# Patient Record
Sex: MALE | Race: WHITE | NOT HISPANIC OR LATINO | Employment: OTHER | URBAN - METROPOLITAN AREA
[De-identification: names, ages, dates, MRNs, and addresses within clinical notes are randomized per-mention and may not be internally consistent; named-entity substitution may affect disease eponyms.]

---

## 2019-02-05 ENCOUNTER — TELEPHONE (OUTPATIENT)
Dept: INTERNAL MEDICINE | Facility: CLINIC | Age: 49
End: 2019-02-05

## 2019-02-05 NOTE — TELEPHONE ENCOUNTER
Spoke with pts. Wife and scheduled an appointment for 3/18/19 at 3:20 to establish care   Akin/MA

## 2019-03-18 ENCOUNTER — OFFICE VISIT (OUTPATIENT)
Dept: INTERNAL MEDICINE | Facility: CLINIC | Age: 49
End: 2019-03-18
Attending: INTERNAL MEDICINE
Payer: MEDICARE

## 2019-03-18 ENCOUNTER — LAB VISIT (OUTPATIENT)
Dept: LAB | Facility: OTHER | Age: 49
End: 2019-03-18
Attending: INTERNAL MEDICINE
Payer: MEDICARE

## 2019-03-18 VITALS
BODY MASS INDEX: 36.18 KG/M2 | OXYGEN SATURATION: 97 % | HEART RATE: 115 BPM | SYSTOLIC BLOOD PRESSURE: 100 MMHG | WEIGHT: 184.31 LBS | HEIGHT: 60 IN | DIASTOLIC BLOOD PRESSURE: 72 MMHG

## 2019-03-18 DIAGNOSIS — R79.9 ABNORMAL FINDING OF BLOOD CHEMISTRY: ICD-10-CM

## 2019-03-18 DIAGNOSIS — R06.81 WITNESSED APNEIC SPELLS: ICD-10-CM

## 2019-03-18 DIAGNOSIS — G89.29 CHRONIC BILATERAL LOW BACK PAIN WITH SCIATICA, SCIATICA LATERALITY UNSPECIFIED: ICD-10-CM

## 2019-03-18 DIAGNOSIS — M54.40 CHRONIC BILATERAL LOW BACK PAIN WITH SCIATICA, SCIATICA LATERALITY UNSPECIFIED: ICD-10-CM

## 2019-03-18 DIAGNOSIS — M25.511 CHRONIC PAIN OF BOTH SHOULDERS: ICD-10-CM

## 2019-03-18 DIAGNOSIS — F43.10 PTSD (POST-TRAUMATIC STRESS DISORDER): ICD-10-CM

## 2019-03-18 DIAGNOSIS — E78.5 HYPERLIPIDEMIA, UNSPECIFIED HYPERLIPIDEMIA TYPE: ICD-10-CM

## 2019-03-18 DIAGNOSIS — E25.9 CONGENITAL ADRENAL HYPERPLASIA DUE TO 21-HYDROXYLASE DEFICIENCY (21-OH CAH), LATE ONSET: ICD-10-CM

## 2019-03-18 DIAGNOSIS — G25.0 ESSENTIAL TREMOR: ICD-10-CM

## 2019-03-18 DIAGNOSIS — G89.29 CHRONIC PAIN OF BOTH SHOULDERS: ICD-10-CM

## 2019-03-18 DIAGNOSIS — M79.7 FIBROMYALGIA: ICD-10-CM

## 2019-03-18 DIAGNOSIS — M25.512 CHRONIC PAIN OF BOTH SHOULDERS: ICD-10-CM

## 2019-03-18 DIAGNOSIS — Z00.00 ENCOUNTER FOR MEDICAL EXAMINATION TO ESTABLISH CARE: Primary | ICD-10-CM

## 2019-03-18 LAB
ALBUMIN SERPL BCP-MCNC: 3.9 G/DL
ALP SERPL-CCNC: 127 U/L
ALT SERPL W/O P-5'-P-CCNC: 23 U/L
ANION GAP SERPL CALC-SCNC: 11 MMOL/L
AST SERPL-CCNC: 20 U/L
BILIRUB SERPL-MCNC: 0.5 MG/DL
BUN SERPL-MCNC: 14 MG/DL
CALCIUM SERPL-MCNC: 10 MG/DL
CHLORIDE SERPL-SCNC: 107 MMOL/L
CHOLEST SERPL-MCNC: 218 MG/DL
CHOLEST/HDLC SERPL: 4.6 {RATIO}
CO2 SERPL-SCNC: 26 MMOL/L
CREAT SERPL-MCNC: 0.8 MG/DL
EST. GFR  (AFRICAN AMERICAN): >60 ML/MIN/1.73 M^2
EST. GFR  (NON AFRICAN AMERICAN): >60 ML/MIN/1.73 M^2
ESTIMATED AVG GLUCOSE: 120 MG/DL
GLUCOSE SERPL-MCNC: 101 MG/DL
HBA1C MFR BLD HPLC: 5.8 %
HDLC SERPL-MCNC: 47 MG/DL
HDLC SERPL: 21.6 %
LDLC SERPL CALC-MCNC: 139.2 MG/DL
NONHDLC SERPL-MCNC: 171 MG/DL
POTASSIUM SERPL-SCNC: 4.1 MMOL/L
PROT SERPL-MCNC: 6.8 G/DL
SODIUM SERPL-SCNC: 144 MMOL/L
T4 FREE SERPL-MCNC: 0.81 NG/DL
TRIGL SERPL-MCNC: 159 MG/DL
TSH SERPL DL<=0.005 MIU/L-ACNC: 0.4 UIU/ML

## 2019-03-18 PROCEDURE — 80053 COMPREHEN METABOLIC PANEL: CPT

## 2019-03-18 PROCEDURE — 99215 OFFICE O/P EST HI 40 MIN: CPT | Mod: PBBFAC | Performed by: INTERNAL MEDICINE

## 2019-03-18 PROCEDURE — 99999 PR PBB SHADOW E&M-EST. PATIENT-LVL V: CPT | Mod: PBBFAC,,, | Performed by: INTERNAL MEDICINE

## 2019-03-18 PROCEDURE — 85025 COMPLETE CBC W/AUTO DIFF WBC: CPT

## 2019-03-18 PROCEDURE — 80061 LIPID PANEL: CPT

## 2019-03-18 PROCEDURE — 99999 PR PBB SHADOW E&M-EST. PATIENT-LVL V: ICD-10-PCS | Mod: PBBFAC,,, | Performed by: INTERNAL MEDICINE

## 2019-03-18 PROCEDURE — 84443 ASSAY THYROID STIM HORMONE: CPT

## 2019-03-18 PROCEDURE — 99214 OFFICE O/P EST MOD 30 MIN: CPT | Mod: S$PBB,,, | Performed by: INTERNAL MEDICINE

## 2019-03-18 PROCEDURE — 36415 COLL VENOUS BLD VENIPUNCTURE: CPT

## 2019-03-18 PROCEDURE — 84439 ASSAY OF FREE THYROXINE: CPT

## 2019-03-18 PROCEDURE — 83036 HEMOGLOBIN GLYCOSYLATED A1C: CPT

## 2019-03-18 PROCEDURE — 99214 PR OFFICE/OUTPT VISIT, EST, LEVL IV, 30-39 MIN: ICD-10-PCS | Mod: S$PBB,,, | Performed by: INTERNAL MEDICINE

## 2019-03-18 RX ORDER — OXYCODONE AND ACETAMINOPHEN 5; 325 MG/1; MG/1
1 TABLET ORAL EVERY 8 HOURS PRN
Qty: 90 TABLET | Refills: 0 | Status: SHIPPED | OUTPATIENT
Start: 2019-03-18 | End: 2019-04-12 | Stop reason: SDUPTHER

## 2019-03-18 RX ORDER — LAMOTRIGINE 200 MG/1
200 TABLET ORAL 2 TIMES DAILY
COMMUNITY
End: 2020-01-08 | Stop reason: SDUPTHER

## 2019-03-18 RX ORDER — SALIVA STIMULANT COMB. NO.4
SPRAY, NON-AEROSOL (ML) MUCOUS MEMBRANE
Status: ON HOLD | COMMUNITY
End: 2019-09-17 | Stop reason: HOSPADM

## 2019-03-18 RX ORDER — OXYCODONE HYDROCHLORIDE 15 MG/1
15 TABLET ORAL NIGHTLY PRN
Qty: 30 TABLET | Refills: 0 | Status: SHIPPED | OUTPATIENT
Start: 2019-03-18 | End: 2019-04-12 | Stop reason: SDUPTHER

## 2019-03-18 RX ORDER — ALPRAZOLAM 1 MG/1
1 TABLET ORAL 3 TIMES DAILY PRN
COMMUNITY
End: 2019-10-14 | Stop reason: SDUPTHER

## 2019-03-18 RX ORDER — OXYCODONE HYDROCHLORIDE 15 MG/1
TABLET ORAL
COMMUNITY
End: 2019-03-18 | Stop reason: SDUPTHER

## 2019-03-18 RX ORDER — GABAPENTIN 800 MG/1
800 TABLET ORAL 2 TIMES DAILY
COMMUNITY
End: 2019-08-21 | Stop reason: SDUPTHER

## 2019-03-18 RX ORDER — ROPINIROLE 4 MG/1
4 TABLET, FILM COATED ORAL NIGHTLY
COMMUNITY
End: 2020-01-24 | Stop reason: SDUPTHER

## 2019-03-18 RX ORDER — ATORVASTATIN CALCIUM 10 MG/1
10 TABLET, FILM COATED ORAL NIGHTLY
COMMUNITY
End: 2019-06-26 | Stop reason: SDUPTHER

## 2019-03-18 RX ORDER — OXYCODONE AND ACETAMINOPHEN 5; 325 MG/1; MG/1
TABLET ORAL
COMMUNITY
End: 2019-03-18 | Stop reason: SDUPTHER

## 2019-03-18 RX ORDER — PRIMIDONE 50 MG/1
50 TABLET ORAL 3 TIMES DAILY
COMMUNITY
End: 2020-03-23 | Stop reason: SDUPTHER

## 2019-03-18 RX ORDER — NAPROXEN SODIUM 220 MG
220 TABLET ORAL NIGHTLY
Status: ON HOLD | COMMUNITY
End: 2019-09-17 | Stop reason: HOSPADM

## 2019-03-18 RX ORDER — LORATADINE 10 MG/1
10 TABLET ORAL DAILY
COMMUNITY

## 2019-03-18 RX ORDER — CYCLOBENZAPRINE HCL 10 MG
TABLET ORAL
COMMUNITY
End: 2019-03-25 | Stop reason: SDUPTHER

## 2019-03-18 NOTE — PROGRESS NOTES
Subjective:       Patient ID: Ari Santos is a 49 y.o. female.    Chief Complaint: Establish Care    Here to establish care    Chronic low back pain/Fibromyalgia/Hx of hip fracture/Cyst on spine s/p removal  -managed on chronic opiates, long acting and prn breakthrough,muscle relaxer. States PT worsened pain. Spinal stenosis-has had several MAYURI and RFA which was effective but is wearing. Multiple Missing muscles in quads, lumbar stenosis, painful neuropathy.     CAH- managed with daily prednisone. Had surgical correction around age 1 to create vagina, multiple subsequent procedures. Had breast implantation at age 15 and starting around age 16 started having corrective procedures including removal of breast implants and corrective genital procedures.    -PTSD-secondary to his primary medical Hx of CAH. He would like to establish mental health here at [facility]. MD office alone is a common trigger for him creating panic.  -RLS-Diagnosed 30+ yrs prior. requip for this  -HLD-on atorvastatin.               Review of Systems   Constitutional: Negative for activity change and unexpected weight change.   HENT: Negative for hearing loss, rhinorrhea and trouble swallowing.    Eyes: Negative for discharge and visual disturbance.   Respiratory: Negative for chest tightness and wheezing.    Cardiovascular: Negative for chest pain and palpitations.   Gastrointestinal: Negative for blood in stool, constipation, diarrhea and vomiting.   Endocrine: Negative for polydipsia and polyuria.   Genitourinary: Positive for dysuria. Negative for difficulty urinating, hematuria and menstrual problem.   Musculoskeletal: Positive for arthralgias, back pain, neck pain and neck stiffness. Negative for joint swelling.   Neurological: Positive for weakness. Negative for headaches.   Psychiatric/Behavioral: Positive for dysphoric mood and sleep disturbance. Negative for confusion and suicidal ideas. The patient is nervous/anxious.        No  past medical history on file.  No past surgical history on file.  No family history on file.  Social History     Socioeconomic History    Marital status:      Spouse name: Not on file    Number of children: Not on file    Years of education: Not on file    Highest education level: Not on file   Occupational History    Not on file   Social Needs    Financial resource strain: Not on file    Food insecurity:     Worry: Not on file     Inability: Not on file    Transportation needs:     Medical: Not on file     Non-medical: Not on file   Tobacco Use    Smoking status: Former Smoker    Smokeless tobacco: Never Used   Substance and Sexual Activity    Alcohol use: Not on file    Drug use: Not on file    Sexual activity: Not on file   Lifestyle    Physical activity:     Days per week: Not on file     Minutes per session: Not on file    Stress: Not on file   Relationships    Social connections:     Talks on phone: Not on file     Gets together: Not on file     Attends [employer] service: Not on file     Active member of club or organization: Not on file     Attends meetings of clubs or organizations: Not on file     Relationship status: Not on file    Intimate partner violence:     Fear of current or ex partner: Not on file     Emotionally abused: Not on file     Physically abused: Not on file     Forced sexual activity: Not on file   Other Topics Concern    Not on file   Social History Narrative    Not on file         Objective:      Vitals:    03/18/19 1520   BP: 100/72   Pulse: (!) 115   SpO2: 97%   Weight: 83.6 kg (184 lb 4.9 oz)   Height: 5' (1.524 m)      Physical Exam   Constitutional: She is oriented to person, place, and time. She appears well-developed and well-nourished. No distress.   HENT:   Head: Normocephalic and atraumatic.   Mouth/Throat: Oropharynx is clear and moist. No oropharyngeal exudate.   Eyes: Pupils are equal, round, and reactive to light. Conjunctivae and EOM are normal.  No scleral icterus.   Neck: No thyromegaly present.   Cardiovascular: Normal rate, regular rhythm and normal heart sounds.   No murmur heard.  Pulmonary/Chest: Effort normal and breath sounds normal. She has no wheezes. She has no rales.   Abdominal: Soft. She exhibits no distension. There is no tenderness.   Musculoskeletal: She exhibits no edema or tenderness.   Lymphadenopathy:     She has no cervical adenopathy.   Neurological: She is alert and oriented to person, place, and time.   Skin: Skin is warm and dry.   Psychiatric: She has a normal mood and affect. Her behavior is normal.       Assessment:       1. Encounter for medical examination to establish care    2. Chronic bilateral low back pain with sciatica, sciatica laterality unspecified    3. 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 Fibromyalgia    5. Essential tremor    6. Chronic pain of both shoulders    7. Hyperlipidemia, unspecified hyperlipidemia type    8. Witnessed apneic spells    9. PTSD (post-traumatic stress disorder)    10. Abnormal finding of blood chemistry         Plan:       Ari was seen today for establish care.    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 Will review chart for present anatomy and make note of appropriate cancer screening per guidelines and discuss with pt at f/u. That being said for now I will have automatic reminders for PAP and MMG removed from pt file at this time. Will ensure pt is aware of recommendations as I get more information. Care everywhere not available likely on  account of name change and lack of electronic crossover.    -     Ambulatory Referral to Endocrinology  -     Ambulatory referral to Urology    Fibromyalgia  -     Ambulatory Referral to Pain Clinic    Essential tremor  -     Ambulatory Referral to Neurology  -     Comprehensive metabolic panel; Future  -     Lipid panel; Future  -     TSH; Future  -     CBC auto differential; Future  -     Hemoglobin A1c; Future    Chronic pain of both shoulders  Pt on significant chronic opiate regimen. I made it clear to patient and partner that I will refill his medications for continuation of care and avoidance of withdrawal but that this regimen is very much outside of my comfort zone and that I will not be Rx this chronically moving forward. They were both understanding, calm and appreciative.  -     Ambulatory Referral to Pain Clinic    Hyperlipidemia, unspecified hyperlipidemia type  -     Lipid panel; Future    Witnessed apneic spells  -     Ambulatory referral to Sleep Disorders    PTSD (post-traumatic stress disorder)  -     Ambulatory Referral to Psychiatry  -     Ambulatory referral to Psychiatry  -     Ambulatory referral to Psychiatry  -     Ambulatory referral to Psychiatry    Abnormal finding of blood chemistry   -     Hemoglobin A1c; Future    Other orders  -     oxyCODONE-acetaminophen (PERCOCET) 5-325 mg per tablet; Take 1 tablet by mouth every 8 (eight) hours as needed for Pain.  -     oxyCODONE (ROXICODONE) 15 MG Tab; Take 1 tablet (15 mg total) by mouth nightly as needed for Pain.    RTC in 6 months or sooner prn            Side effects of medication(s) were discussed in detail and patient voiced understanding.  Patient will call back for any issues or complications.

## 2019-03-19 ENCOUNTER — TELEPHONE (OUTPATIENT)
Dept: INTERNAL MEDICINE | Facility: CLINIC | Age: 49
End: 2019-03-19

## 2019-03-19 DIAGNOSIS — R79.89 LOW TSH LEVEL: Primary | ICD-10-CM

## 2019-03-19 DIAGNOSIS — R73.03 PREDIABETES: ICD-10-CM

## 2019-03-19 PROBLEM — M54.42 CHRONIC BILATERAL LOW BACK PAIN WITH BILATERAL SCIATICA: Status: ACTIVE | Noted: 2019-03-19

## 2019-03-19 PROBLEM — M48.062 SPINAL STENOSIS OF LUMBAR REGION WITH NEUROGENIC CLAUDICATION: Status: ACTIVE | Noted: 2019-03-19

## 2019-03-19 PROBLEM — E25.0 CONGENITAL ADRENAL HYPERPLASIA: Status: ACTIVE | Noted: 2019-03-19

## 2019-03-19 PROBLEM — G25.2 INTENTION TREMOR: Status: ACTIVE | Noted: 2019-03-19

## 2019-03-19 PROBLEM — G25.81 RLS (RESTLESS LEGS SYNDROME): Status: ACTIVE | Noted: 2019-03-19

## 2019-03-19 PROBLEM — M79.7 FIBROMYALGIA: Status: ACTIVE | Noted: 2019-03-19

## 2019-03-19 PROBLEM — F43.10 PTSD (POST-TRAUMATIC STRESS DISORDER): Status: ACTIVE | Noted: 2019-03-19

## 2019-03-19 PROBLEM — Z87.890: Status: ACTIVE | Noted: 2019-03-19

## 2019-03-19 PROBLEM — J30.2 SEASONAL ALLERGIES: Status: ACTIVE | Noted: 2019-03-19

## 2019-03-19 PROBLEM — M54.41 CHRONIC BILATERAL LOW BACK PAIN WITH BILATERAL SCIATICA: Status: ACTIVE | Noted: 2019-03-19

## 2019-03-19 PROBLEM — G89.29 CHRONIC BILATERAL LOW BACK PAIN WITH BILATERAL SCIATICA: Status: ACTIVE | Noted: 2019-03-19

## 2019-03-19 PROBLEM — E78.5 HLD (HYPERLIPIDEMIA): Status: ACTIVE | Noted: 2019-03-19

## 2019-03-19 NOTE — TELEPHONE ENCOUNTER
Patient modifiers has been updated, registry removal for wellness registry female has been requested. Thank you.

## 2019-03-19 NOTE — TELEPHONE ENCOUNTER
Notified CCC's to remove patient from female-oriented health maintenance reminders.    Message from Dr. Mitchell:      Pt both with congenital adrenal hyperplasia and was given sex assignment surgeries as child that have since been reversed.

## 2019-03-21 ENCOUNTER — TELEPHONE (OUTPATIENT)
Dept: INTERNAL MEDICINE | Facility: CLINIC | Age: 49
End: 2019-03-21

## 2019-03-22 DIAGNOSIS — Z12.11 COLON CANCER SCREENING: ICD-10-CM

## 2019-03-25 ENCOUNTER — TELEPHONE (OUTPATIENT)
Dept: ENDOCRINOLOGY | Facility: CLINIC | Age: 49
End: 2019-03-25

## 2019-03-25 ENCOUNTER — PATIENT MESSAGE (OUTPATIENT)
Dept: INTERNAL MEDICINE | Facility: CLINIC | Age: 49
End: 2019-03-25

## 2019-03-25 NOTE — TELEPHONE ENCOUNTER
----- Message from Berkley Beckford sent at 3/25/2019  3:07 PM CDT -----  Consult    Dr Mitchell   Put in referral    Want pt to See dr Alvarez  - Complicated Case  CONGENTIAL ADRENAL  CAH-     Pt would rather wait to see Dr Alvarez      Can you find a place to work in  -   Doesn't have  To be soon  Pt will wait       Please call wife   Mrs Santos   Ph [phone]    Thanks

## 2019-03-25 NOTE — TELEPHONE ENCOUNTER
Left vm message that there are no openings at all right now with Dr Alvarez.  She can try scheduling from time to time via patient portal and I have placed him on our cancellation list.  Otherwise call back on April 1st and can schedule next available in August.  Patient cancelled June visit with Linda Shea.

## 2019-03-26 RX ORDER — CYCLOBENZAPRINE HCL 10 MG
10 TABLET ORAL 3 TIMES DAILY
Qty: 90 TABLET | Refills: 1 | Status: SHIPPED | OUTPATIENT
Start: 2019-03-26 | End: 2019-05-27 | Stop reason: SDUPTHER

## 2019-03-28 ENCOUNTER — OFFICE VISIT (OUTPATIENT)
Dept: SPINE | Facility: CLINIC | Age: 49
End: 2019-03-28
Attending: INTERNAL MEDICINE
Payer: MEDICARE

## 2019-03-28 VITALS
TEMPERATURE: 97 F | BODY MASS INDEX: 35.79 KG/M2 | SYSTOLIC BLOOD PRESSURE: 149 MMHG | DIASTOLIC BLOOD PRESSURE: 85 MMHG | HEART RATE: 105 BPM | HEIGHT: 60 IN | WEIGHT: 182.31 LBS

## 2019-03-28 DIAGNOSIS — M54.41 CHRONIC BILATERAL LOW BACK PAIN WITH BILATERAL SCIATICA: ICD-10-CM

## 2019-03-28 DIAGNOSIS — M48.062 SPINAL STENOSIS OF LUMBAR REGION WITH NEUROGENIC CLAUDICATION: Primary | ICD-10-CM

## 2019-03-28 DIAGNOSIS — F43.10 PTSD (POST-TRAUMATIC STRESS DISORDER): ICD-10-CM

## 2019-03-28 DIAGNOSIS — M54.42 CHRONIC BILATERAL LOW BACK PAIN WITH BILATERAL SCIATICA: ICD-10-CM

## 2019-03-28 DIAGNOSIS — G89.29 CHRONIC BILATERAL LOW BACK PAIN WITH BILATERAL SCIATICA: ICD-10-CM

## 2019-03-28 DIAGNOSIS — Z87.890 STATUS POST SEX REASSIGNMENT SURGERY: ICD-10-CM

## 2019-03-28 DIAGNOSIS — E25.0 CONGENITAL ADRENAL HYPERPLASIA: ICD-10-CM

## 2019-03-28 PROCEDURE — 99215 OFFICE O/P EST HI 40 MIN: CPT | Mod: PBBFAC | Performed by: ANESTHESIOLOGY

## 2019-03-28 PROCEDURE — 99204 OFFICE O/P NEW MOD 45 MIN: CPT | Mod: S$PBB,,, | Performed by: ANESTHESIOLOGY

## 2019-03-28 PROCEDURE — 99999 PR PBB SHADOW E&M-EST. PATIENT-LVL V: ICD-10-PCS | Mod: PBBFAC,,, | Performed by: ANESTHESIOLOGY

## 2019-03-28 PROCEDURE — 99204 PR OFFICE/OUTPT VISIT, NEW, LEVL IV, 45-59 MIN: ICD-10-PCS | Mod: S$PBB,,, | Performed by: ANESTHESIOLOGY

## 2019-03-28 PROCEDURE — 99999 PR PBB SHADOW E&M-EST. PATIENT-LVL V: CPT | Mod: PBBFAC,,, | Performed by: ANESTHESIOLOGY

## 2019-03-28 NOTE — PROGRESS NOTES
Chronic Pain - New Consult    Referring Physician: Siva Mitchell MD    Chief Complaint:   Chief Complaint   Patient presents with    Low-back Pain        SUBJECTIVE: Disclaimer: This note has been generated using voice-recognition software. There may be typographical errors that have been missed during proof-reading    Initial encounter:    Ari Santos presents to the clinic for the evaluation of lower back pain. The pain started 9 year ago starting indiously and symptoms have been worsening.    Brief history:  History of spinal stenosis and history of a cyst with drainage.    Patient has a pain management physician in [facility]    Pain Description:    The pain is located in the lower back area and radiates to the lower extremities bilaterally in the L5 distribution.      At BEST  5/10     At WORST  10/10 on the WORST day.      On average pain is rated as 7/10.     Today the pain is rated as 7/10    The pain is described as sharp and intermittent      Symptoms interfere with daily activity and sleeping.     Exacerbating factors: Standing, Walking, Morning and Getting out of bed/chair.      Mitigating factors medications, physical therapy and rest.     Patient reports significant motor weakness and loss of sensations.  Patient denies any suicidal or homicidal ideations    Pain Medications:  Current:  Flexeril 10mg TID  gababpentin 800mg QID  Lamictal 200mg qHS  Aleve 220mg BID  percoset 5/325  TID PRN  Oxycodone 15mg for PRN breakthrough pain (takes approximately 3/week)  Morphine 15mg BID  Xanax 1mg for 1 - 3 times/day  ropinrole 4mg    Tried in Past:  NSAIDs -with some relief  TCA -Never  SNRI -venlafaxine for depression -with side effects  Anti-convulsants -gabapentin without noticeable relief    Physical Therapy/Home Exercise: yes  -in the past,      report:  Reviewed and consistent with medication use as prescribed.    Pain Procedures: previous RFA and MAYURI, records not  available.    Chiropractor -helps in the past  Acupuncture - never  TENS unit -helps occasionally  Spinal decompression -never  Joint replacement -never    Imaging: none available for review today    History reviewed. No pertinent past medical history.  History reviewed. No pertinent surgical history.  Social History     Socioeconomic History    Marital status:      Spouse name: Not on file    Number of children: Not on file    Years of education: Not on file    Highest education level: Not on file   Occupational History    Not on file   Social Needs    Financial resource strain: Not on file    Food insecurity:     Worry: Not on file     Inability: Not on file    Transportation needs:     Medical: Not on file     Non-medical: Not on file   Tobacco Use    Smoking status: Former Smoker    Smokeless tobacco: Never Used   Substance and Sexual Activity    Alcohol use: Not on file    Drug use: Not on file    Sexual activity: Not on file   Lifestyle    Physical activity:     Days per week: Not on file     Minutes per session: Not on file    Stress: Not on file   Relationships    Social connections:     Talks on phone: Not on file     Gets together: Not on file     Attends [employer] service: Not on file     Active member of club or organization: Not on file     Attends meetings of clubs or organizations: Not on file     Relationship status: Not on file    Intimate partner violence:     Fear of current or ex partner: Not on file     Emotionally abused: Not on file     Physically abused: Not on file     Forced sexual activity: Not on file   Other Topics Concern    Not on file   Social History Narrative    Not on file     History reviewed. No pertinent family history.    Review of patient's allergies indicates:   Allergen Reactions    Penicillins Rash       Current Outpatient Medications   Medication Sig    ALPRAZolam (XANAX) 1 MG tablet     atorvastatin (LIPITOR) 10 MG tablet     cranberry  extract 500 mg Cap     cyclobenzaprine (FLEXERIL) 10 MG tablet Take 1 tablet (10 mg total) by mouth 3 (three) times daily.    gabapentin (NEURONTIN) 800 MG tablet     Lactobacillus acidophilus (ACIDOPHILUS) Cap     lamoTRIgine (LAMICTAL) 200 MG tablet     loratadine (CLARITIN) 10 mg tablet     morphine 15 mg TR12 Take 15 mg by mouth every 12 (twelve) hours.    naproxen sodium (ALEVE) 220 MG tablet     oxyCODONE (ROXICODONE) 15 MG Tab Take 1 tablet (15 mg total) by mouth nightly as needed for Pain.    oxyCODONE-acetaminophen (PERCOCET) 5-325 mg per tablet Take 1 tablet by mouth every 8 (eight) hours as needed for Pain.    primidone (MYSOLINE) 50 MG Tab     rOPINIRole (REQUIP) 4 MG tablet     sennosides/docusate sodium (STOOL SOFTENER-LAXATIVE ORAL)     zinc 50 mg Tab      No current facility-administered medications for this visit.        REVIEW OF SYSTEMS:    GENERAL:  No weight loss, malaise or fevers.  HEENT:   No recent changes in vision or hearing  NECK:  Negative for lumps, no difficulty with swallowing.  RESPIRATORY:  Negative for cough, wheezing or shortness of breath, patient denies any recent URI.  CARDIOVASCULAR:  Negative for chest pain, leg swelling or palpitations.  GI:  Negative for abdominal discomfort, blood in stools or black stools or change in bowel habits.  MUSCULOSKELETAL:  See HPI.  SKIN:  Negative for lesions, rash, and itching.  PSYCH:  Significant psychosocial stressors including PTSD for sex re-assignment surgery.  Patients sleep is disturbed secondary to pain.  HEMATOLOGY/LYMPHOLOGY:  Negative for prolonged bleeding, bruising easily or swollen nodes.  Patient is not currently taking any anti-coagulants  ENDO: No history of diabetes or thyroid dysfunction  NEURO:   No history of headaches, syncope, paralysis, seizures or tremors.  All other reviewed and negative other than HPI.    OBJECTIVE:    BP (!) 149/85   Pulse 105   Temp 96.9 °F (36.1 °C)   Ht 5' (1.524 m)   Wt 82.7  kg (182 lb 5.1 oz)   BMI 35.61 kg/m²     PHYSICAL EXAMINATION:    GENERAL: Well appearing, in no acute distress, alert and oriented x3.  PSYCH:  Mood and affect appropriate.  SKIN: Skin color, texture, turgor normal, no rashes or lesions.  HEAD/FACE:  Normocephalic, atraumatic. Cranial nerves grossly intact.  CV: RRR with palpation of the radial artery.  PULM: No evidence of respiratory difficulty, symmetric chest rise.  BACK:   There is pain with palpation over the facet joints of the lumbar spine bilaterally. There is decreased range of motion with extension to 15 degrees, and facet loading maneuvers cause reproducible pain.    EXTREMITIES: Peripheral joint ROM is full and pain free without obvious instability or laxity in all four extremities. No deformities, edema, or skin discoloration. Good capillary refill.  MUSCULOSKELETAL: Hip, and knee provocative maneuvers are negative.  There is  pain with palpation over the sacroiliac joints bilaterally.  There is no pain to palpation over the greater trochanteric bursa bilaterally.   EHL on Right 5/5, EHL on Left EHL 5/5.  5/5 strength in right ankle with plantar and dorsiflexion, 5/5 strength in left ankle with plantar and dorsiflexion, 5/5 strength with right knee flexion extension, 5/5 strength with knee flexion extension on the left. 5/5 Hip flexion on right, 5/5 hip flexion on the left. No atrophy or tone abnormalities are noted.  NEURO: Bilateral lower extremity coordination and muscle stretch reflexes are physiologic and symmetric.  Plantar response are downgoing. No clonus.  No loss of sensation is noted.  GAIT: Antalgic, ambulates with rolling walker, and has a leg length discrepancy    Lab Results   Component Value Date    WBC 6.73 03/18/2019    HGB 13.9 03/18/2019    HCT 43.8 03/18/2019     (H) 03/18/2019     03/18/2019     CMP  Sodium   Date Value Ref Range Status   03/18/2019 144 136 - 145 mmol/L Final     Potassium   Date Value Ref Range  Status   03/18/2019 4.1 3.5 - 5.1 mmol/L Final     Chloride   Date Value Ref Range Status   03/18/2019 107 95 - 110 mmol/L Final     CO2   Date Value Ref Range Status   03/18/2019 26 23 - 29 mmol/L Final     Glucose   Date Value Ref Range Status   03/18/2019 101 70 - 110 mg/dL Final     BUN, Bld   Date Value Ref Range Status   03/18/2019 14 6 - 20 mg/dL Final     Creatinine   Date Value Ref Range Status   03/18/2019 0.8 0.5 - 1.4 mg/dL Final     Calcium   Date Value Ref Range Status   03/18/2019 10.0 8.7 - 10.5 mg/dL Final     Total Protein   Date Value Ref Range Status   03/18/2019 6.8 6.0 - 8.4 g/dL Final     Albumin   Date Value Ref Range Status   03/18/2019 3.9 3.5 - 5.2 g/dL Final     Total Bilirubin   Date Value Ref Range Status   03/18/2019 0.5 0.1 - 1.0 mg/dL Final     Comment:     For infants and newborns, interpretation of results should be based  on gestational age, weight and in agreement with clinical  observations.  Premature Infant recommended reference ranges:  Up to 24 hours.............<8.0 mg/dL  Up to 48 hours............<12.0 mg/dL  3-5 days..................<15.0 mg/dL  6-29 days.................<15.0 mg/dL       Alkaline Phosphatase   Date Value Ref Range Status   03/18/2019 127 55 - 135 U/L Final     AST   Date Value Ref Range Status   03/18/2019 20 10 - 40 U/L Final     ALT   Date Value Ref Range Status   03/18/2019 23 10 - 44 U/L Final     Anion Gap   Date Value Ref Range Status   03/18/2019 11 8 - 16 mmol/L Final     eGFR if    Date Value Ref Range Status   03/18/2019 >60 >60 mL/min/1.73 m^2 Final     eGFR if non    Date Value Ref Range Status   03/18/2019 >60 >60 mL/min/1.73 m^2 Final     Comment:     Calculation used to obtain the estimated glomerular filtration  rate (eGFR) is the CKD-EPI equation.        Lab Results   Component Value Date    HGBA1C 5.8 (H) 03/18/2019     Lab Results   Component Value Date    TSH 0.398 (L) 03/18/2019     Free T4 - 0.81  (wnl)      ASSESSMENT: 49 y.o. year old female with pain, consistent with     Encounter Diagnoses   Name Primary?    Spinal stenosis of lumbar region with neurogenic claudication Yes    Status post sex reassignment surgery     PTSD (post-traumatic stress disorder)     Congenital adrenal hyperplasia     Chronic bilateral low back pain with bilateral sciatica        PLAN:   MRI lumbar spine secondary to worsening weakness in lower extremities and continued symptoms of neurogenic claudication, lumbar radiculopathy and peripheral neuropathy with EMG/NCV records which show chronic radiculopathy (based on patient report)    Flex/ext xray lumbar spine to evaluate facet arthropathy and r/o instability    De-escalate gabapentin by 1 tablet per week to see if pain is worsening, if there is no worsening in pain can continue till discontinued.    F/u after imaging to discuss results.    Obtain records from previous pain physician to review previous injections / interventions and imaging including recent EMG/NCV    Discussed the possibility of SCS for intervention in the future.    Greater than 25 minutes spent in total in todays visit with the patient, with more than half that time direct face to face counseling and education with the patient today. We discussed the disease process, prognosis, treatment plan, and risks and benefits.     Messi Cabello  03/28/2019

## 2019-03-28 NOTE — LETTER
March 28, 2019      Siva Mitchell MD  [address]  [address]  [facility] [address]           [facility] [address]  [address], [address]  [facility] [address]  Phone: [phone]  Fax: [phone]          Patient: Ari Santos   MR Number: [id number]   YOB: 1970   Date of Visit: 3/28/2019       Dear Dr. Siva Mitchell:    Thank you for referring Ari Santos to me for evaluation. Attached you will find relevant portions of my assessment and plan of care.    If you have questions, please do not hesitate to call me. I look forward to following Ari Santos along with you.    Sincerely,    Messi Cabello MD    Enclosure  CC:  No Recipients    If you would like to receive this communication electronically, please contact [email or web address] or [phone] to request more information on [employer] Link access.    For providers and/or their staff who would like to refer a patient to [facility], please contact us through our one-stop-shop provider referral line, [facility], at [phone].    If you feel you have received this communication in error or would no longer like to receive these types of communications, please e-mail [email or web address]

## 2019-03-29 ENCOUNTER — PATIENT MESSAGE (OUTPATIENT)
Dept: SPINE | Facility: CLINIC | Age: 49
End: 2019-03-29

## 2019-03-29 ENCOUNTER — TELEPHONE (OUTPATIENT)
Dept: INTERNAL MEDICINE | Facility: CLINIC | Age: 49
End: 2019-03-29

## 2019-03-29 NOTE — TELEPHONE ENCOUNTER
----- Message from Iveth Fuentes sent at 3/29/2019 11:27 AM CDT -----  Contact: [facility]/pharmacy #[id number]                                       PHARMACY  VERIFICATION  OF MEDICATIONS    Name of Who is Calling: [facility]/pharmacy #[id number]    What is the request in detail:  Pharmacist called with questions and concerns as this pertains to the amount of pain medications the above patient is taking.  PLEASE GIVE A CALL BACK AT YOUR EARLIEST CONVENIENCE.       THANKS!    Can the clinic reply by [facility]: NO    Pharmacy to Call Back:  [facility]/pharmacy #[id number] - [place] - [address]     [phone] (Phone)  [phone] (Fax)

## 2019-03-29 NOTE — TELEPHONE ENCOUNTER
Imaging sent by patient.  Copies have been made and sent to scanning.    Please review and advise.

## 2019-03-29 NOTE — LETTER
April 11, 2019    Ari Santos  [address]  [address]  [place] [address]         [facility] [address]  [place] [address]  Phone: [phone]  Fax: [phone] April 11, 2019     Patient: Ari Santos   YOB: 1970   Date of Visit: 3/29/2019       To Whom It May Concern:    I, Siva Faulkner MD am aware that Ari Santos is currently on several potentially sedating medications including but not limited to alprazolam, flexeril, several opiate formulations, Requip, and Lamictal.    If you have any questions or concerns, please don't hesitate to call.    Sincerely,        Siva Faulkner MD

## 2019-03-29 NOTE — TELEPHONE ENCOUNTER
I am aware that patient is taking multiple sedating medications including but limited to opiates, muscle relaxer, and benzodiazepines. Patient has recently established care in our system and will be establish care with appropriate specialist who will likely be Rx moving forward.

## 2019-03-29 NOTE — TELEPHONE ENCOUNTER
Spoke to pharmacist and told him the diagnosis codes for spinal stenosis of lumbar region, chronic low back pain  Pharmacist is requesting a document stating MD acknowledges the Pt is being prescribed xanax and cyclobenzaprine in order to approve the pain medicaitons/ clear red flags  Msg routed to MD

## 2019-03-29 NOTE — TELEPHONE ENCOUNTER
Spoke to pharmacist and he said that he needs documentation explaining diagnosis/reason why the Pt is being prescribed pain medications   Msg routed to MD

## 2019-04-01 ENCOUNTER — TELEPHONE (OUTPATIENT)
Dept: ENDOCRINOLOGY | Facility: CLINIC | Age: 49
End: 2019-04-01

## 2019-04-01 NOTE — TELEPHONE ENCOUNTER
Spoke with Kristy.  Can wait until next available in August.  Told her having some computer problems with those schedules and as soon as remedied, will schedule his the visit.

## 2019-04-01 NOTE — TELEPHONE ENCOUNTER
----- Message from Kim Johnson sent at 4/1/2019  8:19 AM CDT -----  Contact: Pt wife Kristy Sainz is requesting a callback from office at [phone] to get a visit scheduled has a referral from Dr Mitchell and need to be seen.     Thanks

## 2019-04-02 ENCOUNTER — OFFICE VISIT (OUTPATIENT)
Dept: INTERNAL MEDICINE | Facility: CLINIC | Age: 49
End: 2019-04-02
Attending: INTERNAL MEDICINE
Payer: MEDICARE

## 2019-04-02 VITALS
TEMPERATURE: 98 F | HEART RATE: 117 BPM | OXYGEN SATURATION: 97 % | DIASTOLIC BLOOD PRESSURE: 80 MMHG | SYSTOLIC BLOOD PRESSURE: 116 MMHG | HEIGHT: 60 IN | WEIGHT: 181.88 LBS | BODY MASS INDEX: 35.71 KG/M2

## 2019-04-02 DIAGNOSIS — B96.89 ACUTE BACTERIAL SINUSITIS: Primary | ICD-10-CM

## 2019-04-02 DIAGNOSIS — J01.90 ACUTE BACTERIAL SINUSITIS: Primary | ICD-10-CM

## 2019-04-02 PROCEDURE — 99214 OFFICE O/P EST MOD 30 MIN: CPT | Mod: PBBFAC | Performed by: INTERNAL MEDICINE

## 2019-04-02 PROCEDURE — 99213 OFFICE O/P EST LOW 20 MIN: CPT | Mod: S$PBB,,, | Performed by: INTERNAL MEDICINE

## 2019-04-02 PROCEDURE — 99213 PR OFFICE/OUTPT VISIT, EST, LEVL III, 20-29 MIN: ICD-10-PCS | Mod: S$PBB,,, | Performed by: INTERNAL MEDICINE

## 2019-04-02 PROCEDURE — 99999 PR PBB SHADOW E&M-EST. PATIENT-LVL IV: CPT | Mod: PBBFAC,,, | Performed by: INTERNAL MEDICINE

## 2019-04-02 PROCEDURE — 99999 PR PBB SHADOW E&M-EST. PATIENT-LVL IV: ICD-10-PCS | Mod: PBBFAC,,, | Performed by: INTERNAL MEDICINE

## 2019-04-02 RX ORDER — AZITHROMYCIN 250 MG/1
TABLET, FILM COATED ORAL
Qty: 6 TABLET | Refills: 0 | Status: SHIPPED | OUTPATIENT
Start: 2019-04-02 | End: 2019-04-07

## 2019-04-02 RX ORDER — FLUCONAZOLE 150 MG/1
150 TABLET ORAL DAILY
Qty: 1 TABLET | Refills: 0 | Status: SHIPPED | OUTPATIENT
Start: 2019-04-02 | End: 2019-04-03

## 2019-04-02 NOTE — PROGRESS NOTES
Subjective:       Patient ID: Ari Santos is a 49 y.o. female.    Chief Complaint: URI    Here for urgent visit    2 weeks prior developed productive cough, rhinorrhea, fatigue, listless, foul smelling, HA, sore throat, nausea, new myalgias. Patient denies SOB, chest tightness, eye pain, severe headache, inability to maintain adequate PO intake, abdominal pain, vomiting, or diarrhea. Taking dayquil.      Review of Systems   Constitutional: Positive for activity change. Negative for unexpected weight change.   HENT: Positive for rhinorrhea. Negative for hearing loss and trouble swallowing.    Eyes: Negative for discharge and visual disturbance.   Respiratory: Negative for chest tightness and wheezing.    Cardiovascular: Negative for chest pain and palpitations.   Gastrointestinal: Negative for blood in stool, constipation, diarrhea and vomiting.   Endocrine: Negative for polydipsia and polyuria.   Genitourinary: Negative for difficulty urinating, dysuria, hematuria and menstrual problem.   Musculoskeletal: Negative for arthralgias, joint swelling and neck pain.   Neurological: Positive for headaches. Negative for weakness.   Psychiatric/Behavioral: Negative for confusion and dysphoric mood.       Objective:      Vitals:    04/02/19 0757   BP: 116/80   Pulse: (!) 117   Temp: 97.8 °F (36.6 °C)   SpO2: 97%   Weight: 82.5 kg (181 lb 14.1 oz)   Height: 5' (1.524 m)      Physical Exam   Constitutional: She is oriented to person, place, and time. She appears well-developed and well-nourished.   HENT:   Head: Normocephalic and atraumatic.   Right Ear: Tympanic membrane, external ear and ear canal normal.   Left Ear: Tympanic membrane, external ear and ear canal normal.   Nose: No mucosal edema or rhinorrhea.   Mouth/Throat: No oropharyngeal exudate, posterior oropharyngeal edema or posterior oropharyngeal erythema.   Eyes: Conjunctivae and EOM are normal.   Pulmonary/Chest: Effort normal and breath sounds normal. No  respiratory distress. She has no decreased breath sounds. She has no wheezes.   Abdominal: She exhibits no distension.   Musculoskeletal: She exhibits no edema.   Lymphadenopathy:     She has no cervical adenopathy.   Neurological: She is alert and oriented to person, place, and time.   Skin: Skin is warm. No rash noted. She is diaphoretic.       Assessment:       1. Acute bacterial sinusitis        Plan:       Ari was seen today for uri.    Diagnoses and all orders for this visit:    Acute bacterial sinusitis  -     azithromycin (Z-SARAVANAN) 250 MG tablet; Take 2 tablets by mouth on day 1; Take 1 tablet by mouth on days 2-5  -     fluconazole (DIFLUCAN) 150 MG Tab; Take 1 tablet (150 mg total) by mouth once daily. for 1 day   Office and Emergency Department prompts discussed.                 Side effects of medication(s) were discussed in detail and patient voiced understanding.  Patient will call back for any issues or complications.

## 2019-04-02 NOTE — PATIENT INSTRUCTIONS
1)Antihistamines(Allegra, Claritin, Xzyal, Zyrtec)  2)Nasal Steroids (Nasocort, Rhinocort, Flonase)  3)Distilled salt water sinus rinses via neti pots or products such as Demetrio Med Sinus Rinse or Sinugator. Must wash container or device and use bottled water to avoid introducing infection.   You can can use (as directed) any combination of these three things every day of your life if needed in order to treat or control your symptoms. Brand name use of medications is not necessary

## 2019-04-10 ENCOUNTER — HOSPITAL ENCOUNTER (OUTPATIENT)
Dept: RADIOLOGY | Facility: OTHER | Age: 49
Discharge: HOME | End: 2019-04-10
Attending: ANESTHESIOLOGY
Payer: MEDICARE

## 2019-04-10 DIAGNOSIS — Z87.890 STATUS POST SEX REASSIGNMENT SURGERY: ICD-10-CM

## 2019-04-10 DIAGNOSIS — F43.10 PTSD (POST-TRAUMATIC STRESS DISORDER): ICD-10-CM

## 2019-04-10 DIAGNOSIS — M48.062 SPINAL STENOSIS OF LUMBAR REGION WITH NEUROGENIC CLAUDICATION: ICD-10-CM

## 2019-04-10 DIAGNOSIS — E25.0 CONGENITAL ADRENAL HYPERPLASIA: ICD-10-CM

## 2019-04-10 DIAGNOSIS — G89.29 CHRONIC BILATERAL LOW BACK PAIN WITH BILATERAL SCIATICA: ICD-10-CM

## 2019-04-10 DIAGNOSIS — M54.42 CHRONIC BILATERAL LOW BACK PAIN WITH BILATERAL SCIATICA: ICD-10-CM

## 2019-04-10 DIAGNOSIS — M54.41 CHRONIC BILATERAL LOW BACK PAIN WITH BILATERAL SCIATICA: ICD-10-CM

## 2019-04-10 LAB
BASOPHILS # BLD AUTO: 0.03 K/UL (ref 0–0.2)
BASOPHILS NFR BLD: 0.4 % (ref 0–1.9)
DIFFERENTIAL METHOD: ABNORMAL
EOSINOPHIL # BLD AUTO: 0 K/UL (ref 0–0.5)
EOSINOPHIL NFR BLD: 0.4 % (ref 0–8)
ERYTHROCYTE [DISTWIDTH] IN BLOOD BY AUTOMATED COUNT: 13.3 % (ref 11.5–14.5)
HCT VFR BLD AUTO: 43.8 % (ref 40–54)
HGB BLD-MCNC: 13.9 G/DL (ref 14–18)
LYMPHOCYTES # BLD AUTO: 1.2 K/UL (ref 1–4.8)
LYMPHOCYTES NFR BLD: 17.8 % (ref 18–48)
MCH RBC QN AUTO: 32.6 PG (ref 27–31)
MCHC RBC AUTO-ENTMCNC: 31.7 G/DL (ref 32–36)
MCV RBC AUTO: 103 FL (ref 82–98)
MONOCYTES # BLD AUTO: 0.3 K/UL (ref 0.3–1)
MONOCYTES NFR BLD: 4.2 % (ref 4–15)
NEUTROPHILS # BLD AUTO: 5.2 K/UL (ref 1.8–7.7)
NEUTROPHILS NFR BLD: 76.9 % (ref 38–73)
PLATELET # BLD AUTO: 336 K/UL (ref 150–350)
PMV BLD AUTO: 9.2 FL (ref 9.2–12.9)
RBC # BLD AUTO: 4.27 M/UL (ref 4.6–6.2)
WBC # BLD AUTO: 6.73 K/UL (ref 3.9–12.7)

## 2019-04-10 PROCEDURE — 72148 MRI LUMBAR SPINE WITHOUT CONTRAST: ICD-10-PCS | Mod: 26,,, | Performed by: RADIOLOGY

## 2019-04-10 PROCEDURE — 72110 XR LUMBAR SPINE 5 VIEW WITH FLEX AND EXT: ICD-10-PCS | Mod: 26,,, | Performed by: RADIOLOGY

## 2019-04-10 PROCEDURE — 72148 MRI LUMBAR SPINE W/O DYE: CPT | Mod: 26,,, | Performed by: RADIOLOGY

## 2019-04-10 PROCEDURE — 72148 MRI LUMBAR SPINE W/O DYE: CPT | Mod: TC

## 2019-04-10 PROCEDURE — 72110 X-RAY EXAM L-2 SPINE 4/>VWS: CPT | Mod: TC,FY

## 2019-04-10 PROCEDURE — 72110 X-RAY EXAM L-2 SPINE 4/>VWS: CPT | Mod: 26,,, | Performed by: RADIOLOGY

## 2019-04-11 ENCOUNTER — TELEPHONE (OUTPATIENT)
Dept: INTERNAL MEDICINE | Facility: CLINIC | Age: 49
End: 2019-04-11

## 2019-04-12 ENCOUNTER — OFFICE VISIT (OUTPATIENT)
Dept: PAIN MEDICINE | Facility: CLINIC | Age: 49
End: 2019-04-12
Payer: MEDICARE

## 2019-04-12 VITALS
SYSTOLIC BLOOD PRESSURE: 122 MMHG | HEIGHT: 60 IN | WEIGHT: 178.13 LBS | DIASTOLIC BLOOD PRESSURE: 90 MMHG | TEMPERATURE: 99 F | BODY MASS INDEX: 34.97 KG/M2 | HEART RATE: 120 BPM

## 2019-04-12 DIAGNOSIS — M47.816 LUMBAR SPONDYLOSIS: ICD-10-CM

## 2019-04-12 DIAGNOSIS — M48.00 SPINAL STENOSIS, UNSPECIFIED SPINAL REGION: ICD-10-CM

## 2019-04-12 DIAGNOSIS — G89.4 CHRONIC PAIN DISORDER: Primary | ICD-10-CM

## 2019-04-12 PROCEDURE — 99214 PR OFFICE/OUTPT VISIT, EST, LEVL IV, 30-39 MIN: ICD-10-PCS | Mod: S$PBB,,, | Performed by: NURSE PRACTITIONER

## 2019-04-12 PROCEDURE — 99213 OFFICE O/P EST LOW 20 MIN: CPT | Mod: PBBFAC | Performed by: NURSE PRACTITIONER

## 2019-04-12 PROCEDURE — 99214 OFFICE O/P EST MOD 30 MIN: CPT | Mod: S$PBB,,, | Performed by: NURSE PRACTITIONER

## 2019-04-12 PROCEDURE — 99999 PR PBB SHADOW E&M-EST. PATIENT-LVL III: ICD-10-PCS | Mod: PBBFAC,,, | Performed by: NURSE PRACTITIONER

## 2019-04-12 PROCEDURE — 99999 PR PBB SHADOW E&M-EST. PATIENT-LVL III: CPT | Mod: PBBFAC,,, | Performed by: NURSE PRACTITIONER

## 2019-04-12 RX ORDER — OXYCODONE HYDROCHLORIDE 15 MG/1
15 TABLET ORAL NIGHTLY PRN
Qty: 30 TABLET | Refills: 0 | Status: SHIPPED | OUTPATIENT
Start: 2019-04-12 | End: 2019-06-20

## 2019-04-12 RX ORDER — OXYCODONE AND ACETAMINOPHEN 5; 325 MG/1; MG/1
1 TABLET ORAL EVERY 8 HOURS PRN
Qty: 90 TABLET | Refills: 0 | Status: SHIPPED | OUTPATIENT
Start: 2019-04-12 | End: 2019-05-27 | Stop reason: SDUPTHER

## 2019-04-12 NOTE — H&P (VIEW-ONLY)
Chronic Pain - Follow up    Referring Physician: No ref. provider found    Chief Complaint:   Chief Complaint   Patient presents with    Follow-up     After imaging        SUBJECTIVE: Disclaimer: This note has been generated using voice-recognition software. There may be typographical errors that have been missed during proof-reading    Interval History 4/12/2019:  The patient returns for follow up of back pain.  We have received his records including previous lumbar and MRI.  There is no NCS/EMG report, however.  His records indicate lumbar RFAs over 6 months ago.  He reports that this was helpful for him until recently.  He had a left synovial cyst aspiration and L5-S1 TF MAYURI in the past as well.  He feels as though RFA was more helpful.  Additionally, he had an updated lumbar MRI since previous visit which does show significant arthritis and spinal stenosis.  He would like to hold off on surgical consult at this time.  He has decreased Gabapentin to 800 mg TID and has not noticed a change in pain.  He takes Percocet 5/325 mg TID PRN pain.  He also takes oxycodone 15 mg PRN, about 2-3 pills per week for severe pain.  This was a one time refill from Dr. Mitchell with intention of transition to our office.  He denies any bowel or bladder changes.  His pain today is 6/10.    Initial encounter:    Ari Santos presents to the clinic for the evaluation of lower back pain. The pain started 9 year ago starting indiously and symptoms have been worsening.    Brief history:  History of spinal stenosis and history of a cyst with drainage.    Patient has a pain management physician in [facility]    Pain Description:    The pain is located in the lower back area and radiates to the lower extremities bilaterally in the L5 distribution.      At BEST  5/10     At WORST  10/10 on the WORST day.      On average pain is rated as 7/10.     Today the pain is rated as 7/10    The pain is described as sharp and  intermittent      Symptoms interfere with daily activity and sleeping.     Exacerbating factors: Standing, Walking, Morning and Getting out of bed/chair.      Mitigating factors medications, physical therapy and rest.     Patient reports significant motor weakness and loss of sensations.  Patient denies any suicidal or homicidal ideations    Pain Medications:  Current:  Flexeril 10mg TID  gababpentin 800mg QID  Lamictal 200mg qHS  Aleve 220mg BID  percocet 5/325  TID PRN  Oxycodone 15mg for PRN breakthrough pain (takes approximately 3/week)  Morphine 15mg BID  Xanax 1mg for 1 - 3 times/day  ropinrole 4mg    Tried in Past:  NSAIDs -with some relief  TCA -Never  SNRI -venlafaxine for depression -with side effects  Anti-convulsants -gabapentin without noticeable relief    Physical Therapy/Home Exercise: yes  -in the past,      report:  Reviewed and consistent with medication use as prescribed.    Pain Procedures: previous RFA and MAYURI, records not available.    Chiropractor -helps in the past  Acupuncture - never  TENS unit -helps occasionally  Spinal decompression -never  Joint replacement -never    Imaging:     Narrative     EXAMINATION:  MRI LUMBAR SPINE WITHOUT CONTRAST    CLINICAL HISTORY:  bilateral lower extremity radiculopathy and weakness in the L4/5 distribution with neurogenic claudication;  Spinal stenosis, lumbar region with neurogenic claudication    TECHNIQUE:  Sagittal T1, T2 and STIR images as well as axial T1 and T2 weighted images were obtained through the lumbar without the administration of contrast.    COMPARISON:  None    FINDINGS:  Alignment: There loss of the normal lumbar lordosis.  Minimal grade 1 anterolisthesis of L3 on L4 and L4 on L5.  There may also be minimal retrolisthesis of L2 as relates to L3.    Vertebrae: The vertebral bodies maintain normal height and signal intensity.    Discs:  Multilevel, advanced loss of disc height is noted throughout the lumbar spine with disc  desiccation.    Cord: Normal signal.  The conus terminates at the T12-L1 level.    Degenerative findings:    T12-L1: There is a minimal diffuse disc bulge with no facet arthropathy or ligamentum flavum hypertrophy.  The central canal and neural foramen are patent.    L1-L2:There is a large diffuse disc bulge within no significant facet arthropathy.  Ligamentum flavum hypertrophy is present.  There effacement of the anterior thecal sleeve and mild central canal stenosis as well as moderate to severe right and severe left neural foraminal stenosis.    L2-L3: There is a 6 mm cystic structure in the posterior left aspect of the central canal at the level of the midbody of L2.  This effaces the anterior thecal sleeve and occupies a portion of the left neural foramen.  Additionally, there is a large diffuse disc bulge as well as severe bilateral facet arthropathy at L2-L3.  No significant ligamentum flavum hypertrophy.  Moderate central canal stenosis and mild bilateral neural foraminal stenosis is present.    L3-L4: There is a large diffuse disc bulge with severe bilateral facet arthropathy.  No significant ligamentum flavum hypertrophy there are congenitally shortened pedicles.  This results in moderate central canal stenosis, mild left and moderate right neural foraminal stenosis.    L4-L5: There is a large diffuse disc bulge with severe bilateral facet arthropathy and mild ligamentum flavum hypertrophy.  These findings result in severe central canal stenosis and left neural foraminal stenosis as well as moderate to severe right neural foraminal stenosis.    L5-S1: There is a mild diffuse disc bulge with severe right and moderate to severe left neural foraminal stenosis.  Ligamentum flavum hypertrophy is present    Paraspinal muscles & soft tissues: Normal.    Incidental findings:    -there is a small amount of fluid in the left sacroiliac joint    -2.6 cm T2 hyperintense, T1 hypointense rounded structure in the  interpolar region of the left kidney    -2.3 cm rounded, circumscribed, uniformly T2 hyperintense, T1 hypointense focus emanating from the lateral limb of the left adrenal gland    -1.1 cm, heterogeneously T2 hyperintense focus emanating from the junction of the anterior and medial limb of the right adrenal gland      Impression       1. Minimal grade 1 anterolisthesis of L3 on L4 and L4 on L5 with minimal grade 1 retrolisthesis of L2 on L3.  2. Multilevel degenerative disc and joint disease resulting in severe central canal and neural foraminal stenosis at several levels.  3. Finding on the left kidney likely represents a Bosniak category 2 cyst.  4. Indeterminate bilateral adrenal gland nodules.  Further evaluation of these nodules as well as the left kidney finding can be performed with dedicated adrenal CT or MRI.  5.  This report was flagged in Epic as abnormal.         History reviewed. No pertinent past medical history.  History reviewed. No pertinent surgical history.  Social History     Socioeconomic History    Marital status:      Spouse name: Not on file    Number of children: Not on file    Years of education: Not on file    Highest education level: Not on file   Occupational History    Not on file   Social Needs    Financial resource strain: Not very hard    Food insecurity:     Worry: Never true     Inability: Never true    Transportation needs:     Medical: No     Non-medical: No   Tobacco Use    Smoking status: Former Smoker    Smokeless tobacco: Never Used   Substance and Sexual Activity    Alcohol use: Not on file    Drug use: Not on file    Sexual activity: Not on file   Lifestyle    Physical activity:     Days per week: 3 days     Minutes per session: 20 min    Stress: To some extent   Relationships    Social connections:     Talks on phone: More than three times a week     Gets together: Once a week     Attends Jainism service: Not on file     Active member of club or  organization: No     Attends meetings of clubs or organizations: Never     Relationship status:    Other Topics Concern    Not on file   Social History Narrative    Not on file     History reviewed. No pertinent family history.    Review of patient's allergies indicates:   Allergen Reactions    Penicillins Rash       Current Outpatient Medications   Medication Sig    ALPRAZolam (XANAX) 1 MG tablet     atorvastatin (LIPITOR) 10 MG tablet     cranberry extract 500 mg Cap     cyclobenzaprine (FLEXERIL) 10 MG tablet Take 1 tablet (10 mg total) by mouth 3 (three) times daily.    gabapentin (NEURONTIN) 800 MG tablet     Lactobacillus acidophilus (ACIDOPHILUS) Cap     lamoTRIgine (LAMICTAL) 200 MG tablet     loratadine (CLARITIN) 10 mg tablet     morphine 15 mg TR12 Take 15 mg by mouth every 12 (twelve) hours.    naproxen sodium (ALEVE) 220 MG tablet     oxyCODONE (ROXICODONE) 15 MG Tab Take 1 tablet (15 mg total) by mouth nightly as needed for Pain.    oxyCODONE-acetaminophen (PERCOCET) 5-325 mg per tablet Take 1 tablet by mouth every 8 (eight) hours as needed for Pain.    primidone (MYSOLINE) 50 MG Tab     rOPINIRole (REQUIP) 4 MG tablet     sennosides/docusate sodium (STOOL SOFTENER-LAXATIVE ORAL)     zinc 50 mg Tab      No current facility-administered medications for this visit.        REVIEW OF SYSTEMS:    GENERAL:  No weight loss, malaise or fevers.  HEENT:   No recent changes in vision or hearing  NECK:  Negative for lumps, no difficulty with swallowing.  RESPIRATORY:  Negative for cough, wheezing or shortness of breath, patient denies any recent URI.  CARDIOVASCULAR:  Negative for chest pain, leg swelling or palpitations.  GI:  Negative for abdominal discomfort, blood in stools or black stools or change in bowel habits.  MUSCULOSKELETAL:  See HPI.  SKIN:  Negative for lesions, rash, and itching.  PSYCH:  Significant psychosocial stressors including PTSD for sex re-assignment surgery.   Patients sleep is disturbed secondary to pain.  HEMATOLOGY/LYMPHOLOGY:  Negative for prolonged bleeding, bruising easily or swollen nodes.  Patient is not currently taking any anti-coagulants  ENDO: No history of diabetes or thyroid dysfunction  NEURO:   No history of headaches, syncope, paralysis, seizures or tremors.  All other reviewed and negative other than HPI.    OBJECTIVE:    BP (!) 122/90   Pulse (!) 120   Temp 99.1 °F (37.3 °C)   Ht 5' (1.524 m)   Wt 80.8 kg (178 lb 2.1 oz)   BMI 34.79 kg/m²     PHYSICAL EXAMINATION:    GENERAL: Well appearing, in no acute distress, alert and oriented x3.  PSYCH:  Mood and affect appropriate.  SKIN: Skin color, texture, turgor normal, no rashes or lesions.  HEAD/FACE:  Normocephalic, atraumatic. Cranial nerves grossly intact.  CV: RRR with palpation of the radial artery.  PULM: No evidence of respiratory difficulty, symmetric chest rise.  BACK:  SLR is negative. There is pain with palpation over the facet joints of the lumbar spine bilaterally. There is decreased range of motion with extension to 15 degrees, and facet loading maneuvers cause reproducible pain.    EXTREMITIES: Peripheral joint ROM is full and pain free without obvious instability or laxity in all four extremities. No deformities, edema, or skin discoloration. Good capillary refill.  MUSCULOSKELETAL: Hip, and knee provocative maneuvers are negative.  There is  pain with palpation over the sacroiliac joints bilaterally.  There is no pain to palpation over the greater trochanteric bursa bilaterally.   EHL on Right 5/5, EHL on Left EHL 5/5.  5/5 strength in right ankle with plantar and dorsiflexion, 5/5 strength in left ankle with plantar and dorsiflexion, 5/5 strength with right knee flexion extension, 5/5 strength with knee flexion extension on the left. 5/5 Hip flexion on right, 5/5 hip flexion on the left. No atrophy or tone abnormalities are noted.  NEURO: Bilateral lower extremity coordination and  muscle stretch reflexes are physiologic and symmetric.  Plantar response are downgoing. No clonus.  No loss of sensation is noted.  GAIT: Antalgic, ambulates with rolling walker, and has a leg length discrepancy    Lab Results   Component Value Date    WBC 6.73 03/18/2019    HGB 13.9 (L) 03/18/2019    HCT 43.8 03/18/2019     (H) 03/18/2019     03/18/2019     CMP  Sodium   Date Value Ref Range Status   03/18/2019 144 136 - 145 mmol/L Final     Potassium   Date Value Ref Range Status   03/18/2019 4.1 3.5 - 5.1 mmol/L Final     Chloride   Date Value Ref Range Status   03/18/2019 107 95 - 110 mmol/L Final     CO2   Date Value Ref Range Status   03/18/2019 26 23 - 29 mmol/L Final     Glucose   Date Value Ref Range Status   03/18/2019 101 70 - 110 mg/dL Final     BUN, Bld   Date Value Ref Range Status   03/18/2019 14 6 - 20 mg/dL Final     Creatinine   Date Value Ref Range Status   03/18/2019 0.8 0.5 - 1.4 mg/dL Final     Calcium   Date Value Ref Range Status   03/18/2019 10.0 8.7 - 10.5 mg/dL Final     Total Protein   Date Value Ref Range Status   03/18/2019 6.8 6.0 - 8.4 g/dL Final     Albumin   Date Value Ref Range Status   03/18/2019 3.9 3.5 - 5.2 g/dL Final     Total Bilirubin   Date Value Ref Range Status   03/18/2019 0.5 0.1 - 1.0 mg/dL Final     Comment:     For infants and newborns, interpretation of results should be based  on gestational age, weight and in agreement with clinical  observations.  Premature Infant recommended reference ranges:  Up to 24 hours.............<8.0 mg/dL  Up to 48 hours............<12.0 mg/dL  3-5 days..................<15.0 mg/dL  6-29 days.................<15.0 mg/dL       Alkaline Phosphatase   Date Value Ref Range Status   03/18/2019 127 55 - 135 U/L Final     AST   Date Value Ref Range Status   03/18/2019 20 10 - 40 U/L Final     ALT   Date Value Ref Range Status   03/18/2019 23 10 - 44 U/L Final     Anion Gap   Date Value Ref Range Status   03/18/2019 11 8 - 16  mmol/L Final     eGFR if    Date Value Ref Range Status   03/18/2019 >60 >60 mL/min/1.73 m^2 Final     eGFR if non    Date Value Ref Range Status   03/18/2019 >60 >60 mL/min/1.73 m^2 Final     Comment:     Calculation used to obtain the estimated glomerular filtration  rate (eGFR) is the CKD-EPI equation.        Lab Results   Component Value Date    HGBA1C 5.8 (H) 03/18/2019     Lab Results   Component Value Date    TSH 0.398 (L) 03/18/2019     Free T4 - 0.81 (wnl)      ASSESSMENT: 49 y.o. year old male with pain, consistent with     Encounter Diagnoses   Name Primary?    Chronic pain disorder Yes    Lumbar spondylosis     Spinal stenosis, unspecified spinal region        PLAN:     - Previous imaging was reviewed and discussed with the patient today.    - Schedule for repeat left then right L3,4,5 RFAs.  Previous provided significant benefit for 6 months from outside provider.    - Decrease Gabapentin to 800 mg BID, from TID.    - I discussed with Dr. Cabello after the visit was complete.  We will take over Percocet 5/325 mg TID PRN pain, #90.  Will also prescribe oxycodone 15 mg PRN #30 for severe breakthrough pain.  Obtain UDS and pain contract at next OV.    - He declined neurosurgical consult at this time.  Consider in the future pending relief from above.  Also consider [place] MAYURI and SCS trial.    - RTC 4 weeks after procedures.  Can call for medications prior to this.    - Dr. Cabello was consulted on the patient and agrees with this plan.      Greater than 25 minutes spent in total in todays visit with the patient, with more than half that time direct face to face counseling and education with the patient today. We discussed the disease process, prognosis, treatment plan, and risks and benefits.     Alejandra Phoenix  04/12/2019

## 2019-04-12 NOTE — PROGRESS NOTES
Chronic Pain - Follow up    Referring Physician: No ref. provider found    Chief Complaint:   Chief Complaint   Patient presents with    Follow-up     After imaging        SUBJECTIVE: Disclaimer: This note has been generated using voice-recognition software. There may be typographical errors that have been missed during proof-reading    Interval History 4/12/2019:  The patient returns for follow up of back pain.  We have received his records including previous lumbar and MRI.  There is no NCS/EMG report, however.  His records indicate lumbar RFAs over 6 months ago.  He reports that this was helpful for him until recently.  He had a left synovial cyst aspiration and L5-S1 TF MAYURI in the past as well.  He feels as though RFA was more helpful.  Additionally, he had an updated lumbar MRI since previous visit which does show significant arthritis and spinal stenosis.  He would like to hold off on surgical consult at this time.  He has decreased Gabapentin to 800 mg TID and has not noticed a change in pain.  He takes Percocet 5/325 mg TID PRN pain.  He also takes oxycodone 15 mg PRN, about 2-3 pills per week for severe pain.  This was a one time refill from Dr. Mitchell with intention of transition to our office.  He denies any bowel or bladder changes.  His pain today is 6/10.    Initial encounter:    Ari Santos presents to the clinic for the evaluation of lower back pain. The pain started 9 year ago starting indiously and symptoms have been worsening.    Brief history:  History of spinal stenosis and history of a cyst with drainage.    Patient has a pain management physician in [facility]    Pain Description:    The pain is located in the lower back area and radiates to the lower extremities bilaterally in the L5 distribution.      At BEST  5/10     At WORST  10/10 on the WORST day.      On average pain is rated as 7/10.     Today the pain is rated as 7/10    The pain is described as sharp and  intermittent      Symptoms interfere with daily activity and sleeping.     Exacerbating factors: Standing, Walking, Morning and Getting out of bed/chair.      Mitigating factors medications, physical therapy and rest.     Patient reports significant motor weakness and loss of sensations.  Patient denies any suicidal or homicidal ideations    Pain Medications:  Current:  Flexeril 10mg TID  gababpentin 800mg QID  Lamictal 200mg qHS  Aleve 220mg BID  percocet 5/325  TID PRN  Oxycodone 15mg for PRN breakthrough pain (takes approximately 3/week)  Morphine 15mg BID  Xanax 1mg for 1 - 3 times/day  ropinrole 4mg    Tried in Past:  NSAIDs -with some relief  TCA -Never  SNRI -venlafaxine for depression -with side effects  Anti-convulsants -gabapentin without noticeable relief    Physical Therapy/Home Exercise: yes  -in the past,      report:  Reviewed and consistent with medication use as prescribed.    Pain Procedures: previous RFA and MAYURI, records not available.    Chiropractor -helps in the past  Acupuncture - never  TENS unit -helps occasionally  Spinal decompression -never  Joint replacement -never    Imaging:     Narrative     EXAMINATION:  MRI LUMBAR SPINE WITHOUT CONTRAST    CLINICAL HISTORY:  bilateral lower extremity radiculopathy and weakness in the L4/5 distribution with neurogenic claudication;  Spinal stenosis, lumbar region with neurogenic claudication    TECHNIQUE:  Sagittal T1, T2 and STIR images as well as axial T1 and T2 weighted images were obtained through the lumbar without the administration of contrast.    COMPARISON:  None    FINDINGS:  Alignment: There loss of the normal lumbar lordosis.  Minimal grade 1 anterolisthesis of L3 on L4 and L4 on L5.  There may also be minimal retrolisthesis of L2 as relates to L3.    Vertebrae: The vertebral bodies maintain normal height and signal intensity.    Discs:  Multilevel, advanced loss of disc height is noted throughout the lumbar spine with disc  desiccation.    Cord: Normal signal.  The conus terminates at the T12-L1 level.    Degenerative findings:    T12-L1: There is a minimal diffuse disc bulge with no facet arthropathy or ligamentum flavum hypertrophy.  The central canal and neural foramen are patent.    L1-L2:There is a large diffuse disc bulge within no significant facet arthropathy.  Ligamentum flavum hypertrophy is present.  There effacement of the anterior thecal sleeve and mild central canal stenosis as well as moderate to severe right and severe left neural foraminal stenosis.    L2-L3: There is a 6 mm cystic structure in the posterior left aspect of the central canal at the level of the midbody of L2.  This effaces the anterior thecal sleeve and occupies a portion of the left neural foramen.  Additionally, there is a large diffuse disc bulge as well as severe bilateral facet arthropathy at L2-L3.  No significant ligamentum flavum hypertrophy.  Moderate central canal stenosis and mild bilateral neural foraminal stenosis is present.    L3-L4: There is a large diffuse disc bulge with severe bilateral facet arthropathy.  No significant ligamentum flavum hypertrophy there are congenitally shortened pedicles.  This results in moderate central canal stenosis, mild left and moderate right neural foraminal stenosis.    L4-L5: There is a large diffuse disc bulge with severe bilateral facet arthropathy and mild ligamentum flavum hypertrophy.  These findings result in severe central canal stenosis and left neural foraminal stenosis as well as moderate to severe right neural foraminal stenosis.    L5-S1: There is a mild diffuse disc bulge with severe right and moderate to severe left neural foraminal stenosis.  Ligamentum flavum hypertrophy is present    Paraspinal muscles & soft tissues: Normal.    Incidental findings:    -there is a small amount of fluid in the left sacroiliac joint    -2.6 cm T2 hyperintense, T1 hypointense rounded structure in the  interpolar region of the left kidney    -2.3 cm rounded, circumscribed, uniformly T2 hyperintense, T1 hypointense focus emanating from the lateral limb of the left adrenal gland    -1.1 cm, heterogeneously T2 hyperintense focus emanating from the junction of the anterior and medial limb of the right adrenal gland      Impression       1. Minimal grade 1 anterolisthesis of L3 on L4 and L4 on L5 with minimal grade 1 retrolisthesis of L2 on L3.  2. Multilevel degenerative disc and joint disease resulting in severe central canal and neural foraminal stenosis at several levels.  3. Finding on the left kidney likely represents a Bosniak category 2 cyst.  4. Indeterminate bilateral adrenal gland nodules.  Further evaluation of these nodules as well as the left kidney finding can be performed with dedicated adrenal CT or MRI.  5.  This report was flagged in Epic as abnormal.         History reviewed. No pertinent past medical history.  History reviewed. No pertinent surgical history.  Social History     Socioeconomic History    Marital status:      Spouse name: Not on file    Number of children: Not on file    Years of education: Not on file    Highest education level: Not on file   Occupational History    Not on file   Social Needs    Financial resource strain: Not very hard    Food insecurity:     Worry: Never true     Inability: Never true    Transportation needs:     Medical: No     Non-medical: No   Tobacco Use    Smoking status: Former Smoker    Smokeless tobacco: Never Used   Substance and Sexual Activity    Alcohol use: Not on file    Drug use: Not on file    Sexual activity: Not on file   Lifestyle    Physical activity:     Days per week: 3 days     Minutes per session: 20 min    Stress: To some extent   Relationships    Social connections:     Talks on phone: More than three times a week     Gets together: Once a week     Attends [employer] service: Not on file     Active member of club or  organization: No     Attends meetings of clubs or organizations: Never     Relationship status:    Other Topics Concern    Not on file   Social History Narrative    Not on file     History reviewed. No pertinent family history.    Review of patient's allergies indicates:   Allergen Reactions    Penicillins Rash       Current Outpatient Medications   Medication Sig    ALPRAZolam (XANAX) 1 MG tablet     atorvastatin (LIPITOR) 10 MG tablet     cranberry extract 500 mg Cap     cyclobenzaprine (FLEXERIL) 10 MG tablet Take 1 tablet (10 mg total) by mouth 3 (three) times daily.    gabapentin (NEURONTIN) 800 MG tablet     Lactobacillus acidophilus (ACIDOPHILUS) Cap     lamoTRIgine (LAMICTAL) 200 MG tablet     loratadine (CLARITIN) 10 mg tablet     morphine 15 mg TR12 Take 15 mg by mouth every 12 (twelve) hours.    naproxen sodium (ALEVE) 220 MG tablet     oxyCODONE (ROXICODONE) 15 MG Tab Take 1 tablet (15 mg total) by mouth nightly as needed for Pain.    oxyCODONE-acetaminophen (PERCOCET) 5-325 mg per tablet Take 1 tablet by mouth every 8 (eight) hours as needed for Pain.    primidone (MYSOLINE) 50 MG Tab     rOPINIRole (REQUIP) 4 MG tablet     sennosides/docusate sodium (STOOL SOFTENER-LAXATIVE ORAL)     zinc 50 mg Tab      No current facility-administered medications for this visit.        REVIEW OF SYSTEMS:    GENERAL:  No weight loss, malaise or fevers.  HEENT:   No recent changes in vision or hearing  NECK:  Negative for lumps, no difficulty with swallowing.  RESPIRATORY:  Negative for cough, wheezing or shortness of breath, patient denies any recent URI.  CARDIOVASCULAR:  Negative for chest pain, leg swelling or palpitations.  GI:  Negative for abdominal discomfort, blood in stools or black stools or change in bowel habits.  MUSCULOSKELETAL:  See HPI.  SKIN:  Negative for lesions, rash, and itching.  PSYCH:  Significant psychosocial stressors including PTSD for sex re-assignment surgery.   Patients sleep is disturbed secondary to pain.  HEMATOLOGY/LYMPHOLOGY:  Negative for prolonged bleeding, bruising easily or swollen nodes.  Patient is not currently taking any anti-coagulants  ENDO: No history of diabetes or thyroid dysfunction  NEURO:   No history of headaches, syncope, paralysis, seizures or tremors.  All other reviewed and negative other than HPI.    OBJECTIVE:    BP (!) 122/90   Pulse (!) 120   Temp 99.1 °F (37.3 °C)   Ht 5' (1.524 m)   Wt 80.8 kg (178 lb 2.1 oz)   BMI 34.79 kg/m²     PHYSICAL EXAMINATION:    GENERAL: Well appearing, in no acute distress, alert and oriented x3.  PSYCH:  Mood and affect appropriate.  SKIN: Skin color, texture, turgor normal, no rashes or lesions.  HEAD/FACE:  Normocephalic, atraumatic. Cranial nerves grossly intact.  CV: RRR with palpation of the radial artery.  PULM: No evidence of respiratory difficulty, symmetric chest rise.  BACK:  SLR is negative. There is pain with palpation over the facet joints of the lumbar spine bilaterally. There is decreased range of motion with extension to 15 degrees, and facet loading maneuvers cause reproducible pain.    EXTREMITIES: Peripheral joint ROM is full and pain free without obvious instability or laxity in all four extremities. No deformities, edema, or skin discoloration. Good capillary refill.  MUSCULOSKELETAL: Hip, and knee provocative maneuvers are negative.  There is  pain with palpation over the sacroiliac joints bilaterally.  There is no pain to palpation over the greater trochanteric bursa bilaterally.   EHL on Right 5/5, EHL on Left EHL 5/5.  5/5 strength in right ankle with plantar and dorsiflexion, 5/5 strength in left ankle with plantar and dorsiflexion, 5/5 strength with right knee flexion extension, 5/5 strength with knee flexion extension on the left. 5/5 Hip flexion on right, 5/5 hip flexion on the left. No atrophy or tone abnormalities are noted.  NEURO: Bilateral lower extremity coordination and  muscle stretch reflexes are physiologic and symmetric.  Plantar response are downgoing. No clonus.  No loss of sensation is noted.  GAIT: Antalgic, ambulates with rolling walker, and has a leg length discrepancy    Lab Results   Component Value Date    WBC 6.73 03/18/2019    HGB 13.9 (L) 03/18/2019    HCT 43.8 03/18/2019     (H) 03/18/2019     03/18/2019     CMP  Sodium   Date Value Ref Range Status   03/18/2019 144 136 - 145 mmol/L Final     Potassium   Date Value Ref Range Status   03/18/2019 4.1 3.5 - 5.1 mmol/L Final     Chloride   Date Value Ref Range Status   03/18/2019 107 95 - 110 mmol/L Final     CO2   Date Value Ref Range Status   03/18/2019 26 23 - 29 mmol/L Final     Glucose   Date Value Ref Range Status   03/18/2019 101 70 - 110 mg/dL Final     BUN, Bld   Date Value Ref Range Status   03/18/2019 14 6 - 20 mg/dL Final     Creatinine   Date Value Ref Range Status   03/18/2019 0.8 0.5 - 1.4 mg/dL Final     Calcium   Date Value Ref Range Status   03/18/2019 10.0 8.7 - 10.5 mg/dL Final     Total Protein   Date Value Ref Range Status   03/18/2019 6.8 6.0 - 8.4 g/dL Final     Albumin   Date Value Ref Range Status   03/18/2019 3.9 3.5 - 5.2 g/dL Final     Total Bilirubin   Date Value Ref Range Status   03/18/2019 0.5 0.1 - 1.0 mg/dL Final     Comment:     For infants and newborns, interpretation of results should be based  on gestational age, weight and in agreement with clinical  observations.  Premature Infant recommended reference ranges:  Up to 24 hours.............<8.0 mg/dL  Up to 48 hours............<12.0 mg/dL  3-5 days..................<15.0 mg/dL  6-29 days.................<15.0 mg/dL       Alkaline Phosphatase   Date Value Ref Range Status   03/18/2019 127 55 - 135 U/L Final     AST   Date Value Ref Range Status   03/18/2019 20 10 - 40 U/L Final     ALT   Date Value Ref Range Status   03/18/2019 23 10 - 44 U/L Final     Anion Gap   Date Value Ref Range Status   03/18/2019 11 8 - 16  mmol/L Final     eGFR if    Date Value Ref Range Status   03/18/2019 >60 >60 mL/min/1.73 m^2 Final     eGFR if non    Date Value Ref Range Status   03/18/2019 >60 >60 mL/min/1.73 m^2 Final     Comment:     Calculation used to obtain the estimated glomerular filtration  rate (eGFR) is the CKD-EPI equation.        Lab Results   Component Value Date    HGBA1C 5.8 (H) 03/18/2019     Lab Results   Component Value Date    TSH 0.398 (L) 03/18/2019     Free T4 - 0.81 (wnl)      ASSESSMENT: 49 y.o. year old male with pain, consistent with     Encounter Diagnoses   Name Primary?    Chronic pain disorder Yes    Lumbar spondylosis     Spinal stenosis, unspecified spinal region        PLAN:     - Previous imaging was reviewed and discussed with the patient today.    - Schedule for repeat left then right L3,4,5 RFAs.  Previous provided significant benefit for 6 months from outside provider.    - Decrease Gabapentin to 800 mg BID, from TID.    - I discussed with Dr. Cabello after the visit was complete.  We will take over Percocet 5/325 mg TID PRN pain, #90.  Will also prescribe oxycodone 15 mg PRN #30 for severe breakthrough pain.  Obtain UDS and pain contract at next OV.    - He declined neurosurgical consult at this time.  Consider in the future pending relief from above.  Also consider [place] MAYURI and SCS trial.    - RTC 4 weeks after procedures.  Can call for medications prior to this.    - Dr. Cabello was consulted on the patient and agrees with this plan.      Greater than 25 minutes spent in total in todays visit with the patient, with more than half that time direct face to face counseling and education with the patient today. We discussed the disease process, prognosis, treatment plan, and risks and benefits.     Alejandra hPoenix  04/12/2019

## 2019-04-25 ENCOUNTER — PATIENT MESSAGE (OUTPATIENT)
Dept: INTERNAL MEDICINE | Facility: CLINIC | Age: 49
End: 2019-04-25

## 2019-04-26 PROBLEM — N28.1 RENAL CYST: Status: ACTIVE | Noted: 2019-04-26

## 2019-04-29 ENCOUNTER — PATIENT MESSAGE (OUTPATIENT)
Dept: PAIN MEDICINE | Facility: OTHER | Age: 49
End: 2019-04-29

## 2019-04-29 NOTE — TELEPHONE ENCOUNTER
Patient is requesting a refill for morphine,  Dr. Falcon said that prescription was supposed to be written by you now.      Please review.  Thanks

## 2019-05-02 NOTE — PROGRESS NOTES
Subjective:      Patient ID: Ari Santos is a 49 y.o. male.    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 This is his first visit with me.   Referred by Dr. Siva Mitchell. Per his last note: managed with daily prednisone. Had surgical correction around age 1 to create vagina, multiple subsequent procedures. Had breast implantation at age 15 and starting around age 16 started having corrective procedures including removal of breast implants and corrective genital procedures.    Moved to [place] a few months ago from [place].    Congenital Adrenal Hyperplasia: Per patient, genetic testing was female. Surgery 3/4 months after to remove penis (not born with testicles) and created a vagina. Raised female. At 15 had breast implants, has since been removed.   Total hysterectomy 2003. Started testosterone in 2004. Attempted to have phalloplasty in 2004, unsuccessful.     Goal is to have phalloplasty. Patient is unhappy with reconstructive surgery of breasts due to excess tissue.     Prednisone 10mg in the Am and 5mg at night  Testosterone 1 packet a day.     Happy with facial hair, no acne.      Ref. Range 3/18/2019 16:45   Hemoglobin A1C External Latest Ref Range: 4.0 - 5.6 % 5.8 (H)   Estimated Avg Glucose Latest Ref Range: 68 - 131 mg/dL 120   TSH Latest Ref Range: 0.400 - 4.000 uIU/mL 0.398 (L)   Free T4 Latest Ref Range: 0.71 - 1.51 ng/dL 0.81     BMD 3/7/18  Impression: Normal BMD    Review of Systems   Constitutional: Negative for fatigue and unexpected weight change.   Eyes: Negative for visual disturbance.   Respiratory: Negative for cough and shortness of breath.    Cardiovascular: Negative for chest pain.   Gastrointestinal: Negative for abdominal pain.   Endocrine: Negative for cold intolerance, heat  intolerance, polydipsia, polyphagia and polyuria.   Musculoskeletal: Negative for arthralgias.   Skin: Negative for wound.   Neurological: Negative for headaches.   Hematological: Does not bruise/bleed easily.   Psychiatric/Behavioral: Negative for sleep disturbance. The patient is nervous/anxious.        Objective:   Physical Exam   Constitutional: He appears well-developed.   HENT:   Right Ear: External ear normal.   Left Ear: External ear normal.   Nose: Nose normal.   Hearing Normal     Neck: No tracheal deviation present. No thyromegaly present.   Cardiovascular: Normal rate.   No murmur heard.  No edema present   Pulmonary/Chest: Effort normal and breath sounds normal.   Abdominal: Soft. He exhibits no mass. No hernia.   Neurological: He is alert. No cranial nerve deficit or sensory deficit.   Skin: No rash noted.   Psychiatric: He has a normal mood and affect. Judgment normal.   Vitals reviewed.  +gynecomastia    Body mass index is 34.53 kg/m².    Lab Review:   Lab Results   Component Value Date    HGBA1C 5.8 (H) 03/18/2019     Lab Results   Component Value Date    CHOL 218 (H) 03/18/2019    HDL 47 03/18/2019    LDLCALC 139.2 03/18/2019    TRIG 159 (H) 03/18/2019    CHOLHDL 21.6 03/18/2019     Lab Results   Component Value Date     03/18/2019    K 4.1 03/18/2019     03/18/2019    CO2 26 03/18/2019     03/18/2019    BUN 14 03/18/2019    CREATININE 0.8 03/18/2019    CALCIUM 10.0 03/18/2019    PROT 6.8 03/18/2019    ALBUMIN 3.9 03/18/2019    BILITOT 0.5 03/18/2019    ALKPHOS 127 03/18/2019    AST 20 03/18/2019    ALT 23 03/18/2019    ANIONGAP 11 03/18/2019    ESTGFRAFRICA >60 03/18/2019    EGFRNONAA >60 03/18/2019    TSH 0.397 (L) 05/06/2019       Assessment and Plan     1. Congenital adrenal hyperplasia  ACTH    17-Hydroxyprogesterone    TSH    Vitamin D    Ambulatory referral to Urogynecology    Ambulatory referral to Plastic Surgery    testosterone 1 % (25 mg/2.5gram) GlPk   2.  Hyperlipidemia, unspecified hyperlipidemia type  ACTH    17-Hydroxyprogesterone    TSH    Vitamin D    Ambulatory referral to Urogynecology    Ambulatory referral to Plastic Surgery   3. Body mass index (BMI) of 34.0-34.9 in adult   Vitamin D    Ambulatory referral to Urogynecology    Ambulatory referral to Plastic Surgery   4. Status post sex reassignment surgery     5. Chronic bilateral low back pain with bilateral sciatica     6. PTSD (post-traumatic stress disorder)     7. Abnormal thyroid blood test         Congenital adrenal hyperplasia  -- Request outside records to research in more depth specific testing and timeline of events  -- Order labs for today, 17 hydroxyprogesterone and ACTH  -- Referral to urogynecology ordered  -- Continue: prednisone 10mg in AM and 5mg at night  -- In depth discussion in revisiting the topic of tapering prednisone dose in the near future    Status post sex reassignment surgery  -- Referral to plastic surgery  -- Continue: testosterone daily     HLD (hyperlipidemia)  -- Controlled  -- Continue: statin    Chronic bilateral low back pain with bilateral sciatica  -- Continue following with Dr. Cabello    PTSD (post-traumatic stress disorder)  -- Continue following with PCP, per Dr. Soria note, referral to psychiatry has been made.     Abnormal thyroid blood test  -- Order TFTs today    Follow up in about 3 months (around 8/6/2019).  Labs today  Referral to plastic surgery and urogynecology    I, Delfina Alvarez MD,  have personally taken the history and examined the patient and agree with the resident's note as stated above.    Based on history   seems to have classic CAH and ? 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 Vagina, any erectile tissue?     Unsure why he did not have breat development as he had ovaries?     Recommend medic alert tag   Does know  about stress dosing   No recent adrenal crisis  He is on higher doses that I would expect he would need as virilization is not a concern

## 2019-05-06 ENCOUNTER — OFFICE VISIT (OUTPATIENT)
Dept: ENDOCRINOLOGY | Facility: CLINIC | Age: 49
End: 2019-05-06
Payer: MEDICARE

## 2019-05-06 ENCOUNTER — TELEPHONE (OUTPATIENT)
Dept: UROGYNECOLOGY | Facility: CLINIC | Age: 49
End: 2019-05-06

## 2019-05-06 VITALS
HEIGHT: 60 IN | HEART RATE: 110 BPM | BODY MASS INDEX: 34.71 KG/M2 | SYSTOLIC BLOOD PRESSURE: 90 MMHG | DIASTOLIC BLOOD PRESSURE: 60 MMHG | WEIGHT: 176.81 LBS

## 2019-05-06 DIAGNOSIS — G89.29 CHRONIC BILATERAL LOW BACK PAIN WITH BILATERAL SCIATICA: ICD-10-CM

## 2019-05-06 DIAGNOSIS — E78.5 HYPERLIPIDEMIA, UNSPECIFIED HYPERLIPIDEMIA TYPE: ICD-10-CM

## 2019-05-06 DIAGNOSIS — R79.89 ABNORMAL THYROID BLOOD TEST: ICD-10-CM

## 2019-05-06 DIAGNOSIS — F43.10 PTSD (POST-TRAUMATIC STRESS DISORDER): ICD-10-CM

## 2019-05-06 DIAGNOSIS — M54.41 CHRONIC BILATERAL LOW BACK PAIN WITH BILATERAL SCIATICA: ICD-10-CM

## 2019-05-06 DIAGNOSIS — M54.42 CHRONIC BILATERAL LOW BACK PAIN WITH BILATERAL SCIATICA: ICD-10-CM

## 2019-05-06 DIAGNOSIS — Z87.890 STATUS POST SEX REASSIGNMENT SURGERY: ICD-10-CM

## 2019-05-06 DIAGNOSIS — E25.0 CONGENITAL ADRENAL HYPERPLASIA: Primary | ICD-10-CM

## 2019-05-06 PROCEDURE — 99999 PR PBB SHADOW E&M-EST. PATIENT-LVL V: CPT | Mod: PBBFAC,,, | Performed by: INTERNAL MEDICINE

## 2019-05-06 PROCEDURE — 99215 OFFICE O/P EST HI 40 MIN: CPT | Mod: PBBFAC | Performed by: INTERNAL MEDICINE

## 2019-05-06 PROCEDURE — 99999 PR PBB SHADOW E&M-EST. PATIENT-LVL V: ICD-10-PCS | Mod: PBBFAC,,, | Performed by: INTERNAL MEDICINE

## 2019-05-06 PROCEDURE — 99204 PR OFFICE/OUTPT VISIT, NEW, LEVL IV, 45-59 MIN: ICD-10-PCS | Mod: S$PBB,,, | Performed by: INTERNAL MEDICINE

## 2019-05-06 PROCEDURE — 99204 OFFICE O/P NEW MOD 45 MIN: CPT | Mod: S$PBB,,, | Performed by: INTERNAL MEDICINE

## 2019-05-06 RX ORDER — PREDNISONE 5 MG/1
5 TABLET ORAL DAILY
COMMUNITY
End: 2019-06-26 | Stop reason: SDUPTHER

## 2019-05-06 RX ORDER — TESTOSTERONE 50 MG/5G
GEL TRANSDERMAL DAILY
COMMUNITY
End: 2019-05-06 | Stop reason: ALTCHOICE

## 2019-05-06 RX ORDER — TESTOSTERONE 25 MG/2.5G
1 GEL TRANSDERMAL DAILY
Qty: 30 PACKET | Refills: 4 | Status: SHIPPED | OUTPATIENT
Start: 2019-05-06 | End: 2019-05-27 | Stop reason: ALTCHOICE

## 2019-05-06 RX ORDER — TESTOSTERONE 25 MG/2.5G
1 GEL TRANSDERMAL DAILY
COMMUNITY
End: 2019-05-06

## 2019-05-06 NOTE — TELEPHONE ENCOUNTER
----- Message from Alfreda Elizabeth sent at 5/6/2019 11:40 AM CDT -----  Pt needs appt with Dr Claudia jasso/ Dr esteban .... Referral is in

## 2019-05-06 NOTE — TELEPHONE ENCOUNTER
Emmanuel Tellez,    Can you please contact pt and help her schedule a consult appointment Dr. Taylor for congenital adrenal hyperplasia referred by Dr. Alvarez.    Thank you,  Gigi

## 2019-05-06 NOTE — ASSESSMENT & PLAN NOTE
-- Request outside records to research in more depth specific testing and timeline of events  -- Order labs for today, 17 hydroxyprogesterone and ACTH  -- Referral to urogynecology ordered  -- Continue: prednisone 10mg in AM and 5mg at night  -- In depth discussion in revisiting the topic of tapering prednisone dose in the near future

## 2019-05-06 NOTE — PATIENT INSTRUCTIONS
Jasbir Hodge M.D (Sol) - [facility] plastic surgery   He has been trained in gender conforming surgeries   Please schedule a visit for a consultation.

## 2019-05-09 ENCOUNTER — HOSPITAL ENCOUNTER (OUTPATIENT)
Facility: OTHER | Age: 49
Discharge: HOME | End: 2019-05-09
Attending: ANESTHESIOLOGY | Admitting: ANESTHESIOLOGY
Payer: MEDICARE

## 2019-05-09 VITALS
TEMPERATURE: 99 F | WEIGHT: 177 LBS | HEIGHT: 60 IN | RESPIRATION RATE: 14 BRPM | BODY MASS INDEX: 34.75 KG/M2 | HEART RATE: 94 BPM | SYSTOLIC BLOOD PRESSURE: 132 MMHG | DIASTOLIC BLOOD PRESSURE: 70 MMHG | OXYGEN SATURATION: 99 %

## 2019-05-09 DIAGNOSIS — M54.41 CHRONIC BILATERAL LOW BACK PAIN WITH BILATERAL SCIATICA: Primary | ICD-10-CM

## 2019-05-09 DIAGNOSIS — M54.42 CHRONIC BILATERAL LOW BACK PAIN WITH BILATERAL SCIATICA: Primary | ICD-10-CM

## 2019-05-09 DIAGNOSIS — M48.062 SPINAL STENOSIS OF LUMBAR REGION WITH NEUROGENIC CLAUDICATION: ICD-10-CM

## 2019-05-09 DIAGNOSIS — G89.29 CHRONIC BILATERAL LOW BACK PAIN WITH BILATERAL SCIATICA: Primary | ICD-10-CM

## 2019-05-09 PROCEDURE — 64636 DESTROY L/S FACET JNT ADDL: CPT | Mod: LT,,, | Performed by: ANESTHESIOLOGY

## 2019-05-09 PROCEDURE — 25000003 PHARM REV CODE 250: Performed by: ANESTHESIOLOGY

## 2019-05-09 PROCEDURE — 99152 MOD SED SAME PHYS/QHP 5/>YRS: CPT | Mod: ,,, | Performed by: ANESTHESIOLOGY

## 2019-05-09 PROCEDURE — 63600175 PHARM REV CODE 636 W HCPCS: Performed by: ANESTHESIOLOGY

## 2019-05-09 PROCEDURE — 64636 DESTROY L/S FACET JNT ADDL: CPT | Performed by: ANESTHESIOLOGY

## 2019-05-09 PROCEDURE — 64635 PR DESTROY LUMB/SAC FACET JNT: ICD-10-PCS | Mod: LT,,, | Performed by: ANESTHESIOLOGY

## 2019-05-09 PROCEDURE — 64636 PR DESTROY L/S FACET JNT ADDL: ICD-10-PCS | Mod: LT,,, | Performed by: ANESTHESIOLOGY

## 2019-05-09 PROCEDURE — 99152 PR MOD CONSCIOUS SEDATION, SAME PHYS, 5+ YRS, FIRST 15 MIN: ICD-10-PCS | Mod: ,,, | Performed by: ANESTHESIOLOGY

## 2019-05-09 PROCEDURE — 64635 DESTROY LUMB/SAC FACET JNT: CPT | Performed by: ANESTHESIOLOGY

## 2019-05-09 PROCEDURE — 64635 DESTROY LUMB/SAC FACET JNT: CPT | Mod: LT,,, | Performed by: ANESTHESIOLOGY

## 2019-05-09 RX ORDER — SODIUM CHLORIDE 9 MG/ML
INJECTION, SOLUTION INTRAVENOUS CONTINUOUS
Status: DISCONTINUED | OUTPATIENT
Start: 2019-05-09 | End: 2019-05-09 | Stop reason: HOSPADM

## 2019-05-09 RX ORDER — LIDOCAINE HYDROCHLORIDE 10 MG/ML
INJECTION INFILTRATION; PERINEURAL
Status: DISCONTINUED | OUTPATIENT
Start: 2019-05-09 | End: 2019-05-09 | Stop reason: HOSPADM

## 2019-05-09 RX ORDER — BUPIVACAINE HYDROCHLORIDE 2.5 MG/ML
INJECTION, SOLUTION EPIDURAL; INFILTRATION; INTRACAUDAL
Status: DISCONTINUED | OUTPATIENT
Start: 2019-05-09 | End: 2019-05-09 | Stop reason: HOSPADM

## 2019-05-09 RX ORDER — MIDAZOLAM HYDROCHLORIDE 1 MG/ML
INJECTION INTRAMUSCULAR; INTRAVENOUS
Status: DISCONTINUED | OUTPATIENT
Start: 2019-05-09 | End: 2019-05-09 | Stop reason: HOSPADM

## 2019-05-09 RX ORDER — FENTANYL CITRATE 50 UG/ML
INJECTION, SOLUTION INTRAMUSCULAR; INTRAVENOUS
Status: DISCONTINUED | OUTPATIENT
Start: 2019-05-09 | End: 2019-05-09 | Stop reason: HOSPADM

## 2019-05-09 NOTE — INTERVAL H&P NOTE
The patient has been examined and the H&P has been reviewed:    I concur with the findings and no changes have occurred since H&P was written.    Anesthesia/Surgery risks, benefits and alternative options discussed and understood by patient/family.        There are no hospital problems to display for this patient.

## 2019-05-09 NOTE — DISCHARGE INSTRUCTIONS
Recovery After Procedural Sedation (Adult)  You have been given medicine by vein to make you sleep during your surgery. This may have included both a pain medicine and sleeping medicine. Most of the effects have worn off. But you may still have some drowsiness for the next 6 to 8 hours.  Home care  Follow these guidelines when you get home:  · For the next 8 hours, you should be watched by a responsible adult. This person should make sure your condition is not getting worse.  · Don't drink any alcohol for the next 24 hours.  · Don't drive, operate dangerous machinery, or make important business or personal decisions during the next 24 hours.  Note: Your healthcare provider may tell you not to take any medicine by mouth for pain or sleep in the next 4 hours. These medicines may react with the medicines you were given in the hospital. This could cause a much stronger response than usual.  Follow-up care  Follow up with your healthcare provider if you are not alert and back to your usual level of activity within 12 hours.  When to seek medical advice  Call your healthcare provider right away if any of these occur:  · Drowsiness gets worse  · Weakness or dizziness gets worse  · Repeated vomiting  · You can't be awakened   Date Last Reviewed: 10/18/2016  © 8651-5122 [employer]. [address] [place] [address], [place] [address]. All rights reserved. This information is not intended as a substitute for professional medical care. Always follow your healthcare professional's instructions.       Thank you for allowing us to care for you today. You may receive a survey about the care we provided. Your feedback is valuable and helps us provide excellent care throughout the community.     Home Care Instructions for Pain Management:    1. DIET:   You may resume your normal diet today.   2. BATHING:   You may shower with luke warm water. No tub baths or anything that will soak injection sites under water for the next 24  hours.  3. DRESSING:   You may remove your bandage today.   4. ACTIVITY LEVEL:   You may resume your normal activities 24 hrs after your procedure. Nothing strenuous today.  5. MEDICATIONS:   You may resume your normal medications today. To restart blood thinners, ask your doctor.  6. DRIVING    If you have received any sedatives by mouth today, you may not drive for 12 hours.    If you have received any sedation through your IV, you may not drive for 24 hrs.   7. SPECIAL INSTRUCTIONS:   No heat to the injection site for 24 hrs including, hot bath or shower, heating pad, moist heat, or hot tubs.    Use ice pack to injection site for any pain or discomfort.  Apply ice packs for 20 minute intervals as needed.    IF you have diabetes, be sure to monitor your blood sugar more closely. IF your injection contained steroids your blood sugar levels may become higher than normal.    If you are still having pain upon discharge:  Your pain may improve over the next 48 hours. The anesthetic (numbing medication) works immediately to 48 hours. IF your injection contained a steroid (anti-inflammatory medication), it takes approximately 3 days to start feeling relief and 7-10 days to see your greatest results from the medication. It is possible you may need subsequent injections. This would be discussed at your follow up appointment with pain management or your referring doctor.      PLEASE CALL YOUR DOCTOR IF:  1. Redness or swelling around the injection site.  2. Fever of 101 degrees or more  3. Drainage (pus) from the injection site.  4. For any continuous bleeding (some dried blood over the incision is normal.)    FOR EMERGENCIES:   If any unusual problems or difficulties occur during clinic hours, call [phone] or [phone].

## 2019-05-09 NOTE — DISCHARGE SUMMARY
Discharge Note  Short Stay      SUMMARY     Admit Date: 5/9/2019    Attending Physician: Messi Cabello      Discharge Physician: Messi Cabello      Discharge Date: 5/9/2019 10:13 AM    Procedure(s) (LRB):  RADIOFREQUENCY ABLATION, LEFT L3,4,5 (Left)    Final Diagnosis: Lumbar spondylosis [M47.816]    Disposition: Home or self care    Patient Instructions:   Current Discharge Medication List      CONTINUE these medications which have NOT CHANGED    Details   ALPRAZolam (XANAX) 1 MG tablet       atorvastatin (LIPITOR) 10 MG tablet Take 10 mg by mouth every evening.       cranberry extract 500 mg Cap       cyclobenzaprine (FLEXERIL) 10 MG tablet Take 1 tablet (10 mg total) by mouth 3 (three) times daily.  Qty: 90 tablet, Refills: 1      gabapentin (NEURONTIN) 800 MG tablet Take 800 mg by mouth 2 (two) times daily.       Lactobacillus acidophilus (ACIDOPHILUS) Cap       lamoTRIgine (LAMICTAL) 200 MG tablet Take 200 mg by mouth once daily.       loratadine (CLARITIN) 10 mg tablet       morphine 15 mg TR12 Take 15 mg by mouth every 12 (twelve) hours.  Qty: 60 tablet, Refills: 0      naproxen sodium (ALEVE) 220 MG tablet       oxyCODONE (ROXICODONE) 15 MG Tab Take 1 tablet (15 mg total) by mouth nightly as needed for Pain.  Qty: 30 tablet, Refills: 0      oxyCODONE-acetaminophen (PERCOCET) 5-325 mg per tablet Take 1 tablet by mouth every 8 (eight) hours as needed for Pain.  Qty: 90 tablet, Refills: 0      predniSONE (DELTASONE) 5 MG tablet Take 5 mg by mouth once daily. 2 tablets in the am and 1 tablet in the evening      primidone (MYSOLINE) 50 MG Tab Take 50 mg by mouth 3 (three) times daily.       rOPINIRole (REQUIP) 4 MG tablet Take 4 mg by mouth nightly.       sennosides/docusate sodium (STOOL SOFTENER-LAXATIVE ORAL)       testosterone 1 % (25 mg/2.5gram) GlPk Place 1 packet onto the skin once daily.  Qty: 30 packet, Refills: 4    Associated Diagnoses: Congenital adrenal hyperplasia      zinc 50 mg Tab                   Discharge Diagnosis: Lumbar spondylosis [M47.816]  Condition on Discharge: Stable with no complications to procedure   Diet on Discharge: Same as before.  Activity: as per instruction sheet.  Discharge to: Home with a responsible adult.  Follow up: 2-4 weeks

## 2019-05-09 NOTE — OP NOTE
Lumbar Medial nerve branch block radiofrequency ablation Under Fluoroscopy     Time-out taken to identify patient and procedure side prior to starting the procedure.     05/09/2019    PROCEDURE: Left radiofrequency ablation of the the medial branch nerves at the   transverse process of  L4, L5 and sacral ala    2)Conscious sedation provided by MD     REASON FOR PROCEDURE: Lumbar spondylosis [M47.816]     PHYSICIAN: Messi Cabello MD     ASSISTANTS: None     MEDICATIONS INJECTED: 0.25% Bupivicaine total 6 mL     LOCAL ANESTHETIC USED: Xylocaine 1% 1mL / site     ESTIMATED BLOOD LOSS: None.     COMPLICATIONS: None.     Interval history: Patient reports that he had complete relief of pain for the day of the procedure, we will proceed with the RFA     TECHNIQUE: Laying in a prone position, the patient was prepped and draped in the usual sterile fashion using ChloraPrep and fenestrated drape. The level was determined under fluoroscopic guidance. Local anesthetic was given by going down to the hub of the 27-gauge 1.25in needle and raising a wheel. A 18-gauge 10mm curved active tip needle was introduced to the anatomic local of the medial branch at each of the above levels using fluoroscopy. Then sensory and motor testing was performed to confirm that the needle tips were in the correct location. Then after negative aspiration, 1 mL of 0.25% bupivacaine was injected into each level. This was followed by thermal lesioning at 80 degrees celsius for 90 seconds.     Conscious sedation provided by M.D   The patient was monitored with continuous pulse oximetry, EKG, and intermittent blood pressure monitors. The patient was hemodynamically stable throughout the entire process was responsive to voice, and breathing spontaneously. Supplemental O2 was provided at 2L/min via nasal cannula. Patient was comfortable for the duration of the procedure. (See nurse documentation and case log for sedation time)    There was a total of 2mg  IV Midazolam and 100mcg Fentanyl titrated for the procedure    The patient tolerated the procedure well. Was able to move their leg at the knee and ankle at the conclusion of the procedure    The patient was monitored after the procedure. Patient was given post procedure and discharge instructions to follow at home. We will see the patient back in two weeks or the patient may call to inform of status. The patient was discharged in a stable condition

## 2019-05-09 NOTE — PLAN OF CARE
Problem: Adult Inpatient Plan of Care  Goal: Plan of Care Review  Pt tolerated procedure well. Pt reports 5/10 pain after procedure. Assisted pt up for first time. Steady on feet. All discharge instructions given.

## 2019-05-10 ENCOUNTER — TELEPHONE (OUTPATIENT)
Dept: ENDOCRINOLOGY | Facility: CLINIC | Age: 49
End: 2019-05-10

## 2019-05-10 NOTE — TELEPHONE ENCOUNTER
Spoke with the patient and his wife, Kristy, about decreasing his current steroid dosage from prednisone 10mg in the Am and 5mg in the Pm to ONLY prednisone 10mg in the AM.  Reviewed the lab results and the indication to the above medication changes along with adverse effects to alert us about in the event they occur.  Both verbalized understanding.

## 2019-05-23 ENCOUNTER — HOSPITAL ENCOUNTER (OUTPATIENT)
Facility: OTHER | Age: 49
Discharge: HOME | End: 2019-05-23
Attending: ANESTHESIOLOGY | Admitting: ANESTHESIOLOGY
Payer: MEDICARE

## 2019-05-23 VITALS
SYSTOLIC BLOOD PRESSURE: 145 MMHG | WEIGHT: 177 LBS | DIASTOLIC BLOOD PRESSURE: 79 MMHG | RESPIRATION RATE: 18 BRPM | HEIGHT: 60 IN | OXYGEN SATURATION: 99 % | TEMPERATURE: 99 F | HEART RATE: 97 BPM | BODY MASS INDEX: 34.75 KG/M2

## 2019-05-23 DIAGNOSIS — M47.816 LUMBAR SPONDYLOSIS: Primary | ICD-10-CM

## 2019-05-23 DIAGNOSIS — M47.816 OSTEOARTHRITIS OF LUMBAR SPINE, UNSPECIFIED SPINAL OSTEOARTHRITIS COMPLICATION STATUS: ICD-10-CM

## 2019-05-23 DIAGNOSIS — G89.29 CHRONIC PAIN: ICD-10-CM

## 2019-05-23 PROCEDURE — 64635 DESTROY LUMB/SAC FACET JNT: CPT | Mod: LT,,, | Performed by: ANESTHESIOLOGY

## 2019-05-23 PROCEDURE — 99152 MOD SED SAME PHYS/QHP 5/>YRS: CPT | Mod: ,,, | Performed by: ANESTHESIOLOGY

## 2019-05-23 PROCEDURE — 64636 PR DESTROY L/S FACET JNT ADDL: ICD-10-PCS | Mod: LT,,, | Performed by: ANESTHESIOLOGY

## 2019-05-23 PROCEDURE — 63600175 PHARM REV CODE 636 W HCPCS: Performed by: ANESTHESIOLOGY

## 2019-05-23 PROCEDURE — 64635 PR DESTROY LUMB/SAC FACET JNT: ICD-10-PCS | Mod: LT,,, | Performed by: ANESTHESIOLOGY

## 2019-05-23 PROCEDURE — 64636 DESTROY L/S FACET JNT ADDL: CPT | Mod: LT,,, | Performed by: ANESTHESIOLOGY

## 2019-05-23 PROCEDURE — 25000003 PHARM REV CODE 250: Performed by: STUDENT IN AN ORGANIZED HEALTH CARE EDUCATION/TRAINING PROGRAM

## 2019-05-23 PROCEDURE — 99152 PR MOD CONSCIOUS SEDATION, SAME PHYS, 5+ YRS, FIRST 15 MIN: ICD-10-PCS | Mod: ,,, | Performed by: ANESTHESIOLOGY

## 2019-05-23 PROCEDURE — 25000003 PHARM REV CODE 250: Performed by: ANESTHESIOLOGY

## 2019-05-23 PROCEDURE — 64635 DESTROY LUMB/SAC FACET JNT: CPT | Performed by: ANESTHESIOLOGY

## 2019-05-23 PROCEDURE — 64636 DESTROY L/S FACET JNT ADDL: CPT | Performed by: ANESTHESIOLOGY

## 2019-05-23 RX ORDER — FENTANYL CITRATE 50 UG/ML
INJECTION, SOLUTION INTRAMUSCULAR; INTRAVENOUS
Status: DISCONTINUED | OUTPATIENT
Start: 2019-05-23 | End: 2019-05-23 | Stop reason: HOSPADM

## 2019-05-23 RX ORDER — BUPIVACAINE HYDROCHLORIDE 2.5 MG/ML
INJECTION, SOLUTION EPIDURAL; INFILTRATION; INTRACAUDAL
Status: DISCONTINUED | OUTPATIENT
Start: 2019-05-23 | End: 2019-05-23 | Stop reason: HOSPADM

## 2019-05-23 RX ORDER — LIDOCAINE HYDROCHLORIDE 10 MG/ML
INJECTION INFILTRATION; PERINEURAL
Status: DISCONTINUED | OUTPATIENT
Start: 2019-05-23 | End: 2019-05-23 | Stop reason: HOSPADM

## 2019-05-23 RX ORDER — MIDAZOLAM HYDROCHLORIDE 1 MG/ML
INJECTION INTRAMUSCULAR; INTRAVENOUS
Status: DISCONTINUED | OUTPATIENT
Start: 2019-05-23 | End: 2019-05-23 | Stop reason: HOSPADM

## 2019-05-23 RX ORDER — SODIUM CHLORIDE 9 MG/ML
500 INJECTION, SOLUTION INTRAVENOUS CONTINUOUS
Status: DISCONTINUED | OUTPATIENT
Start: 2019-05-23 | End: 2019-05-23 | Stop reason: HOSPADM

## 2019-05-23 RX ADMIN — SODIUM CHLORIDE 500 ML: 0.9 INJECTION, SOLUTION INTRAVENOUS at 08:05

## 2019-05-23 NOTE — H&P
HPI  Patient presenting for  Procedure(s) (LRB):  RADIOFREQUENCY ABLATION, RIGHT L3,4,5 (Right)    No health changes since previous encounter    PMHx, PSHx, Allergies, Medications reviewed in epic    ROS negative except pain complaints in HPI    OBJECTIVE:    /88   Pulse (!) 113   Temp 98.7 °F (37.1 °C) (Oral)   Resp 20   Ht 5' (1.524 m)   Wt 80.3 kg (177 lb)   SpO2 97%   BMI 34.57 kg/m²     PHYSICAL EXAMINATION:    GENERAL: Well appearing, in no acute distress, alert and oriented x3.  PSYCH:  Mood and affect appropriate.  SKIN: Skin color, texture, turgor normal, no rashes or lesions.  CV: RRR with palpation of the radial artery.  PULM: No evidence of respiratory difficulty, symmetric chest rise. Clear to auscultation.  NEURO: Cranial nerves grossly intact.    Plan:    Proceed with procedure as planned    Kylie Floyd  05/23/2019

## 2019-05-23 NOTE — OP NOTE
Lumbar Medial nerve branch block radiofrequency ablation Under Fluoroscopy     Time-out taken to identify patient and procedure side prior to starting the procedure.     05/23/2019    PROCEDURE: Right radiofrequency ablation of the the medial branch nerves at the   transverse process of  L4, L5 and sacral ala    2)Conscious sedation provided by MD     REASON FOR PROCEDURE: Lumbar spondylosis [M47.816]     PHYSICIAN: Messi Cabello MD     ASSISTANTS: Kylie Floyd DO     MEDICATIONS INJECTED: 0.25% Bupivicaine total 6mL     LOCAL ANESTHETIC USED: Xylocaine 1% 1mL / site     ESTIMATED BLOOD LOSS: None.     COMPLICATIONS: None.     Interval history: Patient reports that he had complete relief of pain for the day of the procedure, we will proceed with the RFA     TECHNIQUE: Laying in a prone position, the patient was prepped and draped in the usual sterile fashion using ChloraPrep and fenestrated drape. The level was determined under fluoroscopic guidance. Local anesthetic was given by going down to the hub of the 27-gauge 1.25in needle and raising a wheel. A 18-gauge 10mm curved active tip needle was introduced to the anatomic local of the medial branch at each of the above levels using fluoroscopy. Then sensory and motor testing was performed to confirm that the needle tips were in the correct location. Then after negative aspiration, 1 mL of 0.25% bupivacaine was injected into each level. This was followed by thermal lesioning at 80 degrees celsius for 90 seconds.     Conscious sedation provided by M.D   The patient was monitored with continuous pulse oximetry, EKG, and intermittent blood pressure monitors. The patient was hemodynamically stable throughout the entire process was responsive to voice, and breathing spontaneously. Supplemental O2 was provided at 2L/min via nasal cannula. Patient was comfortable for the duration of the procedure. (See nurse documentation and case log for sedation time)    There was  a total of 3mg IV Midazolam and 100mcg Fentanyl titrated for the procedure    The patient tolerated the procedure well. Was able to move their leg at the knee and ankle at the conclusion of the procedure    The patient was monitored after the procedure. Patient was given post procedure and discharge instructions to follow at home. We will see the patient back in two weeks or the patient may call to inform of status. The patient was discharged in a stable condition     Attending physician was present throughout the entire case and all critical portions.

## 2019-05-23 NOTE — DISCHARGE INSTRUCTIONS
Thank you for allowing us to care for you today. You may receive a survey about the care we provided. Your feedback is valuable and helps us provide excellent care throughout the community.     Home Care Instructions for Pain Management:    1. DIET:   You may resume your normal diet today.   2. BATHING:   You may shower with luke warm water. No tub baths or anything that will soak injection sites under water for the next 24 hours.  3. DRESSING:   You may remove your bandage today.   4. ACTIVITY LEVEL:   You may resume your normal activities 24 hrs after your procedure. Nothing strenuous today.  5. MEDICATIONS:   You may resume your normal medications today. To restart blood thinners, ask your doctor.  6. DRIVING    If you have received any sedatives by mouth today, you may not drive for 12 hours.    If you have received any sedation through your IV, you may not drive for 24 hrs.   7. SPECIAL INSTRUCTIONS:   No heat to the injection site for 24 hrs including, hot bath or shower, heating pad, moist heat, or hot tubs.    Use ice pack to injection site for any pain or discomfort.  Apply ice packs for 20 minute intervals as needed.    IF you have diabetes, be sure to monitor your blood sugar more closely. IF your injection contained steroids your blood sugar levels may become higher than normal.    If you are still having pain upon discharge:  Your pain may improve over the next 48 hours. The anesthetic (numbing medication) works immediately to 48 hours. IF your injection contained a steroid (anti-inflammatory medication), it takes approximately 3 days to start feeling relief and 7-10 days to see your greatest results from the medication. It is possible you may need subsequent injections. This would be discussed at your follow up appointment with pain management or your referring doctor.      PLEASE CALL YOUR DOCTOR IF:  1. Redness or swelling around the injection site.  2. Fever of 101 degrees or more  3. Drainage  (pus) from the injection site.  4. For any continuous bleeding (some dried blood over the incision is normal.)    FOR EMERGENCIES:   If any unusual problems or difficulties occur during clinic hours, call [phone] or [phone]. Adult Procedural Sedation Instructions    Recovery After Procedural Sedation (Adult)  You have been given medicine by vein to make you sleep during your surgery. This may have included both a pain medicine and sleeping medicine. Most of the effects have worn off. But you may still have some drowsiness for the next 6 to 8 hours.  Home care  Follow these guidelines when you get home:  · For the next 8 hours, you should be watched by a responsible adult. This person should make sure your condition is not getting worse.  · Don't drink any alcohol for the next 24 hours.  · Don't drive, operate dangerous machinery, or make important business or personal decisions during the next 24 hours.  Note: Your healthcare provider may tell you not to take any medicine by mouth for pain or sleep in the next 4 hours. These medicines may react with the medicines you were given in the hospital. This could cause a much stronger response than usual.  Follow-up care  Follow up with your healthcare provider if you are not alert and back to your usual level of activity within 12 hours.  When to seek medical advice  Call your healthcare provider right away if any of these occur:  · Drowsiness gets worse  · Weakness or dizziness gets worse  · Repeated vomiting  · You can't be awakened   Date Last Reviewed: 10/18/2016  © 4976-7503 [employer]. [address] [place] [address], [place] [address]. All rights reserved. This information is not intended as a substitute for professional medical care. Always follow your healthcare professional's instructions.

## 2019-05-23 NOTE — DISCHARGE SUMMARY
Discharge Note  Short Stay      SUMMARY     Admit Date: 5/23/2019    Attending Physician: Kylie Floyd      Discharge Physician: Kylie Floyd      Discharge Date: 5/23/2019 9:49 AM    Procedure(s) (LRB):  RADIOFREQUENCY ABLATION, RIGHT L3,4,5 (Right)    Final Diagnosis: Lumbar spondylosis [M47.816]    Disposition: Home or self care    Patient Instructions:   Current Discharge Medication List      CONTINUE these medications which have NOT CHANGED    Details   ALPRAZolam (XANAX) 1 MG tablet       atorvastatin (LIPITOR) 10 MG tablet Take 10 mg by mouth every evening.       cranberry extract 500 mg Cap       cyclobenzaprine (FLEXERIL) 10 MG tablet Take 1 tablet (10 mg total) by mouth 3 (three) times daily.  Qty: 90 tablet, Refills: 1      gabapentin (NEURONTIN) 800 MG tablet Take 800 mg by mouth 2 (two) times daily.       Lactobacillus acidophilus (ACIDOPHILUS) Cap       lamoTRIgine (LAMICTAL) 200 MG tablet Take 200 mg by mouth once daily.       loratadine (CLARITIN) 10 mg tablet       morphine 15 mg TR12 Take 15 mg by mouth every 12 (twelve) hours.  Qty: 60 tablet, Refills: 0      naproxen sodium (ALEVE) 220 MG tablet       oxyCODONE (ROXICODONE) 15 MG Tab Take 1 tablet (15 mg total) by mouth nightly as needed for Pain.  Qty: 30 tablet, Refills: 0      oxyCODONE-acetaminophen (PERCOCET) 5-325 mg per tablet Take 1 tablet by mouth every 8 (eight) hours as needed for Pain.  Qty: 90 tablet, Refills: 0      predniSONE (DELTASONE) 5 MG tablet Take 5 mg by mouth once daily. 2 tablets in the am and 1 tablet in the evening      primidone (MYSOLINE) 50 MG Tab Take 50 mg by mouth 3 (three) times daily.       rOPINIRole (REQUIP) 4 MG tablet Take 4 mg by mouth nightly.       sennosides/docusate sodium (STOOL SOFTENER-LAXATIVE ORAL)       testosterone 1 % (25 mg/2.5gram) GlPk Place 1 packet onto the skin once daily.  Qty: 30 packet, Refills: 4    Associated Diagnoses: Congenital adrenal hyperplasia      zinc 50 mg Tab                   Discharge Diagnosis: Lumbar spondylosis [M47.816]  Condition on Discharge: Stable with no complications to procedure   Diet on Discharge: Same as before.  Activity: as per instruction sheet.  Discharge to: Home with a responsible adult.  Follow up: 2-4 weeks

## 2019-05-26 ENCOUNTER — PATIENT MESSAGE (OUTPATIENT)
Dept: SPINE | Facility: CLINIC | Age: 49
End: 2019-05-26

## 2019-05-26 ENCOUNTER — PATIENT MESSAGE (OUTPATIENT)
Dept: ENDOCRINOLOGY | Facility: CLINIC | Age: 49
End: 2019-05-26

## 2019-05-26 DIAGNOSIS — M54.40 CHRONIC LOW BACK PAIN WITH SCIATICA, SCIATICA LATERALITY UNSPECIFIED, UNSPECIFIED BACK PAIN LATERALITY: Primary | ICD-10-CM

## 2019-05-26 DIAGNOSIS — G89.29 CHRONIC LOW BACK PAIN WITH SCIATICA, SCIATICA LATERALITY UNSPECIFIED, UNSPECIFIED BACK PAIN LATERALITY: Primary | ICD-10-CM

## 2019-05-27 ENCOUNTER — PATIENT MESSAGE (OUTPATIENT)
Dept: ENDOCRINOLOGY | Facility: CLINIC | Age: 49
End: 2019-05-27

## 2019-05-27 DIAGNOSIS — Z87.890 STATUS POST SEX REASSIGNMENT SURGERY: Primary | ICD-10-CM

## 2019-05-27 RX ORDER — TESTOSTERONE GEL, 1% 10 MG/G
1 GEL TRANSDERMAL DAILY
Qty: 30 PACKET | Refills: 4 | Status: SHIPPED | OUTPATIENT
Start: 2019-05-27 | End: 2019-07-09 | Stop reason: SDUPTHER

## 2019-05-27 NOTE — TELEPHONE ENCOUNTER
Patient is requesting a refill for Flexeril and oxycodones instead of taking the 15mg oxycodone he is asking for a prescription for 75 10 mg.     Patient last office visit:  04/12/2019    Patient last UDS:  No UDS on File    Patient last refill for Flexeril:  03/26/2019    Patient last refill for Oxycodone:  04/30/2019    Please review.  Thanks

## 2019-05-27 NOTE — TELEPHONE ENCOUNTER
Spoke with the patients spouse to confirm dosage of testosterone.  Per patients spouse, the patient feels better on the 50mg/5g testosterone gel as opposed to the 25mg/2.5gram dosage.  They report that they accidentally reported the wrong dosage during medication reconciliation during their last visit.  Will send in a new Rx to their pharmacy.  All of their questions were answered.

## 2019-05-28 RX ORDER — CYCLOBENZAPRINE HCL 10 MG
10 TABLET ORAL 3 TIMES DAILY
Qty: 90 TABLET | Refills: 1 | Status: SHIPPED | OUTPATIENT
Start: 2019-05-28 | End: 2019-07-29 | Stop reason: SDUPTHER

## 2019-05-28 RX ORDER — OXYCODONE AND ACETAMINOPHEN 5; 325 MG/1; MG/1
1 TABLET ORAL EVERY 8 HOURS PRN
Qty: 90 TABLET | Refills: 0 | Status: SHIPPED | OUTPATIENT
Start: 2019-05-28 | End: 2019-06-20

## 2019-05-28 NOTE — TELEPHONE ENCOUNTER
Contacted and spoke with patient wife informing her that patient medication was called into his pharmacy.    Patient wife verbalized understanding.

## 2019-06-20 ENCOUNTER — OFFICE VISIT (OUTPATIENT)
Dept: PAIN MEDICINE | Facility: CLINIC | Age: 49
End: 2019-06-20
Payer: MEDICARE

## 2019-06-20 VITALS
DIASTOLIC BLOOD PRESSURE: 84 MMHG | TEMPERATURE: 99 F | BODY MASS INDEX: 33.76 KG/M2 | HEIGHT: 60 IN | HEART RATE: 112 BPM | SYSTOLIC BLOOD PRESSURE: 125 MMHG | WEIGHT: 171.94 LBS

## 2019-06-20 DIAGNOSIS — M54.41 CHRONIC BILATERAL LOW BACK PAIN WITH BILATERAL SCIATICA: ICD-10-CM

## 2019-06-20 DIAGNOSIS — M54.42 CHRONIC BILATERAL LOW BACK PAIN WITH BILATERAL SCIATICA: ICD-10-CM

## 2019-06-20 DIAGNOSIS — G89.29 CHRONIC BILATERAL LOW BACK PAIN WITH BILATERAL SCIATICA: ICD-10-CM

## 2019-06-20 DIAGNOSIS — M47.816 LUMBAR SPONDYLOSIS: Primary | ICD-10-CM

## 2019-06-20 DIAGNOSIS — G89.4 CHRONIC PAIN SYNDROME: ICD-10-CM

## 2019-06-20 DIAGNOSIS — M47.816 OSTEOARTHRITIS OF LUMBAR SPINE, UNSPECIFIED SPINAL OSTEOARTHRITIS COMPLICATION STATUS: ICD-10-CM

## 2019-06-20 DIAGNOSIS — M48.062 SPINAL STENOSIS OF LUMBAR REGION WITH NEUROGENIC CLAUDICATION: ICD-10-CM

## 2019-06-20 PROCEDURE — 99999 PR PBB SHADOW E&M-EST. PATIENT-LVL III: ICD-10-PCS | Mod: PBBFAC,,, | Performed by: NURSE PRACTITIONER

## 2019-06-20 PROCEDURE — 99999 PR PBB SHADOW E&M-EST. PATIENT-LVL III: CPT | Mod: PBBFAC,,, | Performed by: NURSE PRACTITIONER

## 2019-06-20 PROCEDURE — 99214 PR OFFICE/OUTPT VISIT, EST, LEVL IV, 30-39 MIN: ICD-10-PCS | Mod: S$PBB,,, | Performed by: NURSE PRACTITIONER

## 2019-06-20 PROCEDURE — 99213 OFFICE O/P EST LOW 20 MIN: CPT | Mod: PBBFAC | Performed by: NURSE PRACTITIONER

## 2019-06-20 PROCEDURE — 99214 OFFICE O/P EST MOD 30 MIN: CPT | Mod: S$PBB,,, | Performed by: NURSE PRACTITIONER

## 2019-06-20 NOTE — PROGRESS NOTES
Chronic Pain - Follow up    Referring Physician: No ref. provider found    Chief Complaint:   No chief complaint on file.       SUBJECTIVE: Disclaimer: This note has been generated using voice-recognition software. There may be typographical errors that have been missed during proof-reading    Interval History 6/20/2019:  The patient is here for follow up of lower back pain.  He is s/p lumbar RFAs.  He is reporting minimal benefit of pain.  He feels as though previous RFAs from another provider were helpful.  However, he is reporting pain across the lower back with radiation down the back of both legs.  We previously discussed a surgical referral and he would like to pursue this option.  He has been taking Percocet 5/325 mg TID as well as oxycodone 15 mg for severe breakthrough pain.  He feels as though the 15 mg make him hyper and unable to sleep.  He would like to adjust the medications.  Additionally, he was weaning Gabapentin 800 mg and is now taking it BID.  However, he feels as though his shooting pain has worsened since this.  His pain today is 6/10.    Interval History 4/12/2019:  The patient returns for follow up of back pain.  We have received his records including previous lumbar and MRI.  There is no NCS/EMG report, however.  His records indicate lumbar RFAs over 6 months ago.  He reports that this was helpful for him until recently.  He had a left synovial cyst aspiration and L5-S1 TF MAYURI in the past as well.  He feels as though RFA was more helpful.  Additionally, he had an updated lumbar MRI since previous visit which does show significant arthritis and spinal stenosis.  He would like to hold off on surgical consult at this time.  He has decreased Gabapentin to 800 mg TID and has not noticed a change in pain.  He takes Percocet 5/325 mg TID PRN pain.  He also takes oxycodone 15 mg PRN, about 2-3 pills per week for severe pain.  This was a one time refill from Dr. Mitchell with intention of transition  to our office.  He denies any bowel or bladder changes.  His pain today is 6/10.    Initial encounter:    Ari Santos presents to the clinic for the evaluation of lower back pain. The pain started 9 year ago starting indiously and symptoms have been worsening.    Brief history:  History of spinal stenosis and history of a cyst with drainage.    Patient has a pain management physician in [facility]    Pain Description:    The pain is located in the lower back area and radiates to the lower extremities bilaterally in the L5 distribution.      At BEST  5/10     At WORST  10/10 on the WORST day.      On average pain is rated as 7/10.     Today the pain is rated as 7/10    The pain is described as sharp and intermittent      Symptoms interfere with daily activity and sleeping.     Exacerbating factors: Standing, Walking, Morning and Getting out of bed/chair.      Mitigating factors medications, physical therapy and rest.     Patient reports significant motor weakness and loss of sensations.  Patient denies any suicidal or homicidal ideations    Pain Medications:  Current:  Flexeril 10mg TID  Gabapentin 800mg BID  Lamictal 200mg qHS  Aleve 220mg BID  Percocet 5/325  TID PRN  Oxycodone 15mg for PRN breakthrough pain (takes approximately 3/week)  Xanax 1mg for 1 - 3 times/day  ropinrole 4mg    Tried in Past:  NSAIDs -with some relief  TCA -Never  SNRI -venlafaxine for depression -with side effects  Anti-convulsants -gabapentin     Physical Therapy/Home Exercise: yes completed last year with limited benefit     report:  Reviewed and consistent with medication use as prescribed.    Pain Procedures: previous RFA and MAYURI  5/9/19 Left L3,4,5 RFA  5/23/19 Right L3,4,5 RFA    Chiropractor -helps in the past  Acupuncture - never  TENS unit -helps occasionally  Spinal decompression -never  Joint replacement -never    Imaging:     Narrative     EXAMINATION:  MRI LUMBAR SPINE WITHOUT CONTRAST    CLINICAL  HISTORY:  bilateral lower extremity radiculopathy and weakness in the L4/5 distribution with neurogenic claudication;  Spinal stenosis, lumbar region with neurogenic claudication    TECHNIQUE:  Sagittal T1, T2 and STIR images as well as axial T1 and T2 weighted images were obtained through the lumbar without the administration of contrast.    COMPARISON:  None    FINDINGS:  Alignment: There loss of the normal lumbar lordosis.  Minimal grade 1 anterolisthesis of L3 on L4 and L4 on L5.  There may also be minimal retrolisthesis of L2 as relates to L3.    Vertebrae: The vertebral bodies maintain normal height and signal intensity.    Discs:  Multilevel, advanced loss of disc height is noted throughout the lumbar spine with disc desiccation.    Cord: Normal signal.  The conus terminates at the T12-L1 level.    Degenerative findings:    T12-L1: There is a minimal diffuse disc bulge with no facet arthropathy or ligamentum flavum hypertrophy.  The central canal and neural foramen are patent.    L1-L2:There is a large diffuse disc bulge within no significant facet arthropathy.  Ligamentum flavum hypertrophy is present.  There effacement of the anterior thecal sleeve and mild central canal stenosis as well as moderate to severe right and severe left neural foraminal stenosis.    L2-L3: There is a 6 mm cystic structure in the posterior left aspect of the central canal at the level of the midbody of L2.  This effaces the anterior thecal sleeve and occupies a portion of the left neural foramen.  Additionally, there is a large diffuse disc bulge as well as severe bilateral facet arthropathy at L2-L3.  No significant ligamentum flavum hypertrophy.  Moderate central canal stenosis and mild bilateral neural foraminal stenosis is present.    L3-L4: There is a large diffuse disc bulge with severe bilateral facet arthropathy.  No significant ligamentum flavum hypertrophy there are congenitally shortened pedicles.  This results in  moderate central canal stenosis, mild left and moderate right neural foraminal stenosis.    L4-L5: There is a large diffuse disc bulge with severe bilateral facet arthropathy and mild ligamentum flavum hypertrophy.  These findings result in severe central canal stenosis and left neural foraminal stenosis as well as moderate to severe right neural foraminal stenosis.    L5-S1: There is a mild diffuse disc bulge with severe right and moderate to severe left neural foraminal stenosis.  Ligamentum flavum hypertrophy is present    Paraspinal muscles & soft tissues: Normal.    Incidental findings:    -there is a small amount of fluid in the left sacroiliac joint    -2.6 cm T2 hyperintense, T1 hypointense rounded structure in the interpolar region of the left kidney    -2.3 cm rounded, circumscribed, uniformly T2 hyperintense, T1 hypointense focus emanating from the lateral limb of the left adrenal gland    -1.1 cm, heterogeneously T2 hyperintense focus emanating from the junction of the anterior and medial limb of the right adrenal gland      Impression       1. Minimal grade 1 anterolisthesis of L3 on L4 and L4 on L5 with minimal grade 1 retrolisthesis of L2 on L3.  2. Multilevel degenerative disc and joint disease resulting in severe central canal and neural foraminal stenosis at several levels.  3. Finding on the left kidney likely represents a Bosniak category 2 cyst.  4. Indeterminate bilateral adrenal gland nodules.  Further evaluation of these nodules as well as the left kidney finding can be performed with dedicated adrenal CT or MRI.  5.  This report was flagged in Epic as abnormal.         Past Medical History:   Diagnosis Date    Adrenal insufficiency     Arthritis     Congenital adrenal hyperplasia     Hyperlipidemia     Spinal stenosis      Past Surgical History:   Procedure Laterality Date    RADIOFREQUENCY ABLATION, LEFT L3,4,5 Left 5/9/2019    [facility]     RADIOFREQUENCY ABLATION, RIGHT L3,4,5 Right 5/23/2019    Performed [facility] at [facility]     Social History     Socioeconomic History    Marital status:      Spouse name: Not on file    Number of children: Not on file    Years of education: Not on file    Highest education level: Not on file   Occupational History    Not on file   Social Needs    Financial resource strain: Not very hard    Food insecurity:     Worry: Never true     Inability: Never true    Transportation needs:     Medical: No     Non-medical: No   Tobacco Use    Smoking status: Former Smoker    Smokeless tobacco: Former User     Quit date: 5/1/2009    Tobacco comment: Socailly   Substance and Sexual Activity    Alcohol use: Not Currently     Frequency: Never     Binge frequency: Never    Drug use: Never    Sexual activity: Yes     Partners: Female   Lifestyle    Physical activity:     Days per week: 3 days     Minutes per session: 20 min    Stress: To some extent   Relationships    Social connections:     Talks on phone: More than three times a week     Gets together: Once a week     Attends Zoroastrianism service: Not on file     Active member of club or organization: No     Attends meetings of clubs or organizations: Never     Relationship status:    Other Topics Concern    Not on file   Social History Narrative    Not on file     No family history on file.    Review of patient's allergies indicates:   Allergen Reactions    Penicillins Rash       Current Outpatient Medications   Medication Sig    ALPRAZolam (XANAX) 1 MG tablet     atorvastatin (LIPITOR) 10 MG tablet Take 10 mg by mouth every evening.     cranberry extract 500 mg Cap     cyclobenzaprine (FLEXERIL) 10 MG tablet Take 1 tablet (10 mg total) by mouth 3 (three) times daily.    gabapentin (NEURONTIN) 800 MG tablet Take 800 mg by mouth 2 (two) times daily.     Lactobacillus acidophilus (ACIDOPHILUS) Cap     lamoTRIgine (LAMICTAL)  200 MG tablet Take 200 mg by mouth once daily.     loratadine (CLARITIN) 10 mg tablet     morphine 15 mg TR12 Take 15 mg by mouth every 12 (twelve) hours.    naproxen sodium (ALEVE) 220 MG tablet     oxyCODONE (ROXICODONE) 15 MG Tab Take 1 tablet (15 mg total) by mouth nightly as needed for Pain.    oxyCODONE-acetaminophen (PERCOCET) 5-325 mg per tablet Take 1 tablet by mouth every 8 (eight) hours as needed for Pain.    predniSONE (DELTASONE) 5 MG tablet Take 5 mg by mouth once daily. 2 tablets in the am and 1 tablet in the evening    primidone (MYSOLINE) 50 MG Tab Take 50 mg by mouth 3 (three) times daily.     rOPINIRole (REQUIP) 4 MG tablet Take 4 mg by mouth nightly.     sennosides/docusate sodium (STOOL SOFTENER-LAXATIVE ORAL)     testosterone (ANDROGEL) 1 % (50 mg/5 gram) GlPk Apply 5 g topically once daily.    zinc 50 mg Tab      No current facility-administered medications for this visit.        REVIEW OF SYSTEMS:    GENERAL:  No weight loss, malaise or fevers.  HEENT:   No recent changes in vision or hearing  NECK:  Negative for lumps, no difficulty with swallowing.  RESPIRATORY:  Negative for cough, wheezing or shortness of breath, patient denies any recent URI.  CARDIOVASCULAR:  Negative for chest pain, leg swelling or palpitations.  GI:  Negative for abdominal discomfort, blood in stools or black stools or change in bowel habits.  MUSCULOSKELETAL:  See HPI.  SKIN:  Negative for lesions, rash, and itching.  PSYCH:  Significant psychosocial stressors including PTSD for sex re-assignment surgery.  Patient's sleep is disturbed secondary to pain.  HEMATOLOGY/LYMPHOLOGY:  Negative for prolonged bleeding, bruising easily or swollen nodes.  Patient is not currently taking any anti-coagulants  ENDO: No history of diabetes or thyroid dysfunction  NEURO:   No history of headaches, syncope, paralysis, seizures or tremors.  All other reviewed and negative other than HPI.    OBJECTIVE:    /84   Pulse  (!) 112   Temp 98.5 °F (36.9 °C)   Ht 5' (1.524 m)   Wt 78 kg (171 lb 15.3 oz)   BMI 33.58 kg/m²     PHYSICAL EXAMINATION:    GENERAL: Well appearing, in no acute distress, alert and oriented x3.  PSYCH:  Mood and affect appropriate.  SKIN: Skin color, texture, turgor normal, no rashes or lesions.  HEAD/FACE:  Normocephalic, atraumatic. Cranial nerves grossly intact.  CV: RRR with palpation of the radial artery.  PULM: No evidence of respiratory difficulty, symmetric chest rise.  BACK:  There is significant pain with palpation over the facet joints of the lumbar spine bilaterally. There is decreased range of motion with extension to 15 degrees, and facet loading maneuvers cause reproducible pain.    EXTREMITIES: Peripheral joint ROM is full and pain free without obvious instability or laxity in all four extremities. No deformities, edema, or skin discoloration. Good capillary refill.  MUSCULOSKELETAL: Hip, and knee provocative maneuvers are negative.  There is pain with palpation over the sacroiliac joints bilaterally.  There is no pain to palpation over the greater trochanteric bursa bilaterally.   EHL on Right 5/5, EHL on Left EHL 5/5.  5/5 strength in right ankle with plantar and dorsiflexion, 5/5 strength in left ankle with plantar and dorsiflexion, 5/5 strength with right knee flexion extension, 5/5 strength with knee flexion extension on the left. 5/5 Hip flexion on right, 5/5 hip flexion on the left. No atrophy or tone abnormalities are noted.  NEURO:  No clonus.  Decreased sensation to BLE.  GAIT: Antalgic, ambulates with rolling walker, and has a leg length discrepancy    Lab Results   Component Value Date    WBC 6.73 03/18/2019    HGB 13.9 (L) 03/18/2019    HCT 43.8 03/18/2019     (H) 03/18/2019     03/18/2019     CMP  Sodium   Date Value Ref Range Status   03/18/2019 144 136 - 145 mmol/L Final     Potassium   Date Value Ref Range Status   03/18/2019 4.1 3.5 - 5.1 mmol/L Final      Chloride   Date Value Ref Range Status   03/18/2019 107 95 - 110 mmol/L Final     CO2   Date Value Ref Range Status   03/18/2019 26 23 - 29 mmol/L Final     Glucose   Date Value Ref Range Status   03/18/2019 101 70 - 110 mg/dL Final     BUN, Bld   Date Value Ref Range Status   03/18/2019 14 6 - 20 mg/dL Final     Creatinine   Date Value Ref Range Status   03/18/2019 0.8 0.5 - 1.4 mg/dL Final     Calcium   Date Value Ref Range Status   03/18/2019 10.0 8.7 - 10.5 mg/dL Final     Total Protein   Date Value Ref Range Status   03/18/2019 6.8 6.0 - 8.4 g/dL Final     Albumin   Date Value Ref Range Status   03/18/2019 3.9 3.5 - 5.2 g/dL Final     Total Bilirubin   Date Value Ref Range Status   03/18/2019 0.5 0.1 - 1.0 mg/dL Final     Comment:     For infants and newborns, interpretation of results should be based  on gestational age, weight and in agreement with clinical  observations.  Premature Infant recommended reference ranges:  Up to 24 hours.............<8.0 mg/dL  Up to 48 hours............<12.0 mg/dL  3-5 days..................<15.0 mg/dL  6-29 days.................<15.0 mg/dL       Alkaline Phosphatase   Date Value Ref Range Status   03/18/2019 127 55 - 135 U/L Final     AST   Date Value Ref Range Status   03/18/2019 20 10 - 40 U/L Final     ALT   Date Value Ref Range Status   03/18/2019 23 10 - 44 U/L Final     Anion Gap   Date Value Ref Range Status   03/18/2019 11 8 - 16 mmol/L Final     eGFR if    Date Value Ref Range Status   03/18/2019 >60 >60 mL/min/1.73 m^2 Final     eGFR if non    Date Value Ref Range Status   03/18/2019 >60 >60 mL/min/1.73 m^2 Final     Comment:     Calculation used to obtain the estimated glomerular filtration  rate (eGFR) is the CKD-EPI equation.        Lab Results   Component Value Date    HGBA1C 5.8 (H) 03/18/2019     Lab Results   Component Value Date    TSH 0.397 (L) 05/06/2019     Free T4 - 0.81 (wnl)      ASSESSMENT: 49 y.o. year old female  with pain, consistent with     Encounter Diagnoses   Name Primary?    Lumbar spondylosis Yes    Chronic bilateral low back pain with bilateral sciatica     Osteoarthritis of lumbar spine, unspecified spinal osteoarthritis complication status     Spinal stenosis of lumbar region with neurogenic claudication     Chronic pain syndrome        PLAN:     - Previous imaging was reviewed and discussed with the patient today.    - We discussed MAYURI and SCS trial today.    - I placed a referral for neurosurgery.  He would like to see Dr. Peng.    - Increase Gabapentin 800 mg BID to TID for one week then QID.  He has had worsened pain since decreasing.    - Discontinue oxycodone 15 mg PRN #30.  Change Percocet 5/325 to 7.5/25 mg TID PRN, #90.    - Pain contract today.  He was unable to provide UDS.  Will obtain next visit.    - RTC after neurosurgery appointment.    - Dr. Cabello was consulted on the patient and agrees with this plan.      Greater than 25 minutes spent in total in todays visit with the patient, with more than half that time direct face to face counseling and education with the patient today. We discussed the disease process, prognosis, treatment plan, and risks and benefits.     Alejandra Phoenix  06/20/2019

## 2019-06-21 ENCOUNTER — TELEPHONE (OUTPATIENT)
Dept: PAIN MEDICINE | Facility: CLINIC | Age: 49
End: 2019-06-21

## 2019-06-21 RX ORDER — OXYCODONE AND ACETAMINOPHEN 7.5; 325 MG/1; MG/1
1 TABLET ORAL EVERY 8 HOURS PRN
Qty: 90 TABLET | Refills: 0 | Status: SHIPPED | OUTPATIENT
Start: 2019-06-21 | End: 2019-07-21

## 2019-06-21 NOTE — TELEPHONE ENCOUNTER
Left voice message for patient to schedule appointment from referral to Neurosurgery.  Nikunj RANDALL  [phone]

## 2019-06-25 ENCOUNTER — PATIENT MESSAGE (OUTPATIENT)
Dept: ENDOCRINOLOGY | Facility: CLINIC | Age: 49
End: 2019-06-25

## 2019-06-25 ENCOUNTER — PATIENT MESSAGE (OUTPATIENT)
Dept: INTERNAL MEDICINE | Facility: CLINIC | Age: 49
End: 2019-06-25

## 2019-06-25 DIAGNOSIS — E78.5 HYPERLIPIDEMIA, UNSPECIFIED HYPERLIPIDEMIA TYPE: Primary | ICD-10-CM

## 2019-06-26 RX ORDER — PREDNISONE 5 MG/1
5 TABLET ORAL DAILY
Qty: 270 TABLET | Refills: 0 | Status: SHIPPED | OUTPATIENT
Start: 2019-06-26 | End: 2019-09-24 | Stop reason: SDUPTHER

## 2019-06-26 RX ORDER — ATORVASTATIN CALCIUM 10 MG/1
10 TABLET, FILM COATED ORAL NIGHTLY
Qty: 90 TABLET | Refills: 2 | Status: SHIPPED | OUTPATIENT
Start: 2019-06-26 | End: 2019-09-25 | Stop reason: SDUPTHER

## 2019-06-26 NOTE — TELEPHONE ENCOUNTER
I, Delfina Alvarez MD, am the physician of Ari Santos  YOB: 1970.     Ari Santos has undergone appropriate and successful clinical treatment for gender transition to the gender of male. As these changes from hormonal therapy are irreversible, they should be legally recognized as male .     I declare under penalty of perjury under the laws of the [place] that the foregoing is true and correct.    If there are any questions or concerns please contact my office,     Delfina Alvarez MD           [place] #[id number]            Chair of Endocrinology, [facility]          Associate Clerkship Director and Senior Lecturer, [employer], [facility]. [facility], [address] [place] [address], 9th floor clinic                                   ([id number]), [place] [address]          O: [phone]  F: [phone]            Email: [email or web address]

## 2019-07-09 DIAGNOSIS — Z87.890 STATUS POST SEX REASSIGNMENT SURGERY: ICD-10-CM

## 2019-07-09 RX ORDER — TESTOSTERONE GEL, 1% 10 MG/G
1 GEL TRANSDERMAL DAILY
Qty: 30 PACKET | Refills: 4 | OUTPATIENT
Start: 2019-07-09 | End: 2020-01-07

## 2019-07-09 RX ORDER — TESTOSTERONE GEL, 1% 10 MG/G
1 GEL TRANSDERMAL DAILY
Qty: 30 PACKET | Refills: 4 | Status: SHIPPED | OUTPATIENT
Start: 2019-07-09 | End: 2019-07-11 | Stop reason: SDUPTHER

## 2019-07-10 DIAGNOSIS — M25.562 PAIN IN BOTH KNEES, UNSPECIFIED CHRONICITY: Primary | ICD-10-CM

## 2019-07-10 DIAGNOSIS — M25.561 PAIN IN BOTH KNEES, UNSPECIFIED CHRONICITY: Primary | ICD-10-CM

## 2019-07-11 ENCOUNTER — OFFICE VISIT (OUTPATIENT)
Dept: ORTHOPEDICS | Facility: CLINIC | Age: 49
End: 2019-07-11
Payer: MEDICARE

## 2019-07-11 ENCOUNTER — HOSPITAL ENCOUNTER (OUTPATIENT)
Dept: RADIOLOGY | Facility: HOSPITAL | Age: 49
Discharge: HOME | End: 2019-07-11
Attending: ORTHOPAEDIC SURGERY
Payer: MEDICARE

## 2019-07-11 VITALS — WEIGHT: 172.19 LBS | BODY MASS INDEX: 33.8 KG/M2 | HEIGHT: 60 IN

## 2019-07-11 DIAGNOSIS — M25.562 PAIN IN BOTH KNEES, UNSPECIFIED CHRONICITY: ICD-10-CM

## 2019-07-11 DIAGNOSIS — M17.0 ARTHRITIS OF BOTH KNEES: Primary | ICD-10-CM

## 2019-07-11 DIAGNOSIS — M25.561 PAIN IN BOTH KNEES, UNSPECIFIED CHRONICITY: ICD-10-CM

## 2019-07-11 DIAGNOSIS — Z87.890 STATUS POST SEX REASSIGNMENT SURGERY: ICD-10-CM

## 2019-07-11 PROCEDURE — 99213 OFFICE O/P EST LOW 20 MIN: CPT | Mod: PBBFAC,25 | Performed by: ORTHOPAEDIC SURGERY

## 2019-07-11 PROCEDURE — 73564 XR KNEE ORTHO BILAT WITH FLEXION: ICD-10-PCS | Mod: 26,50,, | Performed by: RADIOLOGY

## 2019-07-11 PROCEDURE — 99203 PR OFFICE/OUTPT VISIT, NEW, LEVL III, 30-44 MIN: ICD-10-PCS | Mod: S$PBB,,, | Performed by: ORTHOPAEDIC SURGERY

## 2019-07-11 PROCEDURE — 73564 X-RAY EXAM KNEE 4 OR MORE: CPT | Mod: 26,50,, | Performed by: RADIOLOGY

## 2019-07-11 PROCEDURE — 73564 X-RAY EXAM KNEE 4 OR MORE: CPT | Mod: TC,50

## 2019-07-11 PROCEDURE — 99203 OFFICE O/P NEW LOW 30 MIN: CPT | Mod: S$PBB,,, | Performed by: ORTHOPAEDIC SURGERY

## 2019-07-11 PROCEDURE — 99999 PR PBB SHADOW E&M-EST. PATIENT-LVL III: CPT | Mod: PBBFAC,,, | Performed by: ORTHOPAEDIC SURGERY

## 2019-07-11 PROCEDURE — 99999 PR PBB SHADOW E&M-EST. PATIENT-LVL III: ICD-10-PCS | Mod: PBBFAC,,, | Performed by: ORTHOPAEDIC SURGERY

## 2019-07-11 RX ORDER — TESTOSTERONE GEL, 1% 10 MG/G
1 GEL TRANSDERMAL DAILY
Qty: 30 PACKET | Refills: 4 | Status: SHIPPED | OUTPATIENT
Start: 2019-07-11 | End: 2020-02-26 | Stop reason: SDUPTHER

## 2019-07-11 NOTE — TELEPHONE ENCOUNTER
----- Message from Noreen Fermin NP sent at 7/11/2019  7:34 AM CDT -----  Did you send in the new Rx again and did they not accept it again?  ----- Message -----  From: Rose Farfan RN  Sent: 7/10/2019   6:24 PM  To: Noreen Fermin NP    Please Advise  ----- Message -----  From: Loreto Ingram  Sent: 7/10/2019   4:48 PM  To: Zander Sorto Staff    Needs Advice  /  [facility]       Reason for call:        Communication Preference:   Phone  [phone]    Additional Information:  Another physician signature is needed for medication   testosterone (ANDROGEL) 1 % (50 mg/5 gram) GlPk    For the pt prescription ..

## 2019-07-11 NOTE — PROGRESS NOTES
"Subjective:     HPI:   Ari Santos is a 49 y.o. 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  Much of initial treatment and management was done at the [facility].  The patient had some type of right distal thigh soft tissue procedure they say this was due to multiple right thigh steroid injections which caused the "muscle to graft to the femur" and they did some type of quad surgery to remove some of the quad muscle and rectus femoris.  They do not have any records of this was done with the patient was very young.  No history of wound healing issues or infection.     Patient has had progressively worsening bilateral knee pain now it is severe in nature.  It is global in nature.  Patient says it feels like both of their knees are breaking.  They also have chronic worsening low back pain with some radicular symptoms down the back of both legs as well as numbness and tingling and radicular symptoms in the peroneal distribution of both lower legs    On Percocet 5 milligrams 3 times a day they been weaned off morphine and OxyContin from the pain clinic.  Around prednisone 10 milligrams a day for their adrenal issues and Aleve.  They had injections in the left knee in [place] several years ago with no change in symptoms.  No therapy.  Tried bilateral compressive knee sleeves.  Currently using a Rollator but not a wheelchair.  Cannot walk more than a block main limitation is walking    Patient is here with their wife.  They are on disability    Patient has congenital adrenal hyperplasia for which there on prednisone  Significant spinal stenosis history of radiofrequency ablation in May no surgeries, neurosurgery eval pending in August  sex reassignment surgery: born female living as " male  fibromyalgia       ROS:  The updated medical history is in the chart and has been reviewed. A review of systems is updated and there is no reported vision changes, ear/nose/mouth/throat complaints,  chest pain, shortness of breath, abdominal pain, urological complaints, fevers or chills, psychiatric complaints. Musculoskeletal and neurologcial symptoms are as documented. All other systems are negative.      Objective:   Exam:  There were no vitals filed for this visit.  Body mass index is 33.63 kg/m².    Physical examination included assessment of the patient's general appearance with particular attention to development, nutrition, body habitus, attention to grooming, and any evidence of distress.  Constitutional: The patient is a well-developed, well-nourished patient in no acute distress.   Cardiovascular: Vascular examination included warmth and capillary refill as well inspection for edema and assessment of pedal pulses. Pulses are palpable and regular.  Musculoskeletal: Gait was assessed as to whether it was steady, non-antalgic, and/or required the use of an assist device. The patient was also asked to walk independently and get onto the examination table.  Skin: The skin was examined for any obvious rashes or lesions in the extremity.  Neurologic: Sensation is intact to light touch in the extremity. The patient has good coordination without hyperreflexia and is alert and oriented to person, place and time and has normal mood and affect.     All of the above were examined and found to be within normal limits except for the following pertinent clinical findings:    Patient has a significant gait deformity with severe varus angulation of both knees also has significant sagittal balance issues leans very far forward with ambulation  Right knee  range of motion 15° varus alignment which corrects to about 5° of varus tender to palpation lateral and patellofemoral joint lines significantly positive  anterior drawer.  Knee opens up to varus stress without obvious in point.  Significant flexion contracture.  10 degree extensor lag.  5/5 extension strength.  Well-healed curvilinear medial distal thigh incision    Left knee  degree knee range of motion 15° varus alignment which is not nearly correct as much.  Significant flexion contracture but no extensor lag.  5/5 knee strength extension. Knee feels stable to anterior-posterior varus and valgus stresses    Distally patient has decreased sensation the lateral lower leg and lateral foot into the 1st dorsal was pacing great toe into a 5/5 tib ant EHL and gastroc psoas strength      Imaging:  Indication:  Left knee pain  Exam Ordered: Radiographs of the left knee include a standing anteroposterior view, a standing posterioanterior view, a lateral view in full flexion, and a sunrise view  Details of Examination: Todays exam shows evidence of joint space narrowing, osteophyte formation, and subchondral sclerosis, all consistent with degenerative arthritis of the knee.  No other significant findings are noted.  Impression:  Degenerative Arthritis, Left Knee    Indication:  Right knee pain  Exam Ordered: Radiographs of the right knee include a standing anteroposterior view, a standing posterioanterior view, a lateral view in full flexion, and a sunrise view  Details of Examination: Todays exam shows evidence of joint space narrowing, osteophyte formation, and subchondral sclerosis, all consistent with degenerative arthritis of the knee.  No other significant findings are noted.  Impression:  Degenerative Arthritis, Right Knee    Significant varus deformities bilaterally.  The arthritis is worse than the left knee than the right.  Both knees have significant varus lateral compartment opening right side seems more significant than the left      Assessment:     Arthritis of both knees [M17.0]  Congenital adrenal hyperplasia with skeletal abnormalities including very  short stature and varus deformities of both knees  Arthritic changes are more severe in the left knee than the right.  Right side has history of some type of distal thigh quad resection now with extensor lag    Patient has congenital adrenal hyperplasia for which there on prednisone  Significant spinal stenosis history of radiofrequency ablation in May no surgeries, neurosurgery eval pending in August  sex reassignment surgery: born female living as male  fibromyalgia     Plan:       The above findings were discussed with patient length. We discussed the risks of conservative versus surgical management knee arthritis. Conservative management consisting of anti-inflammatory medications, glucosamine/chondroitin sulfate, weight loss, physical therapy, activity modification, as well as injections (lubricant versus corticosteroid) was discussed at length.     We discussed that this is a very difficult and unique situation.  Knee replacements would be very complex and high risk.    I would like to start with some long alignment hip to ankle x-rays to get a true sense of the overall alignment in the knees.    I would also like to see the results of the neuro surgery evaluation given there is significant spinal deformity with ambulation and radicular symptoms    In the meantime we will get them a prescription for custom hinged knee braces which the patient requests.  Given their instability and deformity as well as congenitally short stature off-the-shelf braces will not fit     Orders Placed This Encounter   Procedures    X-Ray Hip to Ankle     Standing, bilateral hip to ankle xrays     Standing Status:   Future     Standing Expiration Date:   7/11/2020     Scheduling Instructions:      Standing, bilateral hip to ankle xrays     Order Specific Question:   May the Radiologist modify the order per protocol to meet the clinical needs of the patient?     Answer:   Yes       Past Medical History:   Diagnosis Date    Adrenal  insufficiency     Arthritis     Congenital adrenal hyperplasia     Hyperlipidemia     Spinal stenosis        Past Surgical History:   Procedure Laterality Date    RADIOFREQUENCY ABLATION, LEFT L3,4,5 Left 5/9/2019    [facility] at [facility]    RADIOFREQUENCY ABLATION, RIGHT L3,4,5 Right 5/23/2019    Performed by [facility] at [facility]       No family history on file.    Social History     Socioeconomic History    Marital status:      Spouse name: Not on file    Number of children: Not on file    Years of education: Not on file    Highest education level: Not on file   Occupational History    Not on file   Social Needs    Financial resource strain: Not very hard    Food insecurity:     Worry: Never true     Inability: Never true    Transportation needs:     Medical: No     Non-medical: No   Tobacco Use    Smoking status: Former Smoker    Smokeless tobacco: Former User     Quit date: 5/1/2009    Tobacco comment: Socailly   Substance and Sexual Activity    Alcohol use: Not Currently     Frequency: Never     Binge frequency: Never    Drug use: Never    Sexual activity: Yes     Partners: Female   Lifestyle    Physical activity:     Days per week: 3 days     Minutes per session: 20 min    Stress: To some extent   Relationships    Social connections:     Talks on phone: More than three times a week     Gets together: Once a week     Attends Worship service: Not on file     Active member of club or organization: No     Attends meetings of clubs or organizations: Never     Relationship status:    Other Topics Concern    Not on file   Social History Narrative    Not on file

## 2019-07-11 NOTE — LETTER
July 15, 2019      Messi Cabello MD  [address]  [address]  [facility] [place] [facility] [address]           [facility]  [address]  [place] [facility] [address]  Phone: [phone]          Patient: Ari Santos   MR Number: [id number]   YOB: 1970   Date of Visit: 7/11/2019       Dear Dr. Messi Cabello:    Thank you for referring Ari Santos to me for evaluation. Attached you will find relevant portions of my assessment and plan of care.    If you have questions, please do not hesitate to call me. I look forward to following Ari Santos along with you.    Sincerely,    Donte Andrade III, MD    Enclosure  CC:  No Recipients    If you would like to receive this communication electronically, please contact [email or web address] or [phone] to request more information on [employer] Link access.    For providers and/or their staff who would like to refer a patient to [facility], please contact us through our one-stop-shop provider referral line, [facility], at [phone].    If you feel you have received this communication in error or would no longer like to receive these types of communications, please e-mail [email or web address]

## 2019-07-13 ENCOUNTER — PATIENT MESSAGE (OUTPATIENT)
Dept: ENDOCRINOLOGY | Facility: CLINIC | Age: 49
End: 2019-07-13

## 2019-07-19 ENCOUNTER — TELEPHONE (OUTPATIENT)
Dept: ORTHOPEDICS | Facility: CLINIC | Age: 49
End: 2019-07-19

## 2019-07-19 NOTE — TELEPHONE ENCOUNTER
----- Message from Chaparrita Hall sent at 7/19/2019  2:54 PM CDT -----  Contact: Pt's Wife Kristy Santos  Pt's Wife Kristy Santos called to speak to the nurse to request a new order due to missing his scheduled appt and can be reached at [phone]

## 2019-07-22 ENCOUNTER — HOSPITAL ENCOUNTER (OUTPATIENT)
Dept: RADIOLOGY | Facility: HOSPITAL | Age: 49
Discharge: HOME | End: 2019-07-22
Attending: ORTHOPAEDIC SURGERY
Payer: MEDICARE

## 2019-07-22 DIAGNOSIS — M17.0 ARTHRITIS OF BOTH KNEES: ICD-10-CM

## 2019-07-22 PROCEDURE — 77073 XR HIP TO ANKLE: ICD-10-PCS | Mod: 26,,, | Performed by: RADIOLOGY

## 2019-07-22 PROCEDURE — 77073 BONE LENGTH STUDIES: CPT | Mod: 26,,, | Performed by: RADIOLOGY

## 2019-07-22 PROCEDURE — 77073 BONE LENGTH STUDIES: CPT | Mod: TC

## 2019-07-23 ENCOUNTER — TELEPHONE (OUTPATIENT)
Dept: ORTHOPEDICS | Facility: CLINIC | Age: 49
End: 2019-07-23

## 2019-07-23 NOTE — TELEPHONE ENCOUNTER
----- Message from Donte Andrade III, MD sent at 7/23/2019  9:54 AM CDT -----  Regarding: follow up appt  Patient got long alignment xrays    Please schedule them for an appointment anytime after their 8/12 neurosurgery appointment    Thank you

## 2019-07-26 ENCOUNTER — PATIENT MESSAGE (OUTPATIENT)
Dept: SPINE | Facility: CLINIC | Age: 49
End: 2019-07-26

## 2019-07-26 DIAGNOSIS — G89.29 CHRONIC LOW BACK PAIN WITH SCIATICA, SCIATICA LATERALITY UNSPECIFIED, UNSPECIFIED BACK PAIN LATERALITY: ICD-10-CM

## 2019-07-26 DIAGNOSIS — M54.40 CHRONIC LOW BACK PAIN WITH SCIATICA, SCIATICA LATERALITY UNSPECIFIED, UNSPECIFIED BACK PAIN LATERALITY: ICD-10-CM

## 2019-07-29 RX ORDER — CYCLOBENZAPRINE HCL 10 MG
10 TABLET ORAL 3 TIMES DAILY
Qty: 90 TABLET | Refills: 1 | Status: SHIPPED | OUTPATIENT
Start: 2019-07-29 | End: 2019-09-03 | Stop reason: SDUPTHER

## 2019-07-29 RX ORDER — OXYCODONE AND ACETAMINOPHEN 7.5; 325 MG/1; MG/1
1 TABLET ORAL 3 TIMES DAILY PRN
Qty: 90 TABLET | Refills: 0 | Status: SHIPPED | OUTPATIENT
Start: 2019-07-29 | End: 2019-08-21 | Stop reason: DRUGHIGH

## 2019-07-29 NOTE — TELEPHONE ENCOUNTER
Mr. Santos is requesting a refill on Oxycodone , Flexeril and Morphine.   Last office visit 06/20/2019   shows: Oxycodone last filled 06/21, Morphine 04/30/2019  NO UDS on file-Noted in chart note, patient unable to produce at last office visit  Signed pain contract on 06/20/2019    Patient also [place], as documented in his request.

## 2019-08-09 ENCOUNTER — PATIENT OUTREACH (OUTPATIENT)
Dept: ADMINISTRATIVE | Facility: OTHER | Age: 49
End: 2019-08-09

## 2019-08-12 ENCOUNTER — OFFICE VISIT (OUTPATIENT)
Dept: NEUROSURGERY | Facility: CLINIC | Age: 49
End: 2019-08-12
Payer: MEDICARE

## 2019-08-12 ENCOUNTER — PATIENT MESSAGE (OUTPATIENT)
Dept: ENDOCRINOLOGY | Facility: CLINIC | Age: 49
End: 2019-08-12

## 2019-08-12 VITALS
HEIGHT: 60 IN | WEIGHT: 163.81 LBS | BODY MASS INDEX: 32.16 KG/M2 | SYSTOLIC BLOOD PRESSURE: 119 MMHG | HEART RATE: 112 BPM | DIASTOLIC BLOOD PRESSURE: 73 MMHG

## 2019-08-12 DIAGNOSIS — M48.062 SPINAL STENOSIS OF LUMBAR REGION WITH NEUROGENIC CLAUDICATION: Primary | ICD-10-CM

## 2019-08-12 DIAGNOSIS — G89.29 CHRONIC BILATERAL LOW BACK PAIN WITH BILATERAL SCIATICA: ICD-10-CM

## 2019-08-12 DIAGNOSIS — M54.42 CHRONIC BILATERAL LOW BACK PAIN WITH BILATERAL SCIATICA: ICD-10-CM

## 2019-08-12 DIAGNOSIS — M54.41 CHRONIC BILATERAL LOW BACK PAIN WITH BILATERAL SCIATICA: ICD-10-CM

## 2019-08-12 PROCEDURE — 99205 OFFICE O/P NEW HI 60 MIN: CPT | Mod: S$PBB,,, | Performed by: NEUROLOGICAL SURGERY

## 2019-08-12 PROCEDURE — 99999 PR PBB SHADOW E&M-EST. PATIENT-LVL IV: ICD-10-PCS | Mod: PBBFAC,,, | Performed by: NEUROLOGICAL SURGERY

## 2019-08-12 PROCEDURE — 99999 PR PBB SHADOW E&M-EST. PATIENT-LVL IV: CPT | Mod: PBBFAC,,, | Performed by: NEUROLOGICAL SURGERY

## 2019-08-12 PROCEDURE — 99214 OFFICE O/P EST MOD 30 MIN: CPT | Mod: PBBFAC | Performed by: NEUROLOGICAL SURGERY

## 2019-08-12 PROCEDURE — 99205 PR OFFICE/OUTPT VISIT, NEW, LEVL V, 60-74 MIN: ICD-10-PCS | Mod: S$PBB,,, | Performed by: NEUROLOGICAL SURGERY

## 2019-08-12 NOTE — LETTER
August 15, 2019      Messi Cabello MD  [address]  [address]  [facility] [address]           [facility]  [address] [facility] [address]  [facility] [address]  Phone: [phone]  Fax: [phone]          Patient: Ari Santos   MR Number: [id number]   YOB: 1970   Date of Visit: 8/12/2019       Dear Dr. Messi Cabello:    Thank you for referring Ari Santos to me for evaluation. Attached you will find relevant portions of my assessment and plan of care.    If you have questions, please do not hesitate to call me. I look forward to following Ari Santos along with you.    Sincerely,    Silas Peng, DO    Enclosure  CC:  No Recipients    If you would like to receive this communication electronically, please contact [email or web address] or [phone] to request more information on [employer] Link access.    For providers and/or their staff who would like to refer a patient to [facility], please contact us through our one-stop-shop provider referral line, [facility] , at [phone].    If you feel you have received this communication in error or would no longer like to receive these types of communications, please e-mail [email or web address]

## 2019-08-13 NOTE — PROGRESS NOTES
Subjective:      Patient ID: Ari Santos is a 49 y.o. female.    Chief Complaint:  No chief complaint on file.    History of Present Illness  Ari Santos is here for follow up of CAH.  Previously seen by Dr. Alvarez.  Last seen 5/6/19.  This is their first visit with me.      Had surgical correction around age 1 to create vagina, multiple subsequent procedures. Had breast implantation at age 15 and starting around age 16 started having corrective procedures including removal of breast implants and corrective genital procedures.    Congenital Adrenal Hyperplasia: Per patient, genetic testing was female. Surgery 3/4 months after to remove penis (not born with testicles) and created a vagina. Raised female. At 15 had breast implants, has since been removed.   Total hysterectomy 2003. Started testosterone in 2004. Attempted to have phalloplasty in 2004, unsuccessful.      Goal is to have phalloplasty. Patient is unhappy with reconstructive surgery of breasts due to excess tissue.      Current Meds:  Prednisone 10mg daily  Testosterone 50mg/5g - 1 packet a day    Happy with facial hair, no acne.     BMD 3/7/18  Impression: Normal BMD    Review of Systems   Constitutional: Negative for fatigue.   Eyes: Negative for visual disturbance.   Respiratory: Negative for shortness of breath.    Cardiovascular: Negative for chest pain.   Gastrointestinal: Negative for abdominal pain.   Musculoskeletal: Positive for back pain and gait problem (rolling walker). Negative for arthralgias.   Skin: Negative for wound.   Neurological: Negative for headaches.   Hematological: Does not bruise/bleed easily.   Psychiatric/Behavioral: Negative for sleep disturbance.     Objective:   Physical Exam   Neck: No thyromegaly present.   Cardiovascular: Normal rate.   No edema present   Pulmonary/Chest: Effort normal.   Abdominal: Soft.   Vitals reviewed.     Ref. Range 3/18/2019 16:45 5/6/2019 11:07   Vit D, 25-Hydroxy Latest Ref Range:  30 - 96 ng/mL  36   17-Hydroxyprogesterone Latest Ref Range: 40 - 239 ng/dL  <31 (L)   Hemoglobin A1C External Latest Ref Range: 4.0 - 5.6 % 5.8 (H)    Estimated Avg Glucose Latest Ref Range: 68 - 131 mg/dL 120    ACTH Latest Ref Range: 0 - 46 pg/mL  <5   TSH Latest Ref Range: 0.400 - 4.000 uIU/mL 0.398 (L) 0.397 (L)   Free T4 Latest Ref Range: 0.71 - 1.51 ng/dL 0.81 0.91     Visit Vitals  /80 (BP Location: Right arm, Patient Position: Sitting, BP Method: Medium (Manual))   Resp 16   Ht 5' (1.524 m)   Wt 74.2 kg (163 lb 9.3 oz)   BMI 31.95 kg/m²       Body mass index is 31.95 kg/m².    Lab Review:   Lab Results   Component Value Date    HGBA1C 5.8 (H) 03/18/2019     Lab Results   Component Value Date    CHOL 218 (H) 03/18/2019    HDL 47 03/18/2019    LDLCALC 139.2 03/18/2019    TRIG 159 (H) 03/18/2019    CHOLHDL 21.6 03/18/2019     Lab Results   Component Value Date     03/18/2019    K 4.1 03/18/2019     03/18/2019    CO2 26 03/18/2019     03/18/2019    BUN 14 03/18/2019    CREATININE 0.8 03/18/2019    CALCIUM 10.0 03/18/2019    PROT 6.8 03/18/2019    ALBUMIN 3.9 03/18/2019    BILITOT 0.5 03/18/2019    ALKPHOS 127 03/18/2019    AST 20 03/18/2019    ALT 23 03/18/2019    ANIONGAP 11 03/18/2019    ESTGFRAFRICA >60 03/18/2019    EGFRNONAA >60 03/18/2019    TSH 0.397 (L) 05/06/2019     Vit D, 25-Hydroxy   Date Value Ref Range Status   05/06/2019 36 30 - 96 ng/mL Final     Comment:     Vitamin D deficiency.........<10 ng/mL                              Vitamin D insufficiency......10-29 ng/mL       Vitamin D sufficiency........> or equal to 30 ng/mL  Vitamin D toxicity............>100 ng/mL       Assessment and Plan     1. Congenital adrenal hyperplasia  Comprehensive metabolic panel    DHEA-sulfate    17-Hydroxyprogesterone   2. Status post sex reassignment surgery     3. Abnormal thyroid blood test     4. Hyperlipidemia, unspecified hyperlipidemia type       Congenital adrenal hyperplasia  --  Order labs for 17 hydroxyprogesterone and DHEas prior to next appointment.  -- Patient to make an appointment with urogynecology.  -- Decrease prednisone to 7.5mg daily for one week.  If you tolerate this, decrease prednisone to 5mg daily thereafter.   Discuss signs and symptoms to notify us about.  -- Patient is having back surgery in September.  Instructed them to notify the surgeon to have Hydrocortisone 100mg on call in the OR and to consult endocrine team on admit.   -- Recommended medic alert tag. The patient does know about stress dosing.  No recent adrenal crisis.    Status post sex reassignment surgery  -- CONTINUE: testosterone daily     Abnormal thyroid blood test  -- Stable    HLD (hyperlipidemia)  -- Controlled on statin.    Follow up in about 3 months (around 11/14/2019).    Case discussed with Dr. Alvarez.   Recommendations were discussed with the patient in detail  The patient verbalized understanding and agrees with the plan outlined as above.

## 2019-08-14 ENCOUNTER — OFFICE VISIT (OUTPATIENT)
Dept: ENDOCRINOLOGY | Facility: CLINIC | Age: 49
End: 2019-08-14
Payer: MEDICARE

## 2019-08-14 ENCOUNTER — PATIENT OUTREACH (OUTPATIENT)
Dept: ADMINISTRATIVE | Facility: HOSPITAL | Age: 49
End: 2019-08-14

## 2019-08-14 VITALS
HEIGHT: 60 IN | SYSTOLIC BLOOD PRESSURE: 118 MMHG | RESPIRATION RATE: 16 BRPM | DIASTOLIC BLOOD PRESSURE: 80 MMHG | BODY MASS INDEX: 32.11 KG/M2 | WEIGHT: 163.56 LBS

## 2019-08-14 DIAGNOSIS — E78.5 HYPERLIPIDEMIA, UNSPECIFIED HYPERLIPIDEMIA TYPE: ICD-10-CM

## 2019-08-14 DIAGNOSIS — R79.89 ABNORMAL THYROID BLOOD TEST: ICD-10-CM

## 2019-08-14 DIAGNOSIS — Z87.890 STATUS POST SEX REASSIGNMENT SURGERY: ICD-10-CM

## 2019-08-14 DIAGNOSIS — E25.0 CONGENITAL ADRENAL HYPERPLASIA: Primary | ICD-10-CM

## 2019-08-14 PROCEDURE — 99999 PR PBB SHADOW E&M-EST. PATIENT-LVL V: ICD-10-PCS | Mod: PBBFAC,,, | Performed by: NURSE PRACTITIONER

## 2019-08-14 PROCEDURE — 99214 PR OFFICE/OUTPT VISIT, EST, LEVL IV, 30-39 MIN: ICD-10-PCS | Mod: S$PBB,,, | Performed by: NURSE PRACTITIONER

## 2019-08-14 PROCEDURE — 99214 OFFICE O/P EST MOD 30 MIN: CPT | Mod: S$PBB,,, | Performed by: NURSE PRACTITIONER

## 2019-08-14 PROCEDURE — 99999 PR PBB SHADOW E&M-EST. PATIENT-LVL V: CPT | Mod: PBBFAC,,, | Performed by: NURSE PRACTITIONER

## 2019-08-14 PROCEDURE — 99215 OFFICE O/P EST HI 40 MIN: CPT | Mod: PBBFAC | Performed by: NURSE PRACTITIONER

## 2019-08-14 NOTE — PATIENT INSTRUCTIONS
Decrease prednisone to 7.5mg daily for one week.  If you tolerate this, decrease prednisone to 5mg daily thereafter.       For surgery, notify surgeon to have hydrocortisone 100mg on call in the OR and to consult Endocrine.

## 2019-08-14 NOTE — ASSESSMENT & PLAN NOTE
-- Order labs for 17 hydroxyprogesterone and DHEas prior to next appointment.  -- Patient to make an appointment with urogynecology.  -- Decrease prednisone to 7.5mg daily for one week.  If you tolerate this, decrease prednisone to 5mg daily thereafter.   Discuss signs and symptoms to notify us about.  -- Patient is having back surgery in September.  Instructed them to notify the surgeon to have Hydrocortisone 100mg on call in the OR and to consult endocrine team on admit.   -- Recommended medic alert tag. The patient does know about stress dosing.  No recent adrenal crisis.

## 2019-08-15 NOTE — PROGRESS NOTES
CHIEF COMPLAINT:  Chief Complaint   Patient presents with    Lumbar Spine Pain (L-Spine)       HPI:  Ari Santos is a 49 y.o.  female (transgender male) with below listed PMH, who is referred for evaluation   Of low back pain that radiates into his legs.   Pain resides along low back and radiates into the buttocks down the backs of his legs to his calves.  He has numbness and tingling in his legs down to his feet involving the entirety of the feet.   He cannot feel his toes.  He has difficulty judging when he stepping and cannot feel the ground when he is walking.  His legs feel weak when he is walking and he uses a rolling walker which is done for the past 6 years.  He can no longer walk long distances and feels like he has no balance.  The symptoms have been ongoing for the past 10 years but worse over the more recent years.  He occasionally has constipation but denies any incontinence.      He has tried physical therapy several times the most recent 2 years ago without much relief.  He has also done injections both steroids and radiofrequency ablation which only helped for the short term.  He recently had a left-sided lumbar facet cyst drained last fall which helped.      Other ailments include bilateral carpal tunnel syndrome right greater than left.  He wears braces but the symptoms have worsened over the past couple of months.  He also complains of neck pain since he was 18 years old that radiates from the elbows down to his fingers.  He also reports bilateral knee pain and bowing of his legs for which he sees Dr. Andrade of Orthopedics.      He has congenital adrenal hyperplasia which was diagnosed as an infant for which he takes daily steroids and can not stop.  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 RLE is shorter than LLE.    Review of patient's allergies indicates:   Allergen Reactions    Penicillins Rash       Past Medical History:    Diagnosis Date    Adrenal insufficiency     Arthritis     Congenital adrenal hyperplasia     Hyperlipidemia     Spinal stenosis      Past Surgical History:   Procedure Laterality Date    RADIOFREQUENCY ABLATION, LEFT L3,4,5 Left 5/9/2019    Performed by Messi Cabello [facility] at [facility]    RADIOFREQUENCY ABLATION, RIGHT L3,4,5 Right 5/23/2019    Performed by Messi Cabello MD at [facility]     History reviewed. No pertinent family history.  Social History     Tobacco Use    Smoking status: Former Smoker    Smokeless tobacco: Former User     Quit date: 5/1/2009    Tobacco comment: Socailly   Substance Use Topics    Alcohol use: Not Currently     Frequency: Never     Binge frequency: Never    Drug use: Never        Review of Systems   Constitutional: Negative.    Respiratory: Negative for cough and shortness of breath.    Cardiovascular: Negative for chest pain, palpitations, claudication and leg swelling.   Gastrointestinal: Negative for abdominal pain, constipation and diarrhea.   Genitourinary: Negative for flank pain, frequency and urgency.   Musculoskeletal: Positive for back pain, falls, joint pain, myalgias and neck pain.   Skin: Negative.    Neurological: Positive for tingling, sensory change, focal weakness and weakness. Negative for dizziness, tremors, speech change, seizures, loss of consciousness and headaches.   Psychiatric/Behavioral: Negative for depression, hallucinations, memory loss, substance abuse and suicidal ideas. The patient is nervous/anxious (PTSD). The patient does not have insomnia.        OBJECTIVE:   Vital Signs:  Pulse: (!) 112 (08/12/19 1018)  BP: 119/73 (08/12/19 1018)    Physical Exam:  Constitutional: Patient sitting comfortably in chair. Appears well developed and well nourished.  Skin: Exposed areas are intact without abnormal markings, rashes or other lesions.  HEENT: Normocephalic. Normal conjunctivae.  Cardiovascular: Normal rate and regular  rhythm.  Respiratory: Chest wall rises and falls symmetrically, without signs of respiratory distress.  Abdomen: Soft and non-tender.  Extremities: Warm and without edema. Calves supple, non-tender.  Psych/Behavior: Normal affect.    Neurological:    Mental status: Alert and oriented. Conversational and appropriate.       Cranial Nerves: VFF to confrontation. PERRL. EOMI without nystagmus. Facial STLT normal and symmetric. Strong, symmetric muscles of mastication. Facial strength full and symmetric. Hearing equal bilaterally to finger rub. Palate and uvula rise and fall normally in midline. Shoulder shrug 5/5 strength. Tongue midline.     Motor:    Upper:  Deltoids Triceps Biceps WE WF  FA    R 5/5 5/5 5/5 5/5 5/5 5/5 5/5    L 5/5 5/5 5/5 5/5 5/5 5/5 5/5      Lower:  HF KE KF DF PF EHL    R 5/5 5/5 5/5 5/5 5/5 5/5    L 5/5 5/5 5/5 5/5 5/5 5/5     Sensory: Intact sensation to light touch and pinprick in all extremities.     Reflexes:      DTR: 2+ knees and biceps symmetrically.     Moore's: Negative.     Babinski's: Negative.     Clonus: Negative.    Cerebellar: Finger-to-nose and rapid alternating movements normal.     Gait:  Stable, fluid.    Spine:    Posture: Head well aligned over pelvis and shoulders in front and side views.  No focal or global spinal deformity visible on inspection.     Cervical:      ROM: Full with flexion, extension, lateral rotation and ear-to-shoulder bend.      Midline TTP: Negative.     Spurling's test: Negative.     Lhermitte's: Negative.    Thoracic:     Midline TTP: Negative    Lumbar:     Midline TTP: Negative     Straight Leg Test: Negative     Crossed Straight Leg Test: Negative    Other:     SI joint TTP: Negative.     Tenderness with external/internal hip rotation: Negative.    Diagnostic Results:  All imaging was independently reviewed by me.    MRI L spine, dated 4/10/19:  1. Severe multi-level DDD  2. Severe stenosis from L1-S1    Flex/Ex X-ray L spine, dated  4/10/19:  1. No dynamic instability    DEXA, 3/7/18:  Normal mineral bone density    ASSESSMENT/PLAN:     Problem List Items Addressed This Visit        Neuro    Spinal stenosis of lumbar region with neurogenic claudication - Primary    Relevant Orders    Case Request Operating Room: LAMINECTOMY, SPINE, LUMBAR, FOR DECOMPRESSION    (OPEN L1-S1 lamis)  Flouro  Trev frame (Completed)    MRI Cervical Spine Without Contrast       Orthopedic    Chronic bilateral low back pain with bilateral sciatica    Relevant Orders    Case Request Operating Room: LAMINECTOMY, SPINE, LUMBAR, FOR DECOMPRESSION    (OPEN L1-S1 lamis)  Flouro  Trev frame (Completed)    MRI Cervical Spine Without Contrast          VISIT SUMMARY:  Patient presents with signs and symptoms concerning for neurogenic claudication secondary to severe stenosis from L1-S1.  Patient has severe multilevel degenerative disc disease throughout the lumbar spine as well as poor bone quality most likely due to his chronic steroid use.  We discussed his options for treatment which would include an open multilevel laminectomy for decompression of the nerve roots.  I shared my concern about his chronic steroid use and the risk for infection and poor healing.  Unfortunately he is unable to come off of steroids due to his reliance for congenital adrenal hyperplasia.  A recent DEXA scan did not demonstrate osteoporosis.  Because he has upper extremity symptoms I will order a cervical MRI to rule out any cervical stenosis.  Ultimately he will need PCP clearance and we will tentatively plan for surgery on 09/13/2019.    PATIENT EDUCATION:  More than half the clinic visit was spent showing with patient the pertinent findings on imaging and educating the patient about natural history of the pathology.   We discussed options for treatment as well as the risks and benefits of each option.  All questions were answered.     The patient understands and agrees with the following plan  of care.    1. Surgery scheduled for 9/13/19 at [facility]  2. Preop clearance needed from PCP (Dr Mitchell)  3. Preop labs, EKG, CXR ordered   4. Preop PAT appointment requested  5. STOP TAKING ASPIRIN, NSAIDS, AND ALL OTHER BLOOD THINNERS 7 DAYS PRIOR TO SURGERY  6. F/u pain medicine on 8/21 as scheduled  7. Ordered cervical MRI to cervical stenosis                .

## 2019-08-19 ENCOUNTER — HOSPITAL ENCOUNTER (OUTPATIENT)
Dept: RADIOLOGY | Facility: HOSPITAL | Age: 49
Discharge: HOME | End: 2019-08-19
Attending: NEUROLOGICAL SURGERY
Payer: MEDICARE

## 2019-08-19 ENCOUNTER — PATIENT OUTREACH (OUTPATIENT)
Dept: ADMINISTRATIVE | Facility: OTHER | Age: 49
End: 2019-08-19

## 2019-08-19 DIAGNOSIS — G89.29 CHRONIC BILATERAL LOW BACK PAIN WITH BILATERAL SCIATICA: ICD-10-CM

## 2019-08-19 DIAGNOSIS — M54.42 CHRONIC BILATERAL LOW BACK PAIN WITH BILATERAL SCIATICA: ICD-10-CM

## 2019-08-19 DIAGNOSIS — M54.41 CHRONIC BILATERAL LOW BACK PAIN WITH BILATERAL SCIATICA: ICD-10-CM

## 2019-08-19 DIAGNOSIS — M48.062 SPINAL STENOSIS OF LUMBAR REGION WITH NEUROGENIC CLAUDICATION: ICD-10-CM

## 2019-08-19 PROCEDURE — 72141 MRI NECK SPINE W/O DYE: CPT | Mod: 26,,, | Performed by: RADIOLOGY

## 2019-08-19 PROCEDURE — 72141 MRI NECK SPINE W/O DYE: CPT | Mod: TC

## 2019-08-19 PROCEDURE — 72141 MRI CERVICAL SPINE WITHOUT CONTRAST: ICD-10-PCS | Mod: 26,,, | Performed by: RADIOLOGY

## 2019-08-21 ENCOUNTER — OFFICE VISIT (OUTPATIENT)
Dept: SPINE | Facility: CLINIC | Age: 49
End: 2019-08-21
Attending: ANESTHESIOLOGY
Payer: MEDICARE

## 2019-08-21 VITALS
RESPIRATION RATE: 18 BRPM | HEIGHT: 60 IN | BODY MASS INDEX: 31.94 KG/M2 | HEART RATE: 98 BPM | TEMPERATURE: 98 F | SYSTOLIC BLOOD PRESSURE: 120 MMHG | DIASTOLIC BLOOD PRESSURE: 80 MMHG | WEIGHT: 162.69 LBS

## 2019-08-21 DIAGNOSIS — M48.062 SPINAL STENOSIS OF LUMBAR REGION WITH NEUROGENIC CLAUDICATION: ICD-10-CM

## 2019-08-21 DIAGNOSIS — M54.40 CHRONIC LOW BACK PAIN WITH SCIATICA, SCIATICA LATERALITY UNSPECIFIED, UNSPECIFIED BACK PAIN LATERALITY: ICD-10-CM

## 2019-08-21 DIAGNOSIS — G89.4 CHRONIC PAIN SYNDROME: ICD-10-CM

## 2019-08-21 DIAGNOSIS — M54.41 CHRONIC BILATERAL LOW BACK PAIN WITH BILATERAL SCIATICA: ICD-10-CM

## 2019-08-21 DIAGNOSIS — M79.2 NEUROPATHIC PAIN: ICD-10-CM

## 2019-08-21 DIAGNOSIS — F11.90 OPIOID USE: Primary | ICD-10-CM

## 2019-08-21 DIAGNOSIS — G89.29 CHRONIC LOW BACK PAIN WITH SCIATICA, SCIATICA LATERALITY UNSPECIFIED, UNSPECIFIED BACK PAIN LATERALITY: ICD-10-CM

## 2019-08-21 DIAGNOSIS — M54.42 CHRONIC BILATERAL LOW BACK PAIN WITH BILATERAL SCIATICA: ICD-10-CM

## 2019-08-21 DIAGNOSIS — G89.29 CHRONIC BILATERAL LOW BACK PAIN WITH BILATERAL SCIATICA: ICD-10-CM

## 2019-08-21 PROCEDURE — 99215 OFFICE O/P EST HI 40 MIN: CPT | Mod: PBBFAC | Performed by: ANESTHESIOLOGY

## 2019-08-21 PROCEDURE — 99999 PR PBB SHADOW E&M-EST. PATIENT-LVL V: CPT | Mod: PBBFAC,,, | Performed by: ANESTHESIOLOGY

## 2019-08-21 PROCEDURE — 99999 PR PBB SHADOW E&M-EST. PATIENT-LVL V: ICD-10-PCS | Mod: PBBFAC,,, | Performed by: ANESTHESIOLOGY

## 2019-08-21 PROCEDURE — 99214 OFFICE O/P EST MOD 30 MIN: CPT | Mod: S$PBB,GC,, | Performed by: ANESTHESIOLOGY

## 2019-08-21 PROCEDURE — 99214 PR OFFICE/OUTPT VISIT, EST, LEVL IV, 30-39 MIN: ICD-10-PCS | Mod: S$PBB,GC,, | Performed by: ANESTHESIOLOGY

## 2019-08-21 RX ORDER — OXYCODONE AND ACETAMINOPHEN 10; 325 MG/1; MG/1
1 TABLET ORAL EVERY 8 HOURS PRN
Qty: 90 TABLET | Refills: 0 | Status: ON HOLD | OUTPATIENT
Start: 2019-08-21 | End: 2019-09-17 | Stop reason: HOSPADM

## 2019-08-21 RX ORDER — GABAPENTIN 800 MG/1
TABLET ORAL
Qty: 150 TABLET | Refills: 2 | Status: SHIPPED | OUTPATIENT
Start: 2019-08-21 | End: 2019-09-25 | Stop reason: SDUPTHER

## 2019-08-21 NOTE — PROGRESS NOTES
Chronic Pain - Follow up    Referring Physician: No ref. provider found    Chief Complaint:   Chief Complaint   Patient presents with    Follow-up        SUBJECTIVE: Disclaimer: This note has been generated using voice-recognition software. There may be typographical errors that have been missed during proof-reading    Interval History 8/21/19:  Patient reports for follow up. He has seen neurosurgery and is scheduled for  Open L1-S1 laminectomy. He has also seen orthopedics for his bilateral knee pain. They have planned for knee braces after completion of his spinal surgery.  Patient reports continued back pain.  He is s/p RFA of bilateral L3,4,5 in 5/9/19 and 5/23/19 with 60% relief in his pain for 1 week.  Patient reports continued back pain, sharp, starts in his lower back and radiates to his feet. Patient also reports worsening of the neuropathic pain in the palms of his hands, burning, tingling pain worse at night.    Interval History 6/20/2019:  The patient is here for follow up of lower back pain.  He is s/p lumbar RFAs.  He is reporting minimal benefit of pain.  He feels as though previous RFAs from another provider were helpful.  However, he is reporting pain across the lower back with radiation down the back of both legs.  We previously discussed a surgical referral and he would like to pursue this option.  He has been taking Percocet 5/325 mg TID as well as oxycodone 15 mg for severe breakthrough pain.  He feels as though the 15 mg make him hyper and unable to sleep.  He would like to adjust the medications.  Additionally, he was weaning Gabapentin 800 mg and is now taking it BID.  However, he feels as though his shooting pain has worsened since this.  His pain today is 6/10.    Interval History 4/12/2019:  The patient returns for follow up of back pain.  We have received his records including previous lumbar and MRI.  There is no NCS/EMG report, however.  His records indicate lumbar RFAs over 6 months  ago.  He reports that this was helpful for him until recently.  He had a left synovial cyst aspiration and L5-S1 TF MAYURI in the past as well.  He feels as though RFA was more helpful.  Additionally, he had an updated lumbar MRI since previous visit which does show significant arthritis and spinal stenosis.  He would like to hold off on surgical consult at this time.  He has decreased Gabapentin to 800 mg TID and has not noticed a change in pain.  He takes Percocet 5/325 mg TID PRN pain.  He also takes oxycodone 15 mg PRN, about 2-3 pills per week for severe pain.  This was a one time refill from Dr. Mitchell with intention of transition to our office.  He denies any bowel or bladder changes.  His pain today is 6/10.    Initial encounter:    Ari Santos presents to the clinic for the evaluation of lower back pain. The pain started 9 year ago starting indiously and symptoms have been worsening.    Brief history:  History of spinal stenosis and history of a cyst with drainage.    Patient has a pain management physician in [facility]    Pain Description:    The pain is located in the lower back area and radiates to the lower extremities bilaterally in the L5 distribution.      At BEST  5/10     At WORST  10/10 on the WORST day.      On average pain is rated as 7/10.     Today the pain is rated as 7/10    The pain is described as sharp and intermittent      Symptoms interfere with daily activity and sleeping.     Exacerbating factors: Standing, Walking, Morning and Getting out of bed/chair.      Mitigating factors medications, physical therapy and rest.     Patient reports significant motor weakness and loss of sensations.  Patient denies any suicidal or homicidal ideations    Pain Medications:  Current:  Flexeril 10mg TID  Gabapentin 800mg QID  Lamictal 200mg qHS  Aleve 220mg BID  Percocet 5/325  TID PRN  Xanax 1mg for 1 - 3 times/day  ropinrole 4mg    Tried in Past:  NSAIDs -with some relief  TCA  -Never  SNRI -venlafaxine for depression -with side effects  Anti-convulsants -gabapentin     Physical Therapy/Home Exercise: yes completed last year with limited benefit     report:  Reviewed and consistent with medication use as prescribed.    Pain Procedures: previous RFA and MAYURI  5/9/19 Left L3,4,5 RFA  5/23/19 Right L3,4,5 RFA- 50-60% reduction for one week    Chiropractor -helps in the past  Acupuncture - never  TENS unit -helps occasionally  Spinal decompression -never  Joint replacement -never    Imaging:     Narrative     EXAMINATION:  MRI LUMBAR SPINE WITHOUT CONTRAST    CLINICAL HISTORY:  bilateral lower extremity radiculopathy and weakness in the L4/5 distribution with neurogenic claudication;  Spinal stenosis, lumbar region with neurogenic claudication    TECHNIQUE:  Sagittal T1, T2 and STIR images as well as axial T1 and T2 weighted images were obtained through the lumbar without the administration of contrast.    COMPARISON:  None    FINDINGS:  Alignment: There loss of the normal lumbar lordosis.  Minimal grade 1 anterolisthesis of L3 on L4 and L4 on L5.  There may also be minimal retrolisthesis of L2 as relates to L3.    Vertebrae: The vertebral bodies maintain normal height and signal intensity.    Discs:  Multilevel, advanced loss of disc height is noted throughout the lumbar spine with disc desiccation.    Cord: Normal signal.  The conus terminates at the T12-L1 level.    Degenerative findings:    T12-L1: There is a minimal diffuse disc bulge with no facet arthropathy or ligamentum flavum hypertrophy.  The central canal and neural foramen are patent.    L1-L2:There is a large diffuse disc bulge within no significant facet arthropathy.  Ligamentum flavum hypertrophy is present.  There effacement of the anterior thecal sleeve and mild central canal stenosis as well as moderate to severe right and severe left neural foraminal stenosis.    L2-L3: There is a 6 mm cystic structure in the posterior  left aspect of the central canal at the level of the midbody of L2.  This effaces the anterior thecal sleeve and occupies a portion of the left neural foramen.  Additionally, there is a large diffuse disc bulge as well as severe bilateral facet arthropathy at L2-L3.  No significant ligamentum flavum hypertrophy.  Moderate central canal stenosis and mild bilateral neural foraminal stenosis is present.    L3-L4: There is a large diffuse disc bulge with severe bilateral facet arthropathy.  No significant ligamentum flavum hypertrophy there are congenitally shortened pedicles.  This results in moderate central canal stenosis, mild left and moderate right neural foraminal stenosis.    L4-L5: There is a large diffuse disc bulge with severe bilateral facet arthropathy and mild ligamentum flavum hypertrophy.  These findings result in severe central canal stenosis and left neural foraminal stenosis as well as moderate to severe right neural foraminal stenosis.    L5-S1: There is a mild diffuse disc bulge with severe right and moderate to severe left neural foraminal stenosis.  Ligamentum flavum hypertrophy is present    Paraspinal muscles & soft tissues: Normal.    Incidental findings:    -there is a small amount of fluid in the left sacroiliac joint    -2.6 cm T2 hyperintense, T1 hypointense rounded structure in the interpolar region of the left kidney    -2.3 cm rounded, circumscribed, uniformly T2 hyperintense, T1 hypointense focus emanating from the lateral limb of the left adrenal gland    -1.1 cm, heterogeneously T2 hyperintense focus emanating from the junction of the anterior and medial limb of the right adrenal gland      Impression       1. Minimal grade 1 anterolisthesis of L3 on L4 and L4 on L5 with minimal grade 1 retrolisthesis of L2 on L3.  2. Multilevel degenerative disc and joint disease resulting in severe central canal and neural foraminal stenosis at several levels.  3. Finding on the left kidney likely  represents a Bosniak category 2 cyst.  4. Indeterminate bilateral adrenal gland nodules.  Further evaluation of these nodules as well as the left kidney finding can be performed with dedicated adrenal CT or MRI.  5.  This report was flagged in Epic as abnormal.         Past Medical History:   Diagnosis Date    Adrenal insufficiency     Arthritis     Congenital adrenal hyperplasia     Hyperlipidemia     Spinal stenosis      Past Surgical History:   Procedure Laterality Date    RADIOFREQUENCY ABLATION, LEFT L3,4,5 Left 5/9/2019    [facility]formed [facility]    RADIOFREQUENCY ABLATION, RIGHT L3,4,5 Right 5/23/2019    Performed by [facility] at [facility]     Social History     Socioeconomic History    Marital status:      Spouse name: Not on file    Number of children: Not on file    Years of education: Not on file    Highest education level: Not on file   Occupational History    Not on file   Social Needs    Financial resource strain: Not very hard    Food insecurity:     Worry: Never true     Inability: Never true    Transportation needs:     Medical: No     Non-medical: No   Tobacco Use    Smoking status: Former Smoker    Smokeless tobacco: Former User     Quit date: 5/1/2009    Tobacco comment: Socailly   Substance and Sexual Activity    Alcohol use: Not Currently     Frequency: Never     Binge frequency: Never    Drug use: Never    Sexual activity: Yes     Partners: Female   Lifestyle    Physical activity:     Days per week: 3 days     Minutes per session: 20 min    Stress: To some extent   Relationships    Social connections:     Talks on phone: More than three times a week     Gets together: Once a week     Attends [employer] service: Not on file     Active member of club or organization: No     Attends meetings of clubs or organizations: Never     Relationship status:    Other Topics Concern    Not on file   Social History Narrative     Not on file     History reviewed. No pertinent family history.    Review of patient's allergies indicates:   Allergen Reactions    Penicillins Rash       Current Outpatient Medications   Medication Sig    ALPRAZolam (XANAX) 1 MG tablet     ALPRAZolam (XANAX) 1 MG tablet 1 tablet (1 mg total) by mouth three times daily.    atorvastatin (LIPITOR) 10 MG tablet Take 1 tablet (10 mg total) by mouth every evening.    cranberry extract 500 mg Cap     cyclobenzaprine (FLEXERIL) 10 MG tablet Take 1 tablet (10 mg total) by mouth 3 (three) times daily.    gabapentin (NEURONTIN) 800 MG tablet 1 tablet three times a day and 2 tablets at night (5 tablets a day, 4000mg)    Lactobacillus acidophilus (ACIDOPHILUS) Cap     lamoTRIgine (LAMICTAL) 200 MG tablet Take 200 mg by mouth once daily.     loratadine (CLARITIN) 10 mg tablet     morphine 15 mg TR12 Take 15 mg by mouth every 12 (twelve) hours.    naproxen sodium (ALEVE) 220 MG tablet     predniSONE (DELTASONE) 5 MG tablet Take 1 tablet (5 mg total) by mouth once daily. 2 tablets in the am and 1 tablet in the evening    primidone (MYSOLINE) 50 MG Tab Take 50 mg by mouth 3 (three) times daily.     rOPINIRole (REQUIP) 4 MG tablet Take 4 mg by mouth nightly.     sennosides/docusate sodium (STOOL SOFTENER-LAXATIVE ORAL)     testosterone (ANDROGEL) 1 % (50 mg/5 gram) GlPk Apply 5 g topically once daily.    zinc 50 mg Tab     clindamycin (CLEOCIN) 300 MG capsule take 1 capsuke by mouth every 8 hours for 7 days    fluconazole (DIFLUCAN) 100 MG tablet take 1 tablet by mouth at onset of symptoms and one 7 days later    oxyCODONE-acetaminophen (PERCOCET)  mg per tablet Take 1 tablet by mouth every 8 (eight) hours as needed for Pain.     No current facility-administered medications for this visit.        REVIEW OF SYSTEMS:    GENERAL:  No weight loss, malaise or fevers.  HEENT:   No recent changes in vision or hearing  NECK:  Negative for lumps, no difficulty  with swallowing.  RESPIRATORY:  Negative for cough, wheezing or shortness of breath, patient denies any recent URI.  CARDIOVASCULAR:  Negative for chest pain, leg swelling or palpitations.  GI:  Negative for abdominal discomfort, blood in stools or black stools or change in bowel habits.  MUSCULOSKELETAL:  See HPI.  SKIN:  Negative for lesions, rash, and itching.  PSYCH:  Significant psychosocial stressors including PTSD for sex re-assignment surgery.  Patient's sleep is disturbed secondary to pain.  HEMATOLOGY/LYMPHOLOGY:  Negative for prolonged bleeding, bruising easily or swollen nodes.  Patient is not currently taking any anti-coagulants  ENDO: No history of diabetes or thyroid dysfunction  NEURO:   No history of headaches, syncope, paralysis, seizures or tremors.  All other reviewed and negative other than HPI.    OBJECTIVE:    /80   Pulse 98   Temp 97.6 °F (36.4 °C)   Resp 18   Ht 5' (1.524 m)   Wt 73.8 kg (162 lb 11.2 oz)   BMI 31.78 kg/m²     PHYSICAL EXAMINATION:    GENERAL: Well appearing, in no acute distress, alert and oriented x3.  PSYCH:  Mood and affect appropriate.  SKIN: Skin color, texture, turgor normal, no rashes or lesions.  HEAD/FACE:  Normocephalic, atraumatic. Cranial nerves grossly intact.  CV: RRR with palpation of the radial artery.  PULM: No evidence of respiratory difficulty, symmetric chest rise.  BACK:  There is significant pain with palpation over the facet joints of the lumbar spine bilaterally. There is decreased range of motion with extension to 15 degrees, and facet loading maneuvers cause reproducible pain.    EXTREMITIES: Peripheral joint ROM is full and pain free without obvious instability or laxity in all four extremities. No deformities, edema, or skin discoloration. Good capillary refill.  MUSCULOSKELETAL: Hip, and knee provocative maneuvers are negative.  There is pain with palpation over the sacroiliac joints bilaterally.  There is no pain to palpation over  the greater trochanteric bursa bilaterally.   EHL on Right 5/5, EHL on Left EHL 5/5.  5/5 strength in right ankle with plantar and dorsiflexion, 5/5 strength in left ankle with plantar and dorsiflexion, 5/5 strength with right knee flexion extension, 5/5 strength with knee flexion extension on the left. 5/5 Hip flexion on right, 5/5 hip flexion on the left. No atrophy or tone abnormalities are noted.  NEURO:  No clonus.  Decreased sensation to BLE.  GAIT: Antalgic, ambulates with rolling walker, and has a leg length discrepancy    Lab Results   Component Value Date    WBC 6.73 03/18/2019    HGB 13.9 (L) 03/18/2019    HCT 43.8 03/18/2019     (H) 03/18/2019     03/18/2019     CMP  Sodium   Date Value Ref Range Status   03/18/2019 144 136 - 145 mmol/L Final     Potassium   Date Value Ref Range Status   03/18/2019 4.1 3.5 - 5.1 mmol/L Final     Chloride   Date Value Ref Range Status   03/18/2019 107 95 - 110 mmol/L Final     CO2   Date Value Ref Range Status   03/18/2019 26 23 - 29 mmol/L Final     Glucose   Date Value Ref Range Status   03/18/2019 101 70 - 110 mg/dL Final     BUN, Bld   Date Value Ref Range Status   03/18/2019 14 6 - 20 mg/dL Final     Creatinine   Date Value Ref Range Status   03/18/2019 0.8 0.5 - 1.4 mg/dL Final     Calcium   Date Value Ref Range Status   03/18/2019 10.0 8.7 - 10.5 mg/dL Final     Total Protein   Date Value Ref Range Status   03/18/2019 6.8 6.0 - 8.4 g/dL Final     Albumin   Date Value Ref Range Status   03/18/2019 3.9 3.5 - 5.2 g/dL Final     Total Bilirubin   Date Value Ref Range Status   03/18/2019 0.5 0.1 - 1.0 mg/dL Final     Comment:     For infants and newborns, interpretation of results should be based  on gestational age, weight and in agreement with clinical  observations.  Premature Infant recommended reference ranges:  Up to 24 hours.............<8.0 mg/dL  Up to 48 hours............<12.0 mg/dL  3-5 days..................<15.0 mg/dL  6-29  days.................<15.0 mg/dL       Alkaline Phosphatase   Date Value Ref Range Status   03/18/2019 127 55 - 135 U/L Final     AST   Date Value Ref Range Status   03/18/2019 20 10 - 40 U/L Final     ALT   Date Value Ref Range Status   03/18/2019 23 10 - 44 U/L Final     Anion Gap   Date Value Ref Range Status   03/18/2019 11 8 - 16 mmol/L Final     eGFR if    Date Value Ref Range Status   03/18/2019 >60 >60 mL/min/1.73 m^2 Final     eGFR if non    Date Value Ref Range Status   03/18/2019 >60 >60 mL/min/1.73 m^2 Final     Comment:     Calculation used to obtain the estimated glomerular filtration  rate (eGFR) is the CKD-EPI equation.        Lab Results   Component Value Date    HGBA1C 5.8 (H) 03/18/2019     Lab Results   Component Value Date    TSH 0.397 (L) 05/06/2019     Free T4 - 0.81 (wnl)      ASSESSMENT: 49 y.o. year old female with pain, consistent with     Encounter Diagnoses   Name Primary?    Opioid use Yes    Chronic low back pain with sciatica, sciatica laterality unspecified, unspecified back pain laterality     Spinal stenosis of lumbar region with neurogenic claudication     Chronic pain syndrome     Chronic bilateral low back pain with bilateral sciatica     Neuropathic pain        PLAN:     - Previous imaging was reviewed and discussed with the patient today.    - We discussed MAYURI and SCS trial today.    -He has seen neurosurgery and is scheduled for  Open L1-S1 laminectomy on 9/13/19.     -He has seen orthopedics for his bilateral knee pain. They have planned for knee braces after completion of his spinal surgery.    -Patient s/p bilateral L3,4,5 RFA in 5/2019 reports 50-60% reduction of his pain for 1 week, followed by complete return.    - Increase Gabapentin 800 mg from 1 pill QID to 1 pill at morning, lunch, and afternoon, and 2 pills at night for increased neuropathic pain.    - Change Percocet 7.5/325 to 10/325 mg TID PRN, #90 on 8/21/19    - Pain has  filled contract.  UDS performed today, will f/u results.    - RTC in 3 months with Dr. Cabello.    - Dr. Cabello was consulted on the patient and agrees with this plan.      Greater than 25 minutes spent in total in todays visit with the patient, with more than half that time direct face to face counseling and education with the patient today. We discussed the disease process, prognosis, treatment plan, and risks and benefits.         Parker Montano  08/21/2019      I reviewed and edited the  fellow's note, I conducted my own interview and physical examination and agree with the findings.     Messi Cabello 08/21/2019

## 2019-08-28 ENCOUNTER — OFFICE VISIT (OUTPATIENT)
Dept: INTERNAL MEDICINE | Facility: CLINIC | Age: 49
End: 2019-08-28
Attending: INTERNAL MEDICINE
Payer: MEDICARE

## 2019-08-28 VITALS
BODY MASS INDEX: 31.25 KG/M2 | SYSTOLIC BLOOD PRESSURE: 120 MMHG | WEIGHT: 159.19 LBS | OXYGEN SATURATION: 96 % | DIASTOLIC BLOOD PRESSURE: 70 MMHG | HEART RATE: 117 BPM | HEIGHT: 60 IN

## 2019-08-28 DIAGNOSIS — K62.5 BRBPR (BRIGHT RED BLOOD PER RECTUM): ICD-10-CM

## 2019-08-28 DIAGNOSIS — F43.10 PTSD (POST-TRAUMATIC STRESS DISORDER): ICD-10-CM

## 2019-08-28 DIAGNOSIS — R00.0 TACHYCARDIA: ICD-10-CM

## 2019-08-28 DIAGNOSIS — Z01.818 PRE-OP EVALUATION: Primary | ICD-10-CM

## 2019-08-28 DIAGNOSIS — R06.81 WITNESSED APNEIC SPELLS: ICD-10-CM

## 2019-08-28 PROCEDURE — 99215 OFFICE O/P EST HI 40 MIN: CPT | Mod: PBBFAC | Performed by: INTERNAL MEDICINE

## 2019-08-28 PROCEDURE — 99214 PR OFFICE/OUTPT VISIT, EST, LEVL IV, 30-39 MIN: ICD-10-PCS | Mod: S$PBB,,, | Performed by: INTERNAL MEDICINE

## 2019-08-28 PROCEDURE — 99999 PR PBB SHADOW E&M-EST. PATIENT-LVL V: CPT | Mod: PBBFAC,,, | Performed by: INTERNAL MEDICINE

## 2019-08-28 PROCEDURE — 99999 PR PBB SHADOW E&M-EST. PATIENT-LVL V: ICD-10-PCS | Mod: PBBFAC,,, | Performed by: INTERNAL MEDICINE

## 2019-08-28 PROCEDURE — 99214 OFFICE O/P EST MOD 30 MIN: CPT | Mod: S$PBB,,, | Performed by: INTERNAL MEDICINE

## 2019-08-28 RX ORDER — TRIAMCINOLONE ACETONIDE 1 MG/G
CREAM TOPICAL 2 TIMES DAILY
Qty: 45 G | Refills: 1 | Status: SHIPPED | OUTPATIENT
Start: 2019-08-28 | End: 2021-06-09 | Stop reason: SDUPTHER

## 2019-08-28 RX ORDER — NYSTATIN 100000 U/G
CREAM TOPICAL 2 TIMES DAILY
Qty: 30 G | Refills: 1 | Status: ON HOLD | OUTPATIENT
Start: 2019-08-28 | End: 2020-01-31 | Stop reason: HOSPADM

## 2019-08-28 NOTE — PROGRESS NOTES
Subjective:       Patient ID: Ari Santos is a 49 y.o. female.    Chief Complaint: Pre-op Exam    Here for pre-op clearance       have lumbar surgery for his chronic lumbar radiculopathy by Dr Peng in approx 2 weeks. Pt denies CP at rest or with exertion, SOB, episodic chest tightness and wheezing, chronic cough, palpitations, dizziness, orthopnea, PND, LE edema. About twice a week he rides a stationary bike without difficulty. His stamina has been increasing and he has been increasing the resistance on machine. Hx of KIARA, lost a lot of weight and symptoms are on longer present. Has not had official sleep eval. Has seen endocrine and plan of hydrocortisone 100mg on call to OR along with consult to endocrine for management of CAH on chronic prednisone. This was recently weaned to 5mg. No insufficiency symptoms. He is very stressed about his upcoming procedure. He is frustrated by his chronic CTS pains, R>>L, managed by [facility]. 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 He is being set up for knee braces prior to possible replacement in future. Back first. He is tachycardic. This has been present for several years and has not been investigated. He is not Tx his KIARA consistently.       Review of Systems   Constitutional: Negative for activity change and unexpected weight change.   HENT: Negative for hearing loss, rhinorrhea and trouble swallowing.    Eyes: Negative for discharge and visual disturbance.   Respiratory: Negative for chest tightness and wheezing.    Cardiovascular: Negative for chest pain and palpitations.   Gastrointestinal: Positive for blood in stool. Negative for constipation, diarrhea and vomiting.   Endocrine: Negative for polydipsia and polyuria.   Genitourinary: Negative for difficulty urinating, dysuria, hematuria and menstrual problem.   Musculoskeletal: Positive for neck pain. Negative for arthralgias and joint swelling.   Neurological: Negative for weakness and  headaches.   Psychiatric/Behavioral: Negative for confusion and dysphoric mood.       Objective:      Vitals:    08/28/19 1520   BP: 120/70   Pulse: (!) 117   SpO2: 96%   Weight: 72.2 kg (159 lb 2.8 oz)   Height: 5' (1.524 m)      Physical Exam   Constitutional: She is oriented to person, place, and time. She appears well-developed and well-nourished. No distress.   HENT:   Head: Normocephalic and atraumatic.   Mouth/Throat: Oropharynx is clear and moist. No oropharyngeal exudate.   Eyes: Pupils are equal, round, and reactive to light. Conjunctivae and EOM are normal. No scleral icterus.   Neck: No thyromegaly present.   Cardiovascular: Normal rate, regular rhythm and normal heart sounds.   No murmur heard.  Pulmonary/Chest: Effort normal and breath sounds normal. She has no wheezes. She has no rales.   Abdominal: Soft. She exhibits no distension. There is no tenderness.   Musculoskeletal: She exhibits no edema or tenderness.   Lymphadenopathy:     She has no cervical adenopathy.   Neurological: She is alert and oriented to person, place, and time.   Skin: Skin is warm and dry.   Psychiatric: She has a normal mood and affect. Her behavior is normal.       Assessment:       1. Pre-op evaluation    2. PTSD (post-traumatic stress disorder)    3. Witnessed apneic spells    4. Tachycardia    5. BRBPR (bright red blood per rectum)        Plan:       Ari was seen today for pre-op exam.    Diagnoses and all orders for this visit:    Pre-op evaluation   Pt is performing exercise, mostly upper body, but tolerance is increasing. Description of activity sounds >4 METs for approx 20-30 min. Resting tachycardia in setting of long duration of moderate-high risk procedure warrants ischemic eval with stress test. That being said his untreated KIARA and exercise capacity needs to continue to be addressed. Additional pre op note to follow stress test results. -     SCHEDULED EKG 12-LEAD (to Muse); Future    PTSD (post-traumatic stress  disorder)  -     Ambulatory Referral to Psychiatry  -     Ambulatory Referral to Psychiatry  -     Ambulatory referral to Psychiatry  -     Ambulatory referral to Psychiatry    Witnessed apneic spells  -     Ambulatory referral to Sleep Disorders    Tachycardia   Needs to have KIARA r/o as cause.    BRBPR (bright red blood per rectum)   colonscopy within past 2 yrs and with Hx of hemorrhoids. Infrequent.   Other orders  -     nystatin (MYCOSTATIN) cream; Apply topically 2 (two) times daily.  -     triamcinolone acetonide 0.1% (KENALOG) 0.1 % cream; Apply topically 2 (two) times daily.           Siva Faulkner MD  Internal Medicine-[facility]        Side effects of medication(s) were discussed in detail and patient voiced understanding.  Patient will call back for any issues or complications.

## 2019-08-29 ENCOUNTER — HOSPITAL ENCOUNTER (OUTPATIENT)
Dept: CARDIOLOGY | Facility: OTHER | Age: 49
Discharge: HOME | End: 2019-08-29
Attending: INTERNAL MEDICINE
Payer: MEDICARE

## 2019-08-29 DIAGNOSIS — Z01.818 PRE-OP EVALUATION: ICD-10-CM

## 2019-08-29 PROCEDURE — 93010 EKG 12-LEAD: ICD-10-PCS | Mod: ,,, | Performed by: INTERNAL MEDICINE

## 2019-08-29 PROCEDURE — 93010 ELECTROCARDIOGRAM REPORT: CPT | Mod: ,,, | Performed by: INTERNAL MEDICINE

## 2019-08-29 PROCEDURE — 93005 ELECTROCARDIOGRAM TRACING: CPT

## 2019-09-03 DIAGNOSIS — M54.40 CHRONIC LOW BACK PAIN WITH SCIATICA, SCIATICA LATERALITY UNSPECIFIED, UNSPECIFIED BACK PAIN LATERALITY: ICD-10-CM

## 2019-09-03 DIAGNOSIS — G89.29 CHRONIC LOW BACK PAIN WITH SCIATICA, SCIATICA LATERALITY UNSPECIFIED, UNSPECIFIED BACK PAIN LATERALITY: ICD-10-CM

## 2019-09-03 RX ORDER — MORPHINE SULFATE 15 MG/1
15 TABLET, FILM COATED, EXTENDED RELEASE ORAL EVERY 12 HOURS
Qty: 60 TABLET | Refills: 0 | Status: ON HOLD | OUTPATIENT
Start: 2019-09-03 | End: 2019-09-17 | Stop reason: SDUPTHER

## 2019-09-03 RX ORDER — CYCLOBENZAPRINE HCL 10 MG
10 TABLET ORAL 3 TIMES DAILY
Qty: 90 TABLET | Refills: 1 | Status: ON HOLD | OUTPATIENT
Start: 2019-09-03 | End: 2019-09-17 | Stop reason: HOSPADM

## 2019-09-03 NOTE — TELEPHONE ENCOUNTER
Patient requesting refill on morphine and cyclobenzaprine (FLEXERIL) 10 MG tablet     Last office visit 06/20/2019     shows last refill on morphine 04/30/2019    Patient does have a pain contract on file with [facility] department 06/20/2019    Patient has no UDS on file    Please review.  Thanks

## 2019-09-04 ENCOUNTER — TELEPHONE (OUTPATIENT)
Dept: INTERNAL MEDICINE | Facility: CLINIC | Age: 49
End: 2019-09-04

## 2019-09-04 DIAGNOSIS — R00.0 TACHYCARDIA, UNSPECIFIED: ICD-10-CM

## 2019-09-04 DIAGNOSIS — Z01.818 PRE-OP EVALUATION: Primary | ICD-10-CM

## 2019-09-04 DIAGNOSIS — R94.31 ABNORMAL ELECTROCARDIOGRAM: ICD-10-CM

## 2019-09-04 NOTE — TELEPHONE ENCOUNTER
Please let pt know we need a stress test due to his persistently elevated HR and the extent of surgery he is having. Please schedule

## 2019-09-04 NOTE — TELEPHONE ENCOUNTER
3rd attempt to call MR. Santos to schedule stress test but no answer.  LVM to call the office.  Message sent to gwyn burks

## 2019-09-04 NOTE — TELEPHONE ENCOUNTER
----- Message from Lucia Conway sent at 9/4/2019 12:08 PM CDT -----  Contact: Self   Type: Patient Call Back    What is the request in detail: pt returning call.     Can the clinic reply by [facility]? No    Would the patient rather a call back or a response via [facility]? Call back     Best call back number: [phone]

## 2019-09-05 ENCOUNTER — TELEPHONE (OUTPATIENT)
Dept: INTERNAL MEDICINE | Facility: CLINIC | Age: 49
End: 2019-09-05

## 2019-09-05 NOTE — TELEPHONE ENCOUNTER
----- Message from Treasure Nascimento sent at 9/4/2019  2:50 PM CDT -----  Contact: Kristy- Wife   Type:  Patient Returning Call    Who Called: Kristy- Wife     Who Left Message for Patient:  Renetta    Does the patient know what this is regarding?: y     Best Call Back Number:[phone]    Additional Information:

## 2019-09-06 ENCOUNTER — TELEPHONE (OUTPATIENT)
Dept: CARDIOLOGY | Facility: CLINIC | Age: 49
End: 2019-09-06

## 2019-09-09 ENCOUNTER — PATIENT MESSAGE (OUTPATIENT)
Dept: SURGERY | Facility: HOSPITAL | Age: 49
End: 2019-09-09

## 2019-09-10 ENCOUNTER — CLINICAL SUPPORT (OUTPATIENT)
Dept: CARDIOLOGY | Facility: CLINIC | Age: 49
End: 2019-09-10
Attending: INTERNAL MEDICINE
Payer: MEDICARE

## 2019-09-10 DIAGNOSIS — Z01.818 PRE-OP EVALUATION: ICD-10-CM

## 2019-09-10 DIAGNOSIS — R00.0 TACHYCARDIA, UNSPECIFIED: ICD-10-CM

## 2019-09-10 DIAGNOSIS — R94.31 ABNORMAL ELECTROCARDIOGRAM: ICD-10-CM

## 2019-09-10 LAB
CV PHARM DOSE: 0.4 MG
CV STRESS BASE HR: 94 BPM
DIASTOLIC BLOOD PRESSURE: 61 MMHG
END DIASTOLIC INDEX-HIGH: 170 ML/M2
END SYSTOLIC INDEX-HIGH: 70 ML/M2
NUC REST DIASTOLIC VOLUME INDEX: 54
NUC REST EJECTION FRACTION: 90
NUC REST SYSTOLIC VOLUME INDEX: 5
NUC STRESS DIASTOLIC VOLUME INDEX: 56
NUC STRESS EJECTION FRACTION: 90 %
NUC STRESS SYSTOLIC VOLUME INDEX: 6
OHS CV CPX 1 MINUTE RECOVERY HEART RATE: 106 BPM
OHS CV CPX 85 PERCENT MAX PREDICTED HEART RATE MALE: 137
OHS CV CPX MAX PREDICTED HEART RATE: 161
OHS CV CPX PATIENT IS FEMALE: 1
OHS CV CPX PATIENT IS MALE: 0
OHS CV CPX PEAK DIASTOLIC BLOOD PRESSURE: 67 MMHG
OHS CV CPX PEAK HEAR RATE: 100 BPM
OHS CV CPX PEAK RATE PRESSURE PRODUCT: NORMAL
OHS CV CPX PEAK SYSTOLIC BLOOD PRESSURE: 105 MMHG
OHS CV CPX PERCENT MAX PREDICTED HEART RATE ACHIEVED: 62
OHS CV CPX RATE PRESSURE PRODUCT PRESENTING: NORMAL
RETIRED EF AND QEF - SEE NOTES: 51 %
STRESS ECHO TARGET HR: 143.65 BPM
SYSTOLIC BLOOD PRESSURE: 108 MMHG

## 2019-09-10 PROCEDURE — 99999 PR PBB SHADOW E&M-EST. PATIENT-LVL I: ICD-10-PCS | Mod: PBBFAC,,,

## 2019-09-10 PROCEDURE — 78452 HT MUSCLE IMAGE SPECT MULT: CPT | Mod: PBBFAC | Performed by: INTERNAL MEDICINE

## 2019-09-10 PROCEDURE — 93018 CV STRESS TEST I&R ONLY: CPT | Mod: S$PBB,,, | Performed by: INTERNAL MEDICINE

## 2019-09-10 PROCEDURE — 99999 PR PBB SHADOW E&M-EST. PATIENT-LVL I: CPT | Mod: PBBFAC,,,

## 2019-09-10 PROCEDURE — 99211 OFF/OP EST MAY X REQ PHY/QHP: CPT | Mod: PBBFAC,27

## 2019-09-10 PROCEDURE — 99211 OFF/OP EST MAY X REQ PHY/QHP: CPT | Mod: PBBFAC

## 2019-09-10 PROCEDURE — 99211 OFF/OP EST MAY X REQ PHY/QHP: CPT | Mod: PBBFAC,27,25

## 2019-09-10 PROCEDURE — 93016 CV STRESS TEST SUPVJ ONLY: CPT | Mod: S$PBB,,, | Performed by: INTERNAL MEDICINE

## 2019-09-10 PROCEDURE — 93018 STRESS TEST WITH MYOCARDIAL PERFUSION (CUPID ONLY): ICD-10-PCS | Mod: S$PBB,,, | Performed by: INTERNAL MEDICINE

## 2019-09-10 PROCEDURE — 93016 STRESS TEST WITH MYOCARDIAL PERFUSION (CUPID ONLY): ICD-10-PCS | Mod: S$PBB,,, | Performed by: INTERNAL MEDICINE

## 2019-09-10 PROCEDURE — 78452 STRESS TEST WITH MYOCARDIAL PERFUSION (CUPID ONLY): ICD-10-PCS | Mod: 26,S$PBB,, | Performed by: INTERNAL MEDICINE

## 2019-09-10 RX ORDER — REGADENOSON 0.08 MG/ML
0.4 INJECTION, SOLUTION INTRAVENOUS
Status: COMPLETED | OUTPATIENT
Start: 2019-09-10 | End: 2019-09-10

## 2019-09-10 RX ADMIN — REGADENOSON 0.4 MG: 0.08 INJECTION, SOLUTION INTRAVENOUS at 09:09

## 2019-09-12 ENCOUNTER — PATIENT MESSAGE (OUTPATIENT)
Dept: INTERNAL MEDICINE | Facility: CLINIC | Age: 49
End: 2019-09-12

## 2019-09-12 NOTE — PRE-PROCEDURE INSTRUCTIONS
Preop instructions: NPO solids/ milk products after midnight or clears up to 2 hours before arrival (clear liquids are: water, apple juice, Gatorade & Jell-O, black coffee/no milk, cream or creamer), shower instructions, directions, leave all valuables at home, medication instructions for PM prior & am of procedure explained. Patient's wife stated an understanding.      Patient's wife denies any side effects or issues with anesthesia or sedation.

## 2019-09-13 ENCOUNTER — ANESTHESIA EVENT (OUTPATIENT)
Dept: SURGERY | Facility: HOSPITAL | Age: 49
DRG: 519 | End: 2019-09-13
Payer: MEDICARE

## 2019-09-13 ENCOUNTER — HOSPITAL ENCOUNTER (INPATIENT)
Facility: HOSPITAL | Age: 49
LOS: 1 days | Discharge: HOME HEALTH | DRG: 519 | End: 2019-09-17
Attending: NEUROLOGICAL SURGERY | Admitting: NEUROLOGICAL SURGERY
Payer: MEDICARE

## 2019-09-13 ENCOUNTER — ANESTHESIA (OUTPATIENT)
Dept: SURGERY | Facility: HOSPITAL | Age: 49
DRG: 519 | End: 2019-09-13
Payer: MEDICARE

## 2019-09-13 DIAGNOSIS — G89.29 CHRONIC LOW BACK PAIN WITH SCIATICA, SCIATICA LATERALITY UNSPECIFIED, UNSPECIFIED BACK PAIN LATERALITY: ICD-10-CM

## 2019-09-13 DIAGNOSIS — M48.061 LUMBAR STENOSIS: ICD-10-CM

## 2019-09-13 DIAGNOSIS — M54.40 CHRONIC LOW BACK PAIN WITH SCIATICA, SCIATICA LATERALITY UNSPECIFIED, UNSPECIFIED BACK PAIN LATERALITY: ICD-10-CM

## 2019-09-13 DIAGNOSIS — E25.0 CONGENITAL ADRENAL HYPERPLASIA: ICD-10-CM

## 2019-09-13 DIAGNOSIS — M48.062 LUMBAR STENOSIS WITH NEUROGENIC CLAUDICATION: ICD-10-CM

## 2019-09-13 LAB
ABO + RH BLD: NORMAL
ANION GAP SERPL CALC-SCNC: 9 MMOL/L (ref 8–16)
APTT BLDCRRT: 26.9 SEC (ref 21–32)
BASOPHILS # BLD AUTO: 0.04 K/UL (ref 0–0.2)
BASOPHILS NFR BLD: 0.6 % (ref 0–1.9)
BLD GP AB SCN CELLS X3 SERPL QL: NORMAL
BUN SERPL-MCNC: 6 MG/DL (ref 6–20)
CALCIUM SERPL-MCNC: 8.7 MG/DL (ref 8.7–10.5)
CHLORIDE SERPL-SCNC: 107 MMOL/L (ref 95–110)
CO2 SERPL-SCNC: 24 MMOL/L (ref 23–29)
CREAT SERPL-MCNC: 0.6 MG/DL (ref 0.5–1.4)
DIFFERENTIAL METHOD: ABNORMAL
EOSINOPHIL # BLD AUTO: 0.1 K/UL (ref 0–0.5)
EOSINOPHIL NFR BLD: 1.6 % (ref 0–8)
ERYTHROCYTE [DISTWIDTH] IN BLOOD BY AUTOMATED COUNT: 12.6 % (ref 11.5–14.5)
EST. GFR  (AFRICAN AMERICAN): >60 ML/MIN/1.73 M^2
EST. GFR  (NON AFRICAN AMERICAN): >60 ML/MIN/1.73 M^2
GLUCOSE SERPL-MCNC: 82 MG/DL (ref 70–110)
HCT VFR BLD AUTO: 38.5 % (ref 37–48.5)
HGB BLD-MCNC: 12.4 G/DL (ref 12–16)
IMM GRANULOCYTES # BLD AUTO: 0.02 K/UL (ref 0–0.04)
IMM GRANULOCYTES NFR BLD AUTO: 0.3 % (ref 0–0.5)
INR PPP: 1.1 (ref 0.8–1.2)
LYMPHOCYTES # BLD AUTO: 1.6 K/UL (ref 1–4.8)
LYMPHOCYTES NFR BLD: 23 % (ref 18–48)
MCH RBC QN AUTO: 31.9 PG (ref 27–31)
MCHC RBC AUTO-ENTMCNC: 32.2 G/DL (ref 32–36)
MCV RBC AUTO: 99 FL (ref 82–98)
MONOCYTES # BLD AUTO: 0.5 K/UL (ref 0.3–1)
MONOCYTES NFR BLD: 6.5 % (ref 4–15)
NEUTROPHILS # BLD AUTO: 4.7 K/UL (ref 1.8–7.7)
NEUTROPHILS NFR BLD: 68 % (ref 38–73)
NRBC BLD-RTO: 0 /100 WBC
PLATELET # BLD AUTO: 223 K/UL (ref 150–350)
PMV BLD AUTO: 8.9 FL (ref 9.2–12.9)
POTASSIUM SERPL-SCNC: 3.5 MMOL/L (ref 3.5–5.1)
PROTHROMBIN TIME: 11 SEC (ref 9–12.5)
RBC # BLD AUTO: 3.89 M/UL (ref 4–5.4)
SODIUM SERPL-SCNC: 140 MMOL/L (ref 136–145)
WBC # BLD AUTO: 6.88 K/UL (ref 3.9–12.7)

## 2019-09-13 PROCEDURE — 63600175 PHARM REV CODE 636 W HCPCS

## 2019-09-13 PROCEDURE — C1729 CATH, DRAINAGE: HCPCS | Performed by: NEUROLOGICAL SURGERY

## 2019-09-13 PROCEDURE — 94761 N-INVAS EAR/PLS OXIMETRY MLT: CPT

## 2019-09-13 PROCEDURE — 63600175 PHARM REV CODE 636 W HCPCS: Performed by: STUDENT IN AN ORGANIZED HEALTH CARE EDUCATION/TRAINING PROGRAM

## 2019-09-13 PROCEDURE — 25000003 PHARM REV CODE 250: Performed by: NEUROLOGICAL SURGERY

## 2019-09-13 PROCEDURE — 71000033 HC RECOVERY, INTIAL HOUR: Performed by: NEUROLOGICAL SURGERY

## 2019-09-13 PROCEDURE — 63600175 PHARM REV CODE 636 W HCPCS: Performed by: NEUROLOGICAL SURGERY

## 2019-09-13 PROCEDURE — 27201423 OPTIME MED/SURG SUP & DEVICES STERILE SUPPLY: Performed by: NEUROLOGICAL SURGERY

## 2019-09-13 PROCEDURE — 85730 THROMBOPLASTIN TIME PARTIAL: CPT

## 2019-09-13 PROCEDURE — D9220A PRA ANESTHESIA: ICD-10-PCS | Mod: CRNA,,, | Performed by: NURSE ANESTHETIST, CERTIFIED REGISTERED

## 2019-09-13 PROCEDURE — C9290 INJ, BUPIVACAINE LIPOSOME: HCPCS | Performed by: NEUROLOGICAL SURGERY

## 2019-09-13 PROCEDURE — 71000039 HC RECOVERY, EACH ADD'L HOUR: Performed by: NEUROLOGICAL SURGERY

## 2019-09-13 PROCEDURE — 86920 COMPATIBILITY TEST SPIN: CPT

## 2019-09-13 PROCEDURE — D9220A PRA ANESTHESIA: Mod: ANES,,, | Performed by: ANESTHESIOLOGY

## 2019-09-13 PROCEDURE — 63600175 PHARM REV CODE 636 W HCPCS: Performed by: NURSE ANESTHETIST, CERTIFIED REGISTERED

## 2019-09-13 PROCEDURE — D9220A PRA ANESTHESIA: ICD-10-PCS | Mod: ANES,,, | Performed by: ANESTHESIOLOGY

## 2019-09-13 PROCEDURE — 63047 PR LAMINEC/FACETECT/FORAMIN,LUMBAR 1 SEG: ICD-10-PCS | Mod: ,,, | Performed by: NEUROLOGICAL SURGERY

## 2019-09-13 PROCEDURE — 25000003 PHARM REV CODE 250: Performed by: STUDENT IN AN ORGANIZED HEALTH CARE EDUCATION/TRAINING PROGRAM

## 2019-09-13 PROCEDURE — 63047 LAM FACETEC & FORAMOT LUMBAR: CPT | Mod: ,,, | Performed by: NEUROLOGICAL SURGERY

## 2019-09-13 PROCEDURE — 36000711: Performed by: NEUROLOGICAL SURGERY

## 2019-09-13 PROCEDURE — 63048 PR LAMINECT/FACETECT/FORAMINOT, EA ADDTL VERTEBRAL SEGM: ICD-10-PCS | Mod: ,,, | Performed by: NEUROLOGICAL SURGERY

## 2019-09-13 PROCEDURE — 25000003 PHARM REV CODE 250: Performed by: NURSE ANESTHETIST, CERTIFIED REGISTERED

## 2019-09-13 PROCEDURE — S0020 INJECTION, BUPIVICAINE HYDRO: HCPCS | Performed by: NEUROLOGICAL SURGERY

## 2019-09-13 PROCEDURE — 36000710: Performed by: NEUROLOGICAL SURGERY

## 2019-09-13 PROCEDURE — 80048 BASIC METABOLIC PNL TOTAL CA: CPT

## 2019-09-13 PROCEDURE — 37000008 HC ANESTHESIA 1ST 15 MINUTES: Performed by: NEUROLOGICAL SURGERY

## 2019-09-13 PROCEDURE — 63600175 PHARM REV CODE 636 W HCPCS: Performed by: ANESTHESIOLOGY

## 2019-09-13 PROCEDURE — 86901 BLOOD TYPING SEROLOGIC RH(D): CPT

## 2019-09-13 PROCEDURE — 85025 COMPLETE CBC W/AUTO DIFF WBC: CPT

## 2019-09-13 PROCEDURE — 37000009 HC ANESTHESIA EA ADD 15 MINS: Performed by: NEUROLOGICAL SURGERY

## 2019-09-13 PROCEDURE — D9220A PRA ANESTHESIA: Mod: CRNA,,, | Performed by: NURSE ANESTHETIST, CERTIFIED REGISTERED

## 2019-09-13 PROCEDURE — 63048 LAM FACETEC &FORAMOT EA ADDL: CPT | Mod: ,,, | Performed by: NEUROLOGICAL SURGERY

## 2019-09-13 PROCEDURE — 85610 PROTHROMBIN TIME: CPT

## 2019-09-13 RX ORDER — GABAPENTIN 400 MG/1
800 CAPSULE ORAL 3 TIMES DAILY
Status: DISCONTINUED | OUTPATIENT
Start: 2019-09-13 | End: 2019-09-17 | Stop reason: HOSPADM

## 2019-09-13 RX ORDER — HYDROMORPHONE HYDROCHLORIDE 1 MG/ML
0.2 INJECTION, SOLUTION INTRAMUSCULAR; INTRAVENOUS; SUBCUTANEOUS EVERY 5 MIN PRN
Status: DISCONTINUED | OUTPATIENT
Start: 2019-09-13 | End: 2019-09-13 | Stop reason: HOSPADM

## 2019-09-13 RX ORDER — ATORVASTATIN CALCIUM 10 MG/1
10 TABLET, FILM COATED ORAL NIGHTLY
Status: DISCONTINUED | OUTPATIENT
Start: 2019-09-13 | End: 2019-09-17 | Stop reason: HOSPADM

## 2019-09-13 RX ORDER — LIDOCAINE HCL/PF 100 MG/5ML
SYRINGE (ML) INTRAVENOUS
Status: DISCONTINUED | OUTPATIENT
Start: 2019-09-13 | End: 2019-09-13

## 2019-09-13 RX ORDER — MUPIROCIN 20 MG/G
1 OINTMENT TOPICAL 2 TIMES DAILY
Status: DISCONTINUED | OUTPATIENT
Start: 2019-09-13 | End: 2019-09-17 | Stop reason: HOSPADM

## 2019-09-13 RX ORDER — SODIUM CHLORIDE 0.9 % (FLUSH) 0.9 %
10 SYRINGE (ML) INJECTION
Status: DISCONTINUED | OUTPATIENT
Start: 2019-09-13 | End: 2019-09-13 | Stop reason: HOSPADM

## 2019-09-13 RX ORDER — ROPINIROLE 1 MG/1
4 TABLET, FILM COATED ORAL NIGHTLY
Status: DISCONTINUED | OUTPATIENT
Start: 2019-09-13 | End: 2019-09-17 | Stop reason: HOSPADM

## 2019-09-13 RX ORDER — MIDAZOLAM HYDROCHLORIDE 1 MG/ML
INJECTION, SOLUTION INTRAMUSCULAR; INTRAVENOUS
Status: DISCONTINUED | OUTPATIENT
Start: 2019-09-13 | End: 2019-09-13

## 2019-09-13 RX ORDER — OXYCODONE HYDROCHLORIDE 5 MG/1
5 TABLET ORAL EVERY 4 HOURS PRN
Status: DISCONTINUED | OUTPATIENT
Start: 2019-09-13 | End: 2019-09-16

## 2019-09-13 RX ORDER — KETAMINE HCL IN 0.9 % NACL 50 MG/5 ML
SYRINGE (ML) INTRAVENOUS
Status: DISCONTINUED | OUTPATIENT
Start: 2019-09-13 | End: 2019-09-13

## 2019-09-13 RX ORDER — METHOCARBAMOL 500 MG/1
500 TABLET, FILM COATED ORAL 4 TIMES DAILY
Status: DISCONTINUED | OUTPATIENT
Start: 2019-09-13 | End: 2019-09-14

## 2019-09-13 RX ORDER — ACETAMINOPHEN 10 MG/ML
1000 INJECTION, SOLUTION INTRAVENOUS ONCE
Status: COMPLETED | OUTPATIENT
Start: 2019-09-13 | End: 2019-09-13

## 2019-09-13 RX ORDER — GABAPENTIN 300 MG/1
300 CAPSULE ORAL 3 TIMES DAILY
Status: DISCONTINUED | OUTPATIENT
Start: 2019-09-13 | End: 2019-09-13

## 2019-09-13 RX ORDER — LIDOCAINE HYDROCHLORIDE AND EPINEPHRINE 10; 10 MG/ML; UG/ML
INJECTION, SOLUTION INFILTRATION; PERINEURAL
Status: DISCONTINUED | OUTPATIENT
Start: 2019-09-13 | End: 2019-09-13 | Stop reason: HOSPADM

## 2019-09-13 RX ORDER — MAG HYDROX/ALUMINUM HYD/SIMETH 200-200-20
30 SUSPENSION, ORAL (FINAL DOSE FORM) ORAL EVERY 4 HOURS PRN
Status: DISCONTINUED | OUTPATIENT
Start: 2019-09-13 | End: 2019-09-17 | Stop reason: HOSPADM

## 2019-09-13 RX ORDER — MORPHINE SULFATE 2 MG/ML
4 INJECTION, SOLUTION INTRAMUSCULAR; INTRAVENOUS
Status: DISCONTINUED | OUTPATIENT
Start: 2019-09-13 | End: 2019-09-16

## 2019-09-13 RX ORDER — ONDANSETRON 2 MG/ML
4 INJECTION INTRAMUSCULAR; INTRAVENOUS ONCE AS NEEDED
Status: DISCONTINUED | OUTPATIENT
Start: 2019-09-13 | End: 2019-09-13 | Stop reason: HOSPADM

## 2019-09-13 RX ORDER — PROPOFOL 10 MG/ML
VIAL (ML) INTRAVENOUS
Status: DISCONTINUED | OUTPATIENT
Start: 2019-09-13 | End: 2019-09-13

## 2019-09-13 RX ORDER — GLYCOPYRROLATE 0.2 MG/ML
INJECTION INTRAMUSCULAR; INTRAVENOUS
Status: DISCONTINUED | OUTPATIENT
Start: 2019-09-13 | End: 2019-09-13

## 2019-09-13 RX ORDER — NEOSTIGMINE METHYLSULFATE 0.5 MG/ML
INJECTION, SOLUTION INTRAVENOUS
Status: DISCONTINUED | OUTPATIENT
Start: 2019-09-13 | End: 2019-09-13

## 2019-09-13 RX ORDER — CLINDAMYCIN PHOSPHATE 900 MG/50ML
900 INJECTION, SOLUTION INTRAVENOUS
Status: DISCONTINUED | OUTPATIENT
Start: 2019-09-13 | End: 2019-09-14

## 2019-09-13 RX ORDER — ONDANSETRON 2 MG/ML
INJECTION INTRAMUSCULAR; INTRAVENOUS
Status: DISCONTINUED | OUTPATIENT
Start: 2019-09-13 | End: 2019-09-13

## 2019-09-13 RX ORDER — ALPRAZOLAM 0.25 MG/1
1 TABLET ORAL 3 TIMES DAILY PRN
Status: DISCONTINUED | OUTPATIENT
Start: 2019-09-13 | End: 2019-09-17 | Stop reason: HOSPADM

## 2019-09-13 RX ORDER — SODIUM CHLORIDE 9 MG/ML
INJECTION, SOLUTION INTRAVENOUS CONTINUOUS PRN
Status: DISCONTINUED | OUTPATIENT
Start: 2019-09-13 | End: 2019-09-13

## 2019-09-13 RX ORDER — HYDROMORPHONE HYDROCHLORIDE 1 MG/ML
INJECTION, SOLUTION INTRAMUSCULAR; INTRAVENOUS; SUBCUTANEOUS
Status: COMPLETED
Start: 2019-09-13 | End: 2019-09-13

## 2019-09-13 RX ORDER — DOCUSATE SODIUM 50 MG/5ML
100 LIQUID ORAL DAILY
Status: DISCONTINUED | OUTPATIENT
Start: 2019-09-14 | End: 2019-09-16

## 2019-09-13 RX ORDER — ACETAMINOPHEN 325 MG/1
650 TABLET ORAL EVERY 6 HOURS PRN
Status: DISCONTINUED | OUTPATIENT
Start: 2019-09-13 | End: 2019-09-16

## 2019-09-13 RX ORDER — FAMOTIDINE 20 MG/1
20 TABLET, FILM COATED ORAL 2 TIMES DAILY
Status: DISCONTINUED | OUTPATIENT
Start: 2019-09-13 | End: 2019-09-17 | Stop reason: HOSPADM

## 2019-09-13 RX ORDER — BUPIVACAINE HYDROCHLORIDE 5 MG/ML
INJECTION, SOLUTION EPIDURAL; INTRACAUDAL
Status: DISCONTINUED | OUTPATIENT
Start: 2019-09-13 | End: 2019-09-13 | Stop reason: HOSPADM

## 2019-09-13 RX ORDER — HEPARIN SODIUM 5000 [USP'U]/ML
5000 INJECTION, SOLUTION INTRAVENOUS; SUBCUTANEOUS EVERY 8 HOURS
Status: DISCONTINUED | OUTPATIENT
Start: 2019-09-14 | End: 2019-09-17 | Stop reason: HOSPADM

## 2019-09-13 RX ORDER — ACETAMINOPHEN 325 MG/1
650 TABLET ORAL EVERY 4 HOURS PRN
Status: DISCONTINUED | OUTPATIENT
Start: 2019-09-13 | End: 2019-09-16

## 2019-09-13 RX ORDER — AMOXICILLIN 250 MG
2 CAPSULE ORAL NIGHTLY PRN
Status: DISCONTINUED | OUTPATIENT
Start: 2019-09-13 | End: 2019-09-16

## 2019-09-13 RX ORDER — ONDANSETRON 8 MG/1
8 TABLET, ORALLY DISINTEGRATING ORAL EVERY 6 HOURS PRN
Status: DISCONTINUED | OUTPATIENT
Start: 2019-09-13 | End: 2019-09-17 | Stop reason: HOSPADM

## 2019-09-13 RX ORDER — BACITRACIN 50000 [IU]/1
INJECTION, POWDER, FOR SOLUTION INTRAMUSCULAR
Status: DISCONTINUED | OUTPATIENT
Start: 2019-09-13 | End: 2019-09-13 | Stop reason: HOSPADM

## 2019-09-13 RX ORDER — ROCURONIUM BROMIDE 10 MG/ML
INJECTION, SOLUTION INTRAVENOUS
Status: DISCONTINUED | OUTPATIENT
Start: 2019-09-13 | End: 2019-09-13

## 2019-09-13 RX ORDER — VANCOMYCIN HYDROCHLORIDE 1 G/20ML
INJECTION, POWDER, LYOPHILIZED, FOR SOLUTION INTRAVENOUS
Status: DISCONTINUED | OUTPATIENT
Start: 2019-09-13 | End: 2019-09-13 | Stop reason: HOSPADM

## 2019-09-13 RX ORDER — PHENYLEPHRINE HYDROCHLORIDE 10 MG/ML
INJECTION INTRAVENOUS
Status: DISCONTINUED | OUTPATIENT
Start: 2019-09-13 | End: 2019-09-13

## 2019-09-13 RX ORDER — CETIRIZINE HYDROCHLORIDE 5 MG/1
10 TABLET ORAL DAILY
Status: DISCONTINUED | OUTPATIENT
Start: 2019-09-14 | End: 2019-09-17 | Stop reason: HOSPADM

## 2019-09-13 RX ORDER — LAMOTRIGINE 100 MG/1
200 TABLET ORAL 2 TIMES DAILY
Status: DISCONTINUED | OUTPATIENT
Start: 2019-09-13 | End: 2019-09-17 | Stop reason: HOSPADM

## 2019-09-13 RX ORDER — FENTANYL CITRATE 50 UG/ML
INJECTION, SOLUTION INTRAMUSCULAR; INTRAVENOUS
Status: DISCONTINUED | OUTPATIENT
Start: 2019-09-13 | End: 2019-09-13

## 2019-09-13 RX ORDER — HYDROCODONE BITARTRATE AND ACETAMINOPHEN 5; 325 MG/1; MG/1
1 TABLET ORAL EVERY 4 HOURS PRN
Status: DISCONTINUED | OUTPATIENT
Start: 2019-09-13 | End: 2019-09-14

## 2019-09-13 RX ADMIN — GABAPENTIN 800 MG: 400 CAPSULE ORAL at 09:09

## 2019-09-13 RX ADMIN — MUPIROCIN 1 G: 20 OINTMENT TOPICAL at 10:09

## 2019-09-13 RX ADMIN — Medication 10 MG: at 05:09

## 2019-09-13 RX ADMIN — ROCURONIUM BROMIDE 30 MG: 10 INJECTION, SOLUTION INTRAVENOUS at 02:09

## 2019-09-13 RX ADMIN — HYDROCODONE BITARTRATE AND ACETAMINOPHEN 1 TABLET: 5; 325 TABLET ORAL at 06:09

## 2019-09-13 RX ADMIN — HYDROMORPHONE HYDROCHLORIDE 0.2 MG: 1 INJECTION, SOLUTION INTRAMUSCULAR; INTRAVENOUS; SUBCUTANEOUS at 06:09

## 2019-09-13 RX ADMIN — FENTANYL CITRATE 50 MCG: 50 INJECTION, SOLUTION INTRAMUSCULAR; INTRAVENOUS at 05:09

## 2019-09-13 RX ADMIN — HYDROCORTISONE SODIUM SUCCINATE 100 MG: 100 INJECTION, POWDER, FOR SOLUTION INTRAMUSCULAR; INTRAVENOUS at 02:09

## 2019-09-13 RX ADMIN — METHOCARBAMOL TABLETS 500 MG: 500 TABLET, COATED ORAL at 09:09

## 2019-09-13 RX ADMIN — HYDROCORTISONE SODIUM SUCCINATE 100 MG: 100 INJECTION, POWDER, FOR SOLUTION INTRAMUSCULAR; INTRAVENOUS at 07:09

## 2019-09-13 RX ADMIN — PHENYLEPHRINE HYDROCHLORIDE 150 MCG: 10 INJECTION INTRAVENOUS at 04:09

## 2019-09-13 RX ADMIN — PHENYLEPHRINE HYDROCHLORIDE 100 MCG: 10 INJECTION INTRAVENOUS at 04:09

## 2019-09-13 RX ADMIN — SENNOSIDES,DOCUSATE SODIUM 2 TABLET: 8.6; 5 TABLET, FILM COATED ORAL at 10:09

## 2019-09-13 RX ADMIN — FAMOTIDINE 20 MG: 20 TABLET, FILM COATED ORAL at 09:09

## 2019-09-13 RX ADMIN — ATORVASTATIN CALCIUM 10 MG: 10 TABLET, FILM COATED ORAL at 09:09

## 2019-09-13 RX ADMIN — LIDOCAINE HYDROCHLORIDE 60 MG: 20 INJECTION, SOLUTION INTRAVENOUS at 02:09

## 2019-09-13 RX ADMIN — FENTANYL CITRATE 50 MCG: 50 INJECTION, SOLUTION INTRAMUSCULAR; INTRAVENOUS at 01:09

## 2019-09-13 RX ADMIN — VANCOMYCIN HYDROCHLORIDE 1 G: 1 INJECTION, POWDER, LYOPHILIZED, FOR SOLUTION INTRAVENOUS at 02:09

## 2019-09-13 RX ADMIN — ONDANSETRON 8 MG: 8 TABLET, ORALLY DISINTEGRATING ORAL at 09:09

## 2019-09-13 RX ADMIN — HYDROMORPHONE HYDROCHLORIDE 0.2 MG: 1 INJECTION, SOLUTION INTRAMUSCULAR; INTRAVENOUS; SUBCUTANEOUS at 07:09

## 2019-09-13 RX ADMIN — MIDAZOLAM HYDROCHLORIDE 2 MG: 1 INJECTION, SOLUTION INTRAMUSCULAR; INTRAVENOUS at 01:09

## 2019-09-13 RX ADMIN — Medication 30 MG: at 02:09

## 2019-09-13 RX ADMIN — OXYCODONE HYDROCHLORIDE 5 MG: 5 TABLET ORAL at 07:09

## 2019-09-13 RX ADMIN — ONDANSETRON 4 MG: 2 INJECTION INTRAMUSCULAR; INTRAVENOUS at 05:09

## 2019-09-13 RX ADMIN — PHENYLEPHRINE HYDROCHLORIDE 100 MCG: 10 INJECTION INTRAVENOUS at 05:09

## 2019-09-13 RX ADMIN — ALPRAZOLAM 1 MG: 0.25 TABLET ORAL at 10:09

## 2019-09-13 RX ADMIN — SODIUM CHLORIDE: 0.9 INJECTION, SOLUTION INTRAVENOUS at 12:09

## 2019-09-13 RX ADMIN — HYDROCODONE BITARTRATE AND ACETAMINOPHEN 1 TABLET: 5; 325 TABLET ORAL at 10:09

## 2019-09-13 RX ADMIN — FENTANYL CITRATE 50 MCG: 50 INJECTION, SOLUTION INTRAMUSCULAR; INTRAVENOUS at 04:09

## 2019-09-13 RX ADMIN — NEOSTIGMINE METHYLSULFATE 5 MG: 0.5 INJECTION INTRAVENOUS at 05:09

## 2019-09-13 RX ADMIN — Medication 10 MG: at 03:09

## 2019-09-13 RX ADMIN — MORPHINE SULFATE 4 MG: 2 INJECTION, SOLUTION INTRAMUSCULAR; INTRAVENOUS at 09:09

## 2019-09-13 RX ADMIN — ACETAMINOPHEN 1000 MG: 10 INJECTION, SOLUTION INTRAVENOUS at 12:09

## 2019-09-13 RX ADMIN — GLYCOPYRROLATE 0.6 MG: 0.2 INJECTION, SOLUTION INTRAMUSCULAR; INTRAVENOUS at 05:09

## 2019-09-13 RX ADMIN — PROPOFOL 200 MG: 10 INJECTION, EMULSION INTRAVENOUS at 02:09

## 2019-09-13 RX ADMIN — ROPINIROLE HYDROCHLORIDE 4 MG: 1 TABLET, FILM COATED ORAL at 09:09

## 2019-09-13 RX ADMIN — FENTANYL CITRATE 50 MCG: 50 INJECTION, SOLUTION INTRAMUSCULAR; INTRAVENOUS at 02:09

## 2019-09-13 RX ADMIN — LAMOTRIGINE 200 MG: 100 TABLET ORAL at 09:09

## 2019-09-13 NOTE — H&P
CHIEF COMPLAINT:      Chief Complaint   Patient presents with    Lumbar Spine Pain (L-Spine)         HPI:  Ari Santos is a 49 y.o.  female (transgender male) with below listed PMH, who is referred for evaluation   Of low back pain that radiates into his legs.   Pain resides along low back and radiates into the buttocks down the backs of his legs to his calves.  He has numbness and tingling in his legs down to his feet involving the entirety of the feet.   He cannot feel his toes.  He has difficulty judging when he stepping and cannot feel the ground when he is walking.  His legs feel weak when he is walking and he uses a rolling walker which is done for the past 6 years.  He can no longer walk long distances and feels like he has no balance.  The symptoms have been ongoing for the past 10 years but worse over the more recent years.  He occasionally has constipation but denies any incontinence.       He has tried physical therapy several times the most recent 2 years ago without much relief.  He has also done injections both steroids and radiofrequency ablation which only helped for the short term.  He recently had a left-sided lumbar facet cyst drained last fall which helped.       Other ailments include bilateral carpal tunnel syndrome right greater than left.  He wears braces but the symptoms have worsened over the past couple of months.  He also complains of neck pain since he was 18 years old that radiates from the elbows down to his fingers.  He also reports bilateral knee pain and bowing of his legs for which he sees Dr. Andrade of Orthopedics.       He has congenital adrenal hyperplasia which was diagnosed as an infant for which he takes daily steroids and can not stop.  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 RLE is shorter than LLE.          Review of patient's allergies indicates:   Allergen Reactions    Penicillins Rash               Past Medical History:   Diagnosis Date    Adrenal insufficiency      Arthritis      Congenital adrenal hyperplasia      Hyperlipidemia      Spinal stenosis              Past Surgical History:   Procedure Laterality Date    RADIOFREQUENCY ABLATION, LEFT L3,4,5 Left 5/9/2019     Performed by [facility] at [facility]    RADIOFREQUENCY ABLATION, RIGHT L3,4,5 Right 5/23/2019     Performed by [facility] at [facility]      History reviewed. No pertinent family history.  Social History            Tobacco Use    Smoking status: Former Smoker    Smokeless tobacco: Former User       Quit date: 5/1/2009    Tobacco comment: Socailly   Substance Use Topics    Alcohol use: Not Currently       Frequency: Never       Binge frequency: Never    Drug use: Never         Review of Systems   Constitutional: Negative.    Respiratory: Negative for cough and shortness of breath.    Cardiovascular: Negative for chest pain, palpitations, claudication and leg swelling.   Gastrointestinal: Negative for abdominal pain, constipation and diarrhea.   Genitourinary: Negative for flank pain, frequency and urgency.   Musculoskeletal: Positive for back pain, falls, joint pain, myalgias and neck pain.   Skin: Negative.    Neurological: Positive for tingling, sensory change, focal weakness and weakness. Negative for dizziness, tremors, speech change, seizures, loss of consciousness and headaches.   Psychiatric/Behavioral: Negative for depression, hallucinations, memory loss, substance abuse and suicidal ideas. The patient is nervous/anxious (PTSD). The patient does not have insomnia.          OBJECTIVE:   Vital Signs:  Pulse: (!) 112 (08/12/19 1018)  BP: 119/73 (08/12/19 1018)     Physical Exam:  Constitutional: Patient sitting comfortably in chair. Appears well developed and well nourished.  Skin: Exposed areas are intact without abnormal markings, rashes or other lesions.  HEENT: Normocephalic. Normal  conjunctivae.  Cardiovascular: Normal rate and regular rhythm.  Respiratory: Chest wall rises and falls symmetrically, without signs of respiratory distress.  Abdomen: Soft and non-tender.  Extremities: Warm and without edema. Calves supple, non-tender.  Psych/Behavior: Normal affect.     Neurological:     Mental status: Alert and oriented. Conversational and appropriate.       Cranial Nerves: VFF to confrontation. PERRL. EOMI without nystagmus. Facial STLT normal and symmetric. Strong, symmetric muscles of mastication. Facial strength full and symmetric. Hearing equal bilaterally to finger rub. Palate and uvula rise and fall normally in midline. Shoulder shrug 5/5 strength. Tongue midline.      Motor:     Upper:   Deltoids Triceps Biceps WE WF  FA     R 5/5 5/5 5/5 5/5 5/5 5/5 5/5     L 5/5 5/5 5/5 5/5 5/5 5/5 5/5      Lower:   HF KE KF DF PF EHL     R 5/5 5/5 5/5 5/5 5/5 5/5     L 5/5 5/5 5/5 5/5 5/5 5/5      Sensory: Intact sensation to light touch and pinprick in all extremities.      Reflexes:      DTR: 2+ knees and biceps symmetrically.     Moore's: Negative.     Babinski's: Negative.     Clonus: Negative.     Cerebellar: Finger-to-nose and rapid alternating movements normal.      Gait:  Stable, fluid.     Spine:     Posture: Head well aligned over pelvis and shoulders in front and side views.  No focal or global spinal deformity visible on inspection.      Cervical:      ROM: Full with flexion, extension, lateral rotation and ear-to-shoulder bend.      Midline TTP: Negative.     Spurling's test: Negative.     Lhermitte's: Negative.     Thoracic:     Midline TTP: Negative     Lumbar:     Midline TTP: Negative     Straight Leg Test: Negative     Crossed Straight Leg Test: Negative     Other:     SI joint TTP: Negative.     Tenderness with external/internal hip rotation: Negative.     Diagnostic Results:  All imaging was independently reviewed by me.     MRI L spine, dated 4/10/19:  1. Severe multi-level  DDD  2. Severe stenosis from L1-S1     Flex/Ex X-ray L spine, dated 4/10/19:  1. No dynamic instability     DEXA, 3/7/18:  Normal mineral bone density     ASSESSMENT/PLAN:           Problem List Items Addressed This Visit                 Neuro      Spinal stenosis of lumbar region with neurogenic claudication - Primary      Relevant Orders      Case Request Operating Room: LAMINECTOMY, SPINE, LUMBAR, FOR DECOMPRESSION     (OPEN L1-S1 lamis)  Flouro  Trev frame (Completed)      MRI Cervical Spine Without Contrast            Orthopedic      Chronic bilateral low back pain with bilateral sciatica      Relevant Orders      Case Request Operating Room: LAMINECTOMY, SPINE, LUMBAR, FOR DECOMPRESSION     (OPEN L1-S1 lamis)  Flouro  Trev frame (Completed)      MRI Cervical Spine Without Contrast              VISIT SUMMARY:  Patient presents with signs and symptoms concerning for neurogenic claudication secondary to severe stenosis from L1-S1.  Patient has severe multilevel degenerative disc disease throughout the lumbar spine as well as poor bone quality most likely due to his chronic steroid use.  We discussed his options for treatment which would include an open multilevel laminectomy for decompression of the nerve roots.  I shared my concern about his chronic steroid use and the risk for infection and poor healing.  Unfortunately he is unable to come off of steroids due to his reliance for congenital adrenal hyperplasia.  A recent DEXA scan did not demonstrate osteoporosis.  Because he has upper extremity symptoms I will order a cervical MRI to rule out any cervical stenosis.  Ultimately he will need PCP clearance and we will tentatively plan for surgery on 09/13/2019.     PATIENT EDUCATION:  More than half the clinic visit was spent showing with patient the pertinent findings on imaging and educating the patient about natural history of the pathology.   We discussed options for treatment as well as the risks and  benefits of each option.  All questions were answered.      The patient understands and agrees with the following plan of care.     1. Surgery scheduled for 9/13/19 at [facility] [place]  2. Preop clearance needed from PCP (Dr Mitchell)  3. Preop labs, EKG, CXR ordered   4. Preop PAT appointment requested  5. STOP TAKING ASPIRIN, NSAIDS, AND ALL OTHER BLOOD THINNERS 7 DAYS PRIOR TO SURGERY  6. F/u pain medicine on 8/21 as scheduled  7. Ordered cervical MRI to cervical stenosis

## 2019-09-13 NOTE — TRANSFER OF CARE
Anesthesia Transfer of Care Note    Patient: Ari Santos    Procedure(s) Performed: Procedure(s) (LRB):  LAMINECTOMY, SPINE, LUMBAR, FOR DECOMPRESSION  (OPEN L1-S1 lamis) Maria To Trev frame (Bilateral)    Patient location: PACU    Anesthesia Type: general    Transport from OR: Transported from OR on 6-10 L/min O2 by face mask with adequate spontaneous ventilation    Post pain: adequate analgesia    Post assessment: no apparent anesthetic complications and tolerated procedure well    Post vital signs: stable    Level of consciousness: awake and alert    Nausea/Vomiting: no nausea/vomiting    Complications: none    Transfer of care protocol was followed      Last vitals:   Visit Vitals  BP (!) 98/54   Pulse 105   Temp 37.2 °C (98.9 °F)   Resp 18   Ht 5' (1.524 m)   Wt 71.7 kg (158 lb)   SpO2 96%   Breastfeeding? No   BMI 30.86 kg/m²

## 2019-09-13 NOTE — CARE UPDATE
Consult received for perioperative steroid management in patient with CAH. Chart reviewed.    This is a 49 y.o F to M transgender with a history of CAH since childhood currently on prednisone 5 mg as per last note. He underwent an L1 laminectomy which provides moderate to high surgical stress. Noted to have received hydrocortisone 100 mg preop.    Plan:  - Hydrocortisone 100 mg IV now, followed by 50 mg IV q8h, then taper    Manan Angelo MD  Endocrinology Fellow

## 2019-09-13 NOTE — ANESTHESIA PREPROCEDURE EVALUATION
09/13/2019  Ari Santos is a 49 y.o., female.    Anesthesia Evaluation    I have reviewed the Patient Summary Reports.    I have reviewed the Nursing Notes.   I have reviewed the Medications.     Review of Systems  Anesthesia Hx:  No problems with previous Anesthesia  History of prior surgery of interest to airway management or planning: Previous anesthesia: General  Denies Personal Hx of Anesthesia complications.   Cardiovascular:   hyperlipidemia    Pulmonary:  Pulmonary Normal    Renal/:  Renal/ Normal     Hepatic/GI:  Hepatic/GI Normal    Musculoskeletal:  Spine Disorders: lumbar    Neurological:   Neuromuscular Disease,    Endocrine:  Endocrine Normal    Psych:   Psychiatric History PTSD       Patient Active Problem List   Diagnosis    Status post sex reassignment surgery    Chronic bilateral low back pain with bilateral sciatica    Spinal stenosis of lumbar region with neurogenic claudication    Fibromyalgia    Intention tremor    RLS (restless legs syndrome)    PTSD (post-traumatic stress disorder)    Congenital adrenal hyperplasia    Seasonal allergies    HLD (hyperlipidemia)    Renal cyst    Abnormal thyroid blood test    Chronic pain    Neuropathic pain     Past Medical History:   Diagnosis Date    Adrenal insufficiency     Arthritis     Congenital adrenal hyperplasia     Hyperlipidemia     Spinal stenosis      Past Surgical History:   Procedure Laterality Date    RADIOFREQUENCY ABLATION, LEFT L3,4,5 Left 5/9/2019    Performed by [facility] at [facility]    RADIOFREQUENCY ABLATION, RIGHT L3,4,5 Right 5/23/2019    Performed by [facility] at [facility]         Physical Exam  General:  Well nourished    Airway/Jaw/Neck:  Airway Findings: Mouth Opening: Normal Tongue: Normal  General Airway Assessment: Adult  Mallampati: II  TM Distance:  Normal, at least 6 cm  Jaw/Neck Findings:  Neck ROM: Normal ROM      Dental:  Dental Findings: In tact   Chest/Lungs:  Chest/Lungs Findings: Clear to auscultation     Heart/Vascular:  Heart Findings: Rate: Normal  Rhythm: Regular Rhythm  Sounds: Normal        Mental Status:  Mental Status Findings:  Cooperative, Alert and Oriented         Anesthesia Plan  Type of Anesthesia, risks & benefits discussed:  Anesthesia Type:  general  Patient's Preference:   Intra-op Monitoring Plan: standard ASA monitors  Intra-op Monitoring Plan Comments:   Post Op Pain Control Plan: per primary service following discharge from PACU  Post Op Pain Control Plan Comments:   Induction:   IV  Beta Blocker:  Patient is not currently on a Beta-Blocker (No further documentation required).       Informed Consent: Patient understands risks and agrees with Anesthesia plan.  Questions answered. Anesthesia consent signed with patient.  ASA Score: 3     Day of Surgery Review of History & Physical:    H&P update referred to the surgeon.         Ready For Surgery From Anesthesia Perspective.

## 2019-09-13 NOTE — TELEPHONE ENCOUNTER
-No signs or symptoms of underlying or uncontrolled cardiac or pulmonary pathology  -Negative uclear stress test done 9/12/19 without any acute or remote signs of ischemia or underlying arrhythmia   -Patient has an estimated risk of perioperative myocardial infarction or cardiac arrest of approx 0.3 % using [facility] database risk model.  -No additional testing or change in management is required prior to elective procedure.

## 2019-09-14 PROCEDURE — 99214 OFFICE O/P EST MOD 30 MIN: CPT | Mod: GC,,, | Performed by: INTERNAL MEDICINE

## 2019-09-14 PROCEDURE — S0077 INJECTION, CLINDAMYCIN PHOSP: HCPCS | Performed by: NEUROLOGICAL SURGERY

## 2019-09-14 PROCEDURE — 94761 N-INVAS EAR/PLS OXIMETRY MLT: CPT

## 2019-09-14 PROCEDURE — 63600175 PHARM REV CODE 636 W HCPCS: Performed by: NEUROLOGICAL SURGERY

## 2019-09-14 PROCEDURE — 99024 PR POST-OP FOLLOW-UP VISIT: ICD-10-PCS | Mod: GC,,, | Performed by: NEUROLOGICAL SURGERY

## 2019-09-14 PROCEDURE — 63600175 PHARM REV CODE 636 W HCPCS: Performed by: STUDENT IN AN ORGANIZED HEALTH CARE EDUCATION/TRAINING PROGRAM

## 2019-09-14 PROCEDURE — 25000003 PHARM REV CODE 250: Performed by: STUDENT IN AN ORGANIZED HEALTH CARE EDUCATION/TRAINING PROGRAM

## 2019-09-14 PROCEDURE — 99214 PR OFFICE/OUTPT VISIT, EST, LEVL IV, 30-39 MIN: ICD-10-PCS | Mod: GC,,, | Performed by: INTERNAL MEDICINE

## 2019-09-14 PROCEDURE — 25000003 PHARM REV CODE 250: Performed by: NEUROLOGICAL SURGERY

## 2019-09-14 PROCEDURE — 99024 POSTOP FOLLOW-UP VISIT: CPT | Mod: GC,,, | Performed by: NEUROLOGICAL SURGERY

## 2019-09-14 RX ORDER — RAMELTEON 8 MG/1
8 TABLET ORAL NIGHTLY PRN
Status: DISCONTINUED | OUTPATIENT
Start: 2019-09-14 | End: 2019-09-16

## 2019-09-14 RX ORDER — HYDROCODONE BITARTRATE AND ACETAMINOPHEN 10; 325 MG/1; MG/1
1 TABLET ORAL EVERY 4 HOURS PRN
Status: DISCONTINUED | OUTPATIENT
Start: 2019-09-14 | End: 2019-09-16

## 2019-09-14 RX ORDER — METHOCARBAMOL 750 MG/1
750 TABLET, FILM COATED ORAL 3 TIMES DAILY
Status: DISCONTINUED | OUTPATIENT
Start: 2019-09-14 | End: 2019-09-16

## 2019-09-14 RX ORDER — SODIUM CHLORIDE 9 MG/ML
INJECTION, SOLUTION INTRAVENOUS ONCE
Status: COMPLETED | OUTPATIENT
Start: 2019-09-14 | End: 2019-09-14

## 2019-09-14 RX ORDER — SODIUM CHLORIDE 9 MG/ML
INJECTION, SOLUTION INTRAVENOUS CONTINUOUS
Status: DISCONTINUED | OUTPATIENT
Start: 2019-09-14 | End: 2019-09-16

## 2019-09-14 RX ADMIN — ROPINIROLE HYDROCHLORIDE 4 MG: 1 TABLET, FILM COATED ORAL at 09:09

## 2019-09-14 RX ADMIN — METHOCARBAMOL TABLETS 750 MG: 750 TABLET, COATED ORAL at 09:09

## 2019-09-14 RX ADMIN — HEPARIN SODIUM 5000 UNITS: 5000 INJECTION, SOLUTION INTRAVENOUS; SUBCUTANEOUS at 01:09

## 2019-09-14 RX ADMIN — LAMOTRIGINE 200 MG: 100 TABLET ORAL at 09:09

## 2019-09-14 RX ADMIN — MUPIROCIN 1 G: 20 OINTMENT TOPICAL at 09:09

## 2019-09-14 RX ADMIN — ATORVASTATIN CALCIUM 10 MG: 10 TABLET, FILM COATED ORAL at 09:09

## 2019-09-14 RX ADMIN — HYDROCORTISONE SODIUM SUCCINATE 50 MG: 100 INJECTION, POWDER, FOR SOLUTION INTRAMUSCULAR; INTRAVENOUS at 01:09

## 2019-09-14 RX ADMIN — MORPHINE SULFATE 4 MG: 2 INJECTION, SOLUTION INTRAMUSCULAR; INTRAVENOUS at 11:09

## 2019-09-14 RX ADMIN — CLINDAMYCIN IN 5 PERCENT DEXTROSE 900 MG: 18 INJECTION, SOLUTION INTRAVENOUS at 01:09

## 2019-09-14 RX ADMIN — FAMOTIDINE 20 MG: 20 TABLET, FILM COATED ORAL at 09:09

## 2019-09-14 RX ADMIN — GABAPENTIN 800 MG: 400 CAPSULE ORAL at 09:09

## 2019-09-14 RX ADMIN — HYDROCODONE BITARTRATE AND ACETAMINOPHEN 1 TABLET: 5; 325 TABLET ORAL at 11:09

## 2019-09-14 RX ADMIN — METHOCARBAMOL TABLETS 500 MG: 500 TABLET, COATED ORAL at 01:09

## 2019-09-14 RX ADMIN — ALPRAZOLAM 1 MG: 0.25 TABLET ORAL at 09:09

## 2019-09-14 RX ADMIN — SODIUM CHLORIDE: 0.9 INJECTION, SOLUTION INTRAVENOUS at 03:09

## 2019-09-14 RX ADMIN — HYDROCODONE BITARTRATE AND ACETAMINOPHEN 1 TABLET: 10; 325 TABLET ORAL at 03:09

## 2019-09-14 RX ADMIN — HEPARIN SODIUM 5000 UNITS: 5000 INJECTION, SOLUTION INTRAVENOUS; SUBCUTANEOUS at 05:09

## 2019-09-14 RX ADMIN — OXYCODONE HYDROCHLORIDE 5 MG: 5 TABLET ORAL at 09:09

## 2019-09-14 RX ADMIN — RAMELTEON 8 MG: 8 TABLET ORAL at 09:09

## 2019-09-14 RX ADMIN — OXYCODONE HYDROCHLORIDE 5 MG: 5 TABLET ORAL at 03:09

## 2019-09-14 RX ADMIN — SODIUM CHLORIDE: 0.9 INJECTION, SOLUTION INTRAVENOUS at 09:09

## 2019-09-14 RX ADMIN — HEPARIN SODIUM 5000 UNITS: 5000 INJECTION, SOLUTION INTRAVENOUS; SUBCUTANEOUS at 09:09

## 2019-09-14 RX ADMIN — MORPHINE SULFATE 4 MG: 2 INJECTION, SOLUTION INTRAMUSCULAR; INTRAVENOUS at 06:09

## 2019-09-14 RX ADMIN — CETIRIZINE HYDROCHLORIDE 10 MG: 5 TABLET ORAL at 09:09

## 2019-09-14 RX ADMIN — METHOCARBAMOL TABLETS 500 MG: 500 TABLET, COATED ORAL at 09:09

## 2019-09-14 RX ADMIN — SODIUM CHLORIDE: 0.9 INJECTION, SOLUTION INTRAVENOUS at 08:09

## 2019-09-14 RX ADMIN — MORPHINE SULFATE 2 MG: 2 INJECTION, SOLUTION INTRAMUSCULAR; INTRAVENOUS at 09:09

## 2019-09-14 RX ADMIN — RAMELTEON 8 MG: 8 TABLET ORAL at 12:09

## 2019-09-14 RX ADMIN — HYDROCORTISONE SODIUM SUCCINATE 50 MG: 100 INJECTION, POWDER, FOR SOLUTION INTRAMUSCULAR; INTRAVENOUS at 09:09

## 2019-09-14 RX ADMIN — GABAPENTIN 800 MG: 400 CAPSULE ORAL at 03:09

## 2019-09-14 RX ADMIN — HYDROCORTISONE SODIUM SUCCINATE 50 MG: 100 INJECTION, POWDER, FOR SOLUTION INTRAMUSCULAR; INTRAVENOUS at 04:09

## 2019-09-14 RX ADMIN — ACETAMINOPHEN 650 MG: 325 TABLET ORAL at 09:09

## 2019-09-14 RX ADMIN — DOCUSATE SODIUM 100 MG: 50 LIQUID ORAL at 09:09

## 2019-09-14 NOTE — HPI
This is a 49 y.o F to M transgender with a history of CAH since childhood (classical non-salt wasting) currently on prednisone 5 mg. Earlier in the year he was on prednisone 10 mg AM and 5 mg PM but this has been titrated down based on suppressed ACTH and low 17OHP. He is currently tolerating 5 mg daily at home and he will follow up with Dr. Alvarez in November 2019 with labs before visit. He was admitted on 9/13 as he underwent an L1 laminectomy on 9/13/19 which provides moderate to high surgical stress. Noted to have received hydrocortisone 100 mg preoperatively.

## 2019-09-14 NOTE — PLAN OF CARE
Problem: Adult Inpatient Plan of Care  Goal: Plan of Care Review  Outcome: Ongoing (interventions implemented as appropriate)  POC reviewed with patient this shift.  A/O x4.  Respirations unlabored.  Skin w/d.  Dressing to back incision remains CDI.  Hemovac/woundvac also intact.  See flowsheet for full assessment.  Pt reports some relief from PRN sleep med with intermittent rest periods noted.  Back pain continues but appears to be managed well with PRN meds.  Able to verbalize wants/needs.  No c/o pain or discomfort at this time.

## 2019-09-14 NOTE — SUBJECTIVE & OBJECTIVE
Interval HPI: Had back surgery yesterday, pain fairly well controlled  Overnight events: None  Eatin%  Nausea: No  Hypoglycemia and intervention: No  Fever: No  TPN and/or TF: No    PMH, PSH, FH, SH updated and reviewed     ROS:  Review of Systems   Constitutional: Negative for unexpected weight change.   Eyes: Negative for visual disturbance.   Respiratory: Negative for shortness of breath.    Cardiovascular: Negative for chest pain.   Gastrointestinal: Negative for abdominal pain.   Musculoskeletal: Positive for back pain. Negative for arthralgias.   Skin: Negative for wound.   Allergic/Immunologic: Negative for food allergies.   Neurological: Negative for headaches.   Hematological: Does not bruise/bleed easily.       PHYSICAL EXAMINATION:  Vitals:    19 0418   BP: (!) 103/58   Pulse: 99   Resp: 12   Temp: 98.3 °F (36.8 °C)     Body mass index is 32.21 kg/m².    Physical Exam   Constitutional: She is oriented to person, place, and time. She appears well-developed and well-nourished.   HENT:   Head: Normocephalic and atraumatic.   Neck: Neck supple. No thyromegaly present.   Cardiovascular: Normal rate, regular rhythm and normal heart sounds.   Pulmonary/Chest: Effort normal. No respiratory distress.   Abdominal: Soft. There is no tenderness.   Neurological: She is alert and oriented to person, place, and time.   Skin: Skin is warm. No rash noted.   Psychiatric: She has a normal mood and affect. Her behavior is normal.

## 2019-09-14 NOTE — PLAN OF CARE
POC reviewed with pt, acknowledged understanding. Pt AAO x 4. Pt remains free of falls/injuries. Pt on telemetry remains SR. Pt tolerating clear liquid diet. Pt pain controlled with prescribed meds. Pt wearing SCDs. Pt on RA denies SOB. Pt voids per catheter, output recorded. No acute events throughout shift. No distress noted, will continue to monitor.

## 2019-09-14 NOTE — NURSING
Pt arrived to unit and transferred to bed with x2 staff assist.  A/O x4.  Respirations unlabored.  Skin w/d.  Incision to back continues with dressing CDI.  Hemovac/woundvac intact with 100 mL noted in hemovac.  C/o back pain 7/10.  VSS.  Will medicate shortly.

## 2019-09-14 NOTE — CONSULTS
[facility]  Endocrinology  Consult Note    Consult Requested by: Silas Peng DO   Reason for admit: L1 laminectomy    HISTORY OF PRESENT ILLNESS:  This is a 49 y.o F to M transgender with a history of CAH since childhood (classical non-salt wasting) currently on prednisone 5 mg. Earlier in the year he was on prednisone 10 mg AM and 5 mg PM but this has been titrated down based on suppressed ACTH and low 17OHP. He is currently tolerating 5 mg daily at home and he will follow up with Dr. Alvarez in November 2019 with labs before visit. He was admitted on 9/13 as he underwent an L1 laminectomy on 9/13/19 which provides moderate to high surgical stress. Noted to have received hydrocortisone 100 mg preoperatively.    Medications and/or Treatments Impacting Glycemic Control:  Immunotherapy:    Immunosuppressants     None        Steroids:   Hormones (From admission, onward)    Start     Stop Route Frequency Ordered    09/14/19 0400  hydrocortisone sodium succinate injection 50 mg      -- IV Every 8 hours 09/13/19 1830        Pressors:    Autonomic Drugs (From admission, onward)    None          Medications Prior to Admission   Medication Sig Dispense Refill Last Dose    ALPRAZolam (XANAX) 1 MG tablet 1 tablet (1 mg total) by mouth three times daily. 90 tablet 2 9/12/2019 at Unknown time    atorvastatin (LIPITOR) 10 MG tablet Take 1 tablet (10 mg total) by mouth every evening. 90 tablet 2 9/12/2019 at Unknown time    cranberry extract 500 mg Cap    9/12/2019 at Unknown time    cyclobenzaprine (FLEXERIL) 10 MG tablet Take 1 tablet (10 mg total) by mouth 3 (three) times daily. Greater than 7 day quantity medically 90 tablet 1 9/12/2019 at Unknown time    gabapentin (NEURONTIN) 800 MG tablet 1 tablet three times a day and 2 tablets at night (5 tablets a day, 4000mg) (Patient taking differently: Take 800 mg by mouth. 1 tablet three times a day and 2 tablets at night (5 tablets a day, 4000mg)) 150  tablet 2 9/12/2019 at Unknown time    Lactobacillus acidophilus (ACIDOPHILUS) Cap    9/12/2019 at Unknown time    lamoTRIgine (LAMICTAL) 200 MG tablet Take 200 mg by mouth 2 (two) times daily.    9/12/2019 at Unknown time    loratadine (CLARITIN) 10 mg tablet Take 10 mg by mouth once daily.    9/12/2019 at Unknown time    morphine (MS CONTIN) 15 MG 12 hr tablet Take 1 tablet (15 mg total) by mouth every 12 (twelve) hours. (Patient taking differently: Take 15 mg by mouth every 12 (twelve) hours. ) 60 tablet 0 9/12/2019 at Unknown time    naproxen sodium (ALEVE) 220 MG tablet Take 220 mg by mouth nightly.    9/11/2019 at Unknown time    nystatin (MYCOSTATIN) cream Apply topically 2 (two) times daily. 30 g 1 9/12/2019 at Unknown time    oxyCODONE-acetaminophen (PERCOCET)  mg per tablet Take 1 tablet by mouth every 8 (eight) hours as needed for Pain. 90 tablet 0 9/12/2019 at Unknown time    predniSONE (DELTASONE) 5 MG tablet Take 1 tablet (5 mg total) by mouth once daily. 2 tablets in the am and 1 tablet in the evening 270 tablet 0 9/13/2019 at Unknown time    primidone (MYSOLINE) 50 MG Tab Take 50 mg by mouth 3 (three) times daily.    9/12/2019 at Unknown time    rOPINIRole (REQUIP) 4 MG tablet Take 4 mg by mouth nightly.    9/12/2019 at Unknown time    sennosides/docusate sodium (STOOL SOFTENER-LAXATIVE ORAL)    Past Week at Unknown time    testosterone (ANDROGEL) 1 % (50 mg/5 gram) GlPk Apply 5 g topically once daily. 30 packet 4 9/12/2019 at Unknown time    triamcinolone acetonide 0.1% (KENALOG) 0.1 % cream Apply topically 2 (two) times daily. 45 g 1 Past Week at Unknown time    zinc 50 mg Tab    9/12/2019 at Unknown time    ALPRAZolam (XANAX) 1 MG tablet    Taking       Current Facility-Administered Medications   Medication Dose Route Frequency Provider Last Rate Last Dose    acetaminophen tablet 650 mg  650 mg Oral Q4H PRN Jasen De León MD        acetaminophen tablet 650 mg  650 mg Oral Q6H  PRN Jasen De León MD   650 mg at 09/14/19 0926    ALPRAZolam tablet 1 mg  1 mg Oral TID PRN Anselmo Harris MD   1 mg at 09/14/19 0925    aluminum-magnesium hydroxide-simethicone 200-200-20 mg/5 mL suspension 30 mL  30 mL Oral Q4H PRN Jasen De León MD        atorvastatin tablet 10 mg  10 mg Oral QHS Anselmo Harris MD   10 mg at 09/13/19 2119    cetirizine tablet 10 mg  10 mg Oral Daily Anselmo Harris MD   10 mg at 09/14/19 0925    docusate 50 mg/5 mL liquid 100 mg  100 mg Oral Daily Ansemlo Harris MD   100 mg at 09/14/19 0927    famotidine tablet 20 mg  20 mg Oral BID Anselmo Harris MD   20 mg at 09/14/19 0918    gabapentin capsule 800 mg  800 mg Oral TID Anselmo Harris MD   800 mg at 09/14/19 0917    heparin (porcine) injection 5,000 Units  5,000 Units Subcutaneous Q8H Jasen De León MD   5,000 Units at 09/14/19 0553    HYDROcodone-acetaminophen 5-325 mg per tablet 1 tablet  1 tablet Oral Q4H PRN Jasen De León MD   1 tablet at 09/13/19 2220    hydrocortisone sodium succinate injection 50 mg  50 mg Intravenous Q8H Manan Agnelo MD   50 mg at 09/14/19 0400    influenza (QUADRIVALENT PF) vaccine 0.5 mL  0.5 mL Intramuscular vaccine x 1 dose Silas Peng,         lamoTRIgine tablet 200 mg  200 mg Oral BID Anselmo Harris MD   200 mg at 09/14/19 0937    methocarbamol tablet 500 mg  500 mg Oral QID Anselmo Harris MD   500 mg at 09/14/19 0918    morphine injection 4 mg  4 mg Intravenous Q1H PRN Jasen De León MD   2 mg at 09/14/19 0912    mupirocin 2 % ointment 1 g  1 g Nasal BID Jasen De León MD   1 g at 09/14/19 0928    ondansetron disintegrating tablet 8 mg  8 mg Oral Q6H PRN Jasen De León MD   8 mg at 09/13/19 2113    oxyCODONE immediate release tablet 5 mg  5 mg Oral Q4H PRN Jasen De León MD   5 mg at 09/14/19 0328    promethazine (PHENERGAN) 6.25 mg in dextrose 5 % 50 mL IVPB  6.25 mg Intravenous Q6H PRN Jasen De León MD        ramelteon tablet 8 mg  8 mg Oral Nightly PRN  Anselmo Harris MD   8 mg at 19 0056    rOPINIRole tablet 4 mg  4 mg Oral Nightly Anselmo Harris MD   4 mg at 19    senna-docusate 8.6-50 mg per tablet 2 tablet  2 tablet Oral Nightly PRN Jasen De León MD   2 tablet at 19 2219       Interval HPI: Had back surgery yesterday, pain fairly well controlled  Overnight events: None  Eatin%  Nausea: No  Hypoglycemia and intervention: No  Fever: No  TPN and/or TF: No    PMH, PSH, FH, SH updated and reviewed     ROS:  Review of Systems   Constitutional: Negative for unexpected weight change.   Eyes: Negative for visual disturbance.   Respiratory: Negative for shortness of breath.    Cardiovascular: Negative for chest pain.   Gastrointestinal: Negative for abdominal pain.   Musculoskeletal: Positive for back pain. Negative for arthralgias.   Skin: Negative for wound.   Allergic/Immunologic: Negative for food allergies.   Neurological: Negative for headaches.   Hematological: Does not bruise/bleed easily.       PHYSICAL EXAMINATION:  Vitals:    19 0418   BP: (!) 103/58   Pulse: 99   Resp: 12   Temp: 98.3 °F (36.8 °C)     Body mass index is 32.21 kg/m².    Physical Exam   Constitutional: She is oriented to person, place, and time. She appears well-developed and well-nourished.   HENT:   Head: Normocephalic and atraumatic.   Neck: Neck supple. No thyromegaly present.   Cardiovascular: Normal rate, regular rhythm and normal heart sounds.   Pulmonary/Chest: Effort normal. No respiratory distress.   Abdominal: Soft. There is no tenderness.   Neurological: She is alert and oriented to person, place, and time.   Skin: Skin is warm. No rash noted.   Psychiatric: She has a normal mood and affect. Her behavior is normal.     .     ASSESSMENT and PLAN:    Congenital adrenal hyperplasia  Needs stress doses given moderate to major surgical stress. Received hydrocortisone 100 mg x1 yesterday, now tapered to 50 mg q8h, tolerating well with normal  vitals and electrolytes.    Plan:  - POD1: Hydrocortisone 50 mg IV q8h  - POD2: Hydrocortisone 25 mg IV q8h  - POD3: Hydrocortisone 25 mg IV q12h  - POD4: Resume home dose of prednisone of 5 mg daily  - Follow up with Endocrinology in Nov 2019    Lumbar stenosis with neurogenic claudication  He is s/p L1 laminectomy on 9/13  Managed by Neurosurgery          DISCHARGE NEEDS: will assess daily    Manan Angelo MD  Endocrinology  [facility]

## 2019-09-14 NOTE — PROGRESS NOTES
[facility]  Brief Operative Note    SUMMARY     Surgery Date: 9/13/2019     Surgeon(s) and Role:     * Silas Peng,  - Primary    Pre-op Diagnosis:  Chronic bilateral low back pain with bilateral sciatica [M54.42, M54.41, G89.29]  Spinal stenosis of lumbar region with neurogenic claudication [M48.062]    Post-op Diagnosis:  Post-Op Diagnosis Codes:     * Chronic bilateral low back pain with bilateral sciatica [M54.42, M54.41, G89.29]     * Spinal stenosis of lumbar region with neurogenic claudication [M48.062]    Procedure(s) (LRB):  LAMINECTOMY, SPINE, LUMBAR, FOR DECOMPRESSION  (OPEN L[facility]-S[facility]) [facility] ([facility]    Anesthesia: General    Description of Procedure: Open L1-S1 laminectomies, medial facetectomies, and foraminotomies    Description of the findings of the procedure: Severe lumbar stenosis.  Fragile tissue 2/2 chronic steroid use.    Estimated Blood Loss: 1050 mL         Specimens:   Specimen (12h ago, onward)    None

## 2019-09-14 NOTE — SUBJECTIVE & OBJECTIVE
Interval History: 9/14: ROSE MARIEONmorgan reports pain well controlled. Pt reports a history of PTSD for which he sees psych, but does not have an appointment until January and would like to see them sooner.    Medications:  Continuous Infusions:  Scheduled Meds:   atorvastatin  10 mg Oral QHS    cetirizine  10 mg Oral Daily    docusate  100 mg Oral Daily    famotidine  20 mg Oral BID    gabapentin  800 mg Oral TID    heparin (porcine)  5,000 Units Subcutaneous Q8H    hydrocortisone sodium succinate  50 mg Intravenous Q8H    lamoTRIgine  200 mg Oral BID    methocarbamol  750 mg Oral TID    mupirocin  1 g Nasal BID    rOPINIRole  4 mg Oral Nightly     PRN Meds:acetaminophen, acetaminophen, ALPRAZolam, aluminum-magnesium hydroxide-simethicone, HYDROcodone-acetaminophen, influenza, morphine, ondansetron, oxyCODONE, promethazine (PHENERGAN) IVPB, ramelteon, senna-docusate 8.6-50 mg     Review of Systems   Constitutional: Negative for chills and fever.   Gastrointestinal: Negative for constipation, diarrhea, nausea and vomiting.   Neurological: Positive for weakness. Negative for headaches.     Objective:     Weight: 74.8 kg (164 lb 14.5 oz)  Body mass index is 32.21 kg/m².  Vital Signs (Most Recent):  Temp: 98.4 °F (36.9 °C) (09/14/19 1612)  Pulse: 102 (09/14/19 1612)  Resp: 18 (09/14/19 1612)  BP: (!) 106/54 (09/14/19 1612)  SpO2: (!) 94 % (09/14/19 1612) Vital Signs (24h Range):  Temp:  [97.6 °F (36.4 °C)-98.5 °F (36.9 °C)] 98.4 °F (36.9 °C)  Pulse:  [] 102  Resp:  [9-18] 18  SpO2:  [94 %-100 %] 94 %  BP: ()/(51-82) 106/54     Date 09/14/19 0700 - 09/15/19 0659   Shift 9807-2814 3100-4795 0665-6596 24 Hour Total   INTAKE   I.V.(mL/kg)  500(6.7)  500(6.7)   Shift Total(mL/kg)  500(6.7)  500(6.7)   OUTPUT   Drains  10  10   Shift Total(mL/kg)  10(0.1)  10(0.1)   Weight (kg) 74.8 74.8 74.8 74.8                        Closed/Suction Drain 09/13/19 1717 Right Back Accordion 10 Fr. (Active)   Site Description  "Healing 9/14/2019 10:00 AM   Dressing Type Transparent 9/14/2019 10:00 AM   Dressing Status Clean;Dry;Intact 9/14/2019 10:00 AM   Drainage Bloody 9/14/2019 10:00 AM   Status Other (Comment) 9/14/2019 10:00 AM   Output (mL) 10 mL 9/14/2019  4:00 PM            Urethral Catheter 09/13/19 1420 Non-latex;Straight-tip 16 Fr. (Active)   Site Assessment Clean;Intact 9/14/2019 10:00 AM   Collection Container Urimeter 9/14/2019 10:00 AM   Securement Method secured to top of thigh w/ adhesive device 9/14/2019 10:00 AM   Catheter Care Performed yes 9/14/2019 10:00 AM   Reason for Continuing Urinary Catheterization Post operative 9/14/2019 10:00 AM   CAUTI Prevention Bundle StatLock in place w 1" slack 9/14/2019 10:00 AM   Output (mL) 400 mL 9/14/2019  6:43 AM       Neurosurgery Physical Exam  4/5 strength in RLE, otherwise 5/5, SILT, incision c/d/i. Reflexes 2+ diffusely       Significant Labs:  Recent Labs   Lab 09/13/19  1310   GLU 82      K 3.5      CO2 24   BUN 6   CREATININE 0.6   CALCIUM 8.7     Recent Labs   Lab 09/13/19  1310   WBC 6.88   HGB 12.4   HCT 38.5        Recent Labs   Lab 09/13/19  1310   INR 1.1   APTT 26.9     Microbiology Results (last 7 days)     ** No results found for the last 168 hours. **        All pertinent labs from the last 24 hours have been reviewed.    Significant Diagnostics:  I have reviewed all pertinent imaging results/findings within the past 24 hours.  "

## 2019-09-14 NOTE — ASSESSMENT & PLAN NOTE
49 y.o. transgender male who presents with lumbar stenosis now s/p L1-S1 laminectomy on 7/13/19:    --Patient admitted to neurosurgery      -[facility] neurochecks  --All labs and diagnostics reviewed  --Follow-up post-op XRs  --Drains to remain at this time, abx while in place. Wound vac to remain until 9/18/19.  --PT/OT/OOB  --TEDs/SCDs/SQH  --Continue to monitor clinically, notify NSGY immediately with any changes in neuro status

## 2019-09-14 NOTE — NURSING
Patient with BP 90/52.  Patient asymptomatic.  States BP runs 110 systolic at home.   Patient c/o pain 10/10 at present.   Call made to Dr. Peng.  Orders received for Fluid bolus 500 ml and Norco 10/325mg.   Bolus given and patient lisbet well.  Norco 10/325 given around 1500.   Patient states relief with [place].  BP at 1600 was 108/52.   Will cont POC and monitor BP.

## 2019-09-14 NOTE — HOSPITAL COURSE
9/13: OR for elective L1-S1 open decompression. No intra op complications. Patient tolerated procedure well. Recovered in PACU.   9/14: ALFRED, pt reports pain well controlled. Pt reports a history of PTSD for which he sees psych, but does not have an appointment until January and would like to see them sooner. HV drain output: 200 mL - continue.   9/15: Pt reports significant pain when OOB, primarily muscle spasm. HV drain output: 35 mL - continue. PT/OT recommending HH.  9/16: Continue reports of significant pain. DC Robaxin, begin Valium PRN muscle spasms. Prevena incisional vac removed without complication. HV drain output: 125 mL - continue. Begin abx while drain in place. H&H 7.9/25.5. Begin iron for replacement.   9/17: ALFRED. Pain much better controlled with new regimen. Drain output undocumented yesterday afternoon but has been 10 mL since midnight. Drain removed without complication. Patient tolerated removal well. Patient requesting to go home. DC home with home health. Follow up in Neurosurgery clinic in 2 weeks for a wound check and in 6 weeks with imaging. Discharge instructions given verbally to patient and wife. All of their questions were answered. They were encouraged to call the clinic with any questions or concerns prior to follow up appt.

## 2019-09-14 NOTE — HPI
Ari Santos is a 49 y.o. transgender male (birth assigned female) with below listed PMH, who presents for L1-S1 laminectomy for lumbar spinal stenosis. Pain resides along low back and radiates into the buttocks down the backs of his legs to his calves.  He has numbness and tingling in his legs down to his feet involving the entirety of the feet which makes walking difficult.  He also reports bilateral knee pain and bowing of his legs for which he sees Dr. Andrade of Orthopedics.  He has congenital adrenal hyperplasia which was diagnosed as an infant for which he takes daily steroids and can not stop.  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 RLE is shorter than LLE.    Past Medical History:   Diagnosis Date    Adrenal insufficiency     Arthritis     Congenital adrenal hyperplasia     Hyperlipidemia     Spinal stenosis

## 2019-09-14 NOTE — PROGRESS NOTES
[facility]  Neurosurgery  Progress Note    Subjective:     History of Present Illness:   Ari Santos is a 49 y.o. female (transgender male) with below listed PMH, who presents for L1-S1 laminectomy for lumbar spinal stenosis. Pain resides along low back and radiates into the buttocks down the backs of his legs to his calves.  He has numbness and tingling in his legs down to his feet involving the entirety of the feet which makes walking difficult.  He also reports bilateral knee pain and bowing of his legs for which he sees Dr. Andrade of Orthopedics.  He has congenital adrenal hyperplasia which was diagnosed as an infant for which he takes daily steroids and can not stop.  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 RLE is shorter than LLE.    Past Medical History:   Diagnosis Date    Adrenal insufficiency     Arthritis     Congenital adrenal hyperplasia     Hyperlipidemia     Spinal stenosis        Post-Op Info:  Procedure(s) (LRB):  LAMINECTOMY, SPINE, LUMBAR, FOR DECOMPRESSION  (OPEN L1-S1 lamis) [facility] Trev frame (Bilateral)   1 Day Post-Op     Interval History: 9/14: ALFRED pt reports pain well controlled. Pt reports a history of PTSD for which he sees psych, but does not have an appointment until January and would like to see them sooner.    Medications:  Continuous Infusions:  Scheduled Meds:   atorvastatin  10 mg Oral QHS    cetirizine  10 mg Oral Daily    docusate  100 mg Oral Daily    famotidine  20 mg Oral BID    gabapentin  800 mg Oral TID    heparin (porcine)  5,000 Units Subcutaneous Q8H    hydrocortisone sodium succinate  50 mg Intravenous Q8H    lamoTRIgine  200 mg Oral BID    methocarbamol  750 mg Oral TID    mupirocin  1 g Nasal BID    rOPINIRole  4 mg Oral Nightly     PRN Meds:acetaminophen, acetaminophen, ALPRAZolam, aluminum-magnesium hydroxide-simethicone, HYDROcodone-acetaminophen, influenza,  "morphine, ondansetron, oxyCODONE, promethazine (PHENERGAN) IVPB, ramelteon, senna-docusate 8.6-50 mg     Review of Systems   Constitutional: Negative for chills and fever.   Gastrointestinal: Negative for constipation, diarrhea, nausea and vomiting.   Neurological: Positive for weakness. Negative for headaches.     Objective:     Weight: 74.8 kg (164 lb 14.5 oz)  Body mass index is 32.21 kg/m².  Vital Signs (Most Recent):  Temp: 98.4 °F (36.9 °C) (09/14/19 1612)  Pulse: 102 (09/14/19 1612)  Resp: 18 (09/14/19 1612)  BP: (!) 106/54 (09/14/19 1612)  SpO2: (!) 94 % (09/14/19 1612) Vital Signs (24h Range):  Temp:  [97.6 °F (36.4 °C)-98.5 °F (36.9 °C)] 98.4 °F (36.9 °C)  Pulse:  [] 102  Resp:  [9-18] 18  SpO2:  [94 %-100 %] 94 %  BP: ()/(51-82) 106/54     Date 09/14/19 0700 - 09/15/19 0659   Shift 4251-0659 5669-9168 6125-0824 24 Hour Total   INTAKE   I.V.(mL/kg)  500(6.7)  500(6.7)   Shift Total(mL/kg)  500(6.7)  500(6.7)   OUTPUT   Drains  10  10   Shift Total(mL/kg)  10(0.1)  10(0.1)   Weight (kg) 74.8 74.8 74.8 74.8                        Closed/Suction Drain 09/13/19 1717 Right Back Accordion 10 Fr. (Active)   Site Description Healing 9/14/2019 10:00 AM   Dressing Type Transparent 9/14/2019 10:00 AM   Dressing Status Clean;Dry;Intact 9/14/2019 10:00 AM   Drainage Bloody 9/14/2019 10:00 AM   Status Other (Comment) 9/14/2019 10:00 AM   Output (mL) 10 mL 9/14/2019  4:00 PM            Urethral Catheter 09/13/19 1420 Non-latex;Straight-tip 16 Fr. (Active)   Site Assessment Clean;Intact 9/14/2019 10:00 AM   Collection Container Urimeter 9/14/2019 10:00 AM   Securement Method secured to top of thigh w/ adhesive device 9/14/2019 10:00 AM   Catheter Care Performed yes 9/14/2019 10:00 AM   Reason for Continuing Urinary Catheterization Post operative 9/14/2019 10:00 AM   CAUTI Prevention Bundle StatLock in place w 1" slack 9/14/2019 10:00 AM   Output (mL) 400 mL 9/14/2019  6:43 AM       Neurosurgery Physical " Exam  4/5 strength in RLE, otherwise 5/5, SILT, incision c/d/i. Reflexes 2+ diffusely       Significant Labs:  Recent Labs   Lab 09/13/19  1310   GLU 82      K 3.5      CO2 24   BUN 6   CREATININE 0.6   CALCIUM 8.7     Recent Labs   Lab 09/13/19  1310   WBC 6.88   HGB 12.4   HCT 38.5        Recent Labs   Lab 09/13/19  1310   INR 1.1   APTT 26.9     Microbiology Results (last 7 days)     ** No results found for the last 168 hours. **        All pertinent labs from the last 24 hours have been reviewed.    Significant Diagnostics:  I have reviewed all pertinent imaging results/findings within the past 24 hours.    Assessment/Plan:     Lumbar stenosis with neurogenic claudication  49 y.o. transgender male who presents with lumbar stenosis now s/p L1-S1 laminectomy on 7/13/19:    --Patient admitted to neurosurgery      -q2h neurochecks  --All labs and diagnostics reviewed  --Follow-up post-op XRs  --Drains to remain at this time, abx while in place. Wound vac to remain until 9/18/19.  --PT/OT/OOB  --TEDs/SCDs/SQH  --Continue to monitor clinically, notify NSGY immediately with any changes in neuro status        Andrea Peterson MD  Neurosurgery  [facility]

## 2019-09-14 NOTE — ASSESSMENT & PLAN NOTE
Needs stress doses given moderate to major surgical stress. Received hydrocortisone 100 mg x1 yesterday, now tapered to 50 mg q8h, tolerating well with normal vitals and electrolytes.    Plan:  - POD1: Hydrocortisone 50 mg IV q8h  - POD2: Hydrocortisone 25 mg IV q8h  - POD3: Hydrocortisone 25 mg IV q12h  - POD4: Resume home dose of prednisone of 5 mg daily  - Follow up with Endocrinology in Nov 2019

## 2019-09-15 LAB
ALBUMIN SERPL BCP-MCNC: 3.1 G/DL (ref 3.5–5.2)
ALP SERPL-CCNC: 189 U/L (ref 55–135)
ALT SERPL W/O P-5'-P-CCNC: 20 U/L (ref 10–44)
ANION GAP SERPL CALC-SCNC: 9 MMOL/L (ref 8–16)
AST SERPL-CCNC: 26 U/L (ref 10–40)
BASOPHILS # BLD AUTO: 0.04 K/UL (ref 0–0.2)
BASOPHILS NFR BLD: 0.5 % (ref 0–1.9)
BILIRUB SERPL-MCNC: 0.2 MG/DL (ref 0.1–1)
BUN SERPL-MCNC: 4 MG/DL (ref 6–20)
CALCIUM SERPL-MCNC: 8.9 MG/DL (ref 8.7–10.5)
CHLORIDE SERPL-SCNC: 107 MMOL/L (ref 95–110)
CO2 SERPL-SCNC: 24 MMOL/L (ref 23–29)
CREAT SERPL-MCNC: 0.7 MG/DL (ref 0.5–1.4)
DIFFERENTIAL METHOD: ABNORMAL
EOSINOPHIL # BLD AUTO: 0 K/UL (ref 0–0.5)
EOSINOPHIL NFR BLD: 0.1 % (ref 0–8)
ERYTHROCYTE [DISTWIDTH] IN BLOOD BY AUTOMATED COUNT: 13 % (ref 11.5–14.5)
EST. GFR  (AFRICAN AMERICAN): >60 ML/MIN/1.73 M^2
EST. GFR  (NON AFRICAN AMERICAN): >60 ML/MIN/1.73 M^2
GLUCOSE SERPL-MCNC: 96 MG/DL (ref 70–110)
HCT VFR BLD AUTO: 27.9 % (ref 37–48.5)
HGB BLD-MCNC: 9 G/DL (ref 12–16)
IMM GRANULOCYTES # BLD AUTO: 0.03 K/UL (ref 0–0.04)
IMM GRANULOCYTES NFR BLD AUTO: 0.4 % (ref 0–0.5)
LYMPHOCYTES # BLD AUTO: 2.1 K/UL (ref 1–4.8)
LYMPHOCYTES NFR BLD: 26.6 % (ref 18–48)
MCH RBC QN AUTO: 31.7 PG (ref 27–31)
MCHC RBC AUTO-ENTMCNC: 32.3 G/DL (ref 32–36)
MCV RBC AUTO: 98 FL (ref 82–98)
MONOCYTES # BLD AUTO: 0.4 K/UL (ref 0.3–1)
MONOCYTES NFR BLD: 5.3 % (ref 4–15)
NEUTROPHILS # BLD AUTO: 5.4 K/UL (ref 1.8–7.7)
NEUTROPHILS NFR BLD: 67.1 % (ref 38–73)
NRBC BLD-RTO: 0 /100 WBC
PLATELET # BLD AUTO: 284 K/UL (ref 150–350)
PMV BLD AUTO: 9.8 FL (ref 9.2–12.9)
POTASSIUM SERPL-SCNC: 4.1 MMOL/L (ref 3.5–5.1)
PROT SERPL-MCNC: 5.5 G/DL (ref 6–8.4)
RBC # BLD AUTO: 2.84 M/UL (ref 4–5.4)
SODIUM SERPL-SCNC: 140 MMOL/L (ref 136–145)
WBC # BLD AUTO: 7.98 K/UL (ref 3.9–12.7)

## 2019-09-15 PROCEDURE — 94761 N-INVAS EAR/PLS OXIMETRY MLT: CPT

## 2019-09-15 PROCEDURE — 80053 COMPREHEN METABOLIC PANEL: CPT

## 2019-09-15 PROCEDURE — 97530 THERAPEUTIC ACTIVITIES: CPT

## 2019-09-15 PROCEDURE — 36415 COLL VENOUS BLD VENIPUNCTURE: CPT

## 2019-09-15 PROCEDURE — 97161 PT EVAL LOW COMPLEX 20 MIN: CPT

## 2019-09-15 PROCEDURE — 85025 COMPLETE CBC W/AUTO DIFF WBC: CPT

## 2019-09-15 PROCEDURE — 25000003 PHARM REV CODE 250: Performed by: STUDENT IN AN ORGANIZED HEALTH CARE EDUCATION/TRAINING PROGRAM

## 2019-09-15 PROCEDURE — 63600175 PHARM REV CODE 636 W HCPCS: Performed by: STUDENT IN AN ORGANIZED HEALTH CARE EDUCATION/TRAINING PROGRAM

## 2019-09-15 PROCEDURE — 63600175 PHARM REV CODE 636 W HCPCS: Performed by: NEUROLOGICAL SURGERY

## 2019-09-15 PROCEDURE — 25000003 PHARM REV CODE 250: Performed by: NEUROLOGICAL SURGERY

## 2019-09-15 RX ORDER — PREDNISONE 5 MG/1
5 TABLET ORAL DAILY
Status: DISCONTINUED | OUTPATIENT
Start: 2019-09-17 | End: 2019-09-17 | Stop reason: HOSPADM

## 2019-09-15 RX ADMIN — SODIUM CHLORIDE: 0.9 INJECTION, SOLUTION INTRAVENOUS at 07:09

## 2019-09-15 RX ADMIN — ALPRAZOLAM 1 MG: 0.25 TABLET ORAL at 02:09

## 2019-09-15 RX ADMIN — GABAPENTIN 800 MG: 400 CAPSULE ORAL at 02:09

## 2019-09-15 RX ADMIN — ATORVASTATIN CALCIUM 10 MG: 10 TABLET, FILM COATED ORAL at 09:09

## 2019-09-15 RX ADMIN — ALPRAZOLAM 1 MG: 0.25 TABLET ORAL at 08:09

## 2019-09-15 RX ADMIN — CETIRIZINE HYDROCHLORIDE 10 MG: 5 TABLET ORAL at 08:09

## 2019-09-15 RX ADMIN — MORPHINE SULFATE 4 MG: 2 INJECTION, SOLUTION INTRAMUSCULAR; INTRAVENOUS at 07:09

## 2019-09-15 RX ADMIN — HYDROCORTISONE SODIUM SUCCINATE 25 MG: 100 INJECTION, POWDER, FOR SOLUTION INTRAMUSCULAR; INTRAVENOUS at 06:09

## 2019-09-15 RX ADMIN — ROPINIROLE HYDROCHLORIDE 4 MG: 1 TABLET, FILM COATED ORAL at 09:09

## 2019-09-15 RX ADMIN — FAMOTIDINE 20 MG: 20 TABLET, FILM COATED ORAL at 09:09

## 2019-09-15 RX ADMIN — HEPARIN SODIUM 5000 UNITS: 5000 INJECTION, SOLUTION INTRAVENOUS; SUBCUTANEOUS at 02:09

## 2019-09-15 RX ADMIN — GABAPENTIN 800 MG: 400 CAPSULE ORAL at 09:09

## 2019-09-15 RX ADMIN — HEPARIN SODIUM 5000 UNITS: 5000 INJECTION, SOLUTION INTRAVENOUS; SUBCUTANEOUS at 09:09

## 2019-09-15 RX ADMIN — HYDROCODONE BITARTRATE AND ACETAMINOPHEN 1 TABLET: 10; 325 TABLET ORAL at 06:09

## 2019-09-15 RX ADMIN — METHOCARBAMOL TABLETS 750 MG: 750 TABLET, COATED ORAL at 08:09

## 2019-09-15 RX ADMIN — METHOCARBAMOL TABLETS 750 MG: 750 TABLET, COATED ORAL at 02:09

## 2019-09-15 RX ADMIN — MUPIROCIN 1 G: 20 OINTMENT TOPICAL at 09:09

## 2019-09-15 RX ADMIN — DOCUSATE SODIUM 100 MG: 50 LIQUID ORAL at 08:09

## 2019-09-15 RX ADMIN — HYDROCORTISONE SODIUM SUCCINATE 25 MG: 100 INJECTION, POWDER, FOR SOLUTION INTRAMUSCULAR; INTRAVENOUS at 02:09

## 2019-09-15 RX ADMIN — HYDROCORTISONE SODIUM SUCCINATE 25 MG: 100 INJECTION, POWDER, FOR SOLUTION INTRAMUSCULAR; INTRAVENOUS at 09:09

## 2019-09-15 RX ADMIN — HEPARIN SODIUM 5000 UNITS: 5000 INJECTION, SOLUTION INTRAVENOUS; SUBCUTANEOUS at 06:09

## 2019-09-15 RX ADMIN — FAMOTIDINE 20 MG: 20 TABLET, FILM COATED ORAL at 08:09

## 2019-09-15 RX ADMIN — LAMOTRIGINE 200 MG: 100 TABLET ORAL at 08:09

## 2019-09-15 RX ADMIN — GABAPENTIN 800 MG: 400 CAPSULE ORAL at 08:09

## 2019-09-15 RX ADMIN — HYDROCODONE BITARTRATE AND ACETAMINOPHEN 1 TABLET: 10; 325 TABLET ORAL at 02:09

## 2019-09-15 RX ADMIN — OXYCODONE HYDROCHLORIDE 5 MG: 5 TABLET ORAL at 10:09

## 2019-09-15 RX ADMIN — SENNOSIDES,DOCUSATE SODIUM 2 TABLET: 8.6; 5 TABLET, FILM COATED ORAL at 09:09

## 2019-09-15 RX ADMIN — METHOCARBAMOL TABLETS 750 MG: 750 TABLET, COATED ORAL at 09:09

## 2019-09-15 RX ADMIN — HYDROCODONE BITARTRATE AND ACETAMINOPHEN 1 TABLET: 10; 325 TABLET ORAL at 05:09

## 2019-09-15 RX ADMIN — LAMOTRIGINE 200 MG: 100 TABLET ORAL at 09:09

## 2019-09-15 RX ADMIN — HYDROCODONE BITARTRATE AND ACETAMINOPHEN 1 TABLET: 10; 325 TABLET ORAL at 09:09

## 2019-09-15 RX ADMIN — OXYCODONE HYDROCHLORIDE 5 MG: 5 TABLET ORAL at 05:09

## 2019-09-15 NOTE — SUBJECTIVE & OBJECTIVE
Interval History: 9/15: Pt reports significant pain when OOB, primarily muscle spasm.    Medications:  Continuous Infusions:   sodium chloride 0.9% 100 mL/hr at 09/15/19 0743     Scheduled Meds:   atorvastatin  10 mg Oral QHS    cetirizine  10 mg Oral Daily    docusate  100 mg Oral Daily    famotidine  20 mg Oral BID    gabapentin  800 mg Oral TID    heparin (porcine)  5,000 Units Subcutaneous Q8H    hydrocortisone sodium succinate  25 mg Intravenous Q8H    [START ON 9/16/2019] hydrocortisone sodium succinate  25 mg Intravenous Q12H    lamoTRIgine  200 mg Oral BID    methocarbamol  750 mg Oral TID    mupirocin  1 g Nasal BID    [START ON 9/17/2019] predniSONE  5 mg Oral Daily    rOPINIRole  4 mg Oral Nightly     PRN Meds:acetaminophen, acetaminophen, ALPRAZolam, aluminum-magnesium hydroxide-simethicone, HYDROcodone-acetaminophen, influenza, morphine, ondansetron, oxyCODONE, promethazine (PHENERGAN) IVPB, ramelteon, senna-docusate 8.6-50 mg     Review of Systems  Objective:     Weight: 74.8 kg (164 lb 14.5 oz)  Body mass index is 32.21 kg/m².  Vital Signs (Most Recent):  Temp: 97.8 °F (36.6 °C) (09/15/19 1230)  Pulse: 98 (09/15/19 1230)  Resp: 18 (09/15/19 1230)  BP: (!) 110/57 (09/15/19 1230)  SpO2: 95 % (09/15/19 1230) Vital Signs (24h Range):  Temp:  [97.4 °F (36.3 °C)-98.5 °F (36.9 °C)] 97.8 °F (36.6 °C)  Pulse:  [] 98  Resp:  [16-18] 18  SpO2:  [94 %-100 %] 95 %  BP: ()/(52-59) 110/57                          Closed/Suction Drain 09/13/19 1717 Right Back Accordion 10 Fr. (Active)   Site Description Healing 9/14/2019 10:00 AM   Dressing Type Transparent 9/14/2019 10:00 AM   Dressing Status Clean;Dry;Intact 9/14/2019 10:00 AM   Drainage Bloody 9/14/2019 10:00 AM   Status Other (Comment) 9/14/2019 10:00 AM   Output (mL) 25 mL 9/14/2019 11:26 PM       Neurosurgery Physical Exam  4/5 strength in RLE, otherwise 5/5, SILT, incision c/d/i. Reflexes 2+ diffusely    Significant Labs:  Recent Labs    Lab 09/15/19  0633   GLU 96      K 4.1      CO2 24   BUN 4*   CREATININE 0.7   CALCIUM 8.9     Recent Labs   Lab 09/15/19  0633   WBC 7.98   HGB 9.0*   HCT 27.9*        No results for input(s): LABPT, INR, APTT in the last 48 hours.  Microbiology Results (last 7 days)     ** No results found for the last 168 hours. **        Recent Lab Results       09/15/19  0633        Albumin 3.1     Alkaline Phosphatase 189     ALT 20     Anion Gap 9     AST 26     Baso # 0.04     Basophil% 0.5     BILIRUBIN TOTAL 0.2  Comment:  For infants and newborns, interpretation of results should be based  on gestational age, weight and in agreement with clinical  observations.  Premature Infant recommended reference ranges:  Up to 24 hours.............<8.0 mg/dL  Up to 48 hours............<12.0 mg/dL  3-5 days..................<15.0 mg/dL  6-29 days.................<15.0 mg/dL       BUN, Bld 4     Calcium 8.9     Chloride 107     CO2 24     Creatinine 0.7     Differential Method Automated     eGFR if African American >60.0     eGFR if non  >60.0  Comment:  Calculation used to obtain the estimated glomerular filtration  rate (eGFR) is the CKD-EPI equation.        Eos # 0.0     Eosinophil% 0.1     Glucose 96     Gran # (ANC) 5.4     Gran% 67.1     Hematocrit 27.9  Comment:  Results confirmed, test repeated     Hemoglobin 9.0  Comment:  Results confirmed, test repeated     Immature Grans (Abs) 0.03  Comment:  Mild elevation in immature granulocytes is non specific and   can be seen in a variety of conditions including stress response,   acute inflammation, trauma and pregnancy. Correlation with other   laboratory and clinical findings is essential.       Immature Granulocytes 0.4     Lymph # 2.1     Lymph% 26.6     MCH 31.7     MCHC 32.3     MCV 98     Mono # 0.4     Mono% 5.3     MPV 9.8     nRBC 0     Platelets 284     Potassium 4.1     PROTEIN TOTAL 5.5     RBC 2.84     RDW 13.0     Sodium 140     WBC  7.98         All pertinent labs from the last 24 hours have been reviewed.    Significant Diagnostics:  I have reviewed all pertinent imaging results/findings within the past 24 hours.

## 2019-09-15 NOTE — PLAN OF CARE
Problem: Physical Therapy Goal  Goal: Physical Therapy Goal  Goals to be met by: 19    Patient will increase functional independence with mobility by performin. Supine to sit with Stand-by Assistance  2. Sit to supine with Stand-by Assistance  3. Sit to stand transfer with Stand-by Assistance  4. Gait  x 150 feet with Stand-by Assistance using Rollator.     Outcome: Ongoing (interventions implemented as appropriate)  Initial evaluation completed. Patient tolerated assessment well. Established POC and goals. Patient would continue to benefit from skilled PT services in order to improve functional mobility independence.       Dayan Sweeney, PT, DPT  9/15/2019

## 2019-09-15 NOTE — NURSING
Call placed to NSX to notify of B/P 92/55 automatic and 88/50 manual.  Pt asymptomatic.  Order given per MD Nieves for 1L bolus of NS then continue NS@ 100 cc/h continuous.  Order noted and carried out.

## 2019-09-15 NOTE — PT/OT/SLP EVAL
Physical Therapy Evaluation    Patient Name:  Ari Santos   MRN:  [id number]    Recommendations:     Discharge Recommendations:  home health PT   Discharge Equipment Recommendations: none   Barriers to discharge: None    Assessment:     Ari Santos is a 49 y.o. female admitted with a medical diagnosis of <principal problem not specified>.  She presents with the following impairments/functional limitations:  weakness, impaired self care skills, impaired balance, impaired endurance, impaired functional mobilty, gait instability, impaired sensation, decreased lower extremity function, impaired fine motor . Patient tolerated session well. He has good family support at home and displays good insight into his deficits and limitations. He requires Min-ModA for functional mobility today. Patient will benefit from PT services to address the mentioned deficits in order to promote an improve functional mobility status. Upon D/C, he is a great candidate for HHPT in order to progress towards his PLOF.     Rehab Prognosis: Good; patient would benefit from acute skilled PT services to address these deficits and reach maximum level of function.    Recent Surgery: Procedure(s) (LRB):  LAMINECTOMY, SPINE, LUMBAR, FOR DECOMPRESSION  (OPEN L1-S1 lamis) [facility] frame (Bilateral) 2 Days Post-Op    Plan:     During this hospitalization, patient to be seen 4 x/week to address the identified rehab impairments via gait training, therapeutic activities, therapeutic exercises, neuromuscular re-education and progress toward the following goals:    · Plan of Care Expires:  10/15/19    History:     Past Medical History:   Diagnosis Date    Adrenal insufficiency     Arthritis     Congenital adrenal hyperplasia     Hyperlipidemia     Spinal stenosis        Past Surgical History:   Procedure Laterality Date    RADIOFREQUENCY ABLATION, LEFT L3,4,5 Left 5/9/2019    [facility]     "RADIOFREQUENCY ABLATION, RIGHT L3,4,5 Right 5/23/2019    Performed by Messi Cabello MD at [facility]       Subjective     Chief Complaint: incisional back pain   Patient/Family Comments/goals: "I know I have to get up and moving. My R leg is shorter and is always like a noodle."  Pain/Comfort:  · Pain Rating 1: (did not rate)  · Location - Orientation 1: generalized  · Location 1: back  · Pain Addressed 1: Reposition, Distraction  · Pain Rating Post-Intervention 1: other (see comments)(did not rate)    Patients cultural, spiritual, Restorationist conflicts given the current situation: no    Living Environment:  Patient's living environment is as follows:  Home type apt   1 or 2 stories  1st floor    Number of KRISTINA/ rails 0 KRISTINA   AD used?/Owned?  Rollator, SPC, tub bench   Bathroom set-up  Tub/shower   Working? no   Driving? no   Individuals living with patient:  wife   Hobbies/Roles: Drawing and painting   Prior to admission, patients level of function was Benitez for mobility with rollator; A for ADLs.  Equipment used at home: bath bench, cane, straight, other (see comments)(rollator).  DME owned (not currently used): none.  Upon discharge, patient will have assistance from wife.    Objective:     Communicated with RN prior to session.  Patient found sitting EOB  with hemovac, wound vac, peripheral IV, telemetry  upon PT entry to room. See below for detailed functional assessment. Patient agreeable to participate in initial evaluation.    General Precautions: Standard, fall   Orthopedic Precautions:spinal precautions   Braces: N/A     Exams:  · Cognitive Exam:  Patient is oriented to Person, Place, Time and Situation  · Fine Motor Coordination:  Test:  Intact Impaired  Comments    Rapid ankle DF/PF  X     · Postural Exam:  Patient presented with the following abnormalities:    · -       Rounded shoulders  · -       Forward head  · Sensation:    LEFT LE: -       Intact  light/touch LE RIGHT LE:-       Intact  " light/touch LE      ROM and Strength   Right Lower Extremity  Left Lower Extremity    Hip Flexion WFL WFL   Knee Extension  WFL WFL   Knee Flexion  WFL WFL   Ankle DF WFL WFL   Ankle PF  WFL WFL     Functional Mobility:  · Bed Mobility:     · Scooting: minimum assistance  · Transfers:     · Sit to Stand:  moderate assistance with rollator  · Toilet Transfer: minimum assistance with  rollator  using  Step Transfer  · Gait: ~12ftx2 with Safia with periods of ModA for line management; slow gait speed, decreased LLE foot clearance 2* RLE LLD   · Balance: sitting eob - SBA; static standing - CGA; dyanamic standing - Safia-ModA    Therapeutic Activities and Exercises:  -Patient educated on the role and goal of PT services during acute care LOS. Question and concerns acknowledged and answers as appropriate.   -Will continue to educated as needed.    -White board updated in patients room to reflect level of assistance needed with nursing.     AM-PAC 6 CLICK MOBILITY  Total Score:12     Patient left up in chair with all lines intact, call button in reach and RN notified.  White board updated in patient room to reflect level of function with nursing.     GOALS:   Multidisciplinary Problems     Physical Therapy Goals        Problem: Physical Therapy Goal    Goal Priority Disciplines Outcome Goal Variances Interventions   Physical Therapy Goal     PT, PT/OT Ongoing (interventions implemented as appropriate)     Description:  Goals to be met by: 19    Patient will increase functional independence with mobility by performin. Supine to sit with Stand-by Assistance  2. Sit to supine with Stand-by Assistance  3. Sit to stand transfer with Stand-by Assistance  4. Gait  x 150 feet with Stand-by Assistance using Rollator.                       Time Tracking:     PT Received On: 09/15/19  PT Start Time: 931     PT Stop Time:   PT Total Time (min): 33 min     Billable Minutes: Evaluation 20 and Therapeutic Activity  13      Dayan Sweeney, PT  09/15/2019

## 2019-09-15 NOTE — NURSING
Received c/b from MD and was advised that although pt had been given Alprazolam 1mg at 2155 give additional dose now for relief.  Will medicate shortly.

## 2019-09-15 NOTE — PROGRESS NOTES
[facility]  Neurosurgery  Progress Note    Subjective:     History of Present Illness:   Ari Santos is a 49 y.o. transgender male (birth assigned female) with below listed PMH, who presents for L1-S1 laminectomy for lumbar spinal stenosis. Pain resides along low back and radiates into the buttocks down the backs of his legs to his calves.  He has numbness and tingling in his legs down to his feet involving the entirety of the feet which makes walking difficult.  He also reports bilateral knee pain and bowing of his legs for which he sees Dr. Andrade of Orthopedics.  He has congenital adrenal hyperplasia which was diagnosed as an infant for which he takes daily steroids and can not stop.  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 RLE is shorter than LLE.    Past Medical History:   Diagnosis Date    Adrenal insufficiency     Arthritis     Congenital adrenal hyperplasia     Hyperlipidemia     Spinal stenosis        Post-Op Info:  Procedure(s) (LRB):  LAMINECTOMY, SPINE, LUMBAR, FOR DECOMPRESSION  (OPEN L1-S1 lamis) [facility] Trev frame (Bilateral)   2 Days Post-Op     Interval History: 9/15: Pt reports significant pain when OOB, primarily muscle spasm.    Medications:  Continuous Infusions:   sodium chloride 0.9% 100 mL/hr at 09/15/19 0743     Scheduled Meds:   atorvastatin  10 mg Oral QHS    cetirizine  10 mg Oral Daily    docusate  100 mg Oral Daily    famotidine  20 mg Oral BID    gabapentin  800 mg Oral TID    heparin (porcine)  5,000 Units Subcutaneous Q8H    hydrocortisone sodium succinate  25 mg Intravenous Q8H    [START ON 9/16/2019] hydrocortisone sodium succinate  25 mg Intravenous Q12H    lamoTRIgine  200 mg Oral BID    methocarbamol  750 mg Oral TID    mupirocin  1 g Nasal BID    [START ON 9/17/2019] predniSONE  5 mg Oral Daily    rOPINIRole  4 mg Oral Nightly     PRN Meds:acetaminophen, acetaminophen,  ALPRAZolam, aluminum-magnesium hydroxide-simethicone, HYDROcodone-acetaminophen, influenza, morphine, ondansetron, oxyCODONE, promethazine (PHENERGAN) IVPB, ramelteon, senna-docusate 8.6-50 mg     Review of Systems  Objective:     Weight: 74.8 kg (164 lb 14.5 oz)  Body mass index is 32.21 kg/m².  Vital Signs (Most Recent):  Temp: 97.8 °F (36.6 °C) (09/15/19 1230)  Pulse: 98 (09/15/19 1230)  Resp: 18 (09/15/19 1230)  BP: (!) 110/57 (09/15/19 1230)  SpO2: 95 % (09/15/19 1230) Vital Signs (24h Range):  Temp:  [97.4 °F (36.3 °C)-98.5 °F (36.9 °C)] 97.8 °F (36.6 °C)  Pulse:  [] 98  Resp:  [16-18] 18  SpO2:  [94 %-100 %] 95 %  BP: ()/(52-59) 110/57                          Closed/Suction Drain 09/13/19 1717 Right Back Accordion 10 Fr. (Active)   Site Description Healing 9/14/2019 10:00 AM   Dressing Type Transparent 9/14/2019 10:00 AM   Dressing Status Clean;Dry;Intact 9/14/2019 10:00 AM   Drainage Bloody 9/14/2019 10:00 AM   Status Other (Comment) 9/14/2019 10:00 AM   Output (mL) 25 mL 9/14/2019 11:26 PM       Neurosurgery Physical Exam  4/5 strength in RLE, otherwise 5/5, SILT, incision c/d/i. Reflexes 2+ diffusely    Significant Labs:  Recent Labs   Lab 09/15/19  0633   GLU 96      K 4.1      CO2 24   BUN 4*   CREATININE 0.7   CALCIUM 8.9     Recent Labs   Lab 09/15/19  0633   WBC 7.98   HGB 9.0*   HCT 27.9*        No results for input(s): LABPT, INR, APTT in the last 48 hours.  Microbiology Results (last 7 days)     ** No results found for the last 168 hours. **        Recent Lab Results       09/15/19  0633        Albumin 3.1     Alkaline Phosphatase 189     ALT 20     Anion Gap 9     AST 26     Baso # 0.04     Basophil% 0.5     BILIRUBIN TOTAL 0.2  Comment:  For infants and newborns, interpretation of results should be based  on gestational age, weight and in agreement with clinical  observations.  Premature Infant recommended reference ranges:  Up to 24 hours.............<8.0  mg/dL  Up to 48 hours............<12.0 mg/dL  3-5 days..................<15.0 mg/dL  6-29 days.................<15.0 mg/dL       BUN, Bld 4     Calcium 8.9     Chloride 107     CO2 24     Creatinine 0.7     Differential Method Automated     eGFR if African American >60.0     eGFR if non  >60.0  Comment:  Calculation used to obtain the estimated glomerular filtration  rate (eGFR) is the CKD-EPI equation.        Eos # 0.0     Eosinophil% 0.1     Glucose 96     Gran # (ANC) 5.4     Gran% 67.1     Hematocrit 27.9  Comment:  Results confirmed, test repeated     Hemoglobin 9.0  Comment:  Results confirmed, test repeated     Immature Grans (Abs) 0.03  Comment:  Mild elevation in immature granulocytes is non specific and   can be seen in a variety of conditions including stress response,   acute inflammation, trauma and pregnancy. Correlation with other   laboratory and clinical findings is essential.       Immature Granulocytes 0.4     Lymph # 2.1     Lymph% 26.6     MCH 31.7     MCHC 32.3     MCV 98     Mono # 0.4     Mono% 5.3     MPV 9.8     nRBC 0     Platelets 284     Potassium 4.1     PROTEIN TOTAL 5.5     RBC 2.84     RDW 13.0     Sodium 140     WBC 7.98         All pertinent labs from the last 24 hours have been reviewed.    Significant Diagnostics:  I have reviewed all pertinent imaging results/findings within the past 24 hours.    Assessment/Plan:     Lumbar stenosis with neurogenic claudication  49 y.o. transgender male who presents with lumbar stenosis now s/p L1-S1 laminectomy on 7/13/19:    --Patient admitted to neurosurgery      -q2h neurochecks  --Endocrinology consulted for steroids. Appreciate recs.  --Drains to remain at this time, abx while in place. Wound vac to remain until 9/18/19.  --PT/OT/OOB >6hrs per day. Encouraged to mobilize regularly.  --TEDs/SCDs/SQH  --Continue to monitor clinically, notify NSGY immediately with any changes in neuro status        Andrea Peterson,  MD  Neurosurgery  [facility]

## 2019-09-15 NOTE — NURSING
Pt c/o inability to sleep despite PRN Ramelteon given.  States pain controlled at this time just unable to sleep and requesting something for relief.  Call placed to on-call NSX MD Rice to notify.  Was advised will review chart and input orders as needed.

## 2019-09-15 NOTE — PLAN OF CARE
Problem: Fall Injury Risk  Goal: Absence of Fall and Fall-Related Injury    Intervention: Identify and Manage Contributors to Fall Injury Risk  Patient to be absent of fall and fall related injury. POC reviewed with patient. Pt verbalized understanding. Questions and concerns addressed. No acute events today. Pt progressing toward goals. Will continue to monitor. See flowsheets for full assessments and VS info.    09/15/19 1657   Manage Acute Allergic Reaction   Medication Review/Management medications reviewed   Identify and Manage Contributors to Fall Injury Risk   Self-Care Promotion safe use of adaptive equipment encouraged;meal setup provided

## 2019-09-15 NOTE — ANESTHESIA POSTPROCEDURE EVALUATION
Anesthesia Post Evaluation    Patient: Ari Santos    Procedure(s) Performed: Procedure(s) (LRB):  LAMINECTOMY, SPINE, LUMBAR, FOR DECOMPRESSION  (OPEN L1-S1 lamis) [facility] Trev frame (Bilateral)    Final Anesthesia Type: general  Patient location during evaluation: ICU  Patient participation: Yes- Able to Participate  Level of consciousness: awake and alert and oriented  Post-procedure vital signs: reviewed and stable  Pain management: adequate  Airway patency: patent  PONV status at discharge: No PONV  Anesthetic complications: no      Cardiovascular status: blood pressure returned to baseline and hemodynamically stable  Respiratory status: unassisted  Hydration status: euvolemic  Follow-up not needed.          Vitals Value Taken Time   /57 9/15/2019 12:30 PM   Temp 36.6 °C (97.8 °F) 9/15/2019 12:30 PM   Pulse 98 9/15/2019 12:30 PM   Resp 18 9/15/2019 12:30 PM   SpO2 95 % 9/15/2019 12:30 PM         Event Time     Out of Recovery 20:00:17          Pain/Andreea Score: Pain Rating Prior to Med Admin: 10 (9/15/2019  2:14 PM)  Pain Rating Post Med Admin: 4 (9/14/2019 11:56 PM)  Andreea Score: 10 (9/14/2019 12:00 PM)

## 2019-09-15 NOTE — ASSESSMENT & PLAN NOTE
49 y.o. transgender male who presents with lumbar stenosis now s/p L1-S1 laminectomy on 7/13/19:    --Patient admitted to neurosurgery      -[facility] neurochecks  --Endocrinology consulted for steroids. Appreciate recs.  --Drains to remain at this time, abx while in place. Wound vac to remain until 9/18/19.  --PT/OT/OOB >6hrs per day. Encouraged to mobilize regularly.  --TEDs/SCDs/SQH  --Continue to monitor clinically, notify NSGY immediately with any changes in neuro status

## 2019-09-15 NOTE — OP NOTE
Date of Operation: 9/13/19    Pre-Operative Diagnosis:   1.  Multi-level lumbar stenosis from L1-S1  2. Severe DDD  3. Neurogenic claudication  4. Chronic low back and leg pain  5. Chronic steroid use  6. Congenital adrenal hyperplasia    Post-Operative Diagnosis:   1.  Multi-level lumbar stenosis from L1-S1  2. Severe DDD  3. Neurogenic claudication  4. Chronic low back and leg pain  5. Chronic steroid use  6. Congenital adrenal hyperplasia    Operation Titles:  1. Bilateral L1, L2, L3, L4, L5, and S1 laminectomies, medial facetectomies and foraminotomies for decompression   2. Use of intraoperative fluoroscopy  3. Placement of Provena wound vac    Surgeon: Silas Peng D.O.    Anesthesia: General     Level of involvement of attending surgeon: Full     Indications:   50 y/o biological female (identifies as male) with congenital adrenal hyperplasia on lifelong steroid use presented with chronic severe low back and leg pain due to severe L1-S1 stenosis causing neurogenic claudication.  Decision was to proceed with open L1-S1 laminectomies to decompress his cauda equina.  Extensive discussion was held re: his chronic steroid use and elevate risk of infection and poor wound healing.     The risks, benefits and alternative options were discussed with the patient. The risks which include but are not limited to bleeding, infection, death, CSF leak, nerve root injury, bowel and bladder or sexual dysfunction, weakness, numbness, paralysis, stroke, chronic pain, and deformity were discussed.     Procedure In Detail:   The patient was taken to the operating Room, and induction of general endotracheal anesthesia was initiated. After placement of adequate IV access, the patient was positioned prone on the marilyn such that all pressure points were carefully inspected and padded. A safety pause was performed with the indicated procedure and site of surgery stated and confirmed. Under fluoroscopic guidance, a midline L1-S1  incision was marked and preanesthetized with local anesthetic. The area was then prepped and draped in the usual sterile fashion.     A Midline incision was made with a #10 blade and with bovie cautery, carried down through the lumbo-dorsal fascia, exposing the L-S1 laminae, the facet joints as well as the pars. The L1-S1 levels were once again confirmed with fluoroscopy.  The L1-S1 spinous processes were bitten off using rongeurs. Using a matchstick drill bit, lamina from L1 to S1 were drilled out as well as the medial part of the L1-S1 facets and pars bilaterally such that the ligamentum flavum was exposed. The ligamentum was then gently swept inferiorly with a curette and then it was bitten off piecemeal using kerrison rongeurs. The medial aspect of the facet joints were identified and Kerrisoned to achieve adequate lateral recess and proximal neuro-foraminal decompression. The serjio and nerve hook confirmed this decompression. Meticulous hemostasis was obtained using Floseal, bipolar cautery, and Aquamantis.    Due to chronic steroid use, the patient's tissues were fragile and oozy.  Although the patient lost 1200 cc blood he remained hemodynamically stable throughout the surgery.    The operative space was irrigated with saline. Number 0 Vicryls were used to close the fascia and 2-0 Vicryls were used subcutaneously. Mastisol and steristrips were used on the skin. The patient was awoken and returned to the recovery area where (s)he remained at his/her neurological and hemodynamic baseline.     Drain: Hemovac and Provena wound vac    Estimated Blood Loss:  1200 ml    Needle/Sponge count: Correct     Anesthesia: General    Prognosis: Good    Condition: Stable    Wound: Clean

## 2019-09-16 PROBLEM — M48.061 LUMBAR STENOSIS: Status: ACTIVE | Noted: 2019-09-16

## 2019-09-16 LAB
ALBUMIN SERPL BCP-MCNC: 2.7 G/DL (ref 3.5–5.2)
ALP SERPL-CCNC: 222 U/L (ref 55–135)
ALT SERPL W/O P-5'-P-CCNC: 27 U/L (ref 10–44)
ANION GAP SERPL CALC-SCNC: 10 MMOL/L (ref 8–16)
AST SERPL-CCNC: 38 U/L (ref 10–40)
BASOPHILS # BLD AUTO: 0.04 K/UL (ref 0–0.2)
BASOPHILS NFR BLD: 0.6 % (ref 0–1.9)
BILIRUB SERPL-MCNC: 0.3 MG/DL (ref 0.1–1)
BUN SERPL-MCNC: 6 MG/DL (ref 6–20)
CALCIUM SERPL-MCNC: 8.6 MG/DL (ref 8.7–10.5)
CHLORIDE SERPL-SCNC: 108 MMOL/L (ref 95–110)
CO2 SERPL-SCNC: 24 MMOL/L (ref 23–29)
CREAT SERPL-MCNC: 0.6 MG/DL (ref 0.5–1.4)
DIFFERENTIAL METHOD: ABNORMAL
EOSINOPHIL # BLD AUTO: 0 K/UL (ref 0–0.5)
EOSINOPHIL NFR BLD: 0.5 % (ref 0–8)
ERYTHROCYTE [DISTWIDTH] IN BLOOD BY AUTOMATED COUNT: 13 % (ref 11.5–14.5)
EST. GFR  (AFRICAN AMERICAN): >60 ML/MIN/1.73 M^2
EST. GFR  (NON AFRICAN AMERICAN): >60 ML/MIN/1.73 M^2
GLUCOSE SERPL-MCNC: 97 MG/DL (ref 70–110)
HCT VFR BLD AUTO: 25.5 % (ref 37–48.5)
HGB BLD-MCNC: 7.9 G/DL (ref 12–16)
IMM GRANULOCYTES # BLD AUTO: 0.01 K/UL (ref 0–0.04)
IMM GRANULOCYTES NFR BLD AUTO: 0.2 % (ref 0–0.5)
LYMPHOCYTES # BLD AUTO: 1.7 K/UL (ref 1–4.8)
LYMPHOCYTES NFR BLD: 26.5 % (ref 18–48)
MCH RBC QN AUTO: 31.3 PG (ref 27–31)
MCHC RBC AUTO-ENTMCNC: 31 G/DL (ref 32–36)
MCV RBC AUTO: 101 FL (ref 82–98)
MONOCYTES # BLD AUTO: 0.5 K/UL (ref 0.3–1)
MONOCYTES NFR BLD: 7.6 % (ref 4–15)
NEUTROPHILS # BLD AUTO: 4.2 K/UL (ref 1.8–7.7)
NEUTROPHILS NFR BLD: 64.6 % (ref 38–73)
NRBC BLD-RTO: 0 /100 WBC
PLATELET # BLD AUTO: 263 K/UL (ref 150–350)
PMV BLD AUTO: 10.2 FL (ref 9.2–12.9)
POTASSIUM SERPL-SCNC: 3.9 MMOL/L (ref 3.5–5.1)
PROT SERPL-MCNC: 5.2 G/DL (ref 6–8.4)
RBC # BLD AUTO: 2.52 M/UL (ref 4–5.4)
SODIUM SERPL-SCNC: 142 MMOL/L (ref 136–145)
WBC # BLD AUTO: 6.54 K/UL (ref 3.9–12.7)

## 2019-09-16 PROCEDURE — 63600175 PHARM REV CODE 636 W HCPCS: Performed by: STUDENT IN AN ORGANIZED HEALTH CARE EDUCATION/TRAINING PROGRAM

## 2019-09-16 PROCEDURE — 63600175 PHARM REV CODE 636 W HCPCS: Performed by: PHYSICIAN ASSISTANT

## 2019-09-16 PROCEDURE — 94761 N-INVAS EAR/PLS OXIMETRY MLT: CPT

## 2019-09-16 PROCEDURE — 36415 COLL VENOUS BLD VENIPUNCTURE: CPT

## 2019-09-16 PROCEDURE — 80053 COMPREHEN METABOLIC PANEL: CPT

## 2019-09-16 PROCEDURE — 97530 THERAPEUTIC ACTIVITIES: CPT

## 2019-09-16 PROCEDURE — 97116 GAIT TRAINING THERAPY: CPT

## 2019-09-16 PROCEDURE — 20600001 HC STEP DOWN PRIVATE ROOM

## 2019-09-16 PROCEDURE — 99024 POSTOP FOLLOW-UP VISIT: CPT | Mod: POP,,, | Performed by: PHYSICIAN ASSISTANT

## 2019-09-16 PROCEDURE — 25000003 PHARM REV CODE 250: Performed by: STUDENT IN AN ORGANIZED HEALTH CARE EDUCATION/TRAINING PROGRAM

## 2019-09-16 PROCEDURE — 99024 PR POST-OP FOLLOW-UP VISIT: ICD-10-PCS | Mod: POP,,, | Performed by: PHYSICIAN ASSISTANT

## 2019-09-16 PROCEDURE — 94799 UNLISTED PULMONARY SVC/PX: CPT

## 2019-09-16 PROCEDURE — 25000003 PHARM REV CODE 250: Performed by: NEUROLOGICAL SURGERY

## 2019-09-16 PROCEDURE — 63600175 PHARM REV CODE 636 W HCPCS: Performed by: NEUROLOGICAL SURGERY

## 2019-09-16 PROCEDURE — 85025 COMPLETE CBC W/AUTO DIFF WBC: CPT

## 2019-09-16 PROCEDURE — 25000003 PHARM REV CODE 250: Performed by: PHYSICIAN ASSISTANT

## 2019-09-16 RX ORDER — DIAZEPAM 5 MG/1
5 TABLET ORAL EVERY 6 HOURS PRN
Status: DISCONTINUED | OUTPATIENT
Start: 2019-09-16 | End: 2019-09-17

## 2019-09-16 RX ORDER — OXYCODONE AND ACETAMINOPHEN 7.5; 325 MG/1; MG/1
1 TABLET ORAL EVERY 4 HOURS PRN
Status: DISCONTINUED | OUTPATIENT
Start: 2019-09-16 | End: 2019-09-17

## 2019-09-16 RX ORDER — MORPHINE SULFATE 2 MG/ML
2 INJECTION, SOLUTION INTRAMUSCULAR; INTRAVENOUS EVERY 4 HOURS PRN
Status: DISCONTINUED | OUTPATIENT
Start: 2019-09-16 | End: 2019-09-17 | Stop reason: HOSPADM

## 2019-09-16 RX ORDER — POLYETHYLENE GLYCOL 3350 17 G/17G
17 POWDER, FOR SOLUTION ORAL DAILY
Status: DISCONTINUED | OUTPATIENT
Start: 2019-09-16 | End: 2019-09-17 | Stop reason: HOSPADM

## 2019-09-16 RX ORDER — ACETAMINOPHEN 325 MG/1
650 TABLET ORAL EVERY 6 HOURS PRN
Status: DISCONTINUED | OUTPATIENT
Start: 2019-09-16 | End: 2019-09-17 | Stop reason: HOSPADM

## 2019-09-16 RX ORDER — FERROUS SULFATE 325(65) MG
325 TABLET, DELAYED RELEASE (ENTERIC COATED) ORAL 3 TIMES DAILY
Status: DISCONTINUED | OUTPATIENT
Start: 2019-09-16 | End: 2019-09-17 | Stop reason: HOSPADM

## 2019-09-16 RX ORDER — SYRING-NEEDL,DISP,INSUL,0.3 ML 29 G X1/2"
296 SYRINGE, EMPTY DISPOSABLE MISCELLANEOUS ONCE
Status: COMPLETED | OUTPATIENT
Start: 2019-09-16 | End: 2019-09-16

## 2019-09-16 RX ORDER — BACITRACIN 500 [USP'U]/G
OINTMENT TOPICAL 2 TIMES DAILY
Status: DISCONTINUED | OUTPATIENT
Start: 2019-09-16 | End: 2019-09-17

## 2019-09-16 RX ORDER — ASCORBIC ACID 250 MG
250 TABLET ORAL 3 TIMES DAILY
Status: DISCONTINUED | OUTPATIENT
Start: 2019-09-16 | End: 2019-09-17 | Stop reason: HOSPADM

## 2019-09-16 RX ORDER — AMOXICILLIN 250 MG
1 CAPSULE ORAL 2 TIMES DAILY
Status: DISCONTINUED | OUTPATIENT
Start: 2019-09-16 | End: 2019-09-17 | Stop reason: HOSPADM

## 2019-09-16 RX ORDER — BISACODYL 10 MG
10 SUPPOSITORY, RECTAL RECTAL DAILY PRN
Status: DISCONTINUED | OUTPATIENT
Start: 2019-09-16 | End: 2019-09-17 | Stop reason: HOSPADM

## 2019-09-16 RX ORDER — VANCOMYCIN HCL IN 5 % DEXTROSE 1G/250ML
15 PLASTIC BAG, INJECTION (ML) INTRAVENOUS
Status: DISCONTINUED | OUTPATIENT
Start: 2019-09-16 | End: 2019-09-17

## 2019-09-16 RX ADMIN — Medication 250 MG: at 08:09

## 2019-09-16 RX ADMIN — SENNOSIDES,DOCUSATE SODIUM 1 TABLET: 8.6; 5 TABLET, FILM COATED ORAL at 08:09

## 2019-09-16 RX ADMIN — MORPHINE SULFATE 4 MG: 2 INJECTION, SOLUTION INTRAMUSCULAR; INTRAVENOUS at 12:09

## 2019-09-16 RX ADMIN — BACITRACIN: 500 OINTMENT TOPICAL at 08:09

## 2019-09-16 RX ADMIN — MORPHINE SULFATE 2 MG: 2 INJECTION, SOLUTION INTRAMUSCULAR; INTRAVENOUS at 02:09

## 2019-09-16 RX ADMIN — GABAPENTIN 800 MG: 400 CAPSULE ORAL at 08:09

## 2019-09-16 RX ADMIN — SENNOSIDES,DOCUSATE SODIUM 1 TABLET: 8.6; 5 TABLET, FILM COATED ORAL at 02:09

## 2019-09-16 RX ADMIN — RAMELTEON 8 MG: 8 TABLET ORAL at 12:09

## 2019-09-16 RX ADMIN — ALPRAZOLAM 1 MG: 0.25 TABLET ORAL at 08:09

## 2019-09-16 RX ADMIN — MUPIROCIN 1 G: 20 OINTMENT TOPICAL at 08:09

## 2019-09-16 RX ADMIN — ALPRAZOLAM 1 MG: 0.25 TABLET ORAL at 09:09

## 2019-09-16 RX ADMIN — ATORVASTATIN CALCIUM 10 MG: 10 TABLET, FILM COATED ORAL at 08:09

## 2019-09-16 RX ADMIN — HYDROCORTISONE SODIUM SUCCINATE 25 MG: 100 INJECTION, POWDER, FOR SOLUTION INTRAMUSCULAR; INTRAVENOUS at 08:09

## 2019-09-16 RX ADMIN — VANCOMYCIN HYDROCHLORIDE 1000 MG: 1 INJECTION, POWDER, LYOPHILIZED, FOR SOLUTION INTRAVENOUS at 05:09

## 2019-09-16 RX ADMIN — ROPINIROLE HYDROCHLORIDE 4 MG: 1 TABLET, FILM COATED ORAL at 08:09

## 2019-09-16 RX ADMIN — POLYETHYLENE GLYCOL 3350 17 G: 17 POWDER, FOR SOLUTION ORAL at 02:09

## 2019-09-16 RX ADMIN — HYDROCODONE BITARTRATE AND ACETAMINOPHEN 1 TABLET: 10; 325 TABLET ORAL at 05:09

## 2019-09-16 RX ADMIN — HYDROCORTISONE SODIUM SUCCINATE 25 MG: 100 INJECTION, POWDER, FOR SOLUTION INTRAMUSCULAR; INTRAVENOUS at 09:09

## 2019-09-16 RX ADMIN — GABAPENTIN 800 MG: 400 CAPSULE ORAL at 02:09

## 2019-09-16 RX ADMIN — FERROUS SULFATE TAB EC 325 MG (65 MG FE EQUIVALENT) 325 MG: 325 (65 FE) TABLET DELAYED RESPONSE at 08:09

## 2019-09-16 RX ADMIN — ALPRAZOLAM 1 MG: 0.25 TABLET ORAL at 03:09

## 2019-09-16 RX ADMIN — METHOCARBAMOL TABLETS 750 MG: 750 TABLET, COATED ORAL at 09:09

## 2019-09-16 RX ADMIN — Medication 250 MG: at 02:09

## 2019-09-16 RX ADMIN — MAGNESIUM CITRATE 296 ML: 1.75 LIQUID ORAL at 07:09

## 2019-09-16 RX ADMIN — Medication 10 MG: at 05:09

## 2019-09-16 RX ADMIN — HEPARIN SODIUM 5000 UNITS: 5000 INJECTION, SOLUTION INTRAVENOUS; SUBCUTANEOUS at 02:09

## 2019-09-16 RX ADMIN — FERROUS SULFATE TAB EC 325 MG (65 MG FE EQUIVALENT) 325 MG: 325 (65 FE) TABLET DELAYED RESPONSE at 02:09

## 2019-09-16 RX ADMIN — HEPARIN SODIUM 5000 UNITS: 5000 INJECTION, SOLUTION INTRAVENOUS; SUBCUTANEOUS at 05:09

## 2019-09-16 RX ADMIN — LAMOTRIGINE 200 MG: 100 TABLET ORAL at 08:09

## 2019-09-16 RX ADMIN — OXYCODONE AND ACETAMINOPHEN 1 TABLET: 7.5; 325 TABLET ORAL at 10:09

## 2019-09-16 RX ADMIN — SODIUM CHLORIDE: 0.9 INJECTION, SOLUTION INTRAVENOUS at 03:09

## 2019-09-16 RX ADMIN — MUPIROCIN 1 G: 20 OINTMENT TOPICAL at 09:09

## 2019-09-16 RX ADMIN — DIAZEPAM 5 MG: 5 TABLET ORAL at 05:09

## 2019-09-16 RX ADMIN — CETIRIZINE HYDROCHLORIDE 10 MG: 5 TABLET ORAL at 10:09

## 2019-09-16 RX ADMIN — HEPARIN SODIUM 5000 UNITS: 5000 INJECTION, SOLUTION INTRAVENOUS; SUBCUTANEOUS at 09:09

## 2019-09-16 RX ADMIN — GABAPENTIN 800 MG: 400 CAPSULE ORAL at 09:09

## 2019-09-16 RX ADMIN — FAMOTIDINE 20 MG: 20 TABLET, FILM COATED ORAL at 09:09

## 2019-09-16 NOTE — ASSESSMENT & PLAN NOTE
49 y.o. transgender male who presents with lumbar stenosis now s/p open L1-S1 laminectomy on 7/13/19:    -Patient neurologically stable on exam  -No brace needed  -HV drain output: 125 mL. Will continue at this time. Abx while in place.   -Prevena incisional vac without suction. Removed without complication. Incision looks good. Will leave vac off. Bacitracin to incision BID.     -Pain: Increase Oxy to 7.5 mg PRN. DC Robaxin and begin Valium 5 mg PRN muscle spasms. Decrease Morphine to 2 mg q 4 hours PRN breakthrough pain. Continue Neurontin 800 mg TID and Lamictal 200mg qHS.   -A long discussion regarding realistic pain expectations was had with patient and wife. Explained to him that it is only POD 3 and he is already able to stand and walk for several mins before experiencing any LE pain or paresthesias. He voiced agreement. Informed him that due to his narcotics use pre op, it is harder to control his pain post op. He voiced understanding. Discussed with him the side effects of narcotics including constipation and bowel obstructions. He voiced understanding. Also reminded him that he just had surgery and it is impossible to make him pain free. Reassured him that with time, his post operative pain would improve. He voiced understanding.    -Congenital adrenal hyperplasia: Will continue with steroid taper per Endo. Currently on POD 3.  - POD3: Hydrocortisone 25 mg IV q12h  - POD4: Resume home dose of prednisone of 5 mg daily  - Follow up with Endocrinology in Nov 2019    -Acute blood loss anemia: H&H 7.9/25.5. Begin iron TID for replacement. Will monitor for increased constipation.   -HLD: Continue home dose of Atorvastatin  -RLS: Continue home dose of Ropinirole 4 mg qHS  -DVT prophylaxis: ADRIEN's, SCD's, SQH  -Bowel regimen: Given Mag citrate x 1 this AM for constipation. Begin senna BID, miralax daily, and suppository daily PRN. Encouraged mobility to help with BM.   -Atelectasis prevention: IS hourly while  awake, PT/OT, OOB > 6 hours per day      DISPO: Home with HH pending drain output. Currently, drain output remains to high to remove. Plan discussed with patient and wife.

## 2019-09-16 NOTE — PLAN OF CARE
CM met with patient and spouse to obtain d/c planning assessment. Pt is post op day 3 for laminectomy with drain in place. Plan d/c home with family and home health Plan b: home with family.    Siva Mitchell MD    Payor: MEDICARE / Plan: MEDICARE PART A & B / Product Type: [facility] /       [facility] [address]  [facility] [address]  Phone: [phone] Fax: [phone]         09/16/19 1208   Discharge Assessment   Assessment Type Discharge Planning Assessment   Confirmed/corrected address and phone number on facesheet? Yes   Assessment information obtained from? Caregiver  (Wife, Kristy)   Expected Length of Stay (days) 2   Communicated expected length of stay with patient/caregiver yes   Prior to hospitilization cognitive status: Alert/Oriented   Prior to hospitalization functional status: Independent;Assistive Equipment   Current cognitive status: Alert/Oriented   Current Functional Status: Independent;Assistive Equipment   Lives With spouse   Able to Return to Prior Arrangements yes   Is patient able to care for self after discharge? Yes   Who are your caregiver(s) and their phone number(s)? Kristy Sainz, wife [phone]   Patient's perception of discharge disposition home health   Readmission Within the Last 30 Days no previous admission in last 30 days   Patient currently being followed by outpatient case management? No   Patient currently receives any other outside agency services? No   Equipment Currently Used at Home walker, rolling;cane, quad;cane, straight   Does the patient have transportation home? Yes   Transportation Anticipated family or friend will provide   Does the patient receive services at the [facility]? No   Discharge Plan A Home with family;Home Health   Discharge Plan B Home with family   Patient/Family in Agreement with Plan yes

## 2019-09-16 NOTE — NURSING
"Pt still has not had BM despite scheduled/PRN meds now day 4.  Reports feelings of "fullness."  ABD remains rounded but non-distended, soft, and non-tender.  BS active.  Call placed to on-call NSX to notify.  Awaiting c/b.   "

## 2019-09-16 NOTE — SUBJECTIVE & OBJECTIVE
"Interval History:   NAEON. Patient sitting in chair, in no apparent distress. Wife at bedside. Patient reports severe pain especially with movement. Patient also concerned because while walking yesterday, he experienced a sharp radicular pain into his buttock and leg. He is worried that he has done something to "mess things up". He also reports continued numbness in his toes which is unchanged. Patient also reports abdominal discomfort because he has not had a BM since surgery. Reports flatus. Denies abdominal pain or cramping. States he feels "full". Denies any new LE paresthesias or weakness. Tolerating diet. Voiding appropriately.     Medications:  Continuous Infusions:  Scheduled Meds:   ascorbic acid (vitamin C)  250 mg Oral TID    atorvastatin  10 mg Oral QHS    cetirizine  10 mg Oral Daily    famotidine  20 mg Oral BID    ferrous sulfate  325 mg Oral TID    gabapentin  800 mg Oral TID    heparin (porcine)  5,000 Units Subcutaneous Q8H    hydrocortisone sodium succinate  25 mg Intravenous Q12H    lamoTRIgine  200 mg Oral BID    mupirocin  1 g Nasal BID    polyethylene glycol  17 g Oral Daily    [START ON 9/17/2019] predniSONE  5 mg Oral Daily    rOPINIRole  4 mg Oral Nightly    senna-docusate 8.6-50 mg  1 tablet Oral BID    vancomycin (VANCOCIN) IVPB  15 mg/kg Intravenous Q12H     PRN Meds:acetaminophen, ALPRAZolam, aluminum-magnesium hydroxide-simethicone, bisacodyl, diazePAM, HYDROcodone-acetaminophen, influenza, morphine, ondansetron, oxyCODONE, promethazine (PHENERGAN) IVPB, ramelteon     Review of Systems  Objective:     Weight: 74.8 kg (164 lb 14.5 oz)  Body mass index is 32.21 kg/m².  Vital Signs (Most Recent):  Temp: 97.9 °F (36.6 °C) (09/16/19 1152)  Pulse: 104 (09/16/19 1152)  Resp: 16 (09/16/19 1152)  BP: (!) 93/56 (09/16/19 1100)  SpO2: 98 % (09/16/19 1152) Vital Signs (24h Range):  Temp:  [97.6 °F (36.4 °C)-99.2 °F (37.3 °C)] 97.9 °F (36.6 °C)  Pulse:  [] 104  Resp:  [16-18] " 16  SpO2:  [94 %-98 %] 98 %  BP: ()/(52-66) 93/56                          Closed/Suction Drain 09/13/19 1717 Right Back Accordion 10 Fr. (Active)   Site Description Healing 9/14/2019 10:00 AM   Dressing Type Transparent 9/14/2019 10:00 AM   Dressing Status Clean;Dry;Intact 9/14/2019 10:00 AM   Drainage Bloody 9/14/2019 10:00 AM   Status Other (Comment) 9/14/2019 10:00 AM   Output (mL) 15 mL 9/16/2019  6:00 AM       Neurosurgery Physical Exam   General: well developed, well nourished, anxious  Head: normocephalic, atraumatic  Neurologic: Alert and oriented. Thought content appropriate.  GCS: Motor: 6/Verbal: 5/Eyes: 4 GCS Total: 15  Mental Status: Awake, Alert, Oriented x 4  Language: No aphasia  Speech: No dysarthria  Cranial nerves: face symmetric, tongue midline, CN II-XII grossly intact.   Eyes: pupils equal, round, reactive to light with accomodation, EOMI.   Pulmonary: normal respirations, no signs of respiratory distress  Abdomen: soft, non-distended, not tender to palpation  Skin: Skin is warm, dry and intact.  Sensory: intact to light touch throughout except decreased in left calf    Motor Strength: Moves all extremities spontaneously with good tone. No abnormal movements seen. LE exam pain limited.     Strength  Deltoids Triceps Biceps Wrist Extension Wrist Flexion Hand    Upper: R 5/5 5/5 5/5 5/5 5/5 5/5    L 5/5 5/5 5/5 5/5 5/5 5/5     Iliopsoas Quadriceps Knee  Flexion Tibialis  anterior Gastro- cnemius EHL   Lower: R 4/5 5-/5 4+/5 5/5 5/5 5/5    L 4/5 5-/5 4+/5 5/5 5/5 5/5     Moore's: Negative.  Clonus: Negative.  Extremities: Leg length discrepancy. Varus deformity bilateral knees.        Incision:  Clean, dry, staples intact. Skin edges well approximated. No surrounding erythema or edema. No drainage or TTP. HV drain in place.         Significant Labs:  Recent Labs   Lab 09/15/19  0633 09/16/19  0554   GLU 96 97    142   K 4.1 3.9    108   CO2 24 24   BUN 4* 6   CREATININE  0.7 0.6   CALCIUM 8.9 8.6*     Recent Labs   Lab 09/15/19  0633 09/16/19  0554   WBC 7.98 6.54   HGB 9.0* 7.9*   HCT 27.9* 25.5*    263

## 2019-09-16 NOTE — NURSING
Received c/b from MD Harris.  Order given for mag citrate x1 dose now.  Order noted and carried out.

## 2019-09-16 NOTE — PLAN OF CARE
Problem: Physical Therapy Goal  Goal: Physical Therapy Goal  Goals to be met by: 19    Patient will increase functional independence with mobility by performin. Supine to sit with Stand-by Assistance  2. Sit to supine with Stand-by Assistance  3. Sit to stand transfer with Stand-by Assistance  4. Gait  x 150 feet with Stand-by Assistance using Rollator.      Outcome: Ongoing (interventions implemented as appropriate)  Patient tolerated treatment well. Established POC and goals reviewed and remain appropriate. Plan is to continue to improve patient's functional mobility capabilities.        Dayan Sweeney, PT, DPT  2019

## 2019-09-16 NOTE — PROGRESS NOTES
Pharmacokinetic Initial Assessment: IV Vancomycin    Assessment/Plan:    Maintenance dose of vancomycin 1000 mg IV every 12 hours  Desired empiric serum trough concentration is 10 to 15 mcg/mL  Draw vancomycin trough level 30 min prior to fourth dose on 9/17 at approximately 1630  Pharmacy will continue to follow and monitor vancomycin.      Please contact pharmacy at extension [phone] with any questions regarding this assessment.     Thank you for the consult,   Angelica Baum, PharmD       Patient brief summary:  Ari Santos is a 49 y.o. female initiated on antimicrobial therapy with IV Vancomycin for Surgical Prophylaxis    Drug Allergies:   Review of patient's allergies indicates:   Allergen Reactions    Penicillins Rash       Actual Body Weight:   74.8 kg    Renal Function:   Estimated Creatinine Clearance: 100.2 mL/min (based on SCr of 0.6 mg/dL).,     Dialysis Method (if applicable):  N/A    CBC (last 72 hours):  Recent Labs   Lab Result Units 09/15/19  0633 09/16/19  0554   WBC K/uL 7.98 6.54   Hemoglobin g/dL 9.0* 7.9*   Hematocrit % 27.9* 25.5*   Platelets K/uL 284 263   Gran% % 67.1 64.6   Lymph% % 26.6 26.5   Mono% % 5.3 7.6   Eosinophil% % 0.1 0.5   Basophil% % 0.5 0.6   Differential Method  Automated Automated       Metabolic Panel (last 72 hours):  Recent Labs   Lab Result Units 09/15/19  0633 09/16/19  0554   Sodium mmol/L 140 142   Potassium mmol/L 4.1 3.9   Chloride mmol/L 107 108   CO2 mmol/L 24 24   Glucose mg/dL 96 97   BUN, Bld mg/dL 4* 6   Creatinine mg/dL 0.7 0.6   Albumin g/dL 3.1* 2.7*   Total Bilirubin mg/dL 0.2 0.3   Alkaline Phosphatase U/L 189* 222*   AST U/L 26 38   ALT U/L 20 27       Drug levels (last 3 results):  No results for input(s): VANCOMYCINRA, VANCOMYCINPE, VANCOMYCINTR in the last 72 hours.    Microbiologic Results:  Microbiology Results (last 7 days)     ** No results found for the last 168 hours. **

## 2019-09-16 NOTE — PT/OT/SLP PROGRESS
"Physical Therapy Treatment    Patient Name:  Ari Santos   MRN:  [id number]    Recommendations:     Discharge Recommendations:  home health PT   Discharge Equipment Recommendations: none   Barriers to discharge: None    Assessment:     Ari Santos is a 49 y.o. female admitted with a medical diagnosis of <principal problem not specified>.  She presents with the following impairments/functional limitations:  weakness, gait instability, impaired endurance, impaired balance, decreased lower extremity function, impaired sensation, impaired self care skills, impaired functional mobilty. Patient tolerated session fair today. He was extremely anxious and required a lot of reassuring and therapeutic listening regarding his PMH, recent surgery, and overall functional mobility. He would benefit from a psych consult and is agreeable to speaking with an MD. Patient would continue to benefit from skilled PT services while in the hospital. At this time,upon D/C patient is a good candidate for HHPT in order to progress to a greater level of functional independence.     Rehab Prognosis: Good; patient would benefit from acute skilled PT services to address these deficits and reach maximum level of function.    Recent Surgery: Procedure(s) (LRB):  LAMINECTOMY, SPINE, LUMBAR, FOR DECOMPRESSION  [facility]    Plan:     During this hospitalization, patient to be seen 4 x/week to address the identified rehab impairments via gait training, therapeutic activities, therapeutic exercises and progress toward the following goals:    · Plan of Care Expires:  10/15/19    Subjective     Chief Complaint: BLE numbness  Patient/Family Comments/goals: "I'm just so anxious. I have PTSD. I would rather a female RN. I'm just really freaking out right now.  Pain/Comfort:  · Pain Rating 1: 0/10  · Pain Rating Post-Intervention 1: 0/10      Objective:     Communicated with RN prior to " session.  Patient found up in chair with peripheral IV, telemetry, hemovac, wound vac upon PT entry to room.     General Precautions: Standard, fall   Orthopedic Precautions:spinal precautions   Braces: N/A     Functional Mobility:  · Bed Mobility:     · Scooting: contact guard assistance  · Transfers:     · Sit to Stand:  minimum assistance with rollator  · Gait   · Assisted patient from bathroom upon entry with Safia and rollator; verbal cues to improve upright posture   · Balance: static standing - CGA; dyanmic standing - Safia      AM-PAC 6 CLICK MOBILITY  Turning over in bed (including adjusting bedclothes, sheets and blankets)?: 3  Sitting down on and standing up from a chair with arms (e.g., wheelchair, bedside commode, etc.): 3  Moving from lying on back to sitting on the side of the bed?: 2  Moving to and from a bed to a chair (including a wheelchair)?: 3  Need to walk in hospital room?: 3  Climbing 3-5 steps with a railing?: 2  Basic Mobility Total Score: 16       Therapeutic Activities and Exercises:  -Patient educated on the continued role and goal of PT  -Questions and concerns answered within the the PT scope of practice.   -White board updated in patient room to reflect level of assistance needed with nursing.     Patient left up in chair with all lines intact, call button in reach and RN notified..    GOALS:   Multidisciplinary Problems     Physical Therapy Goals        Problem: Physical Therapy Goal    Goal Priority Disciplines Outcome Goal Variances Interventions   Physical Therapy Goal     PT, PT/OT Ongoing (interventions implemented as appropriate)     Description:  Goals to be met by: 19    Patient will increase functional independence with mobility by performin. Supine to sit with Stand-by Assistance  2. Sit to supine with Stand-by Assistance  3. Sit to stand transfer with Stand-by Assistance  4. Gait  x 150 feet with Stand-by Assistance using Rollator.                       Time  Tracking:     PT Received On: 09/16/19  PT Start Time: 0907; 1447      PT Stop Time: 0923; 1522  PT Total Time (min): 16 min + 35 min = 51      Billable Minutes: Gait Training 10 and Therapeutic Activity 41    Treatment Type: Treatment  PT/PTA: PT     PTA Visit Number: 0     Dayan Sweeney, PT  09/16/2019

## 2019-09-16 NOTE — PROGRESS NOTES
"[facility]  Neurosurgery  Progress Note    Subjective:     History of Present Illness:   Ari Santos is a 49 y.o. transgender male (birth assigned female) with below listed PMH, who presents for L1-S1 laminectomy for lumbar spinal stenosis. Pain resides along low back and radiates into the buttocks down the backs of his legs to his calves.  He has numbness and tingling in his legs down to his feet involving the entirety of the feet which makes walking difficult.  He also reports bilateral knee pain and bowing of his legs for which he sees Dr. Andrade of Orthopedics.  He has congenital adrenal hyperplasia which was diagnosed as an infant for which he takes daily steroids and can not stop.  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 RLE is shorter than LLE.    Past Medical History:   Diagnosis Date    Adrenal insufficiency     Arthritis     Congenital adrenal hyperplasia     Hyperlipidemia     Spinal stenosis        Post-Op Info:  Procedure(s) (LRB):  LAMINECTOMY, SPINE, LUMBAR, FOR DECOMPRESSION  [facility]   3 Days Post-Op     Interval History:   NAEON. Patient sitting in chair, in no apparent distress. Wife at bedside. Patient reports severe pain especially with movement. Patient also concerned because while walking yesterday, he experienced a sharp radicular pain into his buttock and leg. He is worried that he has done something to "mess things up". He also reports continued numbness in his toes which is unchanged. Patient also reports abdominal discomfort because he has not had a BM since surgery. Reports flatus. Denies abdominal pain or cramping. States he feels "full". Denies any new LE paresthesias or weakness. Tolerating diet. Voiding appropriately.     Medications:  Continuous Infusions:  Scheduled Meds:   ascorbic acid (vitamin C)  250 mg Oral TID    atorvastatin  10 mg Oral " QHS    cetirizine  10 mg Oral Daily    famotidine  20 mg Oral BID    ferrous sulfate  325 mg Oral TID    gabapentin  800 mg Oral TID    heparin (porcine)  5,000 Units Subcutaneous Q8H    hydrocortisone sodium succinate  25 mg Intravenous Q12H    lamoTRIgine  200 mg Oral BID    mupirocin  1 g Nasal BID    polyethylene glycol  17 g Oral Daily    [START ON 9/17/2019] predniSONE  5 mg Oral Daily    rOPINIRole  4 mg Oral Nightly    senna-docusate 8.6-50 mg  1 tablet Oral BID    vancomycin (VANCOCIN) IVPB  15 mg/kg Intravenous Q12H     PRN Meds:acetaminophen, ALPRAZolam, aluminum-magnesium hydroxide-simethicone, bisacodyl, diazePAM, HYDROcodone-acetaminophen, influenza, morphine, ondansetron, oxyCODONE, promethazine (PHENERGAN) IVPB, ramelteon     Review of Systems  Objective:     Weight: 74.8 kg (164 lb 14.5 oz)  Body mass index is 32.21 kg/m².  Vital Signs (Most Recent):  Temp: 97.9 °F (36.6 °C) (09/16/19 1152)  Pulse: 104 (09/16/19 1152)  Resp: 16 (09/16/19 1152)  BP: (!) 93/56 (09/16/19 1100)  SpO2: 98 % (09/16/19 1152) Vital Signs (24h Range):  Temp:  [97.6 °F (36.4 °C)-99.2 °F (37.3 °C)] 97.9 °F (36.6 °C)  Pulse:  [] 104  Resp:  [16-18] 16  SpO2:  [94 %-98 %] 98 %  BP: ()/(52-66) 93/56                          Closed/Suction Drain 09/13/19 1717 Right Back Accordion 10 Fr. (Active)   Site Description Healing 9/14/2019 10:00 AM   Dressing Type Transparent 9/14/2019 10:00 AM   Dressing Status Clean;Dry;Intact 9/14/2019 10:00 AM   Drainage Bloody 9/14/2019 10:00 AM   Status Other (Comment) 9/14/2019 10:00 AM   Output (mL) 15 mL 9/16/2019  6:00 AM       Neurosurgery Physical Exam   General: well developed, well nourished, anxious  Head: normocephalic, atraumatic  Neurologic: Alert and oriented. Thought content appropriate.  GCS: Motor: 6/Verbal: 5/Eyes: 4 GCS Total: 15  Mental Status: Awake, Alert, Oriented x 4  Language: No aphasia  Speech: No dysarthria  Cranial nerves: face symmetric, tongue  midline, CN II-XII grossly intact.   Eyes: pupils equal, round, reactive to light with accomodation, EOMI.   Pulmonary: normal respirations, no signs of respiratory distress  Abdomen: soft, non-distended, not tender to palpation  Skin: Skin is warm, dry and intact.  Sensory: intact to light touch throughout except decreased in left calf    Motor Strength: Moves all extremities spontaneously with good tone. No abnormal movements seen. LE exam pain limited.     Strength  Deltoids Triceps Biceps Wrist Extension Wrist Flexion Hand    Upper: R 5/5 5/5 5/5 5/5 5/5 5/5    L 5/5 5/5 5/5 5/5 5/5 5/5     Iliopsoas Quadriceps Knee  Flexion Tibialis  anterior Gastro- cnemius EHL   Lower: R 4/5 5-/5 4+/5 5/5 5/5 5/5    L 4/5 5-/5 4+/5 5/5 5/5 5/5     Moore's: Negative.  Clonus: Negative.  Extremities: Leg length discrepancy. Varus deformity bilateral knees.        Incision:  Clean, dry, staples intact. Skin edges well approximated. No surrounding erythema or edema. No drainage or TTP. HV drain in place.         Significant Labs:  Recent Labs   Lab 09/15/19  0633 09/16/19  0554   GLU 96 97    142   K 4.1 3.9    108   CO2 24 24   BUN 4* 6   CREATININE 0.7 0.6   CALCIUM 8.9 8.6*     Recent Labs   Lab 09/15/19  0633 09/16/19  0554   WBC 7.98 6.54   HGB 9.0* 7.9*   HCT 27.9* 25.5*    263         Assessment/Plan:     Lumbar stenosis with neurogenic claudication  49 y.o. transgender male who presents with lumbar stenosis now s/p open L1-S1 laminectomy on 7/13/19:    -Patient neurologically stable on exam  -No brace needed  -HV drain output: 125 mL. Will continue at this time. Abx while in place.   -Prevena incisional vac without suction. Removed without complication. Incision looks good. Will leave vac off. Bacitracin to incision BID.     -Pain: Increase Oxy to 7.5 mg PRN. DC Robaxin and begin Valium 5 mg PRN muscle spasms. Decrease Morphine to 2 mg q 4 hours PRN breakthrough pain. Continue Neurontin 800 mg TID  and Lamictal 200mg qHS.   -A long discussion regarding realistic pain expectations was had with patient and wife. Explained to him that it is only POD 3 and he is already able to stand and walk for several mins before experiencing any LE pain or paresthesias. He voiced agreement. Informed him that due to his narcotics use pre op, it is harder to control his pain post op. He voiced understanding. Discussed with him the side effects of narcotics including constipation and bowel obstructions. He voiced understanding. Also reminded him that he just had surgery and it is impossible to make him pain free. Reassured him that with time, his post operative pain would improve. He voiced understanding.    -Congenital adrenal hyperplasia: Will continue with steroid taper per Endo. Currently on POD 3.  - POD3: Hydrocortisone 25 mg IV q12h  - POD4: Resume home dose of prednisone of 5 mg daily  - Follow up with Endocrinology in Nov 2019    -Acute blood loss anemia: H&H 7.9/25.5. Begin iron TID for replacement. Will monitor for increased constipation.   -HLD: Continue home dose of Atorvastatin  -RLS: Continue home dose of Ropinirole 4 mg qHS  -DVT prophylaxis: ADRIEN's, SCD's, SQH  -Bowel regimen: Given Mag citrate x 1 this AM for constipation. Begin senna BID, miralax daily, and suppository daily PRN. Encouraged mobility to help with BM.   -Atelectasis prevention: IS hourly while awake, PT/OT, OOB > 6 hours per day      DISPO: Home with HH pending drain output. Currently, drain output remains to high to remove. Plan discussed with patient and wife.       More than 60 mins was spent with patient and wife answering questions, reviewing recovery process, discussing dispo and pain control, etc. All of their questions were answered prior to leaving the room.     Discussed with Dr. Peng  Please call with any questions      Noreen Pastrana PA-C   Neurosurgery   Pager: [phone]

## 2019-09-17 VITALS
SYSTOLIC BLOOD PRESSURE: 113 MMHG | HEIGHT: 60 IN | OXYGEN SATURATION: 100 % | RESPIRATION RATE: 20 BRPM | WEIGHT: 163 LBS | HEART RATE: 103 BPM | DIASTOLIC BLOOD PRESSURE: 82 MMHG | BODY MASS INDEX: 32 KG/M2 | TEMPERATURE: 99 F

## 2019-09-17 LAB
ALBUMIN SERPL BCP-MCNC: 2.8 G/DL (ref 3.5–5.2)
ALP SERPL-CCNC: 334 U/L (ref 55–135)
ALT SERPL W/O P-5'-P-CCNC: 51 U/L (ref 10–44)
ANION GAP SERPL CALC-SCNC: 12 MMOL/L (ref 8–16)
AST SERPL-CCNC: 71 U/L (ref 10–40)
BASOPHILS # BLD AUTO: 0.03 K/UL (ref 0–0.2)
BASOPHILS NFR BLD: 0.4 % (ref 0–1.9)
BILIRUB SERPL-MCNC: 0.4 MG/DL (ref 0.1–1)
BLD PROD TYP BPU: NORMAL
BLD PROD TYP BPU: NORMAL
BLOOD UNIT EXPIRATION DATE: NORMAL
BLOOD UNIT EXPIRATION DATE: NORMAL
BLOOD UNIT TYPE CODE: 6200
BLOOD UNIT TYPE CODE: 6200
BLOOD UNIT TYPE: NORMAL
BLOOD UNIT TYPE: NORMAL
BUN SERPL-MCNC: 7 MG/DL (ref 6–20)
CALCIUM SERPL-MCNC: 8.9 MG/DL (ref 8.7–10.5)
CHLORIDE SERPL-SCNC: 102 MMOL/L (ref 95–110)
CO2 SERPL-SCNC: 25 MMOL/L (ref 23–29)
CODING SYSTEM: NORMAL
CODING SYSTEM: NORMAL
CREAT SERPL-MCNC: 0.6 MG/DL (ref 0.5–1.4)
DIFFERENTIAL METHOD: ABNORMAL
DISPENSE STATUS: NORMAL
DISPENSE STATUS: NORMAL
EOSINOPHIL # BLD AUTO: 0.1 K/UL (ref 0–0.5)
EOSINOPHIL NFR BLD: 1.2 % (ref 0–8)
ERYTHROCYTE [DISTWIDTH] IN BLOOD BY AUTOMATED COUNT: 12.8 % (ref 11.5–14.5)
EST. GFR  (AFRICAN AMERICAN): >60 ML/MIN/1.73 M^2
EST. GFR  (NON AFRICAN AMERICAN): >60 ML/MIN/1.73 M^2
GLUCOSE SERPL-MCNC: 91 MG/DL (ref 70–110)
HCT VFR BLD AUTO: 25.9 % (ref 37–48.5)
HGB BLD-MCNC: 8.1 G/DL (ref 12–16)
IMM GRANULOCYTES # BLD AUTO: 0.02 K/UL (ref 0–0.04)
IMM GRANULOCYTES NFR BLD AUTO: 0.3 % (ref 0–0.5)
LYMPHOCYTES # BLD AUTO: 1.6 K/UL (ref 1–4.8)
LYMPHOCYTES NFR BLD: 22.8 % (ref 18–48)
MCH RBC QN AUTO: 31.5 PG (ref 27–31)
MCHC RBC AUTO-ENTMCNC: 31.3 G/DL (ref 32–36)
MCV RBC AUTO: 101 FL (ref 82–98)
MONOCYTES # BLD AUTO: 0.4 K/UL (ref 0.3–1)
MONOCYTES NFR BLD: 6.4 % (ref 4–15)
NEUTROPHILS # BLD AUTO: 4.7 K/UL (ref 1.8–7.7)
NEUTROPHILS NFR BLD: 68.9 % (ref 38–73)
NRBC BLD-RTO: 0 /100 WBC
PLATELET # BLD AUTO: 295 K/UL (ref 150–350)
PMV BLD AUTO: 10.3 FL (ref 9.2–12.9)
POTASSIUM SERPL-SCNC: 3.8 MMOL/L (ref 3.5–5.1)
PROT SERPL-MCNC: 5.5 G/DL (ref 6–8.4)
RBC # BLD AUTO: 2.57 M/UL (ref 4–5.4)
SODIUM SERPL-SCNC: 139 MMOL/L (ref 136–145)
TRANS ERYTHROCYTES VOL PATIENT: NORMAL ML
TRANS ERYTHROCYTES VOL PATIENT: NORMAL ML
WBC # BLD AUTO: 6.83 K/UL (ref 3.9–12.7)

## 2019-09-17 PROCEDURE — 25000003 PHARM REV CODE 250: Performed by: NEUROLOGICAL SURGERY

## 2019-09-17 PROCEDURE — 90686 IIV4 VACC NO PRSV 0.5 ML IM: CPT | Performed by: NEUROLOGICAL SURGERY

## 2019-09-17 PROCEDURE — 63600175 PHARM REV CODE 636 W HCPCS: Performed by: PHYSICIAN ASSISTANT

## 2019-09-17 PROCEDURE — 63600175 PHARM REV CODE 636 W HCPCS: Performed by: NEUROLOGICAL SURGERY

## 2019-09-17 PROCEDURE — 99024 PR POST-OP FOLLOW-UP VISIT: ICD-10-PCS | Mod: POP,,, | Performed by: PHYSICIAN ASSISTANT

## 2019-09-17 PROCEDURE — 25000003 PHARM REV CODE 250: Performed by: PHYSICIAN ASSISTANT

## 2019-09-17 PROCEDURE — 99024 POSTOP FOLLOW-UP VISIT: CPT | Mod: POP,,, | Performed by: PHYSICIAN ASSISTANT

## 2019-09-17 PROCEDURE — 25000003 PHARM REV CODE 250: Performed by: STUDENT IN AN ORGANIZED HEALTH CARE EDUCATION/TRAINING PROGRAM

## 2019-09-17 PROCEDURE — 90471 IMMUNIZATION ADMIN: CPT | Performed by: NEUROLOGICAL SURGERY

## 2019-09-17 PROCEDURE — 63600175 PHARM REV CODE 636 W HCPCS: Performed by: STUDENT IN AN ORGANIZED HEALTH CARE EDUCATION/TRAINING PROGRAM

## 2019-09-17 PROCEDURE — 80053 COMPREHEN METABOLIC PANEL: CPT

## 2019-09-17 PROCEDURE — G0008 ADMIN INFLUENZA VIRUS VAC: HCPCS | Performed by: NEUROLOGICAL SURGERY

## 2019-09-17 PROCEDURE — 36415 COLL VENOUS BLD VENIPUNCTURE: CPT

## 2019-09-17 PROCEDURE — 85025 COMPLETE CBC W/AUTO DIFF WBC: CPT

## 2019-09-17 RX ORDER — OXYCODONE AND ACETAMINOPHEN 5; 325 MG/1; MG/1
1 TABLET ORAL EVERY 4 HOURS PRN
Status: DISCONTINUED | OUTPATIENT
Start: 2019-09-17 | End: 2019-09-17 | Stop reason: HOSPADM

## 2019-09-17 RX ORDER — CYCLOBENZAPRINE HCL 5 MG
5 TABLET ORAL 3 TIMES DAILY
Qty: 60 TABLET | Refills: 0 | Status: SHIPPED | OUTPATIENT
Start: 2019-09-17 | End: 2019-09-26

## 2019-09-17 RX ORDER — OXYCODONE AND ACETAMINOPHEN 10; 325 MG/1; MG/1
1 TABLET ORAL EVERY 4 HOURS PRN
Status: DISCONTINUED | OUTPATIENT
Start: 2019-09-17 | End: 2019-09-17 | Stop reason: HOSPADM

## 2019-09-17 RX ORDER — OXYCODONE AND ACETAMINOPHEN 10; 325 MG/1; MG/1
1 TABLET ORAL EVERY 6 HOURS PRN
Qty: 60 TABLET | Refills: 0 | Status: SHIPPED | OUTPATIENT
Start: 2019-09-17 | End: 2019-10-23 | Stop reason: SDUPTHER

## 2019-09-17 RX ORDER — CYCLOBENZAPRINE HCL 5 MG
5 TABLET ORAL 3 TIMES DAILY
Status: DISCONTINUED | OUTPATIENT
Start: 2019-09-17 | End: 2019-09-17 | Stop reason: HOSPADM

## 2019-09-17 RX ORDER — FERROUS SULFATE 325(65) MG
325 TABLET, DELAYED RELEASE (ENTERIC COATED) ORAL 3 TIMES DAILY
Refills: 0 | COMMUNITY
Start: 2019-09-17 | End: 2019-10-01

## 2019-09-17 RX ORDER — MORPHINE SULFATE 15 MG/1
15 TABLET, FILM COATED, EXTENDED RELEASE ORAL EVERY 12 HOURS
Qty: 20 TABLET | Refills: 0 | Status: SHIPPED | OUTPATIENT
Start: 2019-09-17 | End: 2019-09-27

## 2019-09-17 RX ORDER — FAMOTIDINE 20 MG/1
20 TABLET, FILM COATED ORAL 2 TIMES DAILY
Qty: 60 TABLET | Refills: 11 | Status: SHIPPED | OUTPATIENT
Start: 2019-09-17 | End: 2020-01-09 | Stop reason: CLARIF

## 2019-09-17 RX ADMIN — ALPRAZOLAM 1 MG: 0.25 TABLET ORAL at 05:09

## 2019-09-17 RX ADMIN — HEPARIN SODIUM 5000 UNITS: 5000 INJECTION, SOLUTION INTRAVENOUS; SUBCUTANEOUS at 05:09

## 2019-09-17 RX ADMIN — OXYCODONE HYDROCHLORIDE AND ACETAMINOPHEN 1 TABLET: 5; 325 TABLET ORAL at 09:09

## 2019-09-17 RX ADMIN — FAMOTIDINE 20 MG: 20 TABLET, FILM COATED ORAL at 09:09

## 2019-09-17 RX ADMIN — LAMOTRIGINE 200 MG: 100 TABLET ORAL at 09:09

## 2019-09-17 RX ADMIN — VANCOMYCIN HYDROCHLORIDE 1000 MG: 1 INJECTION, POWDER, LYOPHILIZED, FOR SOLUTION INTRAVENOUS at 05:09

## 2019-09-17 RX ADMIN — CETIRIZINE HYDROCHLORIDE 10 MG: 5 TABLET ORAL at 09:09

## 2019-09-17 RX ADMIN — DIAZEPAM 5 MG: 5 TABLET ORAL at 12:09

## 2019-09-17 RX ADMIN — CYCLOBENZAPRINE HYDROCHLORIDE 5 MG: 5 TABLET, FILM COATED ORAL at 09:09

## 2019-09-17 RX ADMIN — OXYCODONE HYDROCHLORIDE AND ACETAMINOPHEN 1 TABLET: 10; 325 TABLET ORAL at 01:09

## 2019-09-17 RX ADMIN — PREDNISONE 5 MG: 5 TABLET ORAL at 09:09

## 2019-09-17 RX ADMIN — Medication 250 MG: at 09:09

## 2019-09-17 RX ADMIN — FERROUS SULFATE TAB EC 325 MG (65 MG FE EQUIVALENT) 325 MG: 325 (65 FE) TABLET DELAYED RESPONSE at 09:09

## 2019-09-17 RX ADMIN — GABAPENTIN 800 MG: 400 CAPSULE ORAL at 09:09

## 2019-09-17 RX ADMIN — INFLUENZA VIRUS VACCINE 0.5 ML: 15; 15; 15; 15 SUSPENSION INTRAMUSCULAR at 01:09

## 2019-09-17 RX ADMIN — OXYCODONE AND ACETAMINOPHEN 1 TABLET: 7.5; 325 TABLET ORAL at 02:09

## 2019-09-17 NOTE — DISCHARGE INSTRUCTIONS
Neurosurgery Patient Information. Please follow ONLY the instructions that are checked below.    Activity Restrictions:  [x]  Return to work will be determined on an individual basis.  [x]  No lifting greater than 5-10 pounds.  [x]  Avoid bending and twisting the area of your surgery more than 45 degrees from neutral position in any direction.  [x]  No driving or operating machinery:  [x]  until cleared by your surgeon.  [x]  while taking narcotic pain medications or muscle relaxants.  [x]  Wear your brace at all times except when lying in bed, showering, using the restroom, or performing hygiene tasks.   [x]  Increase ambulation over the next 2 weeks so that you are walking 2 miles per day at 2 weeks post-operatively.  [x]  Walk on paved surfaces only. It is okay to walk up and down stairs while holding onto a side rail.  [x]  No sexual activity for 6 weeks.    Discharge Medication/Follow-up:  [x]  Please refer to discharge medication reconciliation form.  [x]  Do not take ANY Aspirin or Aspirin containing products for 2 weeks after surgery.   [x]  Do not take ANY herbal supplements for 2 weeks after surgery.    [x]  Do not take ANY non-steroidal anti-inflammatory drugs (NSAIDS), including the following: ibuprofen, naprosyn, Aleve, Advil, Indocin, Mobic, or Celebrex for 6 weeks after surgery.   [x]  Prescriptions for appropriate medication will be given upon discharge.  [x]  Take docusate (Colace 100 mg): take one capsule a day as needed for constipation. You can get this over the counter.  [x]  Follow-up appointment:  [x]  10-14 days post-op for wound check by physician assistant/nurse  [x]  4-6 weeks with MD: Dr. Peng  [x]  An appointment will be mailed to you.    [x]  Follow up appointment with Endocrinology Nov 2019    Wound Care:  [x]  No bandage required. Keep your incision open to the air.  [x]  You may shower on the 2nd day after your surgery. Keep the incision clean and dry at all times. Please cover the  incision while showering and REMOVE once you have completed taking your shower. Do not allow the force of water to hit the incision. If the incision gets damp, pat it dry. Do not rub or scrub the incision.  [x]  You cannot take a bath until 8 weeks after surgery.      Call your doctor or go to the Emergency Room for any signs of infection, including: increased redness, drainage, pain, or fever (temperature ?101.5 for 24 hours). Call your doctor or go to the Emergency Room if there are any localized neurological changes; problems with speech, vision, numbness, tingling, weakness, or severe headache; or for other concerns.    Special Instructions:  [x]  No use of tobacco products.  [x]  Diet: Please eat a regular diet as tolerated.  [x]  Once discharged, please resume your home dose of prednisone 5 mg daily per Endocrinology.  [x]  Please take over the counter Iron, three times per day, for 14 days then stop. This can cause constipation so please continue your home bowel regimen.       Physicians need 3 days' notice for pain medicine to be refilled. Pain medicine will only be refilled between 8 AM and 5 PM, Monday through Friday, due to Food and Drug Administration regulation of documentation.    If you have any questions about this form, please call [phone].    Form No. 16808 (Revised 10/31/2013)

## 2019-09-17 NOTE — PLAN OF CARE
Problem: Adult Inpatient Plan of Care  Goal: Plan of Care Review  Outcome: Ongoing (interventions implemented as appropriate)  POC reviewed with pt who verbalized understanding. AAOx4. VSS on RA. Remains free of falls and injury. Pt has extremely high anxiety about everything - needs encouragement/coaching a lot. Back incision intact with staples. R back hemovac in place. Up with assist/walker to bathroom; BM x2 this shift. Pain and muscle spasms treated with PRN meds per MAR. No acute events. Resting between care. Will continue to monitor.

## 2019-09-17 NOTE — PROGRESS NOTES
AVS printed and reviewed with patient and spouse at bedside. All LDAs, drains removed at this time. Care plans and education resolved for discharge. All prescriptions and  medications delivered to bedside. All follow up appointment and medications reviewed and understood by patient and spouse. Transport requested. All personal belongings gathered by patient & spouse. Patient successfully discharged to home via wheelchair.

## 2019-09-17 NOTE — PLAN OF CARE
[facility] accepted the patient.      09/17/19 1202   Post-Acute Status   Post-Acute Authorization Home Health/Hospice   Home Health/Hospice Status Set-up Complete     Ale Argueta LMSW  [facility]  [id number]

## 2019-09-17 NOTE — SUBJECTIVE & OBJECTIVE
Interval History:   NAEON. Patient sitting comfortably in the chair. Wife at bedside. Patient states that pain has greatly improved. Numbness in foot is unchanged. Denies any new weakness, bladder/bowel dysfunction, or new concerns. Tolerating diet. Voiding appropriately.     Medications:  Continuous Infusions:  Scheduled Meds:   ascorbic acid (vitamin C)  250 mg Oral TID    atorvastatin  10 mg Oral QHS    cetirizine  10 mg Oral Daily    cyclobenzaprine  5 mg Oral TID    famotidine  20 mg Oral BID    ferrous sulfate  325 mg Oral TID    gabapentin  800 mg Oral TID    heparin (porcine)  5,000 Units Subcutaneous Q8H    lamoTRIgine  200 mg Oral BID    mupirocin  1 g Nasal BID    polyethylene glycol  17 g Oral Daily    predniSONE  5 mg Oral Daily    rOPINIRole  4 mg Oral Nightly    senna-docusate 8.6-50 mg  1 tablet Oral BID     PRN Meds:acetaminophen, ALPRAZolam, aluminum-magnesium hydroxide-simethicone, bisacodyl, morphine, ondansetron, oxyCODONE-acetaminophen, oxyCODONE-acetaminophen, promethazine (PHENERGAN) IVPB     Review of Systems  Objective:     Weight: 73.9 kg (163 lb)  Body mass index is 31.83 kg/m².  Vital Signs (Most Recent):  Temp: 98.6 °F (37 °C) (09/17/19 0801)  Pulse: 103 (09/17/19 0801)  Resp: 20 (09/17/19 0801)  BP: 113/82 (09/17/19 0801)  SpO2: 100 % (09/17/19 0801) Vital Signs (24h Range):  Temp:  [97.8 °F (36.6 °C)-98.8 °F (37.1 °C)] 98.6 °F (37 °C)  Pulse:  [] 103  Resp:  [16-20] 20  SpO2:  [95 %-100 %] 100 %  BP: (108-132)/(55-82) 113/82       Neurosurgery Physical Exam   General: well developed, well nourished, anxious  Head: normocephalic, atraumatic  Neurologic: Alert and oriented. Thought content appropriate.  GCS: Motor: 6/Verbal: 5/Eyes: 4 GCS Total: 15  Mental Status: Awake, Alert, Oriented x 4  Language: No aphasia  Speech: No dysarthria  Cranial nerves: face symmetric, tongue midline, CN II-XII grossly intact.   Eyes: pupils equal, round, reactive to light with  accomodation, EOMI.   Pulmonary: normal respirations, no signs of respiratory distress  Abdomen: soft, non-distended, not tender to palpation  Skin: Skin is warm, dry and intact.  Sensory: intact to light touch throughout except decreased in left calf     Motor Strength: Moves all extremities spontaneously with good tone. No abnormal movements seen. LE exam pain limited.      Strength   Deltoids Triceps Biceps Wrist Extension Wrist Flexion Hand    Upper: R 5/5 5/5 5/5 5/5 5/5 5/5     L 5/5 5/5 5/5 5/5 5/5 5/5       Iliopsoas Quadriceps Knee  Flexion Tibialis  anterior Gastro- cnemius EHL   Lower: R 4+/5 5-/5 4+/5 5/5 5/5 5/5     L 4+/5 5-/5 4+/5 5/5 5/5 5/5      Moore's: Negative.  Clonus: Negative.  Extremities: Leg length discrepancy. Varus deformity bilateral knees.         Incision:  Clean, dry, staples intact. Skin edges well approximated. No surrounding erythema or edema. No drainage or TTP. HV drain in place.      Significant Labs:  Recent Labs   Lab 09/16/19  0554 09/17/19  0437   GLU 97 91    139   K 3.9 3.8    102   CO2 24 25   BUN 6 7   CREATININE 0.6 0.6   CALCIUM 8.6* 8.9     Recent Labs   Lab 09/16/19  0554 09/17/19  0437   WBC 6.54 6.83   HGB 7.9* 8.1*   HCT 25.5* 25.9*    295

## 2019-09-17 NOTE — DISCHARGE SUMMARY
[facility]  Neurosurgery  Discharge Summary      Patient Name: Ari Santos  MRN: [id number]  Admission Date: 9/13/2019  Hospital Length of Stay: 1 days  Discharge Date and Time: 9/17/2019  3:27 PM  Attending Physician: No att. providers found   Discharging Provider: DUC Jimenez  Primary Care Provider: Siva Mitchell MD    HPI:   Ari Santos is a 49 y.o. transgender male (birth assigned female) with below listed PMH, who presents for L1-S1 laminectomy for lumbar spinal stenosis. Pain resides along low back and radiates into the buttocks down the backs of his legs to his calves.  He has numbness and tingling in his legs down to his feet involving the entirety of the feet which makes walking difficult.  He also reports bilateral knee pain and bowing of his legs for which he sees Dr. Andrade of Orthopedics.  He has congenital adrenal hyperplasia which was diagnosed as an infant for which he takes daily steroids and can not stop.  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 RLE is shorter than LLE.    Past Medical History:   Diagnosis Date    Adrenal insufficiency     Arthritis     Congenital adrenal hyperplasia     Hyperlipidemia     Spinal stenosis          Date of Operation: 9/13/19     Pre-Operative Diagnosis:   1.  Multi-level lumbar stenosis from L1-S1  2. Severe DDD  3. Neurogenic claudication  4. Chronic low back and leg pain  5. Chronic steroid use  6. Congenital adrenal hyperplasia     Post-Operative Diagnosis:   1.  Multi-level lumbar stenosis from L1-S1  2. Severe DDD  3. Neurogenic claudication  4. Chronic low back and leg pain  5. Chronic steroid use  6. Congenital adrenal hyperplasia     Operation Titles:  1. Bilateral L1, L2, L3, L4, L5, and S1 laminectomies, medial facetectomies and foraminotomies for decompression   2. Use of intraoperative fluoroscopy  3. Placement of Provena wound vac     Surgeon:  Silas Peng D.O.        Hospital Course:  9/13: OR for elective L1-S1 open decompression. No intra op complications. Patient tolerated procedure well. Recovered in PACU.   9/14: ALFRED, pt reports pain well controlled. Pt reports a history of PTSD for which he sees psych, but does not have an appointment until January and would like to see them sooner. HV drain output: 200 mL - continue.   9/15: Pt reports significant pain when OOB, primarily muscle spasm. HV drain output: 35 mL - continue. PT/OT recommending HH.  9/16: Continue reports of significant pain. DC Robaxin, begin Valium PRN muscle spasms. Prevena incisional vac removed without complication. HV drain output: 125 mL - continue. Begin abx while drain in place. H&H 7.9/25.5. Begin iron for replacement.   9/17: CANDICEEON. Pain much better controlled with new regimen. Drain output undocumented yesterday afternoon but has been 10 mL since midnight. Drain removed without complication. Patient tolerated removal well. Patient requesting to go home. DC home with home health. Follow up in Neurosurgery clinic in 2 weeks for a wound check and in 6 weeks with imaging. Discharge instructions given verbally to patient and wife. All of their questions were answered. They were encouraged to call the clinic with any questions or concerns prior to follow up appt.        Consults: PT, OT, Social work  Consults (From admission, onward)        Status Ordering Provider     Inpatient consult to Endocrinology  Once     Provider:  (Not yet assigned)    Completed YOVANA CARROLL          Significant Diagnostic Studies: Labs:   BMP:   Recent Labs   Lab 09/16/19  0554 09/17/19  0437   GLU 97 91    139   K 3.9 3.8    102   CO2 24 25   BUN 6 7   CREATININE 0.6 0.6   CALCIUM 8.6* 8.9    and CBC   Recent Labs   Lab 09/16/19  0554 09/17/19  0437   WBC 6.54 6.83   HGB 7.9* 8.1*   HCT 25.5* 25.9*    295       Pending Diagnostic Studies:     None        Final Active  "Diagnoses:    Diagnosis Date Noted POA    PRINCIPAL PROBLEM:  Lumbar stenosis with neurogenic claudication [M48.062] 09/13/2019 Yes    Lumbar stenosis [M48.061] 09/16/2019 Yes    Congenital adrenal hyperplasia [E25.0] 03/19/2019 Yes      Problems Resolved During this Admission:      Discharged Condition: good    Disposition: Home-Health Care [facility]    Follow Up: Follow up in Neurosurgery clinic in 2 weeks for a wound check and in 6 weeks, no imaging.     Patient Instructions:   See the patient instruction tab for detailed discharge instructions and follow up information.        WALKER FOR HOME USE   Order Comments: Patient has a borrowed walker.  Needs a rolling walker.     Order Specific Question Answer Comments   Type of Walker: Guillermo (4'4"-5'7")    With wheels? Yes    Height: 5' (1.524 m)    Weight: 73.9 kg (163 lb)    Length of need (1-99 months): 99    Does patient have medical equipment at home? walker, rolling rollator / rollator   Does patient have medical equipment at home? bath bench    Please check all that apply: Patient is unable to safely ambulate without equipment.    Vendor: [facility]    Expected Date of Delivery: 9/17/2019      Ambulatory referral to Home Health   Referral Priority: Routine Referral Type: Home Health   Referral Reason: Specialty Services Required   Requested Specialty: Home Health Services   Number of Visits Requested: 1     Notify your health care provider if you experience any of the following:  temperature >100.4     Notify your health care provider if you experience any of the following:  persistent nausea and vomiting or diarrhea     Notify your health care provider if you experience any of the following:  severe uncontrolled pain     Notify your health care provider if you experience any of the following:  redness, tenderness, or signs of infection (pain, swelling, redness, odor or green/yellow discharge around incision site)     Notify your health care provider if you " experience any of the following:  difficulty breathing or increased cough     Notify your health care provider if you experience any of the following:  severe persistent headache     Notify your health care provider if you experience any of the following:  worsening rash     Notify your health care provider if you experience any of the following:  persistent dizziness, light-headedness, or visual disturbances     Notify your health care provider if you experience any of the following:  increased confusion or weakness     No dressing needed     Activity as tolerated     Medications:  Reconciled Home Medications:      Medication List      START taking these medications    famotidine 20 MG tablet  Commonly known as:  PEPCID  Take 1 tablet (20 mg total) by mouth 2 (two) times daily.     ferrous sulfate 325 (65 FE) MG EC tablet  Take 1 tablet (325 mg total) by mouth 3 (three) times daily. for 14 days        CHANGE how you take these medications    cyclobenzaprine 5 MG tablet  Commonly known as:  FLEXERIL  Take 1 tablet (5 mg total) by mouth 3 (three) times daily.  What changed:    · medication strength  · how much to take  · additional instructions     gabapentin 800 MG tablet  Commonly known as:  NEURONTIN  1 tablet three times a day and 2 tablets at night (5 tablets a day, 4000mg)  What changed:    · how much to take  · how to take this  · additional instructions     morphine 15 MG 12 hr tablet  Commonly known as:  MS CONTIN  Take 1 tablet (15 mg total) by mouth every 12 (twelve) hours. Medically necessary for more than 7 days for 10 days  What changed:  additional instructions     oxyCODONE-acetaminophen  mg per tablet  Commonly known as:  PERCOCET  Take 1 tablet by mouth every 6 (six) hours as needed for Pain.  What changed:  when to take this     XANAX 1 MG tablet  Generic drug:  ALPRAZolam  What changed:  Another medication with the same name was removed. Continue taking this medication, and follow the  directions you see here.        CONTINUE taking these medications    ACIDOPHILUS Cap  Generic drug:  Lactobacillus acidophilus     atorvastatin 10 MG tablet  Commonly known as:  LIPITOR  Take 1 tablet (10 mg total) by mouth every evening.     CLARITIN 10 mg tablet  Generic drug:  loratadine  Take 10 mg by mouth once daily.     LaMICtal 200 MG tablet  Generic drug:  lamoTRIgine  Take 200 mg by mouth 2 (two) times daily.     nystatin cream  Commonly known as:  MYCOSTATIN  Apply topically 2 (two) times daily.     predniSONE 5 MG tablet  Commonly known as:  DELTASONE  Take 1 tablet (5 mg total) by mouth once daily. 2 tablets in the am and 1 tablet in the evening     primidone 50 MG Tab  Commonly known as:  MYSOLINE  Take 50 mg by mouth 3 (three) times daily.     REQUIP 4 MG tablet  Generic drug:  rOPINIRole  Take 4 mg by mouth nightly.     testosterone 1 % (50 mg/5 gram) Glpk  Commonly known as:  ANDROGEL  Apply 5 g topically once daily.     triamcinolone acetonide 0.1% 0.1 % cream  Commonly known as:  KENALOG  Apply topically 2 (two) times daily.     zinc 50 mg Tab        STOP taking these medications    ALEVE 220 MG tablet  Generic drug:  naproxen sodium     cranberry extract 500 mg Cap     STOOL SOFTENER-LAXATIVE ORAL            DUC Jimenez  Neurosurgery  [facility]

## 2019-09-17 NOTE — ASSESSMENT & PLAN NOTE
49 y.o. transgender male who presents with lumbar stenosis now s/p open L1-S1 laminectomy on 7/13/19:    -Patient neurologically stable on exam  -No brace needed  -HV drain output: 10 mL. Drain removed without complication. Patient tolerated removal well. DC abx.   -Pain: Continue current regimen of Oxy PRN, Flexeril PRN, Neurontin 800 mg TID, and Lamictal 200mg qHS. Will restart home dose of MS Contin x 10 days. Patient instructed to DC MS Contin after 10 days. He voiced understanding.   -Congenital adrenal hyperplasia: Will continue with steroid taper per Endo. Currently on POD 4.  - POD4: Resume home dose of prednisone of 5 mg daily  - Follow up with Endocrinology in Nov 2019  -Acute blood loss anemia: Continue iron TID x 2 weeks for replacement. Patient informed about possibility of increased constipation.   -HLD: Continue home dose of Atorvastatin  -RLS: Continue home dose of Ropinirole 4 mg qHS  -Bowel regimen: Patient reports multiple BM's last night and this morning. Reports resolution of previous abdominal discomfort. Continue home bowel regimen upon discharge.   -Atelectasis prevention: continue IS hourly while awake x 7 days, PT/OT, OOB > 6 hours per day  -DC home with home health  -Follow up in Neurosurgery clinic in 2 weeks for a wound check  -Follow up with Dr. Peng in 6 weeks. No imaging.   -Discharge instructions given verbally to patient and wife. All of their questions were answered. They were encouraged to call the clinic with any questions or concerns prior to follow up appt.

## 2019-09-17 NOTE — PT/OT/SLP PROGRESS
"Physical Therapy Treatment    Patient Name:  Ari Santos   MRN:  [id number]    Recommendations:     Discharge Recommendations:  home health PT   Discharge Equipment Recommendations: none   Barriers to discharge: None    Assessment:     Ari Santos is a 49 y.o. female admitted with a medical diagnosis of Lumbar stenosis with neurogenic claudication.  She presents with the following impairments/functional limitations:  weakness, gait instability, impaired endurance, impaired balance, decreased lower extremity function, impaired sensation, impaired functional mobilty. Patient tolerated session well today. He was in much better spirits and was eager to return home. Demonstration and explanation of safe and proper bed mobility technique with patient and wife per request with questions and concerns answered. Patient would continue to benefit from skilled PT services while in the hospital. At this time,upon D/C he is a good candidate for HHPT in order to progress towards his PLOF.     Rehab Prognosis: Good; patient would benefit from acute skilled PT services to address these deficits and reach maximum level of function.    Recent Surgery: Procedure(s) (LRB):  LAMINECTOMY, SPINE, LUMBAR, FOR DECOMPRESSION  (OPEN L1-S1 lamis) [facility] Trev frame (Bilateral) 4 Days Post-Op    Plan:     During this hospitalization, patient to be seen 4 x/week to address the identified rehab impairments via gait training, therapeutic activities, therapeutic exercises, neuromuscular re-education and progress toward the following goals:    · Plan of Care Expires:  10/15/19    Subjective     Chief Complaint: none at this time  Patient/Family Comments/goals: "I'm good today. I'm excited to go home."  Pain/Comfort:  · Pain Rating 1: 1/10  · Location - Orientation 1: generalized  · Pain Rating Post-Intervention 1: 0/10      Objective:     Communicated with RN prior to session.  Patient found up in chair with telemetry upon PT entry to " room.     General Precautions: Standard, fall   Orthopedic Precautions:spinal precautions   Braces: N/A     Functional Mobility:  · Transfers:     · Sit to Stand:  minimum assistance with rollator  · Gait: ~2x20ft with CGA and rollator; slow but steady gait pattern, verbal cues to increase upright posture   · Balance: good balance throughout; no LOB      · Detailed discussion regarding safe and proper bed mobility upon return home with patient and wife   · Both were able to correctly recall technique and feel safe performing log roll technique at home with assistance as needed       AM-PAC 6 CLICK MOBILITY  Turning over in bed (including adjusting bedclothes, sheets and blankets)?: 3  Sitting down on and standing up from a chair with arms (e.g., wheelchair, bedside commode, etc.): 3  Moving from lying on back to sitting on the side of the bed?: 3  Moving to and from a bed to a chair (including a wheelchair)?: 3  Need to walk in hospital room?: 3  Climbing 3-5 steps with a railing?: 2  Basic Mobility Total Score: 17       Therapeutic Activities and Exercises:  -Patient educated on the continued role and goal of PT  -Questions and concerns answered within the the PT scope of practice.   -White board updated in patient room to reflect level of assistance needed with nursing.     Patient left up in chair with all lines intact, call button in reach, RN notified and wife present..    GOALS:   Multidisciplinary Problems     Physical Therapy Goals     Not on file          Multidisciplinary Problems (Resolved)        Problem: Physical Therapy Goal    Goal Priority Disciplines Outcome Goal Variances Interventions   Physical Therapy Goal   (Resolved)     PT, PT/OT Outcome(s) achieved     Description:  Goals to be met by: 19    Patient will increase functional independence with mobility by performin. Supine to sit with Stand-by Assistance  2. Sit to supine with Stand-by Assistance  3. Sit to stand transfer with  Stand-by Assistance  4. Gait  x 150 feet with Stand-by Assistance using Rollator.                       Time Tracking:     PT Received On: 09/17/19  PT Start Time: 1114     PT Stop Time: 1137  PT Total Time (min): 23 min     Billable Minutes: Gait Training 10 and Therapeutic Activity 13    Treatment Type: Treatment  PT/PTA: PT     PTA Visit Number: 0     Dayan Sweeney, PT  09/17/2019

## 2019-09-17 NOTE — PLAN OF CARE
Discharge home today with home health. Pt provided with rolling walker. Family provided transportation. Neurosurgery to schedule follow up appointment.    Future Appointments   Date Time Provider Department Center   9/26/2019  3:00 PM Meseret Sterling PA-C [facility]   10/21/2019  8:40 AM Rea Jha NP [facility] SLEEP [facility]   10/23/2019  3:00 PM Rashard Taylor MD [facility] UROGYN [facility]   10/28/2019  8:40 AM Silas Peng DO [facility]   10/29/2019 10:00 AM LAB, [facility] LAB[facility]   11/5/2019  9:30 AM Delfina Alvarez MD [facility] ENDODIA [address]   11/21/2019  2:30 [facility]   1/8/2020  1:00 PM Silas Mejias Jr., MD [facility] PSYCH [address]          09/17/19 1437   Final Note   Assessment Type Final Discharge Note   Anticipated Discharge Disposition Home-Health   Hospital Follow Up  Appt(s) scheduled?   (Neurosurgery to schedule follow up appointment)   Discharge plans and expectations educations in teach back method with documentation complete? Yes

## 2019-09-17 NOTE — PLAN OF CARE
SW following for DC needs. SW in communication with Sarah SPARKS.    Patient's preference for Home Health is [facility]. SW sent to [facility] via [facility].      09/17/19 1157   Post-Acute Status   Post-Acute Authorization Placement   Post-Acute Placement Status Referrals Sent     Ale Argueta LMSW  [facility]  [id number]

## 2019-09-19 ENCOUNTER — PATIENT OUTREACH (OUTPATIENT)
Dept: ADMINISTRATIVE | Facility: CLINIC | Age: 49
End: 2019-09-19

## 2019-09-19 PROCEDURE — G0180 MD CERTIFICATION HHA PATIENT: HCPCS | Mod: ,,, | Performed by: NEUROLOGICAL SURGERY

## 2019-09-19 PROCEDURE — G0180 PR HOME HEALTH MD CERTIFICATION: ICD-10-PCS | Mod: ,,, | Performed by: NEUROLOGICAL SURGERY

## 2019-09-19 NOTE — PATIENT INSTRUCTIONS
Sepsis     To treat sepsis, antibiotics and fluids may by given through an intravenous (IV) line.     Sepsis happens when your body responds with widespread inflammation to a bad infection or bacteremia--the presence of bacteria in your bloodstream. Sepsis can be deadly. Blood pressure may drop and the lungs and kidneys may start to fail. Emergency care for sepsis is crucial.  Risk factors  Those most at risk for sepsis are:  · Infants or older adults  · People who have an illness that weakens their immune system, such as cancer, AIDS, or diabetes  · People being treated with chemotherapy medicines or radiation, which weakens the immune system  · People who have had a transplant  · People with a very severe infection such as pneumonia, meningitis, or a urinary tract infection  When to go to the emergency department (ED)  Sepsis is an emergency. Go to the nearest ED if you have a fever with any of these symptoms:  · Chills and shaking  · Rapid heartbeat and breathing  · Trouble breathing  · Severe nausea or uncontrolled vomiting  · Confusion, disorientation, drowsiness, or dizziness  · Decreased urination  · Severe pain, including in the back or joints   What to expect in the ED  · Blood and urine tests are done to look for bacteria. They also check for organ failure.  · Blood, urine, or sputum cultures may be taken. The samples are sent to a lab. They are placed in a special container. Any bacteria should grow in 24 hours.  · X-rays or other imaging tests may be done.  A person with sepsis will be admitted to the hospital and treated with antibiotics. Treatment may also include oxygen and intravenous fluids.  Date Last Reviewed: 10/1/2016  © 4757-8240 [employer]. [address] [place] [address], [place] [address]. All rights reserved. This information is not intended as a substitute for professional medical care. Always follow your healthcare professional's instructions.

## 2019-09-23 ENCOUNTER — PATIENT MESSAGE (OUTPATIENT)
Dept: NEUROSURGERY | Facility: CLINIC | Age: 49
End: 2019-09-23

## 2019-09-23 ENCOUNTER — TELEPHONE (OUTPATIENT)
Dept: NEUROSURGERY | Facility: HOSPITAL | Age: 49
End: 2019-09-23

## 2019-09-23 NOTE — TELEPHONE ENCOUNTER
Left message as f/u to patient's message stating he was having incontinence.  I replied to his Epic message and asked him to either respond to that or contact the clinic so that we could find out more info.

## 2019-09-24 ENCOUNTER — TELEPHONE (OUTPATIENT)
Dept: NEUROSURGERY | Facility: CLINIC | Age: 49
End: 2019-09-24

## 2019-09-24 RX ORDER — PREDNISONE 5 MG/1
5 TABLET ORAL DAILY
Qty: 30 TABLET | Refills: 11 | Status: SHIPPED | OUTPATIENT
Start: 2019-09-24 | End: 2020-03-05

## 2019-09-24 NOTE — TELEPHONE ENCOUNTER
Spoke with patient's wife regarding his sxs. He has some urge urinary incontinence. Normal bowel function. No new numbness. He has experienced incontinence in the past, but reports these sxs began while he was in the hospital. Pain is in a similar distribution compared to pre-op but is less frequent and more intense. It tends to come in shooting waves instead of a constant pain.     Left voicemail for patient explaining what I discussed with his wife. It is possible he has a UTI causing increased urgency. Advised to contact primary care for evaluation.     Will see pt at wound check next week. Advised to call if he would like to discuss further or if any new problems arise.         Meseret Sterling PA-C  [facility]  Department of Neurosurgery  [phone]

## 2019-09-25 DIAGNOSIS — E78.5 HYPERLIPIDEMIA, UNSPECIFIED HYPERLIPIDEMIA TYPE: ICD-10-CM

## 2019-09-25 DIAGNOSIS — M54.40 CHRONIC LOW BACK PAIN WITH SCIATICA, SCIATICA LATERALITY UNSPECIFIED, UNSPECIFIED BACK PAIN LATERALITY: Primary | ICD-10-CM

## 2019-09-25 DIAGNOSIS — G89.29 CHRONIC LOW BACK PAIN WITH SCIATICA, SCIATICA LATERALITY UNSPECIFIED, UNSPECIFIED BACK PAIN LATERALITY: Primary | ICD-10-CM

## 2019-09-25 RX ORDER — ATORVASTATIN CALCIUM 10 MG/1
10 TABLET, FILM COATED ORAL NIGHTLY
Qty: 90 TABLET | Refills: 2 | Status: SHIPPED | OUTPATIENT
Start: 2019-09-25 | End: 2020-07-06 | Stop reason: SDUPTHER

## 2019-09-25 RX ORDER — GABAPENTIN 800 MG/1
TABLET ORAL
Qty: 150 TABLET | Refills: 2 | Status: SHIPPED | OUTPATIENT
Start: 2019-09-25 | End: 2019-12-30 | Stop reason: SDUPTHER

## 2019-09-25 NOTE — TELEPHONE ENCOUNTER
Patient requesting refill on gabapentin (NEURONTIN) 800 MG tablet     Last office visit 08/21/2019     shows last refill on 08/21/2019    Patient does have a pain contract on file with [facility]    Patient does not have a UDS on file.    Please review.  Thanks

## 2019-09-26 ENCOUNTER — HOSPITAL ENCOUNTER (OUTPATIENT)
Dept: RADIOLOGY | Facility: HOSPITAL | Age: 49
Discharge: HOME | End: 2019-09-26
Attending: PHYSICIAN ASSISTANT
Payer: MEDICARE

## 2019-09-26 ENCOUNTER — OFFICE VISIT (OUTPATIENT)
Dept: NEUROSURGERY | Facility: CLINIC | Age: 49
End: 2019-09-26
Payer: MEDICARE

## 2019-09-26 ENCOUNTER — PATIENT MESSAGE (OUTPATIENT)
Dept: NEUROSURGERY | Facility: CLINIC | Age: 49
End: 2019-09-26

## 2019-09-26 VITALS
WEIGHT: 154.56 LBS | HEIGHT: 60 IN | HEART RATE: 109 BPM | RESPIRATION RATE: 20 BRPM | BODY MASS INDEX: 30.35 KG/M2 | DIASTOLIC BLOOD PRESSURE: 58 MMHG | SYSTOLIC BLOOD PRESSURE: 131 MMHG | TEMPERATURE: 100 F

## 2019-09-26 DIAGNOSIS — S72.002A CLOSED FRACTURE OF LEFT HIP, INITIAL ENCOUNTER: ICD-10-CM

## 2019-09-26 DIAGNOSIS — Z98.890 S/P LUMBAR LAMINECTOMY: ICD-10-CM

## 2019-09-26 DIAGNOSIS — Z98.1 S/P LUMBAR FUSION: ICD-10-CM
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
## 2019-09-26 DIAGNOSIS — Z98.1 S/P LUMBAR SPINAL FUSION: ICD-10-CM

## 2019-09-26 PROCEDURE — 73521 X-RAY EXAM HIPS BI 2 VIEWS: CPT | Mod: 26,,, | Performed by: RADIOLOGY

## 2019-09-26 PROCEDURE — 99024 PR POST-OP FOLLOW-UP VISIT: ICD-10-PCS | Mod: POP,,, | Performed by: PHYSICIAN ASSISTANT

## 2019-09-26 PROCEDURE — 99215 OFFICE O/P EST HI 40 MIN: CPT | Mod: PBBFAC,25 | Performed by: PHYSICIAN ASSISTANT

## 2019-09-26 PROCEDURE — 72100 X-RAY EXAM L-S SPINE 2/3 VWS: CPT | Mod: TC,FY

## 2019-09-26 PROCEDURE — 99999 PR PBB SHADOW E&M-EST. PATIENT-LVL V: CPT | Mod: PBBFAC,,, | Performed by: PHYSICIAN ASSISTANT

## 2019-09-26 PROCEDURE — 73521 XR HIPS BILATERAL 2 VIEW INCL AP PELVIS: ICD-10-PCS | Mod: 26,,, | Performed by: RADIOLOGY

## 2019-09-26 PROCEDURE — 99999 PR PBB SHADOW E&M-EST. PATIENT-LVL V: ICD-10-PCS | Mod: PBBFAC,,, | Performed by: PHYSICIAN ASSISTANT

## 2019-09-26 PROCEDURE — 99024 POSTOP FOLLOW-UP VISIT: CPT | Mod: POP,,, | Performed by: PHYSICIAN ASSISTANT

## 2019-09-26 PROCEDURE — 73521 X-RAY EXAM HIPS BI 2 VIEWS: CPT | Mod: TC,FY

## 2019-09-26 PROCEDURE — 72100 X-RAY EXAM L-S SPINE 2/3 VWS: CPT | Mod: 26,,, | Performed by: RADIOLOGY

## 2019-09-26 PROCEDURE — 72100 XR LUMBAR SPINE AP AND LATERAL: ICD-10-PCS | Mod: 26,,, | Performed by: RADIOLOGY

## 2019-09-26 NOTE — PATIENT INSTRUCTIONS
-Keep incision open to air   -FU with Dr. Cabello for pain medication   -will call/email with xray results once available.   -Can shower and get incision wet, just pat dry and no vigorous scrubbing. Do not submerge incision for another 4 weeks.   -No lifting more than 10 lbs or excessive bending/twisting.   -Follow up with Dr. Peng in 4 weeks   -will order bedside commode via [facility] [place].    Please call with any questions or concerns prior to your next appointment.

## 2019-09-27 ENCOUNTER — PATIENT MESSAGE (OUTPATIENT)
Dept: NEUROSURGERY | Facility: CLINIC | Age: 49
End: 2019-09-27

## 2019-09-27 ENCOUNTER — TELEPHONE (OUTPATIENT)
Dept: NEUROSURGERY | Facility: CLINIC | Age: 49
End: 2019-09-27

## 2019-09-27 ENCOUNTER — HOSPITAL ENCOUNTER (OUTPATIENT)
Dept: RADIOLOGY | Facility: HOSPITAL | Age: 49
Discharge: HOME | End: 2019-09-27
Attending: PHYSICIAN ASSISTANT
Payer: MEDICARE

## 2019-09-27 ENCOUNTER — HOSPITAL ENCOUNTER (EMERGENCY)
Facility: HOSPITAL | Age: 49
Discharge: HOME | End: 2019-09-27
Attending: EMERGENCY MEDICINE
Payer: MEDICARE

## 2019-09-27 ENCOUNTER — TELEPHONE (OUTPATIENT)
Dept: ORTHOPEDICS | Facility: CLINIC | Age: 49
End: 2019-09-27

## 2019-09-27 VITALS
DIASTOLIC BLOOD PRESSURE: 54 MMHG | RESPIRATION RATE: 16 BRPM | TEMPERATURE: 98 F | BODY MASS INDEX: 30.23 KG/M2 | SYSTOLIC BLOOD PRESSURE: 98 MMHG | OXYGEN SATURATION: 100 % | WEIGHT: 154 LBS | HEART RATE: 105 BPM | HEIGHT: 60 IN

## 2019-09-27 DIAGNOSIS — Z98.890 S/P LUMBAR LAMINECTOMY: Primary | ICD-10-CM

## 2019-09-27 DIAGNOSIS — S72.002A CLOSED FRACTURE OF LEFT HIP, INITIAL ENCOUNTER: ICD-10-CM

## 2019-09-27 DIAGNOSIS — N39.0 URINARY TRACT INFECTION WITH HEMATURIA, SITE UNSPECIFIED: ICD-10-CM

## 2019-09-27 DIAGNOSIS — R31.9 URINARY TRACT INFECTION WITH HEMATURIA, SITE UNSPECIFIED: ICD-10-CM

## 2019-09-27 DIAGNOSIS — R26.89 IMPAIRED WEIGHT BEARING: ICD-10-CM

## 2019-09-27 DIAGNOSIS — M25.552 LEFT HIP PAIN: ICD-10-CM
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
## 2019-09-27 PROBLEM — S32.82XD: Status: ACTIVE | Noted: 2019-09-27

## 2019-09-27 PROBLEM — S72.009D: Status: ACTIVE | Noted: 2019-09-27

## 2019-09-27 LAB
ABO + RH BLD: NORMAL
ALBUMIN SERPL BCP-MCNC: 3.2 G/DL (ref 3.5–5.2)
ALP SERPL-CCNC: 683 U/L (ref 55–135)
ALT SERPL W/O P-5'-P-CCNC: 48 U/L (ref 10–44)
ANION GAP SERPL CALC-SCNC: 12 MMOL/L (ref 8–16)
AST SERPL-CCNC: 68 U/L (ref 10–40)
BACTERIA #/AREA URNS AUTO: ABNORMAL /HPF
BASOPHILS # BLD AUTO: 0.05 K/UL (ref 0–0.2)
BASOPHILS NFR BLD: 0.4 % (ref 0–1.9)
BILIRUB SERPL-MCNC: 0.5 MG/DL (ref 0.1–1)
BILIRUB UR QL STRIP: NEGATIVE
BLD GP AB SCN CELLS X3 SERPL QL: NORMAL
BUN SERPL-MCNC: 9 MG/DL (ref 6–20)
CALCIUM SERPL-MCNC: 9.8 MG/DL (ref 8.7–10.5)
CHLORIDE SERPL-SCNC: 99 MMOL/L (ref 95–110)
CLARITY UR REFRACT.AUTO: ABNORMAL
CO2 SERPL-SCNC: 24 MMOL/L (ref 23–29)
COLOR UR AUTO: YELLOW
CREAT SERPL-MCNC: 0.7 MG/DL (ref 0.5–1.4)
DIFFERENTIAL METHOD: ABNORMAL
EOSINOPHIL # BLD AUTO: 0 K/UL (ref 0–0.5)
EOSINOPHIL NFR BLD: 0.3 % (ref 0–8)
ERYTHROCYTE [DISTWIDTH] IN BLOOD BY AUTOMATED COUNT: 13.9 % (ref 11.5–14.5)
EST. GFR  (AFRICAN AMERICAN): >60 ML/MIN/1.73 M^2
EST. GFR  (NON AFRICAN AMERICAN): >60 ML/MIN/1.73 M^2
GLUCOSE SERPL-MCNC: 108 MG/DL (ref 70–110)
GLUCOSE UR QL STRIP: NEGATIVE
HCT VFR BLD AUTO: 27.8 % (ref 37–48.5)
HGB BLD-MCNC: 9 G/DL (ref 12–16)
HGB UR QL STRIP: ABNORMAL
IMM GRANULOCYTES # BLD AUTO: 0.07 K/UL (ref 0–0.04)
IMM GRANULOCYTES NFR BLD AUTO: 0.6 % (ref 0–0.5)
INR PPP: 1 (ref 0.8–1.2)
KETONES UR QL STRIP: NEGATIVE
LACTATE SERPL-SCNC: 0.8 MMOL/L (ref 0.5–2.2)
LEUKOCYTE ESTERASE UR QL STRIP: ABNORMAL
LYMPHOCYTES # BLD AUTO: 0.9 K/UL (ref 1–4.8)
LYMPHOCYTES NFR BLD: 7.3 % (ref 18–48)
MCH RBC QN AUTO: 32.5 PG (ref 27–31)
MCHC RBC AUTO-ENTMCNC: 32.4 G/DL (ref 32–36)
MCV RBC AUTO: 100 FL (ref 82–98)
MICROSCOPIC COMMENT: ABNORMAL
MONOCYTES # BLD AUTO: 0.6 K/UL (ref 0.3–1)
MONOCYTES NFR BLD: 4.6 % (ref 4–15)
NEUTROPHILS # BLD AUTO: 11 K/UL (ref 1.8–7.7)
NEUTROPHILS NFR BLD: 86.8 % (ref 38–73)
NITRITE UR QL STRIP: POSITIVE
NRBC BLD-RTO: 0 /100 WBC
PH UR STRIP: 7 [PH] (ref 5–8)
PLATELET # BLD AUTO: 459 K/UL (ref 150–350)
PMV BLD AUTO: 8.7 FL (ref 9.2–12.9)
POTASSIUM SERPL-SCNC: 4.2 MMOL/L (ref 3.5–5.1)
PROT SERPL-MCNC: 6.8 G/DL (ref 6–8.4)
PROT UR QL STRIP: NEGATIVE
PROTHROMBIN TIME: 10.2 SEC (ref 9–12.5)
RBC # BLD AUTO: 2.77 M/UL (ref 4–5.4)
RBC #/AREA URNS AUTO: 50 /HPF (ref 0–4)
SODIUM SERPL-SCNC: 135 MMOL/L (ref 136–145)
SP GR UR STRIP: 1 (ref 1–1.03)
URN SPEC COLLECT METH UR: ABNORMAL
WBC # BLD AUTO: 12.63 K/UL (ref 3.9–12.7)
WBC #/AREA URNS AUTO: >100 /HPF (ref 0–5)
WBC CLUMPS UR QL AUTO: ABNORMAL

## 2019-09-27 PROCEDURE — 96375 TX/PRO/DX INJ NEW DRUG ADDON: CPT | Mod: 59

## 2019-09-27 PROCEDURE — 99284 EMERGENCY DEPT VISIT MOD MDM: CPT | Mod: 25

## 2019-09-27 PROCEDURE — 99285 EMERGENCY DEPT VISIT HI MDM: CPT | Mod: ,,, | Performed by: PHYSICIAN ASSISTANT

## 2019-09-27 PROCEDURE — 86901 BLOOD TYPING SEROLOGIC RH(D): CPT

## 2019-09-27 PROCEDURE — 87086 URINE CULTURE/COLONY COUNT: CPT

## 2019-09-27 PROCEDURE — 80053 COMPREHEN METABOLIC PANEL: CPT

## 2019-09-27 PROCEDURE — 81001 URINALYSIS AUTO W/SCOPE: CPT

## 2019-09-27 PROCEDURE — 85025 COMPLETE CBC W/AUTO DIFF WBC: CPT

## 2019-09-27 PROCEDURE — 73502 XR HIP 2 VIEW LEFT: ICD-10-PCS | Mod: 26,LT,, | Performed by: RADIOLOGY

## 2019-09-27 PROCEDURE — 73502 X-RAY EXAM HIP UNI 2-3 VIEWS: CPT | Mod: 26,LT,, | Performed by: RADIOLOGY

## 2019-09-27 PROCEDURE — 85610 PROTHROMBIN TIME: CPT

## 2019-09-27 PROCEDURE — 96376 TX/PRO/DX INJ SAME DRUG ADON: CPT

## 2019-09-27 PROCEDURE — 96361 HYDRATE IV INFUSION ADD-ON: CPT

## 2019-09-27 PROCEDURE — 87040 BLOOD CULTURE FOR BACTERIA: CPT

## 2019-09-27 PROCEDURE — 83605 ASSAY OF LACTIC ACID: CPT

## 2019-09-27 PROCEDURE — 73502 X-RAY EXAM HIP UNI 2-3 VIEWS: CPT | Mod: TC,LT

## 2019-09-27 PROCEDURE — 96374 THER/PROPH/DIAG INJ IV PUSH: CPT

## 2019-09-27 PROCEDURE — 63600175 PHARM REV CODE 636 W HCPCS: Performed by: PHYSICIAN ASSISTANT

## 2019-09-27 PROCEDURE — 99285 PR EMERGENCY DEPT VISIT,LEVEL V: ICD-10-PCS | Mod: ,,, | Performed by: PHYSICIAN ASSISTANT

## 2019-09-27 RX ORDER — MORPHINE SULFATE 2 MG/ML
6 INJECTION, SOLUTION INTRAMUSCULAR; INTRAVENOUS
Status: COMPLETED | OUTPATIENT
Start: 2019-09-27 | End: 2019-09-27

## 2019-09-27 RX ORDER — ONDANSETRON 2 MG/ML
4 INJECTION INTRAMUSCULAR; INTRAVENOUS
Status: COMPLETED | OUTPATIENT
Start: 2019-09-27 | End: 2019-09-27

## 2019-09-27 RX ORDER — CEFTRIAXONE 1 G/1
1 INJECTION, POWDER, FOR SOLUTION INTRAMUSCULAR; INTRAVENOUS
Status: COMPLETED | OUTPATIENT
Start: 2019-09-27 | End: 2019-09-27

## 2019-09-27 RX ORDER — FLUCONAZOLE 150 MG/1
150 TABLET ORAL ONCE
Qty: 1 TABLET | Refills: 0 | Status: SHIPPED | OUTPATIENT
Start: 2019-09-27 | End: 2019-09-27

## 2019-09-27 RX ORDER — SULFAMETHOXAZOLE AND TRIMETHOPRIM 800; 160 MG/1; MG/1
1 TABLET ORAL 2 TIMES DAILY
Qty: 14 TABLET | Refills: 0 | Status: SHIPPED | OUTPATIENT
Start: 2019-09-27 | End: 2019-10-04

## 2019-09-27 RX ADMIN — CEFTRIAXONE SODIUM 1 G: 1 INJECTION, POWDER, FOR SOLUTION INTRAMUSCULAR; INTRAVENOUS at 05:09

## 2019-09-27 RX ADMIN — ONDANSETRON 4 MG: 2 INJECTION INTRAMUSCULAR; INTRAVENOUS at 01:09

## 2019-09-27 RX ADMIN — MORPHINE SULFATE 6 MG: 2 INJECTION, SOLUTION INTRAMUSCULAR; INTRAVENOUS at 01:09

## 2019-09-27 RX ADMIN — SODIUM CHLORIDE 1000 ML: 0.9 INJECTION, SOLUTION INTRAVENOUS at 01:09

## 2019-09-27 RX ADMIN — MORPHINE SULFATE 6 MG: 2 INJECTION, SOLUTION INTRAMUSCULAR; INTRAVENOUS at 04:09

## 2019-09-27 NOTE — TELEPHONE ENCOUNTER
Spoke with wife Kristy I [facility] a [facility] per wife today 9-27-19 at 3:30pm. I also told wife I'm going to work on getting him in to see Ortho.      ----- Message from Meseret Sterling PA-C sent at 9/27/2019  9:37 AM CDT -----  Can you please call Mr. Santos's wife (the 484 number in the chart) to schedule a STAT hip CT for sometime today? They live near [facility]    Also, I referred him to Ortho for his left hip fracture. Dr Aelx is helping to coordinate an appt at her office on the [facility], but the patient wanted to know if there is any availability at [facility]. Can you see if he can be seen urgently for a left hip fracture next week at [facility]? If not next week, then we will let him keep whatever appt Dr. Alex offers here in [place].     Thanks,   Meseret

## 2019-09-27 NOTE — ED NOTES
Bed: [facility]  Expected date:   Expected time:   Means of arrival:   Comments:

## 2019-09-27 NOTE — SUBJECTIVE & OBJECTIVE
Past Medical History:   Diagnosis Date    Adrenal insufficiency     Arthritis     Congenital adrenal hyperplasia     Hyperlipidemia     Spinal stenosis        Past Surgical History:   Procedure Laterality Date    BACK SURGERY      RADIOFREQUENCY ABLATION Left 5/9/2019    Procedure: RADIOFREQUENCY ABLATION, LEFT L3,4,5;  Surgeon: Messi Cabello MD;  Location: [facility]IN [facility];  Service: Pain Management;  Laterality: Left;  1 of 2    RADIOFREQUENCY ABLATION Right 5/23/2019    Procedure: RADIOFREQUENCY ABLATION, RIGHT L3,4,5;  Surgeon: Messi Cabello MD;  Location: [facility] PAIN T;  Service: Pain Management;  Laterality: Right;  2of 2       Review of patient's allergies indicates:   Allergen Reactions    Penicillins Rash       No current facility-administered medications for this encounter.      Current Outpatient Medications   Medication Sig    ALPRAZolam (XANAX) 1 MG tablet Take 1 mg by mouth 3 (three) times daily as needed.     atorvastatin (LIPITOR) 10 MG tablet Take 1 tablet (10 mg total) by mouth every evening.    ferrous sulfate 325 (65 FE) MG EC tablet Take 1 tablet (325 mg total) by mouth 3 (three) times daily. for 14 days    gabapentin (NEURONTIN) 800 MG tablet 1 tablet by mouth three times a day and 2 tablets at night (5 tablets a day, 4000mg)    lamoTRIgine (LAMICTAL) 200 MG tablet Take 200 mg by mouth 2 (two) times daily.     loratadine (CLARITIN) 10 mg tablet Take 10 mg by mouth once daily.     morphine (MS CONTIN) 15 MG 12 hr tablet Take 1 tablet (15 mg total) by mouth every 12 (twelve) hours. Medically necessary for more than 7 days for 10 days    oxyCODONE-acetaminophen (PERCOCET)  mg per tablet Take 1 tablet by mouth every 6 (six) hours as needed for Pain.    predniSONE (DELTASONE) 5 MG tablet Take 1 tablet (5 mg total) by mouth once daily.    primidone (MYSOLINE) 50 MG Tab Take 50 mg by mouth 3 (three) times daily.     rOPINIRole (REQUIP) 4 MG tablet Take 4 mg by mouth  nightly.     zinc 50 mg Tab once daily.     famotidine (PEPCID) 20 MG tablet Take 1 tablet (20 mg total) by mouth 2 (two) times daily.    Lactobacillus acidophilus (ACIDOPHILUS) Cap Take by mouth once daily.     nystatin (MYCOSTATIN) cream Apply topically 2 (two) times daily. (Patient taking differently: Apply topically 2 (two) times daily as needed. )    testosterone (ANDROGEL) 1 % (50 mg/5 gram) GlPk Apply 5 g topically once daily.    triamcinolone acetonide 0.1% (KENALOG) 0.1 % cream Apply topically 2 (two) times daily.     Family History     None        Tobacco Use    Smoking status: Former Smoker    Smokeless tobacco: Former User     Quit date: 5/1/2009    Tobacco comment: Socailly   Substance and Sexual Activity    Alcohol use: Not Currently     Frequency: Never     Binge frequency: Never    Drug use: Never    Sexual activity: Yes     Partners: Female     ROS   Per primary team note   Objective:     Vital Signs (Most Recent):  Temp: 97.8 °F (36.6 °C) (09/27/19 1409)  Pulse: 103 (09/27/19 1409)  Resp: 14 (09/27/19 1409)  BP: (!) 106/59 (09/27/19 1409)  SpO2: 98 % (09/27/19 1409) Vital Signs (24h Range):  Temp:  [97.8 °F (36.6 °C)-99.9 °F (37.7 °C)] 97.8 °F (36.6 °C)  Pulse:  [103-112] 103  Resp:  [14-20] 14  SpO2:  [96 %-98 %] 98 %  BP: (100-131)/(58-59) 106/59     Weight: 69.9 kg (154 lb)  Height: 5' (152.4 cm)  Body mass index is 30.08 kg/m².      Ortho/SPM Exam   Surgical dressing in place over L-spine  No drainage from surgical incision that is exposed   Mild pain with passive ROM of L hip  TTP of L groin and sacrum  Painless ROM of R hip  No ecchymosis, erythema, or signs of cellulitis   No decubitus ulcers  SILT throughout except for slightly decreased over L foot (same as baseline)  Motor intact throghout    All other joints (shoulder/elbow/wrist/hip/knee/ankle) were examined and had full ROM and were non-tender to palpation.     Significant Labs:   CBC:   Recent Labs   Lab 09/27/19  1220    WBC 12.63   HGB 9.0*   HCT 27.8*   *     CMP:   Recent Labs   Lab 09/27/19  1220   *   K 4.2   CL 99   CO2 24      BUN 9   CREATININE 0.7   CALCIUM 9.8   PROT 6.8   ALBUMIN 3.2*   BILITOT 0.5   ALKPHOS 683*   AST 68*   ALT 48*   ANIONGAP 12   EGFRNONAA >60.0       Significant Imaging: I have reviewed all pertinent imaging results/findings.   xrays of pelvis and L femur as well as CT scan of pelvis show old fractures of the right iliac wing, left superior and inferior pubic rami. No acute fractures noted

## 2019-09-27 NOTE — HPI
Ari Santos is a 49 y.o. female with PMH gender transition (F>M), adrenal hypertrophy, fibromyalgia, renal disease not on HD, lumbar stenosis s/p L1-S1 laminectomies on 9/13/19 with Dr Peng (neurosurgery) who presents after a mechanical fall from standing. He states that he has been using a walker at home since his laminectomy. He was reaching for his walker when it rolled away from him causing him to fall. He is not sure how he fell. He thinks it may have been onto his buttock. He complains of left groin and posterior sacral pain. He has slight neuropathy of bilateral feet at baseline. There is no change in sensation in any extremity from his baseline. Surgical dressing still in place from neurosurgery.

## 2019-09-27 NOTE — CONSULTS
Patient seen in Neurosurgery clinic yesterday. Exam unchanged. Patient being admitted for ortho intervention for hip fractures. No neurosurgical needs at this time. Please reconsult if NSGY can be of any further assistance.     Discussed with Dr. Peng  Please call with any questions      Noreen Pastrana PA-C   Neurosurgery   Pager: [phone]

## 2019-09-27 NOTE — ED NOTES
Patient brought to OD bathroom and cleansed. Brief placed. Patient reports pain with urination and frequent need to go. Will continue to monitor. Returned to hallway.

## 2019-09-27 NOTE — ASSESSMENT & PLAN NOTE
Ari Santos is a 49 y.o. female with multiple fractures of the pelvis. His pelvis fractures appear to be well corticated signifying likely old fractures. He is s/p L1-S1 laminectomy with neurosurgery. Most of his pain is in his sacral region near the inferior aspect of the surgical incision. He is NVI. There is no acute orthopedic intervention that is warranted for these old fractures. Weight bearing restrictions per neurosurgery postop recommendations. No restrictions from an orthopedic standpoint. Pain control per ED/neurosurgery. Patient safe to discharge from orthopedic standpoint. Will message for outpatient follow up.

## 2019-09-27 NOTE — TELEPHONE ENCOUNTER
Ortho Telephone Triage Message [phone]  Chart/xrays reviewed by LATISHA Peterson PA-C and  who instruct that pt come to  ED for c/o L hip fracture and that CT may be obtained in ED. Wife, Kristy, notified and states understanding. To bring pt to  ED.

## 2019-09-27 NOTE — PROGRESS NOTES
Wound Check   Neurosurgery     Ari Santos is a 49 y.o. transgender male (biologically female) who presents to clinic today for 2 week wound check, s/p L1-S1 open laminectomies with Dr. Peng on 9/13/19.  Denies fevers, chills, night sweats or N/V. Further denies wound drainage or swelling. Pt has been taking Percocet as needed and as prescribed by Dr. Cabello. He reports generally 5/10 constant pain. However, he reports severe 10/10 shooting pains that come and go. He has had increased urinary frequency since surgery. He is currently wearing an adult diaper because he cannot always make it to the bathroom in time. He often urinates on himself upon standing. He always has sensation prior to incontinence. Bowels are moving appropriately and with good control.    He fell from standing today when he lost his balance turning in the kitchen. He denies any new numbness or change in his radicular pain. He reports new pain in his buttocks and pelvis where he hit the ground. He has fractured his right hip in the past and is concerned that he may have a new fracture.       Physical Exam:   General: well developed, well nourished, no distress  Neurologic: Alert and oriented. Thought content appropriate.   GCS: Motor: 6/Verbal: 5/Eyes: 4 GCS Total: 15   Mental Status: Awake, Alert, Oriented x3   Cranial nerves: face symmetric, tongue midline, pupils equal, round, reactive to light with accomodation, EOMI.   Motor Strength: grossly 5/5 BUE and 4-5/5 BLE, particularly in knee flexion. This is consistent with post-op evaluations in the hospital and appears to be somewhat pain limited.    Sensation: response to light touch throughout  Ambulating with rolling walker     Incision is clean, dry and intact with no signs of erythema, swelling or purulent drainage. Staples are intact on exam; removed in clinic. All skin edges are completely approximated. Small amount of bleeding after removing staples. Island dressing applied to  protect clothing.       Vitals:    09/26/19 1518   BP: (!) 131/58   Pulse: 109   Resp: 20   Temp: 99.9 °F (37.7 °C)             Assessment/Plan:   Ari Santos is a 49 y.o. male who presents for 2 week wound check, s/p open L1-S1 laminectomies with Dr. Peng on 9/13/19. He reports concern with continued pain similar to pre-op. The only change in his pain is instead of constant severe pain, he experiences constant mild-moderate pain with intermittent shooting severe pain. Additionally, he fell today.    Xrays of lumbar spine and bilateral hips ordered for evaluation of possible fractures.   UPDATE: hip xray revealed probably hip fracture and radiology recommended dedicated left hip xray. Left hip xrays ordered and confirmed left inferior pubic rami fracture along with sacral fracture  ---Pt referred to orthopedics. Discussed case with Dr. Alex. She recommends touch down weight bearing on his left side along with urgent hip CT for evaluation of extent of the fracture. She is out of town next week but will coordinate an appt with either her PA next week or herself the following.   ---the patient's wife was informed of the above information and recommendations. CT to be completed today. She requested the ortho appt be at [facility] if possible but is willing to come to [place] if needed. Will attempt urgent appt with [facility] ortho next week. If unavailable, will keep appt provided by Dr. Alex.   ---Touch down weight bearing will be difficult in the patient's current condition of increased pain and BLE weakness, but the patient's wife verbalized understanding and they will do their best to comply.     -Keep incision open to air   -No need to continue putting bacitracin ointment on wound   -Can shower and get incision wet, just pat dry and no vigorous scrubbing. Do not submerge incision for another 4 weeks.   -No lifting more than 10 lbs or excessive bending/twisting.   -Follow up with Dr. Peng in 4 weeks    -Encouraged patient to call if they have any questions or concerns prior to next follow up appt        Meseret Sterling PA-C  [facility]  Department of Neurosurgery  [phone]

## 2019-09-27 NOTE — CONSULTS
[facility]  Orthopedics  Consult Note    Patient Name: Ari Santos  MRN: [id number]  Admission Date: 9/27/2019  Hospital Length of Stay: 0 days  Attending Provider: Anastasia Bronson MD  Primary Care Provider: Siva Mitchell MD        Inpatient consult to Orthopedic Surgery  Consult performed by: Shalom Dewey MD  Consult ordered by: Thomas Mendoza PA-C        Subjective:     Principal Problem:<principal problem not specified>    Chief Complaint:   Chief Complaint   Patient presents with    Hip Injury     fell yest, had xrays, + fracture        HPI: Ari Santos is a 49 y.o. female with PMH gender transition (F>M), adrenal hypertrophy, fibromyalgia, renal disease not on HD, lumbar stenosis s/p L1-S1 laminectomies on 9/13/19 with Dr Peng (neurosurgery) who presents after a mechanical fall from standing. He states that he has been using a walker at home since his laminectomy. He was reaching for his walker when it rolled away from him causing him to fall. He is not sure how he fell. He thinks it may have been onto his buttock. He complains of left groin and posterior sacral pain. He has slight neuropathy of bilateral feet at baseline. There is no change in sensation in any extremity from his baseline. Surgical dressing still in place from neurosurgery.     Past Medical History:   Diagnosis Date    Adrenal insufficiency     Arthritis     Congenital adrenal hyperplasia     Hyperlipidemia     Spinal stenosis        Past Surgical History:   Procedure Laterality Date    BACK SURGERY      RADIOFREQUENCY ABLATION Left 5/9/2019    Procedure: RADIOFREQUENCY ABLATION, LEFT L3,4,5;  Surgeon: Messi Cabello MD;  Location: [facility];  Service: Pain Management;  Laterality: Left;  1 of 2    RADIOFREQUENCY ABLATION Right 5/23/2019    Procedure: RADIOFREQUENCY ABLATION, RIGHT L3,4,5;  Surgeon: Messi Cabello MD;  Location: [facility];  Service: Pain Management;   Laterality: Right;  2of 2       Review of patient's allergies indicates:   Allergen Reactions    Penicillins Rash       No current facility-administered medications for this encounter.      Current Outpatient Medications   Medication Sig    ALPRAZolam (XANAX) 1 MG tablet Take 1 mg by mouth 3 (three) times daily as needed.     atorvastatin (LIPITOR) 10 MG tablet Take 1 tablet (10 mg total) by mouth every evening.    ferrous sulfate 325 (65 FE) MG EC tablet Take 1 tablet (325 mg total) by mouth 3 (three) times daily. for 14 days    gabapentin (NEURONTIN) 800 MG tablet 1 tablet by mouth three times a day and 2 tablets at night (5 tablets a day, 4000mg)    lamoTRIgine (LAMICTAL) 200 MG tablet Take 200 mg by mouth 2 (two) times daily.     loratadine (CLARITIN) 10 mg tablet Take 10 mg by mouth once daily.     morphine (MS CONTIN) 15 MG 12 hr tablet Take 1 tablet (15 mg total) by mouth every 12 (twelve) hours. Medically necessary for more than 7 days for 10 days    oxyCODONE-acetaminophen (PERCOCET)  mg per tablet Take 1 tablet by mouth every 6 (six) hours as needed for Pain.    predniSONE (DELTASONE) 5 MG tablet Take 1 tablet (5 mg total) by mouth once daily.    primidone (MYSOLINE) 50 MG Tab Take 50 mg by mouth 3 (three) times daily.     rOPINIRole (REQUIP) 4 MG tablet Take 4 mg by mouth nightly.     zinc 50 mg Tab once daily.     famotidine (PEPCID) 20 MG tablet Take 1 tablet (20 mg total) by mouth 2 (two) times daily.    Lactobacillus acidophilus (ACIDOPHILUS) Cap Take by mouth once daily.     nystatin (MYCOSTATIN) cream Apply topically 2 (two) times daily. (Patient taking differently: Apply topically 2 (two) times daily as needed. )    testosterone (ANDROGEL) 1 % (50 mg/5 gram) GlPk Apply 5 g topically once daily.    triamcinolone acetonide 0.1% (KENALOG) 0.1 % cream Apply topically 2 (two) times daily.     Family History     None        Tobacco Use    Smoking status: Former Smoker     Smokeless tobacco: Former User     Quit date: 5/1/2009    Tobacco comment: Socailly   Substance and Sexual Activity    Alcohol use: Not Currently     Frequency: Never     Binge frequency: Never    Drug use: Never    Sexual activity: Yes     Partners: Female     ROS   Per primary team note   Objective:     Vital Signs (Most Recent):  Temp: 97.8 °F (36.6 °C) (09/27/19 1409)  Pulse: 103 (09/27/19 1409)  Resp: 14 (09/27/19 1409)  BP: (!) 106/59 (09/27/19 1409)  SpO2: 98 % (09/27/19 1409) Vital Signs (24h Range):  Temp:  [97.8 °F (36.6 °C)-99.9 °F (37.7 °C)] 97.8 °F (36.6 °C)  Pulse:  [103-112] 103  Resp:  [14-20] 14  SpO2:  [96 %-98 %] 98 %  BP: (100-131)/(58-59) 106/59     Weight: 69.9 kg (154 lb)  Height: 5' (152.4 cm)  Body mass index is 30.08 kg/m².      Ortho/SPM Exam   Surgical dressing in place over L-spine  No drainage from surgical incision that is exposed   Mild pain with passive ROM of L hip  TTP of L groin and sacrum  Painless ROM of R hip  No ecchymosis, erythema, or signs of cellulitis   No decubitus ulcers  SILT throughout except for slightly decreased over L foot (same as baseline)  Motor intact throghout    All other joints (shoulder/elbow/wrist/hip/knee/ankle) were examined and had full ROM and were non-tender to palpation.     Significant Labs:   CBC:   Recent Labs   Lab 09/27/19  1220   WBC 12.63   HGB 9.0*   HCT 27.8*   *     CMP:   Recent Labs   Lab 09/27/19  1220   *   K 4.2   CL 99   CO2 24      BUN 9   CREATININE 0.7   CALCIUM 9.8   PROT 6.8   ALBUMIN 3.2*   BILITOT 0.5   ALKPHOS 683*   AST 68*   ALT 48*   ANIONGAP 12   EGFRNONAA >60.0       Significant Imaging: I have reviewed all pertinent imaging results/findings.   xrays of pelvis and L femur as well as CT scan of pelvis show old fractures of the right iliac wing, left superior and inferior pubic rami. No acute fractures noted    Assessment/Plan:     Multiple closed traumatic fractures of multiple bones of hip and  pelvis with routine healing, subsequent encounter  Ari Santos is a 49 y.o. female with multiple fractures of the pelvis. His pelvis fractures appear to be well corticated signifying likely old fractures. He is s/p L1-S1 laminectomy with neurosurgery. Most of his pain is in his sacral region near the inferior aspect of the surgical incision. He is NVI. There is no acute orthopedic intervention that is warranted for these old fractures. Weight bearing restrictions per neurosurgery postop recommendations. No restrictions from an orthopedic standpoint. Pain control per ED/neurosurgery. Patient safe to discharge from orthopedic standpoint. Will message for outpatient follow up.           Shalom Dewey MD  Orthopedics  [facility]

## 2019-09-28 NOTE — ED PROVIDER NOTES
Encounter Date: 9/27/2019       History     Chief Complaint   Patient presents with    Hip Injury     fell yest, had xrays, + fracture     Mr Santos is a 48 yo transgender male (biologically female) with recent s/p L1-S1 open laminectomies 9/13/19 that presents to the ED after outpatient imaging revealed concern for possible pelvic fracture after a mechanical fall two days ago. Pt reports that he lost control of his walker at home causing him to fall on his tailbone. Pt states that he has fractured his pelvis in the past and was concerned he might have new fracture. Pt was seen in Neurology clinic for post op f/u and reported fall and symptoms. X-rays of lumbar spine, hip and pelvis were ordered which revealed age indeterminate fractures of pelvis. Pt sent to ED for further imaging studies and Orthopedic surgery eval. Localizes pain to left hip. Rates pain 8/10 at rest. Pt took percocet at home with moderate relief. Pt also reporting urinary frequency, dysuria and concerned about possible UTI with frequent UTIs in the past. Denies any fevers, chills, headaches, dizziness, numbness, weakness, chest pain, shortness of breath, abdominal pain, N/V/D.         Review of patient's allergies indicates:   Allergen Reactions    Penicillins Rash     Past Medical History:   Diagnosis Date    Adrenal insufficiency     Arthritis     Congenital adrenal hyperplasia     Hyperlipidemia     Spinal stenosis      Past Surgical History:   Procedure Laterality Date    BACK SURGERY      RADIOFREQUENCY ABLATION Left 5/9/2019    Procedure: RADIOFREQUENCY ABLATION, LEFT L3,4,5;  Surgeon: Messi Cabello MD;  Location: [facility];  Service: Pain Management;  Laterality: Left;  1 of 2    RADIOFREQUENCY ABLATION Right 5/23/2019    Procedure: RADIOFREQUENCY ABLATION, RIGHT L3,4,5;  Surgeon: Messi Cabello MD;  Location: [facility];  Service: Pain Management;  Laterality: Right;  2of 2     History reviewed. No pertinent family  history.  Social History     Tobacco Use    Smoking status: Former Smoker    Smokeless tobacco: Former User     Quit date: 5/1/2009    Tobacco comment: Socailly   Substance Use Topics    Alcohol use: Not Currently     Frequency: Never     Binge frequency: Never    Drug use: Never     Review of Systems   Constitutional: Positive for activity change. Negative for chills and fever.   HENT: Negative for congestion, rhinorrhea and sore throat.    Eyes: Negative for pain and visual disturbance.   Respiratory: Negative for cough and shortness of breath.    Cardiovascular: Negative for chest pain.   Gastrointestinal: Negative for abdominal pain, diarrhea, nausea and vomiting.   Genitourinary: Positive for dysuria and frequency. Negative for flank pain.   Musculoskeletal: Positive for arthralgias, back pain and gait problem. Negative for neck pain and neck stiffness.   Skin: Positive for wound. Negative for rash.   Allergic/Immunologic: Positive for immunocompromised state.   Neurological: Negative for dizziness, syncope, numbness and headaches.   Hematological: Does not bruise/bleed easily.   Psychiatric/Behavioral: Negative for confusion.       Physical Exam     Initial Vitals [09/27/19 1203]   BP Pulse Resp Temp SpO2   (!) 100/59 (!) 112 18 99.1 °F (37.3 °C) 96 %      MAP       --         Physical Exam    Constitutional: She appears well-developed and well-nourished. She is cooperative.  Non-toxic appearance. No distress.   HENT:   Head: Normocephalic and atraumatic.   Eyes: EOM are normal.   Neck: Normal range of motion. Neck supple. No spinous process tenderness and no muscular tenderness present.   Cardiovascular: Intact distal pulses. Tachycardia present.  Exam reveals no gallop and no friction rub.    No murmur heard.  Pulses:       Radial pulses are 2+ on the right side, and 2+ on the left side.        Dorsalis pedis pulses are 2+ on the right side, and 2+ on the left side.   Pulmonary/Chest: Effort normal. No  respiratory distress. She has no decreased breath sounds. She has no wheezes. She has no rhonchi. She has no rales.   Abdominal: Soft. There is no tenderness. There is no rigidity, no rebound, no guarding and no CVA tenderness.   Musculoskeletal:        Right hip: She exhibits no tenderness, no bony tenderness and no deformity.        Left hip: She exhibits tenderness. She exhibits no bony tenderness, no swelling, no crepitus and no deformity.   Incision site from recent laminectomy without erythema, warmth, discharge or concerning signs of infection. Tenderness to palpation of sacral region and left hip. No bruising, hematomas, gross deformity noted. Distal pulses and sensation intact.    Neurological: She is alert and oriented to person, place, and time. No cranial nerve deficit.   Pt has tingling and decreased sensation to BLE but states it has been present for years   Skin: Skin is warm and dry.   Psychiatric: She has a normal mood and affect. Her speech is normal and behavior is normal.         ED Course   Procedures  Labs Reviewed   CBC W/ AUTO DIFFERENTIAL - Abnormal; Notable for the following components:       Result Value    RBC 2.77 (*)     Hemoglobin 9.0 (*)     Hematocrit 27.8 (*)     Mean Corpuscular Volume 100 (*)     Mean Corpuscular Hemoglobin 32.5 (*)     Platelets 459 (*)     MPV 8.7 (*)     Immature Granulocytes 0.6 (*)     Gran # (ANC) 11.0 (*)     Immature Grans (Abs) 0.07 (*)     Lymph # 0.9 (*)     Gran% 86.8 (*)     Lymph% 7.3 (*)     All other components within normal limits   COMPREHENSIVE METABOLIC PANEL - Abnormal; Notable for the following components:    Sodium 135 (*)     Albumin 3.2 (*)     Alkaline Phosphatase 683 (*)     AST 68 (*)     ALT 48 (*)     All other components within normal limits   URINALYSIS, REFLEX TO URINE CULTURE - Abnormal; Notable for the following components:    Appearance, UA Cloudy (*)     Occult Blood UA 1+ (*)     Nitrite, UA Positive (*)     Leukocytes, UA 3+  (*)     All other components within normal limits    Narrative:     Preferred Collection Type->Urine, Clean Catch   URINALYSIS MICROSCOPIC - Abnormal; Notable for the following components:    RBC, UA 50 (*)     WBC, UA >100 (*)     WBC Clumps, UA Occasional (*)     Bacteria Many (*)     All other components within normal limits    Narrative:     Preferred Collection Type->Urine, Clean Catch   CULTURE, BLOOD   CULTURE, BLOOD   CULTURE, URINE   PROTIME-INR   LACTIC ACID, PLASMA   TYPE & SCREEN          Imaging Results           CT Pelvis Without Contrast (Final result)  Result time 09/27/19 20:11:36    Final result by Rene Mederos MD (09/27/19 20:11:36)                 Impression:      1. No acute displaced fractures.  2. Multiple remote fractures as detailed above.  Ill-defined sclerosis of the right sacral ala favored to relate to remote trauma noting that a superimposed acute component would be difficult to entirely exclude.  3. Fluid distention of the endometrial cavity, suggesting cervical stenosis which can be seen with chronic inflammation/fibrosis, neoplastic process not excluded.  Clinical correlation recommended.  4. Bladder is distended with associated fat stranding, suggesting inflammatory/infectious cystitis versus bladder outlet obstruction.  5. Advanced degenerative changes of the visualized lumbar spine with laminectomy changes.  This report was flagged in Epic as abnormal.    Electronically signed by resident: Aniyah De La Torre  Date:    09/27/2019  Time:    17:07    Electronically signed by: Rene Mederos MD  Date:    09/27/2019  Time:    20:11             Narrative:    EXAMINATION:  CT PELVIS WITHOUT CONTRAST    CLINICAL HISTORY:  2mm cuts with 3d recons;    TECHNIQUE:  5.0 mm axial CT images of the pelvis were obtained via helical, multi detector CT technique.  No intravenous contrast material was administered.    COMPARISON:  Hip radiographs 09/27/2018, 09/26/2019    FINDINGS:  The  following remote fractures are identified:    *Nonunited, displaced lateral right iliac crest.  *Healed medial right iliac crest.  *Nonunited, displaced left superior pubic ramus.  *Nonunited, displaced left inferior pubic ramus.  *Healed right inferior pubic ramus.  There is ill-defined sclerosis of the right sacral ala, likely related to remote fracture.  Ossified loose body noted at long the posterior-superior aspect of the left SI joint, likely related to remote trauma.  No definite acute displaced fractures.  There are postoperative changes of lumbar laminectomies.  Advanced degenerative changes of the lower lumbar spine noted.    Partially visualized large and small bowel is unremarkable without evidence inflammation or obstruction.    The endometrial cavity distended with fluid, suggesting cervical stenosis which can be seen with chronic inflammation/fibrosis, neoplastic process not excluded.    Bladder is distended without significant wall thickening; however, there is subtle fat stranding circumferentially around the bladder, suggesting inflammatory/infectious cystitis versus bladder outlet obstruction.    There are a few slightly prominent right inguinal lymph nodes, nonspecific.                               X-Ray Femur Ap/Lat Left (Final result)  Result time 09/27/19 13:58:35    Final result by Herman Yin III, MD (09/27/19 13:58:35)                 Narrative:    EXAMINATION:  XR FEMUR 2 VIEW LEFT    CLINICAL HISTORY:  Fracture of unspecified part of neck of unspecified femur, initial encounter for closed fracture    FINDINGS:  There is DJD of the hip and knee.  There are remote fractures of the left superior inferior pubic ramus.  There is DJD of the knee with a varus deformity.      Electronically signed by: Herman Yin MD  Date:    09/27/2019  Time:    13:58                               Medical Decision Making:   Clinical Tests:   Lab Tests: Ordered and Reviewed  Radiological Study: Ordered  and Reviewed  ED Management:  On exam patient appears non-toxic and in no acute distress. IVFs, morphine and zofran given for pain. Discussed patient with orthopedic surgery who recommends obtaining plain films of left femur and will review imaging from yesterday. UA reveals nitrite positive urine with 3+ leuks. Rocephin given. No leukocytosis, Lactate 0.8. X-ray of femur reveals  remote fractures of pelvis. CT pelvis ordered for further evaluation of old vs new fractures. Consult to Neurosurgery placed since patient is two weeks out from laminectomy. Discussed patient with NSG and no neurosurgical intervention is necessary at this time. CT pelvis read reports no new fractures. Orthopedics does not believe any orthopedic intervention is necessary for remote fractures and is safe for discharge. Pt has been being assisted to the bathroom by ED RN. Discussed findings from imaging studies and lab work with patient and his wife. In regards to UTI patient states that he has had frequent UTIs in the past and requesting prescription of diflucan in addition to antibiotics because he states he is prone to yeast infections when taking courses of abx. Pt states that he feels comfortable being discharged home with his current pain control regimen and PO antibiotics.  Strict return instructions given and instructed patient to f/u closely with PCP and NSG.  Pt and wife are understanding and agreeable with plan.                       Clinical Impression:       ICD-10-CM ICD-9-CM   1. Fall, initial encounter W19.XXXA E888.9   2. Left hip pain M25.552 719.45   3. Urinary tract infection with hematuria, site unspecified N39.0 599.0    R31.9 599.70         Disposition:   Disposition: Discharged  Condition: Stable                        Thomas Mendoza PA-C  09/28/19 1001

## 2019-09-29 LAB
BACTERIA UR CULT: NORMAL
BACTERIA UR CULT: NORMAL

## 2019-10-01 ENCOUNTER — EXTERNAL HOME HEALTH (OUTPATIENT)
Dept: HOME HEALTH SERVICES | Facility: HOSPITAL | Age: 49
End: 2019-10-01
Payer: MEDICARE

## 2019-10-02 LAB
BACTERIA BLD CULT: NORMAL
BACTERIA BLD CULT: NORMAL

## 2019-10-14 ENCOUNTER — TELEPHONE (OUTPATIENT)
Dept: SLEEP MEDICINE | Facility: CLINIC | Age: 49
End: 2019-10-14

## 2019-10-14 RX ORDER — ALPRAZOLAM 1 MG/1
1 TABLET ORAL
Qty: 90 TABLET | Refills: 2 | Status: CANCELLED | OUTPATIENT
Start: 2019-10-14

## 2019-10-15 RX ORDER — ALPRAZOLAM 1 MG/1
1 TABLET ORAL
Qty: 90 TABLET | Refills: 2 | OUTPATIENT
Start: 2019-10-15

## 2019-10-21 ENCOUNTER — PATIENT OUTREACH (OUTPATIENT)
Dept: ADMINISTRATIVE | Facility: OTHER | Age: 49
End: 2019-10-21

## 2019-10-22 ENCOUNTER — PATIENT MESSAGE (OUTPATIENT)
Dept: PAIN MEDICINE | Facility: CLINIC | Age: 49
End: 2019-10-22

## 2019-10-23 ENCOUNTER — OFFICE VISIT (OUTPATIENT)
Dept: UROGYNECOLOGY | Facility: CLINIC | Age: 49
End: 2019-10-23
Payer: MEDICARE

## 2019-10-23 VITALS
DIASTOLIC BLOOD PRESSURE: 70 MMHG | SYSTOLIC BLOOD PRESSURE: 120 MMHG | BODY MASS INDEX: 28.13 KG/M2 | WEIGHT: 143.31 LBS | HEIGHT: 60 IN

## 2019-10-23 DIAGNOSIS — F52.21 ERECTILE DISORDER, GENERALIZED, MILD: ICD-10-CM

## 2019-10-23 DIAGNOSIS — Z87.890 STATUS POST SEX REASSIGNMENT SURGERY: ICD-10-CM

## 2019-10-23 DIAGNOSIS — F43.10 PTSD (POST-TRAUMATIC STRESS DISORDER): ICD-10-CM

## 2019-10-23 DIAGNOSIS — S32.82XD: ICD-10-CM

## 2019-10-23 DIAGNOSIS — M48.062 SPINAL STENOSIS OF LUMBAR REGION WITH NEUROGENIC CLAUDICATION: ICD-10-CM

## 2019-10-23 DIAGNOSIS — R93.5 ABNORMAL CT SCAN, PELVIS: ICD-10-CM

## 2019-10-23 DIAGNOSIS — S72.009D: ICD-10-CM

## 2019-10-23 DIAGNOSIS — E25.0 CONGENITAL ADRENAL HYPERPLASIA: ICD-10-CM

## 2019-10-23 DIAGNOSIS — G89.21 CHRONIC PAIN DUE TO TRAUMA: ICD-10-CM

## 2019-10-23 DIAGNOSIS — M79.7 FIBROMYALGIA: ICD-10-CM

## 2019-10-23 DIAGNOSIS — N39.41 URINARY INCONTINENCE, URGE: Primary | ICD-10-CM

## 2019-10-23 PROCEDURE — 51701 INSERT BLADDER CATHETER: CPT | Mod: S$PBB,,, | Performed by: OBSTETRICS & GYNECOLOGY

## 2019-10-23 PROCEDURE — 99999 PR PBB SHADOW E&M-EST. PATIENT-LVL III: ICD-10-PCS | Mod: PBBFAC,,, | Performed by: OBSTETRICS & GYNECOLOGY

## 2019-10-23 PROCEDURE — 99213 OFFICE O/P EST LOW 20 MIN: CPT | Mod: PBBFAC | Performed by: OBSTETRICS & GYNECOLOGY

## 2019-10-23 PROCEDURE — 99205 OFFICE O/P NEW HI 60 MIN: CPT | Mod: S$PBB,KX,, | Performed by: OBSTETRICS & GYNECOLOGY

## 2019-10-23 PROCEDURE — 87086 URINE CULTURE/COLONY COUNT: CPT

## 2019-10-23 PROCEDURE — 51701 INSERT BLADDER CATHETER: CPT | Mod: PBBFAC | Performed by: OBSTETRICS & GYNECOLOGY

## 2019-10-23 PROCEDURE — 87088 URINE BACTERIA CULTURE: CPT

## 2019-10-23 PROCEDURE — 87077 CULTURE AEROBIC IDENTIFY: CPT

## 2019-10-23 PROCEDURE — 99999 PR PBB SHADOW E&M-EST. PATIENT-LVL III: CPT | Mod: PBBFAC,,, | Performed by: OBSTETRICS & GYNECOLOGY

## 2019-10-23 PROCEDURE — 87186 SC STD MICRODIL/AGAR DIL: CPT

## 2019-10-23 PROCEDURE — 99205 PR OFFICE/OUTPT VISIT, NEW, LEVL V, 60-74 MIN: ICD-10-PCS | Mod: S$PBB,KX,, | Performed by: OBSTETRICS & GYNECOLOGY

## 2019-10-23 PROCEDURE — 51701 PR INSERTION OF NON-INDWELLING BLADDER CATHETERIZATION FOR RESIDUAL UR: ICD-10-PCS | Mod: S$PBB,,, | Performed by: OBSTETRICS & GYNECOLOGY

## 2019-10-23 RX ORDER — OXYCODONE AND ACETAMINOPHEN 10; 325 MG/1; MG/1
1 TABLET ORAL EVERY 8 HOURS PRN
Qty: 90 TABLET | Refills: 0 | Status: SHIPPED | OUTPATIENT
Start: 2019-10-23 | End: 2019-11-18 | Stop reason: SDUPTHER

## 2019-10-23 RX ORDER — CYCLOBENZAPRINE HCL 10 MG
10 TABLET ORAL 3 TIMES DAILY PRN
Qty: 90 TABLET | Refills: 1 | Status: SHIPPED | OUTPATIENT
Start: 2019-10-23 | End: 2019-12-15 | Stop reason: SDUPTHER

## 2019-10-23 RX ORDER — MORPHINE SULFATE 15 MG/1
15 TABLET, FILM COATED, EXTENDED RELEASE ORAL 2 TIMES DAILY
Qty: 60 TABLET | Refills: 0 | Status: SHIPPED | OUTPATIENT
Start: 2019-10-23 | End: 2019-11-18 | Stop reason: SDUPTHER

## 2019-10-23 NOTE — PATIENT INSTRUCTIONS
1)  Suspected urge incontinence:  --started after laminectomy/lund use  --slight decrease R pudendal nerve sensory  --suspect UTI--C&S sent  --also could have temporary bladder control issues post laminectomy--consider need for PT once C&S back & treated if needed   --reassess once fully healed: consider PT or medication  --consider topical estrogen to prevent UTIs since on T4    2)  History of multiple pelvic surgeries, desires metoidoplasty vs phalloplasty:  --get OR records  --review recent CT with radiology (implies uterus/cervix may still be present but s/p MAUREEN)   --consider MRI pelvis if still unclear   --consider cystoscopy in future if needed  --discuss any reconstructive possibilities once this clarified    3)  Decreased erectile response:  --started 2 years ago  --onset seems to correlate with worsening of back issues/use of pain meds  --does have some decreased sensation pudendal distribution R--may be affecting erectile capacity   --consider application of estrogen    4)  Will call you once urine C&S back and once receive operative notes/can speak with radiologist.

## 2019-10-23 NOTE — PROGRESS NOTES
"[facility] 4   [address] [address]    Ari Santos  [id number]  1970  October 27, 2019    Consulting Physician: No ref. provider found   GYN: no recent exams  Primary M.D.: Siva Mitchell MD    Chief Complaint   Patient presents with    Consult     incontinence     Goes by "Ari":  Uses male pronouns.     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     Referred by Dr. Siva Mitchell. Per his last note: managed with daily prednisone. Had surgical correction around age 1 to create vagina, multiple subsequent procedures. Had breast implantation at age 15 and starting around age 16 started having corrective procedures including removal of breast implants and corrective genital procedures.     Moved to [place] a few months ago from [place].     1)  Congenital Adrenal Hyperplasia: Per patient, genetic testing was female. Surgery 3/4 months after to remove penis (not born with testicles) and created a vagina. Raised female. At 15 had breast implants, has since been removed.  At 15 also another genital surgery--removed labial tissue (had a lot of erectile tissue and was getting tissue engorgement).  Sara 2003 ([facility] in [place], [phone]) , small incision (unsure if had ovaries but was told everything removed). Started to have some issues with recurrent UTIs.  Was told urethra was routed incorrectly and urine was trapping.  2004: URO rerouted urethra, removed cervix.  (URO [facility] in [place], Dr. Andrade: [phone] ). Was trying to reroute urethra so could stand to urinate. No urethra is more at SP area.  Vagina was closed--small pinpoint left. Also has some hair coming from this orifice.  Started testosterone in 2004--no issues.  --had laminectomy (Bilateral L1, L2, L3, L4, L5, and S1 " laminectomies, medial facetectomies and foraminotomies for decompression) 9/2019--had indwelling catheter until POD#2--has had UI since then, improving; Spontaneous UI.  Was not having before surgery.  Wears 4-5 PPD, moderate wetness. Thinks urine leaks from urethra when he has orgasm but not other times  --no C/D, FI  --before laminectomy had L sided pubic/vulvar numbness--was present before surgery    --Goal is to have phalloplasty vs metoidioplasty: main goal is to stand up while urinating.     --Patient has had significant weight loss, s/p implants and removal.  Patient is unhappy with reconstructive surgery of breasts due to excess tissue.     2)  Fall with pelvic Fx 2017:  --R iliac crest Fx--feels resolved s/p PT  --5/2018 CT:   1. No findings of diverticulitis or other acute inflammatory process within the abdomen or pelvis.  2. Stable bilateral benign adrenal nodules.  3. When compared to the CT examination from 10/20/2017, there is increased fluid distention of a structure  immediately posterior to the lower portion of the bladder which may represent a vagina. This does not appear  to represent a bladder diverticulum.  4. Multiple chronic rib, pelvic and sacral fractures.  --9/2019 CT A/P:  1. No acute displaced fractures.  2. Multiple remote fractures as detailed above.  Ill-defined sclerosis of the right sacral ala favored to relate to remote trauma noting that a superimposed acute component would be difficult to entirely exclude.  3. Fluid distention of the endometrial cavity, suggesting cervical stenosis which can be seen with chronic inflammation/fibrosis, neoplastic process not excluded.  Clinical correlation recommended.  4. Bladder is distended with associated fat stranding, suggesting inflammatory/infectious cystitis versus bladder outlet obstruction.  5. Advanced degenerative changes of the visualized lumbar spine with laminectomy changes.  **worried about what's happening on CT scan, manuel with  previous fluid on imaging, not persistent    3)  Erectile tissue does not work anymore:  --originally located B just superior to previous vaginal orifice  --states has small orifice    Prednisone 10mg in the Am and 5mg at night  Testosterone 1 packet a day.      Happy with facial hair, no acne.        Ref. Range 3/18/2019 16:45   Hemoglobin A1C External Latest Ref Range: 4.0 - 5.6 % 5.8 (H)   Estimated Avg Glucose Latest Ref Range: 68 - 131 mg/dL 120   TSH Latest Ref Range: 0.400 - 4.000 uIU/mL 0.398 (L)   Free T4 Latest Ref Range: 0.71 - 1.51 ng/dL 0.81      BMD 3/7/18  Impression: Normal BMD    Plan:  Congenital adrenal hyperplasia  -- Request outside records to research in more depth specific testing and timeline of events  -- Order labs for today, 17 hydroxyprogesterone and ACTH  -- Referral to urogynecology ordered  -- taking prednisone 5 mg daily  -- In depth discussion in revisiting the topic of tapering prednisone dose in the near future     Status post sex reassignment surgery  -- Referral to plastic surgery  -- Continue: testosterone daily      HLD (hyperlipidemia)  -- Controlled  -- Continue: statin     Chronic bilateral low back pain with bilateral sciatica  -- Continue following with Dr. Cabello     PTSD (post-traumatic stress disorder)  -- Continue following with PCP, per Dr. Soria note, referral to psychiatry has been made.      Abnormal thyroid blood test  -- Order TFTs today     Follow up in about 3 months (around 8/6/2019).  Labs today  Referral to plastic surgery and urogynecology      Past Medical History  Past Medical History:   Diagnosis Date    Adrenal insufficiency     Arthritis     Congenital adrenal hyperplasia     Hyperlipidemia     Spinal stenosis         Past Surgical History  Past Surgical History:   Procedure Laterality Date    BACK SURGERY      LAMINECTOMY  09/13/2019    RADIOFREQUENCY ABLATION Left 5/9/2019    Procedure: RADIOFREQUENCY ABLATION, LEFT L3,4,5;  Surgeon:  Messi Cabello MD;  Location: [facility];  Service: Pain Management;  Laterality: Left;  1 of 2    RADIOFREQUENCY ABLATION Right 5/23/2019    Procedure: RADIOFREQUENCY ABLATION, RIGHT L3,4,5;  Surgeon: Messi Cabello MD;  Location: [facility];  Service: Pain Management;  Laterality: Right;  2of 2     G0    Family History  Family History   Problem Relation Age of Onset    Diabetes Maternal Grandfather     Breast cancer Neg Hx     Ovarian cancer Neg Hx     Vaginal cancer Neg Hx     Endometrial cancer Neg Hx     Cervical cancer Neg Hx       Colon CA: Yes - MGF  Breast CA: No  GYN CA: No   CA: Yes - mother RCC    Social History  Social History     Tobacco Use   Smoking Status Former Smoker   Smokeless Tobacco Former User    Quit date: 5/1/2009   Tobacco Comment    Socailly     Social History     Substance and Sexual Activity   Alcohol Use Not Currently    Frequency: Never    Binge frequency: Never   .    Social History     Substance and Sexual Activity   Drug Use Never     The patient is  to female.   Resides in [place] [address]. Moved recently from [place], [place].    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      Allergies  Review of patient's allergies indicates:   Allergen Reactions    Penicillins Rash       Medications  Current Outpatient Medications on File Prior to Visit   Medication Sig Dispense Refill    ALPRAZolam (XANAX) 1 MG tablet Take 1 tablet (1 mg total) by mouth 3 (three) times daily as needed. 90 tablet 0    atorvastatin (LIPITOR) 10 MG tablet Take 1 tablet (10 mg total) by mouth every evening. 90 tablet 2    gabapentin (NEURONTIN) 800 MG tablet 1 tablet by mouth three times a day and 2 tablets at night (5 tablets a day, 4000mg) 150 tablet 2    Lactobacillus acidophilus (ACIDOPHILUS) Cap Take by mouth once daily.       lamoTRIgine (LAMICTAL) 200 MG tablet Take 200 mg by mouth 2 (two) times daily.       loratadine (CLARITIN) 10 mg  tablet Take 10 mg by mouth once daily.       nystatin (MYCOSTATIN) cream Apply topically 2 (two) times daily. (Patient taking differently: Apply topically 2 (two) times daily as needed. ) 30 g 1    predniSONE (DELTASONE) 5 MG tablet Take 1 tablet (5 mg total) by mouth once daily. 30 tablet 11    primidone (MYSOLINE) 50 MG Tab Take 50 mg by mouth 3 (three) times daily.       rOPINIRole (REQUIP) 4 MG tablet Take 4 mg by mouth nightly.       testosterone (ANDROGEL) 1 % (50 mg/5 gram) GlPk Apply 5 g topically once daily. 30 packet 4    triamcinolone acetonide 0.1% (KENALOG) 0.1 % cream Apply topically 2 (two) times daily. 45 g 1    zinc 50 mg Tab once daily.       famotidine (PEPCID) 20 MG tablet Take 1 tablet (20 mg total) by mouth 2 (two) times daily. (Patient not taking: Reported on 10/23/2019) 60 tablet 11     No current facility-administered medications on file prior to visit.        Review of Systems A 14 point ROS was reviewed with pertinent positives as noted above in the history of present illness.      Constitutional: negative  Eyes: negative  Endocrine: negative  Gastrointestinal: negative  Cardiovascular: negative  Respiratory: negative  Allergic/Immunologic: negative  Integumentary: negative  Psychiatric: negative  Musculoskeletal: negative   Ear/Nose/Throat: negative  Neurologic: negative  Genitourinary: SEE HPI  Hematologic/Lymphatic: negative   Breast: negative    Urogynecologic Exam  /70 (BP Location: Right arm, Patient Position: Sitting, BP Method: Large (Manual))   Ht 5' (1.524 m)   Wt 65 kg (143 lb 4.8 oz)   BMI 27.99 kg/m²     GENERAL APPEARANCE:  The patient is well-developed, well-nourished. Uses walker.    Neck:  Supple with no thyromegaly, no carotid bruits.  Heart:  Regular rate and rhythm, no murmurs, rubs or gallops.  Lungs:  Clear.  No CVA tenderness.  Abdomen:  Soft, nontender, nondistended, no hepatosplenomegaly.  Incisions:  None visible    PELVIC:    External genitalia:   Normal Bartholins, Skenes and labia bilaterally.  Bilateral labia not fused superiorly.  Fleshy, erectile tissue around urethra (superior most aspect of mons).  Bilateral labia majora present.  Small dimple present at previous vaginal area--does not track with gentle exploration with os finder.   Urethra:  No caruncle, diverticulum or masses. Able to pass 14F catheter easily--needs to pass 7-8 cm before urine expelled (increased urethral length)?    Internal: deferred, as reports MAUREEN/BSO with vaginal closure    NEUROLOGIC:  Slight decreased sensation R vulvar/labial area.  S2 through 4 normal.  Sacral reflexes intact.    EXT: EDWARDS, 2+ pulses bilaterally, no C/C/E    COUGH STRESS TEST:  negative    RECTAL:    External:  Normal, (--) hemorrhoids, (--) dovetailing.   Internal:   (--) tenderness, (--) masses, Normal resting tone, Normal active tone.    PVR: 70 mL, terminal cloudiness    Impression    1. Urinary incontinence, urge    2. Chronic pain due to trauma    3. Spinal stenosis of lumbar region with neurogenic claudication    4. PTSD (post-traumatic stress disorder)    5. Status post sex reassignment surgery    6. Congenital adrenal hyperplasia    7. Fibromyalgia    8. Multiple closed traumatic fractures of multiple bones of hip and pelvis with routine healing, subsequent encounter    9. Abnormal CT scan, pelvis    10. Erectile disorder, generalized, mild        Initial Plan  The patient was counseled regarding these issues. The patient was given a summary sheet containing each of these issues with possible options for evaluation and management. When appropriate, we also reviewed computer-generated diagrams specific to their diagnoses..  All questions were addressed to the patient's satisfaction.    1)  Suspected urge incontinence:  --started after laminectomy/lund use  --slight decrease R pudendal nerve sensory  --suspect UTI--C&S sent  --also could have temporary bladder control issues post laminectomy--consider  need for PT once C&S back & treated if needed   --reassess once fully healed: consider PT or medication  --consider topical estrogen to prevent UTIs since on T4    2)  History of multiple pelvic surgeries, desires metoidoplasty vs phalloplasty:  --get OR records  --review recent CT with radiology (implies uterus/cervix may still be present but s/p MAUREEN)   --consider MRI pelvis if still unclear   --consider cystoscopy in future if needed  --discuss any reconstructive possibilities once this clarified    3)  Decreased erectile response:  --started 2 years ago  --onset seems to correlate with worsening of back issues/use of pain meds  --does have some decreased sensation pudendal distribution R--may be affecting erectile capacity   --consider application of estrogen    4)  Will call you once urine C&S back and once receive operative notes/can speak with radiologist.     Approximately 60 min were spent in consult, 90 % in discussion.   Patient's partner was present for entire discussion.   Thank you for requesting consultation of your patient.  I look forward to participating in their care.    Rashard Taylor  Female Pelvic Medicine and Reconstructive Surgery  [facility] [place], [place]

## 2019-10-26 LAB — BACTERIA UR CULT: ABNORMAL

## 2019-10-27 ENCOUNTER — PATIENT MESSAGE (OUTPATIENT)
Dept: UROGYNECOLOGY | Facility: CLINIC | Age: 49
End: 2019-10-27

## 2019-10-27 DIAGNOSIS — N39.0 ACUTE UTI: Primary | ICD-10-CM

## 2019-10-27 PROBLEM — N39.41 URINARY INCONTINENCE, URGE: Status: ACTIVE | Noted: 2019-10-27

## 2019-10-27 PROBLEM — R93.5 ABNORMAL CT SCAN, PELVIS: Status: ACTIVE | Noted: 2019-10-27

## 2019-10-27 PROBLEM — F52.21 ERECTILE DISORDER, GENERALIZED, MILD: Status: ACTIVE | Noted: 2019-10-27

## 2019-10-27 RX ORDER — SULFAMETHOXAZOLE AND TRIMETHOPRIM 800; 160 MG/1; MG/1
1 TABLET ORAL 2 TIMES DAILY
Qty: 14 TABLET | Refills: 0 | Status: SHIPPED | OUTPATIENT
Start: 2019-10-27 | End: 2019-11-05

## 2019-10-28 ENCOUNTER — PATIENT OUTREACH (OUTPATIENT)
Dept: ADMINISTRATIVE | Facility: HOSPITAL | Age: 49
End: 2019-10-28

## 2019-10-28 ENCOUNTER — HOSPITAL ENCOUNTER (OUTPATIENT)
Dept: RADIOLOGY | Facility: HOSPITAL | Age: 49
Discharge: HOME | End: 2019-10-28
Attending: PHYSICIAN ASSISTANT
Payer: MEDICARE

## 2019-10-28 ENCOUNTER — OFFICE VISIT (OUTPATIENT)
Dept: NEUROSURGERY | Facility: CLINIC | Age: 49
End: 2019-10-28
Payer: MEDICARE

## 2019-10-28 ENCOUNTER — TELEPHONE (OUTPATIENT)
Dept: UROGYNECOLOGY | Facility: CLINIC | Age: 49
End: 2019-10-28

## 2019-10-28 VITALS
HEART RATE: 112 BPM | DIASTOLIC BLOOD PRESSURE: 63 MMHG | HEIGHT: 60 IN | WEIGHT: 142 LBS | SYSTOLIC BLOOD PRESSURE: 97 MMHG | BODY MASS INDEX: 27.88 KG/M2 | RESPIRATION RATE: 16 BRPM | TEMPERATURE: 99 F

## 2019-10-28 DIAGNOSIS — G56.03 CARPAL TUNNEL SYNDROME ON BOTH SIDES: ICD-10-CM

## 2019-10-28 DIAGNOSIS — Z98.890 S/P LUMBAR LAMINECTOMY: Primary | ICD-10-CM

## 2019-10-28 DIAGNOSIS — M48.062 SPINAL STENOSIS OF LUMBAR REGION WITH NEUROGENIC CLAUDICATION: ICD-10-CM
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
## 2019-10-28 PROBLEM — G89.29 CHRONIC BILATERAL LOW BACK PAIN WITH BILATERAL SCIATICA: Status: RESOLVED | Noted: 2019-03-19 | Resolved: 2019-10-28

## 2019-10-28 PROBLEM — M54.42 CHRONIC BILATERAL LOW BACK PAIN WITH BILATERAL SCIATICA: Status: RESOLVED | Noted: 2019-03-19 | Resolved: 2019-10-28

## 2019-10-28 PROBLEM — M54.41 CHRONIC BILATERAL LOW BACK PAIN WITH BILATERAL SCIATICA: Status: RESOLVED | Noted: 2019-03-19 | Resolved: 2019-10-28

## 2019-10-28 PROCEDURE — 72100 X-RAY EXAM L-S SPINE 2/3 VWS: CPT | Mod: TC,FY

## 2019-10-28 PROCEDURE — 72100 X-RAY EXAM L-S SPINE 2/3 VWS: CPT | Mod: 26,,, | Performed by: RADIOLOGY

## 2019-10-28 PROCEDURE — 99999 PR PBB SHADOW E&M-EST. PATIENT-LVL III: ICD-10-PCS | Mod: PBBFAC,,, | Performed by: NEUROLOGICAL SURGERY

## 2019-10-28 PROCEDURE — 72100 XR LUMBAR SPINE AP AND LATERAL: ICD-10-PCS | Mod: 26,,, | Performed by: RADIOLOGY

## 2019-10-28 PROCEDURE — 99024 PR POST-OP FOLLOW-UP VISIT: ICD-10-PCS | Mod: POP,,, | Performed by: NEUROLOGICAL SURGERY

## 2019-10-28 PROCEDURE — 99024 POSTOP FOLLOW-UP VISIT: CPT | Mod: POP,,, | Performed by: NEUROLOGICAL SURGERY

## 2019-10-28 PROCEDURE — 99999 PR PBB SHADOW E&M-EST. PATIENT-LVL III: CPT | Mod: PBBFAC,,, | Performed by: NEUROLOGICAL SURGERY

## 2019-10-28 PROCEDURE — 99213 OFFICE O/P EST LOW 20 MIN: CPT | Mod: PBBFAC,25 | Performed by: NEUROLOGICAL SURGERY

## 2019-10-28 NOTE — TELEPHONE ENCOUNTER
----- Message from Rashard Taylor MD sent at 10/27/2019  5:30 PM CDT -----  Please let patient know he has UTI.  Rx for Bactrim sent to the [facility].  I will let him know once I receive his OR records/talk with radiologist, hopefully sometime this week. Thanks!

## 2019-10-29 ENCOUNTER — PATIENT MESSAGE (OUTPATIENT)
Dept: UROGYNECOLOGY | Facility: CLINIC | Age: 49
End: 2019-10-29

## 2019-10-29 PROBLEM — G56.03 CARPAL TUNNEL SYNDROME ON BOTH SIDES: Status: ACTIVE | Noted: 2019-10-29

## 2019-10-29 NOTE — PROGRESS NOTES
"CHIEF COMPLAINT:  4-6 week follow-up    HPI:    Ari Santos is a 49 y.o.-year-old female who presents today for post-operative follow-up s/p open L1-S1 lamis on 9/13/19 for neurogenic claudication.  Patient reports that he is doing significantly better and with the exception of some occasional muscle soreness he has significant pain relief.  His wound has healed very well without any signs of infection.  He reports that he has had urinary dysfunction since the surgery and is wearing a pad.  He has had extensive pelvic floor and vaginal corrective surgery in the past and has seen neural gynecology who believes this is possibly due to a chronic UTI.  He can sense when he has to go the bathroom but can't always stop it from coming out.    He additionally reports bilateral carpal tunnel syndrome. Pain at night in palm of his hand and into 1st through 4th digits of both hands.  He has numbness and "gravel" sensation in those fingers.  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  He has not had an BUE EMG.    He also has severe bilateral knee pain for which he is seeing Dr. Andrade of Orthopedics at [facility] on November 7th.  He has severe bowing of his legs.    ROS:  As stated in the above HPI    PE:  Vitals:    10/28/19 0835   BP: 97/63   Pulse: (!) 112   Resp: 16   Temp: 99.3 °F (37.4 °C)       AAOX3  NAD  CN 2-12 intact    Strength:      Deltoids Biceps Triceps Wrist Ext. Wrist Flex. Hand    RUE 5 5 5 5 5 5   LUE 5 5 5 5 5 5    Hip Flex. Knee Flex. Knee Ext. Dorsi Flex Plantar Flex EHL   RLE 5 5 5 5 5 5   LLE 5 5 5 5 5 5     Sensation:  Decreased in bilat digits 1-4    Gait:  Steady    DTR:  No hyperreflexia    Lumbar incision:  Well healed    IMAGING:  No new imaging     ASSESSMENT:   Problem List Items Addressed This Visit        Neuro    Carpal tunnel syndrome on both sides    Relevant Orders    EMG W/ ULTRASOUND AND NERVE CONDUCTION TEST 2 Extremities    " RESOLVED: Spinal stenosis of lumbar region with neurogenic claudication       Orthopedic    S/P lumbar laminectomy - Primary        1. S/p open L1-S1 lamis for neurogenic claudication.  Neuro intact.  Back and leg near completely resolved.  2. Bilateral carpal tunnel syndrome    PLAN:   - Ordered BUE EMG/NCS to r/o CTS  - RTC in 2 months  - F/u urogyn re: urinary dysfunction

## 2019-10-30 ENCOUNTER — PATIENT MESSAGE (OUTPATIENT)
Dept: INTERNAL MEDICINE | Facility: CLINIC | Age: 49
End: 2019-10-30

## 2019-10-30 ENCOUNTER — PATIENT MESSAGE (OUTPATIENT)
Dept: PAIN MEDICINE | Facility: CLINIC | Age: 49
End: 2019-10-30

## 2019-10-30 ENCOUNTER — PATIENT MESSAGE (OUTPATIENT)
Dept: ENDOCRINOLOGY | Facility: CLINIC | Age: 49
End: 2019-10-30

## 2019-10-30 DIAGNOSIS — R74.01 TRANSAMINITIS: Primary | ICD-10-CM

## 2019-10-30 NOTE — TELEPHONE ENCOUNTER
Spoke with the patients spouse, Kristy about LFTs on most recent lab work and concerns that they have been elevated on the last 3 sets of labs.  Advised to cut out acetaminophen and ETOH.  Kristy reports that the patient does not drink but does have acetaminophen in his pain medication.  Advised to reach out to pain management to see if there is an alternative and to follow up with his PCP.  She verbalized understanding.     Will send Dr. Mitchell a message to loop him in.

## 2019-10-31 ENCOUNTER — PATIENT MESSAGE (OUTPATIENT)
Dept: ENDOCRINOLOGY | Facility: CLINIC | Age: 49
End: 2019-10-31

## 2019-10-31 ENCOUNTER — PATIENT MESSAGE (OUTPATIENT)
Dept: INTERNAL MEDICINE | Facility: CLINIC | Age: 49
End: 2019-10-31

## 2019-11-02 ENCOUNTER — PATIENT OUTREACH (OUTPATIENT)
Dept: ADMINISTRATIVE | Facility: OTHER | Age: 49
End: 2019-11-02

## 2019-11-03 ENCOUNTER — PATIENT MESSAGE (OUTPATIENT)
Dept: ENDOCRINOLOGY | Facility: CLINIC | Age: 49
End: 2019-11-03

## 2019-11-04 ENCOUNTER — PATIENT MESSAGE (OUTPATIENT)
Dept: UROGYNECOLOGY | Facility: CLINIC | Age: 49
End: 2019-11-04

## 2019-11-04 ENCOUNTER — PATIENT MESSAGE (OUTPATIENT)
Dept: ENDOCRINOLOGY | Facility: CLINIC | Age: 49
End: 2019-11-04

## 2019-11-04 NOTE — TELEPHONE ENCOUNTER
To whom it may concern,     I, Delfina Alvarez MD, am the physician of Ari Santos,   YOB: 1970, with whom I have a doctor/patient relationship and with whom I have treated since May 2019.     Ari Santos has undergone appropriate and successful clinical treatment for gender transition to the gender of male. As these changes from hormonal therapy are irreversible, they should be legally recognized as male .     I declare under penalty of perjury under the laws of the [place] that the foregoing is true and correct.    If there are any questions or concerns please contact my office,     Delfina Alvarez MD     [place] #[id number]  Chair of Endocrinology, [facility]  Senior Lecturer, [employer], [facility]. [facility], [address] [place] [address], 6th [place] [address]  O: [phone]  F: [phone]    Email: [email or web address]

## 2019-11-05 ENCOUNTER — OFFICE VISIT (OUTPATIENT)
Dept: ENDOCRINOLOGY | Facility: CLINIC | Age: 49
End: 2019-11-05
Payer: MEDICARE

## 2019-11-05 VITALS
WEIGHT: 141.56 LBS | BODY MASS INDEX: 26.73 KG/M2 | SYSTOLIC BLOOD PRESSURE: 139 MMHG | HEIGHT: 61 IN | DIASTOLIC BLOOD PRESSURE: 78 MMHG | HEART RATE: 92 BPM

## 2019-11-05 DIAGNOSIS — E27.40 ADRENAL INSUFFICIENCY: ICD-10-CM

## 2019-11-05 DIAGNOSIS — E25.0 CONGENITAL ADRENAL HYPERPLASIA: Primary | ICD-10-CM

## 2019-11-05 DIAGNOSIS — R79.89 ABNORMAL THYROID BLOOD TEST: ICD-10-CM

## 2019-11-05 DIAGNOSIS — R73.02 IGT (IMPAIRED GLUCOSE TOLERANCE): ICD-10-CM

## 2019-11-05 PROBLEM — Z87.890: Status: RESOLVED | Noted: 2019-03-19 | Resolved: 2019-11-05

## 2019-11-05 PROCEDURE — 99214 OFFICE O/P EST MOD 30 MIN: CPT | Mod: S$PBB,,, | Performed by: INTERNAL MEDICINE

## 2019-11-05 PROCEDURE — 99999 PR PBB SHADOW E&M-EST. PATIENT-LVL III: CPT | Mod: PBBFAC,,, | Performed by: INTERNAL MEDICINE

## 2019-11-05 PROCEDURE — 99213 OFFICE O/P EST LOW 20 MIN: CPT | Mod: PBBFAC | Performed by: INTERNAL MEDICINE

## 2019-11-05 PROCEDURE — 99214 PR OFFICE/OUTPT VISIT, EST, LEVL IV, 30-39 MIN: ICD-10-PCS | Mod: S$PBB,,, | Performed by: INTERNAL MEDICINE

## 2019-11-05 PROCEDURE — 99999 PR PBB SHADOW E&M-EST. PATIENT-LVL III: ICD-10-PCS | Mod: PBBFAC,,, | Performed by: INTERNAL MEDICINE

## 2019-11-05 RX ORDER — PREDNISONE 1 MG/1
TABLET ORAL
Qty: 200 TABLET | Refills: 3 | Status: SHIPPED | OUTPATIENT
Start: 2019-11-05 | End: 2020-03-05

## 2019-11-05 NOTE — PROGRESS NOTES
Subjective:      Patient ID: Air Santos is a 49 y.o. female.    Chief Complaint:  Congenital adrenal hyperplasia    History of Present Illness  Ari Santos is here for follow up of CAH.       Raised female.     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     Saw Dr Taylor and this was very traumatic for him but he appreciated her kindness      Per her exam:  PELVIC:    External genitalia:  Normal Bartholins, Skenes and labia bilaterally.  Bilateral labia not fused superiorly.  Fleshy, erectile tissue around urethra (superior most aspect of mons).  Bilateral labia majora present.  Small dimple present at previous vaginal area--does not track with gentle exploration with os finder.   Urethra:  No caruncle, diverticulum or masses. Able to pass 14F catheter easily--needs to pass 7-8 cm before urine expelled (increased urethral length)?    Internal: deferred, as reports MAUREEN/BSO with vaginal closure      Started testosterone in 2004. Attempted to have phalloplasty in 2004, unsuccessful.         When I initially saw him he was on a high dose of pred   Prednisone 10mg in the Am and 5mg at night    Since we have attempted to lower the dose to physiologic as we dont need to over treat the CAH      Current ritchie:     Prednisone 10mg daily - was down to 5 mg but did not feel well  Feels better on the 10 mg qd       Testosterone 50mg/5g - 1 packet a day    BMD 3/7/18  Impression: Normal BMD    Review of Systems   Constitutional: Negative for fatigue.   Eyes: Negative for visual disturbance.   Respiratory: Negative for shortness of breath.    Cardiovascular:  "Negative for chest pain.   Gastrointestinal: Negative for abdominal pain.   Musculoskeletal: Positive for back pain and gait problem (rolling walker). Negative for arthralgias.   Skin: Negative for wound.   Neurological: Negative for headaches.   Hematological: Does not bruise/bleed easily.   Psychiatric/Behavioral: Negative for sleep disturbance.     Objective:   Physical Exam   Neck: No thyromegaly present.   Cardiovascular: Normal rate.   No edema present   Pulmonary/Chest: Effort normal.   Abdominal: Soft.   Vitals reviewed.     Ref. Range 3/18/2019 16:45 5/6/2019 11:07   Vit D, 25-Hydroxy Latest Ref Range: 30 - 96 ng/mL  36   17-Hydroxyprogesterone Latest Ref Range: 40 - 239 ng/dL  <31 (L)   Hemoglobin A1C External Latest Ref Range: 4.0 - 5.6 % 5.8 (H)    Estimated Avg Glucose Latest Ref Range: 68 - 131 mg/dL 120    ACTH Latest Ref Range: 0 - 46 pg/mL  <5   TSH Latest Ref Range: 0.400 - 4.000 uIU/mL 0.398 (L) 0.397 (L)   Free T4 Latest Ref Range: 0.71 - 1.51 ng/dL 0.81 0.91     Visit Vitals  /78   Pulse 92   Ht 5' 1" (1.549 m)   Wt 64.2 kg (141 lb 8.6 oz)   BMI 26.74 kg/m²       Body mass index is 26.74 kg/m².    Lab Review:   Lab Results   Component Value Date    HGBA1C 5.8 (H) 03/18/2019     Lab Results   Component Value Date    CHOL 218 (H) 03/18/2019    HDL 47 03/18/2019    LDLCALC 139.2 03/18/2019    TRIG 159 (H) 03/18/2019    CHOLHDL 21.6 03/18/2019     Lab Results   Component Value Date     10/28/2019    K 4.9 10/28/2019     10/28/2019    CO2 27 10/28/2019    GLU 88 10/28/2019    BUN 10 10/28/2019    CREATININE 0.7 10/28/2019    CALCIUM 10.4 10/28/2019    PROT 7.1 10/28/2019    ALBUMIN 4.2 10/28/2019    BILITOT 0.3 10/28/2019    ALKPHOS 605 (H) 10/28/2019    AST 62 (H) 10/28/2019    ALT 70 (H) 10/28/2019    ANIONGAP 8 10/28/2019    ESTGFRAFRICA >60 10/28/2019    EGFRNONAA >60 10/28/2019    TSH 0.397 (L) 05/06/2019     Vit D, 25-Hydroxy   Date Value Ref Range Status   05/06/2019 36 30 - " 96 ng/mL Final     Comment:     Vitamin D deficiency.........<10 ng/mL                              Vitamin D insufficiency......10-29 ng/mL       Vitamin D sufficiency........> or equal to 30 ng/mL  Vitamin D toxicity............>100 ng/mL       Assessment and Plan     Congenital adrenal hyperplasia    cah with AI  Goal is to get him on the lowest dose pred that he can tolerate (goal is about 5 mg qd)  Plan to decrease dose from 10 mg qd to 9 mg qd for 1 week and then they will reach t to me  Needs medic alert tag   Reviewed sick day precautions - handout provided  Do not need to normalize 17 oh p or androgens           Adrenal insufficiency  As above     Abnormal thyroid blood test  Recheck and if low check thyroid scan and uptake       IGT (impaired glucose tolerance)    alerted as to the increased risk for t2DM  Stressed diet and exercise      Periodic hba1c levels      Endocrine disorder in female-to-male transgender person  As above  Continue trt   Follow labs       Follow up in about 3 months (around 2/5/2020).

## 2019-11-06 ENCOUNTER — TELEPHONE (OUTPATIENT)
Dept: UROGYNECOLOGY | Facility: CLINIC | Age: 49
End: 2019-11-06

## 2019-11-06 NOTE — ASSESSMENT & PLAN NOTE
cah with AI  Goal is to get him on the lowest dose pred that he can tolerate (goal is about 5 mg qd)  Plan to decrease dose from 10 mg qd to 9 mg qd for 1 week and then they will reach t to me  Needs medic alert tag   Reviewed sick day precautions - handout provided  Do not need to normalize 17 oh p or androgens

## 2019-11-06 NOTE — ASSESSMENT & PLAN NOTE
alerted as to the increased risk for t2DM  Stressed diet and exercise      Periodic hba1c levels

## 2019-11-06 NOTE — TELEPHONE ENCOUNTER
----- Message from Anastasia Allen sent at 11/6/2019  8:38 AM CST -----  Contact: AMAURY VENTURA [[id number]]  Name of Who is Calling: AMAURY VENTURA [[id number]]      What is the request in detail:patient would like a call back to discuss results. Please call       Can the clinic reply by [facility]: no    What Number to Call Back if not in [facility]:[phone] (wife)  or [phone]

## 2019-11-07 ENCOUNTER — OFFICE VISIT (OUTPATIENT)
Dept: ORTHOPEDICS | Facility: CLINIC | Age: 49
End: 2019-11-07
Payer: MEDICARE

## 2019-11-07 VITALS — BODY MASS INDEX: 27.4 KG/M2 | WEIGHT: 139.56 LBS | HEIGHT: 60 IN

## 2019-11-07 DIAGNOSIS — M17.0 ARTHRITIS OF BOTH KNEES: Primary | ICD-10-CM

## 2019-11-07 PROCEDURE — 99212 OFFICE O/P EST SF 10 MIN: CPT | Mod: S$PBB,,, | Performed by: ORTHOPAEDIC SURGERY

## 2019-11-07 PROCEDURE — 99999 PR PBB SHADOW E&M-EST. PATIENT-LVL III: ICD-10-PCS | Mod: PBBFAC,,, | Performed by: ORTHOPAEDIC SURGERY

## 2019-11-07 PROCEDURE — 99999 PR PBB SHADOW E&M-EST. PATIENT-LVL III: CPT | Mod: PBBFAC,,, | Performed by: ORTHOPAEDIC SURGERY

## 2019-11-07 PROCEDURE — 99212 PR OFFICE/OUTPT VISIT, EST, LEVL II, 10-19 MIN: ICD-10-PCS | Mod: S$PBB,,, | Performed by: ORTHOPAEDIC SURGERY

## 2019-11-07 PROCEDURE — 99213 OFFICE O/P EST LOW 20 MIN: CPT | Mod: PBBFAC | Performed by: ORTHOPAEDIC SURGERY

## 2019-11-07 NOTE — PROGRESS NOTES
Subjective:     HPI:   Ari Santos is a 49 y.o. female who presents for recheck.  He has severe varus knees bilaterally.    In the meantime he had a spine fusion he says this helped his radicular pain significantly.  Nine mostly complains of knee pain the right knee is most symptomatic. he got braces he says they are not very helpful     Objective:   Exam:  On the right side he has 5-120 degree knee range of motion 30 degree varus alignment which corrects to 15 45 degree extensor lag 5/5 knee extension strength with knee at 90°  He has a curvilinear longitudinal scar at the distal medial thigh he has got VMO atrophy in that area you can palpate the quad tendon does feel to be intact      Imaging:  None today the      Assessment:     Arthritis of both knees [M17.0]  Severe varus     Plan:       We discussed non operative and operative options.    At this point he would like to move forward with right total knee replacement.  I am concerned about degree of extensor lag which will certainly complicate surgery    I would like to run his case by several of my colleagues at the upcoming [facility] meeting    We will tentatively plan on a right TKA on 1/13/20    We will see him back in December to discuss specifics, risks, benefits    No orders of the defined types were placed in this encounter.      Past Medical History:   Diagnosis Date    Adrenal insufficiency     Arthritis     Chronic bilateral low back pain with bilateral sciatica 3/19/2019    Congenital adrenal hyperplasia     Hyperlipidemia     Spinal stenosis     Spinal stenosis of lumbar region with neurogenic claudication 3/19/2019    Status post sex reassignment surgery 3/19/2019    Hx of Congenital Adrenal Hyperplasia       Past Surgical History:   Procedure Laterality Date    BACK SURGERY      LAMINECTOMY  09/13/2019    RADIOFREQUENCY ABLATION Left 5/9/2019    Procedure: RADIOFREQUENCY ABLATION, LEFT L3,4,5;  Surgeon: Messi Cabello MD;   Location: [facility];  Service: Pain Management;  Laterality: Left;  1 of 2    RADIOFREQUENCY ABLATION Right 5/23/2019    Procedure: RADIOFREQUENCY ABLATION, RIGHT L3,4,5;  Surgeon: Messi Cabello MD;  Location: [facility];  Service: Pain Management;  Laterality: Right;  2of 2       Family History   Problem Relation Age of Onset    Diabetes Maternal Grandfather     Breast cancer Neg Hx     Ovarian cancer Neg Hx     Vaginal cancer Neg Hx     Endometrial cancer Neg Hx     Cervical cancer Neg Hx        Social History     Socioeconomic History    Marital status:      Spouse name: Not on file    Number of children: Not on file    Years of education: Not on file    Highest education level: Not on file   Occupational History    Not on file   Social Needs    Financial resource strain: Not very hard    Food insecurity:     Worry: Never true     Inability: Never true    Transportation needs:     Medical: No     Non-medical: No   Tobacco Use    Smoking status: Former Smoker    Smokeless tobacco: Former User     Quit date: 5/1/2009    Tobacco comment: Socailly   Substance and Sexual Activity    Alcohol use: Not Currently     Frequency: Never     Binge frequency: Never    Drug use: Never    Sexual activity: Yes     Partners: Female   Lifestyle    Physical activity:     Days per week: 3 days     Minutes per session: 20 min    Stress: To some extent   Relationships    Social connections:     Talks on phone: More than three times a week     Gets together: Once a week     Attends [employer] service: Not on file     Active member of club or organization: No     Attends meetings of clubs or organizations: Never     Relationship status:    Other Topics Concern    Not on file   Social History Narrative    Not on file

## 2019-11-11 ENCOUNTER — PATIENT MESSAGE (OUTPATIENT)
Dept: UROGYNECOLOGY | Facility: CLINIC | Age: 49
End: 2019-11-11

## 2019-11-12 ENCOUNTER — PROCEDURE VISIT (OUTPATIENT)
Dept: NEUROLOGY | Facility: CLINIC | Age: 49
End: 2019-11-12
Payer: MEDICARE

## 2019-11-12 VITALS — WEIGHT: 139 LBS | HEIGHT: 60 IN | TEMPERATURE: 86 F | BODY MASS INDEX: 27.29 KG/M2

## 2019-11-12 DIAGNOSIS — G56.03 CARPAL TUNNEL SYNDROME ON BOTH SIDES: ICD-10-CM

## 2019-11-12 PROCEDURE — 95886 PR EMG COMPLETE, W/ NERVE CONDUCTION STUDIES, 5+ MUSCLES: ICD-10-PCS | Mod: 26,S$PBB,, | Performed by: NEUROLOGICAL SURGERY

## 2019-11-12 PROCEDURE — 95911 NRV CNDJ TEST 9-10 STUDIES: CPT | Mod: PBBFAC | Performed by: NEUROLOGICAL SURGERY

## 2019-11-12 PROCEDURE — 95911 PR NERVE CONDUCTION STUDY; 9-10 STUDIES: ICD-10-PCS | Mod: 26,S$PBB,, | Performed by: NEUROLOGICAL SURGERY

## 2019-11-12 PROCEDURE — 99203 PR OFFICE/OUTPT VISIT, NEW, LEVL III, 30-44 MIN: ICD-10-PCS | Mod: 25,S$PBB,, | Performed by: NEUROLOGICAL SURGERY

## 2019-11-12 PROCEDURE — 95886 MUSC TEST DONE W/N TEST COMP: CPT | Mod: 26,S$PBB,, | Performed by: NEUROLOGICAL SURGERY

## 2019-11-12 PROCEDURE — 95911 NRV CNDJ TEST 9-10 STUDIES: CPT | Mod: 26,S$PBB,, | Performed by: NEUROLOGICAL SURGERY

## 2019-11-12 PROCEDURE — 95886 MUSC TEST DONE W/N TEST COMP: CPT | Mod: PBBFAC | Performed by: NEUROLOGICAL SURGERY

## 2019-11-12 PROCEDURE — 99203 OFFICE O/P NEW LOW 30 MIN: CPT | Mod: 25,S$PBB,, | Performed by: NEUROLOGICAL SURGERY

## 2019-11-13 ENCOUNTER — PATIENT OUTREACH (OUTPATIENT)
Dept: ADMINISTRATIVE | Facility: OTHER | Age: 49
End: 2019-11-13

## 2019-11-13 NOTE — PROCEDURES
Chief Complaint   Patient presents with    Other Misc     emg        Ari Devyn Santos is a 49 y.o. female with a history of multiple medical diagnoses as listed below that presents for evaluation of numbness in the hands and arms.  The patient has a history of laminectomy performed and lumbar spine to help to address his pain in the legs, but the patient has been having increasing pain and difficulty with using his hands. 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  He says that he has been having increasing pain in his hands that is waking him up from sleep at night.  He feels that  strength has been decreasing in his manual dexterity has also been fading compared to his baseline.  He says that the symptoms have been increasingly bothersome and he has been evaluated by Neurosurgery who recommended he come today for EMG/nerve conduction studies to further evaluate his symptoms.  The patient was referred by Meseret Sterling to have EMG/nerve conduction studies as part of the diagnosis and evaluation of his complaints.    PAST MEDICAL HISTORY:  Past Medical History:   Diagnosis Date    Adrenal insufficiency     Arthritis     Chronic bilateral low back pain with bilateral sciatica 3/19/2019    Congenital adrenal hyperplasia     Hyperlipidemia     Spinal stenosis     Spinal stenosis of lumbar region with neurogenic claudication 3/19/2019    Status post sex reassignment surgery 3/19/2019    Hx of Congenital Adrenal Hyperplasia       PAST SURGICAL HISTORY:  Past Surgical History:   Procedure Laterality Date    BACK SURGERY      LAMINECTOMY  09/13/2019    RADIOFREQUENCY ABLATION Left 5/9/2019    Procedure: RADIOFREQUENCY ABLATION, LEFT L3,4,5;  Surgeon: Messi Cabello MD;  Location: [facility];  Service: Pain Management;  Laterality: Left;  1 of 2    RADIOFREQUENCY ABLATION Right 5/23/2019    Procedure: RADIOFREQUENCY ABLATION, RIGHT L3,4,5;  Surgeon: Messi Cabello MD;   Location: [facility];  Service: Pain Management;  Laterality: Right;  2of 2       SOCIAL HISTORY:  Social History     Socioeconomic History    Marital status:      Spouse name: Not on file    Number of children: Not on file    Years of education: Not on file    Highest education level: Not on file   Occupational History    Not on file   Social Needs    Financial resource strain: Not very hard    Food insecurity:     Worry: Never true     Inability: Never true    Transportation needs:     Medical: No     Non-medical: No   Tobacco Use    Smoking status: Former Smoker    Smokeless tobacco: Former User     Quit date: 5/1/2009    Tobacco comment: Socailly   Substance and Sexual Activity    Alcohol use: Not Currently     Frequency: Never     Binge frequency: Never    Drug use: Never    Sexual activity: Yes     Partners: Female   Lifestyle    Physical activity:     Days per week: 3 days     Minutes per session: 20 min    Stress: To some extent   Relationships    Social connections:     Talks on phone: More than three times a week     Gets together: Once a week     Attends [employer]od[employer] service: Not on file     Active member of club or organization: No     Attends meetings of clubs or organizations: Never     Relationship status:    Other Topics Concern    Not on file   Social History Narrative    Not on file       FAMILY HISTORY:  Family History   Problem Relation Age of Onset    Diabetes Maternal Grandfather     Breast cancer Neg Hx     Ovarian cancer Neg Hx     Vaginal cancer Neg Hx     Endometrial cancer Neg Hx     Cervical cancer Neg Hx        ALLERGIES AND MEDICATIONS: updated and reviewed.  Review of patient's allergies indicates:   Allergen Reactions    Penicillins Rash     Current Outpatient Medications   Medication Sig Dispense Refill    ALPRAZolam (XANAX) 1 MG tablet Take 1 tablet (1 mg total) by mouth 3 (three) times daily as needed. 90 tablet 0    atorvastatin (LIPITOR)  10 MG tablet Take 1 tablet (10 mg total) by mouth every evening. 90 tablet 2    cyclobenzaprine (FLEXERIL) 10 MG tablet Take 1 tablet (10 mg total) by mouth 3 (three) times daily as needed for Muscle spasms. 90 tablet 1    famotidine (PEPCID) 20 MG tablet Take 1 tablet (20 mg total) by mouth 2 (two) times daily. 60 tablet 11    gabapentin (NEURONTIN) 800 MG tablet 1 tablet by mouth three times a day and 2 tablets at night (5 tablets a day, 4000mg) 150 tablet 2    Lactobacillus acidophilus (ACIDOPHILUS) Cap Take by mouth once daily.       lamoTRIgine (LAMICTAL) 200 MG tablet Take 200 mg by mouth 2 (two) times daily.       loratadine (CLARITIN) 10 mg tablet Take 10 mg by mouth once daily.       morphine (MS CONTIN) 15 MG 12 hr tablet Take 1 tablet (15 mg total) by mouth 2 (two) times daily. 60 tablet 0    nystatin (MYCOSTATIN) cream Apply topically 2 (two) times daily. (Patient taking differently: Apply topically 2 (two) times daily as needed. ) 30 g 1    oxyCODONE-acetaminophen (PERCOCET)  mg per tablet Take 1 tablet by mouth every 8 (eight) hours as needed for Pain. 90 tablet 0    predniSONE (DELTASONE) 1 MG tablet As prescribed - up to 4 mg  A day 200 tablet 3    predniSONE (DELTASONE) 5 MG tablet Take 1 tablet (5 mg total) by mouth once daily. (Patient taking differently: Take 10 mg by mouth once daily. ) 30 tablet 11    primidone (MYSOLINE) 50 MG Tab Take 50 mg by mouth 3 (three) times daily.       rOPINIRole (REQUIP) 4 MG tablet Take 4 mg by mouth nightly.       rOPINIRole (REQUIP) 4 MG tablet Take one tablet by mouth at bedtime 90 tablet 0    testosterone (ANDROGEL) 1 % (50 mg/5 gram) GlPk Apply 5 g topically once daily. 30 packet 4    triamcinolone acetonide 0.1% (KENALOG) 0.1 % cream Apply topically 2 (two) times daily. 45 g 1    zinc 50 mg Tab once daily.        No current facility-administered medications for this visit.        Review of Systems   Neurological: Positive for weakness  and numbness.       Neurologic Exam     Mental Status   Oriented to person, place, and time.   Registration: recalls 3 of 3 objects.   Attention: normal. Concentration: normal.   Speech: speech is normal   Level of consciousness: alert  Knowledge: good.     Cranial Nerves     CN II   Visual fields full to confrontation.   Right visual field deficit: none  Left visual field deficit: none     CN III, IV, VI   Pupils are equal, round, and reactive to light.  Extraocular motions are normal.   Right pupil: Size: 3 mm. Shape: regular. Accommodation: intact.   Left pupil: Size: 3 mm. Shape: regular. Accommodation: intact.   CN III: no CN III palsy  CN VI: no CN VI palsy  Nystagmus: none   Diplopia: none  Ophthalmoparesis: none  Upgaze: normal  Downgaze: normal  Conjugate gaze: present    CN V   Facial sensation intact.   Right facial sensation deficit: none  Left facial sensation deficit: none    CN VII   Facial expression full, symmetric.   Right facial weakness: none  Left facial weakness: none    CN VIII   CN VIII normal.     CN IX, X   CN IX normal.   CN X normal.   Palate: symmetric    CN XI   CN XI normal.   Right sternocleidomastoid strength: normal  Left sternocleidomastoid strength: normal  Right trapezius strength: normal  Left trapezius strength: normal    CN XII   CN XII normal.   Tongue deviation: none    Motor Exam   Muscle bulk: normal  Overall muscle tone: normal  Right arm tone: normal  Left arm tone: normal  Right leg tone: normal  Left leg tone: normal    Strength   Strength 5/5 except as noted.   Right interossei: 4/5  Left interossei: 4/5    Sensory Exam   Right arm light touch: decreased from fingers  Left arm light touch: decreased from fingers  Right leg light touch: normal  Left leg light touch: normal  Right arm vibration: decreased from fingers  Left arm vibration: decreased from fingers  Right leg vibration: normal  Left leg vibration: normal  Right arm proprioception: normal  Left arm  proprioception: normal  Right leg proprioception: normal  Left leg proprioception: normal  Right arm pinprick: decreased from fingers  Left arm pinprick: decreased from fingers  Right leg pinprick: normal  Left leg pinprick: normal  Sensory deficit distribution on right: median  Sensory deficit distribution on left: median    Gait, Coordination, and Reflexes     Gait  Gait: normal    Coordination   Romberg: negative  Finger to nose coordination: normal  Heel to shin coordination: normal  Tandem walking coordination: normal    Tremor   Resting tremor: absent    Reflexes   Right brachioradialis: 2+  Left brachioradialis: 2+  Right biceps: 2+  Left biceps: 2+  Right triceps: 2+  Left triceps: 2+  Right patellar: 2+  Left patellar: 2+  Right achilles: 2+  Left achilles: 2+  Right plantar: normal  Left plantar: normal      Physical Exam   Constitutional: She is oriented to person, place, and time. She appears well-developed and well-nourished.   HENT:   Head: Normocephalic and atraumatic.   Eyes: Pupils are equal, round, and reactive to light. EOM are normal.   Neck: Normal range of motion.   Cardiovascular: Normal rate and intact distal pulses.   Pulmonary/Chest: Effort normal. No apnea. No respiratory distress.   Musculoskeletal: Normal range of motion.   Neurological: She is alert and oriented to person, place, and time. She has a normal Finger-Nose-Finger Test, a normal Heel to Shin Test, a normal Romberg Test and a normal Tandem Gait Test. Gait normal.   Reflex Scores:       Tricep reflexes are 2+ on the right side and 2+ on the left side.       Bicep reflexes are 2+ on the right side and 2+ on the left side.       Brachioradialis reflexes are 2+ on the right side and 2+ on the left side.       Patellar reflexes are 2+ on the right side and 2+ on the left side.       Achilles reflexes are 2+ on the right side and 2+ on the left side.  Skin: Skin is warm and dry.   Psychiatric: She has a normal mood and affect. Her  speech is normal and behavior is normal. Thought content normal.   Vitals reviewed.      Vitals:    11/12/19 1545   Temp: (!) 86 °F (30 °C)   TempSrc: Skin   Weight: 63 kg (139 lb)   Height: 5' (1.524 m)       Assessment & Plan:    Problem List Items Addressed This Visit     Carpal tunnel syndrome on both sides        EMG consistent with carpal tunnel syndrome as well as a concomitant C7 radiculopathy.  Full report will be available to Meseret Arguelles in the media section of the chart    Follow-up: Follow up if symptoms worsen or fail to improve.    This note was done with the assistance of voice recognition software. Some errors may be present after proofreading.

## 2019-11-15 DIAGNOSIS — M17.0 ARTHRITIS OF BOTH KNEES: Primary | ICD-10-CM

## 2019-11-15 DIAGNOSIS — F43.10 PTSD (POST-TRAUMATIC STRESS DISORDER): ICD-10-CM

## 2019-11-15 DIAGNOSIS — R30.0 DYSURIA: Primary | ICD-10-CM

## 2019-11-15 NOTE — PROGRESS NOTES
Will do pre-hab, specific focus on quad strengthening  Will see back at end of December  Planning R TKA in january

## 2019-11-16 RX ORDER — ALPRAZOLAM 1 MG/1
1 TABLET ORAL 3 TIMES DAILY PRN
Qty: 90 TABLET | Refills: 0 | Status: SHIPPED | OUTPATIENT
Start: 2019-11-16 | End: 2019-12-13 | Stop reason: SDUPTHER

## 2019-11-16 NOTE — TELEPHONE ENCOUNTER
Spoke with patient's partner.  Reviewed results with her per patient request.  Not sure further imaging warranted, as patient not having pelvic/perineal pain, but partner concerned that urine may be leaking into area due to previous surgeries. May also be that cervical stump left in place with some fluid collection.  Will discuss possible IR drainage of site with radiology to test for urine vs other fluid.      She is trying to get OP notes.  I let her know we were unable to get them.     Patient also feels like ? UTI again.  Urine C&S placed so he can drop off at [facility].  Partner asks why patient is getting frequent UTIs.  I explained that mult  surgeries and/or T4/atrophy may place him at risk.  May need cysto + topical estrogen.  Will discuss after I speak with radiology re: possible IR drainage of area.     They are ok with this plan.

## 2019-11-18 ENCOUNTER — HOSPITAL ENCOUNTER (OUTPATIENT)
Dept: RADIOLOGY | Facility: OTHER | Age: 49
Discharge: HOME | End: 2019-11-18
Attending: INTERNAL MEDICINE
Payer: MEDICARE

## 2019-11-18 ENCOUNTER — OFFICE VISIT (OUTPATIENT)
Dept: PAIN MEDICINE | Facility: CLINIC | Age: 49
End: 2019-11-18
Attending: ANESTHESIOLOGY
Payer: MEDICARE

## 2019-11-18 ENCOUNTER — TELEPHONE (OUTPATIENT)
Dept: ORTHOPEDICS | Facility: CLINIC | Age: 49
End: 2019-11-18

## 2019-11-18 VITALS
OXYGEN SATURATION: 100 % | TEMPERATURE: 98 F | RESPIRATION RATE: 18 BRPM | HEIGHT: 60 IN | HEART RATE: 112 BPM | DIASTOLIC BLOOD PRESSURE: 76 MMHG | BODY MASS INDEX: 27.48 KG/M2 | SYSTOLIC BLOOD PRESSURE: 121 MMHG | WEIGHT: 140 LBS

## 2019-11-18 DIAGNOSIS — G89.4 CHRONIC PAIN SYNDROME: Primary | ICD-10-CM

## 2019-11-18 DIAGNOSIS — M79.2 NEUROPATHIC PAIN: ICD-10-CM

## 2019-11-18 DIAGNOSIS — Z98.890 S/P LUMBAR LAMINECTOMY: ICD-10-CM

## 2019-11-18 DIAGNOSIS — R74.01 TRANSAMINITIS: ICD-10-CM

## 2019-11-18 DIAGNOSIS — M54.12 CERVICAL RADICULOPATHY: ICD-10-CM

## 2019-11-18 PROCEDURE — 99215 OFFICE O/P EST HI 40 MIN: CPT | Mod: PBBFAC,25 | Performed by: ANESTHESIOLOGY

## 2019-11-18 PROCEDURE — 99214 PR OFFICE/OUTPT VISIT, EST, LEVL IV, 30-39 MIN: ICD-10-PCS | Mod: S$PBB,,, | Performed by: ANESTHESIOLOGY

## 2019-11-18 PROCEDURE — 76705 US ABDOMEN LIMITED: ICD-10-PCS | Mod: 26,,, | Performed by: RADIOLOGY

## 2019-11-18 PROCEDURE — 76705 ECHO EXAM OF ABDOMEN: CPT | Mod: 26,,, | Performed by: RADIOLOGY

## 2019-11-18 PROCEDURE — 76705 ECHO EXAM OF ABDOMEN: CPT | Mod: TC

## 2019-11-18 PROCEDURE — 99214 OFFICE O/P EST MOD 30 MIN: CPT | Mod: S$PBB,,, | Performed by: ANESTHESIOLOGY

## 2019-11-18 PROCEDURE — 99999 PR PBB SHADOW E&M-EST. PATIENT-LVL V: CPT | Mod: PBBFAC,,, | Performed by: ANESTHESIOLOGY

## 2019-11-18 PROCEDURE — 99999 PR PBB SHADOW E&M-EST. PATIENT-LVL V: ICD-10-PCS | Mod: PBBFAC,,, | Performed by: ANESTHESIOLOGY

## 2019-11-18 RX ORDER — MORPHINE SULFATE 15 MG/1
15 TABLET, FILM COATED, EXTENDED RELEASE ORAL 2 TIMES DAILY
Qty: 60 TABLET | Refills: 0 | Status: SHIPPED | OUTPATIENT
Start: 2019-11-21 | End: 2019-12-30 | Stop reason: SDUPTHER

## 2019-11-18 RX ORDER — OXYCODONE AND ACETAMINOPHEN 10; 325 MG/1; MG/1
1 TABLET ORAL EVERY 8 HOURS PRN
Qty: 90 TABLET | Refills: 0 | Status: ON HOLD | OUTPATIENT
Start: 2019-11-21 | End: 2020-01-31 | Stop reason: HOSPADM

## 2019-11-18 NOTE — PROGRESS NOTES
Chronic Pain - Follow up    Referring Physician: No ref. provider found    Chief Complaint:   No chief complaint on file.       SUBJECTIVE: Disclaimer: This note has been generated using voice-recognition software. There may be typographical errors that have been missed during proof-reading  Interval history 11/18/2019:  Since previous encounter the patient is status post lumbar laminectomy decompression from L1-S1 in September and has healed well subsequently and has undergone physical therapy.  He denies radicular symptoms to his lower extremities at this time but is having severe knee pain and is being evaluated for knee replacement to be done in January.  Otherwise the patient has been stable and has been utilizing his opioid medications appropriately he is taking MS Contin 15 mg b.i.d. along with oxycodone acetaminophen 10/325 t.i.d. p.r.n. without any side effects or evidence of misuse or abuse.  The patient also has been taking gabapentin 4000 mg per day but continues to have radicular pain symptoms in his upper extremities which was thought to be predominantly carpal tunnel he had an EMG/NCV with Neurology which showed a bilateral carpal tunnel with concomitant C7 radiculopathy.  Previous MRI of the cervical spine did reveal stenosis at C5-6 and C6-7 with moderate neuroforaminal stenosis.  Interval History 8/21/19:  Patient reports for follow up. He has seen neurosurgery and is scheduled for  Open L1-S1 laminectomy. He has also seen orthopedics for his bilateral knee pain. They have planned for knee braces after completion of his spinal surgery.  Patient reports continued back pain.  He is s/p RFA of bilateral L3,4,5 in 5/9/19 and 5/23/19 with 60% relief in his pain for 1 week.  Patient reports continued back pain, sharp, starts in his lower back and radiates to his feet. Patient also reports worsening of the neuropathic pain in the palms of his hands, burning, tingling pain worse at night.    Interval  History 6/20/2019:  The patient is here for follow up of lower back pain.  He is s/p lumbar RFAs.  He is reporting minimal benefit of pain.  He feels as though previous RFAs from another provider were helpful.  However, he is reporting pain across the lower back with radiation down the back of both legs.  We previously discussed a surgical referral and he would like to pursue this option.  He has been taking Percocet 5/325 mg TID as well as oxycodone 15 mg for severe breakthrough pain.  He feels as though the 15 mg make him hyper and unable to sleep.  He would like to adjust the medications.  Additionally, he was weaning Gabapentin 800 mg and is now taking it BID.  However, he feels as though his shooting pain has worsened since this.  His pain today is 6/10.    Interval History 4/12/2019:  The patient returns for follow up of back pain.  We have received his records including previous lumbar and MRI.  There is no NCS/EMG report, however.  His records indicate lumbar RFAs over 6 months ago.  He reports that this was helpful for him until recently.  He had a left synovial cyst aspiration and L5-S1 TF MAYURI in the past as well.  He feels as though RFA was more helpful.  Additionally, he had an updated lumbar MRI since previous visit which does show significant arthritis and spinal stenosis.  He would like to hold off on surgical consult at this time.  He has decreased Gabapentin to 800 mg TID and has not noticed a change in pain.  He takes Percocet 5/325 mg TID PRN pain.  He also takes oxycodone 15 mg PRN, about 2-3 pills per week for severe pain.  This was a one time refill from Dr. Mitchell with intention of transition to our office.  He denies any bowel or bladder changes.  His pain today is 6/10.    Initial encounter:    Ari Santos presents to the clinic for the evaluation of lower back pain. The pain started 9 year ago starting indiously and symptoms have been worsening.    Brief history:  History of spinal  stenosis and history of a cyst with drainage.    Patient has a pain management physician in [facility]    Pain Description:    The pain is located in the lower back area and radiates to the lower extremities bilaterally in the L5 distribution.      At BEST  5/10     At WORST  10/10 on the WORST day.      On average pain is rated as 7/10.     Today the pain is rated as 7/10    The pain is described as sharp and intermittent      Symptoms interfere with daily activity and sleeping.     Exacerbating factors: Standing, Walking, Morning and Getting out of bed/chair.      Mitigating factors medications, physical therapy and rest.     Patient reports significant motor weakness and loss of sensations.  Patient denies any suicidal or homicidal ideations    Pain Medications:  Current:  Flexeril 10mg TID  Gabapentin 800mg QID  Lamictal 200mg qHS  Aleve 220mg BID  Percocet 5/325  TID PRN  Xanax 1mg for 1 - 3 times/day  ropinrole 4mg    Tried in Past:  NSAIDs -with some relief  TCA -Never  SNRI -venlafaxine for depression -with side effects  Anti-convulsants -gabapentin     Physical Therapy/Home Exercise: yes completed last year with limited benefit     report:  Reviewed and consistent with medication use as prescribed.    Pain Procedures: previous RFA and MAYURI  5/9/19 Left L3,4,5 RFA  5/23/19 Right L3,4,5 RFA- 50-60% reduction for one week    Chiropractor -helps in the past  Acupuncture - never  TENS unit -helps occasionally  Spinal decompression lumbar decompressive surgery from L1-S1 September 2019  Joint replacement -never    Imaging:     Narrative     EXAMINATION:  MRI LUMBAR SPINE WITHOUT CONTRAST    CLINICAL HISTORY:  bilateral lower extremity radiculopathy and weakness in the L4/5 distribution with neurogenic claudication;  Spinal stenosis, lumbar region with neurogenic claudication    TECHNIQUE:  Sagittal T1, T2 and STIR images as well as axial T1 and T2 weighted images were obtained through the lumbar  without the administration of contrast.    COMPARISON:  None    FINDINGS:  Alignment: There loss of the normal lumbar lordosis.  Minimal grade 1 anterolisthesis of L3 on L4 and L4 on L5.  There may also be minimal retrolisthesis of L2 as relates to L3.    Vertebrae: The vertebral bodies maintain normal height and signal intensity.    Discs:  Multilevel, advanced loss of disc height is noted throughout the lumbar spine with disc desiccation.    Cord: Normal signal.  The conus terminates at the T12-L1 level.    Degenerative findings:    T12-L1: There is a minimal diffuse disc bulge with no facet arthropathy or ligamentum flavum hypertrophy.  The central canal and neural foramen are patent.    L1-L2:There is a large diffuse disc bulge within no significant facet arthropathy.  Ligamentum flavum hypertrophy is present.  There effacement of the anterior thecal sleeve and mild central canal stenosis as well as moderate to severe right and severe left neural foraminal stenosis.    L2-L3: There is a 6 mm cystic structure in the posterior left aspect of the central canal at the level of the midbody of L2.  This effaces the anterior thecal sleeve and occupies a portion of the left neural foramen.  Additionally, there is a large diffuse disc bulge as well as severe bilateral facet arthropathy at L2-L3.  No significant ligamentum flavum hypertrophy.  Moderate central canal stenosis and mild bilateral neural foraminal stenosis is present.    L3-L4: There is a large diffuse disc bulge with severe bilateral facet arthropathy.  No significant ligamentum flavum hypertrophy there are congenitally shortened pedicles.  This results in moderate central canal stenosis, mild left and moderate right neural foraminal stenosis.    L4-L5: There is a large diffuse disc bulge with severe bilateral facet arthropathy and mild ligamentum flavum hypertrophy.  These findings result in severe central canal stenosis and left neural foraminal stenosis  as well as moderate to severe right neural foraminal stenosis.    L5-S1: There is a mild diffuse disc bulge with severe right and moderate to severe left neural foraminal stenosis.  Ligamentum flavum hypertrophy is present    Paraspinal muscles & soft tissues: Normal.    Incidental findings:    -there is a small amount of fluid in the left sacroiliac joint    -2.6 cm T2 hyperintense, T1 hypointense rounded structure in the interpolar region of the left kidney    -2.3 cm rounded, circumscribed, uniformly T2 hyperintense, T1 hypointense focus emanating from the lateral limb of the left adrenal gland    -1.1 cm, heterogeneously T2 hyperintense focus emanating from the junction of the anterior and medial limb of the right adrenal gland      Impression       1. Minimal grade 1 anterolisthesis of L3 on L4 and L4 on L5 with minimal grade 1 retrolisthesis of L2 on L3.  2. Multilevel degenerative disc and joint disease resulting in severe central canal and neural foraminal stenosis at several levels.  3. Finding on the left kidney likely represents a Bosniak category 2 cyst.  4. Indeterminate bilateral adrenal gland nodules.  Further evaluation of these nodules as well as the left kidney finding can be performed with dedicated adrenal CT or MRI.  5.  This report was flagged in Epic as abnormal.         Past Medical History:   Diagnosis Date    Adrenal insufficiency     Arthritis     Chronic bilateral low back pain with bilateral sciatica 3/19/2019    Congenital adrenal hyperplasia     Hyperlipidemia     Spinal stenosis     Spinal stenosis of lumbar region with neurogenic claudication 3/19/2019    Status post sex reassignment surgery 3/19/2019    Hx of Congenital Adrenal Hyperplasia     Past Surgical History:   Procedure Laterality Date    BACK SURGERY      LAMINECTOMY  09/13/2019    RADIOFREQUENCY ABLATION Left 5/9/2019    Procedure: RADIOFREQUENCY ABLATION, LEFT L3,4,5;  Surgeon: Messi Cabello MD;  Location:  [facility];  Service: Pain Management;  Laterality: Left;  1 of 2    RADIOFREQUENCY ABLATION Right 5/23/2019    Procedure: RADIOFREQUENCY ABLATION, RIGHT L3,4,5;  Surgeon: Messi Cabello MD;  Location: [facility];  Service: Pain Management;  Laterality: Right;  2of 2     Social History     Socioeconomic History    Marital status:      Spouse name: Not on file    Number of children: Not on file    Years of education: Not on file    Highest education level: Not on file   Occupational History    Not on file   Social Needs    Financial resource strain: Not very hard    Food insecurity:     Worry: Never true     Inability: Never true    Transportation needs:     Medical: No     Non-medical: No   Tobacco Use    Smoking status: Former Smoker    Smokeless tobacco: Former User     Quit date: 5/1/2009    Tobacco comment: Socailly   Substance and Sexual Activity    Alcohol use: Not Currently     Frequency: Never     Binge frequency: Never    Drug use: Never    Sexual activity: Yes     Partners: Female   Lifestyle    Physical activity:     Days per week: 3 days     Minutes per session: 20 min    Stress: To some extent   Relationships    Social connections:     Talks on phone: More than three times a week     Gets together: Once a week     Attends Worship service: Not on file     Active member of club or organization: No     Attends meetings of clubs or organizations: Never     Relationship status:    Other Topics Concern    Not on file   Social History Narrative    Not on file     Family History   Problem Relation Age of Onset    Diabetes Maternal Grandfather     Breast cancer Neg Hx     Ovarian cancer Neg Hx     Vaginal cancer Neg Hx     Endometrial cancer Neg Hx     Cervical cancer Neg Hx        Review of patient's allergies indicates:   Allergen Reactions    Penicillins Rash       Current Outpatient Medications   Medication Sig    ALPRAZolam (XANAX) 1 MG tablet Take 1  tablet (1 mg total) by mouth 3 (three) times daily as needed.    atorvastatin (LIPITOR) 10 MG tablet Take 1 tablet (10 mg total) by mouth every evening.    cyclobenzaprine (FLEXERIL) 10 MG tablet Take 1 tablet (10 mg total) by mouth 3 (three) times daily as needed for Muscle spasms.    famotidine (PEPCID) 20 MG tablet Take 1 tablet (20 mg total) by mouth 2 (two) times daily.    gabapentin (NEURONTIN) 800 MG tablet 1 tablet by mouth three times a day and 2 tablets at night (5 tablets a day, 4000mg)    Lactobacillus acidophilus (ACIDOPHILUS) Cap Take by mouth once daily.     lamoTRIgine (LAMICTAL) 200 MG tablet Take 200 mg by mouth 2 (two) times daily.     loratadine (CLARITIN) 10 mg tablet Take 10 mg by mouth once daily.     morphine (MS CONTIN) 15 MG 12 hr tablet Take 1 tablet (15 mg total) by mouth 2 (two) times daily.    nystatin (MYCOSTATIN) cream Apply topically 2 (two) times daily. (Patient taking differently: Apply topically 2 (two) times daily as needed. )    oxyCODONE-acetaminophen (PERCOCET)  mg per tablet Take 1 tablet by mouth every 8 (eight) hours as needed for Pain.    predniSONE (DELTASONE) 1 MG tablet As prescribed - up to 4 mg  A day    predniSONE (DELTASONE) 5 MG tablet Take 1 tablet (5 mg total) by mouth once daily. (Patient taking differently: Take 10 mg by mouth once daily. )    primidone (MYSOLINE) 50 MG Tab Take 50 mg by mouth 3 (three) times daily.     rOPINIRole (REQUIP) 4 MG tablet Take 4 mg by mouth nightly.     rOPINIRole (REQUIP) 4 MG tablet Take one tablet by mouth at bedtime    testosterone (ANDROGEL) 1 % (50 mg/5 gram) GlPk Apply 5 g topically once daily.    triamcinolone acetonide 0.1% (KENALOG) 0.1 % cream Apply topically 2 (two) times daily.    zinc 50 mg Tab once daily.      No current facility-administered medications for this visit.        REVIEW OF SYSTEMS:    GENERAL:  No weight loss, malaise or fevers.  HEENT:   No recent changes in vision or  hearing  NECK:  Negative for lumps, no difficulty with swallowing.  RESPIRATORY:  Negative for cough, wheezing or shortness of breath, patient denies any recent URI.  CARDIOVASCULAR:  Negative for chest pain, leg swelling or palpitations.  GI:  Negative for abdominal discomfort, blood in stools or black stools or change in bowel habits.  MUSCULOSKELETAL:  See HPI.  SKIN:  Negative for lesions, rash, and itching.  PSYCH:  Significant psychosocial stressors including PTSD for sex re-assignment surgery.  Patient's sleep is disturbed secondary to pain.  HEMATOLOGY/LYMPHOLOGY:  Negative for prolonged bleeding, bruising easily or swollen nodes.  Patient is not currently taking any anti-coagulants  ENDO: No history of diabetes or thyroid dysfunction  NEURO:   No history of headaches, syncope, paralysis, seizures or tremors.  All other reviewed and negative other than HPI.    OBJECTIVE:    /76   Pulse (!) 112   Temp 98.1 °F (36.7 °C)   Resp 18   Ht 5' (1.524 m)   Wt 63.5 kg (139 lb 15.9 oz)   SpO2 100%   BMI 27.34 kg/m²     PHYSICAL EXAMINATION:    GENERAL: Well appearing, in no acute distress, alert and oriented x3.  PSYCH:  Mood and affect appropriate.  SKIN:  Well-healed incisions without any evidence of infection  HEAD/FACE:  Normocephalic, atraumatic.   CV: RRR with palpation of the radial artery.  PULM: No evidence of respiratory difficulty, symmetric chest rise.  EXT:  Decreased range of motion in the right knee, the patient is wearing a knee brace on both knees.  BACK:  There is significant pain with palpation over the facet joints of the lumbar spine bilaterally. There is decreased range of motion with extension to 15 degrees, and facet loading maneuvers cause reproducible pain.    NEURO:  No clonus.  Cranial nerves grossly intact  GAIT: Antalgic, ambulates with rolling walker, and has a leg length discrepancy    Lab Results   Component Value Date    WBC 12.63 09/27/2019    HGB 9.0 (L) 09/27/2019     HCT 27.8 (L) 09/27/2019     (H) 09/27/2019     (H) 09/27/2019     CMP  Sodium   Date Value Ref Range Status   10/28/2019 138 136 - 145 mmol/L Final     Potassium   Date Value Ref Range Status   10/28/2019 4.9 3.5 - 5.1 mmol/L Final     Chloride   Date Value Ref Range Status   10/28/2019 103 95 - 110 mmol/L Final     CO2   Date Value Ref Range Status   10/28/2019 27 23 - 29 mmol/L Final     Glucose   Date Value Ref Range Status   10/28/2019 88 70 - 110 mg/dL Final     BUN, Bld   Date Value Ref Range Status   10/28/2019 10 6 - 20 mg/dL Final     Creatinine   Date Value Ref Range Status   10/28/2019 0.7 0.5 - 1.4 mg/dL Final     Calcium   Date Value Ref Range Status   10/28/2019 10.4 8.7 - 10.5 mg/dL Final     Total Protein   Date Value Ref Range Status   10/28/2019 7.1 6.0 - 8.4 g/dL Final     Albumin   Date Value Ref Range Status   10/28/2019 4.2 3.5 - 5.2 g/dL Final     Total Bilirubin   Date Value Ref Range Status   10/28/2019 0.3 0.1 - 1.0 mg/dL Final     Comment:     For infants and newborns, interpretation of results should be based  on gestational age, weight and in agreement with clinical  observations.  Premature Infant recommended reference ranges:  Up to 24 hours.............<8.0 mg/dL  Up to 48 hours............<12.0 mg/dL  3-5 days..................<15.0 mg/dL  6-29 days.................<15.0 mg/dL       Alkaline Phosphatase   Date Value Ref Range Status   10/28/2019 605 (H) 55 - 135 U/L Final     AST   Date Value Ref Range Status   10/28/2019 62 (H) 10 - 40 U/L Final     ALT   Date Value Ref Range Status   10/28/2019 70 (H) 10 - 44 U/L Final     Anion Gap   Date Value Ref Range Status   10/28/2019 8 8 - 16 mmol/L Final     eGFR if    Date Value Ref Range Status   10/28/2019 >60 >60 mL/min/1.73 m^2 Final     eGFR if non    Date Value Ref Range Status   10/28/2019 >60 >60 mL/min/1.73 m^2 Final     Comment:     Calculation used to obtain the estimated glomerular  filtration  rate (eGFR) is the CKD-EPI equation.        Lab Results   Component Value Date    HGBA1C 5.8 (H) 03/18/2019     Lab Results   Component Value Date    TSH 0.397 (L) 05/06/2019     Free T4 - 0.81 (wnl)      ASSESSMENT: 49 y.o. year old female with pain, consistent with     Encounter Diagnoses   Name Primary?    Chronic pain syndrome Yes    S/P lumbar laminectomy     Neuropathic pain     Cervical radiculopathy        PLAN:     - Previous imaging was reviewed and discussed with the patient today.    -follow-up with orthopedics regarding knee replacement, we will avoid steroid injections until after healing    -in the future the patient may benefit from medial branch nerve blocks followed by radiofrequency ablation of the lumbar spine    -continue gabapentin    Continue oxycodone acetaminophen 10/325 mg TID PRN, #90 , a refill provided today  Continue MS Contin 15 mg b.i.d.    - Pain has filled contract.  UDS performed today, will f/u results.    - RTC in 3 months with NP        Greater than 25 minutes spent in total in todays visit with the patient, with more than half that time direct face to face counseling and education with the patient today. We discussed the disease process, prognosis, treatment plan, and risks and benefits.         Messi Cabello  11/18/2019

## 2019-11-18 NOTE — TELEPHONE ENCOUNTER
----- Message from Donte Andrade III, MD sent at 11/15/2019  4:15 PM CST -----  Regarding: schedule appt  Please schedule appointment for the end of december

## 2019-11-21 ENCOUNTER — PATIENT OUTREACH (OUTPATIENT)
Dept: ADMINISTRATIVE | Facility: HOSPITAL | Age: 49
End: 2019-11-21

## 2019-12-03 ENCOUNTER — CLINICAL SUPPORT (OUTPATIENT)
Dept: REHABILITATION | Facility: OTHER | Age: 49
End: 2019-12-03
Payer: MEDICARE

## 2019-12-03 DIAGNOSIS — M25.562 CHRONIC PAIN OF BOTH KNEES: ICD-10-CM

## 2019-12-03 DIAGNOSIS — G89.29 CHRONIC PAIN OF BOTH KNEES: ICD-10-CM

## 2019-12-03 DIAGNOSIS — M25.561 CHRONIC PAIN OF BOTH KNEES: ICD-10-CM

## 2019-12-03 DIAGNOSIS — Z74.09 DECREASED FUNCTIONAL MOBILITY AND ENDURANCE: ICD-10-CM

## 2019-12-03 DIAGNOSIS — M62.81 QUADRICEPS WEAKNESS: ICD-10-CM

## 2019-12-03 PROCEDURE — 97162 PT EVAL MOD COMPLEX 30 MIN: CPT | Mod: PN | Performed by: PHYSICAL THERAPIST

## 2019-12-03 PROCEDURE — 97110 THERAPEUTIC EXERCISES: CPT | Mod: PN | Performed by: PHYSICAL THERAPIST

## 2019-12-04 PROBLEM — M62.81 QUADRICEPS WEAKNESS: Status: ACTIVE | Noted: 2019-12-04

## 2019-12-04 PROBLEM — M25.562 CHRONIC PAIN OF BOTH KNEES: Status: ACTIVE | Noted: 2019-12-04

## 2019-12-04 PROBLEM — G89.29 CHRONIC PAIN OF BOTH KNEES: Status: ACTIVE | Noted: 2019-12-04

## 2019-12-04 PROBLEM — M25.561 CHRONIC PAIN OF BOTH KNEES: Status: ACTIVE | Noted: 2019-12-04

## 2019-12-04 PROBLEM — Z74.09 DECREASED FUNCTIONAL MOBILITY AND ENDURANCE: Status: ACTIVE | Noted: 2019-12-04

## 2019-12-05 ENCOUNTER — PATIENT OUTREACH (OUTPATIENT)
Dept: ADMINISTRATIVE | Facility: OTHER | Age: 49
End: 2019-12-05

## 2019-12-09 ENCOUNTER — PATIENT MESSAGE (OUTPATIENT)
Dept: UROGYNECOLOGY | Facility: CLINIC | Age: 49
End: 2019-12-09

## 2019-12-10 DIAGNOSIS — R18.8 PELVIC FLUID COLLECTION: Primary | ICD-10-CM

## 2019-12-11 ENCOUNTER — CLINICAL SUPPORT (OUTPATIENT)
Dept: REHABILITATION | Facility: OTHER | Age: 49
End: 2019-12-11
Payer: MEDICARE

## 2019-12-11 DIAGNOSIS — G89.29 CHRONIC PAIN OF BOTH KNEES: ICD-10-CM

## 2019-12-11 DIAGNOSIS — M25.561 CHRONIC PAIN OF BOTH KNEES: ICD-10-CM

## 2019-12-11 DIAGNOSIS — M25.562 CHRONIC PAIN OF BOTH KNEES: ICD-10-CM

## 2019-12-11 DIAGNOSIS — Z74.09 DECREASED FUNCTIONAL MOBILITY AND ENDURANCE: ICD-10-CM

## 2019-12-11 DIAGNOSIS — M62.81 QUADRICEPS WEAKNESS: ICD-10-CM

## 2019-12-11 PROCEDURE — 97110 THERAPEUTIC EXERCISES: CPT | Mod: PN | Performed by: PHYSICAL THERAPIST

## 2019-12-11 NOTE — PROGRESS NOTES
"  Physical Therapy Daily Treatment Note     Name: Ari Santos  Clinic Number: [id number]    Therapy Diagnosis:   Encounter Diagnoses   Name Primary?    Chronic pain of both knees     Quadriceps weakness     Decreased functional mobility and endurance      Physician: Donte Andrade III, *    Visit Date: 12/11/2019    Physician Orders: PT Eval and Treat   eval and treat with modalities: pre-hab TKA, B varus knee arthritis, R side extensor lag. Specific focus on quad strengthening      Medical Diagnosis from Referral: M17.0 (ICD-10-CM) - Arthritis of both knees   Evaluation Date: 12/3/2019  Authorization Period Expiration: 12/31/2019  Plan of Care Expiration: 1/10/2020  Visit # / Visits authorized: 2/20    Time In: 0900am  Time Out: 0945am  Total Billable Time: 45 minutes 1:1 with PT    Precautions: Standard, Laminectomy (09/13/2019).    Subjective     Pt reports: doing the exercises at home. He has his braces on today per request but does not like to wear them. They are bulky and "embaressing"  She was compliant with home exercise program.  Response to previous treatment: none  Functional change: none    Pain: 5/10  Location: right medial knee      Objective     Ari received therapeutic exercises to develop strength, endurance, ROM and core stabilization for 45 minutes including:     QS with tactile/ verbal cueing into towel roll 5 sec x 20  QS with ball for tactile cueing VMO contraction 3 sec x 10  TVA with block in HL 5 sec x 10  SLR with cueing for QS 5 x 2  Supine hip abduction/ adduction x 10  Supine heel slides with sliding board x 10 ea  Hamstring sets 3" x 10 (increased fatigue)  LAQ 3 sec x 20 (2# on L only)    U shuttle 12.5# x 20    Ari received cold pack for 0 minutes to knees.      Home Exercises Provided and Patient Education Provided     Education provided:   - HEP, Pt's spouse present     Written Home Exercises Provided: Patient instructed to cont prior HEP.  Exercises were reviewed and Ari " was able to demonstrate them prior to the end of the session.  Ari demonstrated good  understanding of the education provided.     See EMR under Patient Instructions for exercises provided prior visit.    Assessment     Good tolerance to treatment session with appropriate training effect. Continues with significant varus collapse during stance phase of gait with braces donned.   Ari is progressing well towards her goals.   Pt prognosis is Good.     Pt will continue to benefit from skilled outpatient physical therapy to address the deficits listed in the problem list box on initial evaluation, provide pt/family education and to maximize pt's level of independence in the home and community environment.     Pt's spiritual, cultural and educational needs considered and pt agreeable to plan of care and goals.     Anticipated barriers to physical therapy: none    Goals:   Short term (2 weeks):  1. Pt to be independent with home exercise program for improved self management of condition-progressing, not met  Long term (4 weeks)  1. Patient to have improved gait speed as noted by TUG <20 sec for decreased risk for falls- progressing, not met  2. Pt to have decreased subjective report of disability as noted by <60% on FOTO knee questionnaire -progressing, not met  3. Pt's R knee quad strength to be 3-/5 or greater for improved performance of ADL's such sit to stand transfers- progressing, not met    Plan     Continue with emphasis on quad strengthening    Dayan Bruce, PT

## 2019-12-12 DIAGNOSIS — M17.11 PRIMARY OSTEOARTHRITIS OF RIGHT KNEE: Primary | ICD-10-CM

## 2019-12-13 DIAGNOSIS — F43.10 PTSD (POST-TRAUMATIC STRESS DISORDER): ICD-10-CM

## 2019-12-13 RX ORDER — ALPRAZOLAM 1 MG/1
1 TABLET ORAL 3 TIMES DAILY PRN
Qty: 90 TABLET | Refills: 0 | Status: CANCELLED | OUTPATIENT
Start: 2019-12-13

## 2019-12-15 DIAGNOSIS — M62.838 MUSCLE SPASM: Primary | ICD-10-CM

## 2019-12-16 ENCOUNTER — PATIENT OUTREACH (OUTPATIENT)
Dept: ADMINISTRATIVE | Facility: OTHER | Age: 49
End: 2019-12-16

## 2019-12-16 DIAGNOSIS — Z12.11 ENCOUNTER FOR FIT (FECAL IMMUNOCHEMICAL TEST) SCREENING: Primary | ICD-10-CM

## 2019-12-16 RX ORDER — CYCLOBENZAPRINE HCL 10 MG
10 TABLET ORAL 3 TIMES DAILY PRN
Qty: 90 TABLET | Refills: 1 | Status: SHIPPED | OUTPATIENT
Start: 2019-12-16 | End: 2020-01-17

## 2019-12-17 ENCOUNTER — TELEPHONE (OUTPATIENT)
Dept: PREADMISSION TESTING | Facility: HOSPITAL | Age: 49
End: 2019-12-17

## 2019-12-17 ENCOUNTER — OFFICE VISIT (OUTPATIENT)
Dept: ORTHOPEDICS | Facility: CLINIC | Age: 49
End: 2019-12-17
Payer: MEDICARE

## 2019-12-17 ENCOUNTER — CLINICAL SUPPORT (OUTPATIENT)
Dept: REHABILITATION | Facility: OTHER | Age: 49
End: 2019-12-17
Payer: MEDICARE

## 2019-12-17 VITALS — BODY MASS INDEX: 26.01 KG/M2 | WEIGHT: 132.5 LBS | HEIGHT: 60 IN

## 2019-12-17 DIAGNOSIS — G89.29 CHRONIC PAIN OF BOTH KNEES: ICD-10-CM

## 2019-12-17 DIAGNOSIS — M25.561 CHRONIC PAIN OF BOTH KNEES: ICD-10-CM

## 2019-12-17 DIAGNOSIS — M17.11 PRIMARY OSTEOARTHRITIS OF RIGHT KNEE: Primary | ICD-10-CM

## 2019-12-17 DIAGNOSIS — Z74.09 DECREASED FUNCTIONAL MOBILITY AND ENDURANCE: ICD-10-CM

## 2019-12-17 DIAGNOSIS — M25.562 CHRONIC PAIN OF BOTH KNEES: ICD-10-CM

## 2019-12-17 DIAGNOSIS — M79.604 PAIN OF RIGHT LOWER EXTREMITY: ICD-10-CM

## 2019-12-17 DIAGNOSIS — M62.81 QUADRICEPS WEAKNESS: ICD-10-CM

## 2019-12-17 DIAGNOSIS — M79.604 PAIN OF RIGHT LOWER EXTREMITY: Primary | ICD-10-CM

## 2019-12-17 DIAGNOSIS — Z01.818 PRE-OP TESTING: Primary | ICD-10-CM

## 2019-12-17 PROCEDURE — 99499 UNLISTED E&M SERVICE: CPT | Mod: S$PBB,,, | Performed by: ORTHOPAEDIC SURGERY

## 2019-12-17 PROCEDURE — 97110 THERAPEUTIC EXERCISES: CPT | Mod: PN | Performed by: PHYSICAL THERAPIST

## 2019-12-17 PROCEDURE — 99999 PR PBB SHADOW E&M-EST. PATIENT-LVL III: ICD-10-PCS | Mod: PBBFAC,,, | Performed by: ORTHOPAEDIC SURGERY

## 2019-12-17 PROCEDURE — 99999 PR PBB SHADOW E&M-EST. PATIENT-LVL III: CPT | Mod: PBBFAC,,, | Performed by: ORTHOPAEDIC SURGERY

## 2019-12-17 PROCEDURE — 99213 OFFICE O/P EST LOW 20 MIN: CPT | Mod: PBBFAC | Performed by: ORTHOPAEDIC SURGERY

## 2019-12-17 PROCEDURE — 99499 NO LOS: ICD-10-PCS | Mod: S$PBB,,, | Performed by: ORTHOPAEDIC SURGERY

## 2019-12-17 RX ORDER — ALPRAZOLAM 1 MG/1
1 TABLET ORAL 3 TIMES DAILY PRN
Qty: 90 TABLET | Refills: 0 | Status: SHIPPED | OUTPATIENT
Start: 2019-12-17 | End: 2020-01-15 | Stop reason: SDUPTHER

## 2019-12-17 NOTE — PROGRESS NOTES
Subjective:     HPI:   Ari Santos is a 49 y.o. female who presents for surgical discussion for right total knee    Replacement he has done 3 therapy sessions has been doing exercises on his own he thinks it is helping he has gotten to increase knee extension strength    Of note he is on 9 milligram prednisone daily he got a stress dose and had endocrine consult when he was in the hospital for his spine surgery recently.    He is on testosterone but says he can stop it after surgery we discussed the DVT risk for that    He has a rolling walker and a wheelchair at home     Objective:   Exam:  Slightly improved knee extension strength      Imaging:  None today      Assessment:     Primary osteoarthritis of right knee [M17.11]  Some type of underlying skeletal dysplasia with severe varus deformity grade 3 arthritic changes     Plan:       This patient has significant symptoms in their knee that are affecting their quality of life and daily activities.  They have tried non-operative treatment including analgesics, an exercise program, and activity modification, but the symptoms have persisted. I believe they make a good candidate for knee arthroplasty.     The risks and benefits of knee arthroplasty have been discussed with the patient which include, but are not limited to infections (including severe sequelae), potential component failure, fracture, DVT, pulmonary embolus, nerve palsy, poor range of motion, the possibility of bone grafting, persistent pain, wound healing complications, transfusions, medical complications and death.     Pre-operative planning will include the following:  A pre-surgical evaluation by will be arranged.  Pre-op orders will be placed.  We will make arrangements with the operating room for proper time and staffing.  We will make Social Service arrangements for the patient.    Location: [facility] 2nd floor     Implants:   Company: [employer]  System: Attune Revision, Hinge backup, consider short  cemented stem on femur    DVT prophylaxis: ASA 81mg BID x1 month  Dispo: no PT for 1st 2 weeks, start OP PT 2 weeks post op    We discussed that this is a incredibly complex difficult surgery with significant risk of postoperative and perioperative complications, long-term results are little more unpredictable given his extensor lag and prior knee surgeries, he has had increased risk for implant instability, failure, increased need for revision, nerve injury    Will plan on and attune revision component will need a hinge back up.  He may need some scar revision given his previous surgeries.  Will treat this like a total knee revision and do a total knee revision closure with wound VAC.  Will put him in a T scope hinged knee brace for 2 weeks.  We will use a sterile tourniquet    Inpatient hospital stay anticipate stay of at least 3 days likely 4 days      No orders of the defined types were placed in this encounter.      Past Medical History:   Diagnosis Date    Adrenal insufficiency     Arthritis     Chronic bilateral low back pain with bilateral sciatica 3/19/2019    Congenital adrenal hyperplasia     Hyperlipidemia     Spinal stenosis     Spinal stenosis of lumbar region with neurogenic claudication 3/19/2019    Status post sex reassignment surgery 3/19/2019    Hx of Congenital Adrenal Hyperplasia       Past Surgical History:   Procedure Laterality Date    BACK SURGERY      LAMINECTOMY  09/13/2019    RADIOFREQUENCY ABLATION Left 5/9/2019    Procedure: RADIOFREQUENCY ABLATION, LEFT L3,4,5;  Surgeon: Messi Cabello MD;  Location: [facility];  Service: Pain Management;  Laterality: Left;  1 of 2    RADIOFREQUENCY ABLATION Right 5/23/2019    Procedure: RADIOFREQUENCY ABLATION, RIGHT L3,4,5;  Surgeon: Messi Cabello MD;  Location: [facility];  Service: Pain Management;  Laterality: Right;  2of 2       Family History   Problem Relation Age of Onset    Diabetes Maternal Grandfather     Breast  cancer Neg Hx     Ovarian cancer Neg Hx     Vaginal cancer Neg Hx     Endometrial cancer Neg Hx     Cervical cancer Neg Hx        Social History     Socioeconomic History    Marital status:      Spouse name: Not on file    Number of children: Not on file    Years of education: Not on file    Highest education level: Not on file   Occupational History    Not on file   Social Needs    Financial resource strain: Not very hard    Food insecurity:     Worry: Never true     Inability: Never true    Transportation needs:     Medical: No     Non-medical: No   Tobacco Use    Smoking status: Former Smoker    Smokeless tobacco: Former User     Quit date: 5/1/2009    Tobacco comment: Socailly   Substance and Sexual Activity    Alcohol use: Not Currently     Frequency: Never     Binge frequency: Never    Drug use: Never    Sexual activity: Yes     Partners: Female   Lifestyle    Physical activity:     Days per week: 3 days     Minutes per session: 20 min    Stress: To some extent   Relationships    Social connections:     Talks on phone: More than three times a week     Gets together: Once a week     Attends Alevism service: Not on file     Active member of club or organization: No     Attends meetings of clubs or organizations: Never     Relationship status:    Other Topics Concern    Not on file   Social History Narrative    Not on file

## 2019-12-17 NOTE — PROGRESS NOTES
"  Physical Therapy Daily Treatment Note     Name: Ari Santos  Clinic Number: [id number]    Therapy Diagnosis:   Encounter Diagnoses   Name Primary?    Chronic pain of both knees     Quadriceps weakness     Decreased functional mobility and endurance      Physician: Donte Andrade III, *    Visit Date: 12/17/2019    Physician Orders: PT Eval and Treat   eval and treat with modalities: pre-hab TKA, B varus knee arthritis, R side extensor lag. Specific focus on quad strengthening      Medical Diagnosis from Referral: M17.0 (ICD-10-CM) - Arthritis of both knees   Evaluation Date: 12/3/2019  Authorization Period Expiration: 12/31/2019  Plan of Care Expiration: 1/10/2020  Visit # / Visits authorized: 3/20    Time In: 1:00pm  Time Out: 1:55pm  Total Billable Time: 55 minutes 1:1 with PT 30 min    Precautions: Standard, Laminectomy (09/13/2019).    Subjective     Pt reports: He has an appointment with Dr Andrade this afternoon. He feels stronger since beginning PT. His goal is to be able to walk with a cane following his back surgery.   She was compliant with home exercise program.  Response to previous treatment: none  Functional change: none    Pain: 5/10  Location: right medial knee      Objective     Ari received therapeutic exercises to develop strength, endurance, ROM and core stabilization for 55 minutes including:     QS with tactile/ verbal cueing into towel roll 5 sec x 20  QS with ball for tactile cueing VMO contraction 3 sec x 10  +SAQ from large roll 3 sec x 20 ea  TVA with block in HL 5 sec x 10  +Bridging 2 x 10  SLR with cueing for QS 5 x 2  Supine hip abduction/ adduction x 20  Supine heel slides with sliding board x 10 ea  Hamstring sets 3" x 10 - nt  LAQ 3 sec x 20 (2# on L only)    U shuttle 12.5# x 20 ea  TKE with OTB x 20    Gait training with SPC x 50 ft SBA    Ari received cold pack for 0 minutes to knees.      Home Exercises Provided and Patient Education Provided     Education provided: "   - HEP, Pt's spouse present     Written Home Exercises Provided: Patient instructed to cont prior HEP.  Exercises were reviewed and Ari was able to demonstrate them prior to the end of the session.  Ari demonstrated good  understanding of the education provided.     See EMR under Patient Instructions for exercises provided prior visit.    Assessment     Good tolerance to progression of quad and gluteal strengthening. Recommended continued use of RW in community and SPC household distances. SBA for occasional R knee buckling and instability.   Ari is progressing well towards her goals.   Pt prognosis is Good.     Pt will continue to benefit from skilled outpatient physical therapy to address the deficits listed in the problem list box on initial evaluation, provide pt/family education and to maximize pt's level of independence in the home and community environment.     Pt's spiritual, cultural and educational needs considered and pt agreeable to plan of care and goals.     Anticipated barriers to physical therapy: none    Goals:   Short term (2 weeks):  1. Pt to be independent with home exercise program for improved self management of condition-progressing, not met  Long term (4 weeks)  1. Patient to have improved gait speed as noted by TUG <20 sec for decreased risk for falls- progressing, not met  2. Pt to have decreased subjective report of disability as noted by <60% on FOTO knee questionnaire -progressing, not met  3. Pt's R knee quad strength to be 3-/5 or greater for improved performance of ADL's such sit to stand transfers- progressing, not met    Plan     Continue with emphasis on quad strengthening    Dayan Bruce, PT

## 2019-12-17 NOTE — TELEPHONE ENCOUNTER
----- Message from Kristi Floyd LPN sent at 12/17/2019  4:18 PM CST -----  Surgery 1/13    Patient needs PT/INR,POC and OPOC appt.

## 2019-12-18 DIAGNOSIS — M17.11 PRIMARY OSTEOARTHRITIS OF RIGHT KNEE: Primary | ICD-10-CM

## 2019-12-21 DIAGNOSIS — N39.41 URINARY INCONTINENCE, URGE: ICD-10-CM

## 2019-12-21 DIAGNOSIS — Z98.890 HISTORY OF PELVIC SURGERY: Primary | ICD-10-CM

## 2019-12-22 ENCOUNTER — TELEPHONE (OUTPATIENT)
Dept: UROGYNECOLOGY | Facility: CLINIC | Age: 49
End: 2019-12-22

## 2019-12-22 NOTE — TELEPHONE ENCOUNTER
----- Message from Rsahard Taylor MD sent at 10/27/2019  6:45 PM CDT -----  Regarding: please print blank signed request of information (scanned into EPIC media), fill out, & send  Please print out scanned/signed release of information (media section, scanned 10/27/19), fill out, and send ASAP to request records for the following surgeries done at the following different hospitals:  1)  [facility] in [place] (p) [phone]  --hysterectomy done around 2003  2)  [facility], urology (Dr. Andrade or Stephanie?) (p) [phone]  --urinary reconstruction surgery done around 2004: URO rerouted urethra, removed cervix?, closed vagina    Please let me know when received. Thanks!

## 2019-12-24 DIAGNOSIS — R18.8 PELVIC FLUID COLLECTION: Primary | ICD-10-CM

## 2019-12-26 DIAGNOSIS — Z01.818 PRE-OP TESTING: Primary | ICD-10-CM

## 2019-12-30 ENCOUNTER — PATIENT OUTREACH (OUTPATIENT)
Dept: ADMINISTRATIVE | Facility: OTHER | Age: 49
End: 2019-12-30

## 2019-12-30 DIAGNOSIS — G89.4 CHRONIC PAIN SYNDROME: Primary | ICD-10-CM

## 2019-12-31 DIAGNOSIS — R18.8 PELVIC FLUID COLLECTION: Primary | ICD-10-CM

## 2019-12-31 RX ORDER — MIDAZOLAM HYDROCHLORIDE 1 MG/ML
1 INJECTION INTRAMUSCULAR; INTRAVENOUS
Status: CANCELLED | OUTPATIENT
Start: 2019-12-31

## 2019-12-31 RX ORDER — FENTANYL CITRATE 50 UG/ML
50 INJECTION, SOLUTION INTRAMUSCULAR; INTRAVENOUS
Status: CANCELLED | OUTPATIENT
Start: 2019-12-31

## 2019-12-31 RX ORDER — MORPHINE SULFATE 15 MG/1
15 TABLET, FILM COATED, EXTENDED RELEASE ORAL 2 TIMES DAILY
Qty: 60 TABLET | Refills: 0 | Status: SHIPPED | OUTPATIENT
Start: 2019-12-31 | End: 2020-02-03 | Stop reason: SDUPTHER

## 2019-12-31 RX ORDER — GABAPENTIN 800 MG/1
TABLET ORAL
Qty: 150 TABLET | Refills: 2 | Status: SHIPPED | OUTPATIENT
Start: 2019-12-31 | End: 2020-02-27 | Stop reason: SDUPTHER

## 2019-12-31 RX ORDER — GABAPENTIN 800 MG/1
TABLET ORAL
Qty: 150 TABLET | Refills: 2 | Status: CANCELLED | OUTPATIENT
Start: 2019-12-31

## 2019-12-31 RX ORDER — MORPHINE SULFATE 15 MG/1
15 TABLET, FILM COATED, EXTENDED RELEASE ORAL 2 TIMES DAILY
Qty: 60 TABLET | Refills: 0 | Status: CANCELLED | OUTPATIENT
Start: 2019-12-31

## 2019-12-31 NOTE — TELEPHONE ENCOUNTER
Patient was last seen in the office 11/18/19 by . This is a  patient. Patient has a pain contract on file. Patient last received this medication 11/29/2019. Patient has a follow up scheduled 02/27/20. Patient has no UDS on file.

## 2020-01-02 ENCOUNTER — OFFICE VISIT (OUTPATIENT)
Dept: NEUROSURGERY | Facility: CLINIC | Age: 50
End: 2020-01-02
Payer: MEDICARE

## 2020-01-02 VITALS
HEIGHT: 60 IN | HEART RATE: 107 BPM | SYSTOLIC BLOOD PRESSURE: 113 MMHG | BODY MASS INDEX: 26.05 KG/M2 | WEIGHT: 132.69 LBS | TEMPERATURE: 99 F | DIASTOLIC BLOOD PRESSURE: 68 MMHG

## 2020-01-02 DIAGNOSIS — Z98.890 S/P LUMBAR LAMINECTOMY: ICD-10-CM

## 2020-01-02 DIAGNOSIS — G56.03 CARPAL TUNNEL SYNDROME ON BOTH SIDES: Primary | ICD-10-CM

## 2020-01-02 DIAGNOSIS — S29.019A THORACIC MYOFASCIAL STRAIN, INITIAL ENCOUNTER: ICD-10-CM

## 2020-01-02 PROCEDURE — 99213 OFFICE O/P EST LOW 20 MIN: CPT | Mod: PBBFAC | Performed by: NEUROLOGICAL SURGERY

## 2020-01-02 PROCEDURE — 99999 PR PBB SHADOW E&M-EST. PATIENT-LVL III: CPT | Mod: PBBFAC,,, | Performed by: NEUROLOGICAL SURGERY

## 2020-01-02 PROCEDURE — 99024 POSTOP FOLLOW-UP VISIT: CPT | Mod: POP,,, | Performed by: NEUROLOGICAL SURGERY

## 2020-01-02 PROCEDURE — 99024 PR POST-OP FOLLOW-UP VISIT: ICD-10-PCS | Mod: POP,,, | Performed by: NEUROLOGICAL SURGERY

## 2020-01-02 PROCEDURE — 99999 PR PBB SHADOW E&M-EST. PATIENT-LVL III: ICD-10-PCS | Mod: PBBFAC,,, | Performed by: NEUROLOGICAL SURGERY

## 2020-01-02 NOTE — PROGRESS NOTES
"CHIEF COMPLAINT:  3 month follow-up     INTERVAL HISTORY (1/2/20):  Patient returns for routine 3m postoperative follow-up.  Patient reports preoperative back and leg pain continued to be improved.  However, he has muscle strain and paraspinal muscle weakness in the thoracolumbar region.  Overall he feels deconditioned and needs to strengthen his back.  He is scheduled to undergo a knee replacement on 01/13/2020 at [facility].  Overall he continues to suffer from multifocal areas of pain and arthritis.     He continues to complain of symptoms of bilateral carpal tunnel syndrome.  Recent EMG shows severe bilateral carpal tunnel syndrome and chronic C7 radiculopathy on the right.    HPI:  Ari Santos is a 49 y.o.-year-old female who presents today for post-operative follow-up s/p open L1-S1 lamis on 9/13/19 for neurogenic claudication.  Patient reports that he is doing significantly better and with the exception of some occasional muscle soreness he has significant pain relief.  His wound has healed very well without any signs of infection.  He reports that he has had urinary dysfunction since the surgery and is wearing a pad.  He has had extensive pelvic floor and vaginal corrective surgery in the past and has seen neural gynecology who believes this is possibly due to a chronic UTI.  He can sense when he has to go the bathroom but can't always stop it from coming out.     He additionally reports bilateral carpal tunnel syndrome. Pain at night in palm of his hand and into 1st through 4th digits of both hands.  He has numbness and "gravel" sensation in those fingers.  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  He has not had an BUE EMG.     He also has severe bilateral knee pain for which he is seeing Dr. Andrade of Orthopedics at [facility] on November 7th.  He has severe bowing of his legs.     ROS:  As stated in the above HPI     PE:    Vitals:    01/02/20 1528 "   BP: 113/68   Pulse: 107   Temp: 98.8 °F (37.1 °C)        AAOX3  NAD  CN 2-12 intact     Strength:                  Deltoids Biceps Triceps Wrist Ext. Wrist Flex. Hand    RUE 5 5 5 5 5 5   LUE 5 5 5 5 5 5     Hip Flex. Knee Flex. Knee Ext. Dorsi Flex Plantar Flex EHL   RLE 5 5 5 5 5 5   LLE 5 5 5 5 5 5      Sensation:  Decreased in bilat digits 1-4     Gait:  Steady     DTR:  No hyperreflexia     Lumbar incision:  Well healed     IMAGING:  EMG, 11/13/19:  1.   Severe bilateral carpal tunnel syndrome  2.   Chronic C7 denervation on the right      ASSESSMENT:   Problem List Items Addressed This Visit        Neuro    Carpal tunnel syndrome on both sides - Primary       Orthopedic    S/P lumbar laminectomy    Thoracic myofascial strain        1. S/p open L1-S1 lamis for neurogenic claudication.  Neuro intact.  Back and leg pain improved but having myofascial/muscle strain and weak core strength.  2. Bilateral carpal tunnel syndrome.  Confirmed with EMG.  Will refer to hand specialist.     PLAN:   - Cont PT as tolerated (rec incorporating back specific exercises)  - Defer to ortho re: knee replacement and CTS  - RTC in 6 months

## 2020-01-03 ENCOUNTER — HOSPITAL ENCOUNTER (OUTPATIENT)
Facility: HOSPITAL | Age: 50
Discharge: HOME | End: 2020-01-03
Attending: OBSTETRICS & GYNECOLOGY | Admitting: OBSTETRICS & GYNECOLOGY
Payer: MEDICARE

## 2020-01-03 ENCOUNTER — OFFICE VISIT (OUTPATIENT)
Dept: ORTHOPEDICS | Facility: CLINIC | Age: 50
End: 2020-01-03
Payer: MEDICARE

## 2020-01-03 ENCOUNTER — HOSPITAL ENCOUNTER (OUTPATIENT)
Dept: RADIOLOGY | Facility: HOSPITAL | Age: 50
Discharge: HOME | End: 2020-01-03
Attending: PHYSICIAN ASSISTANT
Payer: MEDICARE

## 2020-01-03 ENCOUNTER — PATIENT MESSAGE (OUTPATIENT)
Dept: UROGYNECOLOGY | Facility: CLINIC | Age: 50
End: 2020-01-03

## 2020-01-03 VITALS
TEMPERATURE: 98 F | HEART RATE: 103 BPM | SYSTOLIC BLOOD PRESSURE: 123 MMHG | DIASTOLIC BLOOD PRESSURE: 79 MMHG | WEIGHT: 132 LBS | BODY MASS INDEX: 25.91 KG/M2 | HEIGHT: 60 IN

## 2020-01-03 VITALS
WEIGHT: 132 LBS | HEIGHT: 60 IN | RESPIRATION RATE: 18 BRPM | TEMPERATURE: 97 F | BODY MASS INDEX: 25.91 KG/M2 | SYSTOLIC BLOOD PRESSURE: 109 MMHG | DIASTOLIC BLOOD PRESSURE: 53 MMHG | HEART RATE: 95 BPM | OXYGEN SATURATION: 100 %

## 2020-01-03 DIAGNOSIS — R18.8 PELVIC FLUID COLLECTION: ICD-10-CM

## 2020-01-03 DIAGNOSIS — M17.11 PRIMARY OSTEOARTHRITIS OF RIGHT KNEE: ICD-10-CM

## 2020-01-03 DIAGNOSIS — M17.11 PRIMARY OSTEOARTHRITIS OF RIGHT KNEE: Primary | ICD-10-CM

## 2020-01-03 PROCEDURE — 73560 X-RAY EXAM OF KNEE 1 OR 2: CPT | Mod: TC,RT

## 2020-01-03 PROCEDURE — 63600175 PHARM REV CODE 636 W HCPCS: Performed by: RADIOLOGY

## 2020-01-03 PROCEDURE — 25000003 PHARM REV CODE 250: Performed by: RADIOLOGY

## 2020-01-03 PROCEDURE — 73560 XR KNEE 1 OR 2 VIEW RIGHT: ICD-10-PCS | Mod: 26,RT,, | Performed by: RADIOLOGY

## 2020-01-03 PROCEDURE — 99499 UNLISTED E&M SERVICE: CPT | Mod: S$PBB,,, | Performed by: PHYSICIAN ASSISTANT

## 2020-01-03 PROCEDURE — 99999 PR PBB SHADOW E&M-EST. PATIENT-LVL V: CPT | Mod: PBBFAC,,, | Performed by: PHYSICIAN ASSISTANT

## 2020-01-03 PROCEDURE — 99215 OFFICE O/P EST HI 40 MIN: CPT | Mod: PBBFAC,25 | Performed by: PHYSICIAN ASSISTANT

## 2020-01-03 PROCEDURE — 63600175 PHARM REV CODE 636 W HCPCS: Performed by: FAMILY MEDICINE

## 2020-01-03 PROCEDURE — 73560 X-RAY EXAM OF KNEE 1 OR 2: CPT | Mod: 26,RT,, | Performed by: RADIOLOGY

## 2020-01-03 PROCEDURE — 99499 NO LOS: ICD-10-PCS | Mod: S$PBB,,, | Performed by: PHYSICIAN ASSISTANT

## 2020-01-03 PROCEDURE — 99999 PR PBB SHADOW E&M-EST. PATIENT-LVL V: ICD-10-PCS | Mod: PBBFAC,,, | Performed by: PHYSICIAN ASSISTANT

## 2020-01-03 RX ORDER — SODIUM CHLORIDE 9 MG/ML
INJECTION, SOLUTION INTRAVENOUS ONCE
Status: COMPLETED | OUTPATIENT
Start: 2020-01-03 | End: 2020-01-03

## 2020-01-03 RX ORDER — MIDAZOLAM HYDROCHLORIDE 1 MG/ML
INJECTION INTRAMUSCULAR; INTRAVENOUS CODE/TRAUMA/SEDATION MEDICATION
Status: COMPLETED | OUTPATIENT
Start: 2020-01-03 | End: 2020-01-03

## 2020-01-03 RX ORDER — SODIUM CHLORIDE 9 MG/ML
500 INJECTION, SOLUTION INTRAVENOUS ONCE
Status: COMPLETED | OUTPATIENT
Start: 2020-01-03 | End: 2020-01-03

## 2020-01-03 RX ORDER — FENTANYL CITRATE 50 UG/ML
INJECTION, SOLUTION INTRAMUSCULAR; INTRAVENOUS CODE/TRAUMA/SEDATION MEDICATION
Status: COMPLETED | OUTPATIENT
Start: 2020-01-03 | End: 2020-01-03

## 2020-01-03 RX ORDER — CHOLECALCIFEROL (VITAMIN D3) 125 MCG
CAPSULE ORAL
Status: ON HOLD | COMMUNITY
End: 2020-01-31 | Stop reason: HOSPADM

## 2020-01-03 RX ORDER — ACETAMINOPHEN 325 MG/1
650 TABLET ORAL ONCE
Status: COMPLETED | OUTPATIENT
Start: 2020-01-03 | End: 2020-01-03

## 2020-01-03 RX ADMIN — ACETAMINOPHEN 650 MG: 325 TABLET ORAL at 12:01

## 2020-01-03 RX ADMIN — MIDAZOLAM HYDROCHLORIDE 1 MG: 1 INJECTION, SOLUTION INTRAMUSCULAR; INTRAVENOUS at 11:01

## 2020-01-03 RX ADMIN — FENTANYL CITRATE 50 MCG: 50 INJECTION, SOLUTION INTRAMUSCULAR; INTRAVENOUS at 11:01

## 2020-01-03 RX ADMIN — SODIUM CHLORIDE: 0.9 INJECTION, SOLUTION INTRAVENOUS at 01:01

## 2020-01-03 RX ADMIN — SODIUM CHLORIDE 500 ML: 0.9 INJECTION, SOLUTION INTRAVENOUS at 10:01

## 2020-01-03 NOTE — DISCHARGE INSTRUCTIONS
Please call with any questions or concerns.      Monday thru Friday 8:00 am - 4:30 pm    Interventional Radiology   [phone]    After Hours    Ask for the Radiology Intern on call  [phone]

## 2020-01-03 NOTE — DISCHARGE SUMMARY
Radiology Discharge Summary      Hospital Course: No complications    Admit Date: 1/3/2020  Discharge Date: 01/03/2020     Instructions Given to Patient: Yes  Diet: Resume prior diet  Activity: activity as tolerated and no driving for today    Description of Condition on Discharge: Stable  Vital Signs (Most Recent): Temp: 97 °F (36.1 °C) (01/03/20 1205)  Pulse: 95 (01/03/20 1320)  Resp: 18 (01/03/20 1320)  BP: (!) 109/53 (01/03/20 1320)  SpO2: 100 % (01/03/20 1320)    Discharge Disposition: Home    Discharge Diagnosis: Pelvic fluid collection s/p attempted aspiration. Follow up as scheduled.    Manan Mederos M.D.  Diagnostic and Interventional Radiologist  Department of Radiology  Pager: [phone]

## 2020-01-03 NOTE — H&P
Radiology History & Physical      SUBJECTIVE:     Chief Complaint: Pelvic fluid collection    History of Present Illness:  Ari Santos is a 49 y.o. female who presents for pelvic fluid collection requiring drainage.    Past Medical History:   Diagnosis Date    Adrenal insufficiency     Arthritis     Chronic bilateral low back pain with bilateral sciatica 3/19/2019    Congenital adrenal hyperplasia     Hyperlipidemia     Spinal stenosis     Spinal stenosis of lumbar region with neurogenic claudication 3/19/2019    Status post sex reassignment surgery 3/19/2019    Hx of Congenital Adrenal Hyperplasia     Past Surgical History:   Procedure Laterality Date    BACK SURGERY      LAMINECTOMY  09/13/2019    RADIOFREQUENCY ABLATION Left 5/9/2019    Procedure: RADIOFREQUENCY ABLATION, LEFT L3,4,5;  Surgeon: Messi Cabello MD;  Location: [facility];  Service: Pain Management;  Laterality: Left;  1 of 2    RADIOFREQUENCY ABLATION Right 5/23/2019    Procedure: RADIOFREQUENCY ABLATION, RIGHT L3,4,5;  Surgeon: Messi Cabello MD;  Location: [facility];  Service: Pain Management;  Laterality: Right;  2of 2       Home Meds:   Prior to Admission medications    Medication Sig Start Date End Date Taking? Authorizing Provider   ALPRAZolam (XANAX) 1 MG tablet Take 1 tablet (1 mg total) by mouth 3 (three) times daily as needed. 12/17/19  Yes Siva Mitchell MD   atorvastatin (LIPITOR) 10 MG tablet Take 1 tablet (10 mg total) by mouth every evening. 9/25/19  Yes Siva Mitchell MD   cyclobenzaprine (FLEXERIL) 10 MG tablet Take 1 tablet (10 mg total) by mouth 3 (three) times daily as needed for Muscle spasms. 12/16/19 1/17/20 Yes ERIK Soto   gabapentin (NEURONTIN) 800 MG tablet 1 tablet by mouth three times a day and 2 tablets at night (5 tablets a day, 4000mg) 12/31/19  Yes Messi Cabello MD   lamoTRIgine (LAMICTAL) 200 MG tablet Take 200 mg by mouth 2 (two) times daily.    Yes  Historical Provider, MD   morphine (MS CONTIN) 15 MG 12 hr tablet Take 1 tablet (15 mg total) by mouth 2 (two) times daily. 12/31/19  Yes Messi Cabello MD   naproxen sodium (ALEVE) 220 mg Cap Take by mouth.   Yes Historical Provider, MD   oxyCODONE-acetaminophen (PERCOCET)  mg per tablet Take 1 tablet by mouth every 8 (eight) hours as needed for Pain. 11/21/19  Yes Messi Cabello MD   predniSONE (DELTASONE) 1 MG tablet As prescribed - up to 4 mg  A day 11/5/19  Yes Delfina Alvarez MD   predniSONE (DELTASONE) 5 MG tablet Take 1 tablet (5 mg total) by mouth once daily.  Patient taking differently: Take 10 mg by mouth once daily.  9/24/19  Yes Noreen Fermin NP   primidone (MYSOLINE) 50 MG Tab Take 50 mg by mouth 3 (three) times daily.    Yes Historical Provider, MD   rOPINIRole (REQUIP) 4 MG tablet Take 4 mg by mouth nightly.    Yes Historical Provider, MD   testosterone (ANDROGEL) 1 % (50 mg/5 gram) GlPk Apply 5 g topically once daily. 7/11/19 1/17/20 Yes Delfina Alvarez MD   zinc 50 mg Tab once daily.    Yes Historical Provider, MD   famotidine (PEPCID) 20 MG tablet Take 1 tablet (20 mg total) by mouth 2 (two) times daily. 9/17/19 9/16/20  Briseyda Brown PA-C   Lactobacillus acidophilus (ACIDOPHILUS) Cap Take by mouth once daily.     Historical Provider, MD   loratadine (CLARITIN) 10 mg tablet Take 10 mg by mouth once daily.     Historical Provider, MD   nystatin (MYCOSTATIN) cream Apply topically 2 (two) times daily.  Patient taking differently: Apply topically 2 (two) times daily as needed.  8/28/19   Siva Mitchell MD   rOPINIRole (REQUIP) 4 MG tablet Take one tablet by mouth at bedtime 12/7/18      triamcinolone acetonide 0.1% (KENALOG) 0.1 % cream Apply topically 2 (two) times daily. 8/28/19   Siva Mitchell MD     Anticoagulants/Antiplatelets: no anticoagulation    Allergies:   Review of patient's allergies indicates:   Allergen Reactions    Penicillins Rash     Sedation  History:  no adverse reactions    Review of Systems:   Hematological: no known coagulopathies  Respiratory: no shortness of breath  Cardiovascular: no chest pain  Gastrointestinal: no abdominal pain  Genito-Urinary: no dysuria  Musculoskeletal: negative  Neurological: no TIA or stroke symptoms         OBJECTIVE:     Vital Signs (Most Recent)  Temp: 97.1 °F (36.2 °C) (01/03/20 1033)  Pulse: 95 (01/03/20 1033)  Resp: 19 (01/03/20 1033)  BP: 115/62 (01/03/20 1033)  SpO2: 98 % (01/03/20 1033)    Physical Exam:  ASA: II  Mallampati: III    General: no acute distress  Mental Status: alert and oriented to person, place and time  HEENT: normocephalic, atraumatic  Chest: unlabored breathing  Heart: regular heart rate  Abdomen: nondistended  Extremity: moves all extremities    Laboratory  Lab Results   Component Value Date    INR 1.0 01/03/2020       Lab Results   Component Value Date    WBC 8.09 01/03/2020    HGB 13.3 01/03/2020    HCT 43.9 01/03/2020    MCV 97 01/03/2020     01/03/2020      Lab Results   Component Value Date    GLU 88 10/28/2019     10/28/2019    K 4.9 10/28/2019     10/28/2019    CO2 27 10/28/2019    BUN 10 10/28/2019    CREATININE 0.7 10/28/2019    CALCIUM 10.4 10/28/2019    ALT 70 (H) 10/28/2019    AST 62 (H) 10/28/2019    ALBUMIN 4.2 10/28/2019    BILITOT 0.3 10/28/2019       ASSESSMENT/PLAN:     Sedation Plan: Moderate  Patient will undergo pelvic fluid collection drainage.    Manny Tinajero M.D.  PGY-3  Radiology

## 2020-01-03 NOTE — NURSING
Pt arrived to IR Room 173 for pelvic fluid collection drainage. Pt oriented to unit and staff. Plan of care reviewed with patient, patient verbalizes understanding. Comfort measures utilized. Pt safely transferred from stretcher to procedural table. Fall risk reviewed with patient, fall risk interventions maintained. Safety strap applied, positioner pillows utilized to minimize pressure points. Blankets applied. Pt prepped and draped utilizing standard sterile technique. Patient placed on continuous monitoring, as required by sedation policy. Timeouts completed utilizing standard universal time-out, per department and facility policy. RN to remain at bedside, continuous monitoring maintained. Pt resting comfortably. Denies pain/discomfort. Will continue to monitor. See flow sheets for monitoring, medication administration, and updates.

## 2020-01-03 NOTE — NURSING
Abscess aspiration completed, MD Mederos unable to obtain fluid for lab collection, report given to Xiomara KUO in [place] [facility], dressing CDI

## 2020-01-03 NOTE — PLAN OF CARE
Pt SBP 80's and HR 90's, Dr Mederos aware. VORB for 500mL bolus of NS prior to ambulation. Site is clean dry and intact. No hematoma noted. No physical signs of bleeding at this time

## 2020-01-03 NOTE — PROCEDURES
Radiology Post-Procedure Note    Pre Op Diagnosis: Pelvic fluid collection    Post Op Diagnosis: Same    Procedure: CT guided aspiration    Procedure performed by: Manan Mederos MD    Written Informed Consent Obtained: Yes  Specimen Removed: NO  Estimated Blood Loss: Minimal    Findings:   Pt placed prone.  images show near total resolution of fluid. Case d/w Dr. Taylor and pt. It was decided to attempt aspiration despite near total resolution. 17 gauge needle was advanced to pelvic area between bladder and rectum under CT guidance. No fluid could be obtained. Needle was removed. No complications.    Patient tolerated procedure well.    Manan Mederos M.D.  Diagnostic and Interventional Radiologist  Department of Radiology  Pager: [phone]

## 2020-01-04 RX ORDER — ONDANSETRON 2 MG/ML
4 INJECTION INTRAMUSCULAR; INTRAVENOUS EVERY 8 HOURS PRN
Status: CANCELLED | OUTPATIENT
Start: 2020-01-04

## 2020-01-04 RX ORDER — POLYETHYLENE GLYCOL 3350 17 G/17G
17 POWDER, FOR SOLUTION ORAL DAILY
Status: CANCELLED | OUTPATIENT
Start: 2020-01-04

## 2020-01-04 RX ORDER — TALC
6 POWDER (GRAM) TOPICAL NIGHTLY PRN
Status: CANCELLED | OUTPATIENT
Start: 2020-01-04

## 2020-01-04 RX ORDER — FAMOTIDINE 20 MG/1
20 TABLET, FILM COATED ORAL 2 TIMES DAILY
Status: CANCELLED | OUTPATIENT
Start: 2020-01-04

## 2020-01-04 RX ORDER — OXYCODONE HYDROCHLORIDE 5 MG/1
10 TABLET ORAL
Status: CANCELLED | OUTPATIENT
Start: 2020-01-04

## 2020-01-04 RX ORDER — SODIUM CHLORIDE 9 MG/ML
INJECTION, SOLUTION INTRAVENOUS
Status: CANCELLED | OUTPATIENT
Start: 2020-01-04

## 2020-01-04 RX ORDER — MUPIROCIN 20 MG/G
1 OINTMENT TOPICAL 2 TIMES DAILY
Status: CANCELLED | OUTPATIENT
Start: 2020-01-04 | End: 2020-01-09

## 2020-01-04 RX ORDER — AMOXICILLIN 250 MG
1 CAPSULE ORAL 2 TIMES DAILY
Status: CANCELLED | OUTPATIENT
Start: 2020-01-04

## 2020-01-04 RX ORDER — SODIUM CHLORIDE 9 MG/ML
INJECTION, SOLUTION INTRAVENOUS CONTINUOUS
Status: CANCELLED | OUTPATIENT
Start: 2020-01-04 | End: 2020-01-05

## 2020-01-04 RX ORDER — LIDOCAINE HYDROCHLORIDE 10 MG/ML
1 INJECTION, SOLUTION EPIDURAL; INFILTRATION; INTRACAUDAL; PERINEURAL
Status: CANCELLED | OUTPATIENT
Start: 2020-01-04

## 2020-01-04 RX ORDER — OXYCODONE HYDROCHLORIDE 5 MG/1
15 TABLET ORAL
Status: CANCELLED | OUTPATIENT
Start: 2020-01-04

## 2020-01-04 RX ORDER — PREGABALIN 25 MG/1
150 CAPSULE ORAL NIGHTLY
Status: CANCELLED | OUTPATIENT
Start: 2020-01-04

## 2020-01-04 RX ORDER — FENTANYL CITRATE 50 UG/ML
25 INJECTION, SOLUTION INTRAMUSCULAR; INTRAVENOUS EVERY 5 MIN PRN
Status: CANCELLED | OUTPATIENT
Start: 2020-01-04

## 2020-01-04 RX ORDER — NALOXONE HCL 0.4 MG/ML
0.02 VIAL (ML) INJECTION
Status: CANCELLED | OUTPATIENT
Start: 2020-01-04

## 2020-01-04 RX ORDER — MORPHINE SULFATE 10 MG/ML
2 INJECTION, SOLUTION INTRAMUSCULAR; INTRAVENOUS
Status: CANCELLED | OUTPATIENT
Start: 2020-01-04

## 2020-01-04 RX ORDER — ROPIVACAINE HYDROCHLORIDE 2 MG/ML
8 INJECTION, SOLUTION EPIDURAL; INFILTRATION; PERINEURAL CONTINUOUS
Status: CANCELLED | OUTPATIENT
Start: 2020-01-04

## 2020-01-04 RX ORDER — CELECOXIB 100 MG/1
400 CAPSULE ORAL
Status: CANCELLED | OUTPATIENT
Start: 2020-01-04

## 2020-01-04 RX ORDER — CELECOXIB 100 MG/1
200 CAPSULE ORAL DAILY
Status: CANCELLED | OUTPATIENT
Start: 2020-01-04

## 2020-01-04 RX ORDER — MUPIROCIN 20 MG/G
1 OINTMENT TOPICAL
Status: CANCELLED | OUTPATIENT
Start: 2020-01-04

## 2020-01-04 RX ORDER — BISACODYL 10 MG
10 SUPPOSITORY, RECTAL RECTAL EVERY 12 HOURS PRN
Status: CANCELLED | OUTPATIENT
Start: 2020-01-04

## 2020-01-04 RX ORDER — ACETAMINOPHEN 500 MG
1000 TABLET ORAL EVERY 6 HOURS
Status: CANCELLED | OUTPATIENT
Start: 2020-01-04 | End: 2020-01-06

## 2020-01-04 RX ORDER — SODIUM CHLORIDE 0.9 % (FLUSH) 0.9 %
10 SYRINGE (ML) INJECTION
Status: CANCELLED | OUTPATIENT
Start: 2020-01-04

## 2020-01-04 RX ORDER — PREGABALIN 25 MG/1
150 CAPSULE ORAL
Status: CANCELLED | OUTPATIENT
Start: 2020-01-04

## 2020-01-04 RX ORDER — OXYCODONE HYDROCHLORIDE 5 MG/1
5 TABLET ORAL
Status: CANCELLED | OUTPATIENT
Start: 2020-01-04

## 2020-01-04 RX ORDER — MIDAZOLAM HYDROCHLORIDE 1 MG/ML
1 INJECTION INTRAMUSCULAR; INTRAVENOUS EVERY 5 MIN PRN
Status: CANCELLED | OUTPATIENT
Start: 2020-01-04

## 2020-01-04 RX ORDER — ASPIRIN 81 MG/1
81 TABLET ORAL 2 TIMES DAILY
Status: CANCELLED | OUTPATIENT
Start: 2020-01-04

## 2020-01-04 NOTE — H&P (VIEW-ONLY)
CC: Right knee pain    Ari Santos is a 49 y.o. male with longstanding history of knee issues congenital adrenal hyperplasia for which there on prednisone  Significant spinal stenosis history of radiofrequency ablation in May no surgeries.  Pain is worse with activity and weight bearing.  Patient has experienced interference of activities of daily living due to decreased range of motion and an increase in joint pain and swelling.  Patient has failed non-operative treatment including pain medication NSAIDs, injections, and activity modification.  Ari Santos currently ambulates using Rollator.     Relevant medical conditions of significance in perioperative period:  Spinal stenosis: seeing neurosurgery, RFA in May  Congenital adrenal hyperplasia: on prednisone, currently 9 mg  Chronic opioid use: currently Percocet 5 mg tid as well as morphine 15 mg qhs    Past Medical History:   Diagnosis Date    Adrenal insufficiency     Arthritis     Chronic bilateral low back pain with bilateral sciatica 3/19/2019    Congenital adrenal hyperplasia     Hyperlipidemia     Spinal stenosis     Spinal stenosis of lumbar region with neurogenic claudication 3/19/2019    Status post sex reassignment surgery 3/19/2019    Hx of Congenital Adrenal Hyperplasia       Past Surgical History:   Procedure Laterality Date    BACK SURGERY      LAMINECTOMY  09/13/2019    RADIOFREQUENCY ABLATION Left 5/9/2019    Procedure: RADIOFREQUENCY ABLATION, LEFT L3,4,5;  Surgeon: Messi Cabello MD;  Location: [facility];  Service: Pain Management;  Laterality: Left;  1 of 2    RADIOFREQUENCY ABLATION Right 5/23/2019    Procedure: RADIOFREQUENCY ABLATION, RIGHT L3,4,5;  Surgeon: Messi Cabello MD;  Location: [facility];  Service: Pain Management;  Laterality: Right;  2of 2       Family History   Problem Relation Age of Onset    Diabetes Maternal Grandfather     Breast cancer Neg Hx     Ovarian cancer Neg Hx      Vaginal cancer Neg Hx     Endometrial cancer Neg Hx     Cervical cancer Neg Hx        Review of patient's allergies indicates:   Allergen Reactions    Penicillins Rash         Current Outpatient Medications:     ALPRAZolam (XANAX) 1 MG tablet, Take 1 tablet (1 mg total) by mouth 3 (three) times daily as needed., Disp: 90 tablet, Rfl: 0    atorvastatin (LIPITOR) 10 MG tablet, Take 1 tablet (10 mg total) by mouth every evening., Disp: 90 tablet, Rfl: 2    cyclobenzaprine (FLEXERIL) 10 MG tablet, Take 1 tablet (10 mg total) by mouth 3 (three) times daily as needed for Muscle spasms., Disp: 90 tablet, Rfl: 1    famotidine (PEPCID) 20 MG tablet, Take 1 tablet (20 mg total) by mouth 2 (two) times daily., Disp: 60 tablet, Rfl: 11    gabapentin (NEURONTIN) 800 MG tablet, 1 tablet by mouth three times a day and 2 tablets at night (5 tablets a day, 4000mg), Disp: 150 tablet, Rfl: 2    Lactobacillus acidophilus (ACIDOPHILUS) Cap, Take by mouth once daily. , Disp: , Rfl:     lamoTRIgine (LAMICTAL) 200 MG tablet, Take 200 mg by mouth 2 (two) times daily. , Disp: , Rfl:     loratadine (CLARITIN) 10 mg tablet, Take 10 mg by mouth once daily. , Disp: , Rfl:     morphine (MS CONTIN) 15 MG 12 hr tablet, Take 1 tablet (15 mg total) by mouth 2 (two) times daily., Disp: 60 tablet, Rfl: 0    nystatin (MYCOSTATIN) cream, Apply topically 2 (two) times daily. (Patient taking differently: Apply topically 2 (two) times daily as needed. ), Disp: 30 g, Rfl: 1    oxyCODONE-acetaminophen (PERCOCET)  mg per tablet, Take 1 tablet by mouth every 8 (eight) hours as needed for Pain., Disp: 90 tablet, Rfl: 0    predniSONE (DELTASONE) 1 MG tablet, As prescribed - up to 4 mg  A day, Disp: 200 tablet, Rfl: 3    predniSONE (DELTASONE) 5 MG tablet, Take 1 tablet (5 mg total) by mouth once daily. (Patient taking differently: Take 10 mg by mouth once daily. ), Disp: 30 tablet, Rfl: 11    primidone (MYSOLINE) 50 MG Tab, Take 50 mg by  mouth 3 (three) times daily. , Disp: , Rfl:     rOPINIRole (REQUIP) 4 MG tablet, Take 4 mg by mouth nightly. , Disp: , Rfl:     rOPINIRole (REQUIP) 4 MG tablet, Take one tablet by mouth at bedtime, Disp: 90 tablet, Rfl: 0    testosterone (ANDROGEL) 1 % (50 mg/5 gram) GlPk, Apply 5 g topically once daily., Disp: 30 packet, Rfl: 4    triamcinolone acetonide 0.1% (KENALOG) 0.1 % cream, Apply topically 2 (two) times daily., Disp: 45 g, Rfl: 1    zinc 50 mg Tab, once daily. , Disp: , Rfl:     naproxen sodium (ALEVE) 220 mg Cap, Take by mouth., Disp: , Rfl:     Review of Systems:  Constitutional: no fever or chills  Eyes: no visual changes  ENT: no nasal congestion or sore throat  Respiratory: no cough or shortness of breath  Cardiovascular: no chest pain or palpitations  Gastrointestinal: no nausea or vomiting, tolerating diet  Genitourinary: no hematuria or dysuria  Integument/Breast: no rash or pruritis  Hematologic/Lymphatic: no easy bruising or lymphadenopathy  Musculoskeletal: positive for knee pain  Neurological: no seizures or tremors  Behavioral/Psych: no auditory or visual hallucinations  Endocrine: no heat or cold intolerance    PE:  /79 (BP Location: Left arm, Patient Position: Sitting, BP Method: Medium (Automatic))   Pulse 103   Temp 97.5 °F (36.4 °C) (Oral)   Ht 5' (1.524 m)   Wt 59.9 kg (132 lb)   BMI 25.78 kg/m²   General: Pleasant, cooperative, NAD   Gait: antalgic  HEENT: NCAT, sclera nonicteric   Lungs: Respirations clear bilaterally; equal and unlabored.   CV: S1S2; 2+ bilateral upper and lower extremity pulses.   Skin: Intact throughout with no rashes, erythema, or lesions  Extremities: No LE edema,  no erythema or warmth of the skin in either lower extremity.    Right knee exam:  Knee Range of Motion:, pain with passive range of motion  Effusion:yes  Condition of skin: intact  Location of tenderness: global   Strength: decreased   There is 10 degree extension lag      Radiographs: Radiographs reveal advanced degenerative changes including subchondral cyst formation, subchondral sclerosis, osteophyte formation, joint space narrowing.     Knee Alignment: significant varus     Diagnosis: osteoarthritis right knee    Plan: Right total knee arthroplasty    Due to the serious nature of total joint infection and the high prevalence of community acquired MRSA, vancomycin will be used perioperatively.

## 2020-01-04 NOTE — H&P
CC: Right knee pain    Ari Santos is a 49 y.o. male with longstanding history of knee issues congenital adrenal hyperplasia for which there on prednisone  Significant spinal stenosis history of radiofrequency ablation in May no surgeries.  Pain is worse with activity and weight bearing.  Patient has experienced interference of activities of daily living due to decreased range of motion and an increase in joint pain and swelling.  Patient has failed non-operative treatment including pain medication NSAIDs, injections, and activity modification.  Ari Santos currently ambulates using Rollator.     Relevant medical conditions of significance in perioperative period:  Spinal stenosis: seeing neurosurgery, RFA in May  Congenital adrenal hyperplasia: on prednisone, currently 9 mg  Chronic opioid use: currently Percocet 5 mg tid as well as morphine 15 mg qhs    Past Medical History:   Diagnosis Date    Adrenal insufficiency     Arthritis     Chronic bilateral low back pain with bilateral sciatica 3/19/2019    Congenital adrenal hyperplasia     Hyperlipidemia     Spinal stenosis     Spinal stenosis of lumbar region with neurogenic claudication 3/19/2019    Status post sex reassignment surgery 3/19/2019    Hx of Congenital Adrenal Hyperplasia       Past Surgical History:   Procedure Laterality Date    BACK SURGERY      LAMINECTOMY  09/13/2019    RADIOFREQUENCY ABLATION Left 5/9/2019    Procedure: RADIOFREQUENCY ABLATION, LEFT L3,4,5;  Surgeon: Messi Cabello MD;  Location: [facility];  Service: Pain Management;  Laterality: Left;  1 of 2    RADIOFREQUENCY ABLATION Right 5/23/2019    Procedure: RADIOFREQUENCY ABLATION, RIGHT L3,4,5;  Surgeon: Messi Cabello MD;  Location: [facility];  Service: Pain Management;  Laterality: Right;  2of 2       Family History   Problem Relation Age of Onset    Diabetes Maternal Grandfather     Breast cancer Neg Hx     Ovarian cancer Neg Hx      Vaginal cancer Neg Hx     Endometrial cancer Neg Hx     Cervical cancer Neg Hx        Review of patient's allergies indicates:   Allergen Reactions    Penicillins Rash         Current Outpatient Medications:     ALPRAZolam (XANAX) 1 MG tablet, Take 1 tablet (1 mg total) by mouth 3 (three) times daily as needed., Disp: 90 tablet, Rfl: 0    atorvastatin (LIPITOR) 10 MG tablet, Take 1 tablet (10 mg total) by mouth every evening., Disp: 90 tablet, Rfl: 2    cyclobenzaprine (FLEXERIL) 10 MG tablet, Take 1 tablet (10 mg total) by mouth 3 (three) times daily as needed for Muscle spasms., Disp: 90 tablet, Rfl: 1    famotidine (PEPCID) 20 MG tablet, Take 1 tablet (20 mg total) by mouth 2 (two) times daily., Disp: 60 tablet, Rfl: 11    gabapentin (NEURONTIN) 800 MG tablet, 1 tablet by mouth three times a day and 2 tablets at night (5 tablets a day, 4000mg), Disp: 150 tablet, Rfl: 2    Lactobacillus acidophilus (ACIDOPHILUS) Cap, Take by mouth once daily. , Disp: , Rfl:     lamoTRIgine (LAMICTAL) 200 MG tablet, Take 200 mg by mouth 2 (two) times daily. , Disp: , Rfl:     loratadine (CLARITIN) 10 mg tablet, Take 10 mg by mouth once daily. , Disp: , Rfl:     morphine (MS CONTIN) 15 MG 12 hr tablet, Take 1 tablet (15 mg total) by mouth 2 (two) times daily., Disp: 60 tablet, Rfl: 0    nystatin (MYCOSTATIN) cream, Apply topically 2 (two) times daily. (Patient taking differently: Apply topically 2 (two) times daily as needed. ), Disp: 30 g, Rfl: 1    oxyCODONE-acetaminophen (PERCOCET)  mg per tablet, Take 1 tablet by mouth every 8 (eight) hours as needed for Pain., Disp: 90 tablet, Rfl: 0    predniSONE (DELTASONE) 1 MG tablet, As prescribed - up to 4 mg  A day, Disp: 200 tablet, Rfl: 3    predniSONE (DELTASONE) 5 MG tablet, Take 1 tablet (5 mg total) by mouth once daily. (Patient taking differently: Take 10 mg by mouth once daily. ), Disp: 30 tablet, Rfl: 11    primidone (MYSOLINE) 50 MG Tab, Take 50 mg by  mouth 3 (three) times daily. , Disp: , Rfl:     rOPINIRole (REQUIP) 4 MG tablet, Take 4 mg by mouth nightly. , Disp: , Rfl:     rOPINIRole (REQUIP) 4 MG tablet, Take one tablet by mouth at bedtime, Disp: 90 tablet, Rfl: 0    testosterone (ANDROGEL) 1 % (50 mg/5 gram) GlPk, Apply 5 g topically once daily., Disp: 30 packet, Rfl: 4    triamcinolone acetonide 0.1% (KENALOG) 0.1 % cream, Apply topically 2 (two) times daily., Disp: 45 g, Rfl: 1    zinc 50 mg Tab, once daily. , Disp: , Rfl:     naproxen sodium (ALEVE) 220 mg Cap, Take by mouth., Disp: , Rfl:     Review of Systems:  Constitutional: no fever or chills  Eyes: no visual changes  ENT: no nasal congestion or sore throat  Respiratory: no cough or shortness of breath  Cardiovascular: no chest pain or palpitations  Gastrointestinal: no nausea or vomiting, tolerating diet  Genitourinary: no hematuria or dysuria  Integument/Breast: no rash or pruritis  Hematologic/Lymphatic: no easy bruising or lymphadenopathy  Musculoskeletal: positive for knee pain  Neurological: no seizures or tremors  Behavioral/Psych: no auditory or visual hallucinations  Endocrine: no heat or cold intolerance    PE:  /79 (BP Location: Left arm, Patient Position: Sitting, BP Method: Medium (Automatic))   Pulse 103   Temp 97.5 °F (36.4 °C) (Oral)   Ht 5' (1.524 m)   Wt 59.9 kg (132 lb)   BMI 25.78 kg/m²   General: Pleasant, cooperative, NAD   Gait: antalgic  HEENT: NCAT, sclera nonicteric   Lungs: Respirations clear bilaterally; equal and unlabored.   CV: S1S2; 2+ bilateral upper and lower extremity pulses.   Skin: Intact throughout with no rashes, erythema, or lesions  Extremities: No LE edema,  no erythema or warmth of the skin in either lower extremity.    Right knee exam:  Knee Range of Motion:, pain with passive range of motion  Effusion:yes  Condition of skin: intact  Location of tenderness: global   Strength: decreased   There is 10 degree extension lag      Radiographs: Radiographs reveal advanced degenerative changes including subchondral cyst formation, subchondral sclerosis, osteophyte formation, joint space narrowing.     Knee Alignment: significant varus     Diagnosis: osteoarthritis right knee    Plan: Right total knee arthroplasty    Due to the serious nature of total joint infection and the high prevalence of community acquired MRSA, vancomycin will be used perioperatively.

## 2020-01-04 NOTE — PROGRESS NOTES
Ari Santos is a 49 y.o. year old here today for a pre-operative visit in preparation for a right total knee arthroplasty to be performed by  Dr. Andrade on 1/13/2020.  she was last seen and treated in the clinic on 12/17/2019. she will be medically optimized by the pre op center. There has been no significant change in medical status since last visit. No fever, chills, malaise, or unexplained weight change.      Allergies, Medications, past medical and surgical history reviewed.    Focused examination performed.    Patient declined to see Dr. Andrade today in clinic.  All questions answered. Patient encouraged to call with questions. Contact information given.     Pre, justina, and post operative procedures and expectations discussed. Questions were answered. Ari Santos has been educated and is ready to proceed with surgery. Approximately 30 minutes was spent discussing surgical outcomes, plans, procedures pre, justina, and post operative expections and care.  Surgical consent signed.    Ari Santos will contact us if there are any questions, concerns, or changes in medical status prior to surgery.

## 2020-01-06 ENCOUNTER — CLINICAL SUPPORT (OUTPATIENT)
Dept: REHABILITATION | Facility: OTHER | Age: 50
End: 2020-01-06
Payer: MEDICARE

## 2020-01-06 ENCOUNTER — PATIENT MESSAGE (OUTPATIENT)
Dept: ENDOCRINOLOGY | Facility: CLINIC | Age: 50
End: 2020-01-06

## 2020-01-06 DIAGNOSIS — G89.29 CHRONIC PAIN OF BOTH KNEES: ICD-10-CM

## 2020-01-06 DIAGNOSIS — M25.562 CHRONIC PAIN OF BOTH KNEES: ICD-10-CM

## 2020-01-06 DIAGNOSIS — M62.81 QUADRICEPS WEAKNESS: ICD-10-CM

## 2020-01-06 DIAGNOSIS — M25.561 CHRONIC PAIN OF BOTH KNEES: ICD-10-CM

## 2020-01-06 DIAGNOSIS — Z74.09 DECREASED FUNCTIONAL MOBILITY AND ENDURANCE: ICD-10-CM

## 2020-01-06 PROCEDURE — 97110 THERAPEUTIC EXERCISES: CPT | Mod: PN | Performed by: PHYSICAL THERAPIST

## 2020-01-06 NOTE — PROGRESS NOTES
Physical Therapy Daily Treatment Note     Name: Ari Santos  Clinic Number: [id number]    Therapy Diagnosis:   Encounter Diagnoses   Name Primary?    Chronic pain of both knees     Quadriceps weakness     Decreased functional mobility and endurance      Physician: Donte Andrade III, *    Visit Date: 2020    Physician Orders: PT Eval and Treat   eval and treat with modalities: pre-hab TKA, B varus knee arthritis, R side extensor lag. Specific focus on quad strengthening      Medical Diagnosis from Referral: M17.0 (ICD-10-CM) - Arthritis of both knees   Evaluation Date: 12/3/2019  Authorization Period Expiration: 2019  Plan of Care Expiration: 1/10/2020  Visit # / Visits authorized:     Time In: 4:00pm  Time Out: 5:00pm  Total Billable Time: 55 minutes 1:1 with PT     Precautions: Standard, Laminectomy (2019).    Subjective     Pt reports: Follow up appointments after surgery were moved to 1 week following surgery. Pt's wife reports that he will be in an immobilizer and have wound vac. He has a RW at home from his back surgery and will bring it to the hospital on the day of his knee surgery. Patient reports that he has been doing the exercises daily and feels stronger. Anxious about the surgery and learning how to walk all over again.   She was compliant with home exercise program.  Response to previous treatment: none  Functional change: none    Pain: 5/10  Location: right medial knee      Objective     R knee AROM: 0- deg  SLR la deg  MMT:  Quads:  R:2/4, L: 4/5    Ari received therapeutic exercises to develop strength, endurance, ROM and core stabilization for 55 minutes including:     QS with tactile/ verbal cueing into towel roll 5 sec x 20  QS with ball for tactile cueing VMO contraction 3 sec x 10  SAQ from large roll 3 sec x 20 ea  TVA with block in HL 5 sec x 10  Bridging 2 x 10  SLR with cueing for QS 5 x 2  Supine hip abduction/ adduction x 20  Supine heel slides  "with sliding board x 10 ea  Hamstring sets 3" x 10 - nt  LAQ 3 sec x 20 (2# on L only)    U shuttle 12.5# x 20 ea  TKE with OTB x 20    Gait training with RW x 50 ft VC's for toe clearance and step through gait pattern     Ari received cold pack for 0 minutes to knees.      Home Exercises Provided and Patient Education Provided     Education provided:   - Educated on appropriate walker height for c/o B shoulder pain using standard height walker, Pt has katerin sized walker at home that may be able to adjust to lower height. Pt's spouse present     Written Home Exercises Provided: Patient instructed to cont prior HEP.  Exercises were reviewed and Ari was able to demonstrate them prior to the end of the session.  Ari demonstrated good  understanding of the education provided.     See EMR under Patient Instructions for exercises provided prior visit.    Assessment     Patient demonstrating slight improvements in R knee flexion ROM and quad contraction. Pt to benefit from continued performance of quad and hip strengthening in home program for improved outcomes following TKA  Ari is progressing well towards her goals.   Pt prognosis is Good.     Pt will continue to benefit from skilled outpatient physical therapy to address the deficits listed in the problem list box on initial evaluation, provide pt/family education and to maximize pt's level of independence in the home and community environment.     Pt's spiritual, cultural and educational needs considered and pt agreeable to plan of care and goals.     Anticipated barriers to physical therapy: none    Goals:   Short term (2 weeks):  1. Pt to be independent with home exercise program for improved self management of condition-met  Long term (4 weeks)  1. Patient to have improved gait speed as noted by TUG <20 sec for decreased risk for falls- progressing, not met  2. Pt to have decreased subjective report of disability as noted by <60% on FOTO knee questionnaire " -progressing, not met  3. Pt's R knee quad strength to be 3-/5 or greater for improved performance of ADL's such sit to stand transfers- progressing, not met    Plan     Continue with home exercise program at this time. Pt to be re-evaluated with new orders following TKA on 1/13/2020    Dayan Bruce, PT

## 2020-01-08 ENCOUNTER — OFFICE VISIT (OUTPATIENT)
Dept: PSYCHIATRY | Facility: CLINIC | Age: 50
End: 2020-01-08
Payer: MEDICARE

## 2020-01-08 VITALS
BODY MASS INDEX: 25.7 KG/M2 | SYSTOLIC BLOOD PRESSURE: 120 MMHG | WEIGHT: 131.63 LBS | DIASTOLIC BLOOD PRESSURE: 80 MMHG | HEART RATE: 119 BPM

## 2020-01-08 DIAGNOSIS — F43.10 PTSD (POST-TRAUMATIC STRESS DISORDER): Primary | ICD-10-CM

## 2020-01-08 PROCEDURE — 90791 PSYCH DIAGNOSTIC EVALUATION: CPT | Mod: ,,, | Performed by: PSYCHIATRY & NEUROLOGY

## 2020-01-08 PROCEDURE — 99999 PR PBB SHADOW E&M-EST. PATIENT-LVL II: ICD-10-PCS | Mod: PBBFAC,,, | Performed by: PSYCHIATRY & NEUROLOGY

## 2020-01-08 PROCEDURE — 99212 OFFICE O/P EST SF 10 MIN: CPT | Mod: PBBFAC | Performed by: PSYCHIATRY & NEUROLOGY

## 2020-01-08 PROCEDURE — 99999 PR PBB SHADOW E&M-EST. PATIENT-LVL II: CPT | Mod: PBBFAC,,, | Performed by: PSYCHIATRY & NEUROLOGY

## 2020-01-08 PROCEDURE — 90791 PR PSYCHIATRIC DIAGNOSTIC EVALUATION: ICD-10-PCS | Mod: ,,, | Performed by: PSYCHIATRY & NEUROLOGY

## 2020-01-08 RX ORDER — LAMOTRIGINE 200 MG/1
200 TABLET ORAL 2 TIMES DAILY
Qty: 60 TABLET | Refills: 5 | Status: SHIPPED | OUTPATIENT
Start: 2020-01-08 | End: 2020-07-02 | Stop reason: SDUPTHER

## 2020-01-08 NOTE — PROGRESS NOTES
Outpatient Psychiatry Initial Visit (MD/NP)    1/8/2020    Ari Santos, a 49 y.o. female, for initial evaluation visit.  Patient is referred by Siva Mitchell MD       Reason for Encounter: Referral for treatment    Complaints:  She has been experiencing depression and PTSD.Mr Santos was seen today for an initial evaluation for depression related to PTSD. 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 He has always felt he was a man, and has since been under care for a return to his real self being male. This route has been very traumatic to him and he has physical limitations as a result. He is also coping with chronic knee and back pain, which hinder his mobility. He is now walking slowly with a walker, his goal being to at least be able to use a cane to walk. He was optimistic today and hopeful. He denies intent to harm himself or others. When he was younger, he used to cut himself,but now now. He has no signs or symptoms of psychosis or ricardo. He is not actively depressed either. He is an , and is recently moved to . He has a wife and that relationship is stable over the course of 30 years. He is compliant with his medication and uses it properly. He denies side effects from his medicines as well at this time. He likes [place] area of [place] country and finds it more accepting to folks who have differences about themselves. He is pleased with the acceptance he has found here and motivated to continue his mood stability.  Current Medications:  Medication List with Changes/Refills   Current Medications    ALPRAZOLAM (XANAX) 1 MG TABLET    Take 1 tablet (1 mg total) by mouth 3 (three) times daily as needed.    ATORVASTATIN (LIPITOR) 10 MG TABLET    Take 1 tablet (10 mg total) by mouth every evening.    CYCLOBENZAPRINE (FLEXERIL) 10 MG TABLET    Take 1 tablet (10 mg total) by mouth 3 (three) times daily as needed for Muscle spasms.    FAMOTIDINE  (PEPCID) 20 MG TABLET    Take 1 tablet (20 mg total) by mouth 2 (two) times daily.    GABAPENTIN (NEURONTIN) 800 MG TABLET    1 tablet by mouth three times a day and 2 tablets at night (5 tablets a day, 4000mg)    LACTOBACILLUS ACIDOPHILUS (ACIDOPHILUS) CAP    Take by mouth once daily.     LORATADINE (CLARITIN) 10 MG TABLET    Take 10 mg by mouth once daily.     MORPHINE (MS CONTIN) 15 MG 12 HR TABLET    Take 1 tablet (15 mg total) by mouth 2 (two) times daily.    NAPROXEN SODIUM (ALEVE) 220 MG CAP    Take by mouth.    NYSTATIN (MYCOSTATIN) CREAM    Apply topically 2 (two) times daily.    OXYCODONE-ACETAMINOPHEN (PERCOCET)  MG PER TABLET    Take 1 tablet by mouth every 8 (eight) hours as needed for Pain.    PREDNISONE (DELTASONE) 1 MG TABLET    As prescribed - up to 4 mg  A day    PREDNISONE (DELTASONE) 5 MG TABLET    Take 1 tablet (5 mg total) by mouth once daily.    PRIMIDONE (MYSOLINE) 50 MG TAB    Take 50 mg by mouth 3 (three) times daily.     ROPINIROLE (REQUIP) 4 MG TABLET    Take 4 mg by mouth nightly.     ROPINIROLE (REQUIP) 4 MG TABLET    Take one tablet by mouth at bedtime    TESTOSTERONE (ANDROGEL) 1 % (50 MG/5 GRAM) GLPK    Apply 5 g topically once daily.    TRIAMCINOLONE ACETONIDE 0.1% (KENALOG) 0.1 % CREAM    Apply topically 2 (two) times daily.    ZINC 50 MG TAB    once daily.    Changed and/or Refilled Medications    Modified Medication Previous Medication    LAMOTRIGINE (LAMICTAL) 200 MG TABLET lamoTRIgine (LAMICTAL) 200 MG tablet       Take 1 tablet (200 mg total) by mouth 2 (two) times daily.    Take 200 mg by mouth 2 (two) times daily.         Stressors:  Dealing with the trauma of his past surgery and chronic pain.    History:     Past Psychiatric History:   Previous therapy: yes  Previous psychiatric treatment and medication trials: yes  Previous psychiatric hospitalizations: yes  Previous diagnoses: yes  Previous suicide attempts: no  History of violence: no  Currently in treatment with  myself.  Education: not questioned this visit  Other pertinent history: None  Depression screening was performed with standardized tool: Not Screened - Not Eligible/Appropriate    Substance Abuse History:  Recreational drugs: no recreational drug use  Use of alcohol: denied  Use of caffeine: limited  Tobacco use: no  Legal consequences of chemical use: no  Patient feels she ought to cut down on drinking and/or drug use: no  Patient has been annoyed by others criticizing her drinking or drug use: no  Patient has felt bad or guilty about drinking or drug use:no  Patient has had a drink or used drugs as an eye opener first thing in the morning to steady nerves, get rid of a hangover or get the day started: no  Use of OTC medications: Aleve and Vitamin C    The following portions of the patient's history were reviewed and updated as appropriate: allergies, current medications, past family history, past medical history, past social history, past surgical history and problem list.    Record Review: moderate      Review Of Systems:     Medical Review Of Systems:  ROS     Psychiatric Review Of Systems:  Sleep: no  Appetite changes: no  Weight changes: no  Energy: yes  Interest/pleasure/anhedonia: no  Somatic symptoms: yes  Libido: Not questioned  Anxiety/panic: no  Guilty/hopeless: no  Self-injurious behavior/risky behavior: no  Any drugs: no  Alcohol: no     Current Evaluation:       Mental Status Evaluation:  Appearance:  unremarkable, age appropriate, casually dressed, neatly groomed   Behavior:  normal, cooperative   Speech:  no latency; no press, spontaneous   Mood:  euthymic   Affect:  congruent and appropriate   Thought Process:  normal and logical   Thought Content:  normal, no suicidality, no homicidality, delusions, or paranoia   Sensorium:  grossly intact   Cognition:  grossly intact   Insight:  has awareness of illness   Judgment:  behavior is adequate to circumstances     SIGECAPS  Sleep:   Interest:   Guilt:    Energy:   Concentration:   Appetite:   Psychomotor agitation:   Suicidal intentions: no  Suicidal plan: no    Physical/Somatic Complaints  The patient lists: pain    Functioning in Relationships:  Spouse/partner:   Peers:   Employers:       Assessment - Diagnosis - Goals:       ICD-10-CM ICD-9-CM   1. PTSD (post-traumatic stress disorder) F43.10 309.81   2. 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 I reviewed the side effects of his medicines and advised he could call if he had problems with his medicines or clinical condition.  Follow-up plan for depression was discussed with patient.    Review with patient: Treatment plan reviewed with the patient.  Medication risks/benefit reviewed with the patient    Silas Mejias Jr

## 2020-01-09 ENCOUNTER — TELEPHONE (OUTPATIENT)
Dept: ENDOCRINOLOGY | Facility: CLINIC | Age: 50
End: 2020-01-09

## 2020-01-09 ENCOUNTER — INITIAL CONSULT (OUTPATIENT)
Dept: INTERNAL MEDICINE | Facility: CLINIC | Age: 50
End: 2020-01-09
Payer: MEDICARE

## 2020-01-09 ENCOUNTER — HOSPITAL ENCOUNTER (OUTPATIENT)
Dept: PREADMISSION TESTING | Facility: HOSPITAL | Age: 50
Discharge: HOME | End: 2020-01-09
Attending: ANESTHESIOLOGY
Payer: MEDICARE

## 2020-01-09 VITALS
WEIGHT: 131.38 LBS | BODY MASS INDEX: 25.79 KG/M2 | DIASTOLIC BLOOD PRESSURE: 70 MMHG | SYSTOLIC BLOOD PRESSURE: 116 MMHG | TEMPERATURE: 98 F | OXYGEN SATURATION: 99 % | HEART RATE: 107 BPM | HEIGHT: 60 IN

## 2020-01-09 DIAGNOSIS — M79.2 NEUROPATHIC PAIN: ICD-10-CM

## 2020-01-09 DIAGNOSIS — R79.89 ABNORMAL THYROID BLOOD TEST: ICD-10-CM

## 2020-01-09 DIAGNOSIS — F11.90 CHRONIC, CONTINUOUS USE OF OPIOIDS: ICD-10-CM

## 2020-01-09 DIAGNOSIS — E27.40 ADRENAL INSUFFICIENCY: ICD-10-CM

## 2020-01-09 DIAGNOSIS — F43.10 PTSD (POST-TRAUMATIC STRESS DISORDER): ICD-10-CM

## 2020-01-09 DIAGNOSIS — G56.03 CARPAL TUNNEL SYNDROME ON BOTH SIDES: ICD-10-CM

## 2020-01-09 DIAGNOSIS — G25.2 INTENTION TREMOR: ICD-10-CM

## 2020-01-09 DIAGNOSIS — R79.89 LOW TESTOSTERONE: ICD-10-CM

## 2020-01-09 DIAGNOSIS — R73.02 IGT (IMPAIRED GLUCOSE TOLERANCE): ICD-10-CM

## 2020-01-09 DIAGNOSIS — E25.0 CONGENITAL ADRENAL HYPERPLASIA: ICD-10-CM

## 2020-01-09 DIAGNOSIS — G25.81 RLS (RESTLESS LEGS SYNDROME): ICD-10-CM

## 2020-01-09 DIAGNOSIS — M54.12 CERVICAL RADICULOPATHY: ICD-10-CM

## 2020-01-09 DIAGNOSIS — E78.5 HYPERLIPIDEMIA, UNSPECIFIED HYPERLIPIDEMIA TYPE: ICD-10-CM

## 2020-01-09 DIAGNOSIS — Z01.818 PREOP EXAMINATION: Primary | ICD-10-CM

## 2020-01-09 DIAGNOSIS — G89.29 OTHER CHRONIC PAIN: ICD-10-CM

## 2020-01-09 DIAGNOSIS — R79.89 ABNORMAL LFTS: ICD-10-CM

## 2020-01-09 DIAGNOSIS — T14.8XXA BLISTER: ICD-10-CM

## 2020-01-09 PROCEDURE — 99999 PR PBB SHADOW E&M-EST. PATIENT-LVL II: CPT | Mod: PBBFAC,,, | Performed by: HOSPITALIST

## 2020-01-09 PROCEDURE — 99214 PR OFFICE/OUTPT VISIT, EST, LEVL IV, 30-39 MIN: ICD-10-PCS | Mod: S$PBB,,, | Performed by: HOSPITALIST

## 2020-01-09 PROCEDURE — 99212 OFFICE O/P EST SF 10 MIN: CPT | Mod: PBBFAC | Performed by: HOSPITALIST

## 2020-01-09 PROCEDURE — 99999 PR PBB SHADOW E&M-EST. PATIENT-LVL II: ICD-10-PCS | Mod: PBBFAC,,, | Performed by: HOSPITALIST

## 2020-01-09 PROCEDURE — 99214 OFFICE O/P EST MOD 30 MIN: CPT | Mod: S$PBB,,, | Performed by: HOSPITALIST

## 2020-01-09 NOTE — ASSESSMENT & PLAN NOTE
Prediabetes   Hemoglobin A1c in the pre diabetic range   Weight loss with diet and exercise , if able helps lower A1c  Increased risk of becoming a diabetic   Suggested  follow up

## 2020-01-09 NOTE — DISCHARGE INSTRUCTIONS
Your surgery has been scheduled for:__________________________________________    You should report to:  ____[facility], located on the [place] side of the first floor of the           [facility] ([phone])  ____[facility], located on the [facility] side of the            Second floor of the [facility] ([phone])  ____3rd Floor [facility] located on the [facility] side of the [facility] [phone]  ____[facility]: located at [address] [place] [address]. Check in on the first floor of the hospital.  Please Note   - Tell your doctor if you take Aspirin, products containing Aspirin, herbal medications  or blood thinners, such as Coumadin, Ticlid, or Plavix.  (Consult your provider regarding holding or stopping before surgery).  - Arrange for someone to drive you home following surgery.  You will not be allowed to leave the surgical facility alone or drive yourself home following sedation and anesthesia.  Before Surgery  - Stop taking all herbal medications 14 days prior to surgery  - No Motrin/Advil (Ibuprofen)  1 day before surgery  - No Aleve (Naproxen) 7 days before surgery  - Stop Taking Asprin, products containing Asprin _____days before surgery  - Stop taking blood thinners_______days before surgery  - No Goody's/BC  Powder 7 days before surgery  - Refrain from drinking alcoholic beverages for 24hours before and after surgery  - Stop or limit smoking _________days before surgery  - You may take Tylenol for pain  Night before Surgery  - Do not eat or drink after midnight  - Take a shower or bath (shower is recommended).  Bathe with Hibiclens soap or an antibacterial soap from the neck down.  If not supplied by your surgeon, hibiclens soap will need to be purchased over the counter in pharmacy.  Rinse soap off thoroughly.  - Shampoo your hair with your regular  shampoo  The Day of Surgery  - Take another bath or shower with hibiclens or any antibacterial soap, to reduce the chance of infection.  - Take heart and blood pressure medications with a small sip of water, as advised by the perioperative team.  - Do not take fluid pills  - You may brush your teeth and rinse your mouth, but do not swall any additional water.   - Do not apply perfumes, powder, body lotions or deodorant on the day of surgery.  - Nail polish should be removed.  - Do not wear makeup or moisturizer  - Wear comfortable clothes, such as a button front shirt and loose fitting pants.  - Leave all jewelry, including body piercings, and valuables at home.    - Bring any devices you will neeed after surgery such as crutches or canes.  - If you have sleep apnea, please bring your CPAP machine  In the event that your physical condition changes including the onset of a cold or respiratory illness, or if you have to delay or cancel your surgery, please notify your surgeon.   Anesthesia: Regional Anesthesia    Youre scheduled for surgery. During surgery, youll receive medicine called anesthesia to keep you comfortable and pain-free. Your surgeon has decided that youll receive regional anesthesia. This sheet tells you what to expect with this type of anesthesia.  What is regional anesthesia?  Regional anesthesia numbs one region of your body. The anesthesia may be given around nerves or into veins in your arms, neck, or legs (nerve block or Gideon block). Or it may be sent into the spinal fluid (spinal anesthesia) or into the space just outside the spinal fluid (epidural anesthesia). You may also be given sedatives to help you relax.  Nerve block or [place] block  A small area of the body, such as an arm or leg, can be numbed using a nerve block or Gideon block.  · Nerve block. During a nerve block, your skin is numbed. A needle is then inserted near nerves that serve the area to be numbed. Anesthetic is sent through  the needle.  · IV regional or [place] block. For this type of block, an IV line is put into a vein. The blood flow to the area to be numbed is blocked for a short time. Anesthetic is sent through the IV.  Spinal anesthesia  Spinal anesthesia numbs your body from about the waist down.  · Anesthetic is injected into the spinal fluid. This is a substance that surrounds the spinal cord in your spinal column. The anesthetic blocks pain traveling from the body to the brain.  · To receive the anesthetic, your skin is numbed at the injection site on your back.  · A needle is then inserted into the spinal space. Anesthetic is sent into the spinal fluid through the needle.  Epidural anesthesia  Epidural anesthesia is most commonly used during childbirth and may also be used after surgical procedures of the chest, belly, and legs.  · Anesthetic is injected into the epidural space. This is just outside the dural sac which contains the spinal fluid.  · To receive the anesthetic, your skin is numbed at the injection site on your back.  · A needle is then inserted into the epidural space. Anesthetic is sent into the epidural space through the needle.  · A small flexible catheter may be attached to the needle and left in place. This allows for continuous injections or infusions of anesthetic.  Anesthesia tools and medicines that might be near you during your procedure  · Local anesthetic. This medicine is given through a needle numbs one region of your body.  · Electrocardiography leads (electrodes). These are used to record your heart rate and rhythm.  · Blood pressure cuff. A cuff is placed on your arm to keep track of your blood pressure.  · Pulse oximeter. This small clip is placed on the end of the finger. It measures your blood oxygen level.  · Sedatives. These medicines may be given through an IV. They help to relax you and keep you comfortable. You may stay awake or sleep lightly.  · Oxygen. You may be given oxygen through a  facemask.  Risks and possible complications  Regional anesthesia carries some risks. These include:  · Nausea and vomiting  · Headache  · Backache  · Decreased blood pressure  · Allergic reaction to the anesthetic  · Ongoing numbness (rare)  · Irregular heartbeat (rare)  · Cardiac arrest (rare)   Date Last Reviewed: 12/1/2016  © 5906-2194 [employer]. [address] [place], [place] [id number]. All rights reserved. This information is not intended as a substitute for professional medical care. Always follow your healthcare professional's instructions.

## 2020-01-09 NOTE — ASSESSMENT & PLAN NOTE
US Nov 2019       Liver: Normal in size,. Homogeneous echotexture. No focal hepatic lesions.    Gallbladder: Multiple gallstones are seen.  There is no gallbladder wall thickening or pericholecystic fluid.  No sonographic Joel's sign.    Biliary system: The common duct is not dilated,.  No intrahepatic ductal dilatation.    Spleen: Normal in size and echotexture.    Miscellaneous: No upper abdominal ascites.    /Suggest care with usage of Medication that are hepatotoxic or  Metabolized in the liver     INR- N    Suggested follow up

## 2020-01-09 NOTE — ASSESSMENT & PLAN NOTE
Hands, feet   Suggested feet care   Decreased sensation left foot , Tingling RT foot-pain  better since had spine surgery    Not a Known Diabetic  Lumbar Spinal steosis

## 2020-01-09 NOTE — ASSESSMENT & PLAN NOTE
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

## 2020-01-09 NOTE — ASSESSMENT & PLAN NOTE
Aug 2019   Multilevel cervical degenerative change, most prominent at C5-C6 and C6-C7 resulting in moderate spinal canal stenosis and variable mild to moderate neural foraminal narrowing  Had Neurosurgical evaluation-   As per history-non surgical treatment

## 2020-01-09 NOTE — TELEPHONE ENCOUNTER
----- Message from Delfina Alvarez MD sent at 1/9/2020 11:28 AM CST -----  Regarding: RE: Pre-op recommendation  Yes  mg on call to or   He is to double his home dose also for 2-3 days after the procedure     ----- Message -----  From: Yue Amezcua RN  Sent: 1/9/2020  11:13 AM CST  To: Delfina Alvarez MD, #  Subject: Pre-op recommendation                            Your pt will have total knee arthroplasty surgery on 1/13/20 (Dr Andrade). The anesthesia team is requesting any recommendation for perioperative care regarding the pt's use of prednisone for congenital adrenal hyperplasia. Would the pt require a stress dose the day of surgery?  Please advise.    Pt will be see in Pre-op Center today.  Thank you,  Yue Amezcua RN  Pre-op Center

## 2020-01-09 NOTE — ASSESSMENT & PLAN NOTE
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

## 2020-01-09 NOTE — HPI
History of present illness- I had the pleasure of meeting this pleasant 49 y.o. born intersex ( Per history ) in the pre op clinic prior to her elective Orthopedic surgery. The patient is new to me . Ari was accompanied by wife Kristy.    I have obtained the history by speaking to the patient and by reviewing the electronic health records.    Events leading up to surgery / History of presenting illness -    She has been troubled with severe  Rt knee  pain for a long time , many years . Pain increases with activity and decreases with resting.    Relevant health conditions of significance for the perioperative period/ History of presenting illness -    Subjectively describes health as fair    Health conditions of significance for the perioperative period     Adrenal insufficiency    Chronic steroid, opioid use     Testosterone replacement      Lives with wife   Wife works at [employer]    Not known to have heart disease , Diabetes Mellitus, Lung disease

## 2020-01-09 NOTE — ASSESSMENT & PLAN NOTE
Prednisone 9 mg a day   Suggest Stress dose steroids  To stay on maintenance Prednisone dose  Hydrocortisone  100 mg on call to Operating room    To double his home dose of Prednisone  for 2-3 days after the procedure     Steroid treatment- I suggest monitoring the patient for any suggestions of adrenal insufficiency in view of the recent steroid use

## 2020-01-09 NOTE — PROGRESS NOTES
[facility] - Pre Op Consult  Progress Note    Patient Name: Ari Santos  MRN: [id number]  Date of Evaluation- 01/09/2020  PCP- Siva Mitchell MD    Future cases for Ari Santos [[id number]]     Case ID Status Date Time Gregg Procedure Provider Location    [id number] [facility] 1/13/2020  7:00  ARTHROPLASTY, KNEE, TOTAL-DEPUY ATTUNE Revision/SROM/MBT backup/Synthesis cables Donte Andrade III, MD [[id number]] [facility] OR 2ND FLR      Rt     HPI:  History of present illness- I had the pleasure of meeting this pleasant 49 y.o. born intersex ( Per history ) in the pre op clinic prior to her elective Orthopedic surgery. The patient is new to me . Ari was accompanied by wife Kristy.    I have obtained the history by speaking to the patient and by reviewing the electronic health records.    Events leading up to surgery / History of presenting illness -    She has been troubled with severe  Rt knee  pain for a long time , many years . Pain increases with activity and decreases with resting.    Relevant health conditions of significance for the perioperative period/ History of presenting illness -    Subjectively describes health as fair    Health conditions of significance for the perioperative period     Adrenal insufficiency    Chronic steroid, opioid use     Testosterone replacement      Lives with wife   Wife works at [employer]    Not known to have heart disease , Diabetes Mellitus, Lung disease            Subjective/ Objective:       Chief complaint-Preoperative evaluation, Perioperative Medical management, complication reduction plan     Active cardiac conditions- none    Revised cardiac risk index predictors- none    Functional capacity -Examples of physical activity, walks with a walker,   can take 1 flight of stairs , was at parents house for x mas and tooks stairs while there ----- She can undertake all the above activities without  chest pain,chest tightness, Shortness of breath ,dizziness,lightheadedness making  "her exercise tolerance more,  than 4 Mets.        Review of Systems   Constitutional: Negative for chills and fever.        No unusual weight changes     HENT:        STOPBANG score 1 / 8      Age over 50        Eyes:        No new visual changes   Respiratory:        No cough , phlegm    No Hemoptysis   Cardiovascular:        As noted   Gastrointestinal:        No overt GI/ blood losses  Bowel movements- Regular   Endocrine:        Prednisone use > 20 mg daily for 3 weeks- none    Genitourinary:        No urinary hesitancy    Musculoskeletal:        As above      Skin: Negative for rash.   Neurological: Negative for syncope.        No unilateral weakness   Hematological:        Current use of Anticoagulants  None    Psychiatric/Behavioral:           No SI/HI   No vascular stenting             No anesthesia, bleeding, cardiac problems, PONV with previous surgeries/procedures.  Medications and Allergies reviewed in epic.  FH- No anesthesia,bleeding / venous thrombosis ,in family      Physical Exam     Vitals - 1 value per visit 1/9/2020   SYSTOLIC 116   DIASTOLIC 70   PULSE 107   TEMPERATURE 97.6   RESPIRATIONS    SPO2 99   Weight (lb) 131.39   Weight (kg) 59.6   HEIGHT 5' 0"   BODY MASS INDEX 25.66         Physical Exam  Constitutional- General appearance-Conscious,Coherent  Eyes- No conjunctival icterus,pupils  round  and reactive to light   ENT-Oral cavity- moist  , Hearing grossly normal   Neck- No thyromegaly ,Trachea -central, No jugular venous distension,   No Carotid Bruit   Cardiovascular -Heart Sounds- Normal  and  no murmur   , No gallop rhythm   Respiratory - Normal Respiratory Effort, Normal breath sounds,  no wheeze  and  no forced expiratory wheeze    Peripheral pitting pedal edema-- none , no calf pain   Gastrointestinal -Soft abdomen, No palpable masses, Non Tender,Liver,Spleen not palpable. No-- free fluid and shifting dullness  Musculoskeletal- No finger Clubbing. Strength grossly normal "   Lymphatic-No Palpable cervical, axillary,Inguinal lymphadenopathy   Psychiatric - normal effect,Orientation  Rt Dorsalis pedis pulses-palpable    Lt Dorsalis pedis pulses- palpable   Rt Posterior tibial pulses -palpable   Left posterior tibial pulses -palpable   Miscellaneous -  no asterixis,  Surgical scarunder breasts  ,  no renal bruit and  tattoos  Investigations  Lab and Imaging have been reviewed in [facility].    Review of Medicine tests    EKG- I had independently reviewed the EKG from--8/29/2019   It was reported to be showing     Sinus tachycardia  Otherwise normal ECG    9/10/2019       The perfusion scan is free of evidence from myocardial ischemia or injury.      There is  normal wall motion at rest.      Review of clinical lab tests:  Lab Results   Component Value Date    CREATININE 0.7 10/28/2019    HGB 14.7 01/09/2020     (H) 01/09/2020       Review of old records- Was done and information gathered regards to events leading to surgery and health conditions of significance in the perioperative period.        Preoperative cardiac risk assessment-  The patient does not have any active cardiac conditions . Revised cardiac risk index predictors-0 ---.Functional capacity is more than 4 Mets. She will be undergoing a Orthopedic procedure that carries a intermediate risk       Risk of a major Cardiac event ( Defined as death, myocardial infarction, or cardiac arrest at 30 days after noncardiac surgery), based on RCRI score     -3.9%         No further cardiac work up is indicated prior to proceeding with the surgery          American Society of Anesthesiologists Physical status classification ( ASA ) class: 3   Postoperative pulmonary complication risk assessment:      ARISCAT ( Canet) risk index- risk class -  Low, if duration of surgery is under 3 hours, intermediate, if duration of surgery is over 3 hours      Rosalio Respiratory failure index- percentage risk of respiratory failure:0.5 %      Assessment/Plan:     Congenital adrenal hyperplasia  Doing well on Prednisone     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, 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  At the age of 3-4 months , was very un well and during evaluation it was found that his chromosomes as XX  He had removal of penis and over the years had breast implants   Has undergone appropriate and successful clinical treatment for gender transition to the gender of male  He always identifies himself as a female    Had  removal of breast implants   Total hysterectomy 2003    Adrenal insufficiency  Prednisone 9 mg a day   Suggest Stress dose steroids  To stay on maintenance Prednisone dose  Hydrocortisone  100 mg on call to Operating room    To double his home dose of Prednisone  for 2-3 days after the procedure     Steroid treatment- I suggest monitoring the patient for any suggestions of adrenal insufficiency in view of the recent steroid use    Carpal tunnel syndrome on both sides  Night time pain both hands   Has tingling of hands  Wears braces   Taking Increased dose of Neurontin   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      Cervical radiculopathy  Aug 2019   Multilevel cervical degenerative change, most prominent at C5-C6 and C6-C7 resulting in moderate spinal canal stenosis and variable mild to moderate neural foraminal narrowing  Had Neurosurgical evaluation-   As per history-non surgical treatment      Chronic pain  Both knees   Both shoulders , hands    Intention tremor  Mysoline helping     Neuropathic pain  Hands, feet   Suggested feet care   Decreased sensation left foot , Tingling RT foot-pain  better since had spine surgery    Not a Known Diabetic  Lumbar Spinal steosis    RLS (restless legs syndrome)  Requip helping     HLD (hyperlipidemia)  HLD-I  suggest continuation of statin during the entire perioperative period.    Abnormal thyroid blood test  May 2019 TSH Low  Free T4 - N  No overt hypo hyperthyroid symptoms    IGT  (impaired glucose tolerance)  Prediabetes   Hemoglobin A1c in the pre diabetic range   Weight loss with diet and exercise , if able helps lower A1c  Increased risk of becoming a diabetic   Suggested  follow up       Low testosterone  On replacement   Increased risk of thrombosis [facility]ina op       PTSD (post-traumatic stress disorder)  From Gender related issues   Anxiety and depression   On Lamictal , xanax , Gbapentin    Chronic, continuous use of opioids  Chronic continuous opioid use- In view of the opioid use, the patient may have opioid tolerance . I suggest continuation of the maintenance scheduled opioid during the perioperative period. I suggest considering the possibility of opioid tolerance  in planning post operative pain control     Abnormal LFTs  US Nov 2019       Liver: Normal in size,. Homogeneous echotexture. No focal hepatic lesions.    Gallbladder: Multiple gallstones are seen.  There is no gallbladder wall thickening or pericholecystic fluid.  No sonographic Joel's sign.    Biliary system: The common duct is not dilated,.  No intrahepatic ductal dilatation.    Spleen: Normal in size and echotexture.    Miscellaneous: No upper abdominal ascites.    /Suggest care with usage of Medication that are hepatotoxic or  Metabolized in the liver     INR- N    Suggested follow up         Blister- healing  Rt little  toe   Mild redness around  No drainage         Preventive perioperative care    Thromboembolic prophylaxis:  Her risk factors for thrombosis include surgical procedure and age.I suggest  thromboembolic prophylaxis ( mechanical/pharmacological, weighing the risk benefits of pharmacological agent use considering justina procedural bleeding )  during the perioperative period.I suggested being active in the post operative period.   The patient is a candidate for extended DVT prophylaxis     Postoperative pulmonary complication prophylaxis-Risk factors for post operative pulmonary complications include ASA  class >2- I suggest incentive spirometry use, early ambulation and end tidal carbon dioxide monitoring  , oral care , head end of bed elevation      Renal complication prophylaxis- I suggest keeping her well hydrated  in the perioperative period.     Surgical site Infection Prophylaxis-I  suggest appropriate antibiotic for Prophylaxis against Surgical site infections     Delirium prophylaxis-Risk factors - opioid use and benzodiazepine use - I suggest avoidance / minimizing the use of  Benzodiazepines ( unless the patient has been taking it on a regular basis ),Anticholinergic medication,Antihistamines ( like  Benadryl).I suggest minimizing the use of opioid medication and use of IV tylenol,if it is appropriate. I suggest using the lowest possible dose of opioids for the shortest duration possible in the perioperative period. I suggest to Keep shades/blinds open during the day, lights off and shades closed at night to encourage normal sleep/wake cycle.I encourage the presence of the family member with the patient at all times, if at all possible as mental status changes can be picked up early by the family members and they help with reorientation. I encouraged the presence of family to help with orientation in the perioperative period. Benadryl avoidance suggested      In view of Regional anesthesia  the patient  is at risk of postoperative urinary retention.  I suggest avoidance / minimizing the of  Benzodiazepines,Anticholinergic medication,antihistamines ( Benadryl) , if possible in the perioperative period. I suggest using the minimum possible use of opioids for the minimum period of time in the perioperative period. Benadryl avoidance suggested      This visit was focused on Preoperative evaluation, Perioperative Medical management, complication reduction plans. I suggest that the patient follows up with primary care or relevant sub specialists for ongoing health care.    I appreciate the opportunity to be  involved in this patients care. Please feel free to contact me if there were any questions about this consultation.    Patient is optimized     Patient  was instructed to call and update me about any changes to health,  medication, office visits ,testing out side of the [facility] operative care center , hospitalizations between now and surgery     Roseanne Handley MD  Perioperative Medicine  [facility]   Pager [phone]    Mild redness , Rt little toe  -  Had miltiple steroid injection - Rt lower thigh - leading to reduced muscle , leading to muscle removal   -  1/9- 19 06   Clarification to the above   Had multiple steroid injections as a child     Messaged surgeon regarding the healing blister , Rt little toe  Message Endocrinologist about [facility]ina op steroid and Testosterone use   -  1/10- 18 11    Corresponded with Endocrinologist about justina op use of testosterone     Plan is to  hold Testosterone in the justina op period ( Until his mobility improves post surgery,  usually may take  2-3 weeks post op ) , due to thrombosis risk     Called to discuss the above - spoke to wife about the above   Suggested to take double dose of prednisone 3 days after surgery     Called to follow up , spoke wife to to address any concerns with the up coming surgery or any questions on Medication instructions -  Doing well ,No changes to Medication, Health -    Blister wound getting better

## 2020-01-10 ENCOUNTER — TELEPHONE (OUTPATIENT)
Dept: ORTHOPEDICS | Facility: CLINIC | Age: 50
End: 2020-01-10

## 2020-01-10 NOTE — TELEPHONE ENCOUNTER
Mohsen for pt stating his 0500 arrival time for surgery on Monday at [facility].

## 2020-01-14 ENCOUNTER — PATIENT MESSAGE (OUTPATIENT)
Dept: INTERNAL MEDICINE | Facility: CLINIC | Age: 50
End: 2020-01-14

## 2020-01-14 DIAGNOSIS — F43.10 PTSD (POST-TRAUMATIC STRESS DISORDER): ICD-10-CM

## 2020-01-15 ENCOUNTER — PATIENT MESSAGE (OUTPATIENT)
Dept: ORTHOPEDICS | Facility: CLINIC | Age: 50
End: 2020-01-15

## 2020-01-15 ENCOUNTER — PATIENT MESSAGE (OUTPATIENT)
Dept: REHABILITATION | Facility: OTHER | Age: 50
End: 2020-01-15

## 2020-01-15 ENCOUNTER — PATIENT MESSAGE (OUTPATIENT)
Dept: INTERNAL MEDICINE | Facility: CLINIC | Age: 50
End: 2020-01-15

## 2020-01-15 RX ORDER — ALPRAZOLAM 1 MG/1
1 TABLET ORAL 3 TIMES DAILY PRN
Qty: 90 TABLET | Refills: 0 | Status: SHIPPED | OUTPATIENT
Start: 2020-01-15 | End: 2020-02-09 | Stop reason: SDUPTHER

## 2020-01-16 ENCOUNTER — TELEPHONE (OUTPATIENT)
Dept: ORTHOPEDICS | Facility: CLINIC | Age: 50
End: 2020-01-16

## 2020-01-16 DIAGNOSIS — M17.11 PRIMARY OSTEOARTHRITIS OF RIGHT KNEE: Primary | ICD-10-CM

## 2020-01-23 ENCOUNTER — PATIENT MESSAGE (OUTPATIENT)
Dept: INTERNAL MEDICINE | Facility: CLINIC | Age: 50
End: 2020-01-23

## 2020-01-23 ENCOUNTER — PATIENT MESSAGE (OUTPATIENT)
Dept: PAIN MEDICINE | Facility: CLINIC | Age: 50
End: 2020-01-23

## 2020-01-24 DIAGNOSIS — M62.838 MUSCLE SPASM: Primary | ICD-10-CM

## 2020-01-24 RX ORDER — CYCLOBENZAPRINE HCL 10 MG
10 TABLET ORAL 3 TIMES DAILY PRN
Qty: 90 TABLET | Refills: 0 | Status: SHIPPED | OUTPATIENT
Start: 2020-01-24 | End: 2020-02-27 | Stop reason: SDUPTHER

## 2020-01-27 ENCOUNTER — PATIENT MESSAGE (OUTPATIENT)
Dept: REHABILITATION | Facility: OTHER | Age: 50
End: 2020-01-27

## 2020-01-27 RX ORDER — ROPINIROLE 4 MG/1
4 TABLET, FILM COATED ORAL NIGHTLY
Qty: 90 TABLET | Refills: 0 | Status: SHIPPED | OUTPATIENT
Start: 2020-01-27 | End: 2020-04-13 | Stop reason: SDUPTHER

## 2020-01-30 ENCOUNTER — ANESTHESIA EVENT (OUTPATIENT)
Dept: SURGERY | Facility: HOSPITAL | Age: 50
DRG: 470 | End: 2020-01-30
Payer: MEDICARE

## 2020-01-31 ENCOUNTER — HOSPITAL ENCOUNTER (INPATIENT)
Facility: HOSPITAL | Age: 50
LOS: 3 days | Discharge: HOME | DRG: 470 | End: 2020-02-03
Attending: ORTHOPAEDIC SURGERY | Admitting: ORTHOPAEDIC SURGERY
Payer: MEDICARE

## 2020-01-31 ENCOUNTER — ANESTHESIA (OUTPATIENT)
Dept: SURGERY | Facility: HOSPITAL | Age: 50
DRG: 470 | End: 2020-01-31
Payer: MEDICARE

## 2020-01-31 DIAGNOSIS — M17.11 PRIMARY OSTEOARTHRITIS OF RIGHT KNEE: ICD-10-CM

## 2020-01-31 PROCEDURE — 94761 N-INVAS EAR/PLS OXIMETRY MLT: CPT

## 2020-01-31 PROCEDURE — 88305 TISSUE EXAM BY PATHOLOGIST: ICD-10-PCS | Mod: 26,,, | Performed by: PATHOLOGY

## 2020-01-31 PROCEDURE — 76942 PR U/S GUIDANCE FOR NEEDLE GUIDANCE: ICD-10-PCS | Mod: 26,,, | Performed by: ANESTHESIOLOGY

## 2020-01-31 PROCEDURE — 88311 PR  DECALCIFY TISSUE: ICD-10-PCS | Mod: 26,,, | Performed by: PATHOLOGY

## 2020-01-31 PROCEDURE — 88305 TISSUE EXAM BY PATHOLOGIST: CPT | Mod: 26,,, | Performed by: PATHOLOGY

## 2020-01-31 PROCEDURE — 25000003 PHARM REV CODE 250: Performed by: NURSE ANESTHETIST, CERTIFIED REGISTERED

## 2020-01-31 PROCEDURE — 64448 PR NERVE BLOCK INJ, ANES/STEROID, FEMORAL, CONT INFUSION, INCL IMAG GUIDANCE: ICD-10-PCS | Mod: 59,RT,, | Performed by: ANESTHESIOLOGY

## 2020-01-31 PROCEDURE — C1713 ANCHOR/SCREW BN/BN,TIS/BN: HCPCS | Performed by: ORTHOPAEDIC SURGERY

## 2020-01-31 PROCEDURE — 64448 NJX AA&/STRD FEM NRV NFS IMG: CPT | Performed by: STUDENT IN AN ORGANIZED HEALTH CARE EDUCATION/TRAINING PROGRAM

## 2020-01-31 PROCEDURE — 88311 DECALCIFY TISSUE: CPT | Mod: 26,,, | Performed by: PATHOLOGY

## 2020-01-31 PROCEDURE — 63600175 PHARM REV CODE 636 W HCPCS: Performed by: STUDENT IN AN ORGANIZED HEALTH CARE EDUCATION/TRAINING PROGRAM

## 2020-01-31 PROCEDURE — 20985 CPTR-ASST DIR MS PX: CPT | Mod: ,,, | Performed by: ORTHOPAEDIC SURGERY

## 2020-01-31 PROCEDURE — 76942 ECHO GUIDE FOR BIOPSY: CPT | Mod: 26,,, | Performed by: ANESTHESIOLOGY

## 2020-01-31 PROCEDURE — 36000711: Performed by: ORTHOPAEDIC SURGERY

## 2020-01-31 PROCEDURE — 25000003 PHARM REV CODE 250: Performed by: STUDENT IN AN ORGANIZED HEALTH CARE EDUCATION/TRAINING PROGRAM

## 2020-01-31 PROCEDURE — 27201423 OPTIME MED/SURG SUP & DEVICES STERILE SUPPLY: Performed by: ORTHOPAEDIC SURGERY

## 2020-01-31 PROCEDURE — 99222 1ST HOSP IP/OBS MODERATE 55: CPT | Mod: GC,,, | Performed by: INTERNAL MEDICINE

## 2020-01-31 PROCEDURE — 71000039 HC RECOVERY, EACH ADD'L HOUR: Performed by: ORTHOPAEDIC SURGERY

## 2020-01-31 PROCEDURE — 63600175 PHARM REV CODE 636 W HCPCS: Performed by: ORTHOPAEDIC SURGERY

## 2020-01-31 PROCEDURE — 64448 NJX AA&/STRD FEM NRV NFS IMG: CPT | Mod: 59,RT,, | Performed by: ANESTHESIOLOGY

## 2020-01-31 PROCEDURE — 27447 PR TOTAL KNEE ARTHROPLASTY: ICD-10-PCS | Mod: 22,RT,GC, | Performed by: ORTHOPAEDIC SURGERY

## 2020-01-31 PROCEDURE — 63600175 PHARM REV CODE 636 W HCPCS: Performed by: NURSE ANESTHETIST, CERTIFIED REGISTERED

## 2020-01-31 PROCEDURE — 88311 DECALCIFY TISSUE: CPT | Performed by: PATHOLOGY

## 2020-01-31 PROCEDURE — 25000003 PHARM REV CODE 250: Performed by: ORTHOPAEDIC SURGERY

## 2020-01-31 PROCEDURE — D9220A PRA ANESTHESIA: Mod: CRNA,,, | Performed by: NURSE ANESTHETIST, CERTIFIED REGISTERED

## 2020-01-31 PROCEDURE — D9220A PRA ANESTHESIA: ICD-10-PCS | Mod: ANES,,, | Performed by: ANESTHESIOLOGY

## 2020-01-31 PROCEDURE — 63600175 PHARM REV CODE 636 W HCPCS: Performed by: ANESTHESIOLOGY

## 2020-01-31 PROCEDURE — 27447 TOTAL KNEE ARTHROPLASTY: CPT | Mod: 22,RT,GC, | Performed by: ORTHOPAEDIC SURGERY

## 2020-01-31 PROCEDURE — 11000001 HC ACUTE MED/SURG PRIVATE ROOM

## 2020-01-31 PROCEDURE — D9220A PRA ANESTHESIA: Mod: ANES,,, | Performed by: ANESTHESIOLOGY

## 2020-01-31 PROCEDURE — 63600175 PHARM REV CODE 636 W HCPCS

## 2020-01-31 PROCEDURE — 37000009 HC ANESTHESIA EA ADD 15 MINS: Performed by: ORTHOPAEDIC SURGERY

## 2020-01-31 PROCEDURE — 97161 PT EVAL LOW COMPLEX 20 MIN: CPT

## 2020-01-31 PROCEDURE — 71000033 HC RECOVERY, INTIAL HOUR: Performed by: ORTHOPAEDIC SURGERY

## 2020-01-31 PROCEDURE — C1776 JOINT DEVICE (IMPLANTABLE): HCPCS | Performed by: ORTHOPAEDIC SURGERY

## 2020-01-31 PROCEDURE — 99222 PR INITIAL HOSPITAL CARE,LEVL II: ICD-10-PCS | Mod: GC,,, | Performed by: INTERNAL MEDICINE

## 2020-01-31 PROCEDURE — 36000710: Performed by: ORTHOPAEDIC SURGERY

## 2020-01-31 PROCEDURE — 37000008 HC ANESTHESIA 1ST 15 MINUTES: Performed by: ORTHOPAEDIC SURGERY

## 2020-01-31 PROCEDURE — 97165 OT EVAL LOW COMPLEX 30 MIN: CPT

## 2020-01-31 PROCEDURE — D9220A PRA ANESTHESIA: ICD-10-PCS | Mod: CRNA,,, | Performed by: NURSE ANESTHETIST, CERTIFIED REGISTERED

## 2020-01-31 PROCEDURE — 97530 THERAPEUTIC ACTIVITIES: CPT

## 2020-01-31 PROCEDURE — 27200750 HC INSULATED NEEDLE/ STIMUPLEX: Performed by: ANESTHESIOLOGY

## 2020-01-31 PROCEDURE — 20985 PR CPTR-ASST SURGICAL NAVIGATION IMAGE-LESS: ICD-10-PCS | Mod: ,,, | Performed by: ORTHOPAEDIC SURGERY

## 2020-01-31 PROCEDURE — 76942 ECHO GUIDE FOR BIOPSY: CPT | Performed by: STUDENT IN AN ORGANIZED HEALTH CARE EDUCATION/TRAINING PROGRAM

## 2020-01-31 PROCEDURE — 88305 TISSUE EXAM BY PATHOLOGIST: CPT | Performed by: PATHOLOGY

## 2020-01-31 PROCEDURE — 97116 GAIT TRAINING THERAPY: CPT

## 2020-01-31 DEVICE — IMPLANTABLE DEVICE: Type: IMPLANTABLE DEVICE | Site: KNEE | Status: FUNCTIONAL

## 2020-01-31 DEVICE — CEMENT BONE WHOLE BATCH: Type: IMPLANTABLE DEVICE | Site: KNEE | Status: FUNCTIONAL

## 2020-01-31 RX ORDER — CIPROFLOXACIN HYDROCHLORIDE 3 MG/ML
1 SOLUTION/ DROPS OPHTHALMIC 4 TIMES DAILY
Status: DISCONTINUED | OUTPATIENT
Start: 2020-01-31 | End: 2020-02-03 | Stop reason: HOSPADM

## 2020-01-31 RX ORDER — ONDANSETRON 2 MG/ML
4 INJECTION INTRAMUSCULAR; INTRAVENOUS EVERY 8 HOURS PRN
Status: DISCONTINUED | OUTPATIENT
Start: 2020-01-31 | End: 2020-02-03 | Stop reason: HOSPADM

## 2020-01-31 RX ORDER — MORPHINE SULFATE 2 MG/ML
2 INJECTION, SOLUTION INTRAMUSCULAR; INTRAVENOUS
Status: DISCONTINUED | OUTPATIENT
Start: 2020-01-31 | End: 2020-01-31

## 2020-01-31 RX ORDER — ATORVASTATIN CALCIUM 10 MG/1
10 TABLET, FILM COATED ORAL NIGHTLY
Status: DISCONTINUED | OUTPATIENT
Start: 2020-01-31 | End: 2020-02-03 | Stop reason: HOSPADM

## 2020-01-31 RX ORDER — KETAMINE HCL IN 0.9 % NACL 50 MG/5 ML
SYRINGE (ML) INTRAVENOUS
Status: DISCONTINUED | OUTPATIENT
Start: 2020-01-31 | End: 2020-01-31

## 2020-01-31 RX ORDER — CELECOXIB 200 MG/1
200 CAPSULE ORAL ONCE
Status: DISCONTINUED | OUTPATIENT
Start: 2020-01-31 | End: 2020-01-31

## 2020-01-31 RX ORDER — TALC
6 POWDER (GRAM) TOPICAL NIGHTLY PRN
Status: DISCONTINUED | OUTPATIENT
Start: 2020-01-31 | End: 2020-01-31 | Stop reason: HOSPADM

## 2020-01-31 RX ORDER — FENTANYL CITRATE 50 UG/ML
INJECTION, SOLUTION INTRAMUSCULAR; INTRAVENOUS
Status: DISCONTINUED | OUTPATIENT
Start: 2020-01-31 | End: 2020-01-31

## 2020-01-31 RX ORDER — GABAPENTIN 300 MG/1
300 CAPSULE ORAL ONCE
Status: DISCONTINUED | OUTPATIENT
Start: 2020-01-31 | End: 2020-01-31 | Stop reason: HOSPADM

## 2020-01-31 RX ORDER — LIDOCAINE HCL/PF 100 MG/5ML
SYRINGE (ML) INTRAVENOUS
Status: DISCONTINUED | OUTPATIENT
Start: 2020-01-31 | End: 2020-01-31

## 2020-01-31 RX ORDER — PREDNISONE 5 MG/1
15 TABLET ORAL DAILY
Status: DISCONTINUED | OUTPATIENT
Start: 2020-02-01 | End: 2020-01-31

## 2020-01-31 RX ORDER — PREDNISONE 1 MG/1
3 TABLET ORAL DAILY
Status: DISCONTINUED | OUTPATIENT
Start: 2020-02-01 | End: 2020-01-31

## 2020-01-31 RX ORDER — POLYETHYLENE GLYCOL 3350 17 G/17G
17 POWDER, FOR SOLUTION ORAL DAILY
Status: DISCONTINUED | OUTPATIENT
Start: 2020-01-31 | End: 2020-02-03 | Stop reason: HOSPADM

## 2020-01-31 RX ORDER — MIDAZOLAM HYDROCHLORIDE 1 MG/ML
1 INJECTION INTRAMUSCULAR; INTRAVENOUS EVERY 5 MIN PRN
Status: DISCONTINUED | OUTPATIENT
Start: 2020-01-31 | End: 2020-01-31

## 2020-01-31 RX ORDER — OXYCODONE HYDROCHLORIDE 10 MG/1
10 TABLET ORAL EVERY 4 HOURS PRN
Status: DISCONTINUED | OUTPATIENT
Start: 2020-01-31 | End: 2020-02-03 | Stop reason: HOSPADM

## 2020-01-31 RX ORDER — LAMOTRIGINE 100 MG/1
200 TABLET ORAL 2 TIMES DAILY
Status: DISCONTINUED | OUTPATIENT
Start: 2020-01-31 | End: 2020-02-03 | Stop reason: HOSPADM

## 2020-01-31 RX ORDER — FENTANYL CITRATE 50 UG/ML
25 INJECTION, SOLUTION INTRAMUSCULAR; INTRAVENOUS EVERY 5 MIN PRN
Status: COMPLETED | OUTPATIENT
Start: 2020-01-31 | End: 2020-01-31

## 2020-01-31 RX ORDER — ACETAMINOPHEN 500 MG
1000 TABLET ORAL EVERY 6 HOURS
Status: DISCONTINUED | OUTPATIENT
Start: 2020-01-31 | End: 2020-02-03 | Stop reason: HOSPADM

## 2020-01-31 RX ORDER — VANCOMYCIN HYDROCHLORIDE 1 G/20ML
INJECTION, POWDER, LYOPHILIZED, FOR SOLUTION INTRAVENOUS
Status: DISCONTINUED | OUTPATIENT
Start: 2020-01-31 | End: 2020-01-31 | Stop reason: HOSPADM

## 2020-01-31 RX ORDER — MIDAZOLAM HYDROCHLORIDE 1 MG/ML
INJECTION, SOLUTION INTRAMUSCULAR; INTRAVENOUS
Status: DISCONTINUED | OUTPATIENT
Start: 2020-01-31 | End: 2020-01-31

## 2020-01-31 RX ORDER — SODIUM CHLORIDE 0.9 % (FLUSH) 0.9 %
10 SYRINGE (ML) INJECTION
Status: DISCONTINUED | OUTPATIENT
Start: 2020-01-31 | End: 2020-01-31 | Stop reason: HOSPADM

## 2020-01-31 RX ORDER — VANCOMYCIN HCL IN 5 % DEXTROSE 1G/250ML
1000 PLASTIC BAG, INJECTION (ML) INTRAVENOUS
Status: COMPLETED | OUTPATIENT
Start: 2020-01-31 | End: 2020-01-31

## 2020-01-31 RX ORDER — PREGABALIN 75 MG/1
75 CAPSULE ORAL
Status: DISCONTINUED | OUTPATIENT
Start: 2020-01-31 | End: 2020-01-31

## 2020-01-31 RX ORDER — CEFAZOLIN SODIUM 1 G/3ML
2 INJECTION, POWDER, FOR SOLUTION INTRAMUSCULAR; INTRAVENOUS
Status: COMPLETED | OUTPATIENT
Start: 2020-01-31 | End: 2020-01-31

## 2020-01-31 RX ORDER — ROPIVACAINE/EPI/CLONIDINE/KET 2.46-0.005
SYRINGE (ML) INJECTION ONCE
Status: COMPLETED | OUTPATIENT
Start: 2020-01-31 | End: 2020-01-31

## 2020-01-31 RX ORDER — TRANEXAMIC ACID 100 MG/ML
1000 INJECTION, SOLUTION INTRAVENOUS
Status: COMPLETED | OUTPATIENT
Start: 2020-01-31 | End: 2020-01-31

## 2020-01-31 RX ORDER — OXYCODONE HYDROCHLORIDE 5 MG/1
10 TABLET ORAL
Status: DISCONTINUED | OUTPATIENT
Start: 2020-01-31 | End: 2020-01-31

## 2020-01-31 RX ORDER — GABAPENTIN 400 MG/1
800 CAPSULE ORAL 3 TIMES DAILY
Status: DISCONTINUED | OUTPATIENT
Start: 2020-01-31 | End: 2020-02-03 | Stop reason: HOSPADM

## 2020-01-31 RX ORDER — PRIMIDONE 50 MG/1
50 TABLET ORAL 3 TIMES DAILY
Status: DISCONTINUED | OUTPATIENT
Start: 2020-01-31 | End: 2020-02-03 | Stop reason: HOSPADM

## 2020-01-31 RX ORDER — ROPIVACAINE HYDROCHLORIDE 5 MG/ML
INJECTION, SOLUTION EPIDURAL; INFILTRATION; PERINEURAL
Status: COMPLETED | OUTPATIENT
Start: 2020-01-31 | End: 2020-01-31

## 2020-01-31 RX ORDER — PROPOFOL 10 MG/ML
VIAL (ML) INTRAVENOUS
Status: DISCONTINUED | OUTPATIENT
Start: 2020-01-31 | End: 2020-01-31

## 2020-01-31 RX ORDER — MORPHINE SULFATE 15 MG/1
15 TABLET, FILM COATED, EXTENDED RELEASE ORAL 2 TIMES DAILY
Status: DISCONTINUED | OUTPATIENT
Start: 2020-01-31 | End: 2020-02-03 | Stop reason: HOSPADM

## 2020-01-31 RX ORDER — MUPIROCIN 20 MG/G
1 OINTMENT TOPICAL
Status: COMPLETED | OUTPATIENT
Start: 2020-01-31 | End: 2020-01-31

## 2020-01-31 RX ORDER — PREDNISONE 1 MG/1
4 TABLET ORAL DAILY
Status: DISCONTINUED | OUTPATIENT
Start: 2020-02-03 | End: 2020-01-31

## 2020-01-31 RX ORDER — OXYCODONE HYDROCHLORIDE 5 MG/1
5 TABLET ORAL EVERY 4 HOURS PRN
Status: DISCONTINUED | OUTPATIENT
Start: 2020-01-31 | End: 2020-02-03 | Stop reason: HOSPADM

## 2020-01-31 RX ORDER — DOCUSATE SODIUM 100 MG/1
100 CAPSULE, LIQUID FILLED ORAL 2 TIMES DAILY
Qty: 60 CAPSULE | Refills: 0 | Status: SHIPPED | OUTPATIENT
Start: 2020-01-31 | End: 2020-03-04

## 2020-01-31 RX ORDER — BACITRACIN 50000 [IU]/1
INJECTION, POWDER, FOR SOLUTION INTRAMUSCULAR
Status: DISCONTINUED | OUTPATIENT
Start: 2020-01-31 | End: 2020-01-31 | Stop reason: HOSPADM

## 2020-01-31 RX ORDER — LABETALOL HYDROCHLORIDE 5 MG/ML
INJECTION, SOLUTION INTRAVENOUS
Status: DISCONTINUED | OUTPATIENT
Start: 2020-01-31 | End: 2020-01-31

## 2020-01-31 RX ORDER — CELECOXIB 200 MG/1
200 CAPSULE ORAL DAILY
Qty: 30 CAPSULE | Refills: 0 | Status: SHIPPED | OUTPATIENT
Start: 2020-01-31 | End: 2020-03-05

## 2020-01-31 RX ORDER — TRANEXAMIC ACID 100 MG/ML
3000 INJECTION, SOLUTION INTRAVENOUS ONCE
Status: DISCONTINUED | OUTPATIENT
Start: 2020-01-31 | End: 2020-01-31 | Stop reason: ALTCHOICE

## 2020-01-31 RX ORDER — ALPRAZOLAM 0.5 MG/1
1 TABLET ORAL 3 TIMES DAILY PRN
Status: DISCONTINUED | OUTPATIENT
Start: 2020-01-31 | End: 2020-02-01

## 2020-01-31 RX ORDER — OXYCODONE HYDROCHLORIDE 5 MG/1
5 TABLET ORAL
Status: DISCONTINUED | OUTPATIENT
Start: 2020-01-31 | End: 2020-01-31

## 2020-01-31 RX ORDER — AMOXICILLIN 250 MG
1 CAPSULE ORAL 2 TIMES DAILY
Status: DISCONTINUED | OUTPATIENT
Start: 2020-01-31 | End: 2020-02-03 | Stop reason: HOSPADM

## 2020-01-31 RX ORDER — NALOXONE HCL 0.4 MG/ML
0.02 VIAL (ML) INJECTION
Status: DISCONTINUED | OUTPATIENT
Start: 2020-01-31 | End: 2020-02-03 | Stop reason: HOSPADM

## 2020-01-31 RX ORDER — FENTANYL CITRATE 50 UG/ML
25 INJECTION, SOLUTION INTRAMUSCULAR; INTRAVENOUS EVERY 5 MIN PRN
Status: DISCONTINUED | OUTPATIENT
Start: 2020-01-31 | End: 2020-01-31

## 2020-01-31 RX ORDER — ROPIVACAINE HYDROCHLORIDE 2 MG/ML
8 INJECTION, SOLUTION EPIDURAL; INFILTRATION; PERINEURAL CONTINUOUS
Status: DISCONTINUED | OUTPATIENT
Start: 2020-01-31 | End: 2020-02-03

## 2020-01-31 RX ORDER — ASPIRIN 81 MG/1
81 TABLET ORAL 2 TIMES DAILY
Qty: 60 TABLET | Refills: 0 | Status: SHIPPED | OUTPATIENT
Start: 2020-01-31 | End: 2020-03-04

## 2020-01-31 RX ORDER — ONDANSETRON 2 MG/ML
4 INJECTION INTRAMUSCULAR; INTRAVENOUS ONCE AS NEEDED
Status: DISCONTINUED | OUTPATIENT
Start: 2020-01-31 | End: 2020-01-31 | Stop reason: HOSPADM

## 2020-01-31 RX ORDER — CELECOXIB 200 MG/1
400 CAPSULE ORAL
Status: COMPLETED | OUTPATIENT
Start: 2020-01-31 | End: 2020-01-31

## 2020-01-31 RX ORDER — ROPIVACAINE HYDROCHLORIDE 2 MG/ML
INJECTION, SOLUTION EPIDURAL; INFILTRATION; PERINEURAL
Status: COMPLETED
Start: 2020-01-31 | End: 2020-01-31

## 2020-01-31 RX ORDER — CETIRIZINE HYDROCHLORIDE 5 MG/1
10 TABLET ORAL DAILY
Status: DISCONTINUED | OUTPATIENT
Start: 2020-01-31 | End: 2020-02-03 | Stop reason: HOSPADM

## 2020-01-31 RX ORDER — SODIUM CHLORIDE 0.9 % (FLUSH) 0.9 %
3 SYRINGE (ML) INJECTION
Status: DISCONTINUED | OUTPATIENT
Start: 2020-01-31 | End: 2020-01-31 | Stop reason: HOSPADM

## 2020-01-31 RX ORDER — LIDOCAINE HYDROCHLORIDE 10 MG/ML
1 INJECTION, SOLUTION EPIDURAL; INFILTRATION; INTRACAUDAL; PERINEURAL
Status: DISCONTINUED | OUTPATIENT
Start: 2020-01-31 | End: 2020-01-31

## 2020-01-31 RX ORDER — ONDANSETRON 2 MG/ML
INJECTION INTRAMUSCULAR; INTRAVENOUS
Status: DISCONTINUED | OUTPATIENT
Start: 2020-01-31 | End: 2020-01-31

## 2020-01-31 RX ORDER — ROPINIROLE 1 MG/1
4 TABLET, FILM COATED ORAL NIGHTLY
Status: DISCONTINUED | OUTPATIENT
Start: 2020-01-31 | End: 2020-02-03 | Stop reason: HOSPADM

## 2020-01-31 RX ORDER — SODIUM CHLORIDE 9 MG/ML
INJECTION, SOLUTION INTRAVENOUS
Status: COMPLETED | OUTPATIENT
Start: 2020-01-31 | End: 2020-01-31

## 2020-01-31 RX ORDER — PREDNISONE 5 MG/1
5 TABLET ORAL DAILY
Status: DISCONTINUED | OUTPATIENT
Start: 2020-02-03 | End: 2020-01-31

## 2020-01-31 RX ORDER — PROPOFOL 10 MG/ML
VIAL (ML) INTRAVENOUS CONTINUOUS PRN
Status: DISCONTINUED | OUTPATIENT
Start: 2020-01-31 | End: 2020-01-31

## 2020-01-31 RX ORDER — MIDAZOLAM HYDROCHLORIDE 1 MG/ML
0.5 INJECTION INTRAMUSCULAR; INTRAVENOUS
Status: DISCONTINUED | OUTPATIENT
Start: 2020-01-31 | End: 2020-01-31

## 2020-01-31 RX ORDER — DEXTROMETHORPHAN HYDROBROMIDE, GUAIFENESIN 5; 100 MG/5ML; MG/5ML
650 LIQUID ORAL EVERY 8 HOURS
Qty: 120 TABLET | Refills: 0 | Status: SHIPPED | OUTPATIENT
Start: 2020-01-31 | End: 2021-01-22 | Stop reason: HOSPADM

## 2020-01-31 RX ORDER — OXYCODONE HYDROCHLORIDE 5 MG/1
15 TABLET ORAL
Status: DISCONTINUED | OUTPATIENT
Start: 2020-01-31 | End: 2020-01-31

## 2020-01-31 RX ORDER — MUPIROCIN 20 MG/G
1 OINTMENT TOPICAL 2 TIMES DAILY
Status: DISCONTINUED | OUTPATIENT
Start: 2020-01-31 | End: 2020-02-03 | Stop reason: HOSPADM

## 2020-01-31 RX ORDER — SODIUM CHLORIDE 9 MG/ML
INJECTION, SOLUTION INTRAVENOUS CONTINUOUS
Status: ACTIVE | OUTPATIENT
Start: 2020-01-31 | End: 2020-02-01

## 2020-01-31 RX ORDER — BISACODYL 10 MG
10 SUPPOSITORY, RECTAL RECTAL EVERY 12 HOURS PRN
Status: DISCONTINUED | OUTPATIENT
Start: 2020-01-31 | End: 2020-02-03 | Stop reason: HOSPADM

## 2020-01-31 RX ORDER — METHOCARBAMOL 500 MG/1
500 TABLET, FILM COATED ORAL 4 TIMES DAILY
Status: DISCONTINUED | OUTPATIENT
Start: 2020-01-31 | End: 2020-02-03 | Stop reason: HOSPADM

## 2020-01-31 RX ORDER — FAMOTIDINE 40 MG/5ML
20 POWDER, FOR SUSPENSION ORAL 2 TIMES DAILY
Status: DISCONTINUED | OUTPATIENT
Start: 2020-01-31 | End: 2020-02-03 | Stop reason: HOSPADM

## 2020-01-31 RX ORDER — HYDROMORPHONE HYDROCHLORIDE 1 MG/ML
0.2 INJECTION, SOLUTION INTRAMUSCULAR; INTRAVENOUS; SUBCUTANEOUS EVERY 5 MIN PRN
Status: DISCONTINUED | OUTPATIENT
Start: 2020-01-31 | End: 2020-01-31 | Stop reason: HOSPADM

## 2020-01-31 RX ORDER — CIPROFLOXACIN HYDROCHLORIDE 3 MG/ML
1 SOLUTION/ DROPS OPHTHALMIC 3 TIMES DAILY
Status: DISCONTINUED | OUTPATIENT
Start: 2020-01-31 | End: 2020-01-31

## 2020-01-31 RX ORDER — MORPHINE SULFATE 2 MG/ML
2 INJECTION, SOLUTION INTRAMUSCULAR; INTRAVENOUS
Status: DISCONTINUED | OUTPATIENT
Start: 2020-01-31 | End: 2020-02-03 | Stop reason: HOSPADM

## 2020-01-31 RX ORDER — ALPRAZOLAM 0.5 MG/1
1 TABLET ORAL 3 TIMES DAILY PRN
Status: CANCELLED | OUTPATIENT
Start: 2020-01-31

## 2020-01-31 RX ORDER — ACETAMINOPHEN 500 MG
1000 TABLET ORAL ONCE
Status: COMPLETED | OUTPATIENT
Start: 2020-01-31 | End: 2020-01-31

## 2020-01-31 RX ORDER — ASPIRIN 81 MG/1
81 TABLET ORAL 2 TIMES DAILY
Status: DISCONTINUED | OUTPATIENT
Start: 2020-01-31 | End: 2020-02-03 | Stop reason: HOSPADM

## 2020-01-31 RX ADMIN — MIDAZOLAM HYDROCHLORIDE 2 MG: 1 INJECTION, SOLUTION INTRAMUSCULAR; INTRAVENOUS at 06:01

## 2020-01-31 RX ADMIN — LABETALOL HYDROCHLORIDE 2.5 MG: 5 INJECTION, SOLUTION INTRAVENOUS at 10:01

## 2020-01-31 RX ADMIN — SENNOSIDES AND DOCUSATE SODIUM 1 TABLET: 8.6; 5 TABLET ORAL at 12:01

## 2020-01-31 RX ADMIN — METHOCARBAMOL TABLETS 500 MG: 500 TABLET, COATED ORAL at 05:01

## 2020-01-31 RX ADMIN — FENTANYL CITRATE 25 MCG: 50 INJECTION, SOLUTION INTRAMUSCULAR; INTRAVENOUS at 10:01

## 2020-01-31 RX ADMIN — PROPOFOL 80 MG: 10 INJECTION, EMULSION INTRAVENOUS at 07:01

## 2020-01-31 RX ADMIN — FENTANYL CITRATE 25 MCG: 50 INJECTION INTRAMUSCULAR; INTRAVENOUS at 12:01

## 2020-01-31 RX ADMIN — FENTANYL CITRATE 100 MCG: 50 INJECTION INTRAMUSCULAR; INTRAVENOUS at 06:01

## 2020-01-31 RX ADMIN — OXYCODONE HYDROCHLORIDE 15 MG: 10 TABLET ORAL at 06:01

## 2020-01-31 RX ADMIN — CEFAZOLIN 2 G: 1 INJECTION, POWDER, FOR SOLUTION INTRAMUSCULAR; INTRAVENOUS at 04:01

## 2020-01-31 RX ADMIN — HYDROMORPHONE HYDROCHLORIDE 0.2 MG: 1 INJECTION, SOLUTION INTRAMUSCULAR; INTRAVENOUS; SUBCUTANEOUS at 02:01

## 2020-01-31 RX ADMIN — MEPIVACAINE HYDROCHLORIDE 3 ML: 15 INJECTION, SOLUTION EPIDURAL; INFILTRATION at 07:01

## 2020-01-31 RX ADMIN — ONDANSETRON 4 MG: 2 INJECTION INTRAMUSCULAR; INTRAVENOUS at 07:01

## 2020-01-31 RX ADMIN — LAMOTRIGINE 200 MG: 100 TABLET ORAL at 09:01

## 2020-01-31 RX ADMIN — LABETALOL HYDROCHLORIDE 5 MG: 5 INJECTION, SOLUTION INTRAVENOUS at 10:01

## 2020-01-31 RX ADMIN — Medication 10 MG: at 11:01

## 2020-01-31 RX ADMIN — ROPIVACAINE HYDROCHLORIDE 8 ML/HR: 2 INJECTION, SOLUTION EPIDURAL; INFILTRATION at 09:01

## 2020-01-31 RX ADMIN — GABAPENTIN 800 MG: 400 CAPSULE ORAL at 04:01

## 2020-01-31 RX ADMIN — CEFAZOLIN 2 G: 1 INJECTION, POWDER, FOR SOLUTION INTRAMUSCULAR; INTRAVENOUS at 11:01

## 2020-01-31 RX ADMIN — ASPIRIN 81 MG: 81 TABLET, COATED ORAL at 09:01

## 2020-01-31 RX ADMIN — ATORVASTATIN CALCIUM 10 MG: 10 TABLET, FILM COATED ORAL at 09:01

## 2020-01-31 RX ADMIN — ACETAMINOPHEN 1000 MG: 500 TABLET ORAL at 12:01

## 2020-01-31 RX ADMIN — SODIUM CHLORIDE, SODIUM GLUCONATE, SODIUM ACETATE, POTASSIUM CHLORIDE, MAGNESIUM CHLORIDE, SODIUM PHOSPHATE, DIBASIC, AND POTASSIUM PHOSPHATE: .53; .5; .37; .037; .03; .012; .00082 INJECTION, SOLUTION INTRAVENOUS at 07:01

## 2020-01-31 RX ADMIN — OXYCODONE HYDROCHLORIDE 15 MG: 5 TABLET ORAL at 12:01

## 2020-01-31 RX ADMIN — ROPIVACAINE HYDROCHLORIDE 8 ML/HR: 2 INJECTION, SOLUTION EPIDURAL; INFILTRATION at 12:01

## 2020-01-31 RX ADMIN — METHOCARBAMOL TABLETS 500 MG: 500 TABLET, COATED ORAL at 09:01

## 2020-01-31 RX ADMIN — ROPINIROLE HYDROCHLORIDE 4 MG: 1 TABLET, FILM COATED ORAL at 09:01

## 2020-01-31 RX ADMIN — Medication: at 09:01

## 2020-01-31 RX ADMIN — ACETAMINOPHEN 1000 MG: 500 TABLET ORAL at 06:01

## 2020-01-31 RX ADMIN — ASPIRIN 81 MG: 81 TABLET, COATED ORAL at 12:01

## 2020-01-31 RX ADMIN — CIPROFLOXACIN HYDROCHLORIDE 1 DROP: 3 SOLUTION/ DROPS OPHTHALMIC at 09:01

## 2020-01-31 RX ADMIN — Medication 10 MG: at 10:01

## 2020-01-31 RX ADMIN — FENTANYL CITRATE 25 MCG: 50 INJECTION, SOLUTION INTRAMUSCULAR; INTRAVENOUS at 07:01

## 2020-01-31 RX ADMIN — SENNOSIDES AND DOCUSATE SODIUM 1 TABLET: 8.6; 5 TABLET ORAL at 09:01

## 2020-01-31 RX ADMIN — MUPIROCIN 1 G: 20 OINTMENT TOPICAL at 06:01

## 2020-01-31 RX ADMIN — VANCOMYCIN HYDROCHLORIDE 1000 MG: 1 INJECTION, POWDER, LYOPHILIZED, FOR SOLUTION INTRAVENOUS at 06:01

## 2020-01-31 RX ADMIN — GABAPENTIN 800 MG: 400 CAPSULE ORAL at 09:01

## 2020-01-31 RX ADMIN — FENTANYL CITRATE 25 MCG: 50 INJECTION, SOLUTION INTRAMUSCULAR; INTRAVENOUS at 08:01

## 2020-01-31 RX ADMIN — LABETALOL HYDROCHLORIDE 2.5 MG: 5 INJECTION, SOLUTION INTRAVENOUS at 08:01

## 2020-01-31 RX ADMIN — TRANEXAMIC ACID 3000 MG: 100 INJECTION, SOLUTION INTRAVENOUS at 10:01

## 2020-01-31 RX ADMIN — MIDAZOLAM HYDROCHLORIDE 2 MG: 1 INJECTION, SOLUTION INTRAMUSCULAR; INTRAVENOUS at 07:01

## 2020-01-31 RX ADMIN — PRIMIDONE 50 MG: 50 TABLET ORAL at 09:01

## 2020-01-31 RX ADMIN — ROPIVACAINE HYDROCHLORIDE 20 ML: 5 INJECTION, SOLUTION EPIDURAL; INFILTRATION; PERINEURAL at 06:01

## 2020-01-31 RX ADMIN — CEFAZOLIN 2 G: 330 INJECTION, POWDER, FOR SOLUTION INTRAMUSCULAR; INTRAVENOUS at 07:01

## 2020-01-31 RX ADMIN — FENTANYL CITRATE 25 MCG: 50 INJECTION INTRAMUSCULAR; INTRAVENOUS at 01:01

## 2020-01-31 RX ADMIN — SODIUM CHLORIDE: 0.9 INJECTION, SOLUTION INTRAVENOUS at 07:01

## 2020-01-31 RX ADMIN — LIDOCAINE HYDROCHLORIDE 75 MG: 20 INJECTION, SOLUTION INTRAVENOUS at 07:01

## 2020-01-31 RX ADMIN — PROPOFOL 150 MCG/KG/MIN: 10 INJECTION, EMULSION INTRAVENOUS at 07:01

## 2020-01-31 RX ADMIN — OXYCODONE HYDROCHLORIDE 10 MG: 10 TABLET ORAL at 11:01

## 2020-01-31 RX ADMIN — Medication 10 MG: at 07:01

## 2020-01-31 RX ADMIN — LABETALOL HYDROCHLORIDE 5 MG: 5 INJECTION, SOLUTION INTRAVENOUS at 08:01

## 2020-01-31 RX ADMIN — LABETALOL HYDROCHLORIDE 5 MG: 5 INJECTION, SOLUTION INTRAVENOUS at 09:01

## 2020-01-31 RX ADMIN — TRANEXAMIC ACID 1000 MG: 100 INJECTION, SOLUTION INTRAVENOUS at 07:01

## 2020-01-31 RX ADMIN — TRANEXAMIC ACID 1000 MG: 100 INJECTION, SOLUTION INTRAVENOUS at 10:01

## 2020-01-31 RX ADMIN — ACETAMINOPHEN 1000 MG: 500 TABLET ORAL at 11:01

## 2020-01-31 RX ADMIN — CELECOXIB 400 MG: 200 CAPSULE ORAL at 06:01

## 2020-01-31 RX ADMIN — HYDROCORTISONE SODIUM SUCCINATE 100 MG: 100 INJECTION, POWDER, FOR SOLUTION INTRAMUSCULAR; INTRAVENOUS at 07:01

## 2020-01-31 RX ADMIN — FAMOTIDINE 20 MG: 40 POWDER, FOR SUSPENSION ORAL at 11:01

## 2020-01-31 RX ADMIN — SODIUM CHLORIDE, SODIUM GLUCONATE, SODIUM ACETATE, POTASSIUM CHLORIDE, MAGNESIUM CHLORIDE, SODIUM PHOSPHATE, DIBASIC, AND POTASSIUM PHOSPHATE: .53; .5; .37; .037; .03; .012; .00082 INJECTION, SOLUTION INTRAVENOUS at 10:01

## 2020-01-31 RX ADMIN — SODIUM CHLORIDE: 0.9 INJECTION, SOLUTION INTRAVENOUS at 12:01

## 2020-01-31 RX ADMIN — ALPRAZOLAM 1 MG: 0.5 TABLET ORAL at 09:01

## 2020-01-31 RX ADMIN — MUPIROCIN 1 G: 20 OINTMENT TOPICAL at 09:01

## 2020-01-31 RX ADMIN — FLUORESCEIN SODIUM AND BENOXINATE HYDROCHLORIDE 1 DROP: 4; 2.5 SOLUTION/ DROPS OPHTHALMIC at 01:01

## 2020-01-31 RX ADMIN — ACETAMINOPHEN 1000 MG: 500 TABLET ORAL at 05:01

## 2020-01-31 NOTE — HPI
CC: Right knee pain     Ari Santos is a 49 y.o. male with longstanding history of knee issues congenital adrenal hyperplasia for which there on prednisone  Significant spinal stenosis history of radiofrequency ablation in May no surgeries.  Pain is worse with activity and weight bearing.  Patient has experienced interference of activities of daily living due to decreased range of motion and an increase in joint pain and swelling.  Patient has failed non-operative treatment including pain medication NSAIDs, injections, and activity modification.  Ari Santos currently ambulates using Rollator.      Relevant medical conditions of significance in perioperative period:  Spinal stenosis: seeing neurosurgery, RFA in May  Congenital adrenal hyperplasia: on prednisone, currently 9 mg  Chronic opioid use: currently Percocet 5 mg tid as well as morphine 15 mg qhs

## 2020-01-31 NOTE — TRANSFER OF CARE
Anesthesia Transfer of Care Note    Patient: Ari Santos    Procedure(s) Performed: Procedure(s) (LRB):  ARTHROPLASTY, KNEE, TOTAL (Right)    Patient location: PACU    Anesthesia Type: general    Transport from OR: Transported from OR on 6-10 L/min O2 by face mask with adequate spontaneous ventilation    Post pain: adequate analgesia    Post assessment: no apparent anesthetic complications and tolerated procedure well    Post vital signs: stable    Level of consciousness: awake and alert    Nausea/Vomiting: no nausea/vomiting    Complications: none    Transfer of care protocol was followed      Last vitals:   Visit Vitals  BP (!) 110/58 (BP Location: Left arm, Patient Position: Lying)   Pulse (P) 93   Temp (P) 37.2 °C (99 °F) (Temporal)   Resp 16   Ht 5' (1.524 m)   Wt 59.9 kg (132 lb)   SpO2 99%   Breastfeeding? No   BMI 25.78 kg/m²

## 2020-01-31 NOTE — OP NOTE
[facility] Right TKA  OPERATIVE NOTE    Date of Operation:   1/31/2020    Providers Performing:   Surgeon(s):  Donte Andrade III, MD  Assistant: Domo Wells MD    Operative Procedure:   Right Total Knee Arthroplasty, CPT 37051    -22 modifier for severe varus deformity and small stature requiring prolonged operative time and increased case complexity and difficulty including revision style components    Application of incisional wound vac device      Preoperative Diagnosis:   Right Knee Osteoarthritis, ICD-10 M17.11    Postoperative Diagnosis:   SAME    Components Used:   Depuy  Femur: Attune Revision CRS Size 3  Tibia: Attune Revision fixed bearing Size 2 (no stem extension)  Patella: Attune medialized Dome 29 mm  Tibial Insert: Attune revision CRS fixed bearing inset size 6 mm  3 packs cement: Simplex    Serian  iAssist device for distal femur cut    Implant Name Type Inv. Item Serial No.  Lot No. LRB No. Used Action   CEMENT BONE WHOLE BATCH - [id number]  CEMENT BONE WHOLE BATCH  SiCortex. [id number] Right 2 Implanted   Quik-Use Femoral Bone Cement Preparation Kit w/ (2) John Medullary Cement Plugs (Polyethelene with Barium Sulfate)    SERINA,INC [id number] Right 1 Implanted   Attune Knee System Revision Tibial Base Fixed Bearing Size 2 Cemented    [employer] [id number] Right 1 Implanted   Attune Knee System Revision CRS Femoral Size 3 Right Cemented    [employer]PU[employer]. [id number] Right 1 Implanted   Attune Patella Medialized Dome 29mm Cemented AOX    [employer]. [id number] Right 1 Implanted   Attune Knee System  Revision CRS Fixed Bearing Insert Size 3 6mm AOX    DEPU[employer]. [id number] Right 1 Implanted       Anesthesia:   Spinal  Adductor canal block with catheter  ARMINDA OFE    Estimated Blood Loss:   200 cc    Specimens:   Tissue sent for pathology per protocol    Complications:   None    Indications:   The patient has failed non-operative measures including medications, injections and activity modifications  for their knee.  After an explanation of risks and benefits of knee arthroplasty surgery, the patient wishes to proceed with surgery.    Operative Notes:   The patient was greeted in the pre-op holding area.  The patients knee was marked with a surgical marker by the surgeon.  Spinal-Epidural anesthesia was administered by the anesthesia team.  The patient was placed in the supine position on the OR table with all bony prominences padded.  A tourniquet was not used.  IV antibiotics were administered.  The leg was prepped and draped in the usual sterile fashion.  A timeout was performed and the correct patient, site and procedure were identified.    A midline incision was made with a standard medical parapatellar arthrotomy, I used the previous surgery scar for the proximal limb of the incision. Proximally the tanvir The standard medial capsular release was performed along with the lateral gutter.  The patella was mobilized from the lateral parapatellar ligament and bony osteophytes were removed.  The intercondylar notch was cleared of the ACL/PCL.     We then obtained access to the femoral canal with the stepped drill.  The iAssist device was placed and calibrated for 5 degrees of valgus with a 10mm planned resection.  Our distal femoral cuts were made.      The extramedullary tibial alignment guide was placed in the appropriate slope and varus/valgus orientation and the proximal cutting block secured by pins.  Measured resection with a 8mm cut was accounted for from the high (lateral) side of the plateau, perpendicular to the ankle.  The cut was made with an oscillating saw.      The knee was placed in extension and the medical and lateral meniscal remnants removed.      An extensive medial release was done involving the medial tibia, posterior capsule, deep MCL, pes anserinus and semi membranosus.     A spacer block was placed with the knee in extension checking for alignment and balance which we were happy  with.    The knee was then brought into flexion and the distal femoral gap assessed for appropriate rotation.  Our distal femoral sizing guide was placed and sized appropriately.  Guide pins were placed in the appropriate external rotation.  This was assessed with the 4 in 1 cutting block and the flexion gap sizing block which was appropriate.  The distal femoral preparation was completed after the anterior, posterior and chamfer cuts were made.  The box cutting guide was placed and assessed.  The box cut was made. A reamer was used to create room for the boss of the attune revision femur.    At this time the trial implants were placed and knee assessed for stability, and flexion arc. The patella was prepared using free-hand technique and the three-holed lug drill guide was sized and appropriately assessed. Patella tracking was found to be acceptable. The tibial component rotation was marked. The trials were removed.     The tibial component was positioned. I tried to drill a keel for the attune rotating platform tibia but the tibia was too small/narrow to fit the keel. I could almost get the keel down but the cortex threw off the alignment of the tray. Therefore, I switched to the fixed bearing revision tray as the shorter keel did not impinge on the cortex. I made sure that there was not a cortical perforation while reaming and trialing.     The wound was copiously irrigated with pulsitile lavage and the bony surfaces dried.  Cement was mixed on the back table and applied to all components.  Cementation was done in a staged manner. I cemented the tibia first. When dry, I cemented the femoral and patellar components. A 0.35% betadine solution was left to soak in the surgical field.     After the cement was dry, the trial poly insert was trialed again. Hemostasis was obtained with bovie electrocautery.  The knee was again copiously irrigated with pulsatile lavage. The final polyethylene insert was placed and the  knee reduced.      1 gram of vancomycin powder was placed in the knee. The arthrotomy was closed with interrupted #1 vicryl suture and #2 quill, the subcuticular layer was closed with 2-0 vicryl suture and a running 4-0 monocryl was used to closed the skin surface.  Surgicel sealant was placed over the top of the incision. Staples were used to reinforce the closure and an incisional wound vac was placed.      Sponge and needle counts were correct.    The first-assistant was critical to all steps of the operation, including retraction and leg stabilization during exposure and bone preparation, as well as the deep and superficial wound closure.  Dr. Andrade performed and/or supervised the key portions of this surgical procedure, including evaluation of the bone cuts, reshaping of the bony elements, and insertion of the provisional and final components.    Postoperative Plan:  The patient will be anticoagulated with 81mg aspirin twice daily for one month.   They will receive 24 hours of post-operative antibiotics.    Activity will be weight bearing as tolerated in hinged knee brace locked in extension x2 weeks.   Staple removal at 2 week follow appointment, then remove brace and begin PT    Signed by: Donte Andrade III, MD

## 2020-01-31 NOTE — PLAN OF CARE
"  Problem: Physical Therapy Goal  Goal: Physical Therapy Goal  Description  Goals to be met by: 2020    Patient will increase functional independence with mobility by performin. Supine to sit with Set-up [place]  2. Sit to supine with Set-up [place]  3. Sit to stand transfer with Supervision  4. Gait  x 150 feet with Supervision using Rolling Walker.   5. Ascend/descend 6" curb step without Handrail(s) and Contact Guard Assistance using Rolling Walker.   6. Lower extremity exercise program x30 reps per handout, with independence     Outcome: Ongoing, Progressing   Patient is pleasant, cooperative, and agreeable to therapy. Patient demonstrates good motivation and functional mobility secondary to appropriate cues for sequencing and assistance for balance. Patient with chronic pain at baseline. Patient requires minimum assistance for bed mobility, transfers, and ambulation secondary to pain, weakness, impaired balance, impaired endurance, decreased range of motion, decreased safety awareness, and fatigue s/p surgery. Patient ambulated 3 steps forward/backwards/laterally using standard walker and minimum assistance with tactile/verbal cues for sequencing and walker management. Plan of care initiated. Patient will benefit from skilled physical therapy in acute care setting for strengthening and mobility training.   "

## 2020-01-31 NOTE — BRIEF OP NOTE
[facility]  Brief Operative Note    SUMMARY     Surgery Date: 1/31/2020     Surgeon(s) and Role:     * Donte Andrade III, MD - Primary     * Domo Wells MD - Resident - Assisting        Pre-op Diagnosis:  Primary osteoarthritis of right knee [M17.11]    Post-op Diagnosis:  Post-Op Diagnosis Codes:     * Primary osteoarthritis of right knee [M17.11]    Procedure(s) (LRB):  ARTHROPLASTY, KNEE, TOTAL (Right)    Anesthesia: Regional    Description of Procedure: Right TKA    Description of the findings of the procedure: see op note    Estimated Blood Loss: 250 mL         Specimens:   Specimen (12h ago, onward)    None

## 2020-01-31 NOTE — ASSESSMENT & PLAN NOTE
49 y.o. female transgender (F to M: recognizes as male) POD1 s/p R TKA    Pain control: multimodal per surgical home  PT/OT: WBAT RLE with hinged knee brace locked in extension.   DVT PPx: ASA 81mg BID, FCDs at all times when not ambulating  Abx: postop Ancef  Garzon: DCed  Continue incisional vac and HKB - ensure Ace wrap under HKB so it doesn't slide.  Prednisone 18mg daily for 2 days postop and then prednisone 9mg daily starting POD3.    Dispo: d/c to SNF vs home with home health pending PT.

## 2020-01-31 NOTE — ANESTHESIA POSTPROCEDURE EVALUATION
Anesthesia Post Evaluation    Patient: Ari Santos    Procedure(s) Performed: Procedure(s) (LRB):  ARTHROPLASTY, KNEE, TOTAL (Right)    Final Anesthesia Type: general    Patient location during evaluation: PACU  Patient participation: Yes- Able to Participate  Level of consciousness: awake and alert and oriented  Post-procedure vital signs: reviewed and stable  Pain management: adequate  Airway patency: patent    PONV status at discharge: No PONV  Anesthetic complications: yes  Perioperative Events: corneal abrasion  Korina-operative Events Comments: Corneal abrasion confirmed via fluorescin dye.  Cipro drops given.  Orders placed, discussion had with patient, who reports an understanding.  All questions answered.  Cardiovascular status: blood pressure returned to baseline and hemodynamically stable  Respiratory status: unassisted, room air and spontaneous ventilation  Hydration status: euvolemic  Follow-up not needed.          Vitals Value Taken Time   /42 1/31/2020  2:01 PM   Temp 37.2 °C (99 °F) 1/31/2020 11:52 AM   Pulse 84 1/31/2020  2:02 PM   Resp 25 1/31/2020  2:02 PM   SpO2 95 % 1/31/2020  2:02 PM   Vitals shown include unvalidated device data.      No case tracking events are documented in the log.      Pain/Andreea Score: Pain Rating Prior to Med Admin: 9 (1/31/2020  1:30 PM)  Andreea Score: 9 (1/31/2020  1:15 PM)

## 2020-01-31 NOTE — ASSESSMENT & PLAN NOTE
On Prednisone 9 mg, daily, at home.   Received IV Hydrocortisone 100 mg, prior to surgery today.     Plan:   -Given IV HC 25 mg, tonight  -Resume home dose Prednisone 9 mg, daily, from tomorrow.   -If clinically unstable, would start stress dose steroids (IV HC 50 mg Q8h).   -Pt has follow up appointment with Dr. Alvarez on 3/5

## 2020-01-31 NOTE — ANESTHESIA PREPROCEDURE EVALUATION
01/31/2020  Pre-operative evaluation for Procedure(s) (LRB):  ARTHROPLASTY, KNEE, TOTAL-DEPUY ATTUNE Revision/SROM/MBT/Synthesis cables (Right)    Ari Santos is a 49 y.o. female     Patient Active Problem List   Diagnosis    Fibromyalgia    Intention tremor    RLS (restless legs syndrome)    PTSD (post-traumatic stress disorder)    Congenital adrenal hyperplasia    Seasonal allergies    HLD (hyperlipidemia)    Renal cyst    Abnormal thyroid blood test    Chronic pain    Neuropathic pain    Multiple closed traumatic fractures of multiple bones of hip and pelvis with routine healing, subsequent encounter    Urinary incontinence, urge    Abnormal CT scan, pelvis    Erectile disorder, generalized, mild    S/P lumbar laminectomy    Carpal tunnel syndrome on both sides    Adrenal insufficiency    IGT (impaired glucose tolerance)    Endocrine disorder in female-to-male transgender person    Cervical radiculopathy    Chronic pain of both knees    Quadriceps weakness    Decreased functional mobility and endurance    Thoracic myofascial strain    Pelvic fluid collection    Low testosterone    Chronic, continuous use of opioids    Abnormal LFTs    Blister- healing    Primary osteoarthritis of right knee       Review of patient's allergies indicates:   Allergen Reactions    Penicillins Rash       No current facility-administered medications on file prior to encounter.      Current Outpatient Medications on File Prior to Encounter   Medication Sig Dispense Refill    ALPRAZolam (XANAX) 1 MG tablet Take 1 tablet (1 mg total) by mouth 3 (three) times daily as needed. 90 tablet 0    atorvastatin (LIPITOR) 10 MG tablet Take 1 tablet (10 mg total) by mouth every evening. 90 tablet 2    cranberry fruit extract (CRANBERRY ORAL) Take by mouth.      docusate sodium (STOOL SOFTENER ORAL)  Take by mouth as needed (constipation).      gabapentin (NEURONTIN) 800 MG tablet 1 tablet by mouth three times a day and 2 tablets at night (5 tablets a day, 4000mg) 150 tablet 2    Lactobacillus acidophilus (ACIDOPHILUS) Cap Take by mouth once daily.       lamoTRIgine (LAMICTAL) 200 MG tablet Take 1 tablet (200 mg total) by mouth 2 (two) times daily. 60 tablet 5    loratadine (CLARITIN) 10 mg tablet Take 10 mg by mouth once daily.       morphine (MS CONTIN) 15 MG 12 hr tablet Take 1 tablet (15 mg total) by mouth 2 (two) times daily. 60 tablet 0    nystatin (MYCOSTATIN) cream Apply topically 2 (two) times daily. (Patient taking differently: Apply topically 2 (two) times daily as needed. ) 30 g 1    oxyCODONE-acetaminophen (PERCOCET)  mg per tablet Take 1 tablet by mouth every 8 (eight) hours as needed for Pain. 90 tablet 0    predniSONE (DELTASONE) 1 MG tablet As prescribed - up to 4 mg  A day 200 tablet 3    predniSONE (DELTASONE) 5 MG tablet Take 1 tablet (5 mg total) by mouth once daily. (Patient taking differently: Take 10 mg by mouth once daily. ) 30 tablet 11    primidone (MYSOLINE) 50 MG Tab Take 50 mg by mouth 3 (three) times daily.       zinc 50 mg Tab once daily.       naproxen sodium (ALEVE) 220 mg Cap Take by mouth.      testosterone (ANDROGEL) 1 % (50 mg/5 gram) GlPk Apply 5 g topically once daily. 30 packet 4    triamcinolone acetonide 0.1% (KENALOG) 0.1 % cream Apply topically 2 (two) times daily. 45 g 1       Past Surgical History:   Procedure Laterality Date    BACK SURGERY      LAMINECTOMY  09/13/2019    RADIOFREQUENCY ABLATION Left 5/9/2019    Procedure: RADIOFREQUENCY ABLATION, LEFT L3,4,5;  Surgeon: Messi Cabello MD;  Location: [facility];  Service: Pain Management;  Laterality: Left;  1 of 2    RADIOFREQUENCY ABLATION Right 5/23/2019    Procedure: RADIOFREQUENCY ABLATION, RIGHT L3,4,5;  Surgeon: Messi Cabello MD;  Location: [facility];  Service: Pain  Management;  Laterality: Right;  2of 2    SPINE SURGERY      2019        Social History     Socioeconomic History    Marital status:      Spouse name: Not on file    Number of children: Not on file    Years of education: Not on file    Highest education level: Not on file   Occupational History    Not on file   Social Needs    Financial resource strain: Not very hard    Food insecurity:     Worry: Never true     Inability: Never true    Transportation needs:     Medical: No     Non-medical: No   Tobacco Use    Smoking status: Former Smoker     Last attempt to quit:      Years since quittin.0    Smokeless tobacco: Never Used    Tobacco comment: Socailly   Substance and Sexual Activity    Alcohol use: Not Currently     Frequency: Never     Binge frequency: Never    Drug use: Never    Sexual activity: Yes     Partners: Female   Lifestyle    Physical activity:     Days per week: 3 days     Minutes per session: 20 min    Stress: To some extent   Relationships    Social connections:     Talks on phone: More than three times a week     Gets together: Once a week     Attends Rastafarian service: Not on file     Active member of club or organization: No     Attends meetings of clubs or organizations: Never     Relationship status:    Other Topics Concern    Not on file   Social History Narrative    Not on file     EKG:  Sinus tachycardia  Otherwise normal ECG    2D Echo:  No results found for this or any previous visit.     Pharm Stress Test  The perfusion scan is free of evidence from myocardial ischemia or injury.    Gated perfusion images showed an ejection fraction of 90.0 % at rest and 90 % post stress. Normal is 51%.    There is  normal wall motion at rest.    LV cavity size is normal at rest and normal at stress.    Visually estimated LV function is normal.    The EKG portion of this study is negative for ischemia.    There were no arrhythmias during stress.     There are no prior studies for comparison.       Anesthesia Evaluation    I have reviewed the Patient Summary Reports.     I have reviewed the Medications.     Review of Systems  Anesthesia Hx:  Denies Family Hx of Anesthesia complications.   Denies Personal Hx of Anesthesia complications.   Hematology/Oncology:         -- Denies Anemia:   Cardiovascular:   Exercise tolerance: good Denies Hypertension.  Denies CABG/stent.  Denies Dysrhythmias.   Denies Angina.        Pulmonary:   Denies COPD.  Denies Asthma.  Denies Shortness of breath.    Renal/:   Denies Chronic Renal Disease.     Hepatic/GI:   Denies GERD.    Musculoskeletal:   Arthritis   Spine Disorders: lumbar Chronic Pain    Neurological:   Denies CVA. Denies Seizures.   Chronic Pain Syndrome   Psych:   depression          Physical Exam  General:  Well nourished    Airway/Jaw/Neck:  Airway Findings: Mouth Opening: Normal Tongue: Normal  General Airway Assessment: Adult  Mallampati: II  TM Distance: Normal, at least 6 cm  Jaw/Neck Findings:  Neck ROM: Normal ROM      Dental:  Dental Findings: In tact   Chest/Lungs:  Chest/Lungs Findings: Clear to auscultation     Heart/Vascular:  Heart Findings: Rate: Normal  Rhythm: Regular Rhythm  Sounds: Normal        Mental Status:  Mental Status Findings:  Cooperative, Alert and Oriented         Anesthesia Plan  Type of Anesthesia, risks & benefits discussed:  Anesthesia Type:  general, spinal  Patient's Preference:   Intra-op Monitoring Plan: standard ASA monitors  Intra-op Monitoring Plan Comments:   Post Op Pain Control Plan: multimodal analgesia and per primary service following discharge from PACU  Post Op Pain Control Plan Comments:   Induction:   IV  Beta Blocker:  Patient is not currently on a Beta-Blocker (No further documentation required).       Informed Consent: Patient understands risks and agrees with Anesthesia plan.  Questions answered. Anesthesia consent signed with patient.  ASA Score: 2     Day of  Surgery Review of History & Physical:    H&P update referred to the surgeon.         Ready For Surgery From Anesthesia Perspective.

## 2020-01-31 NOTE — ANESTHESIA PROCEDURE NOTES
Adductor cath right    Patient location during procedure: pre-op   Block not for primary anesthetic.  Reason for block: at surgeon's request and post-op pain management   Post-op Pain Location: right knee pain  Start time: 1/31/2020 6:39 AM  Timeout: 1/31/2020 6:38 AM   End time: 1/31/2020 7:00 AM    Staffing  Authorizing Provider: Nain Bernard MD  Performing Provider: Anastasia Anguiano MD    Preanesthetic Checklist  Completed: patient identified, site marked, surgical consent, pre-op evaluation, timeout performed, IV checked, risks and benefits discussed and monitors and equipment checked  Peripheral Block  Patient position: supine  Prep: ChloraPrep and site prepped and draped  Patient monitoring: heart rate, cardiac monitor, continuous pulse ox, continuous capnometry and frequent blood pressure checks  Block type: adductor canal  Laterality: right  Injection technique: continuous  Needle  Needle type: Tuohy   Needle gauge: 17 G  Needle length: 3.5 in  Needle localization: anatomical landmarks and ultrasound guidance  Catheter type: spring wound  Catheter size: 19 G  Test dose: lidocaine 1.5% with Epi 1-to-200,000 and negative   -ultrasound image captured on disc.  Assessment  Injection assessment: negative aspiration, negative parasthesia and local visualized surrounding nerve  Paresthesia pain: none  Heart rate change: no  Slow fractionated injection: yes  Additional Notes  VSS.  [facility] RN monitoring vitals throughout procedure.  Patient tolerated procedure well.

## 2020-01-31 NOTE — HOSPITAL COURSE
On 1/31/20, the patient arrived to the [facility] for proper pre-operative management.  Upon completion of pre-operative preparation, the patient was taken back to the operative theatre. Right TKA was performed without complication and the patient was transported to the post anesthesia care unit in stable condition.  After appropriate recovery from the anaesthetic agents used during the surgery, the patient was then transported to the hospital inpatient floor.  The interim of the hospital stay from arrival on the floor up to discharge has been uncomplicated. The patient has tolerated regular diet.  The patient's pain has been controlled using a multimodal approach. Currently, the patient's pain is well controlled on an oral regimen.  The patient has been voiding without difficulty.  The patient began participation in physical therapy after surgery and has progressed throughout the entire hospital stay.  Currently, the patient's progress is sufficient to allow the them to be discharged to * safely.  The patient agrees with this assessment and desires a discharge today.

## 2020-01-31 NOTE — NURSING TRANSFER
Nursing Transfer Note      1/31/2020     Transfer To: 510    Transfer via stretcher    Transfer with polar ice machine    Transported by transport    Medicines sent: IVF, Ropivacaine    Chart send with patient: Yes    Notified: spouse via text    Patient reassessed at: 1/31/2020    Upon arrival to floor:

## 2020-01-31 NOTE — PT/OT/SLP EVAL
Physical Therapy Evaluation    Patient Name:  Ari Santos   MRN:  [id number]    Recommendations:     Discharge Recommendations:  HHPT vs SNF to be assessed; further evaluation needed in AM for mobility concerns due to decreased assistance at home  Discharge Equipment Recommendations: none   Barriers to discharge: Decreased caregiver support at current functional status    Assessment:       Ari Santos is a 49 y.o. female s/p R TKA on 1/31/2020.  She presents with the following impairments/functional limitations:  weakness, impaired functional mobilty, decreased safety awareness, impaired endurance, gait instability, pain, impaired balance, impaired muscle length, impaired joint extensibility, impaired self care skills, decreased lower extremity function, decreased ROM, orthopedic precautions.       Patient is pleasant, cooperative, and agreeable to therapy. Patient demonstrates good motivation and functional mobility secondary to appropriate cues for sequencing and assistance for balance. Patient with chronic pain at baseline. Patient requires minimum assistance for bed mobility, transfers, and ambulation secondary to impairments noted above and fatigue s/p surgery. Patient ambulated 3 steps forward/backwards/laterally using standard walker and minimum assistance with tactile/verbal cues for sequencing and walker management.     Rehab Prognosis: Good; patient would benefit from acute skilled PT services daily to address these deficits and reach maximum level of function.  Patient is most appropriate to go to (HHPT vs SNF to be assessed) .  Recent Surgery: Procedure(s) (LRB):  ARTHROPLASTY, KNEE, TOTAL (Right) Day of Surgery    Plan:     During this hospitalization, patient to be seen dailyto address the identified rehab impairments via gait training, therapeutic activities, therapeutic exercises, neuromuscular re-education and progress toward the following goals:    · Plan of Care Expires:   "03/01/20    Subjective     Subjective: "That's vicious pain"  Pain/Comfort: 7/10, increased to 8/10 with mobility and weightbearing    Patients cultural, spiritual, Sikhism conflicts given the current situation: no    Living Environment:  Patient lives with wife in a single story home with threshold to enter. Patient has a tub shower. Prior to admission, patient was modified independent with functional mobility and ADLs and had 6-7/10 pain at baseline. Wife works for [employer] and is unable to take off work if pt is discharged to home. Equipment available at home: bedside commode, shower chair, rollator, walker, rolling, wheelchair.   Upon discharge, patient will have assistance from wife, but with limited availability.  Objective:     Communicated with RN prior to session.  Patient found supine with blood pressure cuff, cryotherapy, FCD, lund catheter, oxygen, peripheral IV, pulse ox (continuous), telemetry  upon PT entry to room.    General Precautions: Standard,fall   Orthopedic Precautions: RLE weight bearing as tolerated   Braces: Hinged knee brace     Exams:  · LLE ROM: WFL  · LLE Strength: WFL  · RLE ROM: Deficits: grossly decreased s/p surgery   · RLE Strength: Deficits: knee ext >= 3/5 (did not manually resist)       Functional Mobility:  · Bed Mobility:     · Supine to Sit: minimum assistance for leg and trunk management  · Sit to Supine: minimum assistance for leg and trunk management  · Transfers:     · Sit to Stand:  minimum assistance with standard walker with cues for foot/hand placement  · Gait: Patient ambulated 3 steps forward and 3 steps backward with standard walker and minimum assistance  · Balance: Good sitting edge of bed; fair standing with standard walker    Therapeutic Activities:   Patient with assistance as noted above. Patient with good activity tolerance, limited secondary to pain and fatigue s/p surgery. Patient requires frequent cues for sequencing and walker management with " "ambulation.     Patient Education:    Patient educated on fall risk, gait training, transfer training, and TKA protocol by explanation.  Patient was receptive to education and needs reinforcement.     AM-PAC 6 CLICK MOBILITY  Total Score:16     Patient left supine with all lines intact, cryotherapy, and FCD in place.    GOALS:   Multidisciplinary Problems     Physical Therapy Goals        Problem: Physical Therapy Goal    Goal Priority Disciplines Outcome Goal Variances Interventions   Physical Therapy Goal     PT, PT/OT Ongoing, Progressing     Description:  Goals to be met by: 2020    Patient will increase functional independence with mobility by performin. Supine to sit with Set-up [place]  2. Sit to supine with Set-up [place]  3. Sit to stand transfer with Supervision  4. Gait  x 150 feet with Supervision using Rolling Walker.   5. Ascend/descend 6" curb step without Handrail(s) and Contact Guard Assistance using Rolling Walker.   6. Lower extremity exercise program x30 reps per handout, with independence                      History:     Past Medical History:   Diagnosis Date    Adrenal insufficiency     Arthritis     Chronic bilateral low back pain with bilateral sciatica 3/19/2019    Congenital adrenal hyperplasia     Hyperlipidemia     Spinal stenosis     Spinal stenosis of lumbar region with neurogenic claudication 3/19/2019    Status post sex reassignment surgery 3/19/2019    Hx of Congenital Adrenal Hyperplasia       Past Surgical History:   Procedure Laterality Date    BACK SURGERY      LAMINECTOMY  2019    RADIOFREQUENCY ABLATION Left 2019    Procedure: RADIOFREQUENCY ABLATION, LEFT L3,4,5;  Surgeon: Messi Cabello MD;  [facility];  Service: Pain Management;  Laterality: Left;  1 of 2    RADIOFREQUENCY ABLATION Right 2019    Procedure: RADIOFREQUENCY ABLATION, RIGHT L3,4,5;  Surgeon: Messi Cabello MD;  [facility]  " Service: Pain Management;  Laterality: Right;  2of 2    SPINE SURGERY      Sept 2019        Time Tracking:     PT Received On: 01/31/20  PT Start Time: 1340     PT Stop Time: 1400  PT Total Time (min): 20 min     Billable Minutes: Evaluation 10 min and Gait Training 10 min       NIEVES Cisneros  01/31/2020

## 2020-01-31 NOTE — CONSULTS
[facility]  Endocrinology  Consult Note    Consult Requested by: Donte Andrade III, MD   Reason for admit: Primary osteoarthritis of right knee    HISTORY OF PRESENT ILLNESS:  A 48 yo F to M transgender with diagnosis of CAH since childhood (classical non-salt wasting) currently on prednisone 9 mg, daily, was admitted for right total knee arthroplasty. Endocrinology was consulted for recommendations on steroid taper.   He is also on testosterone replacement therapy (gel) at home.     Pt reports feeling well post-op.   He follows up with Dr. Alvarez outpatient.    Medications and/or Treatments Impacting Glycemic Control:  Immunotherapy:    Immunosuppressants     None        Steroids:   Hormones (From admission, onward)    Start     Stop Route Frequency Ordered    02/03/20 0900  predniSONE tablet 5 mg      -- Oral Daily 01/31/20 1206    02/03/20 0900  predniSONE tablet 4 mg      -- Oral Daily 01/31/20 1206    02/01/20 0900  predniSONE tablet 3 mg      02/03 0859 Oral Daily 01/31/20 1205    02/01/20 0900  predniSONE tablet 15 mg      02/03 0859 Oral Daily 01/31/20 1205    01/31/20 1145  melatonin tablet 6 mg      -- Oral Nightly PRN 01/31/20 1145        Pressors:    Autonomic Drugs (From admission, onward)    None          Medications Prior to Admission   Medication Sig Dispense Refill Last Dose    ALPRAZolam (XANAX) 1 MG tablet Take 1 tablet (1 mg total) by mouth 3 (three) times daily as needed. 90 tablet 0 1/31/2020 at 0430    atorvastatin (LIPITOR) 10 MG tablet Take 1 tablet (10 mg total) by mouth every evening. 90 tablet 2 1/30/2020 at 2000    cranberry fruit extract (CRANBERRY ORAL) Take by mouth.   1/30/2020 at 0900    cyclobenzaprine (FLEXERIL) 10 MG tablet Take 1 tablet (10 mg total) by mouth 3 (three) times daily as needed for Muscle spasms. 90 tablet 0 1/31/2020 at 0430    gabapentin (NEURONTIN) 800 MG tablet 1 tablet by mouth three times a day and 2 tablets at night (5 tablets a  day, 4000mg) 150 tablet 2 1/31/2020 at 0430    Lactobacillus acidophilus (ACIDOPHILUS) Cap Take by mouth once daily.    1/30/2020 at 0900    lamoTRIgine (LAMICTAL) 200 MG tablet Take 1 tablet (200 mg total) by mouth 2 (two) times daily. 60 tablet 5 1/31/2020 at 0430    loratadine (CLARITIN) 10 mg tablet Take 10 mg by mouth once daily.    1/30/2020 at 2000    morphine (MS CONTIN) 15 MG 12 hr tablet Take 1 tablet (15 mg total) by mouth 2 (two) times daily. 60 tablet 0 1/30/2020 at 2000    predniSONE (DELTASONE) 1 MG tablet As prescribed - up to 4 mg  A day 200 tablet 3 1/31/2020 at 0430    predniSONE (DELTASONE) 5 MG tablet Take 1 tablet (5 mg total) by mouth once daily. (Patient taking differently: Take 10 mg by mouth once daily. ) 30 tablet 11 1/31/2020 at 0430    primidone (MYSOLINE) 50 MG Tab Take 50 mg by mouth 3 (three) times daily.    1/31/2020 at 0430    rOPINIRole (REQUIP) 4 MG tablet Take 1 tablet (4 mg total) by mouth nightly. 90 tablet 0 1/30/2020 at 2000    zinc 50 mg Tab once daily.    1/30/2020 at 0900    [DISCONTINUED] docusate sodium (STOOL SOFTENER ORAL) Take by mouth as needed (constipation).   1/30/2020 at 0900    [DISCONTINUED] nystatin (MYCOSTATIN) cream Apply topically 2 (two) times daily. (Patient taking differently: Apply topically 2 (two) times daily as needed. ) 30 g 1 0900 at Unknown time    [DISCONTINUED] oxyCODONE-acetaminophen (PERCOCET)  mg per tablet Take 1 tablet by mouth every 8 (eight) hours as needed for Pain. 90 tablet 0 1/31/2020 at 0430    testosterone (ANDROGEL) 1 % (50 mg/5 gram) GlPk Apply 5 g topically once daily. 30 packet 4 1/27/2020 at 0900    triamcinolone acetonide 0.1% (KENALOG) 0.1 % cream Apply topically 2 (two) times daily. 45 g 1 More than a month at Unknown time    [DISCONTINUED] naproxen sodium (ALEVE) 220 mg Cap Take by mouth.   1/24/2020 at 0900       Current Facility-Administered Medications   Medication Dose Route Frequency Provider Last  Rate Last Dose    0.9%  NaCl infusion   Intravenous Continuous Donte Andrade III,  mL/hr at 01/31/20 1214      acetaminophen tablet 1,000 mg  1,000 mg Oral Q6H Donte Andrade III, MD   1,000 mg at 01/31/20 1206    aspirin EC tablet 81 mg  81 mg Oral BID Donte Andrade III, MD   81 mg at 01/31/20 1208    atorvastatin tablet 10 mg  10 mg Oral QHS Domo Wells MD        bisacodyL suppository 10 mg  10 mg Rectal Q12H PRN Donte Andrade III, MD        ceFAZolin injection 2 g  2 g Intravenous Q8H Donte Andrade III, MD        cetirizine tablet 10 mg  10 mg Oral Daily Domo Wells MD        ciprofloxacin HCl 0.3 % ophthalmic solution 1 drop  1 drop Left Eye QID Domo Hirsch MD        famotidine 40 mg/5 mL (8 mg/mL) suspension 20 mg  20 mg Oral BID Donte Andrade III, MD        gabapentin capsule 300 mg  300 mg Oral Once Juan Rivera MD        gabapentin capsule 800 mg  800 mg Oral TID Calvin Gaspar MD        HYDROmorphone injection 0.2 mg  0.2 mg Intravenous Q5 Min PRN Calvin Gaspar MD   0.2 mg at 01/31/20 1410    lamoTRIgine tablet 200 mg  200 mg Oral BID Domo Wells MD        melatonin tablet 6 mg  6 mg Oral Nightly PRN Donte Andrade III, MD        methocarbamol tablet 500 mg  500 mg Oral QID Calvin Gaspar MD        morphine 12 hr tablet 15 mg  15 mg Oral BID Domo Wells MD        morphine injection 2 mg  2 mg Intravenous Q3H PRN Calvin Gaspar MD        mupirocin 2 % ointment 1 g  1 g Nasal BID Donte Andrade III, MD        naloxone 0.4 mg/mL injection 0.02 mg  0.02 mg Intravenous PRN Donte Andrade III, MD        ondansetron injection 4 mg  4 mg Intravenous Q8H PRN Donte Andrade III, MD        ondansetron injection 4 mg  4 mg Intravenous Once PRN Nain Bernard MD        oxyCODONE immediate release tablet 10 mg  10 mg Oral Q4H PRN Calvin Gaspar MD        oxyCODONE immediate release tablet 15  mg  15 mg Oral Q4H PRN Calvin Gaspar MD        oxyCODONE immediate release tablet 5 mg  5 mg Oral Q4H PRN Calvin Gaspar MD        polyethylene glycol packet 17 g  17 g Oral Daily Donte Andrade III, MD        [START ON 2/1/2020] predniSONE tablet 15 mg  15 mg Oral Daily Domo Wells MD        [START ON 2/1/2020] predniSONE tablet 3 mg  3 mg Oral Daily Domo Wells MD        [START ON 2/3/2020] predniSONE tablet 4 mg  4 mg Oral Daily Domo Wells MD        [START ON 2/3/2020] predniSONE tablet 5 mg  5 mg Oral Daily Domo Wells MD        primidone tablet 50 mg  50 mg Oral TID Domo Wells MD        promethazine (PHENERGAN) 6.25 mg in dextrose 5 % 50 mL IVPB  6.25 mg Intravenous Q6H PRN Donte Andrade III, MD        rOPINIRole tablet 4 mg  4 mg Oral Nightly Domo Wells MD        ropivacaine (PF) 2 mg/ml (0.2%) infusion  8 mL/hr Perineural Continuous Donte Andrade III, MD 8 mL/hr at 01/31/20 1215 8 mL/hr at 01/31/20 1215    senna-docusate 8.6-50 mg per tablet 1 tablet  1 tablet Oral BID Donte Andrade III, MD   1 tablet at 01/31/20 1206    sodium chloride 0.9% flush 10 mL  10 mL Intravenous PRN Donte Andrade III, MD        sodium chloride 0.9% flush 3 mL  3 mL Intravenous PRN Nain Bernard MD           Interval HPI:   Overnight events:  VS stable    Nausea: No  Hypoglycemia and intervention: No  Fever: No  TPN and/or TF: No    PMH, PSH, FH, SH updated and reviewed     ROS:    Review of Systems   Constitutional: Negative for unexpected weight change.   Eyes: Negative for visual disturbance.   Respiratory: Negative for shortness of breath.    Cardiovascular: Negative for chest pain.   Gastrointestinal: Negative for abdominal pain.   Genitourinary: Negative for urgency.   Musculoskeletal: Negative for arthralgias.   Skin: Negative for wound.   Neurological: Negative for headaches.   Hematological: Does not bruise/bleed easily.    Psychiatric/Behavioral: Negative for sleep disturbance.       PHYSICAL EXAMINATION:  Vitals:    01/31/20 1330   BP: (!) 106/53   Pulse: 84   Resp: 12   Temp:      Body mass index is 25.78 kg/m².    Physical Exam   Constitutional: She is oriented to person, place, and time. She appears well-developed and well-nourished.   HENT:   Head: Normocephalic and atraumatic.   Eyes: Conjunctivae are normal. No scleral icterus.   Cardiovascular: Normal rate, regular rhythm and normal heart sounds.   Pulmonary/Chest: Effort normal. No respiratory distress.   Abdominal: Soft. There is no tenderness.   Neurological: She is alert and oriented to person, place, and time.   Skin: Skin is warm. No rash noted.   Psychiatric: She has a normal mood and affect. Her behavior is normal.     .     ASSESSMENT and PLAN:    * Primary osteoarthritis of right knee  S/p right total knee arthroplasty      Congenital adrenal hyperplasia  On Prednisone 9 mg, daily, at home.   Received IV Hydrocortisone 100 mg, prior to surgery today.     Plan:   -Given IV HC 25 mg, tonight  -Resume home dose Prednisone 9 mg, daily, from tomorrow.   -If clinically unstable, would start stress dose steroids (IV HC 50 mg Q8h).   -Pt has follow up appointment with Dr. Alvarez on 3/5          DISCHARGE NEEDS: will assess daily    Faith Sanchez MD  Endocrinology  [facility]

## 2020-01-31 NOTE — ANESTHESIA PROCEDURE NOTES
Spinal    Diagnosis: knee pain  Patient location during procedure: OR  Start time: 1/31/2020 7:05 AM  Timeout: 1/31/2020 7:05 AM  End time: 1/31/2020 7:12 AM    Staffing  Authorizing Provider: Nain Bernard MD  Performing Provider: Nain Bernard MD    Preanesthetic Checklist  Completed: patient identified, surgical consent, pre-op evaluation, timeout performed, IV checked, risks and benefits discussed and monitors and equipment checked  Spinal Block  Patient position: sitting  Prep: ChloraPrep  Patient monitoring: heart rate, continuous pulse ox and frequent blood pressure checks  Approach: midline  Location: L3-4  Injection technique: single shot  CSF Fluid: clear free-flowing CSF  Needle  Needle type: pencil-tip   Needle gauge: 25 G  Needle length: 5 in  Additional Documentation: incremental injection, negative aspiration for heme and no paresthesia on injection  Needle localization: anatomical landmarks  Assessment  Ease of block: easy  Patient's tolerance of the procedure: comfortable throughout block and no complaints  Additional Notes  Utilized 17G Tuohy to achieve epidural access.  Placed 25G spinal needle through Tuohy for dural puncture.

## 2020-01-31 NOTE — PT/OT/SLP EVAL
"Occupational Therapy   Evaluation/ Treatment    Name: Ari Santos  MRN: [id number]  Admitting Diagnosis:  Primary osteoarthritis of right knee Day of Surgery    Recommendations:     Discharge recommendations: HH vs SNF    Barriers to discharge: Decreased caregiver support    Equipment recommendations: none - all necessary equipment in place at home    History:     Occupational Profile:  Living Environment: Pt lives with his wife in [facility] w/ 1 [address]; he uses a tub/shower combo  Previous level of function: PTA, pt reports being independent w/ ADLs and uses his rollator for functional mobility   Equipment Owned: rolling walker, bedside commode, shower chair, wheelchair and rollator    Assistance Upon Discharge: pt states he will have assistance from his wife; however, wife is an employee of [employer] and is unable to take off to assist pt during the day    Past Medical History:   Diagnosis Date    Adrenal insufficiency     Arthritis     Chronic bilateral low back pain with bilateral sciatica 3/19/2019    Congenital adrenal hyperplasia     Hyperlipidemia     Spinal stenosis     Spinal stenosis of lumbar region with neurogenic claudication 3/19/2019    Status post sex reassignment surgery 3/19/2019    Hx of Congenital Adrenal Hyperplasia       Past Surgical History:   Procedure Laterality Date    BACK SURGERY      LAMINECTOMY  09/13/2019    RADIOFREQUENCY ABLATION Left 5/9/2019    Procedure: RADIOFREQUENCY ABLATION, LEFT L3,4,5;  Surgeon: Messi Cabello MD;  Location: [facility] PAIN [facility]GT;  Service: Pain Management;  Laterality: Left;  1 of 2    RADIOFREQUENCY ABLATION Right 5/23/2019    Procedure: RADIOFREQUENCY ABLATION, RIGHT L3,4,5;  Surgeon: Messi Cabello MD;  Location: [facility] PAIN MGT;  Service: Pain Management;  Laterality: Right;  2of 2    SPINE SURGERY      Sept 2019        Subjective     "This pain is bitchin"     Communicated with: RN prior to session.    Pt agreeable to " Evaluation.    Pain/Comfort:  Pain level: 9/10 in R knee    Objective:     Pt found supine in bed with blood pressure cuff, lund catheter, telemetry, wound vac, PNC, PCEA, pulse ox, Peripheral IV and FCD.    Orthopedic Precautions: RLE weight bearing as tolerated    Occupational Performance:    Bed Mobility:    Supine to sit: CGA  Sit to supine: Min A    Transfers:    Sit<>Stand: Min A, SW    Ambulation:    Pt able to tolerate 3 steps forward, backward, and side stepped to HOB w/ Min-Mod A using RW due to slight gait instability. No LOB or SOB noted.     Activities of Daily Living:  UE dressing: Maximum Assistance to lamont gown around back  LE dressing: Total Assistance to lamont socks  Pt educated on LE dressing techniques and need for AD with LE dressing      Patient left supine in bed with all lines intact and RN notified.    [facility] 6 Click: Self-care   [facility] Total Score: 16    Education:    Assessment:     Ari Santos is a 49 y.o. female with a medical diagnosis of Primary osteoarthritis of right knee.  She presents with decreased function of R LE impeding his ability to perform ADLs and functional mobility and would benefit from OT services to maximize functional (I) and safety.    Performance deficits affecting function are weakness, impaired endurance, impaired self care skills, impaired functional mobilty, gait instability, impaired balance, decreased lower extremity function, decreased safety awareness, pain, impaired skin, decreased ROM, edema, orthopedic precautions.    Rehab Prognosis:  Good    Plan:     Patient to be seen QD to address the above listed problems via self-care/home management, therapeutic activities, therapeutic exercises    Plan of Care Reviewed with: patient    This Plan of care has been discussed with the patient who was involved in its development and understands and is in agreement with the identified goals and treatment plan    GOALS:   Multidisciplinary Problems      Occupational Therapy Goals        Problem: Occupational Therapy Goal    Goal Priority Disciplines Outcome Interventions   Occupational Therapy Goal     OT, PT/OT Ongoing, Progressing    Description:  Goals to be met by: 3/1/2020    Patient will increase functional independence with ADLs by performing:    UE Dressing with Supervision.  LE Dressing with Supervision with AD as needed.  Grooming while standing at sink with Supervision.  Toileting from bedside commode with Supervision for hygiene and clothing management.   Stand pivot transfers with Supervision.  Toilet transfer to bedside commode with Supervision.                         Time Tracking:     OT Received On: 1/31/2020  OT Start Time: 1335  OT Stop Time: 1355   OT Total Time: 20 minutes     Billable Minutes:  Evaluation 12 minutes  Therapeutic Activity 8 minutes    Elodia Lin OT  1/31/2020

## 2020-01-31 NOTE — SUBJECTIVE & OBJECTIVE
Interval HPI:   Overnight events:  VS stable    Nausea: No  Hypoglycemia and intervention: No  Fever: No  TPN and/or TF: No    PMH, PSH, FH, SH updated and reviewed     ROS:    Review of Systems   Constitutional: Negative for unexpected weight change.   Eyes: Negative for visual disturbance.   Respiratory: Negative for shortness of breath.    Cardiovascular: Negative for chest pain.   Gastrointestinal: Negative for abdominal pain.   Genitourinary: Negative for urgency.   Musculoskeletal: Negative for arthralgias.   Skin: Negative for wound.   Neurological: Negative for headaches.   Hematological: Does not bruise/bleed easily.   Psychiatric/Behavioral: Negative for sleep disturbance.       PHYSICAL EXAMINATION:  Vitals:    01/31/20 1330   BP: (!) 106/53   Pulse: 84   Resp: 12   Temp:      Body mass index is 25.78 kg/m².    Physical Exam   Constitutional: She is oriented to person, place, and time. She appears well-developed and well-nourished.   HENT:   Head: Normocephalic and atraumatic.   Eyes: Conjunctivae are normal. No scleral icterus.   Cardiovascular: Normal rate, regular rhythm and normal heart sounds.   Pulmonary/Chest: Effort normal. No respiratory distress.   Abdominal: Soft. There is no tenderness.   Neurological: She is alert and oriented to person, place, and time.   Skin: Skin is warm. No rash noted.   Psychiatric: She has a normal mood and affect. Her behavior is normal.

## 2020-01-31 NOTE — PROGRESS NOTES
Patient complaining right eye pain. He is concerned for corneal abrasion. Dr. Hirsch with anesthesia notified and will come assess patient shortly.

## 2020-01-31 NOTE — DISCHARGE INSTRUCTIONS
Do not remove surgical dressing for 2 weeks post-op. This will be done only by MD at initial post-op visit.    Patient may shower at home. Dry area around surgical dressing well afterward. No submerging underwater (bathing, swimming, hot tub) for 1 month.     If dressing becomes saturated with drainage or there are signs of infection, please call [facility] [phone] for further instructions and to make appt to be seen.     Please do not take your testosterone for a month after surgery. Resume your home prednisone dose upon discharge, unless inpatient endocrinology recommends otherwise.     Blood clot prophylaxis: Asprin 81mg twice a day for 30 days

## 2020-01-31 NOTE — HPI
A 48 yo F to M transgender with diagnosis of CAH since childhood (classical non-salt wasting) currently on prednisone 9 mg, daily, was admitted for right total knee arthroplasty. Endocrinology was consulted for recommendations on steroid taper.   He is also on testosterone replacement therapy (gel) at home.     Pt reports feeling well post-op.   He follows up with Dr. Alvarez outpatient.

## 2020-01-31 NOTE — PLAN OF CARE
OT chuckie complete. Pt tolerated session well. Pt's functional outcomes have been established today based on his presenting functional level.       Problem: Occupational Therapy Goal  Goal: Occupational Therapy Goal  Description  Goals to be met by: 3/1/2020    Patient will increase functional independence with ADLs by performing:    UE Dressing with Supervision.  LE Dressing with Supervision with AD as needed.  Grooming while standing at sink with Supervision.  Toileting from bedside commode with Supervision for hygiene and clothing management.   Stand pivot transfers with Supervision.  Toilet transfer to bedside commode with Supervision.        Outcome: Ongoing, Progressing

## 2020-02-01 PROCEDURE — 99231 SBSQ HOSP IP/OBS SF/LOW 25: CPT | Mod: ,,, | Performed by: ANESTHESIOLOGY

## 2020-02-01 PROCEDURE — 63600175 PHARM REV CODE 636 W HCPCS: Performed by: STUDENT IN AN ORGANIZED HEALTH CARE EDUCATION/TRAINING PROGRAM

## 2020-02-01 PROCEDURE — 99231 SBSQ HOSP IP/OBS SF/LOW 25: CPT | Mod: GC,,, | Performed by: INTERNAL MEDICINE

## 2020-02-01 PROCEDURE — 94799 UNLISTED PULMONARY SVC/PX: CPT

## 2020-02-01 PROCEDURE — 11000001 HC ACUTE MED/SURG PRIVATE ROOM

## 2020-02-01 PROCEDURE — 99231 PR SUBSEQUENT HOSPITAL CARE,LEVL I: ICD-10-PCS | Mod: GC,,, | Performed by: INTERNAL MEDICINE

## 2020-02-01 PROCEDURE — 94761 N-INVAS EAR/PLS OXIMETRY MLT: CPT

## 2020-02-01 PROCEDURE — 63600175 PHARM REV CODE 636 W HCPCS: Performed by: ORTHOPAEDIC SURGERY

## 2020-02-01 PROCEDURE — 97535 SELF CARE MNGMENT TRAINING: CPT

## 2020-02-01 PROCEDURE — 97110 THERAPEUTIC EXERCISES: CPT

## 2020-02-01 PROCEDURE — 99231 PR SUBSEQUENT HOSPITAL CARE,LEVL I: ICD-10-PCS | Mod: ,,, | Performed by: ANESTHESIOLOGY

## 2020-02-01 PROCEDURE — 99900035 HC TECH TIME PER 15 MIN (STAT)

## 2020-02-01 PROCEDURE — 25000003 PHARM REV CODE 250: Performed by: STUDENT IN AN ORGANIZED HEALTH CARE EDUCATION/TRAINING PROGRAM

## 2020-02-01 PROCEDURE — 97530 THERAPEUTIC ACTIVITIES: CPT

## 2020-02-01 PROCEDURE — 25000003 PHARM REV CODE 250: Performed by: ORTHOPAEDIC SURGERY

## 2020-02-01 PROCEDURE — 97116 GAIT TRAINING THERAPY: CPT

## 2020-02-01 RX ORDER — ALPRAZOLAM 0.5 MG/1
1 TABLET ORAL 3 TIMES DAILY
Status: DISCONTINUED | OUTPATIENT
Start: 2020-02-01 | End: 2020-02-03 | Stop reason: HOSPADM

## 2020-02-01 RX ORDER — CELECOXIB 200 MG/1
200 CAPSULE ORAL DAILY
Status: DISCONTINUED | OUTPATIENT
Start: 2020-02-01 | End: 2020-02-03 | Stop reason: HOSPADM

## 2020-02-01 RX ADMIN — ACETAMINOPHEN 1000 MG: 500 TABLET ORAL at 12:02

## 2020-02-01 RX ADMIN — CIPROFLOXACIN HYDROCHLORIDE 1 DROP: 3 SOLUTION/ DROPS OPHTHALMIC at 10:02

## 2020-02-01 RX ADMIN — ASPIRIN 81 MG: 81 TABLET, COATED ORAL at 08:02

## 2020-02-01 RX ADMIN — OXYCODONE HYDROCHLORIDE 15 MG: 10 TABLET ORAL at 04:02

## 2020-02-01 RX ADMIN — CETIRIZINE HYDROCHLORIDE 10 MG: 5 TABLET ORAL at 08:02

## 2020-02-01 RX ADMIN — FAMOTIDINE 20 MG: 40 POWDER, FOR SUSPENSION ORAL at 08:02

## 2020-02-01 RX ADMIN — CIPROFLOXACIN HYDROCHLORIDE 1 DROP: 3 SOLUTION/ DROPS OPHTHALMIC at 09:02

## 2020-02-01 RX ADMIN — METHOCARBAMOL TABLETS 500 MG: 500 TABLET, COATED ORAL at 09:02

## 2020-02-01 RX ADMIN — METHOCARBAMOL TABLETS 500 MG: 500 TABLET, COATED ORAL at 12:02

## 2020-02-01 RX ADMIN — ROPIVACAINE HYDROCHLORIDE 8 ML/HR: 2 INJECTION, SOLUTION EPIDURAL; INFILTRATION at 08:02

## 2020-02-01 RX ADMIN — PRIMIDONE 50 MG: 50 TABLET ORAL at 09:02

## 2020-02-01 RX ADMIN — PRIMIDONE 50 MG: 50 TABLET ORAL at 08:02

## 2020-02-01 RX ADMIN — ALPRAZOLAM 1 MG: 0.5 TABLET ORAL at 03:02

## 2020-02-01 RX ADMIN — MORPHINE SULFATE 2 MG: 2 INJECTION, SOLUTION INTRAMUSCULAR; INTRAVENOUS at 11:02

## 2020-02-01 RX ADMIN — CELECOXIB 200 MG: 200 CAPSULE ORAL at 08:02

## 2020-02-01 RX ADMIN — ALPRAZOLAM 1 MG: 0.5 TABLET ORAL at 09:02

## 2020-02-01 RX ADMIN — SENNOSIDES AND DOCUSATE SODIUM 1 TABLET: 8.6; 5 TABLET ORAL at 09:02

## 2020-02-01 RX ADMIN — FAMOTIDINE 20 MG: 40 POWDER, FOR SUSPENSION ORAL at 09:02

## 2020-02-01 RX ADMIN — ATORVASTATIN CALCIUM 10 MG: 10 TABLET, FILM COATED ORAL at 09:02

## 2020-02-01 RX ADMIN — PREDNISONE 9 MG: 1 TABLET ORAL at 05:02

## 2020-02-01 RX ADMIN — CIPROFLOXACIN HYDROCHLORIDE 1 DROP: 3 SOLUTION/ DROPS OPHTHALMIC at 04:02

## 2020-02-01 RX ADMIN — SENNOSIDES AND DOCUSATE SODIUM 1 TABLET: 8.6; 5 TABLET ORAL at 08:02

## 2020-02-01 RX ADMIN — LAMOTRIGINE 200 MG: 100 TABLET ORAL at 08:02

## 2020-02-01 RX ADMIN — GABAPENTIN 800 MG: 400 CAPSULE ORAL at 09:02

## 2020-02-01 RX ADMIN — MUPIROCIN 1 G: 20 OINTMENT TOPICAL at 08:02

## 2020-02-01 RX ADMIN — OXYCODONE HYDROCHLORIDE 15 MG: 10 TABLET ORAL at 12:02

## 2020-02-01 RX ADMIN — ASPIRIN 81 MG: 81 TABLET, COATED ORAL at 09:02

## 2020-02-01 RX ADMIN — LAMOTRIGINE 200 MG: 100 TABLET ORAL at 09:02

## 2020-02-01 RX ADMIN — PRIMIDONE 50 MG: 50 TABLET ORAL at 03:02

## 2020-02-01 RX ADMIN — METHOCARBAMOL TABLETS 500 MG: 500 TABLET, COATED ORAL at 04:02

## 2020-02-01 RX ADMIN — POLYETHYLENE GLYCOL 3350 17 G: 17 POWDER, FOR SOLUTION ORAL at 08:02

## 2020-02-01 RX ADMIN — METHOCARBAMOL TABLETS 500 MG: 500 TABLET, COATED ORAL at 08:02

## 2020-02-01 RX ADMIN — ROPINIROLE HYDROCHLORIDE 4 MG: 1 TABLET, FILM COATED ORAL at 09:02

## 2020-02-01 RX ADMIN — CIPROFLOXACIN HYDROCHLORIDE 1 DROP: 3 SOLUTION/ DROPS OPHTHALMIC at 01:02

## 2020-02-01 RX ADMIN — HYDROCORTISONE SODIUM SUCCINATE 25 MG: 100 INJECTION, POWDER, FOR SOLUTION INTRAMUSCULAR; INTRAVENOUS at 03:02

## 2020-02-01 RX ADMIN — OXYCODONE HYDROCHLORIDE 15 MG: 10 TABLET ORAL at 08:02

## 2020-02-01 RX ADMIN — ACETAMINOPHEN 1000 MG: 500 TABLET ORAL at 05:02

## 2020-02-01 RX ADMIN — MORPHINE SULFATE 15 MG: 15 TABLET, EXTENDED RELEASE ORAL at 08:02

## 2020-02-01 RX ADMIN — GABAPENTIN 800 MG: 400 CAPSULE ORAL at 08:02

## 2020-02-01 RX ADMIN — MORPHINE SULFATE 15 MG: 15 TABLET, EXTENDED RELEASE ORAL at 09:02

## 2020-02-01 RX ADMIN — MUPIROCIN 1 G: 20 OINTMENT TOPICAL at 10:02

## 2020-02-01 RX ADMIN — GABAPENTIN 800 MG: 400 CAPSULE ORAL at 03:02

## 2020-02-01 NOTE — ASSESSMENT & PLAN NOTE
On Prednisone 9 mg, daily, at home.   Received IV Hydrocortisone 100 mg, prior to surgery   Received IV hydrocortisone 25mg yesterday   -VS WNL  -Electrolytes WNL  -No hypoglycemias  -No evidence of A.I     Plan:   -Resume home dose Prednisone 9 mg, daily, today   -If clinically unstable, would start stress dose steroids (IV HC 50 mg Q8h).   -Pt has follow up appointment with Dr. Alvarez on 3/5  -ENDO WILL SIGN OFF

## 2020-02-01 NOTE — PT/OT/SLP PROGRESS
Physical Therapy Treatment    Patient Name:  Ari Santos   MRN:  [id number]    Recommendations:     Discharge Recommendations:  home, home health PT   Discharge Equipment Recommendations: none   Barriers to discharge: Decreased caregiver support, wife works during the day    Assessment:     Ari Santos is a 49 y.o. female admitted with a medical diagnosis of Primary osteoarthritis of right knee.  She presents with the following impairments/functional limitations:  weakness, impaired endurance, impaired self care skills, impaired functional mobilty, impaired balance, gait instability, pain, decreased lower extremity function, orthopedic precautions Patient very pleasant and motivated to work with therapy. Patient shows great progress with therapy. SBA for bed mobility, CGA for transfers and gait. Performed curb/step training today. Recs changed to HHPT due to patient's progress.     Rehab Prognosis: Good; patient would benefit from acute skilled PT services to address these deficits and reach maximum level of function.    Recent Surgery: Procedure(s) (LRB):  ARTHROPLASTY, KNEE, TOTAL (Right) 1 Day Post-Op    Plan:     During this hospitalization, patient to be seen daily to address the identified rehab impairments via gait training, therapeutic activities, therapeutic exercises, neuromuscular re-education and progress toward the following goals:    · Plan of Care Expires:  03/01/20    Subjective     Chief Complaint: none stated  Patient/Family Comments/goals: to get better and go home  Pain/Comfort:  · Pain Rating 1: 0/10  · Pain Rating Post-Intervention 1: 0/10      Objective:     Communicated with nurse prior to session.  Patient found HOB elevated with cryotherapy, perineural catheter upon PT entry to room.     General Precautions: Standard, fall   Orthopedic Precautions:RLE weight bearing as tolerated   Braces: Hinged knee brace     Functional Mobility:  · Bed Mobility:     · Supine to Sit: stand by  "assistance  · Transfers:     · Sit to Stand:  contact guard assistance with rolling walker  · Bed to Chair: contact guard assistance with  rolling walker  using  Step Transfer  · Toilet Transfer: contact guard assistance with  rolling walker  using  Step Transfer  · Gait: 15' +15' into restroom and back, CGA, RW. Step to gait pattern. RLE WBAT.   · Stairs:  Pt ascended/descended 6" curb step with Rolling Walker with no handrails with Contact Guard Assistance.       AM-PAC 6 CLICK MOBILITY  Turning over in bed (including adjusting bedclothes, sheets and blankets)?: 4  Sitting down on and standing up from a chair with arms (e.g., wheelchair, bedside commode, etc.): 3  Moving from lying on back to sitting on the side of the bed?: 3  Moving to and from a bed to a chair (including a wheelchair)?: 3  Need to walk in hospital room?: 3  Climbing 3-5 steps with a railing?: 3  Basic Mobility Total Score: 19       Therapeutic Activities and Exercises:   Pt educated on importance of OOB activity to decrease the risks associated with bed rest. Instructed patient on GS,QS, SLR, and AP and provided with written material. Education and instruction provided on ascending/descending curb with RW with step to pattern. Instruction provided for safety and technique for gait and transfers. Instructed to call nursing for assistance with out of bed mobility.       Patient left up in chair with all lines intact and call button in reach..    GOALS:   Multidisciplinary Problems     Physical Therapy Goals        Problem: Physical Therapy Goal    Goal Priority Disciplines Outcome Goal Variances Interventions   Physical Therapy Goal     PT, PT/OT Ongoing, Progressing     Description:  Goals to be met by: 2020    Patient will increase functional independence with mobility by performin. Supine to sit with Set-up [place] -Met   2. Sit to supine with Set-up [place] -Met   3. Sit to stand transfer with Supervision   4. Gait " " x 150 feet with Supervision using Rolling Walker.   5. Ascend/descend 6" curb step without Handrail(s) and Contact Guard Assistance using Rolling Walker.   6. Lower extremity exercise program x30 reps per handout, with independence                       Time Tracking:     PT Received On: 02/01/20  PT Start Time: 0940     PT Stop Time: 1015  PT Total Time (min): 35 min     Billable Minutes: Gait Training 19 and Therapeutic Exercise 16       PT/PTA: PT     PTA Visit Number: 0     Jillian Crook, PT  02/01/2020  "

## 2020-02-01 NOTE — SUBJECTIVE & OBJECTIVE
Interval HPI:   AAOX3 s/p TKA.  Pt received Hydrocortisone 100 day of the surgery and 25mg IV yesterday.  Today VS wnl, No hypotension, No hypoglycemias, Electrolytes WNL. No evidence of A.1     Overnight events: JEFFERSON   Eatin%  Nausea: No  Hypoglycemia and intervention: No  Fever: No  TPN and/or TF: No    /83 (BP Location: Right arm, Patient Position: Lying)   Pulse 93   Temp 97.6 °F (36.4 °C) (Oral)   Resp 18   Ht 5' (1.524 m)   Wt 60.3 kg (133 lb)   SpO2 100%   Breastfeeding? No   BMI 25.97 kg/m²     Labs Reviewed and Include    No results for input(s): GLU, CALCIUM, ALBUMIN, PROT, NA, K, CO2, CL, BUN, CREATININE, ALKPHOS, ALT, AST, BILITOT in the last 24 hours.  Lab Results   Component Value Date    WBC 7.34 2020    HGB 14.7 2020    HCT 45.5 2020    MCV 94 2020     (H) 2020     No results for input(s): TSH, FREET4 in the last 168 hours.  Lab Results   Component Value Date    HGBA1C 5.8 (H) 2019       Nutritional status:   Body mass index is 25.97 kg/m².  Lab Results   Component Value Date    ALBUMIN 4.2 10/28/2019    ALBUMIN 3.2 (L) 2019    ALBUMIN 2.8 (L) 2019     No results found for: PREALBUMIN    CrCl cannot be calculated (Patient's most recent lab result is older than the maximum 7 days allowed.).    Accu-Checks  No results for input(s): POCTGLUCOSE in the last 72 hours.    Current Medications and/or Treatments Impacting Glycemic Control  Immunotherapy:    Immunosuppressants     None        Steroids:   Hormones (From admission, onward)    Start     Stop Route Frequency Ordered    20 0600  predniSONE tablet 9 mg      -- Oral Before breakfast 20 1435        Pressors:    Autonomic Drugs (From admission, onward)    None        Hyperglycemia/Diabetes Medications:   Antihyperglycemics (From admission, onward)    None

## 2020-02-01 NOTE — PROGRESS NOTES
[facility]  Orthopedics  Progress Note    Patient Name: Ari Santos  MRN: [id number]  Admission Date: 1/31/2020  Hospital Length of Stay: 1 days  Attending Provider: Donte Andrade III, MD  Primary Care Provider: Siva Mitchell MD  Follow-up For: Procedure(s) (LRB):  ARTHROPLASTY, KNEE, TOTAL (Right)    Post-Operative Day: 1 Day Post-Op  Subjective:     Principal Problem:Primary osteoarthritis of right knee    Principal Orthopedic Problem: S/p R TKA on 1/31/20    Interval History: The patient was seen and examined at bedside. ALFRED. The patient states pain is 6/10 currently, though was 9/10 overnight. Patient on xanax 1 mg TID PRN at home for severe panic disorder; restarted to avoid withdrawal.     Review of patient's allergies indicates:   Allergen Reactions    Penicillins Rash       Current Facility-Administered Medications   Medication    0.9%  NaCl infusion    acetaminophen tablet 1,000 mg    ALPRAZolam tablet 1 mg    aspirin EC tablet 81 mg    atorvastatin tablet 10 mg    bisacodyL suppository 10 mg    celecoxib capsule 200 mg    cetirizine tablet 10 mg    ciprofloxacin HCl 0.3 % ophthalmic solution 1 drop    famotidine 40 mg/5 mL (8 mg/mL) suspension 20 mg    gabapentin capsule 800 mg    lamoTRIgine tablet 200 mg    methocarbamol tablet 500 mg    morphine 12 hr tablet 15 mg    morphine injection 2 mg    mupirocin 2 % ointment 1 g    naloxone 0.4 mg/mL injection 0.02 mg    ondansetron injection 4 mg    oxyCODONE immediate release tablet 10 mg    oxyCODONE immediate release tablet 15 mg    oxyCODONE immediate release tablet 5 mg    polyethylene glycol packet 17 g    predniSONE tablet 9 mg    primidone tablet 50 mg    promethazine (PHENERGAN) 6.25 mg in dextrose 5 % 50 mL IVPB    rOPINIRole tablet 4 mg    ropivacaine (PF) 2 mg/ml (0.2%) infusion    senna-docusate 8.6-50 mg per tablet 1 tablet     Objective:     Vital Signs (Most Recent):  Temp: 97.2 °F  (36.2 °C) (02/01/20 0543)  Pulse: 91 (02/01/20 0543)  Resp: 18 (02/01/20 0543)  BP: (!) 94/50 (02/01/20 0543)  SpO2: 99 % (02/01/20 0543) Vital Signs (24h Range):  Temp:  [96.7 °F (35.9 °C)-99 °F (37.2 °C)] 97.2 °F (36.2 °C)  Pulse:  [71-94] 91  Resp:  [11-21] 18  SpO2:  [92 %-100 %] 99 %  BP: ()/(42-69) 94/50     Weight: 60.3 kg (133 lb)  Height: 5' (152.4 cm)  Body mass index is 25.97 kg/m².      Intake/Output Summary (Last 24 hours) at 2/1/2020 0715  Last data filed at 2/1/2020 0600  Gross per 24 hour   Intake 2200 ml   Output 2940 ml   Net -740 ml       Ortho/SPM Exam     Awake/alert/oriented x3, No acute distress, Afebrile, Vital signs stable  Good inspiratory effort with unlaboured breathing  Dressings c/d/i  Good quad strength  Calves non-tender  Compartments soft  FROM  SILT Sa/[place]/DP/SP/T  Motor intact EHL/FHL/TA/Gastroc  2+ DP, 2+ PT              Significant Labs: All pertinent labs within the past 24 hours have been reviewed.    Significant Imaging: All pertinent imaging reviewed.    Assessment/Plan:     * Primary osteoarthritis of right knee  49 y.o. female transgender (F to M: recognizes as male) POD1 s/p R TKA    Pain control: multimodal per surgical home  PT/OT: WBAT RLE with hinged knee brace locked in extension.   DVT PPx: ASA 81mg BID, FCDs at all times when not ambulating  Abx: postop Ancef  Garzon: DCed  Continue incisional vac and HKB - ensure Ace wrap under HKB so it doesn't slide.  Prednisone 18mg daily for 2 days postop and then prednisone 9mg daily starting POD3.    Dispo: d/c to SNF vs home with home health pending PT.           Lucien Lee MD  Orthopedics  [facility]

## 2020-02-01 NOTE — PLAN OF CARE
"  Problem: Physical Therapy Goal  Goal: Physical Therapy Goal  Description  Goals to be met by: 2020    Patient will increase functional independence with mobility by performin. Supine to sit with Set-up [place] -Met   2. Sit to supine with Set-up [place] -Met   3. Sit to stand transfer with Supervision   4. Gait  x 150 feet with Supervision using Rolling Walker.   5. Ascend/descend 6" curb step without Handrail(s) and Contact Guard Assistance using Rolling Walker.   6. Lower extremity exercise program x30 reps per handout, with independence      Outcome: Ongoing, Progressing    Jillian Crook, PT, DPT  2020      "

## 2020-02-01 NOTE — PT/OT/SLP PROGRESS
Occupational Therapy   Treatment    Name: Ari Santos  MRN: [id number]  Admitting Diagnosis:  Primary osteoarthritis of right knee  1 Day Post-Op    Recommendations:     Discharge Recommendations: (HH vs SNF)  Discharge Equipment Recommendations:  none  Barriers to discharge:       Assessment:     Ari Santos is a 49 y.o. female with a medical diagnosis of Primary osteoarthritis of right knee.  She presents with much improved mobility distance and decreased level of assistance required. Pt able to walk to the bathroom with CGA and stand at sink. Performance deficits affecting function are weakness, impaired functional mobilty, impaired endurance, gait instability, decreased coordination, impaired balance, impaired self care skills.     Rehab Prognosis:  Good; patient would benefit from acute skilled OT services to address these deficits and reach maximum level of function.       Plan:     Patient to be seen daily to address the above listed problems via self-care/home management, therapeutic activities, therapeutic exercises  · Plan of Care Expires:    · Plan of Care Reviewed with: patient    Subjective     Pain/Comfort:  · Pain Rating 1: 0/10    Objective:     Communicated with: RN prior to session.  Patient found supine with peripheral IV, cryotherapy upon OT entry to room.    General Precautions: Standard, fall   Orthopedic Precautions:RLE weight bearing as tolerated   Braces:       Occupational Performance:     Bed Mobility:    · Patient completed Rolling/Turning to Right with stand by assistance  · Patient completed Scooting/Bridging with stand by assistance  · Patient completed Supine to Sit with stand by assistance     Functional Mobility/Transfers:  · Patient completed Sit <> Stand Transfer with contact guard assistance  with  rolling walker   · Patient completed Toilet Transfer Step Transfer technique with contact guard assistance with  rolling walker  · Functional Mobility: Pt walked CGA from  bed<>bathroom using a RW (~ 15' x 2)    Activities of Daily Living:  · Grooming: stand by assistance for oral care in standing.  · Upper Body Dressing: supervision  · Toileting: supervision for pericare.    [facility] 6 Click ADL: 20    Treatment & Education:  Pt educated on proper/safe t/f techniques.  Pt advised to call for assistance as needed.  Discussed OT POC.    Patient left up in chair with all lines intact and call button in reachEducation:      GOALS:   Multidisciplinary Problems     Occupational Therapy Goals        Problem: Occupational Therapy Goal    Goal Priority Disciplines Outcome Interventions   Occupational Therapy Goal     OT, PT/OT Ongoing, Progressing    Description:  Goals to be met by: 3/1/2020    Patient will increase functional independence with ADLs by performing:    UE Dressing with Supervision.  LE Dressing with Supervision with AD as needed.  Grooming while standing at sink with Supervision.  Toileting from toilet with Supervision for hygiene and clothing management. MET 2/1 (hygiene only)  Stand pivot transfers with Supervision.  Toilet transfer to bedside commode with Supervision.                          Time Tracking:     OT Date of Treatment: 02/01/20  OT Start Time: 0940  OT Stop Time: 1010  OT Total Time (min): 30 min    Billable Minutes:Self Care/Home Management 15  Therapeutic Activity 15    ROSS Robbins  2/1/2020

## 2020-02-01 NOTE — ANESTHESIA POST-OP PAIN MANAGEMENT
Acute Pain Service and Perioperative Surgical Home Progress Note    HPI  Ari Santos is a 49 y.o., female to male transgender patient (identifies as male) with PMHx of congenital adrenal hyperplasia, now with adrenal insufficiency on chronic steroids, HLD, chronic lower back pain with sciatica s/p laminectomy, achondroplasia who is now s/p right TKA.    Interval history      Surgery:  Procedure(s) (LRB):  ARTHROPLASTY, KNEE, TOTAL (Right)    Post Op Day #: 1    Catheter type: Perineural Adductor Canal    Infusion type: Ropivacaine 0.2%  8 ml/hr basal    Problem List:    Active Hospital Problems    Diagnosis  POA    *Primary osteoarthritis of right knee [M17.11]  Yes    Congenital adrenal hyperplasia [E25.0]  Yes      Resolved Hospital Problems   No resolved problems to display.       Subjective:       General appearance of alert, oriented, no complaints   Pain with rest: 1    Numbers   Pain with movement: 4    Numbers   Side Effects    1. Pruritis No    2. Nausea No    3. Motor Blockade No, 0=Ability to raise lower extremities off bed    4. Sedation No, 1=awake and alert    Schedule Medications:    acetaminophen  1,000 mg Oral Q6H    aspirin  81 mg Oral BID    atorvastatin  10 mg Oral QHS    celecoxib  200 mg Oral Daily    cetirizine  10 mg Oral Daily    ciprofloxacin HCl  1 drop Left Eye QID    famotidine  20 mg Oral BID    gabapentin  800 mg Oral TID    lamoTRIgine  200 mg Oral BID    methocarbamol  500 mg Oral QID    morphine  15 mg Oral BID    mupirocin  1 g Nasal BID    polyethylene glycol  17 g Oral Daily    predniSONE  9 mg Oral Before breakfast    primidone  50 mg Oral TID    rOPINIRole  4 mg Oral Nightly    senna-docusate 8.6-50 mg  1 tablet Oral BID        Continuous Infusions:   sodium chloride 0.9% 150 mL/hr at 01/31/20 1214    ropivacaine (PF) 2 mg/ml (0.2%) 8 mL/hr (02/01/20 0842)        PRN Medications:  ALPRAZolam, bisacodyL, morphine, naloxone, ondansetron, oxyCODONE,  oxyCODONE, oxyCODONE, promethazine (PHENERGAN) IVPB       Antibiotics:  Antibiotics (From admission, onward)    Start     Stop Route Frequency Ordered    01/31/20 1700  ciprofloxacin HCl 0.3 % ophthalmic solution 1 drop      -- LEFT EYE 4 times daily 01/31/20 1330    01/31/20 1200  mupirocin 2 % ointment 1 g      02/05 0859 Nasl 2 times daily 01/31/20 1145             Objective:     Catheter site clean, dry, intact          Vital Signs (Most Recent):  Temp: 36.1 °C (97 °F) (02/01/20 1131)  Pulse: 86 (02/01/20 1131)  Resp: 18 (02/01/20 1131)  BP: (!) 102/50 (02/01/20 1131)  SpO2: 100 % (02/01/20 1131) Vital Signs Range (Last 24H):  Temp:  [35.9 °C (96.7 °F)-37.2 °C (98.9 °F)]   Pulse:  [71-94]   Resp:  [11-21]   BP: ()/(42-83)   SpO2:  [92 %-100 %]          I & O (Last 24H):    Intake/Output Summary (Last 24 hours) at 2/1/2020 1159  Last data filed at 2/1/2020 0600  Gross per 24 hour   Intake --   Output 1750 ml   Net -1750 ml       Physical Exam:    GA: Alert, comfortable, no acute distress.   Pulmonary: Clear to auscultation A/P/L. No wheezing, crackles, or rhonchi.  Cardiac: RRR S1 & S2 w/o rubs/murmurs/gallops.   Abdominal:Bowel sounds present. No tenderness to palpation or distension. No appreciable hepatosplenomegaly.   Skin: No jaundice, rashes, or visible lesions.         Laboratory:  CBC: No results for input(s): WBC, RBC, HGB, HCT, PLT, MCV, MCH, MCHC in the last 72 hours.    BMP: No results for input(s): NA, K, CO2, CL, BUN, CREATININE, GLU, MG, PHOS, CALCIUM in the last 72 hours.    No results for input(s): PT, INR, PROTIME, APTT in the last 72 hours.      Assessment:    Patient appears well. Working well with PT.      Pain control adequate    Plan:     1) Pain:    Adductor canal perineural catheter in place and infusing. Multimodal pain regimen with acetaminophen, Celebrex, gabapentin, robaxin, ropinirole, primidone, and prn oxycodone given. Will continue to monitor. Plan to D/C home or to SNF with  On-Q if patient discharged today.   2) Adrenal insufficiency: Per endocrine recs, will restart home dose of prednisone 9mg QD.   3) Anxiety: PRN xanax   4) FEN/GI: Tolerating liquids, advance diet as tolerated. + flatus. + BM.   5) Dispo: Pt working well with PT/OT. Case management and SW for    placement. Plan for D/C possibly this afternoon if patient works well with PT and meets goals.          Evaluator Darron Sykes MD

## 2020-02-01 NOTE — SUBJECTIVE & OBJECTIVE
Principal Problem:Primary osteoarthritis of right knee    Principal Orthopedic Problem: S/p R TKA on 1/31/20    Interval History: The patient was seen and examined at bedside. ALFRED. The patient states pain is 6/10 currently, though was 9/10 overnight. Patient on xanax 1 mg TID PRN at home for severe panic disorder; restarted to avoid withdrawal.     Review of patient's allergies indicates:   Allergen Reactions    Penicillins Rash       Current Facility-Administered Medications   Medication    0.9%  NaCl infusion    acetaminophen tablet 1,000 mg    ALPRAZolam tablet 1 mg    aspirin EC tablet 81 mg    atorvastatin tablet 10 mg    bisacodyL suppository 10 mg    celecoxib capsule 200 mg    cetirizine tablet 10 mg    ciprofloxacin HCl 0.3 % ophthalmic solution 1 drop    famotidine 40 mg/5 mL (8 mg/mL) suspension 20 mg    gabapentin capsule 800 mg    lamoTRIgine tablet 200 mg    methocarbamol tablet 500 mg    morphine 12 hr tablet 15 mg    morphine injection 2 mg    mupirocin 2 % ointment 1 g    naloxone 0.4 mg/mL injection 0.02 mg    ondansetron injection 4 mg    oxyCODONE immediate release tablet 10 mg    oxyCODONE immediate release tablet 15 mg    oxyCODONE immediate release tablet 5 mg    polyethylene glycol packet 17 g    predniSONE tablet 9 mg    primidone tablet 50 mg    promethazine (PHENERGAN) 6.25 mg in dextrose 5 % 50 mL IVPB    rOPINIRole tablet 4 mg    ropivacaine (PF) 2 mg/ml (0.2%) infusion    senna-docusate 8.6-50 mg per tablet 1 tablet     Objective:     Vital Signs (Most Recent):  Temp: 97.2 °F (36.2 °C) (02/01/20 0543)  Pulse: 91 (02/01/20 0543)  Resp: 18 (02/01/20 0543)  BP: (!) 94/50 (02/01/20 0543)  SpO2: 99 % (02/01/20 0543) Vital Signs (24h Range):  Temp:  [96.7 °F (35.9 °C)-99 °F (37.2 °C)] 97.2 °F (36.2 °C)  Pulse:  [71-94] 91  Resp:  [11-21] 18  SpO2:  [92 %-100 %] 99 %  BP: ()/(42-69) 94/50     Weight: 60.3 kg (133 lb)  Height: 5' (152.4 cm)  Body mass index  is 25.97 kg/m².      Intake/Output Summary (Last 24 hours) at 2/1/2020 0715  Last data filed at 2/1/2020 0600  Gross per 24 hour   Intake 2200 ml   Output 2940 ml   Net -740 ml       Ortho/SPM Exam     Awake/alert/oriented x3, No acute distress, Afebrile, Vital signs stable  Good inspiratory effort with unlaboured breathing  Dressings c/d/i  Good quad strength  Calves non-tender  Compartments soft  FROM  SILT Sa/[place]/DP/SP/T  Motor intact EHL/FHL/TA/Gastroc  2+ DP, 2+ PT              Significant Labs: All pertinent labs within the past 24 hours have been reviewed.    Significant Imaging: All pertinent imaging reviewed.

## 2020-02-01 NOTE — CARE UPDATE
Received call from nurse regarding patient's xanax. He takes xanax 3x dialy prn 1 mg. This wasn't ordered. Instructed nurse to hold narcotics and adminsitere xanax with concern for withdrawal. Extreme caution to avoid overdosage so will hold narcotics while taking xanax

## 2020-02-01 NOTE — PROGRESS NOTES
[facility]  Endocrinology  Progress Note    Admit Date: 2020     A 48 yo F to M transgender with diagnosis of CAH since childhood (classical non-salt wasting) currently on prednisone 9 mg, daily, was admitted for right total knee arthroplasty. Endocrinology was consulted for recommendations on steroid taper.   He is also on testosterone replacement therapy (gel) at home.     Pt reports feeling well post-op.   He follows up with Dr. Alvarez outpatient.    Interval HPI:   AAOX3 s/p TKA.  Pt received Hydrocortisone 100 day of the surgery and 25mg IV yesterday.  Today VS wnl, No hypotension, No hypoglycemias, Electrolytes WNL. No evidence of A.1     Overnight events: JEFFERSON   Eatin%  Nausea: No  Hypoglycemia and intervention: No  Fever: No  TPN and/or TF: No    /83 (BP Location: Right arm, Patient Position: Lying)   Pulse 93   Temp 97.6 °F (36.4 °C) (Oral)   Resp 18   Ht 5' (1.524 m)   Wt 60.3 kg (133 lb)   SpO2 100%   Breastfeeding? No   BMI 25.97 kg/m²      Labs Reviewed and Include    No results for input(s): GLU, CALCIUM, ALBUMIN, PROT, NA, K, CO2, CL, BUN, CREATININE, ALKPHOS, ALT, AST, BILITOT in the last 24 hours.  Lab Results   Component Value Date    WBC 7.34 2020    HGB 14.7 2020    HCT 45.5 2020    MCV 94 2020     (H) 2020     No results for input(s): TSH, FREET4 in the last 168 hours.  Lab Results   Component Value Date    HGBA1C 5.8 (H) 2019       Nutritional status:   Body mass index is 25.97 kg/m².  Lab Results   Component Value Date    ALBUMIN 4.2 10/28/2019    ALBUMIN 3.2 (L) 2019    ALBUMIN 2.8 (L) 2019     No results found for: PREALBUMIN    CrCl cannot be calculated (Patient's most recent lab result is older than the maximum 7 days allowed.).    Accu-Checks  No results for input(s): POCTGLUCOSE in the last 72 hours.    Current Medications and/or Treatments Impacting Glycemic Control  Immunotherapy:     Immunosuppressants     None        Steroids:   Hormones (From admission, onward)    Start     Stop Route Frequency Ordered    02/01/20 0600  predniSONE tablet 9 mg      -- Oral Before breakfast 01/31/20 1435        Pressors:    Autonomic Drugs (From admission, onward)    None        Hyperglycemia/Diabetes Medications:   Antihyperglycemics (From admission, onward)    None          ASSESSMENT and PLAN    * Primary osteoarthritis of right knee  S/p right total knee arthroplasty      Congenital adrenal hyperplasia  On Prednisone 9 mg, daily, at home.   Received IV Hydrocortisone 100 mg, prior to surgery   Received IV hydrocortisone 25mg yesterday   -VS WNL  -Electrolytes WNL  -No hypoglycemias  -No evidence of A.I     Plan:   -Resume home dose Prednisone 9 mg, daily, today   -If clinically unstable, would start stress dose steroids (IV HC 50 mg Q8h).   -Pt has follow up appointment with Dr. Alvarez on 3/5  -ENDO WILL SIGN OFF           Albin Henderson MD  Endocrinology  [facility]

## 2020-02-02 PROCEDURE — 63600175 PHARM REV CODE 636 W HCPCS: Performed by: ORTHOPAEDIC SURGERY

## 2020-02-02 PROCEDURE — 99231 SBSQ HOSP IP/OBS SF/LOW 25: CPT | Mod: ,,, | Performed by: ANESTHESIOLOGY

## 2020-02-02 PROCEDURE — 94799 UNLISTED PULMONARY SVC/PX: CPT

## 2020-02-02 PROCEDURE — 25000003 PHARM REV CODE 250: Performed by: STUDENT IN AN ORGANIZED HEALTH CARE EDUCATION/TRAINING PROGRAM

## 2020-02-02 PROCEDURE — 99231 PR SUBSEQUENT HOSPITAL CARE,LEVL I: ICD-10-PCS | Mod: ,,, | Performed by: ANESTHESIOLOGY

## 2020-02-02 PROCEDURE — 63600175 PHARM REV CODE 636 W HCPCS: Performed by: STUDENT IN AN ORGANIZED HEALTH CARE EDUCATION/TRAINING PROGRAM

## 2020-02-02 PROCEDURE — 11000001 HC ACUTE MED/SURG PRIVATE ROOM

## 2020-02-02 PROCEDURE — 94761 N-INVAS EAR/PLS OXIMETRY MLT: CPT

## 2020-02-02 PROCEDURE — 97116 GAIT TRAINING THERAPY: CPT

## 2020-02-02 PROCEDURE — 25000003 PHARM REV CODE 250: Performed by: ORTHOPAEDIC SURGERY

## 2020-02-02 PROCEDURE — 97535 SELF CARE MNGMENT TRAINING: CPT

## 2020-02-02 RX ADMIN — CETIRIZINE HYDROCHLORIDE 10 MG: 5 TABLET ORAL at 09:02

## 2020-02-02 RX ADMIN — OXYCODONE HYDROCHLORIDE 15 MG: 10 TABLET ORAL at 04:02

## 2020-02-02 RX ADMIN — SENNOSIDES AND DOCUSATE SODIUM 1 TABLET: 8.6; 5 TABLET ORAL at 08:02

## 2020-02-02 RX ADMIN — ROPINIROLE HYDROCHLORIDE 4 MG: 1 TABLET, FILM COATED ORAL at 08:02

## 2020-02-02 RX ADMIN — PRIMIDONE 50 MG: 50 TABLET ORAL at 09:02

## 2020-02-02 RX ADMIN — CIPROFLOXACIN HYDROCHLORIDE 1 DROP: 3 SOLUTION/ DROPS OPHTHALMIC at 12:02

## 2020-02-02 RX ADMIN — PREDNISONE 9 MG: 1 TABLET ORAL at 05:02

## 2020-02-02 RX ADMIN — GABAPENTIN 800 MG: 400 CAPSULE ORAL at 09:02

## 2020-02-02 RX ADMIN — METHOCARBAMOL TABLETS 500 MG: 500 TABLET, COATED ORAL at 04:02

## 2020-02-02 RX ADMIN — GABAPENTIN 800 MG: 400 CAPSULE ORAL at 03:02

## 2020-02-02 RX ADMIN — CIPROFLOXACIN HYDROCHLORIDE 1 DROP: 3 SOLUTION/ DROPS OPHTHALMIC at 09:02

## 2020-02-02 RX ADMIN — PRIMIDONE 50 MG: 50 TABLET ORAL at 03:02

## 2020-02-02 RX ADMIN — CIPROFLOXACIN HYDROCHLORIDE 1 DROP: 3 SOLUTION/ DROPS OPHTHALMIC at 04:02

## 2020-02-02 RX ADMIN — ROPIVACAINE HYDROCHLORIDE 8 ML/HR: 2 INJECTION, SOLUTION EPIDURAL; INFILTRATION at 08:02

## 2020-02-02 RX ADMIN — ASPIRIN 81 MG: 81 TABLET, COATED ORAL at 08:02

## 2020-02-02 RX ADMIN — METHOCARBAMOL TABLETS 500 MG: 500 TABLET, COATED ORAL at 12:02

## 2020-02-02 RX ADMIN — MORPHINE SULFATE 15 MG: 15 TABLET, EXTENDED RELEASE ORAL at 09:02

## 2020-02-02 RX ADMIN — ALPRAZOLAM 1 MG: 0.5 TABLET ORAL at 09:02

## 2020-02-02 RX ADMIN — METHOCARBAMOL TABLETS 500 MG: 500 TABLET, COATED ORAL at 08:02

## 2020-02-02 RX ADMIN — GABAPENTIN 800 MG: 400 CAPSULE ORAL at 08:02

## 2020-02-02 RX ADMIN — MORPHINE SULFATE 15 MG: 15 TABLET, EXTENDED RELEASE ORAL at 08:02

## 2020-02-02 RX ADMIN — OXYCODONE HYDROCHLORIDE 15 MG: 10 TABLET ORAL at 09:02

## 2020-02-02 RX ADMIN — METHOCARBAMOL TABLETS 500 MG: 500 TABLET, COATED ORAL at 09:02

## 2020-02-02 RX ADMIN — LAMOTRIGINE 200 MG: 100 TABLET ORAL at 09:02

## 2020-02-02 RX ADMIN — SENNOSIDES AND DOCUSATE SODIUM 1 TABLET: 8.6; 5 TABLET ORAL at 09:02

## 2020-02-02 RX ADMIN — PRIMIDONE 50 MG: 50 TABLET ORAL at 08:02

## 2020-02-02 RX ADMIN — MUPIROCIN 1 G: 20 OINTMENT TOPICAL at 08:02

## 2020-02-02 RX ADMIN — CELECOXIB 200 MG: 200 CAPSULE ORAL at 09:02

## 2020-02-02 RX ADMIN — FAMOTIDINE 20 MG: 40 POWDER, FOR SUSPENSION ORAL at 08:02

## 2020-02-02 RX ADMIN — ACETAMINOPHEN 1000 MG: 500 TABLET ORAL at 12:02

## 2020-02-02 RX ADMIN — ACETAMINOPHEN 1000 MG: 500 TABLET ORAL at 05:02

## 2020-02-02 RX ADMIN — ALPRAZOLAM 1 MG: 0.5 TABLET ORAL at 08:02

## 2020-02-02 RX ADMIN — LAMOTRIGINE 200 MG: 100 TABLET ORAL at 08:02

## 2020-02-02 RX ADMIN — ACETAMINOPHEN 1000 MG: 500 TABLET ORAL at 06:02

## 2020-02-02 RX ADMIN — POLYETHYLENE GLYCOL 3350 17 G: 17 POWDER, FOR SOLUTION ORAL at 09:02

## 2020-02-02 RX ADMIN — ATORVASTATIN CALCIUM 10 MG: 10 TABLET, FILM COATED ORAL at 08:02

## 2020-02-02 RX ADMIN — CIPROFLOXACIN HYDROCHLORIDE 1 DROP: 3 SOLUTION/ DROPS OPHTHALMIC at 08:02

## 2020-02-02 RX ADMIN — FAMOTIDINE 20 MG: 40 POWDER, FOR SUSPENSION ORAL at 09:02

## 2020-02-02 RX ADMIN — MUPIROCIN 1 G: 20 OINTMENT TOPICAL at 09:02

## 2020-02-02 RX ADMIN — ASPIRIN 81 MG: 81 TABLET, COATED ORAL at 09:02

## 2020-02-02 RX ADMIN — OXYCODONE HYDROCHLORIDE 15 MG: 10 TABLET ORAL at 12:02

## 2020-02-02 RX ADMIN — ALPRAZOLAM 1 MG: 0.5 TABLET ORAL at 03:02

## 2020-02-02 RX ADMIN — BISACODYL 10 MG: 10 SUPPOSITORY RECTAL at 06:02

## 2020-02-02 NOTE — PT/OT/SLP PROGRESS
Physical Therapy Treatment    Patient Name:  Ari Santos   MRN:  [id number]    Recommendations:     Discharge Recommendations:  outpatient PT   Discharge Equipment Recommendations: none   Barriers to discharge: decreased functional mobility requiring increased assist      Assessment:     Ari Santos is a 49 y.o. female admitted with a medical diagnosis of Primary osteoarthritis of right knee.  She presents with the following impairments/functional limitations:  weakness, impaired functional mobilty, impaired endurance, impaired self care skills. Pt progressing well w/therapy performing all functional mobility w/SBA safely. Pt will be safe to discharge home once medically stable and would benefit from OPPT to address LE weakness and decreased AROM. Pt will continued to be seen by skilled acute PT daily to improve functional mobility.  Recommending pt receive PT services in OPPT setting following discharge from hospital once medically cleared.     Rehab Prognosis: Good; patient would benefit from acute skilled PT services to address these deficits and reach maximum level of function.    Recent Surgery: Procedure(s) (LRB):  ARTHROPLASTY, KNEE, TOTAL (Right) 2 Days Post-Op    Plan:     During this hospitalization, patient to be seen daily to address the identified rehab impairments via gait training, therapeutic activities, therapeutic exercises, neuromuscular re-education and progress toward the following goals:    · Plan of Care Expires:  03/01/20    Subjective     Chief Complaint: none noted   Patient/Family Comments/goals: Pt pleasant and willing to participate with therapy on this date.    Pain/Comfort:  · Pain Rating 1: (did not rate)      Objective:     Communicated with NSG prior to session.  Patient found standing in RW with cryotherapy, perineural catheter, wound vac upon PT entry to room.     General Precautions: Standard, fall   Orthopedic Precautions:RLE weight bearing as tolerated   Braces:  "Hinged knee brace     Functional Mobility:  · Transfers:     · Sit to Stand:  stand by assistance with rolling walker  · Toilet Transfer: stand by assistance with  rolling walker  using  Step Transfer  · Gait: 15ft x 2 w/RW SBA demonstrating a antalgic gait pattern  · Balance: SBA      AM-PAC 6 CLICK MOBILITY  Turning over in bed (including adjusting bedclothes, sheets and blankets)?: 4  Sitting down on and standing up from a chair with arms (e.g., wheelchair, bedside commode, etc.): 3  Moving from lying on back to sitting on the side of the bed?: 3  Moving to and from a bed to a chair (including a wheelchair)?: 3  Need to walk in hospital room?: 3  Climbing 3-5 steps with a railing?: 3  Basic Mobility Total Score: 19       Therapeutic Activities and Exercises:   - Pt educated on:   -PT roles, expectations, and POC    -Safety with mobility   -Benefits of OOB activities to increase strength and functional mobility    -Performing ther ex for increasing LE ROM and strength   -Discharge recommendations     Patient left up in chair with all lines intact and call button in reach..    GOALS:   Multidisciplinary Problems     Physical Therapy Goals        Problem: Physical Therapy Goal    Goal Priority Disciplines Outcome Goal Variances Interventions   Physical Therapy Goal     PT, PT/OT Ongoing, Progressing     Description:  Goals to be met by: 2020    Patient will increase functional independence with mobility by performin. Supine to sit with Set-up [place] -Met   2. Sit to supine with Set-up [place] -Met   3. Sit to stand transfer with Supervision   4. Gait  x 150 feet with Supervision using Rolling Walker.   5. Ascend/descend 6" curb step without Handrail(s) and Contact Guard Assistance using Rolling Walker.   6. Lower extremity exercise program x30 reps per handout, with independence                       Time Tracking:     PT Received On: 20  PT Start Time: 914     PT Stop Time: " 0937  PT Total Time (min): 23 min     Billable Minutes: Gait Training 23    Treatment Type: Treatment  PT/PTA: PT     PTA Visit Number: 0     Nikunj Arroyo, PT  02/02/2020

## 2020-02-02 NOTE — ANESTHESIA POST-OP PAIN MANAGEMENT
Acute Pain Service and Perioperative Surgical Home Progress Note    HPI  Ari Santos is a 49 y.o., female to male transgender patient (identifies as male) with PMHx of congenital adrenal hyperplasia, now with adrenal insufficiency on chronic steroids, HLD, chronic lower back pain with sciatica s/p laminectomy, achondroplasia who is now s/p right TKA.    Surgery:  Procedure(s) (LRB):  ARTHROPLASTY, KNEE, TOTAL (Right)    Post Op Day #: 2    Catheter type: Perineural Adductor Canal    Infusion type: Ropivacaine 0.2%  8 ml/hr basal    Problem List:    Active Hospital Problems    Diagnosis  POA    *Primary osteoarthritis of right knee [M17.11]  Yes    Congenital adrenal hyperplasia [E25.0]  Yes      Resolved Hospital Problems   No resolved problems to display.       Subjective:              General appearance of alert, oriented, no complaints            Pain with rest: 1    Numbers            Pain with movement: 4    Numbers            Side Effects                      1. Pruritis No                      2. Nausea No                      3. Motor Blockade No, 0=Ability to raise lower extremities off bed                      4. Sedation No, 1=awake and alert      Schedule Medications:    acetaminophen  1,000 mg Oral Q6H    ALPRAZolam  1 mg Oral TID    aspirin  81 mg Oral BID    atorvastatin  10 mg Oral QHS    celecoxib  200 mg Oral Daily    cetirizine  10 mg Oral Daily    ciprofloxacin HCl  1 drop Left Eye QID    famotidine  20 mg Oral BID    gabapentin  800 mg Oral TID    lamoTRIgine  200 mg Oral BID    methocarbamol  500 mg Oral QID    morphine  15 mg Oral BID    mupirocin  1 g Nasal BID    polyethylene glycol  17 g Oral Daily    predniSONE  9 mg Oral Before breakfast    primidone  50 mg Oral TID    rOPINIRole  4 mg Oral Nightly    senna-docusate 8.6-50 mg  1 tablet Oral BID        Continuous Infusions:   ropivacaine (PF) 2 mg/ml (0.2%) 8 mL/hr (02/02/20 0815)        PRN  Medications:  bisacodyL, morphine, naloxone, ondansetron, oxyCODONE, oxyCODONE, oxyCODONE, promethazine (PHENERGAN) IVPB       Antibiotics:  Antibiotics (From admission, onward)    Start     Stop Route Frequency Ordered    01/31/20 1700  ciprofloxacin HCl 0.3 % ophthalmic solution 1 drop      -- LEFT EYE 4 times daily 01/31/20 1330    01/31/20 1200  mupirocin 2 % ointment 1 g      02/05 0859 Nasl 2 times daily 01/31/20 1145             Objective:    Vital Signs (Most Recent):  Temp: 36.9 °C (98.5 °F) (02/02/20 1119)  Pulse: 104 (02/02/20 0800)  Resp: 18 (02/02/20 1119)  BP: (!) 103/57 (02/02/20 1119)  SpO2: 99 % (02/02/20 1119) Vital Signs Range (Last 24H):  Temp:  [36.1 °C (97 °F)-37.1 °C (98.7 °F)]   Pulse:  []   Resp:  [18]   BP: ()/(49-69)   SpO2:  [94 %-100 %]          I & O (Last 24H):    Intake/Output Summary (Last 24 hours) at 2/2/2020 1540  Last data filed at 2/1/2020 2000  Gross per 24 hour   Intake --   Output 0 ml   Net 0 ml       Physical Exam:    GA: Alert, comfortable, no acute distress.   Pulmonary: Clear to auscultation A/P/L. No wheezing, crackles, or rhonchi.  Cardiac: RRR S1 & S2 w/o rubs/murmurs/gallops.   Abdominal:Bowel sounds present. No tenderness to palpation or distension. No appreciable hepatosplenomegaly.   Skin: No jaundice, rashes, or visible lesions.         Laboratory:  CBC: No results for input(s): WBC, RBC, HGB, HCT, PLT, MCV, MCH, MCHC in the last 72 hours.    BMP: No results for input(s): NA, K, CO2, CL, BUN, CREATININE, GLU, MG, PHOS, CALCIUM in the last 72 hours.    No results for input(s): PT, INR, PROTIME, APTT in the last 72 hours.      Assessment:     Pain control adequate    Plan:    1) Pain: Adductor canal perineural catheter this will be continued with a Qball discharge  -  Continue multimodal pain regimen with acetaminophen, Celebrex, gabapentin, and prn oxycodone given.  Will continue to monitor.   2) Adrenal insufficiency: Per endocrine recs, will restart  home dose of prednisone 9mg QD.  3) Anxiety: PRN xanax  4) FEN/GI: Tolerating full diet.   5) Dispo: Pt working well with PT/OT. Possible discharge today or tomorrow to [facility]        Evaluator Sai Gillette MD

## 2020-02-02 NOTE — PT/OT/SLP PROGRESS
Occupational Therapy   Treatment    Name: Ari aSntos  MRN: [id number]  Admitting Diagnosis:  Primary osteoarthritis of right knee  2 Days Post-Op  R TKA    Recommendations:     Discharge Recommendations: home with home health (with progression to OP PT)  Discharge Equipment Recommendations:  none  Barriers to discharge:  None    Assessment:     Ari Santos is a 49 y.o.  Male with a medical diagnosis of Primary osteoarthritis of right knee. He presents with performance deficits including weakness, impaired endurance, impaired self care skills, impaired functional mobilty, pain, decreased lower extremity function, orthopedic precautions. Pt progressing toward goals and would continue to benefit from OT to increase functional independence and safety.     Rehab Prognosis:  Good; patient would benefit from acute skilled OT services to address these deficits and reach maximum level of function.       Plan:     Patient to be seen daily to address the above listed problems via self-care/home management, therapeutic activities, therapeutic exercises  Plan of Care Reviewed with: patient    Subjective     Pain/Comfort:  · Location - Side 1: Right  · Location 1: knee    Objective:     Communicated with: RN prior to session. Patient found seated on toilet, PT present, with wound vac, cryotherapy, perineural catheter upon OT entry to room.    General Precautions: Standard, fall   Orthopedic Precautions:RLE weight bearing as tolerated   Braces: Hinged knee brace locked in extension    Occupational Performance:     Bed Mobility:    · Not assessed    Functional Mobility/Transfers:  · Patient completed Sit <> Stand Transfer with stand by assistance with rolling walker from toilet and bedside chair  · Functional Mobility: Within room household distance with stand by assistance with rolling walker    Activities of Daily Living:  · Grooming: stand by assistance standing at sink to wash hands  · Upper Body Dressing: stand by  assistance to don shirt while seated  · Lower Body Dressing: moderate assistance to don shorts with cues for technique-- assist to thread wound vac and knee brace through shorts; able to pull up over hips in standing with SBA  · Toileting: stand by assistance for seated hygiene      [facility] 6 Click ADL: 20    Treatment & Education:  Pt completed toileting/standing ADLs and functional mobility within room, seated ADLs as described above; discussed D/C and DME recs and pt verbalized understanding to call for assistance before getting up    Patient left up in chair with all lines intact, call button in reach and RN notifiedEducation:      GOALS:   Multidisciplinary Problems     Occupational Therapy Goals        Problem: Occupational Therapy Goal    Goal Priority Disciplines Outcome Interventions   Occupational Therapy Goal     OT, PT/OT Ongoing, Progressing    Description:  Goals to be met by: 3/1/2020    Patient will increase functional independence with ADLs by performing:    UE Dressing with Supervision.  LE Dressing with Supervision with AD as needed.  Grooming while standing at sink with Supervision.  Toileting from toilet with Supervision for hygiene and clothing management. MET 2/1 (hygiene only)  Stand pivot transfers with Supervision.  Toilet transfer to bedside commode with Supervision.                          Time Tracking:     OT Date of Treatment: 02/02/20  OT Start Time: 0920  OT Stop Time: 0937  OT Total Time (min): 17 min (partial co-treat with PT)    Billable Minutes:Self Care/Home Management 10 minutes    ROSS Cali  2/2/2020

## 2020-02-02 NOTE — PLAN OF CARE
"Pt would benefit from continued skilled acute PT services to improve functional mobility. POC is to continue towards current goals set to progress towards PLOF.   Problem: Physical Therapy Goal  Goal: Physical Therapy Goal  Description  Goals to be met by: 2020    Patient will increase functional independence with mobility by performin. Supine to sit with Set-up [place] -Met   2. Sit to supine with Set-up [place] -Met   3. Sit to stand transfer with Supervision   4. Gait  x 150 feet with Supervision using Rolling Walker.   5. Ascend/descend 6" curb step without Handrail(s) and Contact Guard Assistance using Rolling Walker.   6. Lower extremity exercise program x30 reps per handout, with independence      Outcome: Ongoing, Progressing     "

## 2020-02-02 NOTE — ANESTHESIA POST-OP PAIN MANAGEMENT
Acute Pain Service and Perioperative Surgical Home Progress Note    HPI  Ari Santos is a 49 y.o., female to male transgender patient (identifies as male) with PMHx of congenital adrenal hyperplasia, now with adrenal insufficiency on chronic steroids, HLD, chronic lower back pain with sciatica s/p laminectomy, achondroplasia who is now s/p right TKA.    Surgery:  Procedure(s) (LRB):  ARTHROPLASTY, KNEE, TOTAL (Right)    Post Op Day #: 3    Catheter type: Perineural Adductor Canal    Infusion type: Ropivacaine 0.2%  8 ml/hr basal    Problem List:    Active Hospital Problems    Diagnosis  POA    *Primary osteoarthritis of right knee [M17.11]  Yes    Congenital adrenal hyperplasia [E25.0]  Yes      Resolved Hospital Problems   No resolved problems to display.       Subjective:              General appearance of alert, oriented, no complaints            Pain with rest: 1    Numbers            Pain with movement: 4    Numbers            Side Effects                      1. Pruritis No                      2. Nausea No                      3. Motor Blockade No, 0=Ability to raise lower extremities off bed                      4. Sedation No, 1=awake and alert      Schedule Medications:    acetaminophen  1,000 mg Oral Q6H    ALPRAZolam  1 mg Oral TID    aspirin  81 mg Oral BID    atorvastatin  10 mg Oral QHS    celecoxib  200 mg Oral Daily    cetirizine  10 mg Oral Daily    ciprofloxacin HCl  1 drop Left Eye QID    famotidine  20 mg Oral BID    gabapentin  800 mg Oral TID    lamoTRIgine  200 mg Oral BID    methocarbamol  500 mg Oral QID    morphine  15 mg Oral BID    mupirocin  1 g Nasal BID    polyethylene glycol  17 g Oral Daily    predniSONE  9 mg Oral Before breakfast    primidone  50 mg Oral TID    rOPINIRole  4 mg Oral Nightly    senna-docusate 8.6-50 mg  1 tablet Oral BID        Continuous Infusions:   ropivacaine (PF) 2 mg/ml (0.2%) 8 mL/hr (02/01/20 2048)        PRN  Medications:  bisacodyL, morphine, naloxone, ondansetron, oxyCODONE, oxyCODONE, oxyCODONE, promethazine (PHENERGAN) IVPB       Antibiotics:  Antibiotics (From admission, onward)    Start     Stop Route Frequency Ordered    01/31/20 1700  ciprofloxacin HCl 0.3 % ophthalmic solution 1 drop      -- LEFT EYE 4 times daily 01/31/20 1330    01/31/20 1200  mupirocin 2 % ointment 1 g      02/05 0859 Nasl 2 times daily 01/31/20 1145             Objective:    Vital Signs (Most Recent):  Temp: 37.1 °C (98.7 °F) (02/02/20 0006)  Pulse: 102 (02/02/20 0433)  Resp: 18 (02/02/20 0433)  BP: (!) 112/57 (02/02/20 0433)  SpO2: 96 % (02/02/20 0433) Vital Signs Range (Last 24H):  Temp:  [36.1 °C (97 °F)-37.1 °C (98.7 °F)]   Pulse:  []   Resp:  [18]   BP: ()/(49-83)   SpO2:  [94 %-100 %]          I & O (Last 24H):    Intake/Output Summary (Last 24 hours) at 2/2/2020 0804  Last data filed at 2/1/2020 2000  Gross per 24 hour   Intake --   Output 0 ml   Net 0 ml       Physical Exam:    GA: Alert, comfortable, no acute distress.   Pulmonary: Clear to auscultation A/P/L. No wheezing, crackles, or rhonchi.  Cardiac: RRR S1 & S2 w/o rubs/murmurs/gallops.   Abdominal:Bowel sounds present. No tenderness to palpation or distension. No appreciable hepatosplenomegaly.   Skin: No jaundice, rashes, or visible lesions.         Laboratory:  CBC: No results for input(s): WBC, RBC, HGB, HCT, PLT, MCV, MCH, MCHC in the last 72 hours.    BMP: No results for input(s): NA, K, CO2, CL, BUN, CREATININE, GLU, MG, PHOS, CALCIUM in the last 72 hours.    No results for input(s): PT, INR, PROTIME, APTT in the last 72 hours.      Assessment:     Pain control adequate    Plan:    1) Pain: Adductor canal perineural catheter this will be continued with a Qball discharge  -  Continue multimodal pain regimen with acetaminophen, Celebrex, gabapentin, and prn oxycodone given.  Will continue to monitor.   2) Adrenal insufficiency: Per endocrine recs, will restart  home dose of prednisone 9mg QD.  3) Anxiety: PRN xanax  4) FEN/GI: Tolerating full diet.   5) Dispo: Pt working well with PT/OT. Possible discharge today or tomorrow to [facility]        Evaluator Sai Gillette MD

## 2020-02-02 NOTE — PLAN OF CARE
Continue OT plan of care.    Problem: Occupational Therapy Goal  Goal: Occupational Therapy Goal  Description  Goals to be met by: 3/1/2020    Patient will increase functional independence with ADLs by performing:    UE Dressing with Supervision.  LE Dressing with Supervision with AD as needed.  Grooming while standing at sink with Supervision.  Toileting from toilet with Supervision for hygiene and clothing management. MET 2/1 (hygiene only)  Stand pivot transfers with Supervision.  Toilet transfer to bedside commode with Supervision.         Outcome: Ongoing, Progressing

## 2020-02-02 NOTE — SUBJECTIVE & OBJECTIVE
Principal Problem:Primary osteoarthritis of right knee    Principal Orthopedic Problem: S/p R TKA on 1/31/20    Interval History: The patient was seen and examined at bedside. ALFRED. The patient states pain is 9/10 this AM. Mobilized well with PT yesterday.     Review of patient's allergies indicates:   Allergen Reactions    Penicillins Rash       Current Facility-Administered Medications   Medication    acetaminophen tablet 1,000 mg    ALPRAZolam tablet 1 mg    aspirin EC tablet 81 mg    atorvastatin tablet 10 mg    bisacodyL suppository 10 mg    celecoxib capsule 200 mg    cetirizine tablet 10 mg    ciprofloxacin HCl 0.3 % ophthalmic solution 1 drop    famotidine 40 mg/5 mL (8 mg/mL) suspension 20 mg    gabapentin capsule 800 mg    lamoTRIgine tablet 200 mg    methocarbamol tablet 500 mg    morphine 12 hr tablet 15 mg    morphine injection 2 mg    mupirocin 2 % ointment 1 g    naloxone 0.4 mg/mL injection 0.02 mg    ondansetron injection 4 mg    oxyCODONE immediate release tablet 10 mg    oxyCODONE immediate release tablet 15 mg    oxyCODONE immediate release tablet 5 mg    polyethylene glycol packet 17 g    predniSONE tablet 9 mg    primidone tablet 50 mg    promethazine (PHENERGAN) 6.25 mg in dextrose 5 % 50 mL IVPB    rOPINIRole tablet 4 mg    ropivacaine (PF) 2 mg/ml (0.2%) infusion    senna-docusate 8.6-50 mg per tablet 1 tablet     Objective:     Vital Signs (Most Recent):  Temp: 98.7 °F (37.1 °C) (02/02/20 0006)  Pulse: 102 (02/02/20 0433)  Resp: 18 (02/02/20 0433)  BP: (!) 112/57 (02/02/20 0433)  SpO2: 96 % (02/02/20 0433) Vital Signs (24h Range):  Temp:  [97 °F (36.1 °C)-98.7 °F (37.1 °C)] 98.7 °F (37.1 °C)  Pulse:  [] 102  Resp:  [18] 18  SpO2:  [94 %-100 %] 96 %  BP: ()/(49-69) 112/57     Weight: 60.3 kg (133 lb)  Height: 5' (152.4 cm)  Body mass index is 25.97 kg/m².      Intake/Output Summary (Last 24 hours) at 2/2/2020 0832  Last data filed at 2/1/2020  2000  Gross per 24 hour   Intake --   Output 0 ml   Net 0 ml       Ortho/SPM Exam     Awake/alert/oriented x3, No acute distress, Afebrile, Vital signs stable  Good inspiratory effort with unlaboured breathing  Dressings c/d/i  Good quad strength  Calves non-tender  Compartments soft  FROM  SILT Sa/[place]/DP/SP/T  Motor intact EHL/FHL/TA/Gastroc  2+ DP, 2+ PT              Significant Labs: All pertinent labs within the past 24 hours have been reviewed.    Significant Imaging: All pertinent imaging reviewed.

## 2020-02-02 NOTE — ASSESSMENT & PLAN NOTE
49 y.o. female transgender (F to M: recognizes as male) POD1 s/p R TKA    Pain control: multimodal per surgical home  PT/OT: WBAT RLE with hinged knee brace locked in extension.   DVT PPx: ASA 81mg BID, FCDs at all times when not ambulating  Abx: postop Ancef  Continue incisional vac and HKB - ensure Ace wrap under HKB so it doesn't slide.  Prednisone 9mg daily     Dispo: d/c home with home health likely tomorrow

## 2020-02-02 NOTE — PROGRESS NOTES
[facility]  Orthopedics  Progress Note    Patient Name: Ari Santos  MRN: [id number]  Admission Date: 1/31/2020  Hospital Length of Stay: 2 days  Attending Provider: Donte Andrade III, MD  Primary Care Provider: Siva Mitchell MD  Follow-up For: Procedure(s) (LRB):  ARTHROPLASTY, KNEE, TOTAL (Right)    Post-Operative Day: 2 Days Post-Op  Subjective:     Principal Problem:Primary osteoarthritis of right knee    Principal Orthopedic Problem: S/p R TKA on 1/31/20    Interval History: The patient was seen and examined at bedside. ALFRED. The patient states pain is 9/10 this AM. Mobilized well with PT yesterday.     Review of patient's allergies indicates:   Allergen Reactions    Penicillins Rash       Current Facility-Administered Medications   Medication    acetaminophen tablet 1,000 mg    ALPRAZolam tablet 1 mg    aspirin EC tablet 81 mg    atorvastatin tablet 10 mg    bisacodyL suppository 10 mg    celecoxib capsule 200 mg    cetirizine tablet 10 mg    ciprofloxacin HCl 0.3 % ophthalmic solution 1 drop    famotidine 40 mg/5 mL (8 mg/mL) suspension 20 mg    gabapentin capsule 800 mg    lamoTRIgine tablet 200 mg    methocarbamol tablet 500 mg    morphine 12 hr tablet 15 mg    morphine injection 2 mg    mupirocin 2 % ointment 1 g    naloxone 0.4 mg/mL injection 0.02 mg    ondansetron injection 4 mg    oxyCODONE immediate release tablet 10 mg    oxyCODONE immediate release tablet 15 mg    oxyCODONE immediate release tablet 5 mg    polyethylene glycol packet 17 g    predniSONE tablet 9 mg    primidone tablet 50 mg    promethazine (PHENERGAN) 6.25 mg in dextrose 5 % 50 mL IVPB    rOPINIRole tablet 4 mg    ropivacaine (PF) 2 mg/ml (0.2%) infusion    senna-docusate 8.6-50 mg per tablet 1 tablet     Objective:     Vital Signs (Most Recent):  Temp: 98.7 °F (37.1 °C) (02/02/20 0006)  Pulse: 102 (02/02/20 0433)  Resp: 18 (02/02/20 0433)  BP: (!) 112/57 (02/02/20  0433)  SpO2: 96 % (02/02/20 0433) Vital Signs (24h Range):  Temp:  [97 °F (36.1 °C)-98.7 °F (37.1 °C)] 98.7 °F (37.1 °C)  Pulse:  [] 102  Resp:  [18] 18  SpO2:  [94 %-100 %] 96 %  BP: ()/(49-69) 112/57     Weight: 60.3 kg (133 lb)  Height: 5' (152.4 cm)  Body mass index is 25.97 kg/m².      Intake/Output Summary (Last 24 hours) at 2/2/2020 0832  Last data filed at 2/1/2020 2000  Gross per 24 hour   Intake --   Output 0 ml   Net 0 ml       Ortho/SPM Exam     Awake/alert/oriented x3, No acute distress, Afebrile, Vital signs stable  Good inspiratory effort with unlaboured breathing  Dressings c/d/i  Good quad strength  Calves non-tender  Compartments soft  FROM  SILT Sa/[place]/DP/SP/T  Motor intact EHL/FHL/TA/Gastroc  2+ DP, 2+ PT              Significant Labs: All pertinent labs within the past 24 hours have been reviewed.    Significant Imaging: All pertinent imaging reviewed.    Assessment/Plan:     * Primary osteoarthritis of right knee  49 y.o. female transgender (F to M: recognizes as male) POD1 s/p R TKA    Pain control: multimodal per surgical home  PT/OT: WBAT RLE with hinged knee brace locked in extension.   DVT PPx: ASA 81mg BID, FCDs at all times when not ambulating  Abx: postop Ancef  Continue incisional vac and HKB - ensure Ace wrap under HKB so it doesn't slide.  Prednisone 9mg daily     Dispo: d/c home with home health likely tomorrow          Lucien Lee MD  Orthopedics  [facility]

## 2020-02-03 VITALS
DIASTOLIC BLOOD PRESSURE: 58 MMHG | TEMPERATURE: 99 F | OXYGEN SATURATION: 95 % | RESPIRATION RATE: 17 BRPM | HEART RATE: 95 BPM | WEIGHT: 133 LBS | BODY MASS INDEX: 26.11 KG/M2 | HEIGHT: 60 IN | SYSTOLIC BLOOD PRESSURE: 107 MMHG

## 2020-02-03 PROCEDURE — 99232 SBSQ HOSP IP/OBS MODERATE 35: CPT | Mod: GC,,, | Performed by: ANESTHESIOLOGY

## 2020-02-03 PROCEDURE — 97116 GAIT TRAINING THERAPY: CPT

## 2020-02-03 PROCEDURE — 99232 PR SUBSEQUENT HOSPITAL CARE,LEVL II: ICD-10-PCS | Mod: GC,,, | Performed by: ANESTHESIOLOGY

## 2020-02-03 PROCEDURE — 97530 THERAPEUTIC ACTIVITIES: CPT

## 2020-02-03 PROCEDURE — 25000003 PHARM REV CODE 250: Performed by: STUDENT IN AN ORGANIZED HEALTH CARE EDUCATION/TRAINING PROGRAM

## 2020-02-03 PROCEDURE — 63600175 PHARM REV CODE 636 W HCPCS: Performed by: ORTHOPAEDIC SURGERY

## 2020-02-03 PROCEDURE — 25000003 PHARM REV CODE 250: Performed by: ORTHOPAEDIC SURGERY

## 2020-02-03 PROCEDURE — 63600175 PHARM REV CODE 636 W HCPCS: Performed by: STUDENT IN AN ORGANIZED HEALTH CARE EDUCATION/TRAINING PROGRAM

## 2020-02-03 RX ORDER — OXYCODONE HYDROCHLORIDE 5 MG/1
5 TABLET ORAL EVERY 6 HOURS PRN
Qty: 50 TABLET | Refills: 0 | Status: SHIPPED | OUTPATIENT
Start: 2020-02-03 | End: 2020-02-27

## 2020-02-03 RX ADMIN — LAMOTRIGINE 200 MG: 100 TABLET ORAL at 08:02

## 2020-02-03 RX ADMIN — SENNOSIDES AND DOCUSATE SODIUM 1 TABLET: 8.6; 5 TABLET ORAL at 08:02

## 2020-02-03 RX ADMIN — CELECOXIB 200 MG: 200 CAPSULE ORAL at 08:02

## 2020-02-03 RX ADMIN — METHOCARBAMOL TABLETS 500 MG: 500 TABLET, COATED ORAL at 08:02

## 2020-02-03 RX ADMIN — POLYETHYLENE GLYCOL 3350 17 G: 17 POWDER, FOR SOLUTION ORAL at 08:02

## 2020-02-03 RX ADMIN — PRIMIDONE 50 MG: 50 TABLET ORAL at 08:02

## 2020-02-03 RX ADMIN — CETIRIZINE HYDROCHLORIDE 10 MG: 5 TABLET ORAL at 08:02

## 2020-02-03 RX ADMIN — ASPIRIN 81 MG: 81 TABLET, COATED ORAL at 08:02

## 2020-02-03 RX ADMIN — PREDNISONE 9 MG: 1 TABLET ORAL at 06:02

## 2020-02-03 RX ADMIN — CIPROFLOXACIN HYDROCHLORIDE 1 DROP: 3 SOLUTION/ DROPS OPHTHALMIC at 08:02

## 2020-02-03 RX ADMIN — FAMOTIDINE 20 MG: 40 POWDER, FOR SUSPENSION ORAL at 08:02

## 2020-02-03 RX ADMIN — MORPHINE SULFATE 15 MG: 15 TABLET, EXTENDED RELEASE ORAL at 08:02

## 2020-02-03 RX ADMIN — ACETAMINOPHEN 1000 MG: 500 TABLET ORAL at 01:02

## 2020-02-03 RX ADMIN — GABAPENTIN 800 MG: 400 CAPSULE ORAL at 08:02

## 2020-02-03 RX ADMIN — ACETAMINOPHEN 1000 MG: 500 TABLET ORAL at 06:02

## 2020-02-03 RX ADMIN — OXYCODONE HYDROCHLORIDE 5 MG: 5 TABLET ORAL at 12:02

## 2020-02-03 RX ADMIN — ALPRAZOLAM 1 MG: 0.5 TABLET ORAL at 08:02

## 2020-02-03 RX ADMIN — OXYCODONE HYDROCHLORIDE 10 MG: 10 TABLET ORAL at 12:02

## 2020-02-03 RX ADMIN — ROPIVACAINE HYDROCHLORIDE 8 ML/HR: 2 INJECTION, SOLUTION EPIDURAL; INFILTRATION at 08:02

## 2020-02-03 RX ADMIN — OXYCODONE HYDROCHLORIDE 15 MG: 10 TABLET ORAL at 04:02

## 2020-02-03 RX ADMIN — MUPIROCIN 1 G: 20 OINTMENT TOPICAL at 08:02

## 2020-02-03 NOTE — PLAN OF CARE
"  Problem: Physical Therapy Goal  Goal: Physical Therapy Goal  Description  Goals to be met by: 2020    Patient will increase functional independence with mobility by performin. Supine to sit with Set-up [place] -Met   2. Sit to supine with Set-up [place] -Met   3. Sit to stand transfer with Supervision   4. Gait  x 150 feet with Supervision using Rolling Walker.   5. Ascend/descend 6" curb step without Handrail(s) and Contact Guard Assistance using Rolling Walker.   6. Lower extremity exercise program x30 reps per handout, with independence      Outcome: Ongoing, Progressing   Patient is pleasant, cooperative, and agreeable to therapy. Patient demonstrates good motivation and functional mobility secondary to appropriate rest breaks for pain and cues for sequencing. Patient requires between SBA and CGA for transfers and ambulation secondary to pain, weakness, gait instability, impaired balance, impaired endurance, and decreased safety awareness. Patient ambulated 25 ft with rolling walker and CGA and cues for sequencing. Patient ascended 4" curb step using backwards technique with rolling walker and CGA to replicate step-up to bed; required cues for sequencing and hand/foot placement. Plan of care continues to be appropriate. Patient will continue to benefit from skilled physical therapy in acute care setting and at home with home health PT setting for strengthening and mobility training.   "

## 2020-02-03 NOTE — PROGRESS NOTES
[facility]  Orthopedics  Progress Note    Patient Name: Ari Santos  MRN: [id number]  Admission Date: 1/31/2020  Hospital Length of Stay: 3 days  Attending Provider: Donte Andrade III, MD  Primary Care Provider: Siva Mitchell MD  Follow-up For: Procedure(s) (LRB):  ARTHROPLASTY, KNEE, TOTAL (Right)    Post-Operative Day: 3 Days Post-Op  Subjective:     Principal Problem:Primary osteoarthritis of right knee    Principal Orthopedic Problem: S/p R TKA on 1/31/20    Interval History: Patient examined at the bedside. NAEON. Pain controlled. Tolerating PO w/out N/V and voiding appropriately. Mobilized well with PT yesterday.     Review of patient's allergies indicates:   Allergen Reactions    Penicillins Rash       Current Facility-Administered Medications   Medication    acetaminophen tablet 1,000 mg    ALPRAZolam tablet 1 mg    aspirin EC tablet 81 mg    atorvastatin tablet 10 mg    bisacodyL suppository 10 mg    celecoxib capsule 200 mg    cetirizine tablet 10 mg    ciprofloxacin HCl 0.3 % ophthalmic solution 1 drop    famotidine 40 mg/5 mL (8 mg/mL) suspension 20 mg    gabapentin capsule 800 mg    lamoTRIgine tablet 200 mg    methocarbamol tablet 500 mg    morphine 12 hr tablet 15 mg    morphine injection 2 mg    mupirocin 2 % ointment 1 g    naloxone 0.4 mg/mL injection 0.02 mg    ondansetron injection 4 mg    oxyCODONE immediate release tablet 10 mg    oxyCODONE immediate release tablet 15 mg    oxyCODONE immediate release tablet 5 mg    polyethylene glycol packet 17 g    predniSONE tablet 9 mg    primidone tablet 50 mg    promethazine (PHENERGAN) 6.25 mg in dextrose 5 % 50 mL IVPB    rOPINIRole tablet 4 mg    ropivacaine (PF) 2 mg/ml (0.2%) infusion    senna-docusate 8.6-50 mg per tablet 1 tablet     Objective:     Vital Signs (Most Recent):  Temp: 98.7 °F (37.1 °C) (02/03/20 0058)  Pulse: 100 (02/03/20 0129)  Resp: 16 (02/02/20 1953)  BP: (!) 110/57  (02/03/20 0358)  SpO2: 99 % (02/03/20 0058) Vital Signs (24h Range):  Temp:  [98.5 °F (36.9 °C)-99.2 °F (37.3 °C)] 98.7 °F (37.1 °C)  Pulse:  [] 100  Resp:  [16-18] 16  SpO2:  [93 %-100 %] 99 %  BP: ()/(48-57) 110/57     Weight: 60.3 kg (133 lb)  Height: 5' (152.4 cm)  Body mass index is 25.97 kg/m².    No intake or output data in the 24 hours ending 02/03/20 0536    Ortho/SPM Exam     AAOx4  NAD  Reg rate  No increased WOB    RLE  Dressing c/d/i  Polar ice in place  HKB locking in extension  Incisional wound vac holding suction  SILT T/SP/DP/[place]/Sa  Motor intact T/SP/DP  WWP extremities  FCDs in place and functioning      Significant Labs: All pertinent labs within the past 24 hours have been reviewed.    Significant Imaging: All pertinent imaging reviewed.    Assessment/Plan:     * Primary osteoarthritis of right knee  49 y.o. female transgender (F to M: recognizes as male) POD3 s/p R TKA    Pain control: multimodal per surgical home  PT/OT: WBAT RLE with hinged knee brace locked in extension.   DVT PPx: ASA 81mg BID, FCDs at all times when not ambulating  Abx: postop Ancef  DC incisional wound vac this AM  Continue HKB - ensure Ace wrap under HKB so it doesn't slide.  Prednisone 9mg daily     Dispo: d/c home today          Domo Wells MD  Orthopedics  [facility]

## 2020-02-03 NOTE — NURSING
Pt received dc instructions and verbalized understanding of instructions. Iv removed no irritation, pt awaiting meds from pharmacy to come up. Pt stated does not need equipment has it at home, wife at bedside.

## 2020-02-03 NOTE — PT/OT/SLP PROGRESS
"Physical Therapy Treatment    Patient Name:  Ari Santos   MRN:  [id number]    Recommendations:     Discharge Recommendations:  home health PT   Discharge Equipment Recommendations: none   Barriers to discharge: Decreased caregiver support at current functional status    Assessment:     Ari Santos is a 49 y.o. female s/p right TKA.  She presents with the following impairments/functional limitations:  weakness, gait instability, decreased ROM, impaired endurance, impaired balance, decreased lower extremity function, impaired joint extensibility, impaired muscle length, decreased safety awareness, impaired self care skills, pain, impaired skin, orthopedic precautions, impaired functional mobilty.      Patient is pleasant, cooperative, and agreeable to therapy. Patient demonstrates good motivation and functional mobility secondary to appropriate rest breaks for pain and cues for sequencing. Patient requires between SBA and CGA for transfers and ambulation secondary to impairments noted above. Patient ambulated 25 ft with rolling walker and CGA and cues for sequencing. Patient ascended 4" curb step using backwards technique with rolling walker and CGA to replicate step-up to bed; required cues for sequencing and hand/foot placement.    Rehab Prognosis: Good; patient continues to benefit from acute skilled PT services to address these deficits and reach maximum level of function.  Patient remains most appropriate to discharge to home health PT  Recent Surgery: Procedure(s) (LRB):  ARTHROPLASTY, KNEE, TOTAL (Right) 3 Days Post-Op    Plan:     During this hospitalization, patient to be seen daily to address the identified rehab impairments via gait training, therapeutic activities, therapeutic exercises, neuromuscular re-education and progress toward the following goals:    · Plan of Care Expires:  03/01/20    Subjective     Subjective: "The pain is worse when I move and walk."  Pain/Comfort:  Pain Rating 1: " "(6/10 at rest; 8/10 with weightbearing)  Location - Side 1: Right  Location - Orientation 1: generalized  Location 1: knee  Pain Addressed 1: Pre-medicate for activity, Reposition, Distraction, Cessation of Activity  Pain Rating Post-Intervention 1: (Did not rate)      Objective:     Communicated with RN prior to session.  Patient found up in chair with cryotherapy, perineural catheter upon PT entry to room.     General Precautions: Standard, fall   Orthopedic Precautions:RLE weight bearing as tolerated   Braces: Knee immobilizer     Functional Mobility:  · Transfers:     · Sit to Stand:  stand by assistance and contact guard assistance with rolling walker  · Bed to Chair: stand by assistance with  rolling walker  using  Step Transfer  · Gait: Patient ambulated ~25 ft with rolling walker and stand by assistance and contact guard assistance  · Stairs:  Pt ascended 4" curb step with Rolling Walker with no handrails with Contact Guard Assistance. Ascended backwards to replicate step-up to bed in home environment.       AM-PAC 6 CLICK MOBILITY  Turning over in bed (including adjusting bedclothes, sheets and blankets)?: 4  Sitting down on and standing up from a chair with arms (e.g., wheelchair, bedside commode, etc.): 3  Moving from lying on back to sitting on the side of the bed?: 3  Moving to and from a bed to a chair (including a wheelchair)?: 3  Need to walk in hospital room?: 3  Climbing 3-5 steps with a railing?: 3  Basic Mobility Total Score: 19       Therapeutic Activities and Exercises:  Patient continues to require intermittent cues for sequencing during ambulation and hand/foot placement with transfers. Patient with good activity tolerance, limited secondary to pain. Patient with good balance in standing and sitting edge of bed.    Gait training:  Patient required cues for position in walker, sequencing and step to gait pattern to increase independence and safety.  Patient required cues ~ 50% of the " "time.    Patient Education:    Patient educated on transfer training, Fall risk, gait training, home safety, Home exercise program (avoid exercises with knee flexion), stair training, TKA protocol, knee immobilizer adjustments, and Weight bearing restrictions by explanation, demonstration and handout.  Patient was receptive to education and verbalizes understanding.     Patient left up in chair with SCDs, cryotherapy in place, all lines intact and call button in reach.    GOALS:   Multidisciplinary Problems     Physical Therapy Goals        Problem: Physical Therapy Goal    Goal Priority Disciplines Outcome Goal Variances Interventions   Physical Therapy Goal     PT, PT/OT Ongoing, Progressing     Description:  Goals to be met by: 2020    Patient will increase functional independence with mobility by performin. Supine to sit with Set-up [place] -Met   2. Sit to supine with Set-up [place] -Met   3. Sit to stand transfer with Supervision   4. Gait  x 150 feet with Supervision using Rolling Walker.   5. Ascend/descend 6" curb step without Handrail(s) and Contact Guard Assistance using Rolling Walker.   6. Lower extremity exercise program x30 reps per handout, with independence                       Time Tracking:     PT Received On: 20  PT Start Time: 948     PT Stop Time: 1029  PT Total Time (min): 41 min     Billable Minutes: Gait Training 15 min and Therapeutic Activity 26 min    Treatment Type: Treatment  PT/PTA: PT     PTA Visit Number: 0     NIEVES Cisneros  2020    "

## 2020-02-03 NOTE — SUBJECTIVE & OBJECTIVE
Principal Problem:Primary osteoarthritis of right knee    Principal Orthopedic Problem: S/p R TKA on 1/31/20    Interval History: Patient examined at the bedside. NAEON. Pain controlled. Tolerating PO w/out N/V and voiding appropriately. Mobilized well with PT yesterday.     Review of patient's allergies indicates:   Allergen Reactions    Penicillins Rash       Current Facility-Administered Medications   Medication    acetaminophen tablet 1,000 mg    ALPRAZolam tablet 1 mg    aspirin EC tablet 81 mg    atorvastatin tablet 10 mg    bisacodyL suppository 10 mg    celecoxib capsule 200 mg    cetirizine tablet 10 mg    ciprofloxacin HCl 0.3 % ophthalmic solution 1 drop    famotidine 40 mg/5 mL (8 mg/mL) suspension 20 mg    gabapentin capsule 800 mg    lamoTRIgine tablet 200 mg    methocarbamol tablet 500 mg    morphine 12 hr tablet 15 mg    morphine injection 2 mg    mupirocin 2 % ointment 1 g    naloxone 0.4 mg/mL injection 0.02 mg    ondansetron injection 4 mg    oxyCODONE immediate release tablet 10 mg    oxyCODONE immediate release tablet 15 mg    oxyCODONE immediate release tablet 5 mg    polyethylene glycol packet 17 g    predniSONE tablet 9 mg    primidone tablet 50 mg    promethazine (PHENERGAN) 6.25 mg in dextrose 5 % 50 mL IVPB    rOPINIRole tablet 4 mg    ropivacaine (PF) 2 mg/ml (0.2%) infusion    senna-docusate 8.6-50 mg per tablet 1 tablet     Objective:     Vital Signs (Most Recent):  Temp: 98.7 °F (37.1 °C) (02/03/20 0058)  Pulse: 100 (02/03/20 0129)  Resp: 16 (02/02/20 1953)  BP: (!) 110/57 (02/03/20 0358)  SpO2: 99 % (02/03/20 0058) Vital Signs (24h Range):  Temp:  [98.5 °F (36.9 °C)-99.2 °F (37.3 °C)] 98.7 °F (37.1 °C)  Pulse:  [] 100  Resp:  [16-18] 16  SpO2:  [93 %-100 %] 99 %  BP: ()/(48-57) 110/57     Weight: 60.3 kg (133 lb)  Height: 5' (152.4 cm)  Body mass index is 25.97 kg/m².    No intake or output data in the 24 hours ending 02/03/20 0536    Ortho/SPM  Exam     AAOx4  NAD  Reg rate  No increased WOB    RLE  Dressing c/d/i  Polar ice in place  HKB locking in extension  Incisional wound vac holding suction  SILT T/SP/DP/[place]/Sa  Motor intact T/SP/DP  WWP extremities  FCDs in place and functioning      Significant Labs: All pertinent labs within the past 24 hours have been reviewed.    Significant Imaging: All pertinent imaging reviewed.

## 2020-02-03 NOTE — ASSESSMENT & PLAN NOTE
49 y.o. female transgender (F to M: recognizes as male) POD3 s/p R TKA    Pain control: multimodal per surgical home  PT/OT: WBAT RLE with hinged knee brace locked in extension.   DVT PPx: ASA 81mg BID, FCDs at all times when not ambulating  Abx: postop Ancef  DC incisional wound vac this AM  Continue HKB - ensure Ace wrap under HKB so it doesn't slide.  Prednisone 9mg daily     Dispo: d/c home today

## 2020-02-03 NOTE — DISCHARGE SUMMARY
[facility]  Orthopedics  Discharge Summary      Patient Name: Ari Santos  MRN: [id number]  Admission Date: 1/31/2020  Hospital Length of Stay: 3 days  Discharge Date and Time: 02/03/2020  Attending Physician: Donte Andrade III, MD   Discharging Provider: Domo Wells MD  Primary Care Provider: Siva Mitchell MD    HPI:   CC: Right knee pain     Ari Santos is a 49 y.o. male with longstanding history of knee issues congenital adrenal hyperplasia for which there on prednisone  Significant spinal stenosis history of radiofrequency ablation in May no surgeries.  Pain is worse with activity and weight bearing.  Patient has experienced interference of activities of daily living due to decreased range of motion and an increase in joint pain and swelling.  Patient has failed non-operative treatment including pain medication NSAIDs, injections, and activity modification.  Ari Santos currently ambulates using Rollator.      Relevant medical conditions of significance in perioperative period:  Spinal stenosis: seeing neurosurgery, RFA in May  Congenital adrenal hyperplasia: on prednisone, currently 9 mg  Chronic opioid use: currently Percocet 5 mg tid as well as morphine 15 mg qhs    Procedure(s) (LRB):  ARTHROPLASTY, KNEE, TOTAL (Right)      Hospital Course:  On 1/31/20, the patient arrived to the [facility] for proper pre-operative management.  Upon completion of pre-operative preparation, the patient was taken back to the operative theatre. Right TKA was performed without complication and the patient was transported to the post anesthesia care unit in stable condition.  After appropriate recovery from the anaesthetic agents used during the surgery, the patient was then transported to the hospital inpatient floor.  The interim of the hospital stay from arrival on the floor up to discharge has been uncomplicated. The patient has tolerated regular diet.   The patient's pain has been controlled using a multimodal approach. Currently, the patient's pain is well controlled on an oral regimen.  The patient has been voiding without difficulty.  The patient began participation in physical therapy after surgery and has progressed throughout the entire hospital stay.  Currently, the patient's progress is sufficient to allow the them to be discharged to home safely.  The patient agrees with this assessment and desires a discharge today.    Consults (From admission, onward)        Status Ordering Provider     Inpatient consult to Endocrinology  Once     Provider:  (Not yet assigned)    Completed IBETH TAYLOR     Inpatient consult to Pain Management  Once     Provider:  (Not yet assigned)    Acknowledged ELOY GOLDEN III     Inpatient consult to Respiratory Care  Once     Provider:  (Not yet assigned)    Acknowledged ELOY GOLDEN III     Inpatient consult to Social Work  Once     Provider:  (Not yet assigned)    Acknowledged ELOY GOLDEN III     Inpatient consult to [facility]  Once     Provider:  (Not yet assigned)    Acknowledged IBETH TAYLOR            Pending Diagnostic Studies:     Procedure Component Value Units Date/Time    Specimen to Pathology, Surgery Orthopedics [[id number]] Collected:  01/31/20 1058    Order Status:  Sent Lab Status:  In process Updated:  01/31/20 1235        Final Active Diagnoses:    Diagnosis Date Noted POA    PRINCIPAL PROBLEM:  Primary osteoarthritis of right knee [M17.11] 01/31/2020 Yes    Congenital adrenal hyperplasia [E25.0] 03/19/2019 Yes      Problems Resolved During this Admission:      Discharged Condition: good    Disposition: Home or Self Care    Follow Up:  Follow-up Information     Daisy Hogue PA-C. Go on 2/14/2020.    Specialty:  Orthopedic Surgery  Why:  1st post op visit  Contact information:  [address]ES[address]  [facility] [id number] [phone]                 Patient Instructions:       Activity as tolerated     Sponge bath only until clinic visit     Keep surgical extremity elevated     Lifting restrictions   Order Comments: No strenuous exercise or lifting of > 10 lbs     No driving, operating heavy equipment or signing legal documents while taking pain medication     Leave dressing on - Keep it clean, dry, and intact until clinic visit   Order Comments: Do not remove surgical dressing for 2 weeks post-op. This will be done only by MD at initial post-op visit. If dressing is completely saturated, replace with identical dressing - silver-impregnated hydrocolloid dressing.     Do not get dressings wet. Do not shower.     If dressing continues to be saturated or there are signs of infection, please call [facility] [phone] for further instructions and to make appt to be seen.     Call MD for:  temperature >100.4     Call MD for:  persistent nausea and vomiting     Call MD for:  severe uncontrolled pain     Call MD for:  difficulty breathing, headache or visual disturbances     Call MD for:  redness, tenderness, or signs of infection (pain, swelling, redness, odor or green/yellow discharge around incision site)     Call MD for:  hives     Call MD for:  persistent dizziness or light-headedness     Call MD for:  extreme fatigue     Weight bearing as tolerated   Order Comments: WBAT in HK locked in extension.     Medications:  Reconciled Home Medications:      Medication List      START taking these medications    acetaminophen 650 MG Tbsr  Commonly known as:  TYLENOL  Take 1 tablet (650 mg total) by mouth every 8 (eight) hours.     aspirin 81 MG EC tablet  Commonly known as:  ECOTRIN  Take 1 tablet (81 mg total) by mouth 2 (two) times daily.     celecoxib 200 MG capsule  Commonly known as:  CeleBREX  Take 1 capsule (200 mg total) by mouth once daily.     oxyCODONE 5 MG immediate release tablet  Commonly known as:  ROXICODONE  Take 1 tablet (5 mg total) by mouth every 6 (six) hours as needed  for Pain. May take 1-2 tablets every 6 hours as needed for pain        CHANGE how you take these medications    docusate sodium 100 MG capsule  Commonly known as:  Colace  Take 1 capsule (100 mg total) by mouth 2 (two) times daily.  What changed:    · medication strength  · how much to take  · when to take this  · reasons to take this     * predniSONE 5 MG tablet  Commonly known as:  DELTASONE  Take 1 tablet (5 mg total) by mouth once daily.  What changed:  how much to take     * predniSONE 1 MG tablet  Commonly known as:  DELTASONE  As prescribed - up to 4 mg  A day  What changed:  Another medication with the same name was changed. Make sure you understand how and when to take each.         * This list has 2 medication(s) that are the same as other medications prescribed for you. Read the directions carefully, and ask your doctor or other care provider to review them with you.            CONTINUE taking these medications    Acidophilus Cap  Generic drug:  Lactobacillus acidophilus  Take by mouth once daily.     ALPRAZolam 1 MG tablet  Commonly known as:  XANAX  Take 1 tablet (1 mg total) by mouth 3 (three) times daily as needed.     atorvastatin 10 MG tablet  Commonly known as:  LIPITOR  Take 1 tablet (10 mg total) by mouth every evening.     Claritin 10 mg tablet  Generic drug:  loratadine  Take 10 mg by mouth once daily.     CRANBERRY ORAL  Take by mouth.     cyclobenzaprine 10 MG tablet  Commonly known as:  FLEXERIL  Take 1 tablet (10 mg total) by mouth 3 (three) times daily as needed for Muscle spasms.     gabapentin 800 MG tablet  Commonly known as:  NEURONTIN  1 tablet by mouth three times a day and 2 tablets at night (5 tablets a day, 4000mg)     lamoTRIgine 200 MG tablet  Commonly known as:  LAMICTAL  Take 1 tablet (200 mg total) by mouth 2 (two) times daily.     morphine 15 MG 12 hr tablet  Commonly known as:  MS CONTIN  Take 1 tablet (15 mg total) by mouth 2 (two) times daily.     primidone 50 MG  Tab  Commonly known as:  MYSOLINE  Take 50 mg by mouth 3 (three) times daily.     rOPINIRole 4 MG tablet  Commonly known as:  REQUIP  Take 1 tablet (4 mg total) by mouth nightly.     triamcinolone acetonide 0.1% 0.1 % cream  Commonly known as:  KENALOG  Apply topically 2 (two) times daily.     zinc 50 mg Tab  once daily.        STOP taking these medications    Aleve 220 mg Cap  Generic drug:  naproxen sodium     nystatin cream  Commonly known as:  MYCOSTATIN     oxyCODONE-acetaminophen  mg per tablet  Commonly known as:  PERCOCET        ASK your doctor about these medications    testosterone 1 % (50 mg/5 gram) Glpk  Commonly known as:  ANDROGEL  Apply 5 g topically once daily.            Domo Wells MD  Orthopedics  [facility]

## 2020-02-03 NOTE — ANESTHESIA POST-OP PAIN MANAGEMENT
Acute Pain Service and Perioperative Surgical Home Progress Note    Ari Santos is a 49 y.o., female to male transgender patient (identifies as male) with PMHx of congenital adrenal hyperplasia, now with adrenal insufficiency on chronic steroids, HLD, chronic lower back pain with sciatica s/p laminectomy, achondroplasia who is now s/p right TKA.     Surgery:  Procedure(s) (LRB):  ARTHROPLASTY, KNEE, TOTAL (Right)     Post Op Day #: 4     Catheter type: Perineural Adductor Canal     Infusion type: Ropivacaine 0.2%  8 ml/hr basal  Problem List:    Active Hospital Problems    Diagnosis  POA    *Primary osteoarthritis of right knee [M17.11]  Yes    Congenital adrenal hyperplasia [E25.0]  Yes      Resolved Hospital Problems   No resolved problems to display.       Subjective:  Interval hx: NAEON.  Patient continues to frequently required PRN oxycodone for pain (5x in 24 hours.)      General appearance of alert, oriented, no complaints   Pain with rest: 5    Numbers   Pain with movement: 10    Numbers   Side Effects    1. Pruritis No    2. Nausea No    3. Motor Blockade No, 0=Ability to raise lower extremities off bed    4. Sedation No, 1=awake and alert    Objective:     Vitals   Vitals:    02/03/20 0616   BP: (!) 104/57   Pulse: 91   Resp: 16   Temp: 36.6 °C (97.9 °F)     Physical Exam   Constitutional:   Patient is a 48 yo M who appears comfortable, alert and in NAD.    HENT:   Head: Normocephalic and atraumatic.   Eyes: Pupils are equal, round, and reactive to light. EOM are normal.   Neck: Normal range of motion. Neck supple.   Cardiovascular: Normal rate, regular rhythm and normal heart sounds.   Pulmonary/Chest: Effort normal and breath sounds normal. No respiratory distress.   Abdominal: Soft. Bowel sounds are normal. She exhibits no distension. There is no tenderness.   Musculoskeletal: She exhibits no edema.   Catheter site appears to be leaking.  Catheter not fully in place.   Right leg is bandaged and  in a brace.    Skin: Skin is warm and dry.   Psychiatric: She has a normal mood and affect.     Significant Labs: All pertinent labs within the past 24 hours have been reviewed.     No results for input(s): WBC, HGB, HCT, PLT, MCV, RDW, NA, K, CL, CO2, BUN, CREATININE, GLU, PROT, ALBUMIN, BILITOT, AST, ALKPHOS, ALT in the last 168 hours.    Significant Imaging: I have reviewed all pertinent imaging results/findings within the past 24 hours.      Meds   Current Facility-Administered Medications   Medication Dose Route Frequency Provider Last Rate Last Dose    acetaminophen tablet 1,000 mg  1,000 mg Oral Q6H Donte Andrade III, MD   1,000 mg at 02/03/20 0610    ALPRAZolam tablet 1 mg  1 mg Oral TID Donte Andrade III, MD   1 mg at 02/02/20 2050    aspirin EC tablet 81 mg  81 mg Oral BID Donte Andrade III, MD   81 mg at 02/02/20 2051    atorvastatin tablet 10 mg  10 mg Oral QHS Domo Wells MD   10 mg at 02/02/20 2052    bisacodyL suppository 10 mg  10 mg Rectal Q12H PRN Donte Andrade III, MD   10 mg at 02/02/20 0636    celecoxib capsule 200 mg  200 mg Oral Daily Donte Andrade III, MD   200 mg at 02/02/20 0904    cetirizine tablet 10 mg  10 mg Oral Daily Domo Wells MD   10 mg at 02/02/20 0904    ciprofloxacin HCl 0.3 % ophthalmic solution 1 drop  1 drop Left Eye QID Domo Hirsch MD   1 drop at 02/02/20 2056    famotidine 40 mg/5 mL (8 mg/mL) suspension 20 mg  20 mg Oral BID Donte Andrade III, MD   20 mg at 02/02/20 2054    gabapentin capsule 800 mg  800 mg Oral TID Calvin Gaspar MD   800 mg at 02/02/20 2052    lamoTRIgine tablet 200 mg  200 mg Oral BID Domo Wells MD   200 mg at 02/02/20 2052    methocarbamol tablet 500 mg  500 mg Oral QID Calvin Gaspar MD   500 mg at 02/02/20 2051    morphine 12 hr tablet 15 mg  15 mg Oral BID Domo Wells MD   15 mg at 02/02/20 2054    morphine injection 2 mg  2 mg Intravenous Q3H PRN Calvin Gaspar,  MD   2 mg at 02/01/20 2327    mupirocin 2 % ointment 1 g  1 g Nasal BID Donte Andrade III, MD   1 g at 02/02/20 2055    naloxone 0.4 mg/mL injection 0.02 mg  0.02 mg Intravenous PRN Donte Andrade III, MD        ondansetron injection 4 mg  4 mg Intravenous Q8H PRN Donte Andrade III, MD        oxyCODONE immediate release tablet 10 mg  10 mg Oral Q4H PRN Calvin Gaspar MD   10 mg at 01/31/20 2340    oxyCODONE immediate release tablet 15 mg  15 mg Oral Q4H PRN Calvin Gaspar MD   15 mg at 02/03/20 0405    oxyCODONE immediate release tablet 5 mg  5 mg Oral Q4H PRN Calvin Gaspar MD        polyethylene glycol packet 17 g  17 g Oral Daily Donte Andrade III, MD   17 g at 02/02/20 0900    predniSONE tablet 9 mg  9 mg Oral Before breakfast Faith Sanchez MD   9 mg at 02/03/20 0611    primidone tablet 50 mg  50 mg Oral TID Domo Wells MD   50 mg at 02/02/20 2053    promethazine (PHENERGAN) 6.25 mg in dextrose 5 % 50 mL IVPB  6.25 mg Intravenous Q6H PRN Donte Andrade III, MD        rOPINIRole tablet 4 mg  4 mg Oral Nightly Domo Wells MD   4 mg at 02/02/20 2053    ropivacaine (PF) 2 mg/ml (0.2%) infusion  8 mL/hr Perineural Continuous Donte Andrade III, MD 8 mL/hr at 02/02/20 2049 8 mL/hr at 02/02/20 2049    senna-docusate 8.6-50 mg per tablet 1 tablet  1 tablet Oral BID Donte Andrade III, MD   1 tablet at 02/02/20 2051       Assessment:    Patient is a 50 yo M who is now POD 4 R TKA. Pain control appears adequate. He states that his pain is stable as rest but worsening up to a 10 with movement.  Patient's catheter appears to not be fully in place so he has not likely been receiving ropivacaine.   He has not noticed any worsening of his pain.     Plan:    1. Pain: Removed perineural catheter.  Continue multi modal pain control with celebrex, gabapentin and PRN oxycodone   2. Adrenal Insufficiency 2/2 to chronic steroid use: per endorine recommendations, continue  HOME dose prednisone at 9mg QD.   3. Anxiety: continue HOME Xanax PRN  4. Dispo: PT recommending outpatient PT       Evaluator Jonathan Peguero        I have reviewed and concur with the resident's history, physical, assessment, and plan.  I have personally interviewed and examined the patient at bedside.  See below addendum for my evaluation and additional findings.    Patient doing well this am though on exam of PNC, it was noted to be almost completely out due to non adherent dressing.  Patient very anxious about removing PNC but I believe PNC has been in incorrect location for several house because his pants were soaked.  PT is POD#4 and unlikely to have large spike in pain control once PNC removed. Otherwise tolerating a regular diet - vitals and labs are stable.  He worked well with PT and plans to go home today with HH And PT.  Recommended going back on home regimen of MS Contin and percocet and add extra oxycodone as written by ortho.

## 2020-02-04 ENCOUNTER — PATIENT MESSAGE (OUTPATIENT)
Dept: PAIN MEDICINE | Facility: CLINIC | Age: 50
End: 2020-02-04

## 2020-02-04 RX ORDER — MORPHINE SULFATE 15 MG/1
15 TABLET, FILM COATED, EXTENDED RELEASE ORAL 2 TIMES DAILY
Qty: 60 TABLET | Refills: 0 | Status: CANCELLED | OUTPATIENT
Start: 2020-02-04 | End: 2020-03-05

## 2020-02-04 NOTE — PT/OT/SLP DISCHARGE
Occupational Therapy Discharge Summary    Ari Santos  MRN: [id number]   Principal Problem: Primary osteoarthritis of right knee      Patient Discharged from acute Occupational Therapy on 2/3/20.  Please refer to prior OT note dated 2/2/20 for functional status.    Assessment:      Patient appropriate for care in another setting.    Objective:     GOALS:   Multidisciplinary Problems     Occupational Therapy Goals        Problem: Occupational Therapy Goal    Goal Priority Disciplines Outcome Interventions   Occupational Therapy Goal     OT, PT/OT Ongoing, Progressing    Description:  Goals to be met by: 3/1/2020    Patient will increase functional independence with ADLs by performing:    UE Dressing with Supervision.  LE Dressing with Supervision with AD as needed.  Grooming while standing at sink with Supervision.  Toileting from toilet with Supervision for hygiene and clothing management. MET 2/1 (hygiene only)  Stand pivot transfers with Supervision.  Toilet transfer to bedside commode with Supervision.                          Reasons for Discontinuation of Therapy Services  Transfer to alternate level of care.      Plan:     Patient Discharged to: Home with Home Health Service    ROSS [place]  2/4/2020

## 2020-02-05 DIAGNOSIS — G89.29 OTHER CHRONIC PAIN: Primary | ICD-10-CM

## 2020-02-05 RX ORDER — MORPHINE SULFATE 15 MG/1
15 TABLET, FILM COATED, EXTENDED RELEASE ORAL 2 TIMES DAILY
Qty: 60 TABLET | Refills: 0 | Status: CANCELLED | OUTPATIENT
Start: 2020-02-04 | End: 2020-03-05

## 2020-02-05 NOTE — PLAN OF CARE
Patient discharged home to care of family on 2/3/20.     02/05/20 0821   Final Note   Assessment Type Final Discharge Note   Anticipated Discharge Disposition Home   What phone number can be called within the next 1-3 days to see how you are doing after discharge?   ([phone])   Hospital Follow Up  Appt(s) scheduled? Yes   Discharge plans and expectations educations in teach back method with documentation complete? Yes   Right Care Referral Info   Post Acute Recommendation No Care

## 2020-02-06 RX ORDER — MORPHINE SULFATE 15 MG/1
15 TABLET, FILM COATED, EXTENDED RELEASE ORAL 2 TIMES DAILY
Qty: 60 TABLET | Refills: 0 | Status: SHIPPED | OUTPATIENT
Start: 2020-02-06 | End: 2020-02-27 | Stop reason: SDUPTHER

## 2020-02-07 ENCOUNTER — PATIENT MESSAGE (OUTPATIENT)
Dept: ADMINISTRATIVE | Facility: OTHER | Age: 50
End: 2020-02-07

## 2020-02-09 DIAGNOSIS — F43.10 PTSD (POST-TRAUMATIC STRESS DISORDER): ICD-10-CM

## 2020-02-09 RX ORDER — ALPRAZOLAM 1 MG/1
1 TABLET ORAL 3 TIMES DAILY PRN
Qty: 90 TABLET | Refills: 0 | Status: CANCELLED | OUTPATIENT
Start: 2020-02-09

## 2020-02-10 ENCOUNTER — PATIENT MESSAGE (OUTPATIENT)
Dept: ORTHOPEDICS | Facility: CLINIC | Age: 50
End: 2020-02-10

## 2020-02-10 DIAGNOSIS — F43.10 PTSD (POST-TRAUMATIC STRESS DISORDER): ICD-10-CM

## 2020-02-11 RX ORDER — ALPRAZOLAM 1 MG/1
1 TABLET ORAL 3 TIMES DAILY PRN
Qty: 90 TABLET | Refills: 0 | Status: SHIPPED | OUTPATIENT
Start: 2020-02-11 | End: 2020-03-12 | Stop reason: SDUPTHER

## 2020-02-12 ENCOUNTER — OFFICE VISIT (OUTPATIENT)
Dept: ORTHOPEDICS | Facility: CLINIC | Age: 50
End: 2020-02-12
Payer: MEDICARE

## 2020-02-12 VITALS — WEIGHT: 132.94 LBS | BODY MASS INDEX: 26.1 KG/M2 | HEIGHT: 60 IN

## 2020-02-12 DIAGNOSIS — Z96.651 STATUS POST RIGHT KNEE REPLACEMENT: Primary | ICD-10-CM

## 2020-02-12 PROCEDURE — 99024 POSTOP FOLLOW-UP VISIT: CPT | Mod: POP,,, | Performed by: NURSE PRACTITIONER

## 2020-02-12 PROCEDURE — 99999 PR PBB SHADOW E&M-EST. PATIENT-LVL III: ICD-10-PCS | Mod: PBBFAC,,, | Performed by: NURSE PRACTITIONER

## 2020-02-12 PROCEDURE — 99213 OFFICE O/P EST LOW 20 MIN: CPT | Mod: PBBFAC | Performed by: NURSE PRACTITIONER

## 2020-02-12 PROCEDURE — 99999 PR PBB SHADOW E&M-EST. PATIENT-LVL III: CPT | Mod: PBBFAC,,, | Performed by: NURSE PRACTITIONER

## 2020-02-12 PROCEDURE — 99024 PR POST-OP FOLLOW-UP VISIT: ICD-10-PCS | Mod: POP,,, | Performed by: NURSE PRACTITIONER

## 2020-02-13 LAB
FINAL PATHOLOGIC DIAGNOSIS: NORMAL
GROSS: NORMAL

## 2020-02-18 ENCOUNTER — CLINICAL SUPPORT (OUTPATIENT)
Dept: REHABILITATION | Facility: OTHER | Age: 50
End: 2020-02-18
Payer: MEDICARE

## 2020-02-18 DIAGNOSIS — M25.561 CHRONIC PAIN OF RIGHT KNEE: ICD-10-CM

## 2020-02-18 DIAGNOSIS — G89.29 CHRONIC PAIN OF RIGHT KNEE: ICD-10-CM

## 2020-02-18 DIAGNOSIS — R26.9 GAIT ABNORMALITY: ICD-10-CM

## 2020-02-18 PROCEDURE — 97110 THERAPEUTIC EXERCISES: CPT | Mod: PN | Performed by: PHYSICAL THERAPIST

## 2020-02-18 PROCEDURE — 97161 PT EVAL LOW COMPLEX 20 MIN: CPT | Mod: PN | Performed by: PHYSICAL THERAPIST

## 2020-02-18 NOTE — PLAN OF CARE
[facility] OUTPAT[facility]  Physical Therapy Initial Evaluation    Name: Ari Santos  Clinic Number: [id number]    Therapy Diagnosis:   1. Chronic pain of right knee     2. Gait abnormality       Physician: Donte Andrade III, *    Physician Orders: PT Eval and Treat   Medical Diagnosis from Referral: M17.0 (ICD-10-CM) - Arthritis of both knees   Evaluation Date: 2/18/2020  Authorization Period Expiration: 12/31/2020  Plan of Care Expiration: 4/17/2020  Visit # / Visits authorized: 1/ 20    Time In: 8:00am  Time Out: 0850am  Total Appointment Time (timed & untimed codes): 50 minutes    Precautions: Standard     Per Surgical report:   Activity will be weight bearing as tolerated in hinged knee brace locked in extension x2 weeks.   Staple removal at 2 week follow appointment, then remove brace and begin PT    Subjective   Date of onset: 1/31/2020 s/p R TKA  History of current condition - Ari reports: Having surgery last month with 2 days of PT in the hospital then discharged to home. He had home health nursing for the wound vac and was in immobilizer for 2 weeks. He reports having on a tight dressing on after surgery and some blistering occurred. He had follow up with Dr. Andrade and cleared to start outpatient PT this week. Pt is here with his wife. They are moving in a few weeks and transportation to therapy will be difficult following the move in 2-3 weeks.      Medical History:   Past Medical History:   Diagnosis Date    Adrenal insufficiency     Arthritis     Chronic bilateral low back pain with bilateral sciatica 3/19/2019    Congenital adrenal hyperplasia     Hyperlipidemia     Spinal stenosis     Spinal stenosis of lumbar region with neurogenic claudication 3/19/2019    Status post sex reassignment surgery 3/19/2019    Hx of Congenital Adrenal Hyperplasia       Surgical History:   Ari Santos  has a past surgical history that includes Radiofrequency ablation (Left, 5/9/2019);  Radiofrequency ablation (Right, 2019); Back surgery; Laminectomy (2019); Spine surgery; and Total knee arthroplasty (Right, 2020).    Medications:   Ari has a current medication list which includes the following prescription(s): acetaminophen, alprazolam, aspirin, atorvastatin, celecoxib, cranberry fruit extract, cyclobenzaprine, docusate sodium, gabapentin, lactobacillus acidophilus, lamotrigine, loratadine, morphine, oxycodone, prednisone, prednisone, primidone, ropinirole, testosterone, triamcinolone acetonide 0.1%, and zinc.    Allergies:   Review of patient's allergies indicates:   Allergen Reactions    Penicillins Rash        Imaging, xrays: Postoperative changes of right knee arthroplasty identified.  The position and alignment is satisfactory     Prior Therapy: yes, pt known to me from prehab and had acute care PT  Social History:  lives with their spouse  Occupation: artist  Prior Level of Function: walking with rollator, mod I   Current Level of Function: Walking with RW, unable to climb stairs, mod I with ADL's    Pain:  Current 5/10, worst 8/10, best 5/10   Location: right anterior knee  Description: Aching  Aggravating Factors: Bending and Walking  Easing Factors: rest, ice    Pts goals: To be able to straighten his leg all the way out and walk without the walker    Objective     Observation: incision clean and dry    Posture: Genu varus LLE      Range of Motion:   Knee Left active Right Active   Flexion 100 80   Extension -15 -10       Lower Extremity Strength  Right LE  Left LE    Knee extension: 2/5 Knee extension: 4/5   Knee flexion: 3+/5 Knee flexion: 4/5   Hip flexion: 3/5 Hip flexion: 4/5   Hip abduction: 3-/5 Hip abduction: 3+/5   Hip adduction: 3+/5 Hip adduction 4/5   Ankle dorsiflexion: 4-/5 Ankle dorsiflexion: 4+/5     SLR extension la deg       Function:  - Sit <--> Stand:mod I   - Bed Mobility: mod I  -Sit to stand 30 sec: 5x  -TU sec  -Gait: ambulating  "with RW mod I step through gait pattern, decreased stance time and heel strike R    Joint Mobility: patellar hypomobility all planes     Palpation: TTP medial hamstrings and gastroc head    Sensation: Grossly intact BLE's     Edema: 37.5 cm    Limitation/Restriction for FOTO knee Survey    Therapist reviewed FOTO scores for Ari Santos on 2/18/2020.   FOTO documents entered into EPIC - see Media section.    Limitation Score: 55%         TREATMENT   Treatment Time In: 2:30pm  Treatment Time Out: 2:50pm  Total Treatment time (time-based codes) separate from Evaluation: 20 minutes    Ari received therapeutic exercises to develop strength, endurance, ROM and flexibility for 20 minutes including:    Seated passive knee flexion over EOB 10" x 10  SAQ 2 x 10  LAQ 2 x 10  S/L hip abduction x 10  S/L hip adduction x 10  SLR x 5, stopped due to pain    Home Exercises and Patient Education Provided    Education provided:   - HEP    Written Home Exercises Provided: Patient instructed to cont prior HEP issued by acute care and issued additional handout  Exercises were reviewed and Ari was able to demonstrate them prior to the end of the session.  Ari demonstrated good  understanding of the education provided.     See EMR under Patient Instructions for exercises provided 2/18/2020.    Assessment   Ari is a 49 y.o. female referred to outpatient Physical Therapy with a medical diagnosis of knee osteoarthritis s/p R total knee replacement. Pt presents with quad atrophy, edema, antalgic gait, decreased R knee ROM, LE weakness, and decreased functional mobility. Pertinent medical history of surgical removal R rectus femoris.     Pt prognosis is Good.   Pt will benefit from skilled outpatient Physical Therapy to address the deficits stated above and in the chart below, provide pt/family education, and to maximize pt's level of independence.     Plan of care discussed with patient: Yes  Pt's spiritual, cultural and educational " "needs considered and patient is agreeable to the plan of care and goals as stated below:     Anticipated Barriers for therapy: none    Medical Necessity is demonstrated by the following  History  Co-morbidities and personal factors that may impact the plan of care Co-morbidities:   prior lumbar surgery and prior R knee surgery x 2    Personal Factors:   transportation     high   Examination  Body Structures and Functions, activity limitations and participation restrictions that may impact the plan of care Body Regions:   back  lower extremities  trunk    Body Systems:    gross symmetry  ROM  strength  balance  gait  transfers  motor control  edema  scar formation    Participation Restrictions:   Walking, transfers    Activity limitations:   Learning and applying knowledge  no deficits    General Tasks and Commands  no deficits    Communication  no deficits    Mobility  lifting and carrying objects  walking  driving (bike, car, motorcycle)    Self care  washing oneself (bathing, drying, washing hands)  looking after one's health    Domestic Life  doing house work (cleaning house, washing dishes, laundry)    Interactions/Relationships  no deficits    Life Areas  employment    Community and Social Life  recreation and leisure         high   Clinical Presentation stable and uncomplicated low   Decision Making/ Complexity Score: low     Goals:  Short term (4 weeks):  1. Pt's R knee AROM flexion to be 90 degrees or greater  2. Pt to demonstrate improved quad control as noted by SLR with <30 deg extension lag  3. Pt to have full R knee extension for improved gait quality  4. Pt to be able to ambulate with SPC household distance  5. Pt to have a TUG score of 13" or less for decreased risk for falls  Long term (8 weeks)  1. Pt to be independent with home exercise program for improved self management of condition  2. Pt to have decreased subjective report of disability as noted by <50% on FOTO knee questionnaire   3. Pt to " report 4/10 pain or less with ADL's  4. Pt to be able to ascend/ descend 1 threshold step mod I with RW  5. Pt to have 120 degrees of R knee flexion or greater for improved performance of ADL's such as donning shoes  6. Pt's R knee strength to increased 1/2 muscle grade greater for improved performance of ADL's such as transfers    Plan   Plan of care Certification: 2/18/2020 to 4/17/2020.    Outpatient Physical Therapy 3 times weekly for 4 weeks, then decrease frequency to 2 times per week for 4 weeks to include the following interventions: Gait Training, Manual Therapy, Moist Heat/ Ice, Neuromuscular Re-ed, Patient Education, Therapeutic Activites and Therapeutic Exercise.     Dayan Bruce, PT

## 2020-02-19 ENCOUNTER — CLINICAL SUPPORT (OUTPATIENT)
Dept: REHABILITATION | Facility: OTHER | Age: 50
End: 2020-02-19
Payer: MEDICARE

## 2020-02-19 DIAGNOSIS — M25.561 CHRONIC PAIN OF RIGHT KNEE: ICD-10-CM

## 2020-02-19 DIAGNOSIS — G89.29 CHRONIC PAIN OF RIGHT KNEE: ICD-10-CM

## 2020-02-19 DIAGNOSIS — R26.9 GAIT ABNORMALITY: ICD-10-CM

## 2020-02-19 PROCEDURE — 97110 THERAPEUTIC EXERCISES: CPT | Mod: PN | Performed by: PHYSICAL THERAPIST

## 2020-02-19 NOTE — PROGRESS NOTES
"  Physical Therapy Daily Treatment Note     Name: Ari Santos  Clinic Number: [id number]    Therapy Diagnosis:   Encounter Diagnoses   Name Primary?    Chronic pain of right knee     Gait abnormality      Physician: Donte Andrade III, *    Visit Date: 2/19/2020    Physician Orders: PT Eval and Treat   Medical Diagnosis from Referral: M17.0 (ICD-10-CM) - Arthritis of both knees   Evaluation Date: 2/18/2020  Authorization Period Expiration: 12/31/2020  Plan of Care Expiration: 4/17/2020  Visit # / Visits authorized: 2/ 20    Time In: 1:00pm  Time Out: 2:00pm  Total Billable Time: 30 minutes    Precautions: Standard    Subjective     Pt reports: Having a lot of pain last night.  She was compliant with home exercise program.  Response to previous treatment: none  Functional change: none    Pain: 5/10  Location: right knee      Objective     Ari received therapeutic exercises to develop strength, endurance, ROM, flexibility, posture and core stabilization for 40 minutes including:    Seated passive knee flexion over EOB 10" x 10   SAQ 2 x 10 (eccentrics with PT)  LAQ 2 x 10   Supine hip abd/ add x 20  S/L TKE's with sliding board x 20  QS 5 sec x 20  Hamstring sets x 20  Heel props x 3 min  SLR - nt    Sit to stands x 5 high mat    Ari received the following manual therapy techniques: Joint mobilizations were applied to the: patella for 3 minutes, including:  Patella mobs all planes    Ari received cold pack for 10 minutes to R knee.      Home Exercises Provided and Patient Education Provided     Education provided:   - HEP / icing     Written Home Exercises Provided: yes.  Exercises were reviewed and Ari was able to demonstrate them prior to the end of the session.  Ari demonstrated good  understanding of the education provided.     See EMR under Patient Instructions for exercises provided 2/19/2020.    Assessment     Improved knee flexion noted to 95 deg this session  Ari is progressing well towards her " "goals.   Pt prognosis is Good.     Pt will continue to benefit from skilled outpatient physical therapy to address the deficits listed in the problem list box on initial evaluation, provide pt/family education and to maximize pt's level of independence in the home and community environment.     Pt's spiritual, cultural and educational needs considered and pt agreeable to plan of care and goals.     Anticipated barriers to physical therapy: transportation,     Goals:     Short term (4 weeks):  1. Pt's R knee AROM flexion to be 90 degrees or greater- in progress, not met  2. Pt to demonstrate improved quad control as noted by SLR with <30 deg extension lag- in progress, not met  3. Pt to have full R knee extension for improved gait quality- in progress, not met  4. Pt to be able to ambulate with SPC household distance- in progress, not met  5. Pt to have a TUG score of 13" or less for decreased risk for falls- in progress, not met  Long term (8 weeks)  1. Pt to be independent with home exercise program for improved self management of condition- in progress, not met  2. Pt to have decreased subjective report of disability as noted by <50% on FOTO knee questionnaire - in progress, not met  3. Pt to report 4/10 pain or less with ADL's- in progress, not met  4. Pt to be able to ascend/ descend 1 threshold step mod I with RW- in progress, not met  5. Pt to have 120 degrees of R knee flexion or greater for improved performance of ADL's such as donning shoes- in progress, not met  6. Pt's R knee strength to increased 1/2 muscle grade greater for improved performance of ADL's such as transfers- in progress, not met    Plan     Continue with emphasis on quad strength, ROM, and gait    Dayan Bruce, PT   "

## 2020-02-20 ENCOUNTER — OFFICE VISIT (OUTPATIENT)
Dept: UROGYNECOLOGY | Facility: CLINIC | Age: 50
End: 2020-02-20
Payer: MEDICARE

## 2020-02-20 VITALS
DIASTOLIC BLOOD PRESSURE: 62 MMHG | BODY MASS INDEX: 26.49 KG/M2 | HEIGHT: 60 IN | SYSTOLIC BLOOD PRESSURE: 100 MMHG | WEIGHT: 134.94 LBS

## 2020-02-20 DIAGNOSIS — Z98.890 HISTORY OF PELVIC SURGERY: ICD-10-CM

## 2020-02-20 DIAGNOSIS — N39.41 URINARY INCONTINENCE, URGE: ICD-10-CM

## 2020-02-20 DIAGNOSIS — R18.8 PELVIC FLUID COLLECTION: Primary | ICD-10-CM

## 2020-02-20 DIAGNOSIS — R82.71 BACTERIA IN URINE: ICD-10-CM

## 2020-02-20 DIAGNOSIS — R39.9 URINARY TRACT INFECTION SYMPTOMS: ICD-10-CM

## 2020-02-20 LAB
BILIRUB SERPL-MCNC: ABNORMAL MG/DL
BLOOD URINE, POC: ABNORMAL
COLOR, POC UA: ABNORMAL
GLUCOSE UR QL STRIP: NORMAL
KETONES UR QL STRIP: ABNORMAL
LEUKOCYTE ESTERASE URINE, POC: ABNORMAL
NITRITE, POC UA: ABNORMAL
PH, POC UA: 6
PROTEIN, POC: ABNORMAL
SPECIFIC GRAVITY, POC UA: 1.01
UROBILINOGEN, POC UA: NORMAL

## 2020-02-20 PROCEDURE — 87086 URINE CULTURE/COLONY COUNT: CPT

## 2020-02-20 PROCEDURE — 99999 PR PBB SHADOW E&M-EST. PATIENT-LVL III: ICD-10-PCS | Mod: PBBFAC,,,

## 2020-02-20 PROCEDURE — 52000 CYSTOURETHROSCOPY: CPT | Mod: PBBFAC | Performed by: OBSTETRICS & GYNECOLOGY

## 2020-02-20 PROCEDURE — 99499 NO LOS: ICD-10-PCS | Mod: S$PBB,,, | Performed by: OBSTETRICS & GYNECOLOGY

## 2020-02-20 PROCEDURE — 81002 URINALYSIS NONAUTO W/O SCOPE: CPT | Mod: PBBFAC

## 2020-02-20 PROCEDURE — 99999 PR PBB SHADOW E&M-EST. PATIENT-LVL III: CPT | Mod: PBBFAC,,,

## 2020-02-20 PROCEDURE — 52000 PR CYSTOURETHROSCOPY: ICD-10-PCS | Mod: S$PBB,,, | Performed by: OBSTETRICS & GYNECOLOGY

## 2020-02-20 PROCEDURE — 99213 OFFICE O/P EST LOW 20 MIN: CPT | Mod: PBBFAC

## 2020-02-20 PROCEDURE — 87088 URINE BACTERIA CULTURE: CPT

## 2020-02-20 PROCEDURE — 52000 CYSTOURETHROSCOPY: CPT | Mod: S$PBB,,, | Performed by: OBSTETRICS & GYNECOLOGY

## 2020-02-20 PROCEDURE — 87077 CULTURE AEROBIC IDENTIFY: CPT

## 2020-02-20 PROCEDURE — 87186 SC STD MICRODIL/AGAR DIL: CPT

## 2020-02-20 PROCEDURE — 99499 UNLISTED E&M SERVICE: CPT | Mod: S$PBB,,, | Performed by: OBSTETRICS & GYNECOLOGY

## 2020-02-20 RX ORDER — CIPROFLOXACIN 500 MG/1
500 TABLET ORAL
Status: COMPLETED | OUTPATIENT
Start: 2020-02-20 | End: 2020-02-20

## 2020-02-20 RX ORDER — LIDOCAINE HYDROCHLORIDE 20 MG/ML
JELLY TOPICAL ONCE
Status: COMPLETED | OUTPATIENT
Start: 2020-02-20 | End: 2020-02-20

## 2020-02-20 RX ADMIN — CIPROFLOXACIN 500 MG: 500 TABLET ORAL at 11:02

## 2020-02-20 RX ADMIN — LIDOCAINE HYDROCHLORIDE 5 ML: 20 JELLY TOPICAL at 11:02

## 2020-02-21 ENCOUNTER — PATIENT MESSAGE (OUTPATIENT)
Dept: UROGYNECOLOGY | Facility: CLINIC | Age: 50
End: 2020-02-21

## 2020-02-21 ENCOUNTER — CLINICAL SUPPORT (OUTPATIENT)
Dept: REHABILITATION | Facility: OTHER | Age: 50
End: 2020-02-21
Payer: MEDICARE

## 2020-02-21 DIAGNOSIS — M25.561 CHRONIC PAIN OF RIGHT KNEE: ICD-10-CM

## 2020-02-21 DIAGNOSIS — R26.9 GAIT ABNORMALITY: ICD-10-CM

## 2020-02-21 DIAGNOSIS — G89.29 CHRONIC PAIN OF RIGHT KNEE: ICD-10-CM

## 2020-02-21 PROCEDURE — 97110 THERAPEUTIC EXERCISES: CPT | Mod: PN | Performed by: PHYSICAL THERAPIST

## 2020-02-21 NOTE — PROGRESS NOTES
"  Physical Therapy Daily Treatment Note     Name: Ari Santos  Clinic Number: [id number]    Therapy Diagnosis:   Encounter Diagnoses   Name Primary?    Chronic pain of right knee     Gait abnormality      Physician: Donte Andrade III, *    Visit Date: 2/21/2020    Physician Orders: PT Eval and Treat   Medical Diagnosis from Referral: M17.0 (ICD-10-CM) - Arthritis of both knees   Evaluation Date: 2/18/2020  Authorization Period Expiration: 12/31/2020  Plan of Care Expiration: 4/17/2020  Visit # / Visits authorized: 2/ 20    Time In: 1:00pm  Time Out: 2:00pm  Total Billable Time: 30 minutes    Precautions: Standard    Subjective     Pt reports: Having a lot of soreness after last session. The ice pack seems to help.   She was compliant with home exercise program.  Response to previous treatment: quad soreness  Functional change: none    Pain: 5/10  Location: right knee      Objective     R knee AROM: 0-100 deg    Ari received therapeutic exercises to develop strength, endurance, ROM, flexibility, posture and core stabilization for 40 minutes including:    Seated passive knee flexion over EOB 10" x 10   SAQ 2 x 10 (eccentrics with PT)  LAQ 2 x 10   Supine hip abd/ add x 20  S/L TKE's with sliding board x 20  QS 5 sec x 20  Hamstring sets x 20  Heel props x 3 min  SLR x 5    Gait training RW supervision    Sit to stands x 5 high mat    Standing tricep extensions OTB x 20 (SBA for balance)  Standing Row OTB x 20 (SBA for balance)    Weight shifting // bars- nv    Ari received the following manual therapy techniques: Joint mobilizations were applied to the: patella for 3 minutes, including:  Patella mobs all planes    Ari received cold pack for 10 minutes to R knee.      Home Exercises Provided and Patient Education Provided     Education provided:   - HEP / icing     Written Home Exercises Provided: yes.  Exercises were reviewed and Ari was able to demonstrate them prior to the end of the session.  Ari " "demonstrated good  understanding of the education provided.     See EMR under Patient Instructions for exercises provided 2/19/2020.    Assessment     Improved knee flexion and full knee extension this session. Short term ROM goals met. Continues with R quad weakness and SLR extension lag.   Ari is progressing well towards her goals.   Pt prognosis is Good.     Pt will continue to benefit from skilled outpatient physical therapy to address the deficits listed in the problem list box on initial evaluation, provide pt/family education and to maximize pt's level of independence in the home and community environment.     Pt's spiritual, cultural and educational needs considered and pt agreeable to plan of care and goals.     Anticipated barriers to physical therapy: transportation,     Goals:     Short term (4 weeks):  1. Pt's R knee AROM flexion to be 90 degrees or greater- met 2/21/2020  2. Pt to demonstrate improved quad control as noted by SLR with <30 deg extension lag- in progress, not met  3. Pt to have full R knee extension for improved gait quality- met 2/21/2020  4. Pt to be able to ambulate with SPC household distance- in progress, not met  5. Pt to have a TUG score of 13" or less for decreased risk for falls- in progress, not met  Long term (8 weeks)  1. Pt to be independent with home exercise program for improved self management of condition- in progress, not met  2. Pt to have decreased subjective report of disability as noted by <50% on FOTO knee questionnaire - in progress, not met  3. Pt to report 4/10 pain or less with ADL's- in progress, not met  4. Pt to be able to ascend/ descend 1 threshold step mod I with RW- in progress, not met  5. Pt to have 120 degrees of R knee flexion or greater for improved performance of ADL's such as donning shoes- in progress, not met  6. Pt's R knee strength to increased 1/2 muscle grade greater for improved performance of ADL's such as transfers- in progress, not " met    Plan     Continue with emphasis on quad strength, ROM, and gait    Dayan Bruce, PT

## 2020-02-22 LAB — BACTERIA UR CULT: ABNORMAL

## 2020-02-23 ENCOUNTER — PATIENT MESSAGE (OUTPATIENT)
Dept: PAIN MEDICINE | Facility: CLINIC | Age: 50
End: 2020-02-23

## 2020-02-23 DIAGNOSIS — N39.0 ACUTE UTI: Primary | ICD-10-CM

## 2020-02-23 RX ORDER — SULFAMETHOXAZOLE AND TRIMETHOPRIM 800; 160 MG/1; MG/1
1 TABLET ORAL 2 TIMES DAILY
Qty: 20 TABLET | Refills: 0 | Status: SHIPPED | OUTPATIENT
Start: 2020-02-23 | End: 2020-02-27

## 2020-02-23 NOTE — PROGRESS NOTES
"Title of Operation:   Cystourethroscopy.     INDICATIONS:  Goes by "Ari":  Uses male pronouns.      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      Referred by Dr. Svia Mitchell. Per his last note: managed with daily prednisone. Had surgical correction around age 1 to create vagina, multiple subsequent procedures. Had breast implantation at age 15 and starting around age 16 started having corrective procedures including removal of breast implants and corrective genital procedures.     Moved to [place] in 2019 from [place].     1)  Congenital Adrenal Hyperplasia: Per patient, genetic testing was female. Surgery 3/4 months after to remove penis (not born with testicles) and created a vagina. Raised female. At 15 had breast implants, has since been removed.  At 15 also another genital surgery--removed labial tissue (had a lot of erectile tissue and was getting tissue engorgement).  S[facility] 2003 ([facility] [place], [phone]) , small incision (unsure if had ovaries but was told everything removed). Started to have some issues with recurrent UTIs.  Was told urethra was routed incorrectly and urine was trapping.  2004: URO rerouted urethra, removed cervix.  (URO [facility] in [place], Dr. Andrade: [phone] ). Was trying to reroute urethra so could stand to urinate. No urethra is more at SP area.  Vagina was closed--small pinpoint left. Also has some hair coming from this orifice.  Started testosterone in 2004--no issues.  --had laminectomy (Bilateral L1, L2, L3, L4, L5, and S1 laminectomies, medial facetectomies and foraminotomies for decompression) 9/2019--had indwelling catheter until POD#2--has had UI since then, improving; Spontaneous UI.  Was not having before surgery.  Wears 4-5 PPD, moderate wetness. Thinks urine leaks from urethra when he has " orgasm but not other times  --no C/D, FI  --before laminectomy had L sided pubic/vulvar numbness--was present before surgery     --Goal is to have phalloplasty vs metoidioplasty: main goal is to stand up while urinating.      --Patient has had significant weight loss, s/p implants and removal.  Patient is unhappy with reconstructive surgery of breasts due to excess tissue.     2)  Erectile tissue does not work anymore:  --originally located B just superior to previous vaginal orifice  --states has small orifice    Prednisone 10mg in the Am and 5mg at night  Testosterone 1 packet a day.     --initial UG exam:  PELVIC:    External genitalia:  Normal Bartholins, Skenes and labia bilaterally.  Bilateral labia not fused superiorly.  Fleshy, erectile tissue around urethra (superior most aspect of mons).  Bilateral labia majora present.  Small dimple present at previous vaginal area--does not track with gentle exploration with os finder.   Urethra:  No caruncle, diverticulum or masses. Able to pass 14F catheter easily--needs to pass 7-8 cm before urine expelled (increased urethral length)?    Internal: deferred, as reports MAUREEN/BSO with vaginal closure  RECTAL:    External:  Normal, (--) hemorrhoids, (--) dovetailing.   Internal:   (--) tenderness, (--) masses, Normal resting tone, Normal active tone.    PELVIC IMAGIN) CT A/P 2019:  Fluid distention of the endometrial cavity, suggesting cervical stenosis which can be seen with chronic inflammation/fibrosis, neoplastic process not excluded.  Clinical correlation recommended.  Bladder is distended with associated fat stranding, suggesting inflammatory/infectious cystitis versus bladder outlet obstruction.  Additional clinical history reports history of hysterectomy and suspected vaginal closure.  Aforementioned fluid attenuation within the pelvis posterior to the bladder is likely contained within a distended vaginal remnant and is likely chronic in nature.  Noting  absent operative details, note is made that fluid may be extending into a residual cervix if supracervical hysterectomy technique used.  2)  Attempted percutaneous drainage of fluid pocket 1/2020:  Percutaneous attempted aspiration of pelvic collection.  No fluid could be obtained given that the collection was near completely resolved.  No drainage catheter was left in place.    PREOPERATIVE DIAGNOSIS  1. Urinary incontinence, urge    2. Chronic pain due to trauma    3. Spinal stenosis of lumbar region with neurogenic claudication    4. PTSD (post-traumatic stress disorder)    5. Status post sex reassignment surgery    6. Congenital adrenal hyperplasia    7. Fibromyalgia    8. Multiple closed traumatic fractures of multiple bones of hip and pelvis with routine healing, subsequent encounter    9. Abnormal CT scan, pelvis    10. Erectile disorder, generalized, mild      POSTOPERATIVE DIAGNOSIS:   1. Urinary incontinence, urge    2. Chronic pain due to trauma    3. Spinal stenosis of lumbar region with neurogenic claudication    4. PTSD (post-traumatic stress disorder)    5. Status post sex reassignment surgery    6. Congenital adrenal hyperplasia    7. Fibromyalgia    8. Multiple closed traumatic fractures of multiple bones of hip and pelvis with routine healing, subsequent encounter    9. Abnormal CT scan, pelvis    10. Erectile disorder, generalized, mild    11.    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    Specimen (Bacteriological, Pathological or other):   None.     Prosthetic Device/Implant:   None.     Surgeons Narrative:               After informed consent was obtained, the patient was placed in the lithotomy position. The urethral meatus was prepped with Betadine and 10 cubic centimeters of 2% Xylocaine gel were introduced into the urethra. A flexible cystourethroscope was introduced into the bladder. The bladder was distended with approximately 300 cubic  centimeters of sterile water. A systematic survey was performed in which the bladder was surveyed using multiple sequential passes in a clockwise fashion from the bladder dome to the bladder base to the urethrovesical junction. The trigone and ureteral orifices were observed. The scope was then flipped back on itself, and the urethrovesical junction was viewed. A vaginal examining finger was then placed with pressure suburethrally at the urethrovesical junction as the telescope was withdrawn in order to perform positive pressure urethroscopy.  Standard maneuvers of cough, squeeze and Valsalva were performed. The telescope was then completely withdrawn.       Findings: Urethroscopy:  Abnormal. Urethra is ~7-8 cm in length. Posterior diverticular ostia noted at 6 o'clock, 2-3 cm caudal of the UVJ.  Able to pass scope through area and explore diverticular pouch.  Pouch is large but may actually be previous vagina that failed to close when posterior urethral defect formed vs actual diverticula.  On retraction of the scope, there is another smaller defect at 6 o'clock, ~1 cm caudal of the larger ostia.  Unable to pass scope through this, and it's uncertain if it connects to the vaginal vs diverticular space.  Cystoscopy:  Normal bladder mucosa, bilateral ureteral flow was noted.     Assessment: Normal cystoscopy but urethroscopy concerning for posterior fistula to old vaginal space vs diverticula.      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

## 2020-02-24 ENCOUNTER — CLINICAL SUPPORT (OUTPATIENT)
Dept: REHABILITATION | Facility: OTHER | Age: 50
End: 2020-02-24
Payer: MEDICARE

## 2020-02-24 ENCOUNTER — TELEPHONE (OUTPATIENT)
Dept: UROGYNECOLOGY | Facility: CLINIC | Age: 50
End: 2020-02-24

## 2020-02-24 DIAGNOSIS — R26.9 GAIT ABNORMALITY: ICD-10-CM

## 2020-02-24 DIAGNOSIS — G89.4 CHRONIC PAIN SYNDROME: Primary | ICD-10-CM

## 2020-02-24 DIAGNOSIS — M25.561 CHRONIC PAIN OF RIGHT KNEE: ICD-10-CM

## 2020-02-24 DIAGNOSIS — G89.29 CHRONIC PAIN OF RIGHT KNEE: ICD-10-CM

## 2020-02-24 PROCEDURE — 97110 THERAPEUTIC EXERCISES: CPT | Mod: PN | Performed by: PHYSICAL THERAPIST

## 2020-02-24 NOTE — TELEPHONE ENCOUNTER
Attempted to contact pt to inform him that his urine culture was positive for UTI and an RX for bactrim was sent to [facility] to  and start. Pt was also informed on his voicemail that I will send a message through [place].

## 2020-02-24 NOTE — PROGRESS NOTES
"  Physical Therapy Daily Treatment Note     Name: Ari aSntos  Clinic Number: [id number]    Therapy Diagnosis:   Encounter Diagnoses   Name Primary?    Chronic pain of right knee     Gait abnormality      Physician: Donte Andrade III, *    Visit Date: 2/24/2020    Physician Orders: PT Eval and Treat   Medical Diagnosis from Referral: M17.0 (ICD-10-CM) - Arthritis of both knees   Evaluation Date: 2/18/2020  Authorization Period Expiration: 12/31/2020  Plan of Care Expiration: 4/17/2020  Visit # / Visits authorized: 3/ 20    Time In: 11:00am  Time Out: 12:00pm  Total Billable Time: 30 minutes    Precautions: Standard    Subjective     Pt reports: pain decreased to 4/10 during waking hours and most of his pain is at night. The ice machine has been helping.   She was compliant with home exercise program.  Response to previous treatment: quad soreness  Functional change: none    Pain: 4/10  Location: right knee      Objective     R knee AROM: 0-100 deg    Ari received therapeutic exercises to develop strength, endurance, ROM, flexibility, posture and core stabilization for 40 minutes including:    Seated passive knee flexion over EOB 10" x 10   SAQ 2 x 10 (eccentrics with PT)  LAQ 2 x 10   Supine hip abd/ add x 20  S/L TKE's with sliding board x 20  QS 5 sec x 20  Hamstring sets x 20  Heel props x 3 min  SLR x 5  HL hip adduction x 20    Gait training RW supervision    Sit to stands x 5 high mat    Recumbent bike seat 1 x 5 min      Not today:  Standing tricep extensions OTB x 20 (SBA for balance)  Standing Row OTB x 20 (SBA for balance)    Weight shifting // bars- nv    Ari received the following manual therapy techniques: Joint mobilizations were applied to the: patella for 3 minutes, including:  Patella mobs all planes    Ari received cold pack for 10 minutes to R knee.      Home Exercises Provided and Patient Education Provided     Education provided:   - HEP / icing     Written Home Exercises Provided: " "yes.  Exercises were reviewed and Ari was able to demonstrate them prior to the end of the session.  Ari demonstrated good  understanding of the education provided.     See EMR under Patient Instructions for exercises provided 2/19/2020.    Assessment     Improved knee flexion and able to complete full revolutions on bike.   Ari is progressing well towards her goals.   Pt prognosis is Good.     Pt will continue to benefit from skilled outpatient physical therapy to address the deficits listed in the problem list box on initial evaluation, provide pt/family education and to maximize pt's level of independence in the home and community environment.     Pt's spiritual, cultural and educational needs considered and pt agreeable to plan of care and goals.     Anticipated barriers to physical therapy: transportation,     Goals:     Short term (4 weeks):  1. Pt's R knee AROM flexion to be 90 degrees or greater- met 2/21/2020  2. Pt to demonstrate improved quad control as noted by SLR with <30 deg extension lag- in progress, not met  3. Pt to have full R knee extension for improved gait quality- met 2/21/2020  4. Pt to be able to ambulate with SPC household distance- in progress, not met  5. Pt to have a TUG score of 13" or less for decreased risk for falls- in progress, not met  Long term (8 weeks)  1. Pt to be independent with home exercise program for improved self management of condition- in progress, not met  2. Pt to have decreased subjective report of disability as noted by <50% on FOTO knee questionnaire - in progress, not met  3. Pt to report 4/10 pain or less with ADL's- in progress, not met  4. Pt to be able to ascend/ descend 1 threshold step mod I with RW- in progress, not met  5. Pt to have 120 degrees of R knee flexion or greater for improved performance of ADL's such as donning shoes- in progress, not met  6. Pt's R knee strength to increased 1/2 muscle grade greater for improved performance of ADL's such as " transfers- in progress, not met    Plan     Continue with emphasis on quad strength, ROM, and gait    Dayan Bruce, PT

## 2020-02-26 ENCOUNTER — TELEPHONE (OUTPATIENT)
Dept: PAIN MEDICINE | Facility: CLINIC | Age: 50
End: 2020-02-26

## 2020-02-26 ENCOUNTER — CLINICAL SUPPORT (OUTPATIENT)
Dept: REHABILITATION | Facility: OTHER | Age: 50
End: 2020-02-26
Payer: MEDICARE

## 2020-02-26 ENCOUNTER — PATIENT OUTREACH (OUTPATIENT)
Dept: ADMINISTRATIVE | Facility: OTHER | Age: 50
End: 2020-02-26

## 2020-02-26 DIAGNOSIS — Z87.890 STATUS POST SEX REASSIGNMENT SURGERY: ICD-10-CM

## 2020-02-26 DIAGNOSIS — M25.561 CHRONIC PAIN OF RIGHT KNEE: ICD-10-CM

## 2020-02-26 DIAGNOSIS — R26.9 GAIT ABNORMALITY: ICD-10-CM

## 2020-02-26 DIAGNOSIS — G89.29 CHRONIC PAIN OF RIGHT KNEE: ICD-10-CM

## 2020-02-26 PROCEDURE — 97110 THERAPEUTIC EXERCISES: CPT | Mod: PN | Performed by: PHYSICAL THERAPIST

## 2020-02-26 RX ORDER — OXYCODONE AND ACETAMINOPHEN 10; 325 MG/1; MG/1
1 TABLET ORAL 3 TIMES DAILY PRN
Qty: 90 TABLET | Refills: 0 | OUTPATIENT
Start: 2020-02-26

## 2020-02-26 RX ORDER — TESTOSTERONE GEL, 1% 10 MG/G
1 GEL TRANSDERMAL DAILY
Qty: 30 PACKET | Refills: 4 | Status: SHIPPED | OUTPATIENT
Start: 2020-02-26 | End: 2020-09-15 | Stop reason: SDUPTHER

## 2020-02-26 NOTE — PROGRESS NOTES
Chart reviewed.   Immunizations: Triggered Imm Registry     Orders placed: n/a  Upcoming appts to satisfy ARJUN topics: n/a

## 2020-02-26 NOTE — TELEPHONE ENCOUNTER
My name is Staff, I am contacting you from [facility] regarding your appointment scheduled for , with , just confirming you will be able to make it.    If you feel you need to reschedule or canceled please give our office a call so we can better assist you.      Staff requesting patient to arrive 15 mins ahead of schedule appointment time.    Spoke with pt wife regarding scheduled appt on 02-27-20. Pt wife verbalized understanding and confirmed appt for 8:40 AM

## 2020-02-26 NOTE — PROGRESS NOTES
"  Physical Therapy Daily Treatment Note     Name: Ari Santos  Clinic Number: [id number]    Therapy Diagnosis:   Encounter Diagnoses   Name Primary?    Chronic pain of right knee     Gait abnormality      Physician: Donte Andrade III, *    Visit Date: 2/26/2020    Physician Orders: PT Eval and Treat   Medical Diagnosis from Referral: M17.0 (ICD-10-CM) - Arthritis of both knees   Evaluation Date: 2/18/2020  Authorization Period Expiration: 12/31/2020  Plan of Care Expiration: 4/17/2020  Visit # / Visits authorized: 4/ 20    Time In: 3:00pm  Time Out: 4:00pm  Total Billable Time: 40 minutes    Precautions: Standard    Subjective     Pt reports: pain decreased to 4/10 during waking hours and most of his pain is at night. The ice machine has been helping.   She was compliant with home exercise program.  Response to previous treatment: quad soreness  Functional change: none    Pain: 4/10  Location: right knee      Objective     R knee AROM: 0-100 deg    Ari received therapeutic exercises to develop strength, endurance, ROM, flexibility, posture and core stabilization for 40 minutes including:    Seated passive knee flexion over EOB 10" x 10   SAQ 2 x 10 (eccentrics with PT)  LAQ 2 x 10   Supine hip abd/ add x 20  S/L TKE's with sliding board x 20  QS 5 sec x 20  Hamstring sets x 20  Heel props x 3 min  SLR x 5  HL hip adduction x 20    Gait training RW supervision  Weight shifting // bars- x 3 min    Sit to stands x 5 high mat    Recumbent bike seat 2 x 5 min      Not today:  Standing tricep extensions OTB x 20 (SBA for balance)  Standing Row OTB x 20 (SBA for balance)      Ari received the following manual therapy techniques: Joint mobilizations were applied to the: patella for 3 minutes, including:  Patella mobs all planes    Ari received cold pack for 10 minutes to R knee.      Home Exercises Provided and Patient Education Provided     Education provided:   - HEP / icing     Written Home Exercises Provided: " "yes.  Exercises were reviewed and Ari was able to demonstrate them prior to the end of the session.  Ari demonstrated good  understanding of the education provided.     See EMR under Patient Instructions for exercises provided 2/19/2020.    Assessment     Limited performance in standing gait and balance training with exacerbation of low back pain. Frequent cueing for upright trunk and avoidance of scapula elevation with increased use of UE's on walker.   Ari is progressing well towards her goals.   Pt prognosis is Good.     Pt will continue to benefit from skilled outpatient physical therapy to address the deficits listed in the problem list box on initial evaluation, provide pt/family education and to maximize pt's level of independence in the home and community environment.     Pt's spiritual, cultural and educational needs considered and pt agreeable to plan of care and goals.     Anticipated barriers to physical therapy: transportation,     Goals:     Short term (4 weeks):  1. Pt's R knee AROM flexion to be 90 degrees or greater- met 2/21/2020  2. Pt to demonstrate improved quad control as noted by SLR with <30 deg extension lag- in progress, not met  3. Pt to have full R knee extension for improved gait quality- met 2/21/2020  4. Pt to be able to ambulate with SPC household distance- in progress, not met  5. Pt to have a TUG score of 13" or less for decreased risk for falls- in progress, not met  Long term (8 weeks)  1. Pt to be independent with home exercise program for improved self management of condition- in progress, not met  2. Pt to have decreased subjective report of disability as noted by <50% on FOTO knee questionnaire - in progress, not met  3. Pt to report 4/10 pain or less with ADL's- in progress, not met  4. Pt to be able to ascend/ descend 1 threshold step mod I with RW- in progress, not met  5. Pt to have 120 degrees of R knee flexion or greater for improved performance of ADL's such as donning " shoes- in progress, not met  6. Pt's R knee strength to increased 1/2 muscle grade greater for improved performance of ADL's such as transfers- in progress, not met    Plan     Continue with emphasis on quad strength, ROM, and gait    Dayan Bruce, PT

## 2020-02-27 ENCOUNTER — LAB VISIT (OUTPATIENT)
Dept: LAB | Facility: OTHER | Age: 50
End: 2020-02-27
Attending: INTERNAL MEDICINE
Payer: MEDICARE

## 2020-02-27 ENCOUNTER — OFFICE VISIT (OUTPATIENT)
Dept: PAIN MEDICINE | Facility: CLINIC | Age: 50
End: 2020-02-27
Payer: MEDICARE

## 2020-02-27 ENCOUNTER — TELEPHONE (OUTPATIENT)
Dept: UROGYNECOLOGY | Facility: CLINIC | Age: 50
End: 2020-02-27

## 2020-02-27 ENCOUNTER — PATIENT MESSAGE (OUTPATIENT)
Dept: UROGYNECOLOGY | Facility: CLINIC | Age: 50
End: 2020-02-27

## 2020-02-27 VITALS
SYSTOLIC BLOOD PRESSURE: 119 MMHG | HEART RATE: 112 BPM | RESPIRATION RATE: 18 BRPM | DIASTOLIC BLOOD PRESSURE: 68 MMHG | TEMPERATURE: 97 F | WEIGHT: 315 LBS | HEIGHT: 60 IN | BODY MASS INDEX: 61.84 KG/M2

## 2020-02-27 DIAGNOSIS — M62.838 MUSCLE SPASM: ICD-10-CM

## 2020-02-27 DIAGNOSIS — M54.12 CERVICAL RADICULOPATHY: ICD-10-CM

## 2020-02-27 DIAGNOSIS — N39.0 ACUTE UTI: Primary | ICD-10-CM

## 2020-02-27 DIAGNOSIS — M47.816 LUMBAR SPONDYLOSIS: ICD-10-CM

## 2020-02-27 DIAGNOSIS — E25.0 CONGENITAL ADRENAL HYPERPLASIA: ICD-10-CM

## 2020-02-27 DIAGNOSIS — M79.2 NEUROPATHIC PAIN: ICD-10-CM

## 2020-02-27 DIAGNOSIS — R73.02 IGT (IMPAIRED GLUCOSE TOLERANCE): ICD-10-CM

## 2020-02-27 DIAGNOSIS — R79.89 ABNORMAL THYROID BLOOD TEST: ICD-10-CM

## 2020-02-27 DIAGNOSIS — E27.40 ADRENAL INSUFFICIENCY: ICD-10-CM

## 2020-02-27 DIAGNOSIS — G89.4 CHRONIC PAIN SYNDROME: Primary | ICD-10-CM

## 2020-02-27 DIAGNOSIS — Z79.891 ENCOUNTER FOR LONG-TERM OPIATE ANALGESIC USE: ICD-10-CM

## 2020-02-27 DIAGNOSIS — Z98.890 S/P LUMBAR LAMINECTOMY: ICD-10-CM

## 2020-02-27 DIAGNOSIS — G89.29 OTHER CHRONIC PAIN: ICD-10-CM

## 2020-02-27 LAB
ALBUMIN SERPL BCP-MCNC: 3.8 G/DL (ref 3.5–5.2)
ALP SERPL-CCNC: 620 U/L (ref 55–135)
ALT SERPL W/O P-5'-P-CCNC: 120 U/L (ref 10–44)
ANION GAP SERPL CALC-SCNC: 9 MMOL/L (ref 8–16)
AST SERPL-CCNC: 89 U/L (ref 10–40)
BILIRUB SERPL-MCNC: 0.9 MG/DL (ref 0.1–1)
BUN SERPL-MCNC: 16 MG/DL (ref 6–20)
CALCIUM SERPL-MCNC: 9.7 MG/DL (ref 8.7–10.5)
CHLORIDE SERPL-SCNC: 102 MMOL/L (ref 95–110)
CO2 SERPL-SCNC: 26 MMOL/L (ref 23–29)
CORTIS SERPL-MCNC: 3.8 UG/DL (ref 4.3–22.4)
CREAT SERPL-MCNC: 0.7 MG/DL (ref 0.5–1.4)
DHEA-S SERPL-MCNC: 4.3 UG/DL (ref 56.2–282.9)
ERYTHROCYTE [DISTWIDTH] IN BLOOD BY AUTOMATED COUNT: 14.7 % (ref 11.5–14.5)
EST. GFR  (AFRICAN AMERICAN): >60 ML/MIN/1.73 M^2
EST. GFR  (NON AFRICAN AMERICAN): >60 ML/MIN/1.73 M^2
ESTIMATED AVG GLUCOSE: 103 MG/DL (ref 68–131)
GLUCOSE SERPL-MCNC: 91 MG/DL (ref 70–110)
HBA1C MFR BLD HPLC: 5.2 % (ref 4–5.6)
HCT VFR BLD AUTO: 36.5 % (ref 37–48.5)
HGB BLD-MCNC: 11.7 G/DL (ref 12–16)
MCH RBC QN AUTO: 30.2 PG (ref 27–31)
MCHC RBC AUTO-ENTMCNC: 32.1 G/DL (ref 32–36)
MCV RBC AUTO: 94 FL (ref 82–98)
PLATELET # BLD AUTO: 345 K/UL (ref 150–350)
PMV BLD AUTO: 9.2 FL (ref 9.2–12.9)
POTASSIUM SERPL-SCNC: 3.9 MMOL/L (ref 3.5–5.1)
PROT SERPL-MCNC: 7 G/DL (ref 6–8.4)
RBC # BLD AUTO: 3.87 M/UL (ref 4–5.4)
SODIUM SERPL-SCNC: 137 MMOL/L (ref 136–145)
T3 SERPL-MCNC: 88 NG/DL (ref 60–180)
TESTOST SERPL-MCNC: 441 NG/DL (ref 5–73)
TSH SERPL DL<=0.005 MIU/L-ACNC: 1.52 UIU/ML (ref 0.4–4)
WBC # BLD AUTO: 7.2 K/UL (ref 3.9–12.7)

## 2020-02-27 PROCEDURE — 83498 ASY HYDROXYPROGESTERONE 17-D: CPT

## 2020-02-27 PROCEDURE — 84403 ASSAY OF TOTAL TESTOSTERONE: CPT

## 2020-02-27 PROCEDURE — 99999 PR PBB SHADOW E&M-EST. PATIENT-LVL III: CPT | Mod: PBBFAC,,, | Performed by: NURSE PRACTITIONER

## 2020-02-27 PROCEDURE — 83036 HEMOGLOBIN GLYCOSYLATED A1C: CPT

## 2020-02-27 PROCEDURE — 99999 PR PBB SHADOW E&M-EST. PATIENT-LVL III: ICD-10-PCS | Mod: PBBFAC,,, | Performed by: NURSE PRACTITIONER

## 2020-02-27 PROCEDURE — 99213 OFFICE O/P EST LOW 20 MIN: CPT | Mod: PBBFAC | Performed by: NURSE PRACTITIONER

## 2020-02-27 PROCEDURE — 82627 DEHYDROEPIANDROSTERONE: CPT

## 2020-02-27 PROCEDURE — 84443 ASSAY THYROID STIM HORMONE: CPT

## 2020-02-27 PROCEDURE — 84480 ASSAY TRIIODOTHYRONINE (T3): CPT

## 2020-02-27 PROCEDURE — 99214 OFFICE O/P EST MOD 30 MIN: CPT | Mod: S$PBB,,, | Performed by: NURSE PRACTITIONER

## 2020-02-27 PROCEDURE — 85027 COMPLETE CBC AUTOMATED: CPT

## 2020-02-27 PROCEDURE — 99214 PR OFFICE/OUTPT VISIT, EST, LEVL IV, 30-39 MIN: ICD-10-PCS | Mod: S$PBB,,, | Performed by: NURSE PRACTITIONER

## 2020-02-27 PROCEDURE — 82157 ASSAY OF ANDROSTENEDIONE: CPT

## 2020-02-27 PROCEDURE — 80053 COMPREHEN METABOLIC PANEL: CPT

## 2020-02-27 PROCEDURE — 36415 COLL VENOUS BLD VENIPUNCTURE: CPT

## 2020-02-27 PROCEDURE — 82533 TOTAL CORTISOL: CPT

## 2020-02-27 PROCEDURE — 80307 DRUG TEST PRSMV CHEM ANLYZR: CPT

## 2020-02-27 PROCEDURE — 82024 ASSAY OF ACTH: CPT

## 2020-02-27 PROCEDURE — 82088 ASSAY OF ALDOSTERONE: CPT

## 2020-02-27 RX ORDER — OXYCODONE AND ACETAMINOPHEN 10; 325 MG/1; MG/1
1 TABLET ORAL EVERY 8 HOURS PRN
Qty: 90 TABLET | Refills: 0 | Status: SHIPPED | OUTPATIENT
Start: 2020-02-27 | End: 2020-03-28

## 2020-02-27 RX ORDER — CYCLOBENZAPRINE HCL 10 MG
10 TABLET ORAL 3 TIMES DAILY PRN
Qty: 90 TABLET | Refills: 0 | Status: SHIPPED | OUTPATIENT
Start: 2020-02-27 | End: 2020-03-22 | Stop reason: SDUPTHER

## 2020-02-27 RX ORDER — GABAPENTIN 800 MG/1
TABLET ORAL
Qty: 150 TABLET | Refills: 2 | Status: SHIPPED | OUTPATIENT
Start: 2020-02-27 | End: 2020-05-27 | Stop reason: SDUPTHER

## 2020-02-27 RX ORDER — CIPROFLOXACIN 500 MG/1
500 TABLET ORAL 2 TIMES DAILY
Qty: 14 TABLET | Refills: 0 | Status: SHIPPED | OUTPATIENT
Start: 2020-02-27 | End: 2020-03-05

## 2020-02-27 RX ORDER — MORPHINE SULFATE 15 MG/1
15 TABLET, FILM COATED, EXTENDED RELEASE ORAL 2 TIMES DAILY
Qty: 60 TABLET | Refills: 0 | Status: SHIPPED | OUTPATIENT
Start: 2020-03-06 | End: 2020-04-13 | Stop reason: SDUPTHER

## 2020-02-27 NOTE — PROGRESS NOTES
Chronic Pain - Follow up    Referring Physician: No ref. provider found    Chief Complaint:   Chief Complaint   Patient presents with    Low-back Pain        SUBJECTIVE: Disclaimer: This note has been generated using voice-recognition software. There may be typographical errors that have been missed during proof-reading    Interval History 2/27/2020:  The patient is here for follow up of chronic pain.  Since last visit, he underwent right TKA by Dr. Andrade on 1/31/20.  He reports that he is recovering well.  He is no longer taking post op pain medications.  He takes his MS Contin 1-2 times per day.  He says he was unaware to take it scheduled.  He taking Percocet PRN.  He needs a refill of the Percocet today.  He denies any major adverse effects.  He does report that he fell last week onto his tailbone.  He has been using a donut pillow when sitting.  His pain today is 6/10.    Interval history 11/18/2019:  Since previous encounter the patient is status post lumbar laminectomy decompression from L1-S1 in September and has healed well subsequently and has undergone physical therapy.  He denies radicular symptoms to his lower extremities at this time but is having severe knee pain and is being evaluated for knee replacement to be done in January.  Otherwise the patient has been stable and has been utilizing his opioid medications appropriately he is taking MS Contin 15 mg b.i.d. along with oxycodone acetaminophen 10/325 t.i.d. p.r.n. without any side effects or evidence of misuse or abuse.  The patient also has been taking gabapentin 4000 mg per day but continues to have radicular pain symptoms in his upper extremities which was thought to be predominantly carpal tunnel he had an EMG/NCV with Neurology which showed a bilateral carpal tunnel with concomitant C7 radiculopathy.  Previous MRI of the cervical spine did reveal stenosis at C5-6 and C6-7 with moderate neuroforaminal stenosis.    Interval History  8/21/19:  Patient reports for follow up. He has seen neurosurgery and is scheduled for  Open L1-S1 laminectomy. He has also seen orthopedics for his bilateral knee pain. They have planned for knee braces after completion of his spinal surgery.  Patient reports continued back pain.  He is s/p RFA of bilateral L3,4,5 in 5/9/19 and 5/23/19 with 60% relief in his pain for 1 week.  Patient reports continued back pain, sharp, starts in his lower back and radiates to his feet. Patient also reports worsening of the neuropathic pain in the palms of his hands, burning, tingling pain worse at night.    Interval History 6/20/2019:  The patient is here for follow up of lower back pain.  He is s/p lumbar RFAs.  He is reporting minimal benefit of pain.  He feels as though previous RFAs from another provider were helpful.  However, he is reporting pain across the lower back with radiation down the back of both legs.  We previously discussed a surgical referral and he would like to pursue this option.  He has been taking Percocet 5/325 mg TID as well as oxycodone 15 mg for severe breakthrough pain.  He feels as though the 15 mg make him hyper and unable to sleep.  He would like to adjust the medications.  Additionally, he was weaning Gabapentin 800 mg and is now taking it BID.  However, he feels as though his shooting pain has worsened since this.  His pain today is 6/10.    Interval History 4/12/2019:  The patient returns for follow up of back pain.  We have received his records including previous lumbar and MRI.  There is no NCS/EMG report, however.  His records indicate lumbar RFAs over 6 months ago.  He reports that this was helpful for him until recently.  He had a left synovial cyst aspiration and L5-S1 TF MAYURI in the past as well.  He feels as though RFA was more helpful.  Additionally, he had an updated lumbar MRI since previous visit which does show significant arthritis and spinal stenosis.  He would like to hold off on  surgical consult at this time.  He has decreased Gabapentin to 800 mg TID and has not noticed a change in pain.  He takes Percocet 5/325 mg TID PRN pain.  He also takes oxycodone 15 mg PRN, about 2-3 pills per week for severe pain.  This was a one time refill from Dr. Mitchell with intention of transition to our office.  He denies any bowel or bladder changes.  His pain today is 6/10.    Initial encounter:    Ari Santos presents to the clinic for the evaluation of lower back pain. The pain started 9 year ago starting indiously and symptoms have been worsening.    Brief history:  History of spinal stenosis and history of a cyst with drainage.    Patient has a pain management physician in [facility]    Pain Description:    The pain is located in the lower back area and radiates to the lower extremities bilaterally in the L5 distribution.      At BEST  5/10     At WORST  10/10 on the WORST day.      On average pain is rated as 7/10.     Today the pain is rated as 7/10    The pain is described as sharp and intermittent      Symptoms interfere with daily activity and sleeping.     Exacerbating factors: Standing, Walking, Morning and Getting out of bed/chair.      Mitigating factors medications, physical therapy and rest.     Patient reports significant motor weakness and loss of sensations.  Patient denies any suicidal or homicidal ideations    Pain Medications:  Current:  Flexeril 10mg TID  Gabapentin 800mg QID  Lamictal 200mg qHS  Percocet 10/325  TID PRN  Xanax 1mg for 1 - 3 times/day  ropinrole 4mg  MS Contin 15 mg Q12    Tried in Past:  NSAIDs -with some relief  TCA -Never  SNRI -venlafaxine for depression -with side effects  Anti-convulsants -gabapentin     Physical Therapy/Home Exercise: yes completed last year with limited benefit     report:  Reviewed and consistent with medication use as prescribed.    Pain Procedures: previous RFA and MAYURI  5/9/19 Left L3,4,5 RFA  5/23/19 Right L3,4,5 RFA-  50-60% reduction for one week    Chiropractor -helps in the past  Acupuncture - never  TENS unit -helps occasionally  Spinal decompression lumbar decompressive surgery from L1-S1 September 2019  Joint replacement - Right TKA 1/31/20    Imaging:     Narrative     EXAMINATION:  MRI LUMBAR SPINE WITHOUT CONTRAST    CLINICAL HISTORY:  bilateral lower extremity radiculopathy and weakness in the L4/5 distribution with neurogenic claudication;  Spinal stenosis, lumbar region with neurogenic claudication    TECHNIQUE:  Sagittal T1, T2 and STIR images as well as axial T1 and T2 weighted images were obtained through the lumbar without the administration of contrast.    COMPARISON:  None    FINDINGS:  Alignment: There loss of the normal lumbar lordosis.  Minimal grade 1 anterolisthesis of L3 on L4 and L4 on L5.  There may also be minimal retrolisthesis of L2 as relates to L3.    Vertebrae: The vertebral bodies maintain normal height and signal intensity.    Discs:  Multilevel, advanced loss of disc height is noted throughout the lumbar spine with disc desiccation.    Cord: Normal signal.  The conus terminates at the T12-L1 level.    Degenerative findings:    T12-L1: There is a minimal diffuse disc bulge with no facet arthropathy or ligamentum flavum hypertrophy.  The central canal and neural foramen are patent.    L1-L2:There is a large diffuse disc bulge within no significant facet arthropathy.  Ligamentum flavum hypertrophy is present.  There effacement of the anterior thecal sleeve and mild central canal stenosis as well as moderate to severe right and severe left neural foraminal stenosis.    L2-L3: There is a 6 mm cystic structure in the posterior left aspect of the central canal at the level of the midbody of L2.  This effaces the anterior thecal sleeve and occupies a portion of the left neural foramen.  Additionally, there is a large diffuse disc bulge as well as severe bilateral facet arthropathy at L2-L3.  No  significant ligamentum flavum hypertrophy.  Moderate central canal stenosis and mild bilateral neural foraminal stenosis is present.    L3-L4: There is a large diffuse disc bulge with severe bilateral facet arthropathy.  No significant ligamentum flavum hypertrophy there are congenitally shortened pedicles.  This results in moderate central canal stenosis, mild left and moderate right neural foraminal stenosis.    L4-L5: There is a large diffuse disc bulge with severe bilateral facet arthropathy and mild ligamentum flavum hypertrophy.  These findings result in severe central canal stenosis and left neural foraminal stenosis as well as moderate to severe right neural foraminal stenosis.    L5-S1: There is a mild diffuse disc bulge with severe right and moderate to severe left neural foraminal stenosis.  Ligamentum flavum hypertrophy is present    Paraspinal muscles & soft tissues: Normal.    Incidental findings:    -there is a small amount of fluid in the left sacroiliac joint    -2.6 cm T2 hyperintense, T1 hypointense rounded structure in the interpolar region of the left kidney    -2.3 cm rounded, circumscribed, uniformly T2 hyperintense, T1 hypointense focus emanating from the lateral limb of the left adrenal gland    -1.1 cm, heterogeneously T2 hyperintense focus emanating from the junction of the anterior and medial limb of the right adrenal gland      Impression       1. Minimal grade 1 anterolisthesis of L3 on L4 and L4 on L5 with minimal grade 1 retrolisthesis of L2 on L3.  2. Multilevel degenerative disc and joint disease resulting in severe central canal and neural foraminal stenosis at several levels.  3. Finding on the left kidney likely represents a Bosniak category 2 cyst.  4. Indeterminate bilateral adrenal gland nodules.  Further evaluation of these nodules as well as the left kidney finding can be performed with dedicated adrenal CT or MRI.  5.  This report was flagged in Epic as abnormal.          Past Medical History:   Diagnosis Date    Adrenal insufficiency     Arthritis     Chronic bilateral low back pain with bilateral sciatica 3/19/2019    Congenital adrenal hyperplasia     Hyperlipidemia     Spinal stenosis     Spinal stenosis of lumbar region with neurogenic claudication 3/19/2019    Status post sex reassignment surgery 3/19/2019    Hx of Congenital Adrenal Hyperplasia     Past Surgical History:   Procedure Laterality Date    BACK SURGERY      LAMINECTOMY  2019    RADIOFREQUENCY ABLATION Left 2019    Procedure: RADIOFREQUENCY ABLATION, LEFT L3,4,5;  Surgeon: Messi Cabello MD;  Location: [facility];  Service: Pain Management;  Laterality: Left;  1 of 2    RADIOFREQUENCY ABLATION Right 2019    Procedure: RADIOFREQUENCY ABLATION, RIGHT L3,4,5;  Surgeon: Messi Cabello MD;  Location: [facility];  Service: Pain Management;  Laterality: Right;  2of 2    SPINE SURGERY      2019     TOTAL KNEE ARTHROPLASTY Right 2020    Procedure: ARTHROPLASTY, KNEE, TOTAL;  Surgeon: Donte Andrade III, MD;  Location: [facility] OR [address] [place] [address];  Service: Orthopedics;  Laterality: Right;     Social History     Socioeconomic History    Marital status:      Spouse name: Not on file    Number of children: Not on file    Years of education: Not on file    Highest education level: Not on file   Occupational History    Not on file   Social Needs    Financial resource strain: Not very hard    Food insecurity:     Worry: Never true     Inability: Never true    Transportation needs:     Medical: No     Non-medical: No   Tobacco Use    Smoking status: Former Smoker     Last attempt to quit: 2009     Years since quittin.1    Smokeless tobacco: Never Used    Tobacco comment: Socailly   Substance and Sexual Activity    Alcohol use: Not Currently     Frequency: Never     Binge frequency: Never    Drug use: Never    Sexual activity: Yes     Partners: Female    Lifestyle    Physical activity:     Days per week: 3 days     Minutes per session: 20 min    Stress: To some extent   Relationships    Social connections:     Talks on phone: More than three times a week     Gets together: Once a week     Attends [employer] service: Not on file     Active member of club or organization: No     Attends meetings of clubs or organizations: Never     Relationship status:    Other Topics Concern    Not on file   Social History Narrative    Not on file     Family History   Problem Relation Age of Onset    Diabetes Maternal Grandfather     Breast cancer Neg Hx     Ovarian cancer Neg Hx     Vaginal cancer Neg Hx     Endometrial cancer Neg Hx     Cervical cancer Neg Hx        Review of patient's allergies indicates:   Allergen Reactions    Penicillins Rash       Current Outpatient Medications   Medication Sig    acetaminophen (TYLENOL) 650 MG TbSR Take 1 tablet (650 mg total) by mouth every 8 (eight) hours.    ALPRAZolam (XANAX) 1 MG tablet Take 1 tablet (1 mg total) by mouth 3 (three) times daily as needed.    aspirin (ECOTRIN) 81 MG EC tablet Take 1 tablet (81 mg total) by mouth 2 (two) times daily.    atorvastatin (LIPITOR) 10 MG tablet Take 1 tablet (10 mg total) by mouth every evening.    celecoxib (CELEBREX) 200 MG capsule Take 1 capsule (200 mg total) by mouth once daily.    cranberry fruit extract (CRANBERRY ORAL) Take by mouth.    cyclobenzaprine (FLEXERIL) 10 MG tablet Take 1 tablet (10 mg total) by mouth 3 (three) times daily as needed for Muscle spasms.    docusate sodium (COLACE) 100 MG capsule Take 1 capsule (100 mg total) by mouth 2 (two) times daily.    gabapentin (NEURONTIN) 800 MG tablet 1 tablet by mouth three times a day and 2 tablets at night (5 tablets a day, 4000mg)    Lactobacillus acidophilus (ACIDOPHILUS) Cap Take by mouth once daily.     lamoTRIgine (LAMICTAL) 200 MG tablet Take 1 tablet (200 mg total) by mouth 2 (two) times daily.     loratadine (CLARITIN) 10 mg tablet Take 10 mg by mouth once daily.     morphine (MS CONTIN) 15 MG 12 hr tablet Take 1 tablet (15 mg total) by mouth 2 (two) times daily.    predniSONE (DELTASONE) 1 MG tablet As prescribed - up to 4 mg  A day    predniSONE (DELTASONE) 5 MG tablet Take 1 tablet (5 mg total) by mouth once daily. (Patient taking differently: Take 10 mg by mouth once daily. )    primidone (MYSOLINE) 50 MG Tab Take 50 mg by mouth 3 (three) times daily.     rOPINIRole (REQUIP) 4 MG tablet Take 1 tablet (4 mg total) by mouth nightly.    sulfamethoxazole-trimethoprim 800-160mg (BACTRIM DS) 800-160 mg Tab Take 1 tablet by mouth 2 (two) times daily. for 10 days    testosterone (ANDROGEL) 1 % (50 mg/5 gram) GlPk Apply 5 grams topically once daily.    zinc 50 mg Tab once daily.     oxyCODONE (ROXICODONE) 5 MG immediate release tablet Take 1 tablet (5 mg total) by mouth every 6 (six) hours as needed for Pain. May take 1-2 tablets every 6 hours as needed for pain (Patient not taking: Reported on 2/27/2020)    triamcinolone acetonide 0.1% (KENALOG) 0.1 % cream Apply topically 2 (two) times daily. (Patient not taking: Reported on 2/20/2020)     No current facility-administered medications for this visit.        REVIEW OF SYSTEMS:    GENERAL:  No weight loss, malaise or fevers.  HEENT:   No recent changes in vision or hearing  NECK:  Negative for lumps, no difficulty with swallowing.  RESPIRATORY:  Negative for cough, wheezing or shortness of breath, patient denies any recent URI.  CARDIOVASCULAR:  Negative for chest pain, leg swelling or palpitations.  GI:  Negative for abdominal discomfort, blood in stools or black stools or change in bowel habits.  MUSCULOSKELETAL:  See HPI.  SKIN:  Negative for lesions, rash, and itching.  PSYCH:  Significant psychosocial stressors including PTSD for sex re-assignment surgery.  Patient's sleep is disturbed secondary to pain.  HEMATOLOGY/LYMPHOLOGY:  Negative for  prolonged bleeding, bruising easily or swollen nodes.  Patient is not currently taking any anti-coagulants  ENDO: No history of diabetes or thyroid dysfunction  NEURO:   No history of headaches, syncope, paralysis, seizures or tremors.  All other reviewed and negative other than HPI.    OBJECTIVE:    /68   Pulse (!) 112   Temp 97 °F (36.1 °C) (Oral)   Resp 18   Ht 5' (1.524 m)   Wt (!) 620.4 kg (1367 lb 11.2 oz)   .11 kg/m²     PHYSICAL EXAMINATION:    GENERAL: Well appearing, in no acute distress, alert and oriented x3.  PSYCH:  Mood and affect appropriate.  SKIN:  Well-healed incisions without any evidence of infection  HEAD/FACE:  Normocephalic, atraumatic.   CV: RRR with palpation of the radial artery.  PULM: No evidence of respiratory difficulty, symmetric chest rise.  EXT:  Decreased range of motion in the right knee. Well healing scar to right knee from recent TKA.  BACK:  There is significant pain with palpation over the facet joints of the lumbar spine bilaterally. There is decreased range of motion with extension to 15 degrees, and facet loading maneuvers cause reproducible pain.    NEURO:  No clonus.  Cranial nerves grossly intact  GAIT: Antalgic.    Lab Results   Component Value Date    WBC 7.34 01/09/2020    HGB 14.7 01/09/2020    HCT 45.5 01/09/2020    MCV 94 01/09/2020     (H) 01/09/2020     CMP  Sodium   Date Value Ref Range Status   10/28/2019 138 136 - 145 mmol/L Final     Potassium   Date Value Ref Range Status   10/28/2019 4.9 3.5 - 5.1 mmol/L Final     Chloride   Date Value Ref Range Status   10/28/2019 103 95 - 110 mmol/L Final     CO2   Date Value Ref Range Status   10/28/2019 27 23 - 29 mmol/L Final     Glucose   Date Value Ref Range Status   10/28/2019 88 70 - 110 mg/dL Final     BUN, Bld   Date Value Ref Range Status   10/28/2019 10 6 - 20 mg/dL Final     Creatinine   Date Value Ref Range Status   10/28/2019 0.7 0.5 - 1.4 mg/dL Final     Calcium   Date Value Ref  Range Status   10/28/2019 10.4 8.7 - 10.5 mg/dL Final     Total Protein   Date Value Ref Range Status   10/28/2019 7.1 6.0 - 8.4 g/dL Final     Albumin   Date Value Ref Range Status   10/28/2019 4.2 3.5 - 5.2 g/dL Final     Total Bilirubin   Date Value Ref Range Status   10/28/2019 0.3 0.1 - 1.0 mg/dL Final     Comment:     For infants and newborns, interpretation of results should be based  on gestational age, weight and in agreement with clinical  observations.  Premature Infant recommended reference ranges:  Up to 24 hours.............<8.0 mg/dL  Up to 48 hours............<12.0 mg/dL  3-5 days..................<15.0 mg/dL  6-29 days.................<15.0 mg/dL       Alkaline Phosphatase   Date Value Ref Range Status   10/28/2019 605 (H) 55 - 135 U/L Final     AST   Date Value Ref Range Status   10/28/2019 62 (H) 10 - 40 U/L Final     ALT   Date Value Ref Range Status   10/28/2019 70 (H) 10 - 44 U/L Final     Anion Gap   Date Value Ref Range Status   10/28/2019 8 8 - 16 mmol/L Final     eGFR if    Date Value Ref Range Status   10/28/2019 >60 >60 mL/min/1.73 m^2 Final     eGFR if non    Date Value Ref Range Status   10/28/2019 >60 >60 mL/min/1.73 m^2 Final     Comment:     Calculation used to obtain the estimated glomerular filtration  rate (eGFR) is the CKD-EPI equation.        Lab Results   Component Value Date    HGBA1C 5.8 (H) 03/18/2019     Lab Results   Component Value Date    TSH 0.397 (L) 05/06/2019     Free T4 - 0.81 (wnl)      ASSESSMENT: 49 y.o. year old female with pain, consistent with     Encounter Diagnoses   Name Primary?    Chronic pain syndrome Yes    Cervical radiculopathy     S/P lumbar laminectomy     Lumbar spondylosis     Neuropathic pain     Encounter for long-term opiate analgesic use     Muscle spasm        PLAN:     - Previous imaging was reviewed and discussed with the patient today.    - Continue to follow up with orthopedics.  Continue with  PT.    - In the future the patient may benefit from medial branch nerve blocks followed by radiofrequency ablation of the lumbar spine    - Continue Gabapentin.    - Continue oxycodone acetaminophen 10/325 mg TID PRN, #90.  Will send refill today.    - Continue MS Contin 15 mg Q12h.  He was advised to take regularly but can take Percocet PRN.    - The patient is here today for a refill of current pain medications and they believe these provide effective pain control and improvements in quality of life.  The patient notes no serious side effects, and feels the benefits outweigh the risks.  The patient was reminded of the pain contract that they signed previously as well as the risks and benefits of the medication including possible death.  The updated [place] Board [employer] prescription monitoring program was reviewed, and the patient has been found to be compliant with current treatment plan.    - Pain has filled contract.  UDS performed today.    - RTC in 3 months.      The above plan and management options were discussed at length with patient. Patient is in agreement with the above and verbalized understanding.      Alejandra Phoenix  02/27/2020

## 2020-02-27 NOTE — TELEPHONE ENCOUNTER
Patient having some itching with Bactrim, relieved with benadryl. No hives or N/V/F/C.  Patient's partner contacted--please let patient know: stop bactrim. Start cipro x 7 days.  Rx sent to [facility] pharmacy.

## 2020-02-27 NOTE — TELEPHONE ENCOUNTER
Spoke to Kristy, pt's wife, she states Ari had taken to doses of  Bactrim, one dose last night at dinner and one dose this morning and one hour after each dose he started itching severely, but was relived after taking  Benadryl.  Kristy was inform per Dr. Richard, another antibiotic will be called into her pharmacy. Kristy is aware and verbalizes understanding.

## 2020-02-28 ENCOUNTER — CLINICAL SUPPORT (OUTPATIENT)
Dept: REHABILITATION | Facility: OTHER | Age: 50
End: 2020-02-28
Payer: MEDICARE

## 2020-02-28 DIAGNOSIS — G89.29 CHRONIC PAIN OF RIGHT KNEE: ICD-10-CM

## 2020-02-28 DIAGNOSIS — M25.561 CHRONIC PAIN OF RIGHT KNEE: ICD-10-CM

## 2020-02-28 DIAGNOSIS — R26.9 GAIT ABNORMALITY: ICD-10-CM

## 2020-02-28 LAB — ACTH PLAS-MCNC: 638 PG/ML (ref 0–46)

## 2020-02-28 PROCEDURE — 97110 THERAPEUTIC EXERCISES: CPT | Mod: PN | Performed by: PHYSICAL THERAPIST

## 2020-02-29 LAB — RENIN PLAS-CCNC: 21 NG/ML/H

## 2020-03-01 ENCOUNTER — PATIENT MESSAGE (OUTPATIENT)
Dept: ENDOCRINOLOGY | Facility: CLINIC | Age: 50
End: 2020-03-01

## 2020-03-01 ENCOUNTER — PATIENT MESSAGE (OUTPATIENT)
Dept: ORTHOPEDICS | Facility: CLINIC | Age: 50
End: 2020-03-01

## 2020-03-02 LAB
17OHP SERPL-MCNC: ABNORMAL NG/DL (ref 32–272)
6MAM UR QL: NOT DETECTED
7AMINOCLONAZEPAM UR QL: NOT DETECTED
A-OH ALPRAZ UR QL: NOT DETECTED
ALDOST SERPL-MCNC: 31.4 NG/DL
ALPRAZ UR QL: PRESENT
AMPHET UR QL SCN: NOT DETECTED
ANNOTATION COMMENT IMP: NORMAL
ANNOTATION COMMENT IMP: NORMAL
BARBITURATES UR QL: PRESENT
BUPRENORPHINE UR QL: NOT DETECTED
BZE UR QL: NOT DETECTED
CARBOXYTHC UR QL: NOT DETECTED
CARISOPRODOL UR QL: NOT DETECTED
CLONAZEPAM UR QL: NOT DETECTED
CODEINE UR QL: NOT DETECTED
CREAT UR-MCNC: 58.9 MG/DL (ref 20–400)
DIAZEPAM UR QL: NOT DETECTED
ETHYL GLUCURONIDE UR QL: NOT DETECTED
FENTANYL UR QL: NOT DETECTED
HYDROCODONE UR QL: NOT DETECTED
HYDROMORPHONE UR QL: NOT DETECTED
LORAZEPAM UR QL: NOT DETECTED
MDA UR QL: NOT DETECTED
MDEA UR QL: NOT DETECTED
MDMA UR QL: NOT DETECTED
ME-PHENIDATE UR QL: NOT DETECTED
MEPERIDINE UR QL: NOT DETECTED
METHADONE UR QL: NOT DETECTED
METHAMPHET UR QL: NOT DETECTED
MIDAZOLAM UR QL SCN: NOT DETECTED
MORPHINE UR QL: PRESENT
NORBUPRENORPHINE UR QL CFM: NOT DETECTED
NORDIAZEPAM UR QL: NOT DETECTED
NORFENTANYL UR QL: NOT DETECTED
NORHYDROCODONE UR QL CFM: NOT DETECTED
NOROXYCODONE UR QL CFM: PRESENT
NOROXYMORPHONE: NOT DETECTED
OXAZEPAM UR QL: NOT DETECTED
OXYCODONE UR QL: PRESENT
OXYMORPHONE UR QL: NOT DETECTED
PATHOLOGY STUDY: NORMAL
PCP UR QL: NOT DETECTED
PHENTERMINE UR QL: NOT DETECTED
PROPOXYPH UR QL: NOT DETECTED
SERVICE CMNT-IMP: NORMAL
TAPENTADOL UR QL SCN: NOT DETECTED
TAPENTADOL-O-SULF: NOT DETECTED
TEMAZEPAM UR QL: NOT DETECTED
TRAMADOL UR QL: NOT DETECTED
ZOLPIDEM UR QL: NOT DETECTED

## 2020-03-03 ENCOUNTER — CLINICAL SUPPORT (OUTPATIENT)
Dept: REHABILITATION | Facility: OTHER | Age: 50
End: 2020-03-03
Payer: MEDICARE

## 2020-03-03 DIAGNOSIS — M25.561 CHRONIC PAIN OF RIGHT KNEE: ICD-10-CM

## 2020-03-03 DIAGNOSIS — R26.9 GAIT ABNORMALITY: ICD-10-CM

## 2020-03-03 DIAGNOSIS — G89.29 CHRONIC PAIN OF RIGHT KNEE: ICD-10-CM

## 2020-03-03 PROCEDURE — 97110 THERAPEUTIC EXERCISES: CPT | Mod: PN | Performed by: PHYSICAL THERAPIST

## 2020-03-03 NOTE — PROGRESS NOTES
"  Physical Therapy Daily Treatment Note     Name: Ari Santos  Clinic Number: [id number]    Therapy Diagnosis:   Encounter Diagnoses   Name Primary?    Chronic pain of right knee     Gait abnormality      Physician: Donte Andrade III, *    Visit Date: 3/3/2020    Physician Orders: PT Eval and Treat   Medical Diagnosis from Referral: M17.0 (ICD-10-CM) - Arthritis of both knees   Evaluation Date: 2020  Authorization Period Expiration: 2020  Plan of Care Expiration: 2020  Visit # / Visits authorized:     Time In: 3:00 pm  Time Out: 3:50pm  Total Billable Time: 25 minutes    Precautions: Standard    Subjective     Pt reports: brining his cane from home today to practice. No issues after last session   She was compliant with home exercise program.  Response to previous treatment: none  Functional change: none    Pain: 4/10  Location: right knee      Objective     R knee AROM: 0-100 deg    SLR extension la deg    Ari received therapeutic exercises to develop strength, endurance, ROM, flexibility, posture and core stabilization for 40 minutes including:    Seated passive knee flexion over EOB 10" x 10   SAQ 2 x 10 (eccentrics with PT)  LAQ 2 x 10   QS 5 sec x 20  bridging x 20  Heel props x 3 min- nt  SLR x 5  HL hip adduction x 20 with ball  HL clams OTB x 20    Gait training SPC 2 x 80 ft SBA   Weight shifting // bars- x 3 min no UE support    Sit to stands x 5 high mat    Seated shoulder extensions OTB x 20   seated Row OTB x 20       Ari received the following manual therapy techniques: Joint mobilizations were applied to the: patella for 0 minutes, including:  Patella mobs all planes    Ari received cold pack for 00 minutes to R knee.- deferred      Home Exercises Provided and Patient Education Provided     Education provided:   - HEP / icing     Written Home Exercises Provided: yes.  Exercises were reviewed and Ari was able to demonstrate them prior to the end of the session.  Ari " "demonstrated good  understanding of the education provided.     See EMR under Patient Instructions for exercises provided 2/19/2020.    Assessment     Pt demonstrating improved static standing balance without UE support. Improved level of independence with SBA gait training this session and recommending practice with household distances when wife present.   Ari is progressing well towards her goals.   Pt prognosis is Good.     Pt will continue to benefit from skilled outpatient physical therapy to address the deficits listed in the problem list box on initial evaluation, provide pt/family education and to maximize pt's level of independence in the home and community environment.     Pt's spiritual, cultural and educational needs considered and pt agreeable to plan of care and goals.     Anticipated barriers to physical therapy: transportation,     Goals:     Short term (4 weeks):  1. Pt's R knee AROM flexion to be 90 degrees or greater- met 2/21/2020  2. Pt to demonstrate improved quad control as noted by SLR with <30 deg extension lag- in progress, not met  3. Pt to have full R knee extension for improved gait quality- met 2/21/2020  4. Pt to be able to ambulate with SPC household distance- in progress, not met  5. Pt to have a TUG score of 13" or less for decreased risk for falls- in progress, not met  Long term (8 weeks)  1. Pt to be independent with home exercise program for improved self management of condition- in progress, not met  2. Pt to have decreased subjective report of disability as noted by <50% on FOTO knee questionnaire - in progress, not met  3. Pt to report 4/10 pain or less with ADL's- in progress, not met  4. Pt to be able to ascend/ descend 1 threshold step mod I with RW- in progress, not met  5. Pt to have 120 degrees of R knee flexion or greater for improved performance of ADL's such as donning shoes- in progress, not met  6. Pt's R knee strength to increased 1/2 muscle grade greater for " improved performance of ADL's such as transfers- in progress, not met    Plan     Continue with emphasis on quad strength, ROM, and gait    Dayan Bruce, PT

## 2020-03-04 ENCOUNTER — PATIENT MESSAGE (OUTPATIENT)
Dept: ORTHOPEDICS | Facility: CLINIC | Age: 50
End: 2020-03-04

## 2020-03-04 ENCOUNTER — TELEPHONE (OUTPATIENT)
Dept: ORTHOPEDICS | Facility: CLINIC | Age: 50
End: 2020-03-04

## 2020-03-04 ENCOUNTER — PATIENT OUTREACH (OUTPATIENT)
Dept: ADMINISTRATIVE | Facility: OTHER | Age: 50
End: 2020-03-04

## 2020-03-04 LAB — ANDROST SERPL-MCNC: 111 NG/DL

## 2020-03-04 NOTE — TELEPHONE ENCOUNTER
I called the patient today regarding their appointment. I left a message for the patient to call me back. I left my name and phone number.

## 2020-03-05 ENCOUNTER — OFFICE VISIT (OUTPATIENT)
Dept: ENDOCRINOLOGY | Facility: CLINIC | Age: 50
End: 2020-03-05
Payer: MEDICARE

## 2020-03-05 ENCOUNTER — TELEPHONE (OUTPATIENT)
Dept: ORTHOPEDICS | Facility: CLINIC | Age: 50
End: 2020-03-05

## 2020-03-05 VITALS
HEART RATE: 100 BPM | DIASTOLIC BLOOD PRESSURE: 88 MMHG | BODY MASS INDEX: 26.92 KG/M2 | SYSTOLIC BLOOD PRESSURE: 110 MMHG | WEIGHT: 137.13 LBS | HEIGHT: 60 IN

## 2020-03-05 DIAGNOSIS — R74.8 ABNORMAL LIVER ENZYMES: ICD-10-CM

## 2020-03-05 DIAGNOSIS — E27.40 ADRENAL INSUFFICIENCY: ICD-10-CM

## 2020-03-05 DIAGNOSIS — E25.0 CONGENITAL ADRENAL HYPERPLASIA: Primary | ICD-10-CM

## 2020-03-05 DIAGNOSIS — R73.02 IGT (IMPAIRED GLUCOSE TOLERANCE): ICD-10-CM

## 2020-03-05 DIAGNOSIS — E24.2 DRUG-INDUCED CUSHING'S SYNDROME: ICD-10-CM

## 2020-03-05 PROCEDURE — 99999 PR PBB SHADOW E&M-EST. PATIENT-LVL IV: CPT | Mod: PBBFAC,,, | Performed by: INTERNAL MEDICINE

## 2020-03-05 PROCEDURE — 99999 PR PBB SHADOW E&M-EST. PATIENT-LVL IV: ICD-10-PCS | Mod: PBBFAC,,, | Performed by: INTERNAL MEDICINE

## 2020-03-05 PROCEDURE — 99214 PR OFFICE/OUTPT VISIT, EST, LEVL IV, 30-39 MIN: ICD-10-PCS | Mod: S$PBB,,, | Performed by: INTERNAL MEDICINE

## 2020-03-05 PROCEDURE — 99214 OFFICE O/P EST MOD 30 MIN: CPT | Mod: PBBFAC | Performed by: INTERNAL MEDICINE

## 2020-03-05 PROCEDURE — 99214 OFFICE O/P EST MOD 30 MIN: CPT | Mod: S$PBB,,, | Performed by: INTERNAL MEDICINE

## 2020-03-05 RX ORDER — PREDNISONE 10 MG/1
10 TABLET ORAL DAILY
Qty: 100 TABLET | Refills: 5 | Status: SHIPPED | OUTPATIENT
Start: 2020-03-05 | End: 2020-12-16 | Stop reason: SDUPTHER

## 2020-03-05 NOTE — TELEPHONE ENCOUNTER
I called the patient today regarding their appointment on Friday March 13 with Dr. Andrade. I left a message for the patient to call me back. I left my name and phone number.

## 2020-03-05 NOTE — ASSESSMENT & PLAN NOTE
cah with AI  Goal is to get him on the lowest dose pred that he can tolerate   For now continue 10 mg a day    Needs medic alert tag   Reviewed sick day precautions - handout provided  Do not need to normalize ACTH, 17 oh p or androgens

## 2020-03-05 NOTE — PROGRESS NOTES
Subjective:      Patient ID: Ari Santos is a 49 y.o. female.    Chief Complaint:  Congenital Adrenal Hyperplasia and Gender dysphoria    History of Present Illness  Since last visit had TKR     Ari Santos is here for follow up of CAH.       Raised female.     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     Saw Dr Taylor and this was very traumatic for him but he appreciated her kindness      Per her exam:  PELVIC:    External genitalia:  Normal Bartholins, Skenes and labia bilaterally.  Bilateral labia not fused superiorly.  Fleshy, erectile tissue around urethra (superior most aspect of mons).  Bilateral labia majora present.  Small dimple present at previous vaginal area--does not track with gentle exploration with os finder.   Urethra:  No caruncle, diverticulum or masses. Able to pass 14F catheter easily--needs to pass 7-8 cm before urine expelled (increased urethral length)?    Internal: deferred, as reports MAUREEN/BSO with vaginal closure      Started testosterone in 2004. Attempted to have phalloplasty in 2004, unsuccessful.         Current dose:     Testosterone 50mg/5g - 1 packet qod    When I initially saw him he was on a high dose of pred   Prednisone 10mg in the Am and 5mg at night    Since we have attempted to lower the dose to physiologic as we dont need to over treat the CAH      Current dose:     Prednisone 9 mg daily   Feels best on 10 mg qd     BMD 3/7/18  Impression: Normal BMD    Review of Systems   Constitutional: Negative for fatigue.   Eyes: Negative for visual disturbance.   Respiratory: Negative for shortness of breath.     Cardiovascular: Negative for chest pain.   Gastrointestinal: Negative for abdominal pain.   Musculoskeletal: Positive for back pain and gait problem (rolling walker). Negative for arthralgias.   Skin: Negative for wound.   Neurological: Negative for headaches.   Hematological: Does not bruise/bleed easily.   Psychiatric/Behavioral: Negative for sleep disturbance.     Objective:   Physical Exam   Neck: No thyromegaly present.   Cardiovascular: Normal rate.   No edema present   Pulmonary/Chest: Effort normal.   Abdominal: Soft.   Vitals reviewed.    Visit Vitals  /88   Pulse 100   Ht 5' (1.524 m)   Wt 62.2 kg (137 lb 2 oz)   BMI 26.78 kg/m²       Body mass index is 26.78 kg/m².    Lab Review:   Lab Results   Component Value Date    HGBA1C 5.2 02/27/2020     Lab Results   Component Value Date    CHOL 218 (H) 03/18/2019    HDL 47 03/18/2019    LDLCALC 139.2 03/18/2019    TRIG 159 (H) 03/18/2019    CHOLHDL 21.6 03/18/2019     Lab Results   Component Value Date     02/27/2020    K 3.9 02/27/2020     02/27/2020    CO2 26 02/27/2020    GLU 91 02/27/2020    BUN 16 02/27/2020    CREATININE 0.7 02/27/2020    CALCIUM 9.7 02/27/2020    PROT 7.0 02/27/2020    ALBUMIN 3.8 02/27/2020    BILITOT 0.9 02/27/2020    ALKPHOS 620 (H) 02/27/2020    AST 89 (H) 02/27/2020     (H) 02/27/2020    ANIONGAP 9 02/27/2020    ESTGFRAFRICA >60 02/27/2020    EGFRNONAA >60 02/27/2020    TSH 1.516 02/27/2020     Vit D, 25-Hydroxy   Date Value Ref Range Status   05/06/2019 36 30 - 96 ng/mL Final     Comment:     Vitamin D deficiency.........<10 ng/mL                              Vitamin D insufficiency......10-29 ng/mL       Vitamin D sufficiency........> or equal to 30 ng/mL  Vitamin D toxicity............>100 ng/mL     Results for AMAURY VENTURA (MRN [id number]) as of 3/5/2020 08:21   Ref. Range 2/27/2020 08:05   ACTH Latest Ref Range: 0 - 46 pg/mL 638 (H)   TSH Latest Ref Range: 0.400 - 4.000 uIU/mL 1.516   T3, Total  Latest Ref Range: 60 - 180 ng/dL 88   DHEA-SO4 Latest Ref Range: 56.2 - 282.9 ug/dL 4.3 (L)   Testosterone, Total Latest Ref Range: 5 - 73 ng/dL 441 (H)     Assessment and Plan     Congenital adrenal hyperplasia    cah with AI  Goal is to get him on the lowest dose pred that he can tolerate   For now continue 10 mg a day    Needs medic alert tag   Reviewed sick day precautions - handout provided  Do not need to normalize ACTH, 17 oh p or androgens           Adrenal insufficiency  As above     Endocrine disorder in female-to-male transgender person  Okay to restart testosterone daily if okay with the surgeon   Follow labs       IGT (impaired glucose tolerance)    alerted as to the increased risk for t2DM  Stressed diet and exercise      Periodic hba1c levels      Follow up in about 6 months (around 9/5/2020).     Check BMD  Abnormal liver test refer to hepatology

## 2020-03-06 ENCOUNTER — CLINICAL SUPPORT (OUTPATIENT)
Dept: REHABILITATION | Facility: OTHER | Age: 50
End: 2020-03-06
Payer: MEDICARE

## 2020-03-06 ENCOUNTER — PATIENT MESSAGE (OUTPATIENT)
Dept: ORTHOPEDICS | Facility: CLINIC | Age: 50
End: 2020-03-06

## 2020-03-06 DIAGNOSIS — G89.29 CHRONIC PAIN OF RIGHT KNEE: ICD-10-CM

## 2020-03-06 DIAGNOSIS — M25.561 CHRONIC PAIN OF RIGHT KNEE: ICD-10-CM

## 2020-03-06 DIAGNOSIS — R26.9 GAIT ABNORMALITY: ICD-10-CM

## 2020-03-06 PROCEDURE — 97110 THERAPEUTIC EXERCISES: CPT | Mod: PN | Performed by: PHYSICAL THERAPIST

## 2020-03-06 NOTE — PROGRESS NOTES
"  Physical Therapy Daily Treatment Note     Name: Ari Santos  Clinic Number: [id number]    Therapy Diagnosis:   Encounter Diagnoses   Name Primary?    Chronic pain of right knee     Gait abnormality      Physician: Donte Andrade III, *    Visit Date: 3/6/2020    Physician Orders: PT Eval and Treat   Medical Diagnosis from Referral: M17.0 (ICD-10-CM) - Arthritis of both knees   Evaluation Date: 2020  Authorization Period Expiration: 2020  Plan of Care Expiration: 2020  Visit # / Visits authorized:     Time In: 3:50 pm  Time Out: 3:50pm  Total Billable Time: 30 minutes    Precautions: Standard    Subjective     Pt reports: moving to new house and transportation to clinic may be an issue. He is excited to walk today.    She was compliant with home exercise program.  Response to previous treatment: none  Functional change: none    Pain: 4/10  Location: right knee      Objective     R knee AROM: 0-110 deg    SLR extension la deg    Ari received therapeutic exercises to develop strength, endurance, ROM, flexibility, posture and core stabilization for 60 minutes including:    Seated passive knee flexion over EOB 10" x 10   SAQ 2 x 10 (eccentrics with PT)  LAQ 2 x 10   QS 5 sec x 20  bridging x 20  SLR x 15  S/L hip abduction x 10  S/L hip adduction x 15  HL hip adduction x 20 with ball  HL clams OTB x 20    Gait training SPC 2 x 250 ft SBA   Weight shifting // bars- x 3 min no UE support    Sit to stands x 5 high mat    Deferred:  Seated shoulder extensions OTB x 20   seated Row OTB x 20       Ari received the following manual therapy techniques: Joint mobilizations were applied to the: patella for 0 minutes, including:  Patella mobs all planes    Ari received cold pack for 00 minutes to R knee.- deferred      Home Exercises Provided and Patient Education Provided     Education provided:   - HEP / icing     Written Home Exercises Provided: yes.  Exercises were reviewed and Ari was able " "to demonstrate them prior to the end of the session.  Ari demonstrated good  understanding of the education provided.     See EMR under Patient Instructions for exercises provided 2/19/2020.    Assessment     Pt continues to progress with increased knee flexion each visit. Pt reports being anxious when walking in public settings and hx of PTSD. Plan to use more secluded mat and gait training in more private clinic hallways for pt comfort.   Ari is progressing well towards her goals.   Pt prognosis is Good.     Pt will continue to benefit from skilled outpatient physical therapy to address the deficits listed in the problem list box on initial evaluation, provide pt/family education and to maximize pt's level of independence in the home and community environment.     Pt's spiritual, cultural and educational needs considered and pt agreeable to plan of care and goals.     Anticipated barriers to physical therapy: transportation,     Goals:     Short term (4 weeks):  1. Pt's R knee AROM flexion to be 90 degrees or greater- met 2/21/2020  2. Pt to demonstrate improved quad control as noted by SLR with <30 deg extension lag- in progress, not met  3. Pt to have full R knee extension for improved gait quality- met 2/21/2020  4. Pt to be able to ambulate with SPC household distance- in progress, not met  5. Pt to have a TUG score of 13" or less for decreased risk for falls- in progress, not met  Long term (8 weeks)  1. Pt to be independent with home exercise program for improved self management of condition- in progress, not met  2. Pt to have decreased subjective report of disability as noted by <50% on FOTO knee questionnaire - in progress, not met  3. Pt to report 4/10 pain or less with ADL's- in progress, not met  4. Pt to be able to ascend/ descend 1 threshold step mod I with RW- in progress, not met  5. Pt to have 120 degrees of R knee flexion or greater for improved performance of ADL's such as donning shoes- in " progress, not met  6. Pt's R knee strength to increased 1/2 muscle grade greater for improved performance of ADL's such as transfers- in progress, not met    Plan     Continue with emphasis on quad strength, ROM, and gait    Dayan Bruce, PT

## 2020-03-09 ENCOUNTER — PATIENT MESSAGE (OUTPATIENT)
Dept: REHABILITATION | Facility: OTHER | Age: 50
End: 2020-03-09

## 2020-03-09 DIAGNOSIS — N39.0 FREQUENT UTI: ICD-10-CM

## 2020-03-09 DIAGNOSIS — Z96.651 STATUS POST RIGHT KNEE REPLACEMENT: Primary | ICD-10-CM

## 2020-03-09 DIAGNOSIS — N36.9 URETHRAL DISORDER: ICD-10-CM

## 2020-03-09 DIAGNOSIS — R93.5 ABNORMAL CT SCAN, PELVIS: Primary | ICD-10-CM

## 2020-03-11 ENCOUNTER — PATIENT MESSAGE (OUTPATIENT)
Dept: ADMINISTRATIVE | Facility: OTHER | Age: 50
End: 2020-03-11

## 2020-03-11 ENCOUNTER — PATIENT MESSAGE (OUTPATIENT)
Dept: ORTHOPEDICS | Facility: CLINIC | Age: 50
End: 2020-03-11

## 2020-03-12 DIAGNOSIS — F43.10 PTSD (POST-TRAUMATIC STRESS DISORDER): ICD-10-CM

## 2020-03-12 RX ORDER — ALPRAZOLAM 1 MG/1
1 TABLET ORAL 3 TIMES DAILY PRN
Qty: 90 TABLET | Refills: 0 | Status: SHIPPED | OUTPATIENT
Start: 2020-03-12 | End: 2020-04-13 | Stop reason: SDUPTHER

## 2020-03-13 ENCOUNTER — TELEPHONE (OUTPATIENT)
Dept: ORTHOPEDICS | Facility: CLINIC | Age: 50
End: 2020-03-13

## 2020-03-13 NOTE — TELEPHONE ENCOUNTER
I called the patient regarding Ari's inquiry for physical therapy while the CORVID-19 virus is spreading. I spoke with Kristy (Ari's spouse). She stated that he is doing very well: able to walk with his cane around the house, does not use walker, muscle is getting stronger, not in a lot of pain.I told her that Dr. Andrade and I discussed rehab plans for Ari. Dr. Andrade recommends that the patient replicates PT exercises at home instead of home health or outpatient PT. I told her that if they feel like Ari is not progressing or getting worse to call us so we can arrange services for Ari. Kristy was very content with that option. I told Kristy to call us back when they feel comfortable coming to the clinic to be seen.

## 2020-03-17 ENCOUNTER — DOCUMENTATION ONLY (OUTPATIENT)
Dept: REHABILITATION | Facility: OTHER | Age: 50
End: 2020-03-17

## 2020-03-17 NOTE — PROGRESS NOTES
Physical Therapy   Document Only    Ari Santos   MRN: [id number]     Due to ongoing recommendations from state and federal government to limit community exposure to COVID 19 outside of critical care, patient has chosen to cancel remaining physical therapy visits. Dr Andrade recommended to continue to do the exercises from home without HHPT. I called and spoke with patient and patient's wife Kristy to review his HEP and answer any questions/ concerns. Patient reports having all of his exercise handouts and walking around the house more easily with the cane. No concerns at this time and will message if he needs any assistance.    Dayan Bruce, PT  3/17/2020

## 2020-03-19 ENCOUNTER — TELEPHONE (OUTPATIENT)
Dept: UROGYNECOLOGY | Facility: CLINIC | Age: 50
End: 2020-03-19

## 2020-03-19 NOTE — TELEPHONE ENCOUNTER
----- Message from Rashard Taylor MD sent at 3/16/2020  5:08 PM CDT -----  Regarding: please call patient's wife (or email her) to help her schedule his MRI  Hi:  Patient needs MRI.  This can wait until after COVID19 stuff, but please call/email patient's wife to help them schedule MRI for 1-2 months from now (or later if desired).  Order in [facility] for MRI pelvis. Again, this is not urgent--just want to get it scheduled.  Thanks!    Contact for patient's wife, Kristy Sainz:  TATA Brown, CCRP    Neuroscience Research  [facility] [address] [place] [address]  [email or web address]  [phone]  [phone] fax    (c) [phone]

## 2020-03-21 ENCOUNTER — PATIENT MESSAGE (OUTPATIENT)
Dept: PSYCHIATRY | Facility: CLINIC | Age: 50
End: 2020-03-21

## 2020-03-22 ENCOUNTER — PATIENT MESSAGE (OUTPATIENT)
Dept: INTERNAL MEDICINE | Facility: CLINIC | Age: 50
End: 2020-03-22

## 2020-03-22 DIAGNOSIS — M62.838 MUSCLE SPASM: ICD-10-CM

## 2020-03-23 ENCOUNTER — PATIENT MESSAGE (OUTPATIENT)
Dept: INTERNAL MEDICINE | Facility: CLINIC | Age: 50
End: 2020-03-23

## 2020-03-23 RX ORDER — CYCLOBENZAPRINE HCL 10 MG
10 TABLET ORAL 3 TIMES DAILY PRN
Qty: 90 TABLET | Refills: 0 | Status: SHIPPED | OUTPATIENT
Start: 2020-03-23 | End: 2020-04-13 | Stop reason: SDUPTHER

## 2020-03-23 RX ORDER — PRIMIDONE 50 MG/1
50 TABLET ORAL 3 TIMES DAILY
Qty: 270 TABLET | Refills: 0 | Status: SHIPPED | OUTPATIENT
Start: 2020-03-23 | End: 2020-05-14 | Stop reason: SDUPTHER

## 2020-03-24 NOTE — TELEPHONE ENCOUNTER
Patient is following up with Dr. Cabello and Dr. Peng regarding neuro dx's. Sent message to patient and scheduled Sleep med appt > 30 days out due to facility COVID19 considerations

## 2020-03-25 ENCOUNTER — PATIENT MESSAGE (OUTPATIENT)
Dept: ENDOCRINOLOGY | Facility: CLINIC | Age: 50
End: 2020-03-25

## 2020-03-27 ENCOUNTER — TELEPHONE (OUTPATIENT)
Dept: PAIN MEDICINE | Facility: CLINIC | Age: 50
End: 2020-03-27

## 2020-03-27 NOTE — TELEPHONE ENCOUNTER
Staff contacted and spoke with patient  in regards to patient appt originally schedule for 5/27/20 with JESS Alejandra Phoenix due to situation at hand JESS has been redeployed to another area in the hospital, staff informed patient they would have to convert patient appointment to provider schedule Dr. Cabello for a virtual visit for 5/27/20    Pt spouse verbalized understanding.

## 2020-03-31 ENCOUNTER — PATIENT MESSAGE (OUTPATIENT)
Dept: ADMINISTRATIVE | Facility: OTHER | Age: 50
End: 2020-03-31

## 2020-04-01 ENCOUNTER — OFFICE VISIT (OUTPATIENT)
Dept: PSYCHIATRY | Facility: CLINIC | Age: 50
End: 2020-04-01
Payer: MEDICARE

## 2020-04-01 DIAGNOSIS — F43.10 PTSD (POST-TRAUMATIC STRESS DISORDER): Primary | ICD-10-CM

## 2020-04-01 PROCEDURE — 99213 OFFICE O/P EST LOW 20 MIN: CPT | Mod: 95,,, | Performed by: PSYCHIATRY & NEUROLOGY

## 2020-04-01 PROCEDURE — 99213 PR OFFICE/OUTPT VISIT, EST, LEVL III, 20-29 MIN: ICD-10-PCS | Mod: 95,,, | Performed by: PSYCHIATRY & NEUROLOGY

## 2020-04-01 NOTE — PROGRESS NOTES
"Outpatient Psychiatry Follow-Up Visit (MD/NP)    04/01/2020     Clinical Status of Patient:  Outpatient (Ambulatory) The patient location is: home  The chief complaint leading to consultation is: PTSD  Visit type: Virtual visit with synchronous audio and video  Total time spent with patient: 10"  Each patient to whom he or she provides medical services by telemedicine is:  (1) informed of the relationship between the physician and patient and the respective role of any other health care provider with respect to management of the patient; and (2) notified that he or she may decline to receive medical services by telemedicine and may withdraw from such care at any time.    Notes: see below    Chief Complaint:  Followup of PTSD    Interval History and Content of Current Session: Patient is doing well. His mood is upbeat and hopeful, and he is in good self control. He has no thoughts of harming himself or others. He is sleeping well and active, enjoying now the fact that he is able to paint again.    Compliance: takes meds as directed     Side effects:  none    Risk Parameters:Patient not active danger to self or others at this time.  Musculoskeletal: No tremors or dyskinesia.    Patient's Response to Intervention:accepting    Progress Toward Goals and Other Mental Status Changes:good     Vital signs this date were not reviewed.     Mental Status Evaluation     Appearance:  Neatly dressed and groomed   Behavior:  cooperative                             Speech normal   Mood:  euthymic   Affect:  euthymic   Thought Process:  linear, logical   Thought Content:  organizednormal   Sensorium:  alert   Attention Span & Concentration able to focus   Cognition:  Grossly intactgrossly intact   Insight:  has awareness of illness   Judgment:  behavior is adequate to circumstances       Diagnosis PTSD  Endocrine Disorder    No primary diagnosis found.     I reviewed the side effects of the patient's medicines and advised a call if " the patient had problems with the medicine or clinical condition.    Plan:(Medication and Therapy Recommendation)  Additional Notes: Patient is to continue Lamictal 200 mg bid, and Xanax 1 mg bid-tid.  Return to Clinic:3 months

## 2020-04-02 ENCOUNTER — PATIENT MESSAGE (OUTPATIENT)
Dept: PSYCHIATRY | Facility: CLINIC | Age: 50
End: 2020-04-02

## 2020-04-13 ENCOUNTER — PATIENT MESSAGE (OUTPATIENT)
Dept: PAIN MEDICINE | Facility: CLINIC | Age: 50
End: 2020-04-13

## 2020-04-13 ENCOUNTER — TELEPHONE (OUTPATIENT)
Dept: SLEEP MEDICINE | Facility: CLINIC | Age: 50
End: 2020-04-13

## 2020-04-13 DIAGNOSIS — M47.816 LUMBAR SPONDYLOSIS: ICD-10-CM

## 2020-04-13 DIAGNOSIS — M62.838 MUSCLE SPASM: ICD-10-CM

## 2020-04-13 DIAGNOSIS — F43.10 PTSD (POST-TRAUMATIC STRESS DISORDER): ICD-10-CM

## 2020-04-13 DIAGNOSIS — M79.2 NEUROPATHIC PAIN: ICD-10-CM

## 2020-04-13 DIAGNOSIS — M54.12 CERVICAL RADICULOPATHY: Primary | ICD-10-CM

## 2020-04-13 DIAGNOSIS — G89.29 OTHER CHRONIC PAIN: ICD-10-CM

## 2020-04-13 DIAGNOSIS — Z98.890 S/P LUMBAR LAMINECTOMY: ICD-10-CM

## 2020-04-13 RX ORDER — ALPRAZOLAM 1 MG/1
1 TABLET ORAL 3 TIMES DAILY PRN
Qty: 90 TABLET | Refills: 0 | Status: SHIPPED | OUTPATIENT
Start: 2020-04-13 | End: 2020-05-13 | Stop reason: SDUPTHER

## 2020-04-13 NOTE — TELEPHONE ENCOUNTER
Lvm asking pt to contact the office to change appt for 4/30/20 to virtual visit or reschedule to later date

## 2020-04-14 DIAGNOSIS — Z98.890 S/P LUMBAR LAMINECTOMY: Primary | ICD-10-CM

## 2020-04-14 DIAGNOSIS — M17.11 PRIMARY OSTEOARTHRITIS OF RIGHT KNEE: ICD-10-CM

## 2020-04-14 DIAGNOSIS — M54.12 CERVICAL RADICULOPATHY: ICD-10-CM

## 2020-04-14 DIAGNOSIS — G89.29 OTHER CHRONIC PAIN: ICD-10-CM

## 2020-04-14 RX ORDER — OXYCODONE AND ACETAMINOPHEN 10; 325 MG/1; MG/1
1 TABLET ORAL 3 TIMES DAILY PRN
Qty: 90 TABLET | Refills: 0 | Status: SHIPPED | OUTPATIENT
Start: 2020-04-14 | End: 2020-08-21

## 2020-04-14 RX ORDER — MORPHINE SULFATE 15 MG/1
15 TABLET, FILM COATED, EXTENDED RELEASE ORAL 2 TIMES DAILY
Qty: 60 TABLET | Refills: 0 | Status: SHIPPED | OUTPATIENT
Start: 2020-04-15 | End: 2020-05-21

## 2020-04-14 RX ORDER — CYCLOBENZAPRINE HCL 10 MG
10 TABLET ORAL 3 TIMES DAILY PRN
Qty: 90 TABLET | Refills: 0 | Status: SHIPPED | OUTPATIENT
Start: 2020-04-22 | End: 2020-05-13 | Stop reason: SDUPTHER

## 2020-04-14 RX ORDER — ROPINIROLE 4 MG/1
4 TABLET, FILM COATED ORAL NIGHTLY
Qty: 90 TABLET | Refills: 0 | Status: SHIPPED | OUTPATIENT
Start: 2020-04-14 | End: 2020-07-20 | Stop reason: SDUPTHER

## 2020-04-14 RX ORDER — OXYCODONE AND ACETAMINOPHEN 10; 325 MG/1; MG/1
1 TABLET ORAL EVERY 8 HOURS PRN
Qty: 90 TABLET | Refills: 0 | Status: SHIPPED | OUTPATIENT
Start: 2020-04-14 | End: 2020-05-27 | Stop reason: SDUPTHER

## 2020-04-14 NOTE — TELEPHONE ENCOUNTER
Patient requesting refill on Oxycodone 10/325 and Morphine ER 15 mg  Last office visit 02/27/2020   shows last refill on Oxycodone-02/27/2020, Morphine 03/16/2020  Patient does have a pain contract on file with [facility]  Patient last UDS 02/27/2020 was consistent with current therapy

## 2020-04-28 ENCOUNTER — PATIENT OUTREACH (OUTPATIENT)
Dept: ADMINISTRATIVE | Facility: OTHER | Age: 50
End: 2020-04-28

## 2020-04-28 DIAGNOSIS — Z12.11 ENCOUNTER FOR FECAL IMMUNOCHEMICAL TEST SCREENING: Primary | ICD-10-CM

## 2020-04-30 ENCOUNTER — OFFICE VISIT (OUTPATIENT)
Dept: SLEEP MEDICINE | Facility: CLINIC | Age: 50
End: 2020-04-30
Payer: MEDICARE

## 2020-04-30 DIAGNOSIS — G47.01 INSOMNIA DUE TO MEDICAL CONDITION: Primary | ICD-10-CM

## 2020-04-30 DIAGNOSIS — G25.81 RLS (RESTLESS LEGS SYNDROME): ICD-10-CM

## 2020-04-30 DIAGNOSIS — D50.8 OTHER IRON DEFICIENCY ANEMIA: ICD-10-CM

## 2020-04-30 DIAGNOSIS — R53.82 CHRONIC FATIGUE: ICD-10-CM

## 2020-04-30 DIAGNOSIS — R06.83 SNORING: ICD-10-CM

## 2020-04-30 DIAGNOSIS — M79.7 FIBROMYALGIA: ICD-10-CM

## 2020-04-30 DIAGNOSIS — F43.10 PTSD (POST-TRAUMATIC STRESS DISORDER): ICD-10-CM

## 2020-04-30 DIAGNOSIS — G89.29 OTHER CHRONIC PAIN: ICD-10-CM

## 2020-04-30 DIAGNOSIS — G47.30 SLEEP APNEA, UNSPECIFIED TYPE: ICD-10-CM

## 2020-04-30 PROCEDURE — 99215 OFFICE O/P EST HI 40 MIN: CPT | Mod: 95,,, | Performed by: INTERNAL MEDICINE

## 2020-04-30 PROCEDURE — 99215 PR OFFICE/OUTPT VISIT, EST, LEVL V, 40-54 MIN: ICD-10-PCS | Mod: 95,,, | Performed by: INTERNAL MEDICINE

## 2020-04-30 NOTE — PROGRESS NOTES
Subjective:       Patient ID: Ari Santos is a 50 y.o. female.    Chief Complaint: Sleeping Problem    HPI     I had the pleasure of seeing Ari Santos today, who is a 50 y.o. male that presents with frequent awakenings.    Ari Santos does not have a CDL.    Ari Santos is not a shift worker.    Ari Santos presents with has interrupted sleep that has been going on for 3 years    Bedtime when working ranges from 2100 to 2130.   When not working, bedtime ranges from 2100 to 2130.   Sleep latency ranges from 10 to 15 minutes.     Average number of awakenings is 2 and return to sleep is variable.   Wake up time when working is 0630 to 0700.   When not working, wake up time is 0630 to 0700.   Patient does not feel rested upon awakening.    Ari Santos consumes approximately 1 beverages with caffeine are consumed daily.   An average of 0 beverages with alcohol are consumed    Medications taken for sleep currently: none  Previous medications taken: none     Ari Santos does experience daytime sleepiness.   Naps are taken about 0 times weekly, usually lasting NA to NA minutes.  Ari currently does not operate an automobile.  Ari Santos NA experience drowsiness when driving.   Patient does doze off when sedentary.   Ari Santos does not have auxiliary symptoms of narcolepsy including sleep onset paralysis, hypnagogic hallucinations, sleep attacks and cataplexy    EPWORTH SLEEPINESS SCALE 4/29/2020   Sitting and reading 0   Watching TV 1   Sitting, inactive in a public place (e.g. a theatre or a meeting) 0   As a passenger in a car for an hour without a break 0   Lying down to rest in the afternoon when circumstances permit 1   Sitting and talking to someone 0   Sitting quietly after a lunch without alcohol 0   In a car, while stopped for a few minutes in traffic 0   Total score 2       Ari Santos has a history of snoring.   Snoring is described as  moderate and intermittent.   Apneic episodes have been noticed during sleep.   A witness to sleep is present.   The patient awakens with mouth dryness.      Air Santos does not have symptoms of Restless Legs Syndrome. Nocturnal leg movements have been noticed.   The patient does not experience sleep related leg cramps.   There is a history of parasomnia including PTSD.    Dysthesia description: dyscomfort  Are symptoms limited to lower extremities? Yes  Do symptoms ever involve the arms?   no  Is there an associated urge to move?  Yes  Does movement relieve symptoms?  no  Symptoms are worse in the in the evening  Ferritin level: No  CBC: No  Symptoms started around 10 years.  Currently on ropinirole with dosage increased to 4 mg-starting to notice daytime symptoms      Current Outpatient Medications:     acetaminophen (TYLENOL) 650 MG TbSR, Take 1 tablet (650 mg total) by mouth every 8 (eight) hours. (Patient not taking: Reported on 3/5/2020), Disp: 120 tablet, Rfl: 0    ALPRAZolam (XANAX) 1 MG tablet, Take 1 tablet (1 mg total) by mouth 3 (three) times daily as needed., Disp: 90 tablet, Rfl: 0    aspirin (ECOTRIN) 81 MG EC tablet, Take 1 tablet (81 mg total) by mouth 2 (two) times daily., Disp: 60 tablet, Rfl: 0    atorvastatin (LIPITOR) 10 MG tablet, Take 1 tablet (10 mg total) by mouth every evening., Disp: 90 tablet, Rfl: 2    cranberry fruit extract (CRANBERRY ORAL), Take by mouth., Disp: , Rfl:     cyclobenzaprine (FLEXERIL) 10 MG tablet, Take 1 tablet (10 mg total) by mouth 3 (three) times daily as needed for Muscle spasms., Disp: 90 tablet, Rfl: 0    gabapentin (NEURONTIN) 800 MG tablet, Take 1 tablet by mouth three times a day and take 2 tablets at night (5 tablets a day, 4000mg), Disp: 150 tablet, Rfl: 2    Lactobacillus acidophilus (ACIDOPHILUS) Cap, Take by mouth once daily. , Disp: , Rfl:     lamoTRIgine (LAMICTAL) 200 MG tablet, Take 1 tablet (200 mg total) by mouth 2 (two) times  daily., Disp: 60 tablet, Rfl: 5    loratadine (CLARITIN) 10 mg tablet, Take 10 mg by mouth once daily. , Disp: , Rfl:     morphine (MS CONTIN) 15 MG 12 hr tablet, Take 1 tablet (15 mg total) by mouth 2 (two) times daily., Disp: 60 tablet, Rfl: 0    oxyCODONE-acetaminophen (PERCOCET)  mg per tablet, Take 1 tablet by mouth every 8 (eight) hours as needed for Pain., Disp: 90 tablet, Rfl: 0    oxyCODONE-acetaminophen (PERCOCET)  mg per tablet, Take 1 tablet by mouth 3 (three) times daily as needed for Pain., Disp: 90 tablet, Rfl: 0    predniSONE (DELTASONE) 10 MG tablet, Take 1 tablet (10 mg total) by mouth once daily., Disp: 100 tablet, Rfl: 5    primidone (MYSOLINE) 50 MG Tab, Take 1 tablet (50 mg total) by mouth 3 (three) times daily., Disp: 270 tablet, Rfl: 0    rOPINIRole (REQUIP) 4 MG tablet, Take 1 tablet (4 mg total) by mouth nightly., Disp: 90 tablet, Rfl: 0    testosterone (ANDROGEL) 1 % (50 mg/5 gram) GlPk, Apply 5 grams topically once daily., Disp: 30 packet, Rfl: 4    triamcinolone acetonide 0.1% (KENALOG) 0.1 % cream, Apply topically 2 (two) times daily., Disp: 45 g, Rfl: 1    zinc 50 mg Tab, once daily. , Disp: , Rfl:      Review of patient's allergies indicates:   Allergen Reactions    Bactrim [sulfamethoxazole-trimethoprim] Itching    Penicillins Rash         Past Medical History:   Diagnosis Date    Adrenal insufficiency     Arthritis     Chronic bilateral low back pain with bilateral sciatica 3/19/2019    Congenital adrenal hyperplasia     Hyperlipidemia     Spinal stenosis     Spinal stenosis of lumbar region with neurogenic claudication 3/19/2019    Status post sex reassignment surgery 3/19/2019    Hx of Congenital Adrenal Hyperplasia       Past Surgical History:   Procedure Laterality Date    BACK SURGERY      LAMINECTOMY  09/13/2019    RADIOFREQUENCY ABLATION Left 5/9/2019    Procedure: RADIOFREQUENCY ABLATION, LEFT L3,4,5;  Surgeon: Messi Cabello MD;   [place] [facility] [place] [facility]  Service: Pain Management;  Laterality: Left;  1 of 2    RADIOFREQUENCY ABLATION Right 2019    Procedure: RADIOFREQUENCY ABLATION, RIGHT L3,4,5;  Surgeon: Messi Cabello MD;  Location: [facility];  Service: Pain Management;  Laterality: Right;  2of 2    SPINE SURGERY      2019     TOTAL KNEE ARTHROPLASTY Right 2020    Procedure: ARTHROPLASTY, KNEE, TOTAL;  Surgeon: Donte Andrade III, MD;  Location: [facility] OR [address] [place] [address];  Service: Orthopedics;  Laterality: Right;       Family History   Problem Relation Age of Onset    Diabetes Maternal Grandfather     Breast cancer Neg Hx     Ovarian cancer Neg Hx     Vaginal cancer Neg Hx     Endometrial cancer Neg Hx     Cervical cancer Neg Hx        Social History     Socioeconomic History    Marital status:      Spouse name: Not on file    Number of children: Not on file    Years of education: Not on file    Highest education level: Not on file   Occupational History    Not on file   Social Needs    Financial resource strain: Not very hard    Food insecurity:     Worry: Never true     Inability: Never true    Transportation needs:     Medical: No     Non-medical: No   Tobacco Use    Smoking status: Former Smoker     Last attempt to quit: 2009     Years since quittin.3    Smokeless tobacco: Never Used    Tobacco comment: Socailly   Substance and Sexual Activity    Alcohol use: Not Currently     Frequency: Never     Binge frequency: Never    Drug use: Never    Sexual activity: Yes     Partners: Female   Lifestyle    Physical activity:     Days per week: 3 days     Minutes per session: 20 min    Stress: To some extent   Relationships    Social connections:     Talks on phone: More than three times a week     Gets together: Once a week     Attends Hindu service: Not on file     Active member of club or organization: No     Attends meetings of clubs or organizations: Never     Relationship status:     Other Topics Concern    Not on file   Social History Narrative    Not on file           Old medical records.    There were no vitals filed for this visit.           The patient was given open opportunity to ask questions and/or express concerns about treatment plan.   All questions/concerns were discussed.   Driving precautions were provided.     Two patient identifiers used prior to evaluation.    Thank you for referring Ari Santos for evaluation.           Review of Systems    Objective:      Physical Exam    Assessment:       1. Insomnia due to medical condition        Plan:       Due to listed symptoms, a polysomnogram is recommended and ordered, however home sleep study ordered due to current scenario.   Description of procedure given to patient.   If significant Obstructive Sleep Apnea (KIARA) is found during the initial portion of the study, therapy will be initiated with nasal Continuous Positive Airway Pressure (CPAP).   Goals of therapy were discussed, alternative treatments listed and patient agrees to this form of therapy if indicated.   The pathophysiology of KIARA was discussed.   The effects of KIARA on patient's co-morbid conditions and the increased morbidity and/or mortality associated with this condition were reviewed.   The patient was given open opportunity to ask questions and/or express concerns about treatment plan.   All questions/concerns were discussed.   Driving precautions were NA.   Discussed augmentation with ropinirole.   Start iron.   Ferritin ordered.   Reduce ropinirole to 1 mg.       The patient location is: home  The chief complaint leading to consultation is: frequent awakenings  Visit type: audiovisual  Total time spent with patient: 41  Each patient to whom he or she provides medical services by telemedicine is:  (1) informed of the relationship between the physician and patient and the respective role of any other health care provider with respect to management  of the patient; and (2) notified that he or she may decline to receive medical services by telemedicine and may withdraw from such care at any time.    Notes: see above         Thank you for referring Ari Santos for evaluation.

## 2020-04-30 NOTE — LETTER
April 30, 2020      Siva Mitchell MD  [address]  [address]  [place] [address]           [id number]  [address]  [facility] [address]  Phone: [phone]          Patient: Ari Santos   MR Number: [id number]   YOB: 1970   Date of Visit: 4/30/2020       Dear Dr. Siva Mitchell:    Thank you for referring Ari Santos to me for evaluation. Attached you will find relevant portions of my assessment and plan of care.    If you have questions, please do not hesitate to call me. I look forward to following Ari Santos along with you.    Sincerely,    Sandrine Mcfadden MD    Enclosure  CC:  No Recipients    If you would like to receive this communication electronically, please contact [email or web address] or [phone] to request more information on [employer] access.    For providers and/or their staff who would like to refer a patient to [facility], please contact us through our one-stop-shop provider referral line, [facility], at [phone].    If you feel you have received this communication in error or would no longer like to receive these types of communications, please e-mail [email or web address]

## 2020-05-05 ENCOUNTER — TELEPHONE (OUTPATIENT)
Dept: SLEEP MEDICINE | Facility: OTHER | Age: 50
End: 2020-05-05

## 2020-05-13 ENCOUNTER — PATIENT MESSAGE (OUTPATIENT)
Dept: ADMINISTRATIVE | Facility: OTHER | Age: 50
End: 2020-05-13

## 2020-05-13 ENCOUNTER — PATIENT MESSAGE (OUTPATIENT)
Dept: INTERNAL MEDICINE | Facility: CLINIC | Age: 50
End: 2020-05-13

## 2020-05-13 DIAGNOSIS — F43.10 PTSD (POST-TRAUMATIC STRESS DISORDER): ICD-10-CM

## 2020-05-13 DIAGNOSIS — M62.838 MUSCLE SPASM: Primary | ICD-10-CM

## 2020-05-13 RX ORDER — CYCLOBENZAPRINE HCL 10 MG
10 TABLET ORAL 3 TIMES DAILY PRN
Qty: 90 TABLET | Refills: 0 | Status: SHIPPED | OUTPATIENT
Start: 2020-05-13 | End: 2020-05-27 | Stop reason: SDUPTHER

## 2020-05-14 RX ORDER — ALPRAZOLAM 1 MG/1
1 TABLET ORAL 3 TIMES DAILY PRN
Qty: 90 TABLET | Refills: 0 | Status: SHIPPED | OUTPATIENT
Start: 2020-05-14 | End: 2020-06-14 | Stop reason: SDUPTHER

## 2020-05-14 RX ORDER — PRIMIDONE 50 MG/1
100 TABLET ORAL 3 TIMES DAILY
Qty: 180 TABLET | Refills: 0 | Status: SHIPPED | OUTPATIENT
Start: 2020-05-14 | End: 2020-06-14 | Stop reason: SDUPTHER

## 2020-05-14 NOTE — TELEPHONE ENCOUNTER
----- Message from DUC Fried sent at 5/14/2020 10:35 AM CDT -----  Please pend and I will sign.    Thanks,  Juju Lugo

## 2020-05-25 ENCOUNTER — TELEPHONE (OUTPATIENT)
Dept: SLEEP MEDICINE | Facility: CLINIC | Age: 50
End: 2020-05-25

## 2020-05-25 NOTE — TELEPHONE ENCOUNTER
Returned pts call. Pts wife requested information on the at home sleep study he is doing in the near future. I gave her the phone number for the sleep lab and directed her to call them with any further questions.

## 2020-05-26 ENCOUNTER — PATIENT OUTREACH (OUTPATIENT)
Dept: ADMINISTRATIVE | Facility: OTHER | Age: 50
End: 2020-05-26

## 2020-05-27 ENCOUNTER — HOSPITAL ENCOUNTER (OUTPATIENT)
Dept: SLEEP MEDICINE | Facility: OTHER | Age: 50
Discharge: HOME | End: 2020-05-27
Attending: INTERNAL MEDICINE
Payer: MEDICARE

## 2020-05-27 ENCOUNTER — OFFICE VISIT (OUTPATIENT)
Dept: PAIN MEDICINE | Facility: CLINIC | Age: 50
End: 2020-05-27
Attending: ANESTHESIOLOGY
Payer: MEDICARE

## 2020-05-27 DIAGNOSIS — Z98.890 S/P LUMBAR LAMINECTOMY: ICD-10-CM

## 2020-05-27 DIAGNOSIS — Z87.890 STATUS POST SEX REASSIGNMENT SURGERY: ICD-10-CM

## 2020-05-27 DIAGNOSIS — M17.11 PRIMARY OSTEOARTHRITIS OF RIGHT KNEE: ICD-10-CM

## 2020-05-27 DIAGNOSIS — G47.33 OSA (OBSTRUCTIVE SLEEP APNEA): Primary | ICD-10-CM

## 2020-05-27 DIAGNOSIS — F43.10 PTSD (POST-TRAUMATIC STRESS DISORDER): ICD-10-CM

## 2020-05-27 DIAGNOSIS — G89.29 OTHER CHRONIC PAIN: ICD-10-CM

## 2020-05-27 DIAGNOSIS — M79.7 FIBROMYALGIA: ICD-10-CM

## 2020-05-27 DIAGNOSIS — G47.01 INSOMNIA DUE TO MEDICAL CONDITION: ICD-10-CM

## 2020-05-27 DIAGNOSIS — R06.83 SNORING: ICD-10-CM

## 2020-05-27 DIAGNOSIS — R53.82 CHRONIC FATIGUE: ICD-10-CM

## 2020-05-27 DIAGNOSIS — G25.81 RLS (RESTLESS LEGS SYNDROME): ICD-10-CM

## 2020-05-27 DIAGNOSIS — G47.30 SLEEP APNEA, UNSPECIFIED TYPE: ICD-10-CM

## 2020-05-27 DIAGNOSIS — M54.12 CERVICAL RADICULOPATHY: ICD-10-CM

## 2020-05-27 DIAGNOSIS — M62.838 MUSCLE SPASM: ICD-10-CM

## 2020-05-27 PROCEDURE — 99214 OFFICE O/P EST MOD 30 MIN: CPT | Mod: 95,,, | Performed by: ANESTHESIOLOGY

## 2020-05-27 PROCEDURE — 95800 SLP STDY UNATTENDED: CPT

## 2020-05-27 PROCEDURE — 99214 PR OFFICE/OUTPT VISIT, EST, LEVL IV, 30-39 MIN: ICD-10-PCS | Mod: 95,,, | Performed by: ANESTHESIOLOGY

## 2020-05-27 RX ORDER — GABAPENTIN 800 MG/1
TABLET ORAL
Qty: 150 TABLET | Refills: 2 | Status: SHIPPED | OUTPATIENT
Start: 2020-05-27 | End: 2020-09-08 | Stop reason: SDUPTHER

## 2020-05-27 RX ORDER — MORPHINE SULFATE 15 MG/1
15 TABLET, FILM COATED, EXTENDED RELEASE ORAL 2 TIMES DAILY
Qty: 60 TABLET | Refills: 0 | Status: SHIPPED | OUTPATIENT
Start: 2020-05-27 | End: 2020-06-23 | Stop reason: SDUPTHER

## 2020-05-27 RX ORDER — CYCLOBENZAPRINE HCL 10 MG
10 TABLET ORAL 3 TIMES DAILY PRN
Qty: 90 TABLET | Refills: 0 | Status: SHIPPED | OUTPATIENT
Start: 2020-05-27 | End: 2020-07-06 | Stop reason: SDUPTHER

## 2020-05-27 RX ORDER — OXYCODONE AND ACETAMINOPHEN 10; 325 MG/1; MG/1
1 TABLET ORAL EVERY 8 HOURS PRN
Qty: 90 TABLET | Refills: 0 | Status: SHIPPED | OUTPATIENT
Start: 2020-05-27 | End: 2020-08-21

## 2020-05-27 NOTE — PROGRESS NOTES
Per physician orders, patient's wife was given home sleep testing device and instructed on how to apply the device before going to bed tonight.  I sized the device and showed the patient's wife using a mirror how the device fits and what it should look like so they can use a mirror when putting it on themselves at home.  We reviewed the instruction booklet and the number to call if they have any questions at night.  Patient's wife understood and was instructed to return the device the next day back to the sleep lab.

## 2020-05-27 NOTE — PATIENT INSTRUCTIONS
Exercise guide:    https://order.justa.nih.gov/sites/default/files/[email or web address]/justa-exercise-guide.pdf    https://kx4ehch.justa.nih.gov/workout-videos/     Medications refilled    Can call for synvisc knee injection in the future.

## 2020-05-27 NOTE — PROGRESS NOTES
Chronic Pain - Follow up -Virtual Visit with Video    Referring Physician: No ref. provider found    Chief Complaint:   No chief complaint on file.       SUBJECTIVE: Disclaimer: This note has been generated using voice-recognition software. There may be typographical errors that have been missed during proof-reading  Interval History 5/27/2020   since previous encounter the patient continues to have improvement after his right knee replacement he is currently in physical therapy.  He has been able to on some days decreased team a of oxycodone acetaminophen that he has been taking.  Continues to use morphine ER 15 mg b.i.d. Without any side effects, gabapentin 4000 mg per day and Flexeril p.r.n.  He is planning a left knee replacement in the future but it is currently on hold with the coronavirus outbreak.    Interval History 2/27/2020:  The patient is here for follow up of chronic pain.  Since last visit, he underwent right TKA by Dr. Andrade on 1/31/20.  He reports that he is recovering well.  He is no longer taking post op pain medications.  He takes his MS Contin 1-2 times per day.  He says he was unaware to take it scheduled.  He taking Percocet PRN.  He needs a refill of the Percocet today.  He denies any major adverse effects.  He does report that he fell last week onto his tailbone.  He has been using a donut pillow when sitting.  His pain today is 6/10.    Interval history 11/18/2019:  Since previous encounter the patient is status post lumbar laminectomy decompression from L1-S1 in September and has healed well subsequently and has undergone physical therapy.  He denies radicular symptoms to his lower extremities at this time but is having severe knee pain and is being evaluated for knee replacement to be done in January.  Otherwise the patient has been stable and has been utilizing his opioid medications appropriately he is taking MS Contin 15 mg b.i.d. along with oxycodone acetaminophen 10/325 t.i.d.  p.r.n. without any side effects or evidence of misuse or abuse.  The patient also has been taking gabapentin 4000 mg per day but continues to have radicular pain symptoms in his upper extremities which was thought to be predominantly carpal tunnel he had an EMG/NCV with Neurology which showed a bilateral carpal tunnel with concomitant C7 radiculopathy.  Previous MRI of the cervical spine did reveal stenosis at C5-6 and C6-7 with moderate neuroforaminal stenosis.    Interval History 8/21/19:  Patient reports for follow up. He has seen neurosurgery and is scheduled for  Open L1-S1 laminectomy. He has also seen orthopedics for his bilateral knee pain. They have planned for knee braces after completion of his spinal surgery.  Patient reports continued back pain.  He is s/p RFA of bilateral L3,4,5 in 5/9/19 and 5/23/19 with 60% relief in his pain for 1 week.  Patient reports continued back pain, sharp, starts in his lower back and radiates to his feet. Patient also reports worsening of the neuropathic pain in the palms of his hands, burning, tingling pain worse at night.    Interval History 6/20/2019:  The patient is here for follow up of lower back pain.  He is s/p lumbar RFAs.  He is reporting minimal benefit of pain.  He feels as though previous RFAs from another provider were helpful.  However, he is reporting pain across the lower back with radiation down the back of both legs.  We previously discussed a surgical referral and he would like to pursue this option.  He has been taking Percocet 5/325 mg TID as well as oxycodone 15 mg for severe breakthrough pain.  He feels as though the 15 mg make him hyper and unable to sleep.  He would like to adjust the medications.  Additionally, he was weaning Gabapentin 800 mg and is now taking it BID.  However, he feels as though his shooting pain has worsened since this.  His pain today is 6/10.    Interval History 4/12/2019:  The patient returns for follow up of back pain.  We  have received his records including previous lumbar and MRI.  There is no NCS/EMG report, however.  His records indicate lumbar RFAs over 6 months ago.  He reports that this was helpful for him until recently.  He had a left synovial cyst aspiration and L5-S1 TF MAYURI in the past as well.  He feels as though RFA was more helpful.  Additionally, he had an updated lumbar MRI since previous visit which does show significant arthritis and spinal stenosis.  He would like to hold off on surgical consult at this time.  He has decreased Gabapentin to 800 mg TID and has not noticed a change in pain.  He takes Percocet 5/325 mg TID PRN pain.  He also takes oxycodone 15 mg PRN, about 2-3 pills per week for severe pain.  This was a one time refill from Dr. Mitchell with intention of transition to our office.  He denies any bowel or bladder changes.  His pain today is 6/10.    Initial encounter:    Ari Santos presents to the clinic for the evaluation of lower back pain. The pain started 9 year ago starting indiously and symptoms have been worsening.    Brief history:  History of spinal stenosis and history of a cyst with drainage.    Patient has a pain management physician in [facility]    Pain Description:    The pain is located in the lower back area and radiates to the lower extremities bilaterally in the L5 distribution.      At BEST  5/10     At WORST  10/10 on the WORST day.      On average pain is rated as 7/10.     Today the pain is rated as 7/10    The pain is described as sharp and intermittent      Symptoms interfere with daily activity and sleeping.     Exacerbating factors: Standing, Walking, Morning and Getting out of bed/chair.      Mitigating factors medications, physical therapy and rest.     Patient reports significant motor weakness and loss of sensations.  Patient denies any suicidal or homicidal ideations    Pain Medications:  Current:  Flexeril 10mg TID  Gabapentin 800mg QID  Lamictal 200mg  qHS  Percocet 10/325  TID PRN  Xanax 1mg for 1 - 3 times/day  ropinrole 4mg  MS Contin 15 mg Q12    Tried in Past:  NSAIDs -with some relief  TCA -Never  SNRI -venlafaxine for depression -with side effects  Anti-convulsants -gabapentin     Physical Therapy/Home Exercise: yes completed last year with limited benefit     report:  Reviewed and consistent with medication use as prescribed.    Pain Procedures: previous RFA and MAYURI  Previous knee steroid injection without improvement, and euflexxa in the remote past -unsure of the benefit    5/9/19 Left L3,4,5 RFA  5/23/19 Right L3,4,5 RFA- 50-60% reduction for one week    Chiropractor -helps in the past  Acupuncture - never  TENS unit -helps occasionally  Spinal decompression lumbar decompressive surgery from L1-S1 September 2019  Joint replacement - Right TKA 1/31/20    Imaging:     Narrative     EXAMINATION:  MRI LUMBAR SPINE WITHOUT CONTRAST    CLINICAL HISTORY:  bilateral lower extremity radiculopathy and weakness in the L4/5 distribution with neurogenic claudication;  Spinal stenosis, lumbar region with neurogenic claudication    TECHNIQUE:  Sagittal T1, T2 and STIR images as well as axial T1 and T2 weighted images were obtained through the lumbar without the administration of contrast.    COMPARISON:  None    FINDINGS:  Alignment: There loss of the normal lumbar lordosis.  Minimal grade 1 anterolisthesis of L3 on L4 and L4 on L5.  There may also be minimal retrolisthesis of L2 as relates to L3.    Vertebrae: The vertebral bodies maintain normal height and signal intensity.    Discs:  Multilevel, advanced loss of disc height is noted throughout the lumbar spine with disc desiccation.    Cord: Normal signal.  The conus terminates at the T12-L1 level.    Degenerative findings:    T12-L1: There is a minimal diffuse disc bulge with no facet arthropathy or ligamentum flavum hypertrophy.  The central canal and neural foramen are patent.    L1-L2:There is a large  diffuse disc bulge within no significant facet arthropathy.  Ligamentum flavum hypertrophy is present.  There effacement of the anterior thecal sleeve and mild central canal stenosis as well as moderate to severe right and severe left neural foraminal stenosis.    L2-L3: There is a 6 mm cystic structure in the posterior left aspect of the central canal at the level of the midbody of L2.  This effaces the anterior thecal sleeve and occupies a portion of the left neural foramen.  Additionally, there is a large diffuse disc bulge as well as severe bilateral facet arthropathy at L2-L3.  No significant ligamentum flavum hypertrophy.  Moderate central canal stenosis and mild bilateral neural foraminal stenosis is present.    L3-L4: There is a large diffuse disc bulge with severe bilateral facet arthropathy.  No significant ligamentum flavum hypertrophy there are congenitally shortened pedicles.  This results in moderate central canal stenosis, mild left and moderate right neural foraminal stenosis.    L4-L5: There is a large diffuse disc bulge with severe bilateral facet arthropathy and mild ligamentum flavum hypertrophy.  These findings result in severe central canal stenosis and left neural foraminal stenosis as well as moderate to severe right neural foraminal stenosis.    L5-S1: There is a mild diffuse disc bulge with severe right and moderate to severe left neural foraminal stenosis.  Ligamentum flavum hypertrophy is present    Paraspinal muscles & soft tissues: Normal.    Incidental findings:    -there is a small amount of fluid in the left sacroiliac joint    -2.6 cm T2 hyperintense, T1 hypointense rounded structure in the interpolar region of the left kidney    -2.3 cm rounded, circumscribed, uniformly T2 hyperintense, T1 hypointense focus emanating from the lateral limb of the left adrenal gland    -1.1 cm, heterogeneously T2 hyperintense focus emanating from the junction of the anterior and medial limb of the  right adrenal gland      Impression       1. Minimal grade 1 anterolisthesis of L3 on L4 and L4 on L5 with minimal grade 1 retrolisthesis of L2 on L3.  2. Multilevel degenerative disc and joint disease resulting in severe central canal and neural foraminal stenosis at several levels.  3. Finding on the left kidney likely represents a Bosniak category 2 cyst.  4. Indeterminate bilateral adrenal gland nodules.  Further evaluation of these nodules as well as the left kidney finding can be performed with dedicated adrenal CT or MRI.  5.  This report was flagged in Epic as abnormal.         Past Medical History:   Diagnosis Date    Adrenal insufficiency     Arthritis     Chronic bilateral low back pain with bilateral sciatica 3/19/2019    Congenital adrenal hyperplasia     Hyperlipidemia     Spinal stenosis     Spinal stenosis of lumbar region with neurogenic claudication 3/19/2019    Status post sex reassignment surgery 3/19/2019    Hx of Congenital Adrenal Hyperplasia     Past Surgical History:   Procedure Laterality Date    BACK SURGERY      LAMINECTOMY  09/13/2019    RADIOFREQUENCY ABLATION Left 5/9/2019    Procedure: RADIOFREQUENCY ABLATION, LEFT L3,4,5;  Surgeon: Messi Cabello MD;  Location: [facility];  Service: Pain Management;  Laterality: Left;  1 of 2    RADIOFREQUENCY ABLATION Right 5/23/2019    Procedure: RADIOFREQUENCY ABLATION, RIGHT L3,4,5;  Surgeon: Messi Cabello MD;  Location: [facility];  Service: Pain Management;  Laterality: Right;  2of 2    SPINE SURGERY      Sept 2019     TOTAL KNEE ARTHROPLASTY Right 1/31/2020    Procedure: ARTHROPLASTY, KNEE, TOTAL;  Surgeon: Donte Andrade III, MD;  Location: [facility] OR [address] [place] [address];  Service: Orthopedics;  Laterality: Right;     Social History     Socioeconomic History    Marital status:      Spouse name: Not on file    Number of children: Not on file    Years of education: Not on file    Highest education level: Not on file    Occupational History    Not on file   Social Needs    Financial resource strain: Not very hard    Food insecurity:     Worry: Never true     Inability: Never true    Transportation needs:     Medical: No     Non-medical: No   Tobacco Use    Smoking status: Former Smoker     Last attempt to quit: 2009     Years since quittin.4    Smokeless tobacco: Never Used    Tobacco comment: Socailly   Substance and Sexual Activity    Alcohol use: Not Currently     Frequency: Never     Binge frequency: Never    Drug use: Never    Sexual activity: Yes     Partners: Female   Lifestyle    Physical activity:     Days per week: 3 days     Minutes per session: 20 min    Stress: To some extent   Relationships    Social connections:     Talks on phone: More than three times a week     Gets together: Once a week     Attends [employer] service: Not on file     Active member of club or organization: No     Attends meetings of clubs or organizations: Never     Relationship status:    Other Topics Concern    Not on file   Social History Narrative    Not on file     Family History   Problem Relation Age of Onset    Diabetes Maternal Grandfather     Breast cancer Neg Hx     Ovarian cancer Neg Hx     Vaginal cancer Neg Hx     Endometrial cancer Neg Hx     Cervical cancer Neg Hx        Review of patient's allergies indicates:   Allergen Reactions    Bactrim [sulfamethoxazole-trimethoprim] Itching    Penicillins Rash       Current Outpatient Medications   Medication Sig    acetaminophen (TYLENOL) 650 MG TbSR Take 1 tablet (650 mg total) by mouth every 8 (eight) hours. (Patient not taking: Reported on 3/5/2020)    ALPRAZolam (XANAX) 1 MG tablet Take 1 tablet (1 mg total) by mouth 3 (three) times daily as needed.    aspirin (ECOTRIN) 81 MG EC tablet Take 1 tablet (81 mg total) by mouth 2 (two) times daily.    atorvastatin (LIPITOR) 10 MG tablet Take 1 tablet (10 mg total) by mouth every evening.    cranberry  fruit extract (CRANBERRY ORAL) Take by mouth.    cyclobenzaprine (FLEXERIL) 10 MG tablet Take 1 tablet (10 mg total) by mouth 3 (three) times daily as needed for Muscle spasms.    gabapentin (NEURONTIN) 800 MG tablet Take 1 tablet by mouth three times a day and take 2 tablets at night (5 tablets a day, 4000mg)    Lactobacillus acidophilus (ACIDOPHILUS) Cap Take by mouth once daily.     lamoTRIgine (LAMICTAL) 200 MG tablet Take 1 tablet (200 mg total) by mouth 2 (two) times daily.    loratadine (CLARITIN) 10 mg tablet Take 10 mg by mouth once daily.     oxyCODONE-acetaminophen (PERCOCET)  mg per tablet Take 1 tablet by mouth every 8 (eight) hours as needed for Pain.    oxyCODONE-acetaminophen (PERCOCET)  mg per tablet Take 1 tablet by mouth 3 (three) times daily as needed for Pain.    predniSONE (DELTASONE) 10 MG tablet Take 1 tablet (10 mg total) by mouth once daily.    primidone (MYSOLINE) 50 MG Tab Take 2 tablets (100 mg total) by mouth 3 (three) times daily.    rOPINIRole (REQUIP) 4 MG tablet Take 1 tablet (4 mg total) by mouth nightly.    testosterone (ANDROGEL) 1 % (50 mg/5 gram) GlPk Apply 5 grams topically once daily.    triamcinolone acetonide 0.1% (KENALOG) 0.1 % cream Apply topically 2 (two) times daily.    zinc 50 mg Tab once daily.      No current facility-administered medications for this visit.        REVIEW OF SYSTEMS:    GENERAL:  No weight loss, malaise or fevers.  HEENT:   No recent changes in vision or hearing  NECK:  Negative for lumps, no difficulty with swallowing.  RESPIRATORY:  Negative for cough, wheezing or shortness of breath, patient denies any recent URI.  CARDIOVASCULAR:  Negative for chest pain, leg swelling or palpitations.  GI:  Negative for abdominal discomfort, blood in stools or black stools or change in bowel habits.  MUSCULOSKELETAL:  See HPI.  SKIN:  Negative for lesions, rash, and itching.  PSYCH:  Significant psychosocial stressors including PTSD for  sex re-assignment surgery.  Patient's sleep is disturbed secondary to pain.  HEMATOLOGY/LYMPHOLOGY:  Negative for prolonged bleeding, bruising easily or swollen nodes.  Patient is not currently taking any anti-coagulants  ENDO: No history of diabetes or thyroid dysfunction  NEURO:   No history of headaches, syncope, paralysis, seizures or tremors.  All other reviewed and negative other than HPI.    OBJECTIVE:    There were no vitals taken for this visit.    PHYSICAL EXAMINATION: (previous physical examination)    GENERAL: Well appearing, in no acute distress, alert and oriented x3.  PSYCH:  Mood and affect appropriate.  SKIN:  Well-healed incisions without any evidence of infection  HEAD/FACE:  Normocephalic, atraumatic.   CV: RRR with palpation of the radial artery.  PULM: No evidence of respiratory difficulty, symmetric chest rise.  EXT:  Decreased range of motion in the right knee. Well healing scar to right knee from recent TKA.  BACK:  There is significant pain with palpation over the facet joints of the lumbar spine bilaterally. There is decreased range of motion with extension to 15 degrees, and facet loading maneuvers cause reproducible pain.    NEURO:  No clonus.  Cranial nerves grossly intact  GAIT: Antalgic.    Lab Results   Component Value Date    WBC 7.20 02/27/2020    HGB 11.7 (L) 02/27/2020    HCT 36.5 (L) 02/27/2020    MCV 94 02/27/2020     02/27/2020     CMP  Sodium   Date Value Ref Range Status   02/27/2020 137 136 - 145 mmol/L Final     Potassium   Date Value Ref Range Status   02/27/2020 3.9 3.5 - 5.1 mmol/L Final     Chloride   Date Value Ref Range Status   02/27/2020 102 95 - 110 mmol/L Final     CO2   Date Value Ref Range Status   02/27/2020 26 23 - 29 mmol/L Final     Glucose   Date Value Ref Range Status   02/27/2020 91 70 - 110 mg/dL Final     BUN, Bld   Date Value Ref Range Status   02/27/2020 16 6 - 20 mg/dL Final     Creatinine   Date Value Ref Range Status   02/27/2020 0.7 0.5 -  1.4 mg/dL Final     Calcium   Date Value Ref Range Status   02/27/2020 9.7 8.7 - 10.5 mg/dL Final     Total Protein   Date Value Ref Range Status   02/27/2020 7.0 6.0 - 8.4 g/dL Final     Albumin   Date Value Ref Range Status   02/27/2020 3.8 3.5 - 5.2 g/dL Final     Total Bilirubin   Date Value Ref Range Status   02/27/2020 0.9 0.1 - 1.0 mg/dL Final     Comment:     For infants and newborns, interpretation of results should be based  on gestational age, weight and in agreement with clinical  observations.  Premature Infant recommended reference ranges:  Up to 24 hours.............<8.0 mg/dL  Up to 48 hours............<12.0 mg/dL  3-5 days..................<15.0 mg/dL  6-29 days.................<15.0 mg/dL       Alkaline Phosphatase   Date Value Ref Range Status   02/27/2020 620 (H) 55 - 135 U/L Final     AST   Date Value Ref Range Status   02/27/2020 89 (H) 10 - 40 U/L Final     ALT   Date Value Ref Range Status   02/27/2020 120 (H) 10 - 44 U/L Final     Anion Gap   Date Value Ref Range Status   02/27/2020 9 8 - 16 mmol/L Final     eGFR if    Date Value Ref Range Status   02/27/2020 >60 >60 mL/min/1.73 m^2 Final     eGFR if non    Date Value Ref Range Status   02/27/2020 >60 >60 mL/min/1.73 m^2 Final     Comment:     Calculation used to obtain the estimated glomerular filtration  rate (eGFR) is the CKD-EPI equation.        Lab Results   Component Value Date    HGBA1C 5.2 02/27/2020     Lab Results   Component Value Date    TSH 1.516 02/27/2020     Free T4 - 0.81 (wnl)      ASSESSMENT: 50 y.o. year old female with pain, consistent with     Encounter Diagnoses   Name Primary?    S/P lumbar laminectomy     Cervical radiculopathy     Other chronic pain     Primary osteoarthritis of right knee     Muscle spasm        PLAN:     - Previous imaging was reviewed and discussed with the patient today.    - Continue to follow up with orthopedics.  Continue with PT.    - Can consider synvisc  injection in the future if left knee pain worsens    - In the future the patient may benefit from medial branch nerve blocks followed by radiofrequency ablation of the lumbar spine    - Continue Gabapentin.    - Continue oxycodone acetaminophen 10/325 mg TID PRN, #90.  Will send refill today.    - Continue MS Contin 15 mg Q12h.  He was advised to take regularly but can take Percocet PRN.  Continue attempts to wean from percoset    - The patient is here today for a refill of current pain medications and they believe these provide effective pain control and improvements in quality of life.  The patient notes no serious side effects, and feels the benefits outweigh the risks.  The patient was reminded of the pain contract that they signed previously as well as the risks and benefits of the medication including possible death.  The updated [place] Board of Pharm[employer] prescription monitoring program was reviewed, and the patient has been found to be compliant with current treatment plan.    - Pain has filled contract.  UDS previously performed is appropriate.    - RTC in 3 months or sooner if pain worsens or changes.    Can call for knee injection with synvisc in the future.      The above plan and management options were discussed at length with patient. Patient is in agreement with the above and verbalized understanding.      Messi Cabello  05/27/2020

## 2020-05-29 ENCOUNTER — PATIENT MESSAGE (OUTPATIENT)
Dept: SLEEP MEDICINE | Facility: CLINIC | Age: 50
End: 2020-05-29

## 2020-05-29 PROCEDURE — 95800 PR SLEEP STUDY, UNATTENDED, RECORD HEART RATE/O2 SAT/RESP ANAL/SLEEP TIME: ICD-10-PCS | Mod: 26,,, | Performed by: INTERNAL MEDICINE

## 2020-05-29 PROCEDURE — 95800 SLP STDY UNATTENDED: CPT | Mod: 26,,, | Performed by: INTERNAL MEDICINE

## 2020-05-29 NOTE — PROCEDURES
"The sleep study that you ordered is complete.  You have ordered sleep LAB services to perform the sleep study for Ari Santos     Please find Sleep Study result in  the "Media tab" of Chart Review menu.     Patient is already established with a Sleep Medicine provider        For any questions, please contact our clinic staff at [phone] to talk to clinical staff.     "

## 2020-06-04 DIAGNOSIS — G47.33 OSA (OBSTRUCTIVE SLEEP APNEA): Primary | ICD-10-CM

## 2020-06-09 ENCOUNTER — PATIENT MESSAGE (OUTPATIENT)
Dept: INTERNAL MEDICINE | Facility: CLINIC | Age: 50
End: 2020-06-09

## 2020-06-09 DIAGNOSIS — M25.511 RIGHT SHOULDER PAIN, UNSPECIFIED CHRONICITY: Primary | ICD-10-CM

## 2020-06-12 ENCOUNTER — TELEPHONE (OUTPATIENT)
Dept: INTERNAL MEDICINE | Facility: CLINIC | Age: 50
End: 2020-06-12

## 2020-06-14 DIAGNOSIS — F43.10 PTSD (POST-TRAUMATIC STRESS DISORDER): ICD-10-CM

## 2020-06-15 RX ORDER — ALPRAZOLAM 1 MG/1
1 TABLET ORAL 3 TIMES DAILY PRN
Qty: 90 TABLET | Refills: 0 | Status: SHIPPED | OUTPATIENT
Start: 2020-06-15 | End: 2020-07-15 | Stop reason: SDUPTHER

## 2020-06-16 RX ORDER — PRIMIDONE 50 MG/1
100 TABLET ORAL 3 TIMES DAILY
Qty: 180 TABLET | Refills: 0 | Status: SHIPPED | OUTPATIENT
Start: 2020-06-16 | End: 2020-07-20 | Stop reason: SDUPTHER

## 2020-06-23 DIAGNOSIS — M54.12 CERVICAL RADICULOPATHY: Primary | ICD-10-CM

## 2020-06-23 RX ORDER — MORPHINE SULFATE 15 MG/1
15 TABLET, FILM COATED, EXTENDED RELEASE ORAL 2 TIMES DAILY
Qty: 60 TABLET | Refills: 0 | Status: SHIPPED | OUTPATIENT
Start: 2020-06-26 | End: 2020-08-03 | Stop reason: SDUPTHER

## 2020-06-23 NOTE — TELEPHONE ENCOUNTER
Patient completed video visit with  on 05/27/20. This is a  patient. Patient last received this medication 05/27/20. Patient has no UDS on file. Patient has a pain contract on file. Patient has a follow up scheduled 08/21/20.

## 2020-07-02 ENCOUNTER — OFFICE VISIT (OUTPATIENT)
Dept: PSYCHIATRY | Facility: CLINIC | Age: 50
End: 2020-07-02
Payer: MEDICARE

## 2020-07-02 DIAGNOSIS — F43.10 PTSD (POST-TRAUMATIC STRESS DISORDER): Primary | ICD-10-CM

## 2020-07-02 DIAGNOSIS — E78.5 HYPERLIPIDEMIA, UNSPECIFIED HYPERLIPIDEMIA TYPE: ICD-10-CM

## 2020-07-02 PROCEDURE — 99213 OFFICE O/P EST LOW 20 MIN: CPT | Mod: 95,,, | Performed by: PSYCHIATRY & NEUROLOGY

## 2020-07-02 PROCEDURE — 99213 PR OFFICE/OUTPT VISIT, EST, LEVL III, 20-29 MIN: ICD-10-PCS | Mod: 95,,, | Performed by: PSYCHIATRY & NEUROLOGY

## 2020-07-02 RX ORDER — LAMOTRIGINE 200 MG/1
200 TABLET ORAL 2 TIMES DAILY
Qty: 60 TABLET | Refills: 5 | Status: SHIPPED | OUTPATIENT
Start: 2020-07-02 | End: 2020-11-11 | Stop reason: SDUPTHER

## 2020-07-02 RX ORDER — ATORVASTATIN CALCIUM 10 MG/1
10 TABLET, FILM COATED ORAL NIGHTLY
Qty: 90 TABLET | Refills: 2 | Status: CANCELLED | OUTPATIENT
Start: 2020-07-02

## 2020-07-02 NOTE — PROGRESS NOTES
"Outpatient Psychiatry Follow-Up Visit (MD/NP)    07/02/2020 The patient location is: home  The chief complaint leading to consultation is: PTSD    Visit type: audiovisual    Face to Face time with patient: 10"  25 minutes of total time spent on the encounter, which includes face to face time and non-face to face time preparing to see the patient (eg, review of tests), Obtaining and/or reviewing separately obtained history, Documenting clinical information in the electronic or other health record, Independently interpreting results (not separately reported) and communicating results to the patient/family/caregiver, or Care coordination (not separately reported).         Each patient to whom he or she provides medical services by telemedicine is:  (1) informed of the relationship between the physician and patient and the respective role of any other health care provider with respect to management of the patient; and (2) notified that he or she may decline to receive medical services by telemedicine and may withdraw from such care at any time.    Notes: See below    Clinical Status of Patient:  Outpatient (Ambulatory)    Chief Complaint:  Problems with PTSD    Interval History and Content of Current Session: Patient continues to do quite well. 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 He is in good self control and has no thoughts of harming himself. He has no signs of ricardo or psychosis. He is doing well managing the virus pandemic. 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    Compliance:good    Side effects:  None  Musculoskeletal: patient had no abnormal motor movements of any kind. He is restricted to a wheelchair for mobility and discussions.    Risk Parameters:not active danger to self or others at this time.    Patient's Response to Intervention:acceptng.Progress Toward Goals and Other Mental Status Changes:very good.   Vital signs this " date were not reviewed.     Mental Status Evaluation     Appearance:  Neatly dressed and groomed   Behavior:  cooperative                             Speech normal   Mood:  steady   Affect:  euthymic   Thought Process:  linear, logical   Thought Content:  organizednormal   Sensorium:  alert and oriented to person, place, time and situation   Attention Span & Concentration able to focus   Cognition:  Grossly intactgrossly intact   Insight:  has awareness of illness   Judgment:  behavior is adequate to circumstances       Diagnosis:    PTSD (post-traumatic stress disorder) [F43.10]      I reviewed the side effects of the patient's medicines and advised a call if the patient had problems with the medicine or clinical condition.    Plan:(Medication and Therapy Recommendation)  Additional Notes: Patient agrees to continue Lamictal 200 mg bid,and Xanax 1 mg bid to tid.  Return to Clinic:4 months

## 2020-07-04 DIAGNOSIS — E78.5 HYPERLIPIDEMIA, UNSPECIFIED HYPERLIPIDEMIA TYPE: ICD-10-CM

## 2020-07-04 RX ORDER — ATORVASTATIN CALCIUM 10 MG/1
10 TABLET, FILM COATED ORAL NIGHTLY
Qty: 90 TABLET | Refills: 2 | Status: CANCELLED | OUTPATIENT
Start: 2020-07-02

## 2020-07-06 ENCOUNTER — PATIENT MESSAGE (OUTPATIENT)
Dept: INTERNAL MEDICINE | Facility: CLINIC | Age: 50
End: 2020-07-06

## 2020-07-06 DIAGNOSIS — E78.5 HYPERLIPIDEMIA, UNSPECIFIED HYPERLIPIDEMIA TYPE: ICD-10-CM

## 2020-07-06 DIAGNOSIS — M62.838 MUSCLE SPASM: Primary | ICD-10-CM

## 2020-07-06 RX ORDER — CYCLOBENZAPRINE HCL 10 MG
10 TABLET ORAL 3 TIMES DAILY PRN
Qty: 90 TABLET | Refills: 0 | Status: SHIPPED | OUTPATIENT
Start: 2020-07-06 | End: 2020-08-06

## 2020-07-06 RX ORDER — ATORVASTATIN CALCIUM 10 MG/1
10 TABLET, FILM COATED ORAL NIGHTLY
Qty: 90 TABLET | Refills: 2 | Status: CANCELLED | OUTPATIENT
Start: 2020-07-02

## 2020-07-06 RX ORDER — ATORVASTATIN CALCIUM 10 MG/1
10 TABLET, FILM COATED ORAL NIGHTLY
Qty: 90 TABLET | Refills: 2 | Status: SHIPPED | OUTPATIENT
Start: 2020-07-06 | End: 2021-03-29 | Stop reason: SDUPTHER

## 2020-07-15 DIAGNOSIS — F43.10 PTSD (POST-TRAUMATIC STRESS DISORDER): ICD-10-CM

## 2020-07-15 RX ORDER — PRIMIDONE 50 MG/1
100 TABLET ORAL 3 TIMES DAILY
Qty: 180 TABLET | Refills: 0 | Status: CANCELLED | OUTPATIENT
Start: 2020-07-15 | End: 2020-08-14

## 2020-07-15 RX ORDER — ROPINIROLE 4 MG/1
4 TABLET, FILM COATED ORAL NIGHTLY
Qty: 90 TABLET | Refills: 0 | Status: CANCELLED | OUTPATIENT
Start: 2020-07-15

## 2020-07-16 RX ORDER — ALPRAZOLAM 1 MG/1
1 TABLET ORAL 3 TIMES DAILY PRN
Qty: 90 TABLET | Refills: 1 | Status: SHIPPED | OUTPATIENT
Start: 2020-07-16 | End: 2020-10-06 | Stop reason: SDUPTHER

## 2020-07-19 ENCOUNTER — PATIENT MESSAGE (OUTPATIENT)
Dept: INTERNAL MEDICINE | Facility: CLINIC | Age: 50
End: 2020-07-19

## 2020-07-19 DIAGNOSIS — E27.40 ADRENAL INSUFFICIENCY: ICD-10-CM

## 2020-07-19 DIAGNOSIS — G25.2 INTENTION TREMOR: Primary | ICD-10-CM

## 2020-07-19 DIAGNOSIS — G25.81 RLS (RESTLESS LEGS SYNDROME): ICD-10-CM

## 2020-07-20 RX ORDER — ROPINIROLE 4 MG/1
4 TABLET, FILM COATED ORAL NIGHTLY
Qty: 30 TABLET | Refills: 0 | Status: SHIPPED | OUTPATIENT
Start: 2020-07-20 | End: 2020-08-17 | Stop reason: SDUPTHER

## 2020-07-20 RX ORDER — PRIMIDONE 50 MG/1
100 TABLET ORAL 3 TIMES DAILY
Qty: 180 TABLET | Refills: 0 | Status: SHIPPED | OUTPATIENT
Start: 2020-07-20 | End: 2020-08-17 | Stop reason: SDUPTHER

## 2020-07-20 NOTE — TELEPHONE ENCOUNTER
LOV 8/28/2019    Informed patient she would need to be seen within the next month to continue getting refills. Sent message to Dr. Mitchell to refill meds.

## 2020-07-21 ENCOUNTER — OFFICE VISIT (OUTPATIENT)
Dept: HEPATOLOGY | Facility: CLINIC | Age: 50
End: 2020-07-21
Payer: MEDICARE

## 2020-07-21 DIAGNOSIS — K75.2 NONSPECIFIC REACTIVE HEPATITIS: ICD-10-CM

## 2020-07-21 DIAGNOSIS — K75.9 INFLAMMATORY LIVER DISEASE, UNSPECIFIED: ICD-10-CM

## 2020-07-21 DIAGNOSIS — R74.8 ELEVATED LIVER ENZYMES: ICD-10-CM

## 2020-07-21 DIAGNOSIS — R63.4 UNINTENTIONAL WEIGHT LOSS: ICD-10-CM

## 2020-07-21 DIAGNOSIS — R74.8 ABNORMAL LIVER ENZYMES: ICD-10-CM

## 2020-07-21 DIAGNOSIS — R93.5 ABNORMAL FINDINGS ON DIAGNOSTIC IMAGING OF OTHER ABDOMINAL REGIONS, INCLUDING RETROPERITONEUM: ICD-10-CM

## 2020-07-21 DIAGNOSIS — R74.8 ELEVATED ALKALINE PHOSPHATASE LEVEL: Primary | ICD-10-CM

## 2020-07-21 PROCEDURE — 99214 PR OFFICE/OUTPT VISIT, EST, LEVL IV, 30-39 MIN: ICD-10-PCS | Mod: 95,,, | Performed by: NURSE PRACTITIONER

## 2020-07-21 PROCEDURE — 99214 OFFICE O/P EST MOD 30 MIN: CPT | Mod: 95,,, | Performed by: NURSE PRACTITIONER

## 2020-07-21 NOTE — Clinical Note
[facility] Please contact pt to schedule MRI abd AND MRI elastography at [facility]. Prob needs to be scheduled as 2 separate appts but hopefully can be done back to back at same time  Also needs f/u appt with me 1 week after MRIs completed  Thanks!  Mariely

## 2020-07-21 NOTE — PATIENT INSTRUCTIONS
1. Will check immunity markers for Hepatitis A and B and arrange for vaccination if needed  2. Labs soon to  check for multiple causes of liver disease. I will send you the results of your labs when they are all resulted, which is typically 1 week after your visit  3.  If alkaline phosphatase remains elevated on labs, likely will proceed with MRI of abdomen and bile ducts  4. Follow up based on results of above

## 2020-07-21 NOTE — PROGRESS NOTES
[facility]    REFERRING PROVIDER:  Dr. Delfina Alvarez    CHIEF COMPLAINT: elevated liver enzymes, alk phos    HPI: This is a 50 y.o. White female with PMH noted below, presenting for evaluation of elevated liver enzymes    Followed by endocrine Dr. Alvarez for congenital adrenal hyperplasia, adrenal insufficiency, and gender dysphoria. On testosterone gel (Androgel) since 2004    No Previous serologic w/u - will obtain     Minimal Risk factors for NAFLD include HLD    Interval HPI: Presents today via video visit with wife.   Does report Unintentional weight loss, loss of appetite, weight has decreased from 222 to 135 lbs over the last year.   Do not suspect below medications causing abnormal liver enzymes:   Primidone taking for ~ 2 years  Lamictal for ~10-15 years  Lipitor ~4 years  Does report sister with Sjrogren's and Raunaud's    Was prescribed Antibiotics 9/2019 and 1/2020 pre-surgeries and also again in 2020 for a Bladder infection per wife, possible correlation     Labs done 2/2020 show elevated transaminase levels (ALT > AST, elevated since 9/2019)  Platelets WNL, alk phos markedly elevated (increasing since 9/2019)  Synthetic liver functioning WNL    Lab Results   Component Value Date     (H) 02/27/2020    AST 89 (H) 02/27/2020    ALKPHOS 620 (H) 02/27/2020    BILITOT 0.9 02/27/2020    ALBUMIN 3.8 02/27/2020    INR 1.0 01/09/2020     02/27/2020     Abd U/S done 11/2019 showed normal liver, no lesions.   Multiple gallstones seen but no biliary ductal dilation or gallbladder wall thickening or pericholecystic fluid   No splenomegaly    Denies family history of liver disease . Denies alcohol consumption currently or in the past-     Immunity to Hep A and B - will check with labs    Denies jaundice, dark urine, abdominal distention, hematemesis, melena, slowed mentation. No abnormal skin rashes. No generalized joint or muscle pain.      Review of patient's  allergies indicates:   Allergen Reactions    Bactrim [sulfamethoxazole-trimethoprim] Itching    Penicillins Rash       Current Outpatient Medications on File Prior to Visit   Medication Sig Dispense Refill    acetaminophen (TYLENOL) 650 MG TbSR Take 1 tablet (650 mg total) by mouth every 8 (eight) hours. (Patient not taking: Reported on 3/5/2020) 120 tablet 0    ALPRAZolam (XANAX) 1 MG tablet Take 1 tablet (1 mg total) by mouth 3 (three) times daily as needed. 90 tablet 1    aspirin (ECOTRIN) 81 MG EC tablet Take 1 tablet (81 mg total) by mouth 2 (two) times daily. 60 tablet 0    atorvastatin (LIPITOR) 10 MG tablet Take 1 tablet (10 mg total) by mouth every evening. 90 tablet 2    cranberry fruit extract (CRANBERRY ORAL) Take by mouth.      cyclobenzaprine (FLEXERIL) 10 MG tablet Take 1 tablet (10 mg total) by mouth 3 (three) times daily as needed for Muscle spasms. 90 tablet 0    gabapentin (NEURONTIN) 800 MG tablet Take 1 tablet by mouth three times a day and take 2 tablets at night (5 tablets a day, 4000mg) 150 tablet 2    Lactobacillus acidophilus (ACIDOPHILUS) Cap Take by mouth once daily.       lamoTRIgine (LAMICTAL) 200 MG tablet Take 1 tablet (200 mg total) by mouth 2 (two) times daily. 60 tablet 5    loratadine (CLARITIN) 10 mg tablet Take 10 mg by mouth once daily.       morphine (MS CONTIN) 15 MG 12 hr tablet Take 1 tablet (15 mg total) by mouth 2 (two) times daily. 60 tablet 0    oxyCODONE-acetaminophen (PERCOCET)  mg per tablet Take 1 tablet by mouth 3 (three) times daily as needed for Pain. 90 tablet 0    oxyCODONE-acetaminophen (PERCOCET)  mg per tablet Take 1 tablet by mouth every 8 (eight) hours as needed for Pain. 90 tablet 0    predniSONE (DELTASONE) 10 MG tablet Take 1 tablet (10 mg total) by mouth once daily. 100 tablet 5    primidone (MYSOLINE) 50 MG Tab Take 2 tablets (100 mg total) by mouth 3 (three) times daily. 180 tablet 0    rOPINIRole (REQUIP) 4 MG tablet  Take 1 tablet (4 mg total) by mouth nightly. 30 tablet 0    testosterone (ANDROGEL) 1 % (50 mg/5 gram) GlPk Apply 5 grams topically once daily. 30 packet 4    triamcinolone acetonide 0.1% (KENALOG) 0.1 % cream Apply topically 2 (two) times daily. 45 g 1    zinc 50 mg Tab once daily.        No current facility-administered medications on file prior to visit.        PMHX:  has a past medical history of Adrenal insufficiency, Arthritis, Chronic bilateral low back pain with bilateral sciatica (3/19/2019), Congenital adrenal hyperplasia, Hyperlipidemia, Spinal stenosis, Spinal stenosis of lumbar region with neurogenic claudication (3/19/2019), and Status post sex reassignment surgery (3/19/2019).    PSHX:  has a past surgical history that includes Radiofrequency ablation (Left, 2019); Radiofrequency ablation (Right, 2019); Back surgery; Laminectomy (2019); Spine surgery; and Total knee arthroplasty (Right, 2020).    FAMILY HISTORY: Negative for liver disease, [facility]    SOCIAL HISTORY:   Social History     Tobacco Use   Smoking Status Former Smoker    Quit date:     Years since quittin.5   Smokeless Tobacco Never Used   Tobacco Comment    Socailly       Social History     Substance and Sexual Activity   Alcohol Use Not Currently    Frequency: Never    Binge frequency: Never       Social History     Substance and Sexual Activity   Drug Use Never       ROS:   GENERAL: Denies fever, chills, +weight loss, denies fatigue  HEENT: Denies headaches, dizziness, vision/hearing changes  CARDIOVASCULAR: Denies chest pain, palpitations, or edema  RESPIRATORY: Denies dyspnea, cough  GI: Denies abdominal pain, rectal bleeding, nausea, vomiting. No change in bowel pattern or color  : Denies dysuria, hematuria   SKIN: Denies rash, itching   NEURO: Denies confusion, memory loss, or mood changes  PSYCH: Denies depression or anxiety  HEME/LYMPH: Denies easy bruising or bleeding    PHYSICAL  EXAM:   Friendly White female, in no acute distress; alert and oriented to person, place and time  VITALS: There were no vitals taken for this visit.  HENT: Normocephalic, without obvious abnormality. Oral mucosa pink and moist. Dentition good  EYES: Sclerae anicteric. No conjunctival pallor.   NECK: Supple. No masses or cervical adenopathy.  CARDIOVASCULAR: AYSHA on video visit  RESPIRATORY: Normal respiratory effort.   GI: Soft, non-tender, non-distended. AYSHA hepatosplenomegaly  EXTREMITIES:  No clubbing, cyanosis or edema.  SKIN: Warm and dry. No jaundice. No rashes noted to exposed skin. No telangectasias noted. No palmar erythema.  NEURO:  Normal gait. No asterixis.  PSYCH:  Memory intact. Thought and speech pattern appropriate. Behavior normal. No depression or anxiety noted.    DIAGNOSTIC STUDIES:    ABD. U/S-    Done 11/2019  FINDINGS:  Liver: Normal in size, measuring 15.4 cm. Homogeneous echotexture. No focal hepatic lesions.     Gallbladder: Multiple gallstones are seen.  There is no gallbladder wall thickening or pericholecystic fluid.  No sonographic Joel's sign.     Biliary system: The common duct is not dilated, measuring 2 mm.  No intrahepatic ductal dilatation.     Spleen: Normal in size and echotexture, measuring 10.8 cm.     Miscellaneous: No upper abdominal ascites.     Impression:     Cholelithiasis without sonographic evidence of acute cholecystitis.    CT SCAN-       MRI-  Will arrange MRI/MRCP given rising alk phos if repeat labs are liver etiology     LIVER BIOPSY- none in past    FIBROSCAN - to be done with RTC if indicated       EDUCATION:  Discussed will perform full serologic workup to assess for causes of elevated liver enzymes/alk phos, MRI/MRCP to assess and  fibroscan to assess for fatty liver    ASSESSMENT & PLAN:  50 y.o. White female with:  1. Elevated liver enzymes  -- Labs note elevated transaminase levels (ALT > AST), elevated since 9/2019  --- synthetic liver function WNL  ---  Abd U/S done 11/2019 showed normal liver, no lesions.   Multiple gallstones seen but no biliary ductal dilation or gallbladder wall thickening or pericholecystic fluid   No splenomegaly  --- no previous serological work up : will obtain today   --- Hep A and B immunity: will check today, will arrange Hep A and B vaccines if needed    -- labs today  Orders Placed This Encounter   Procedures    FibroScan (Vibration Controlled Transient Elastography)    MRI Abdomen W WO Contrast_INC MRCP    Alpha-1-Antitrypsin    ULISES Screen w/Reflex    Antimitochondrial Antibody    Anti-Smooth Muscle Antibody    CBC Without Differential    Ceruloplasmin    CK    Comprehensive metabolic panel    Ferritin    Hepatitis Panel, Acute    IgG    Iron and TIBC    Phosphatidylethanol (PETH)    Protime-INR    Hepatitis A antibody, IgG    Hepatitis B Core Antibody, Total    Hepatitis B Surface Ab, Qualitative    Alkaline phosphatase, isoenzymes    Angiotensin converting enzyme      -- fibroscan with RTC if indicated     2. Elevated Alk phos  -- will obtain labs above  -- alk phos rising since 9/2019, currently in 600s  -- full serological workup to be done to assess for possible causes  -- if liver etiology, will obtain MRI/MRCP    3. Unintentional weight loss  -- will arrange MRI/MRCP if alk phos remains elevated  -- message also sent to PCP in case warrants other w/u given significant weight loss     No follow-ups on file. pending results of w/u, will likely arrange f/u 1-2 weeks after MRI if proceeds with MRI after repeat labs    ADDENDUM 8/27/20:Serological workup was negative for Trev's, alpha-1 antitrypsin deficiency, hemochromatosis, autoimmune etiology (including negative AMA), and viral hepatitis A, B and C   rheum for muscle pain, elevated CK  normal ACE,   + Hep B core, negative sAg, suggests previous exposure to Hep B  needs [employer] vaccine, sent to [facility] pharm and ID  Will proceed with MRI/MRCP and MRI  elastography for assessment of elevated alk phos and fatty liver     Thank you for allowing me to participate in the care of Ari Santos    Mariely Robison, NP-C    CC'ed note to:   Delfina Alvarez MD Nicholas J Disalvo, MD

## 2020-07-21 NOTE — LETTER
July 21, 2020      Delfina Alvarez MD  [address]  [place] [address]           [facility] - Hepatology  [address]  [facility] [address]  Phone: [phone]  Fax: [phone]          Patient: Ari Santos   MR Number: [id number]   YOB: 1970   Date of Visit: 7/21/2020       Dear Dr. Delfina Alvarez:    Thank you for referring Ari Santos to me for evaluation. Attached you will find relevant portions of my assessment and plan of care.    If you have questions, please do not hesitate to call me. I look forward to following Ari Santos along with you.    Sincerely,    Mariely Robison, NP    Enclosure  CC:  No Recipients    If you would like to receive this communication electronically, please contact [email or web address] or [phone] to request more information on [facility] Link access.    For providers and/or their staff who would like to refer a patient to [facility], please contact us through our one-stop-shop provider referral line, [facility], at [phone].    If you feel you have received this communication in error or would no longer like to receive these types of communications, please e-mail [email or web address]

## 2020-07-21 NOTE — Clinical Note
I saw Ari today for hepatology eval for elevated liver enzymes and alk phos. If alk phos remains elevated will do MRI/MRCP to assess for etiology. Just wanted to let you know that he is experiecing significant unintentional weight loss and loss of appetite (weight from 222 lbs to 130s in the last year). Just wanted to let you know in case you recommend further workup  Thanks  Mariely

## 2020-07-21 NOTE — Clinical Note
Please contact pt or pts wife to arrange all labs soon. Will arrange MRI if needed after labs come back, don't schedule yet  Thanks  Mariely

## 2020-07-22 ENCOUNTER — TELEPHONE (OUTPATIENT)
Dept: HEPATOLOGY | Facility: CLINIC | Age: 50
End: 2020-07-22

## 2020-07-22 NOTE — TELEPHONE ENCOUNTER
Attempted to contact patient and schedule labs as instructed but patient didn't answer. Called both numbers on file, left voicemail on both stating the purpose for the call. Awaiting a call back.     Instructions:    Mariely Robison, SARINA Robison Mariely Staff             Please contact pt or pts wife to arrange all labs soon. Will arrange MRI if needed after labs come back, don't schedule yet   Thanks

## 2020-07-29 DIAGNOSIS — M54.12 CERVICAL RADICULOPATHY: ICD-10-CM

## 2020-07-29 RX ORDER — MORPHINE SULFATE 15 MG/1
15 TABLET, FILM COATED, EXTENDED RELEASE ORAL 2 TIMES DAILY
Qty: 60 TABLET | Refills: 0 | OUTPATIENT
Start: 2020-07-29

## 2020-07-30 DIAGNOSIS — M54.12 CERVICAL RADICULOPATHY: ICD-10-CM

## 2020-07-30 RX ORDER — MORPHINE SULFATE 15 MG/1
15 TABLET, FILM COATED, EXTENDED RELEASE ORAL 2 TIMES DAILY
Qty: 60 TABLET | Refills: 0 | OUTPATIENT
Start: 2020-07-30

## 2020-08-03 DIAGNOSIS — M54.12 CERVICAL RADICULOPATHY: Primary | ICD-10-CM

## 2020-08-03 RX ORDER — MORPHINE SULFATE 15 MG/1
15 TABLET, FILM COATED, EXTENDED RELEASE ORAL 2 TIMES DAILY
Qty: 60 TABLET | Refills: 0 | Status: SHIPPED | OUTPATIENT
Start: 2020-08-03 | End: 2020-08-23 | Stop reason: SDUPTHER

## 2020-08-03 NOTE — TELEPHONE ENCOUNTER
Patient completed video visit with  05/27/20. This is a  patient. Patient last received this medication 06/29/20. Patient last completed UDS 02/27/20 and was consistent. Patient has a pain contract on file. Patient has a follow up 08/21/20.

## 2020-08-10 DIAGNOSIS — M62.838 MUSCLE SPASM: Primary | ICD-10-CM

## 2020-08-10 RX ORDER — CYCLOBENZAPRINE HCL 10 MG
10 TABLET ORAL 3 TIMES DAILY PRN
Qty: 90 TABLET | Refills: 0 | Status: SHIPPED | OUTPATIENT
Start: 2020-08-10 | End: 2020-09-10

## 2020-08-18 ENCOUNTER — PATIENT OUTREACH (OUTPATIENT)
Dept: ADMINISTRATIVE | Facility: HOSPITAL | Age: 50
End: 2020-08-18

## 2020-08-19 ENCOUNTER — PATIENT OUTREACH (OUTPATIENT)
Dept: ADMINISTRATIVE | Facility: OTHER | Age: 50
End: 2020-08-19

## 2020-08-20 ENCOUNTER — TELEPHONE (OUTPATIENT)
Dept: PAIN MEDICINE | Facility: CLINIC | Age: 50
End: 2020-08-20

## 2020-08-20 NOTE — TELEPHONE ENCOUNTER
Staff called to confirm 08/21/20 10:20 am with Alejandra Phoenix Np. Patient's wife was informed of the instructions. Patient's wife confirmed and verbalized understanding and expressed thanks.

## 2020-08-21 ENCOUNTER — HOSPITAL ENCOUNTER (OUTPATIENT)
Dept: RADIOLOGY | Facility: OTHER | Age: 50
Discharge: HOME | End: 2020-08-21
Attending: NURSE PRACTITIONER
Payer: MEDICARE

## 2020-08-21 ENCOUNTER — OFFICE VISIT (OUTPATIENT)
Dept: PAIN MEDICINE | Facility: CLINIC | Age: 50
End: 2020-08-21
Payer: MEDICARE

## 2020-08-21 VITALS
WEIGHT: 134.5 LBS | SYSTOLIC BLOOD PRESSURE: 143 MMHG | BODY MASS INDEX: 26.41 KG/M2 | DIASTOLIC BLOOD PRESSURE: 86 MMHG | HEART RATE: 116 BPM | HEIGHT: 60 IN | OXYGEN SATURATION: 100 % | RESPIRATION RATE: 18 BRPM | TEMPERATURE: 98 F

## 2020-08-21 DIAGNOSIS — Z98.890 S/P LUMBAR LAMINECTOMY: ICD-10-CM

## 2020-08-21 DIAGNOSIS — M54.12 CERVICAL RADICULOPATHY: Primary | ICD-10-CM

## 2020-08-21 DIAGNOSIS — M54.12 CERVICAL RADICULOPATHY: ICD-10-CM

## 2020-08-21 DIAGNOSIS — M17.11 PRIMARY OSTEOARTHRITIS OF RIGHT KNEE: ICD-10-CM

## 2020-08-21 DIAGNOSIS — M25.511 ACUTE PAIN OF RIGHT SHOULDER: ICD-10-CM

## 2020-08-21 DIAGNOSIS — M62.838 MUSCLE SPASM: ICD-10-CM

## 2020-08-21 DIAGNOSIS — G89.29 OTHER CHRONIC PAIN: ICD-10-CM

## 2020-08-21 DIAGNOSIS — Z79.891 ENCOUNTER FOR LONG-TERM OPIATE ANALGESIC USE: ICD-10-CM

## 2020-08-21 PROCEDURE — 99999 PR PBB SHADOW E&M-EST. PATIENT-LVL V: ICD-10-PCS | Mod: PBBFAC,,, | Performed by: NURSE PRACTITIONER

## 2020-08-21 PROCEDURE — 80307 DRUG TEST PRSMV CHEM ANLYZR: CPT

## 2020-08-21 PROCEDURE — 73030 XR SHOULDER COMPLETE 2 OR MORE VIEWS RIGHT: ICD-10-PCS | Mod: 26,RT,, | Performed by: RADIOLOGY

## 2020-08-21 PROCEDURE — 73030 X-RAY EXAM OF SHOULDER: CPT | Mod: TC,FY,RT

## 2020-08-21 PROCEDURE — 99214 OFFICE O/P EST MOD 30 MIN: CPT | Mod: S$PBB,,, | Performed by: NURSE PRACTITIONER

## 2020-08-21 PROCEDURE — 99215 OFFICE O/P EST HI 40 MIN: CPT | Mod: PBBFAC,25 | Performed by: NURSE PRACTITIONER

## 2020-08-21 PROCEDURE — 99214 PR OFFICE/OUTPT VISIT, EST, LEVL IV, 30-39 MIN: ICD-10-PCS | Mod: S$PBB,,, | Performed by: NURSE PRACTITIONER

## 2020-08-21 PROCEDURE — 73030 X-RAY EXAM OF SHOULDER: CPT | Mod: 26,RT,, | Performed by: RADIOLOGY

## 2020-08-21 PROCEDURE — 99999 PR PBB SHADOW E&M-EST. PATIENT-LVL V: CPT | Mod: PBBFAC,,, | Performed by: NURSE PRACTITIONER

## 2020-08-21 RX ORDER — OXYCODONE AND ACETAMINOPHEN 10; 325 MG/1; MG/1
1 TABLET ORAL EVERY 8 HOURS PRN
Qty: 90 TABLET | Refills: 0 | Status: SHIPPED | OUTPATIENT
Start: 2020-08-21 | End: 2020-11-24 | Stop reason: SDUPTHER

## 2020-08-21 NOTE — PROGRESS NOTES
Chronic Pain - Follow up    Referring Physician: No ref. provider found    Chief Complaint:   No chief complaint on file.       SUBJECTIVE: Disclaimer: This note has been generated using voice-recognition software. There may be typographical errors that have been missed during proof-reading    Interval History 8/21/2020:  The patient is here for follow up of chronic pain and medication refill.  He has been having more of the left knee pain recently.  He is considering replacement with Dr. Andrade in the future.  They have also discussed Euflexxa, however, he feels like this will not help him.  He has been having more right shoulder pain.  He says that he has difficulty lifting his right arm at times.  He also says that he has had steroid injections into the right shoulder in the past with minimal benefit.  She wishes to avoid further steroids.  He is interested in another procedure for his shoulder.  He continues to take morphine long-acting medication which is helpful.  He also takes Percocet sparingly for breakthrough pain.  His pain today is 5/10.    Interval History 5/27/2020   since previous encounter the patient continues to have improvement after his right knee replacement he is currently in physical therapy.  He has been able to on some days decreased team a of oxycodone acetaminophen that he has been taking.  Continues to use morphine ER 15 mg b.i.d. Without any side effects, gabapentin 4000 mg per day and Flexeril p.r.n.  He is planning a left knee replacement in the future but it is currently on hold with the coronavirus outbreak.    Interval History 2/27/2020:  The patient is here for follow up of chronic pain.  Since last visit, he underwent right TKA by Dr. Andrade on 1/31/20.  He reports that he is recovering well.  He is no longer taking post op pain medications.  He takes his MS Contin 1-2 times per day.  He says he was unaware to take it scheduled.  He taking Percocet PRN.  He needs a refill of the  Percocet today.  He denies any major adverse effects.  He does report that he fell last week onto his tailbone.  He has been using a donut pillow when sitting.  His pain today is 6/10.    Interval history 11/18/2019:  Since previous encounter the patient is status post lumbar laminectomy decompression from L1-S1 in September and has healed well subsequently and has undergone physical therapy.  He denies radicular symptoms to his lower extremities at this time but is having severe knee pain and is being evaluated for knee replacement to be done in January.  Otherwise the patient has been stable and has been utilizing his opioid medications appropriately he is taking MS Contin 15 mg b.i.d. along with oxycodone acetaminophen 10/325 t.i.d. p.r.n. without any side effects or evidence of misuse or abuse.  The patient also has been taking gabapentin 4000 mg per day but continues to have radicular pain symptoms in his upper extremities which was thought to be predominantly carpal tunnel he had an EMG/NCV with Neurology which showed a bilateral carpal tunnel with concomitant C7 radiculopathy.  Previous MRI of the cervical spine did reveal stenosis at C5-6 and C6-7 with moderate neuroforaminal stenosis.    Interval History 8/21/19:  Patient reports for follow up. He has seen neurosurgery and is scheduled for  Open L1-S1 laminectomy. He has also seen orthopedics for his bilateral knee pain. They have planned for knee braces after completion of his spinal surgery.  Patient reports continued back pain.  He is s/p RFA of bilateral L3,4,5 in 5/9/19 and 5/23/19 with 60% relief in his pain for 1 week.  Patient reports continued back pain, sharp, starts in his lower back and radiates to his feet. Patient also reports worsening of the neuropathic pain in the palms of his hands, burning, tingling pain worse at night.    Interval History 6/20/2019:  The patient is here for follow up of lower back pain.  He is s/p lumbar RFAs.  He is  reporting minimal benefit of pain.  He feels as though previous RFAs from another provider were helpful.  However, he is reporting pain across the lower back with radiation down the back of both legs.  We previously discussed a surgical referral and he would like to pursue this option.  He has been taking Percocet 5/325 mg TID as well as oxycodone 15 mg for severe breakthrough pain.  He feels as though the 15 mg make him hyper and unable to sleep.  He would like to adjust the medications.  Additionally, he was weaning Gabapentin 800 mg and is now taking it BID.  However, he feels as though his shooting pain has worsened since this.  His pain today is 6/10.    Interval History 4/12/2019:  The patient returns for follow up of back pain.  We have received his records including previous lumbar and MRI.  There is no NCS/EMG report, however.  His records indicate lumbar RFAs over 6 months ago.  He reports that this was helpful for him until recently.  He had a left synovial cyst aspiration and L5-S1 TF MAYURI in the past as well.  He feels as though RFA was more helpful.  Additionally, he had an updated lumbar MRI since previous visit which does show significant arthritis and spinal stenosis.  He would like to hold off on surgical consult at this time.  He has decreased Gabapentin to 800 mg TID and has not noticed a change in pain.  He takes Percocet 5/325 mg TID PRN pain.  He also takes oxycodone 15 mg PRN, about 2-3 pills per week for severe pain.  This was a one time refill from Dr. Mitchell with intention of transition to our office.  He denies any bowel or bladder changes.  His pain today is 6/10.    Initial encounter:    Ari Santos presents to the clinic for the evaluation of lower back pain. The pain started 9 year ago starting indiously and symptoms have been worsening.    Brief history:  History of spinal stenosis and history of a cyst with drainage.    Patient has a pain management physician in [place]  [place]    Pain Description:    The pain is located in the lower back area and radiates to the lower extremities bilaterally in the L5 distribution.      At BEST  5/10     At WORST  10/10 on the WORST day.      On average pain is rated as 7/10.     Today the pain is rated as 7/10    The pain is described as sharp and intermittent      Symptoms interfere with daily activity and sleeping.     Exacerbating factors: Standing, Walking, Morning and Getting out of bed/chair.      Mitigating factors medications, physical therapy and rest.     Patient reports significant motor weakness and loss of sensations.  Patient denies any suicidal or homicidal ideations    Pain Medications:  Current:  Flexeril 10mg TID  Gabapentin 800mg QID  Lamictal 200mg qHS  Percocet 10/325  TID PRN  Xanax 1mg for 1 - 3 times/day  ropinrole 4mg  MS Contin 15 mg Q12    Tried in Past:  NSAIDs -with some relief  TCA -Never  SNRI -venlafaxine for depression -with side effects  Anti-convulsants -gabapentin     Physical Therapy/Home Exercise: yes completed last year with limited benefit     report:  Reviewed and consistent with medication use as prescribed.    Pain Procedures: previous RFA and MAYURI  Previous knee steroid injection without improvement, and euflexxa in the remote past -unsure of the benefit    5/9/19 Left L3,4,5 RFA  5/23/19 Right L3,4,5 RFA- 50-60% reduction for one week    Chiropractor -helps in the past  Acupuncture - never  TENS unit -helps occasionally  Spinal decompression lumbar decompressive surgery from L1-S1 September 2019  Joint replacement - Right TKA 1/31/20    Imaging:     Narrative     EXAMINATION:  MRI LUMBAR SPINE WITHOUT CONTRAST    CLINICAL HISTORY:  bilateral lower extremity radiculopathy and weakness in the L4/5 distribution with neurogenic claudication;  Spinal stenosis, lumbar region with neurogenic claudication    TECHNIQUE:  Sagittal T1, T2 and STIR images as well as axial T1 and T2 weighted images were  obtained through the lumbar without the administration of contrast.    COMPARISON:  None    FINDINGS:  Alignment: There loss of the normal lumbar lordosis.  Minimal grade 1 anterolisthesis of L3 on L4 and L4 on L5.  There may also be minimal retrolisthesis of L2 as relates to L3.    Vertebrae: The vertebral bodies maintain normal height and signal intensity.    Discs:  Multilevel, advanced loss of disc height is noted throughout the lumbar spine with disc desiccation.    Cord: Normal signal.  The conus terminates at the T12-L1 level.    Degenerative findings:    T12-L1: There is a minimal diffuse disc bulge with no facet arthropathy or ligamentum flavum hypertrophy.  The central canal and neural foramen are patent.    L1-L2:There is a large diffuse disc bulge within no significant facet arthropathy.  Ligamentum flavum hypertrophy is present.  There effacement of the anterior thecal sleeve and mild central canal stenosis as well as moderate to severe right and severe left neural foraminal stenosis.    L2-L3: There is a 6 mm cystic structure in the posterior left aspect of the central canal at the level of the midbody of L2.  This effaces the anterior thecal sleeve and occupies a portion of the left neural foramen.  Additionally, there is a large diffuse disc bulge as well as severe bilateral facet arthropathy at L2-L3.  No significant ligamentum flavum hypertrophy.  Moderate central canal stenosis and mild bilateral neural foraminal stenosis is present.    L3-L4: There is a large diffuse disc bulge with severe bilateral facet arthropathy.  No significant ligamentum flavum hypertrophy there are congenitally shortened pedicles.  This results in moderate central canal stenosis, mild left and moderate right neural foraminal stenosis.    L4-L5: There is a large diffuse disc bulge with severe bilateral facet arthropathy and mild ligamentum flavum hypertrophy.  These findings result in severe central canal stenosis and left  neural foraminal stenosis as well as moderate to severe right neural foraminal stenosis.    L5-S1: There is a mild diffuse disc bulge with severe right and moderate to severe left neural foraminal stenosis.  Ligamentum flavum hypertrophy is present    Paraspinal muscles & soft tissues: Normal.    Incidental findings:    -there is a small amount of fluid in the left sacroiliac joint    -2.6 cm T2 hyperintense, T1 hypointense rounded structure in the interpolar region of the left kidney    -2.3 cm rounded, circumscribed, uniformly T2 hyperintense, T1 hypointense focus emanating from the lateral limb of the left adrenal gland    -1.1 cm, heterogeneously T2 hyperintense focus emanating from the junction of the anterior and medial limb of the right adrenal gland      Impression       1. Minimal grade 1 anterolisthesis of L3 on L4 and L4 on L5 with minimal grade 1 retrolisthesis of L2 on L3.  2. Multilevel degenerative disc and joint disease resulting in severe central canal and neural foraminal stenosis at several levels.  3. Finding on the left kidney likely represents a Bosniak category 2 cyst.  4. Indeterminate bilateral adrenal gland nodules.  Further evaluation of these nodules as well as the left kidney finding can be performed with dedicated adrenal CT or MRI.  5.  This report was flagged in Epic as abnormal.         Past Medical History:   Diagnosis Date    Adrenal insufficiency     Arthritis     Chronic bilateral low back pain with bilateral sciatica 3/19/2019    Congenital adrenal hyperplasia     Hyperlipidemia     Spinal stenosis     Spinal stenosis of lumbar region with neurogenic claudication 3/19/2019    Status post sex reassignment surgery 3/19/2019    Hx of Congenital Adrenal Hyperplasia    Unintentional weight loss 7/21/2020     Past Surgical History:   Procedure Laterality Date    BACK SURGERY      gender surgery      multiple surgeries since birth    LAMINECTOMY  09/13/2019     RADIOFREQUENCY ABLATION Left 2019    Procedure: RADIOFREQUENCY ABLATION, LEFT L3,4,5;  Surgeon: Messi Cabello MD;  [facility];  Service: Pain Management;  Laterality: Left;  1 of 2    RADIOFREQUENCY ABLATION Right 2019    Procedure: RADIOFREQUENCY ABLATION, RIGHT L3,4,5;  Surgeon: Messi Cabello MD;  Location[facility];  Service: Pain Management;  Laterality: Right;  2of 2    SPINE SURGERY      2019     TOTAL KNEE ARTHROPLASTY Right 2020    Procedure: ARTHROPLASTY, KNEE, TOTAL;  Surgeon: Donte Andrade III, MD;  Location: [facility] OR [address] [place] [address];  Service: Orthopedics;  Laterality: Right;     Social History     Socioeconomic History    Marital status:      Spouse name: Not on file    Number of children: Not on file    Years of education: Not on file    Highest education level: Not on file   Occupational History    Not on file   Social Needs    Financial resource strain: Not very hard    Food insecurity     Worry: Never true     Inability: Never true    Transportation needs     Medical: No     Non-medical: No   Tobacco Use    Smoking status: Former Smoker     Quit date:      Years since quittin.6    Smokeless tobacco: Never Used    Tobacco comment: Socailly   Substance and Sexual Activity    Alcohol use: Not Currently     Frequency: Never     Binge frequency: Never    Drug use: Never    Sexual activity: Yes     Partners: Female   Lifestyle    Physical activity     Days per week: 3 days     Minutes per session: 20 min    Stress: To some extent   Relationships    Social connections     Talks on phone: More than three times a week     Gets together: Once a week     Attends [employer] service: Not on file     Active member of club or organization: No     Attends meetings of clubs or organizations: Never     Relationship status:    Other Topics Concern    Not on file   Social History Narrative    Not on file     Family History   Problem  Relation Age of Onset    Diabetes Maternal Grandfather     Breast cancer Neg Hx     Ovarian cancer Neg Hx     Vaginal cancer Neg Hx     Endometrial cancer Neg Hx     Cervical cancer Neg Hx        Review of patient's allergies indicates:   Allergen Reactions    Bactrim [sulfamethoxazole-trimethoprim] Itching    Penicillins Rash       Current Outpatient Medications   Medication Sig    acetaminophen (TYLENOL) 650 MG TbSR Take 1 tablet (650 mg total) by mouth every 8 (eight) hours. (Patient not taking: Reported on 3/5/2020)    ALPRAZolam (XANAX) 1 MG tablet Take 1 tablet (1 mg total) by mouth 3 (three) times daily as needed.    aspirin (ECOTRIN) 81 MG EC tablet Take 1 tablet (81 mg total) by mouth 2 (two) times daily.    atorvastatin (LIPITOR) 10 MG tablet Take 1 tablet (10 mg total) by mouth every evening.    cranberry fruit extract (CRANBERRY ORAL) Take by mouth.    cyclobenzaprine (FLEXERIL) 10 MG tablet Take 1 tablet (10 mg total) by mouth 3 (three) times daily as needed for Muscle spasms.    gabapentin (NEURONTIN) 800 MG tablet Take 1 tablet by mouth three times a day and take 2 tablets at night (5 tablets a day, 4000mg)    Lactobacillus acidophilus (ACIDOPHILUS) Cap Take by mouth once daily.     lamoTRIgine (LAMICTAL) 200 MG tablet Take 1 tablet (200 mg total) by mouth 2 (two) times daily.    loratadine (CLARITIN) 10 mg tablet Take 10 mg by mouth once daily.     morphine (MS CONTIN) 15 MG 12 hr tablet Take 1 tablet (15 mg total) by mouth 2 (two) times daily.    oxyCODONE-acetaminophen (PERCOCET)  mg per tablet Take 1 tablet by mouth 3 (three) times daily as needed for Pain.    oxyCODONE-acetaminophen (PERCOCET)  mg per tablet Take 1 tablet by mouth every 8 (eight) hours as needed for Pain.    predniSONE (DELTASONE) 10 MG tablet Take 1 tablet (10 mg total) by mouth once daily.    primidone (MYSOLINE) 50 MG Tab Take 2 tablets (100 mg total) by mouth 3 (three) times daily.     rOPINIRole (REQUIP) 4 MG tablet Take 1 tablet (4 mg total) by mouth nightly.    testosterone (ANDROGEL) 1 % (50 mg/5 gram) GlPk Apply 5 grams topically once daily.    triamcinolone acetonide 0.1% (KENALOG) 0.1 % cream Apply topically 2 (two) times daily.    zinc 50 mg Tab once daily.      No current facility-administered medications for this visit.        REVIEW OF SYSTEMS:    GENERAL:  No weight loss, malaise or fevers.  HEENT:   No recent changes in vision or hearing  NECK:  Negative for lumps, no difficulty with swallowing.  RESPIRATORY:  Negative for cough, wheezing or shortness of breath, patient denies any recent URI.  CARDIOVASCULAR:  Negative for chest pain, leg swelling or palpitations.  GI:  Negative for abdominal discomfort, blood in stools or black stools or change in bowel habits.  MUSCULOSKELETAL:  See HPI.  SKIN:  Negative for lesions, rash, and itching.  PSYCH:  Significant psychosocial stressors including PTSD for sex re-assignment surgery.  Patient's sleep is disturbed secondary to pain.  HEMATOLOGY/LYMPHOLOGY:  Negative for prolonged bleeding, bruising easily or swollen nodes.  Patient is not currently taking any anti-coagulants  ENDO: No history of diabetes or thyroid dysfunction  NEURO:   No history of headaches, syncope, paralysis, seizures or tremors.  All other reviewed and negative other than HPI.    OBJECTIVE:    BP (!) 143/86   Pulse (!) 116   Temp 97.5 °F (36.4 °C)   Resp 18   Ht 5' (1.524 m)   Wt 61 kg (134 lb 8 oz)   SpO2 100%   BMI 26.27 kg/m²     PHYSICAL EXAMINATION: (previous physical examination)    GENERAL: Well appearing, in no acute distress, alert and oriented x3.  PSYCH:  Mood and affect appropriate.  SKIN:  Well-healed incisions without any evidence of infection  HEAD/FACE:  Normocephalic, atraumatic.   CV: RRR with palpation of the radial artery.  PULM: No evidence of respiratory difficulty, symmetric chest rise.  EXT:  Decreased range of motion in the leftt knee.  Brace on left knee. Well healing scar to right knee from recent TKA. TTP over right subacromial bursa.  Limited ROM of right shoulder with global pain. Positive NEER to right shoulder.  BACK:  There is significant pain with palpation over the facet joints of the lumbar spine bilaterally. There is decreased range of motion with extension to 15 degrees, and facet loading maneuvers cause reproducible pain.    NEURO:  No clonus.  Cranial nerves grossly intact  GAIT: Antalgic- ambulates with rolling walker.    Lab Results   Component Value Date    WBC 7.20 02/27/2020    HGB 11.7 (L) 02/27/2020    HCT 36.5 (L) 02/27/2020    MCV 94 02/27/2020     02/27/2020     CMP  Sodium   Date Value Ref Range Status   02/27/2020 137 136 - 145 mmol/L Final     Potassium   Date Value Ref Range Status   02/27/2020 3.9 3.5 - 5.1 mmol/L Final     Chloride   Date Value Ref Range Status   02/27/2020 102 95 - 110 mmol/L Final     CO2   Date Value Ref Range Status   02/27/2020 26 23 - 29 mmol/L Final     Glucose   Date Value Ref Range Status   02/27/2020 91 70 - 110 mg/dL Final     BUN, Bld   Date Value Ref Range Status   02/27/2020 16 6 - 20 mg/dL Final     Creatinine   Date Value Ref Range Status   02/27/2020 0.7 0.5 - 1.4 mg/dL Final     Calcium   Date Value Ref Range Status   02/27/2020 9.7 8.7 - 10.5 mg/dL Final     Total Protein   Date Value Ref Range Status   02/27/2020 7.0 6.0 - 8.4 g/dL Final     Albumin   Date Value Ref Range Status   02/27/2020 3.8 3.5 - 5.2 g/dL Final     Total Bilirubin   Date Value Ref Range Status   02/27/2020 0.9 0.1 - 1.0 mg/dL Final     Comment:     For infants and newborns, interpretation of results should be based  on gestational age, weight and in agreement with clinical  observations.  Premature Infant recommended reference ranges:  Up to 24 hours.............<8.0 mg/dL  Up to 48 hours............<12.0 mg/dL  3-5 days..................<15.0 mg/dL  6-29 days.................<15.0 mg/dL       Alkaline  Phosphatase   Date Value Ref Range Status   02/27/2020 620 (H) 55 - 135 U/L Final     AST   Date Value Ref Range Status   02/27/2020 89 (H) 10 - 40 U/L Final     ALT   Date Value Ref Range Status   02/27/2020 120 (H) 10 - 44 U/L Final     Anion Gap   Date Value Ref Range Status   02/27/2020 9 8 - 16 mmol/L Final     eGFR if    Date Value Ref Range Status   02/27/2020 >60 >60 mL/min/1.73 m^2 Final     eGFR if non    Date Value Ref Range Status   02/27/2020 >60 >60 mL/min/1.73 m^2 Final     Comment:     Calculation used to obtain the estimated glomerular filtration  rate (eGFR) is the CKD-EPI equation.        Lab Results   Component Value Date    HGBA1C 5.2 02/27/2020     Lab Results   Component Value Date    TSH 1.516 02/27/2020     Free T4 - 0.81 (wnl)      ASSESSMENT: 50 y.o. year old female with pain, consistent with     Encounter Diagnoses   Name Primary?    Cervical radiculopathy Yes    S/P lumbar laminectomy     Primary osteoarthritis of right knee     Muscle spasm     Other chronic pain     Encounter for long-term opiate analgesic use        PLAN:     - Previous imaging was reviewed and discussed with the patient today.    - Continue to follow up with orthopedics.     - He declined left knee Synvisc at this time.    - He declined right shoulder steroid injection.  He feels as though outside steroid injections have not been helpful in the past.  I will schedule him for peripheral blocks of the right shoulder.  He will call with his pain diary.  If significant short-term benefit will schedule for radiofrequency ablation of peripheral nerves of the right shoulder.    - Continue oxycodone acetaminophen 10/325 mg TID PRN, #90.  Will send refill today.    - Continue MS Contin 15 mg Q12h.  He does not need a refill today.    - The patient is here today for a refill of current pain medications and they believe these provide effective pain control and improvements in quality of  life.  The patient notes no serious side effects, and feels the benefits outweigh the risks.  The patient was reminded of the pain contract that they signed previously as well as the risks and benefits of the medication including possible death.  The updated [place] Board of Pharm[employer] prescription monitoring program was reviewed, and the patient has been found to be compliant with current treatment plan.    - Pt has filled contract.  UDS previously performed is appropriate.  Repeat today.    - RTC after completion of procedures.    - Dr. Cabello was consulted on the patient and agrees with this plan.      The above plan and management options were discussed at length with patient. Patient is in agreement with the above and verbalized understanding.      Alejandra Phoenix     08/21/2020

## 2020-08-22 ENCOUNTER — PATIENT MESSAGE (OUTPATIENT)
Dept: HEPATOLOGY | Facility: CLINIC | Age: 50
End: 2020-08-22

## 2020-08-22 DIAGNOSIS — R79.89 ABNORMAL LFTS: Primary | ICD-10-CM

## 2020-08-22 DIAGNOSIS — M62.81 MUSCLE WEAKNESS: ICD-10-CM

## 2020-08-22 DIAGNOSIS — R74.8 ELEVATED CK: ICD-10-CM

## 2020-08-23 DIAGNOSIS — M54.12 CERVICAL RADICULOPATHY: Primary | ICD-10-CM

## 2020-08-24 ENCOUNTER — TELEPHONE (OUTPATIENT)
Dept: ADMINISTRATIVE | Facility: OTHER | Age: 50
End: 2020-08-24

## 2020-08-24 RX ORDER — MORPHINE SULFATE 15 MG/1
15 TABLET, FILM COATED, EXTENDED RELEASE ORAL 2 TIMES DAILY
Qty: 60 TABLET | Refills: 0 | Status: SHIPPED | OUTPATIENT
Start: 2020-09-02 | End: 2020-09-28 | Stop reason: SDUPTHER

## 2020-08-24 NOTE — TELEPHONE ENCOUNTER
Patient was last seen in the office 08/21/20 by Alejandra Phoenix. This is a  patient. Patient last received this medication 08/03/20. Patient completed UDS 08/21/20 and its still in process. Patient has no pain contract on file. Patient has no follow up scheduled

## 2020-08-24 NOTE — TELEPHONE ENCOUNTER
Left voice message for patient to return call to schedule appointment from referral to Rheumatology.  Niecy ORTEGA [phone]

## 2020-08-25 LAB
6MAM UR QL: NOT DETECTED
7AMINOCLONAZEPAM UR QL: NOT DETECTED
A-OH ALPRAZ UR QL: PRESENT
ALPRAZ UR QL: NOT DETECTED
AMPHET UR QL SCN: NOT DETECTED
ANNOTATION COMMENT IMP: NORMAL
ANNOTATION COMMENT IMP: NORMAL
BARBITURATES UR QL: PRESENT
BUPRENORPHINE UR QL: NOT DETECTED
BZE UR QL: NOT DETECTED
CARBOXYTHC UR QL: NOT DETECTED
CARISOPRODOL UR QL: NOT DETECTED
CLONAZEPAM UR QL: NOT DETECTED
CODEINE UR QL: NOT DETECTED
CREAT UR-MCNC: 113 MG/DL (ref 20–400)
DIAZEPAM UR QL: NOT DETECTED
ETHYL GLUCURONIDE UR QL: NOT DETECTED
FENTANYL UR QL: NOT DETECTED
HYDROCODONE UR QL: NOT DETECTED
HYDROMORPHONE UR QL: NOT DETECTED
LORAZEPAM UR QL: NOT DETECTED
MDA UR QL: NOT DETECTED
MDEA UR QL: NOT DETECTED
MDMA UR QL: NOT DETECTED
ME-PHENIDATE UR QL: NOT DETECTED
MEPERIDINE UR QL: NOT DETECTED
METHADONE UR QL: NOT DETECTED
METHAMPHET UR QL: NOT DETECTED
MIDAZOLAM UR QL SCN: NOT DETECTED
MORPHINE UR QL: PRESENT
NORBUPRENORPHINE UR QL CFM: NOT DETECTED
NORDIAZEPAM UR QL: NOT DETECTED
NORFENTANYL UR QL: NOT DETECTED
NORHYDROCODONE UR QL CFM: NOT DETECTED
NOROXYCODONE UR QL CFM: PRESENT
NOROXYMORPHONE: NOT DETECTED
OXAZEPAM UR QL: NOT DETECTED
OXYCODONE UR QL: PRESENT
OXYMORPHONE UR QL: NOT DETECTED
PATHOLOGY STUDY: NORMAL
PCP UR QL: NOT DETECTED
PHENTERMINE UR QL: NOT DETECTED
PROPOXYPH UR QL: NOT DETECTED
SERVICE CMNT-IMP: NORMAL
TAPENTADOL UR QL SCN: NOT DETECTED
TAPENTADOL-O-SULF: NOT DETECTED
TEMAZEPAM UR QL: NOT DETECTED
TRAMADOL UR QL: NOT DETECTED
ZOLPIDEM UR QL: NOT DETECTED

## 2020-08-27 ENCOUNTER — PATIENT MESSAGE (OUTPATIENT)
Dept: HEPATOLOGY | Facility: CLINIC | Age: 50
End: 2020-08-27

## 2020-08-27 ENCOUNTER — TELEPHONE (OUTPATIENT)
Dept: HEPATOLOGY | Facility: CLINIC | Age: 50
End: 2020-08-27

## 2020-08-27 DIAGNOSIS — Z23 NEED FOR HEPATITIS A AND B VACCINATION: Primary | ICD-10-CM

## 2020-08-27 NOTE — TELEPHONE ENCOUNTER
----- Message from Mariely Robison NP sent at 8/27/2020 10:20 AM CDT -----  [facility]. Please contact pt to schedule MRI abd AND MRI elastography at [facility]. Prob needs to be scheduled as 2 separate appts but hopefully can be done back to back at same timeAlso needs f/u appt with me 1 week after MRIs completedThanks!Mariely

## 2020-08-28 ENCOUNTER — PATIENT MESSAGE (OUTPATIENT)
Dept: HEPATOLOGY | Facility: CLINIC | Age: 50
End: 2020-08-28

## 2020-09-01 ENCOUNTER — OFFICE VISIT (OUTPATIENT)
Dept: INTERNAL MEDICINE | Facility: CLINIC | Age: 50
End: 2020-09-01
Attending: INTERNAL MEDICINE
Payer: MEDICARE

## 2020-09-01 VITALS
OXYGEN SATURATION: 96 % | HEIGHT: 60 IN | BODY MASS INDEX: 26.53 KG/M2 | HEART RATE: 102 BPM | DIASTOLIC BLOOD PRESSURE: 64 MMHG | SYSTOLIC BLOOD PRESSURE: 102 MMHG | WEIGHT: 135.13 LBS

## 2020-09-01 DIAGNOSIS — R73.03 PREDIABETES: ICD-10-CM

## 2020-09-01 DIAGNOSIS — G25.81 RLS (RESTLESS LEGS SYNDROME): ICD-10-CM

## 2020-09-01 DIAGNOSIS — E78.5 HYPERLIPIDEMIA, UNSPECIFIED HYPERLIPIDEMIA TYPE: Primary | ICD-10-CM

## 2020-09-01 DIAGNOSIS — F43.10 PTSD (POST-TRAUMATIC STRESS DISORDER): ICD-10-CM

## 2020-09-01 DIAGNOSIS — K04.7 DENTAL ABSCESS: ICD-10-CM

## 2020-09-01 PROCEDURE — 99214 OFFICE O/P EST MOD 30 MIN: CPT | Mod: S$PBB,,, | Performed by: INTERNAL MEDICINE

## 2020-09-01 PROCEDURE — 99999 PR PBB SHADOW E&M-EST. PATIENT-LVL IV: CPT | Mod: PBBFAC,,, | Performed by: INTERNAL MEDICINE

## 2020-09-01 PROCEDURE — 99214 PR OFFICE/OUTPT VISIT, EST, LEVL IV, 30-39 MIN: ICD-10-PCS | Mod: S$PBB,,, | Performed by: INTERNAL MEDICINE

## 2020-09-01 PROCEDURE — 99999 PR PBB SHADOW E&M-EST. PATIENT-LVL IV: ICD-10-PCS | Mod: PBBFAC,,, | Performed by: INTERNAL MEDICINE

## 2020-09-01 PROCEDURE — 99214 OFFICE O/P EST MOD 30 MIN: CPT | Mod: PBBFAC | Performed by: INTERNAL MEDICINE

## 2020-09-01 RX ORDER — FLUCONAZOLE 150 MG/1
150 TABLET ORAL DAILY
Qty: 1 TABLET | Refills: 0 | Status: SHIPPED | OUTPATIENT
Start: 2020-09-01 | End: 2020-09-02

## 2020-09-01 RX ORDER — CLINDAMYCIN HYDROCHLORIDE 300 MG/1
300 CAPSULE ORAL EVERY 8 HOURS
Qty: 21 CAPSULE | Refills: 0 | Status: SHIPPED | OUTPATIENT
Start: 2020-09-01 | End: 2020-10-21 | Stop reason: ALTCHOICE

## 2020-09-01 NOTE — PROGRESS NOTES
Subjective:       Patient ID: Ari Santos is a 50 y.o. female.    Chief Complaint: Annual Exam    Here for annual exam    3 day Hx of right upper tooth pain with associated aching and swelling of gums. Has had root canal on this tooth in past, not recent. No f/c. Pain is constant and worse with chewing. No extension to ear, jaw. Blurry vision, eye pain, or facial pain.    CAH-[facility]  PTSD- Dr Mejias. Steadily improving. Painting again.  Lumbar DJD-Had had improvement with pain clinic. Chronic opiate regimen. Had right knee done.   PreDM-Endocrine is monitoring. Not on medication at this time.  -Transaminitis- followed in hepatologyy. Workup most consistent with fatty liver. MRCP planned. No s/s of cirrhosis or decreased synthetic function.        Review of Systems   Constitutional: Negative for chills, fatigue, fever and unexpected weight change.   HENT: Negative for ear pain, hearing loss, postnasal drip, tinnitus, trouble swallowing and voice change.    Respiratory: Negative for cough, chest tightness, shortness of breath and wheezing.    Cardiovascular: Negative for chest pain, palpitations and leg swelling.   Gastrointestinal: Negative for abdominal pain, blood in stool, diarrhea, nausea and vomiting.   Endocrine: Negative for polydipsia, polyphagia and polyuria.   Genitourinary: Negative for difficulty urinating, dysuria, hematuria and vaginal bleeding.   Skin: Negative for rash.   Allergic/Immunologic: Negative for food allergies.   Neurological: Negative for dizziness, numbness and headaches.   Hematological: Does not bruise/bleed easily.   Psychiatric/Behavioral: The patient is not nervous/anxious.        Objective:      Vitals:    09/01/20 1552   BP: 102/64   Pulse: 102   SpO2: 96%   Weight: 61.3 kg (135 lb 2.3 oz)   Height: 5' (1.524 m)      Physical Exam  Constitutional:       General: She is not in acute distress.     Appearance: She is well-developed.   HENT:      Head: Normocephalic and  atraumatic.      Mouth/Throat:      Pharynx: No oropharyngeal exudate.   Eyes:      General: No scleral icterus.     Conjunctiva/sclera: Conjunctivae normal.      Pupils: Pupils are equal, round, and reactive to light.   Neck:      Thyroid: No thyromegaly.   Cardiovascular:      Rate and Rhythm: Normal rate and regular rhythm.      Heart sounds: Normal heart sounds. No murmur.   Pulmonary:      Effort: Pulmonary effort is normal.      Breath sounds: Normal breath sounds. No wheezing or rales.   Abdominal:      General: There is no distension.      Palpations: Abdomen is soft.      Tenderness: There is no abdominal tenderness.   Musculoskeletal:         General: No tenderness.   Lymphadenopathy:      Cervical: No cervical adenopathy.   Skin:     General: Skin is warm and dry.   Neurological:      Mental Status: She is alert and oriented to person, place, and time.   Psychiatric:         Behavior: Behavior normal.         Assessment:       1. Hyperlipidemia, unspecified hyperlipidemia type    2. Prediabetes     3. PTSD (post-traumatic stress disorder)    4. RLS (restless legs syndrome)    5. Dental abscess        Plan:       Ari was seen today for annual exam.    Diagnoses and all orders for this visit:    Hyperlipidemia, unspecified hyperlipidemia type  -     Lipid Panel; Future    Prediabetes    Your hemoglobin A1c, which measures your average blood sugar over the last 3 months, is elevated.  You do not have diabetes, but you do have what we call pre-diabetes, meaning that you are at an increased risk of developing overt diabetes.  I recommend a low carbohydrate diet in addition to weight loss.  This will drastically decrease your likelihood of developing diabetes. We will repeat blood work in 6-12 months to monitor this.      PTSD (post-traumatic stress disorder)   controlled. Continue current regimen per mental health provider Dr Mejias.    RLS (restless legs syndrome)    Dental abscess   Has dental appt in a few  days. Will get started on Abx and dentist will see if palatal only.  -     clindamycin (CLEOCIN) 300 MG capsule; Take 1 capsule (300 mg total) by mouth every 8 (eight) hours.  -     fluconazole (DIFLUCAN) 150 MG Tab; Take 1 tablet (150 mg total) by mouth once daily. for 1 day           Siva Faulkner MD  Internal Medicine-[facility]        Side effects of medication(s) were discussed in detail and patient voiced understanding.  Patient will call back for any issues or complications.

## 2020-09-01 NOTE — TELEPHONE ENCOUNTER
MA called pt Mr. Chapman and got in touch with his wife, I went ahead and got him scheduled for a follow up a week after his MRI will mail out appt reminder.

## 2020-09-02 ENCOUNTER — IMMUNIZATION (OUTPATIENT)
Dept: PHARMACY | Facility: CLINIC | Age: 50
End: 2020-09-02

## 2020-09-02 ENCOUNTER — IMMUNIZATION (OUTPATIENT)
Dept: PHARMACY | Facility: CLINIC | Age: 50
End: 2020-09-02
Payer: MEDICARE

## 2020-09-08 ENCOUNTER — PATIENT MESSAGE (OUTPATIENT)
Dept: SLEEP MEDICINE | Facility: CLINIC | Age: 50
End: 2020-09-08

## 2020-09-08 DIAGNOSIS — M54.12 CERVICAL RADICULOPATHY: Primary | ICD-10-CM

## 2020-09-08 RX ORDER — GABAPENTIN 800 MG/1
TABLET ORAL
Qty: 450 TABLET | Refills: 0 | Status: SHIPPED | OUTPATIENT
Start: 2020-09-08 | End: 2020-12-01 | Stop reason: SDUPTHER

## 2020-09-09 ENCOUNTER — TELEPHONE (OUTPATIENT)
Dept: HEPATOLOGY | Facility: CLINIC | Age: 50
End: 2020-09-09

## 2020-09-09 NOTE — TELEPHONE ENCOUNTER
Attempted to contact patient and reschedule appointments as instructed but patient was sleep. Patient wife stated she will reschedule appointment once patient is feeling better. Informed Mrs. Schuster of this and she stated its okay.

## 2020-09-15 DIAGNOSIS — Z87.890 STATUS POST SEX REASSIGNMENT SURGERY: ICD-10-CM

## 2020-09-15 DIAGNOSIS — M62.838 MUSCLE SPASM: Primary | ICD-10-CM

## 2020-09-15 RX ORDER — CYCLOBENZAPRINE HCL 10 MG
10 TABLET ORAL 3 TIMES DAILY PRN
Qty: 270 TABLET | Refills: 1 | Status: SHIPPED | OUTPATIENT
Start: 2020-09-15 | End: 2020-12-01 | Stop reason: SDUPTHER

## 2020-09-16 RX ORDER — TESTOSTERONE GEL, 1% 10 MG/G
1 GEL TRANSDERMAL DAILY
Qty: 30 PACKET | Refills: 4 | Status: SHIPPED | OUTPATIENT
Start: 2020-09-16 | End: 2021-03-30 | Stop reason: SDUPTHER

## 2020-09-20 ENCOUNTER — PATIENT OUTREACH (OUTPATIENT)
Dept: ADMINISTRATIVE | Facility: OTHER | Age: 50
End: 2020-09-20

## 2020-09-20 NOTE — PROGRESS NOTES
Care Everywhere: updated  Immunization: updated  Health Maintenance: updated  Media Review: review for outside colon cancer report   Legacy Review:   Order placed:   Upcoming appts:

## 2020-09-21 ENCOUNTER — OFFICE VISIT (OUTPATIENT)
Dept: NEUROLOGY | Facility: CLINIC | Age: 50
End: 2020-09-21
Payer: MEDICARE

## 2020-09-21 ENCOUNTER — PATIENT MESSAGE (OUTPATIENT)
Dept: NEUROLOGY | Facility: CLINIC | Age: 50
End: 2020-09-21

## 2020-09-21 DIAGNOSIS — G25.81 RLS (RESTLESS LEGS SYNDROME): ICD-10-CM

## 2020-09-21 DIAGNOSIS — E27.40 ADRENAL INSUFFICIENCY: ICD-10-CM

## 2020-09-21 DIAGNOSIS — R25.1 TREMOR: ICD-10-CM

## 2020-09-21 PROCEDURE — 99215 PR OFFICE/OUTPT VISIT, EST, LEVL V, 40-54 MIN: ICD-10-PCS | Mod: 95,,, | Performed by: PSYCHIATRY & NEUROLOGY

## 2020-09-21 PROCEDURE — 99215 OFFICE O/P EST HI 40 MIN: CPT | Mod: 95,,, | Performed by: PSYCHIATRY & NEUROLOGY

## 2020-09-21 RX ORDER — PRIMIDONE 50 MG/1
100 TABLET ORAL 3 TIMES DAILY
Qty: 540 TABLET | Refills: 12 | Status: SHIPPED | OUTPATIENT
Start: 2020-09-21 | End: 2021-06-30 | Stop reason: SDUPTHER

## 2020-09-21 RX ORDER — ROPINIROLE 4 MG/1
4 TABLET, FILM COATED ORAL NIGHTLY
Qty: 90 TABLET | Refills: 12 | Status: SHIPPED | OUTPATIENT
Start: 2020-09-21 | End: 2021-11-30 | Stop reason: SDUPTHER

## 2020-09-21 NOTE — ASSESSMENT & PLAN NOTE
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 Well managed with primidone however his benefit is short lived. Suggested Primidone 100mg TID however change timing to help with painting.    Suggested inperson exam.  No PDism noted on video exam despite RLS

## 2020-09-21 NOTE — PROGRESS NOTES
The patient location is: home  The chief complaint leading to consultation is: tremor, RLS    Visit type: audiovisual    Face to Face time with patient: 40mins  40 minutes of total time spent on the encounter, which includes face to face time and non-face to face time preparing to see the patient (eg, review of tests), Obtaining and/or reviewing separately obtained history, Documenting clinical information in the electronic or other health record, Independently interpreting results (not separately reported) and communicating results to the patient/family/caregiver, or Care coordination (not separately reported).         Each patient to whom he or she provides medical services by telemedicine is:  (1) informed of the relationship between the physician and patient and the respective role of any other health care provider with respect to management of the patient; and (2) notified that he or she may decline to receive medical services by telemedicine and may withdraw from such care at any time.    Notes:       [facility] DISORDERS CLINIC NEW CONSULT NOTE    PCP/Referring Provider: No referring provider defined for this encounter.  Date of Service: 9/21/2020    Chief Complaint: RLS and tremors    HPI: Ari Santos is a R HANDED 50 y.o. female with a medical issues significant for Lspine surg 2019 (stenosis), R knee replacement, concussions, adrenal hyperplasia, fibromyalgia, coming for help with hand tremors and RLS. For RLS, he's had this for 20 years and feels like it's well controlled. He feels a bilateral leg sensation improved by movement. These sensations start around 9PM. He feels like this is well controlled with 4mg requip at night. Takes Gabapentin 800mg TID and 1600 QHS. Unknown if ETOH sensitive.  Hand tremor for 10 years.  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 Primidone 100mg PO TID. This helps tremors a 1/2 hour after taking primidone. He notices tremors worse when paining upright and  less when he's drawing. 30 minutes after primidone he has no tremor. Primidone lasts 3-4 hours.    Balance issues. Falls frequently. Imbalance since 10 years. He attributes this to needing a knee replacement.    Not overly sedated.    Currently on lamictal    Uncle has a tremor.    Medication history:  -Tried propranolol and it did not seem to help      Neuroleptic exposure:  -Was on latuda 2 years ago    PD Review of Symptoms:  Anosmia: none  Depression: as above  Cognitive slowing: mild  Orthostasis: mild  Falls: yes  Micrographia: none  Sleep issues:  -RBD: several reactions to nightmares - some PTSD    Review of Systems:   Review of Systems   Constitutional: Negative for fever.   HENT: Negative for congestion.    Eyes: Negative for double vision.   Respiratory: Negative for cough and shortness of breath.    Cardiovascular: Negative for chest pain and leg swelling.   Gastrointestinal: Negative for nausea.   Genitourinary: Negative for dysuria.   Musculoskeletal: Negative for falls.   Skin: Negative for rash.   Neurological: Positive for tremors. Negative for speech change and headaches.   Psychiatric/Behavioral: Negative for depression.         Current Medications:  Outpatient Encounter Medications as of 9/21/2020   Medication Sig Dispense Refill    acetaminophen (TYLENOL) 650 MG TbSR Take 1 tablet (650 mg total) by mouth every 8 (eight) hours. (Patient not taking: Reported on 8/21/2020) 120 tablet 0    ALPRAZolam (XANAX) 1 MG tablet Take 1 tablet (1 mg total) by mouth 3 (three) times daily as needed. 90 tablet 1    aspirin (ECOTRIN) 81 MG EC tablet Take 1 tablet (81 mg total) by mouth 2 (two) times daily. 60 tablet 0    atorvastatin (LIPITOR) 10 MG tablet Take 1 tablet (10 mg total) by mouth every evening. 90 tablet 2    clindamycin (CLEOCIN) 300 MG capsule Take 1 capsule (300 mg total) by mouth every 8 (eight) hours. 21 capsule 0    cranberry fruit extract (CRANBERRY ORAL) Take by mouth.       cyclobenzaprine (FLEXERIL) 10 MG tablet Take 1 tablet (10 mg total) by mouth 3 (three) times daily as needed for Muscle spasms. 270 tablet 1    gabapentin (NEURONTIN) 800 MG tablet Take 1 tablet by mouth three times a day and take 2 tablets at night (5 tablets a day, 4000mg) 450 tablet 0    Lactobacillus acidophilus (ACIDOPHILUS) Cap Take by mouth once daily.       lamoTRIgine (LAMICTAL) 200 MG tablet Take 1 tablet (200 mg total) by mouth 2 (two) times daily. 60 tablet 5    loratadine (CLARITIN) 10 mg tablet Take 10 mg by mouth once daily.       morphine (MS CONTIN) 15 MG 12 hr tablet Take 1 tablet (15 mg total) by mouth 2 (two) times daily. 60 tablet 0    oxyCODONE-acetaminophen (PERCOCET)  mg per tablet Take 1 tablet by mouth every 8 (eight) hours as needed for Pain. 90 tablet 0    predniSONE (DELTASONE) 10 MG tablet Take 1 tablet (10 mg total) by mouth once daily. 100 tablet 5    primidone (MYSOLINE) 50 MG Tab Take 2 tablets (100 mg total) by mouth 3 (three) times daily. 540 tablet 12    rOPINIRole (REQUIP) 4 MG tablet Take 1 tablet (4 mg total) by mouth nightly. 90 tablet 12    testosterone (ANDROGEL) 1 % (50 mg/5 gram) GlPk Apply 5 grams topically once daily. 30 packet 4    triamcinolone acetonide 0.1% (KENALOG) 0.1 % cream Apply topically 2 (two) times daily. 45 g 1    zinc 50 mg Tab once daily.       [DISCONTINUED] primidone (MYSOLINE) 50 MG Tab Take 2 tablets (100 mg total) by mouth 3 (three) times daily. 180 tablet 0    [DISCONTINUED] rOPINIRole (REQUIP) 4 MG tablet Take 1 tablet (4 mg total) by mouth nightly. 30 tablet 0     No facility-administered encounter medications on file as of 9/21/2020.        Past Medical History:  Patient Active Problem List   Diagnosis    Fibromyalgia    Tremor    RLS (restless legs syndrome)    PTSD (post-traumatic stress disorder)    Congenital adrenal hyperplasia    Seasonal allergies    HLD (hyperlipidemia)    Renal cyst    Abnormal thyroid  blood test    Chronic pain    Neuropathic pain    Multiple closed traumatic fractures of multiple bones of hip and pelvis with routine healing, subsequent encounter    Urinary incontinence, urge    Abnormal CT scan, pelvis    Erectile disorder, generalized, mild    S/P lumbar laminectomy    Carpal tunnel syndrome on both sides    Adrenal insufficiency    IGT (impaired glucose tolerance)    Endocrine disorder in female-to-male transgender person    Cervical radiculopathy    Chronic pain of both knees    Quadriceps weakness    Decreased functional mobility and endurance    Thoracic myofascial strain    Pelvic fluid collection    Low testosterone    Chronic, continuous use of opioids    Abnormal LFTs    Blister- healing    Primary osteoarthritis of right knee    Chronic pain of right knee    Gait abnormality    Insomnia due to medical condition    KIARA (obstructive sleep apnea)    Unintentional weight loss       Past Surgical History:  Past Surgical History:   Procedure Laterality Date    BACK SURGERY      gender surgery      multiple surgeries since birth    LAMINECTOMY  09/13/2019    RADIOFREQUENCY ABLATION Left 5/9/2019    Procedure: RADIOFREQUENCY ABLATION, LEFT L3,4,5;  Surgeon: Messi Cabello MD;  Location: [facility];  Service: Pain Management;  Laterality: Left;  1 of 2    RADIOFREQUENCY ABLATION Right 5/23/2019    Procedure: RADIOFREQUENCY ABLATION, RIGHT L3,4,5;  Surgeon: Messi Cabello MD;  Location: [facility];  Service: Pain Management;  Laterality: Right;  2of 2    SPINE SURGERY      Sept 2019     TOTAL KNEE ARTHROPLASTY Right 1/31/2020    Procedure: ARTHROPLASTY, KNEE, TOTAL;  Surgeon: Donte Andrade III, MD;  Location: [address];  Service: Orthopedics;  Laterality: Right;       Current Living Situation: home    Social:  Social History     Socioeconomic History    Marital status:      Spouse name: Not on file    Number of children: Not on  file    Years of education: Not on file    Highest education level: Not on file   Occupational History    Not on file   Social Needs    Financial resource strain: Not very hard    Food insecurity     Worry: Never true     Inability: Never true    Transportation needs     Medical: No     Non-medical: No   Tobacco Use    Smoking status: Former Smoker     Quit date:      Years since quittin.7    Smokeless tobacco: Never Used    Tobacco comment: Socailly   Substance and Sexual Activity    Alcohol use: Not Currently     Frequency: Never     Drinks per session: Patient refused     Binge frequency: Never    Drug use: Never    Sexual activity: Yes     Partners: Female   Lifestyle    Physical activity     Days per week: 3 days     Minutes per session: 30 min    Stress: To some extent   Relationships    Social connections     Talks on phone: More than three times a week     Gets together: Patient refused     Attends Gnosticism service: Not on file     Active member of club or organization: No     Attends meetings of clubs or organizations: Never     Relationship status:    Other Topics Concern    Not on file   Social History Narrative    Not on file       Family History:  Family History   Problem Relation Age of Onset    Diabetes Maternal Grandfather     Breast cancer Neg Hx     Ovarian cancer Neg Hx     Vaginal cancer Neg Hx     Endometrial cancer Neg Hx     Cervical cancer Neg Hx        PHYSICAL:  There were no vitals taken for this visit.    Physical Exam  Constitutional: Well-developed, well-nourished, appears stated age  Eyes: No scleral icterus  ENT: Moist oral mucosa  Cardiovascular: No lower extremity edema   Respiratory: No labored breathing   Skin: No rash   Hematologic: No bruising    Other: GI/ deferred   · Mental status: Alert and oriented to person, place, time, and situation;   · follows commands  · Speech: normal (not dysarthric), no aphasia  · Cranial nerves:             · CN II: Pupils mid-position and equal, not tested light or accommodation  · CN III, IV, VI: Extraocular movements full, no nystagmus visualized  · CN V: Not tested   · CN VII: Face strong and symmetric bilaterally   · CN VIII: Hearing intact to voice and conversation   · CN IX, X: Palate raises midline and symmetric   · CN XI: Strong shoulder shrug B/L  · CN XII: Tongue appears midline   · Motor: Normal bulk by appearance, no drift   · Sensory: Not tested    · Gait: Not tested  · Deep tendon reflexes: Not tested  · Movement/Coordination                    No hypophonic speech.                     No facial masking.  No bradykinesia.                   No tremor with rest, posture, kinesis, or intention.                     No other dystonia, chorea, athetosis, myoclonus, or tics visualized.    Bradykinesia  ? Finger taps Finger flicks KAVITHA Heel taps   Left 0 0 0 0   Right 0 0 0 0       Tremor Exam   Arms extended Arms in wing position, fingers almost touching Re-emergent Arms extended wrists extended Intention Resting Kinetic   Left ? ? ? ? ? ? ?   Right ? ? ? ? ? ? ?   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 Well managed with primidone however his benefit is short lived. Suggested Primidone 100mg TID however change timing to help with painting.    Suggested inperson exam.  No PDism noted on video exam despite RLS         RLS (restless legs syndrome)    Current Assessment & Plan     Continue ropinirole 3mg QHS         Relevant Medications    rOPINIRole (REQUIP) 4 MG tablet       Endocrine    Adrenal insufficiency    Relevant Medications    primidone (MYSOLINE) 50 MG Tab            Baylee Esteban MD, MS  [facility]  Department of Neurology  Movement  Disorders

## 2020-09-28 ENCOUNTER — PATIENT MESSAGE (OUTPATIENT)
Dept: PAIN MEDICINE | Facility: CLINIC | Age: 50
End: 2020-09-28

## 2020-09-28 DIAGNOSIS — M54.12 CERVICAL RADICULOPATHY: Primary | ICD-10-CM

## 2020-09-28 RX ORDER — MORPHINE SULFATE 15 MG/1
15 TABLET, FILM COATED, EXTENDED RELEASE ORAL 2 TIMES DAILY
Qty: 60 TABLET | Refills: 0 | Status: SHIPPED | OUTPATIENT
Start: 2020-10-02 | End: 2020-11-04 | Stop reason: SDUPTHER

## 2020-09-28 NOTE — TELEPHONE ENCOUNTER
Patient was last seen in the office 08/21/20 by Alejandra Phoenix. This is a  patient. Patient last received this medication 09/02/20. Patient completed UDS  08/21/20 and was consistent. Patient has a pain contract on file. Patient has no follow up scheduled.

## 2020-10-05 ENCOUNTER — PATIENT MESSAGE (OUTPATIENT)
Dept: ORTHOPEDICS | Facility: CLINIC | Age: 50
End: 2020-10-05

## 2020-10-05 DIAGNOSIS — M25.562 LEFT KNEE PAIN, UNSPECIFIED CHRONICITY: ICD-10-CM

## 2020-10-05 DIAGNOSIS — Z96.651 STATUS POST RIGHT KNEE REPLACEMENT: Primary | ICD-10-CM

## 2020-10-06 ENCOUNTER — PATIENT MESSAGE (OUTPATIENT)
Dept: RESEARCH | Facility: OTHER | Age: 50
End: 2020-10-06

## 2020-10-06 ENCOUNTER — OFFICE VISIT (OUTPATIENT)
Dept: ORTHOPEDICS | Facility: CLINIC | Age: 50
End: 2020-10-06
Payer: MEDICARE

## 2020-10-06 ENCOUNTER — HOSPITAL ENCOUNTER (OUTPATIENT)
Dept: RADIOLOGY | Facility: HOSPITAL | Age: 50
Discharge: HOME | End: 2020-10-06
Attending: ORTHOPAEDIC SURGERY
Payer: MEDICARE

## 2020-10-06 VITALS — HEIGHT: 60 IN | BODY MASS INDEX: 26.53 KG/M2 | WEIGHT: 135.13 LBS

## 2020-10-06 DIAGNOSIS — F43.10 PTSD (POST-TRAUMATIC STRESS DISORDER): ICD-10-CM

## 2020-10-06 DIAGNOSIS — M17.12 PRIMARY OSTEOARTHRITIS OF LEFT KNEE: Primary | ICD-10-CM

## 2020-10-06 DIAGNOSIS — M25.562 LEFT KNEE PAIN, UNSPECIFIED CHRONICITY: ICD-10-CM

## 2020-10-06 DIAGNOSIS — Z96.652 STATUS POST TOTAL LEFT KNEE REPLACEMENT: ICD-10-CM

## 2020-10-06 DIAGNOSIS — Z96.651 STATUS POST RIGHT KNEE REPLACEMENT: Primary | ICD-10-CM

## 2020-10-06 DIAGNOSIS — M17.12 PRIMARY OSTEOARTHRITIS OF LEFT KNEE: ICD-10-CM

## 2020-10-06 DIAGNOSIS — Z96.651 STATUS POST RIGHT KNEE REPLACEMENT: ICD-10-CM

## 2020-10-06 DIAGNOSIS — Z01.818 PRE-OP TESTING: ICD-10-CM

## 2020-10-06 PROCEDURE — 99999 PR PBB SHADOW E&M-EST. PATIENT-LVL III: ICD-10-PCS | Mod: PBBFAC,,, | Performed by: ORTHOPAEDIC SURGERY

## 2020-10-06 PROCEDURE — 73562 XR KNEE ORTHO BILAT: ICD-10-PCS | Mod: 26,50,, | Performed by: RADIOLOGY

## 2020-10-06 PROCEDURE — 73562 X-RAY EXAM OF KNEE 3: CPT | Mod: 26,50,, | Performed by: RADIOLOGY

## 2020-10-06 PROCEDURE — 99213 OFFICE O/P EST LOW 20 MIN: CPT | Mod: S$PBB,,, | Performed by: ORTHOPAEDIC SURGERY

## 2020-10-06 PROCEDURE — 99213 PR OFFICE/OUTPT VISIT, EST, LEVL III, 20-29 MIN: ICD-10-PCS | Mod: S$PBB,,, | Performed by: ORTHOPAEDIC SURGERY

## 2020-10-06 PROCEDURE — 73562 X-RAY EXAM OF KNEE 3: CPT | Mod: TC,50

## 2020-10-06 PROCEDURE — 99999 PR PBB SHADOW E&M-EST. PATIENT-LVL III: CPT | Mod: PBBFAC,,, | Performed by: ORTHOPAEDIC SURGERY

## 2020-10-06 PROCEDURE — 99213 OFFICE O/P EST LOW 20 MIN: CPT | Mod: PBBFAC,25 | Performed by: ORTHOPAEDIC SURGERY

## 2020-10-06 RX ORDER — ALPRAZOLAM 1 MG/1
1 TABLET ORAL 3 TIMES DAILY PRN
Qty: 90 TABLET | Refills: 1 | Status: SHIPPED | OUTPATIENT
Start: 2020-10-06 | End: 2020-12-28 | Stop reason: SDUPTHER

## 2020-10-06 NOTE — PROGRESS NOTES
"Subjective:     HPI:   Ari Santos is a 50 y.o. adult who presents 9 months out from complex right total knee replacement January 31, 2020.  He was seen for his 2 week follow-up and then did not come in for any additional follow-up due to the pandemic.  Patient says this is only the 3rd time he has really been out of the house    Overall is happy with right knee.  He is much better than prior surgery.  He has some occasional aches but nothing like prior to surgery.  He feels like overall hip significantly helped him    Left knee continues to get worse.  Moderate to severe pain global nature activity related relieved with rest.  He says that his left knee varus deformity makes him fall because his legs are not balance out.  He complains of some right-sided quad weakness which was pre-existing prior to surgery but no additional instability    He is on his baseline MS contin 15 milligrams twice a day and is down to Percocet 10 milligrams only once a day.  He is on prednisone 10 milligrams daily.     He has done a knee home exercise conditioning program.  He has knee braces or his knees.  He has a cane in the house and a walker he goes for longer distances.     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  Much of initial treatment and management was done at [facility].  The patient had some type of right distal thigh soft tissue procedure they say this was due to multiple right thigh steroid injections which caused the "muscle to graft to the femur" and they did some type of quad surgery to remove some of the quad muscle and rectus femoris.  They do not have any records of this was done with the patient was very young.  No history of wound healing issues or " infection.      Patient has had progressively worsening bilateral knee pain now it is severe in nature.  It is global in nature.  Patient says it feels like both of their knees are breaking.  They also have chronic worsening low back pain with some radicular symptoms down the back of both legs as well as numbness and tingling and radicular symptoms in the peroneal distribution of both lower legs     On Percocet 5 milligrams 3 times a day they been weaned off morphine and OxyContin from the pain clinic.  Around prednisone 10 milligrams a day for their adrenal issues and Aleve.  They had injections in the left knee in [place] several years ago with no change in symptoms.  No therapy.  Tried bilateral compressive knee sleeves.  Currently using a Rollator but not a wheelchair.  Cannot walk more than a block main limitation is walking     Patient is here with their wife.  They are on disability     Patient has congenital adrenal hyperplasia for which there on prednisone  Significant spinal stenosis history of radiofrequency ablation in May no surgeries, neurosurgery eval pending in August  sex reassignment surgery: born female living as male  fibromyalgia         Objective:   Body mass index is 26.39 kg/m².  Exam:  Antalgic gait on the left side with a limp.  On the right he hyperextends to back knee for stability.     Right knee incision well healed.  Passively hyperextends the 8° and flexes to 120° with 5° valgus alignment knee stable anterior-posterior varus and valgus stresses no flexion contracture he has 30 degree extensor lag.  His preop extensor lag was 45°    Left knee 15-97 degree knee range of motion 15° varus alignment which corrects to about 10° no extensor lag on that side tender palpation mediolateral patellofemoral joint lines knees opens to varus stress stable valgus anterior and posterior      Imaging:  Indication:  Exam status post right total knee arthroplasty  Exam Ordered: Radiographs of the  right knee include a standing anteroposterior view, a lateral view in full flexion, and a sunrise view  Details of Examination: Todays exam show a well fixed, well positioned total knee arthroplasty with no evidence of wear, osteolysis, or loosening.  Impression:  Status post right total knee arthroplasty, implant in good position with no abnormality    Indication:  Left knee pain  Exam Ordered: Radiographs of the left knee include a standing anteroposterior view, a standing posterioanterior view, a lateral view in full flexion, and a sunrise view  Details of Examination: Todays exam shows evidence of joint space narrowing, osteophyte formation, and subchondral sclerosis, all consistent with degenerative arthritis of the knee.  No other significant findings are noted.  Impression:  Degenerative Arthritis, Left Knee    Severe grade 4 varus knee arthritis with underlying skeletal dysplasia but is tibia is larger than the right side is on the lateral      Assessment:       ICD-10-CM ICD-9-CM   1. Status post right knee replacement  Z96.651 V43.65   2. Primary osteoarthritis of left knee  M17.12 715.16     Some type of underlying skeletal dysplasia with severe varus deformity     Patient has congenital adrenal hyperplasia for which there on prednisone  Significant spinal stenosis s/p spine surgery  sex reassignment surgery: born female living as male  fibromyalgia     Plan:       Patient is doing very well with their total knee arthroplasty.  They will continue with their routine care of the knee replacement and see me back for their follow-up at the routine interval.  If there are problems in the interim they will see me back sooner.  For the right knee recommend that he uses his better hinged knee brace to try to give some more stability and prevent hyperextension.  He will continue work on quad strengthening    He would like to proceed with a left total knee replacement    This patient has significant symptoms in  their knee that are affecting their quality of life and daily activities.  They have tried non-operative treatment including analgesics, an exercise program, and activity modification, but the symptoms have persisted. I believe they make a good candidate for knee arthroplasty.     The risks and benefits of knee arthroplasty have been discussed with the patient which include, but are not limited to infections (including severe sequelae), potential component failure, fracture, DVT, pulmonary embolus, nerve palsy, poor range of motion, the possibility of bone grafting, persistent pain, wound healing complications, transfusions, medical complications and death.     Pre-operative planning will include the following:  A pre-surgical evaluation by will be arranged.  Pre-op orders will be placed.  We will make arrangements with the operating room for proper time and staffing.  We will make Social Service arrangements for the patient.    Implants:   Company: [employer]  System: Attune revision femur, fixed bearing v RP revision tibia, short cemented stems on hold  Berny: [employer]  Oscar cables on hold    DVT prophylaxis: ASA 81mg BID x1 month  Dispo: outpatient PT    Admission status:    Inpatient       Location: [facility]                            Will plan on left total knee replacement on November 2nd.   Will put him in a T scope hinged knee brace locked in extension for the 1st 2 weeks and then start outpatient therapy after that    Will use an incisional wound VAC like the right side for the 1st 72 hours  Plan for discharge on postop day 3.  He will need stress dose steroids, do same endocrine recs as per right knee    We will try to avoid Garzon catheter. If Surgery is prolonged then we can do in and out catheterization the end of the case      INPATIENT ADMISSION STATUS  Per CMS MLN Matters Number [id number]:  X   I believe that this patient meets inpatient admission status criteria as there is a reasonable expectation of  medically necessary hospital services for 2 midnights or longer including all outpatient/observation and inpatient care time    X   Please see the supporting documentation in the medical record that supports the admitting physicians determination that the patient required inpatient care despite the lack of a 2-midnight expectation based upon complex medical factors including but not limited to:  -Patients history, co-morbidities and current medical needs  -Risk of adverse events  -Severity of signs and symptoms   X            No orders of the defined types were placed in this encounter.            Past Medical History:   Diagnosis Date    Adrenal insufficiency     Arthritis     Chronic bilateral low back pain with bilateral sciatica 3/19/2019    Congenital adrenal hyperplasia     Hyperlipidemia     Spinal stenosis     Spinal stenosis of lumbar region with neurogenic claudication 3/19/2019    Status post sex reassignment surgery 3/19/2019    Hx of Congenital Adrenal Hyperplasia    Unintentional weight loss 7/21/2020       Past Surgical History:   Procedure Laterality Date    BACK SURGERY      gender surgery      multiple surgeries since birth    LAMINECTOMY  09/13/2019    RADIOFREQUENCY ABLATION Left 5/9/2019    Procedure: RADIOFREQUENCY ABLATION, LEFT L3,4,5;  Surgeon: Messi Cabello MD;  Location: [facility];  Service: Pain Management;  Laterality: Left;  1 of 2    RADIOFREQUENCY ABLATION Right 5/23/2019    Procedure: RADIOFREQUENCY ABLATION, RIGHT L3,4,5;  Surgeon: Messi Cabello MD;  Location: [facility];  Service: Pain Management;  Laterality: Right;  2of 2    SPINE SURGERY      Sept 2019     TOTAL KNEE ARTHROPLASTY Right 1/31/2020    Procedure: ARTHROPLASTY, KNEE, TOTAL;  Surgeon: Donte Andrade III, MD;  Location: [address];  Service: Orthopedics;  Laterality: Right;       Family History   Problem Relation Age of Onset    Diabetes Maternal Grandfather     Breast cancer  Neg Hx     Ovarian cancer Neg Hx     Vaginal cancer Neg Hx     Endometrial cancer Neg Hx     Cervical cancer Neg Hx        Social History     Socioeconomic History    Marital status:      Spouse name: Not on file    Number of children: Not on file    Years of education: Not on file    Highest education level: Not on file   Occupational History    Not on file   Social Needs    Financial resource strain: Not very hard    Food insecurity     Worry: Never true     Inability: Never true    Transportation needs     Medical: No     Non-medical: No   Tobacco Use    Smoking status: Former Smoker     Quit date:      Years since quittin.    Smokeless tobacco: Never Used    Tobacco comment: Socailly   Substance and Sexual Activity    Alcohol use: Not Currently     Frequency: Never     Drinks per session: Patient refused     Binge frequency: Never    Drug use: Never    Sexual activity: Yes     Partners: Female   Lifestyle    Physical activity     Days per week: 3 days     Minutes per session: 30 min    Stress: To some extent   Relationships    Social connections     Talks on phone: More than three times a week     Gets together: Patient refused     Attends [employer] service: Not on file     Active member of club or organization: No     Attends meetings of clubs or organizations: Never     Relationship status:    Other Topics Concern    Not on file   Social History Narrative    Not on file

## 2020-10-09 ENCOUNTER — OFFICE VISIT (OUTPATIENT)
Dept: NEUROLOGY | Facility: CLINIC | Age: 50
End: 2020-10-09
Payer: MEDICARE

## 2020-10-09 VITALS
SYSTOLIC BLOOD PRESSURE: 115 MMHG | HEIGHT: 60 IN | BODY MASS INDEX: 26.39 KG/M2 | DIASTOLIC BLOOD PRESSURE: 83 MMHG | HEART RATE: 107 BPM

## 2020-10-09 DIAGNOSIS — R25.1 TREMOR: ICD-10-CM

## 2020-10-09 PROCEDURE — 99213 OFFICE O/P EST LOW 20 MIN: CPT | Mod: PBBFAC | Performed by: PSYCHIATRY & NEUROLOGY

## 2020-10-09 PROCEDURE — 99999 PR PBB SHADOW E&M-EST. PATIENT-LVL III: CPT | Mod: PBBFAC,,, | Performed by: PSYCHIATRY & NEUROLOGY

## 2020-10-09 PROCEDURE — 99213 PR OFFICE/OUTPT VISIT, EST, LEVL III, 20-29 MIN: ICD-10-PCS | Mod: S$PBB,,, | Performed by: PSYCHIATRY & NEUROLOGY

## 2020-10-09 PROCEDURE — 99999 PR PBB SHADOW E&M-EST. PATIENT-LVL III: ICD-10-PCS | Mod: PBBFAC,,, | Performed by: PSYCHIATRY & NEUROLOGY

## 2020-10-09 PROCEDURE — 99213 OFFICE O/P EST LOW 20 MIN: CPT | Mod: S$PBB,,, | Performed by: PSYCHIATRY & NEUROLOGY

## 2020-10-09 NOTE — ASSESSMENT & PLAN NOTE
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 Well managed with primidone however his benefit is short lived. Suggested Primidone 100mg TID however change timing to help with painting which has helped.    During inperson exam today no PDism or cerebellar signs.  Likely tremor due to predisone/gabapentin however if this worsens may consider him to have essential tremor.  No PDism noted on video exam despite RLS

## 2020-10-14 ENCOUNTER — PATIENT MESSAGE (OUTPATIENT)
Dept: ORTHOPEDICS | Facility: CLINIC | Age: 50
End: 2020-10-14

## 2020-10-19 ENCOUNTER — PATIENT MESSAGE (OUTPATIENT)
Dept: ADMINISTRATIVE | Facility: OTHER | Age: 50
End: 2020-10-19

## 2020-10-20 ENCOUNTER — TELEPHONE (OUTPATIENT)
Dept: PREADMISSION TESTING | Facility: HOSPITAL | Age: 50
End: 2020-10-20

## 2020-10-20 NOTE — TELEPHONE ENCOUNTER
----- Message from Yue Amezcua RN sent at 10/20/2020 12:57 PM CDT -----  ORTHO TOTAL KNEE  11/2/20  DR GOLDEN    Please schedule POC NP for anesthesia and clearance.    (The pt has an ortho appt on 10/23/20)    ThanksIvonne

## 2020-10-21 ENCOUNTER — TELEPHONE (OUTPATIENT)
Dept: ENDOCRINOLOGY | Facility: CLINIC | Age: 50
End: 2020-10-21

## 2020-10-21 ENCOUNTER — CLINICAL SUPPORT (OUTPATIENT)
Dept: INTERNAL MEDICINE | Facility: CLINIC | Age: 50
End: 2020-10-21
Payer: MEDICARE

## 2020-10-21 DIAGNOSIS — Z23 IMMUNIZATION DUE: Primary | ICD-10-CM

## 2020-10-21 PROCEDURE — 90686 IIV4 VACC NO PRSV 0.5 ML IM: CPT | Mod: PBBFAC

## 2020-10-21 NOTE — TELEPHONE ENCOUNTER
----- Message from Yue Amezcua RN sent at 10/21/2020 12:20 PM CDT -----  Dr Alvarez, did you need to see the pt before surgery on 11/2/20 since the follow-up appt was due in Sept.?  Will the pt need to double the home dose 2-3 days post procedure as done the last time?    Thank you,  Yue Amezcua RN  Pre-op Center  ----- Message -----  From: Rose Farfan RN  Sent: 10/21/2020  11:04 AM CDT  To: Delfina Alvarez MD, Yue Amezcua RN    Patient has an appointment on 12/11. Patient's significant other stated they are aware of the stress dose steroid required for surgery.  ----- Message -----  From: Delfina Alvarez MD  Sent: 10/21/2020   9:23 AM CDT  To: Yue Amezcua RN, Rose Farfan RN    Good morning, thank you for reaching out.  Yes he will require stress dose steroids on call to OR.  Generally we use 100 mg of hydrocortisone.        I also noticed that he is due for follow-up with the so I will ask my nurse Rose to  reach out to get him a  visit   ----- Message -----  From: Yue Amezcua RN  Sent: 10/20/2020  12:52 PM CDT  To: Delfina Alvarez MD    Your pt will have a second knee replacement surgery with Dr Andrade on 11/2/20.   Our previous request and your response was as follows; would we follow the same recommendations for this surgery?  Thank you,  Yue Amezcua RN  Pre-op Center      Regarding: RE: Pre-op recommendation  Yes  mg on call to or   He is to double his home dose also for 2-3 days after the procedure      ----- Message -----  From: Yue Amezcua RN  Sent: 1/9/2020  11:13 AM CST  To: Delfina Alvarez MD, #  Subject: Pre-op recommendation                             Your pt will have total knee arthroplasty surgery on 1/13/20 (Dr Andrade). The anesthesia team is requesting any recommendation for perioperative care regarding the pt's use of prednisone for congenital adrenal hyperplasia. Would the pt require a stress dose the day of surgery?  Please  advise.

## 2020-10-21 NOTE — PROGRESS NOTES
"Patient was given vaccine information sheet for the Flu Vaccine. The area of injection was palpated using the acromion process as a landmark. This area was cleaned with alcohol. Using a 25g 1" safety needle, 0.5mL of the vaccine was placed into the right deltoid muscle. The injection site was dressed with a bandage. Patient experienced no complications and was discharged in stable condition. Fluarix vaccine Lot: [id number] Exp: 06/30/2021.      "

## 2020-10-21 NOTE — TELEPHONE ENCOUNTER
Patient's significant other stated patient is aware of stress dose steroid. Appointment is scheduled on 12/11 with Dr Alvarez.    Instructed for patient to reach out to our office if we can  Further assistance      ----- Message from Delfina Alvarez MD sent at 10/21/2020  9:23 AM CDT -----  Good morning, thank you for reaching out.  Yes he will require stress dose steroids on call to OR.  Generally we use 100 mg of hydrocortisone.        I also noticed that he is due for follow-up with the so I will ask my nurse Rose to  reach out to get him a  visit   ----- Message -----  From: Yue Amezcua RN  Sent: 10/20/2020  12:52 PM CDT  To: Delfina Alvarez MD    Your pt will have a second knee replacement surgery with Dr Andrade on 11/2/20.   Our previous request and your response was as follows; would we follow the same recommendations for this surgery?  Thank you,  Yue Amezcua RN  Pre-op Center      Regarding: RE: Pre-op recommendation  Yes  mg on call to or   He is to double his home dose also for 2-3 days after the procedure      ----- Message -----  From: Yue Amezcua RN  Sent: 1/9/2020  11:13 AM CST  To: Delfina Alvarez MD, #  Subject: Pre-op recommendation                             Your pt will have total knee arthroplasty surgery on 1/13/20 (Dr Andrade). The anesthesia team is requesting any recommendation for perioperative care regarding the pt's use of prednisone for congenital adrenal hyperplasia. Would the pt require a stress dose the day of surgery?  Please advise.

## 2020-10-22 ENCOUNTER — PATIENT OUTREACH (OUTPATIENT)
Dept: ADMINISTRATIVE | Facility: OTHER | Age: 50
End: 2020-10-22

## 2020-10-22 NOTE — PROGRESS NOTES
Patient's chart was reviewed for overdue ARJUN topics.  Immunizations reconciled.    Orders placed:n/a  Tasked appts:n/a  Labs Linked:n/a

## 2020-10-23 ENCOUNTER — INITIAL CONSULT (OUTPATIENT)
Dept: INTERNAL MEDICINE | Facility: CLINIC | Age: 50
End: 2020-10-23
Payer: MEDICARE

## 2020-10-23 ENCOUNTER — HOSPITAL ENCOUNTER (OUTPATIENT)
Dept: RADIOLOGY | Facility: HOSPITAL | Age: 50
Discharge: HOME | End: 2020-10-23
Attending: NURSE PRACTITIONER
Payer: MEDICARE

## 2020-10-23 ENCOUNTER — OFFICE VISIT (OUTPATIENT)
Dept: ORTHOPEDICS | Facility: CLINIC | Age: 50
End: 2020-10-23
Payer: MEDICARE

## 2020-10-23 VITALS
WEIGHT: 132 LBS | OXYGEN SATURATION: 99 % | HEIGHT: 60 IN | BODY MASS INDEX: 25.91 KG/M2 | TEMPERATURE: 98 F | HEART RATE: 102 BPM | DIASTOLIC BLOOD PRESSURE: 83 MMHG | SYSTOLIC BLOOD PRESSURE: 133 MMHG

## 2020-10-23 DIAGNOSIS — N39.41 URINARY INCONTINENCE, URGE: ICD-10-CM

## 2020-10-23 DIAGNOSIS — E27.40 ADRENAL INSUFFICIENCY: ICD-10-CM

## 2020-10-23 DIAGNOSIS — R74.8 ABNORMAL LIVER ENZYMES: Primary | ICD-10-CM

## 2020-10-23 DIAGNOSIS — M25.562 LEFT KNEE PAIN, UNSPECIFIED CHRONICITY: Primary | ICD-10-CM

## 2020-10-23 DIAGNOSIS — E78.5 HYPERLIPIDEMIA, UNSPECIFIED HYPERLIPIDEMIA TYPE: ICD-10-CM

## 2020-10-23 DIAGNOSIS — R79.89 ABNORMAL THYROID BLOOD TEST: ICD-10-CM

## 2020-10-23 DIAGNOSIS — R79.89 ABNORMAL LFTS: ICD-10-CM

## 2020-10-23 DIAGNOSIS — R93.5 ABNORMAL CT SCAN, PELVIS: ICD-10-CM

## 2020-10-23 DIAGNOSIS — M19.011 ARTHRITIS OF RIGHT SHOULDER REGION: ICD-10-CM

## 2020-10-23 DIAGNOSIS — F11.90 CHRONIC, CONTINUOUS USE OF OPIOIDS: ICD-10-CM

## 2020-10-23 DIAGNOSIS — R63.4 UNINTENTIONAL WEIGHT LOSS: ICD-10-CM

## 2020-10-23 DIAGNOSIS — G47.33 OSA (OBSTRUCTIVE SLEEP APNEA): ICD-10-CM

## 2020-10-23 DIAGNOSIS — M48.00 SPINAL STENOSIS, UNSPECIFIED SPINAL REGION: ICD-10-CM

## 2020-10-23 DIAGNOSIS — G25.81 RLS (RESTLESS LEGS SYNDROME): ICD-10-CM

## 2020-10-23 DIAGNOSIS — M25.562 LEFT KNEE PAIN, UNSPECIFIED CHRONICITY: ICD-10-CM

## 2020-10-23 DIAGNOSIS — F43.10 PTSD (POST-TRAUMATIC STRESS DISORDER): ICD-10-CM

## 2020-10-23 DIAGNOSIS — R25.1 TREMOR: ICD-10-CM

## 2020-10-23 PROBLEM — Z74.09 DECREASED FUNCTIONAL MOBILITY AND ENDURANCE: Status: RESOLVED | Noted: 2019-12-04 | Resolved: 2020-10-23

## 2020-10-23 PROBLEM — S32.82XD: Status: RESOLVED | Noted: 2019-09-27 | Resolved: 2020-10-23

## 2020-10-23 PROBLEM — T14.8XXA BLISTER: Status: RESOLVED | Noted: 2020-01-09 | Resolved: 2020-10-23

## 2020-10-23 PROBLEM — R73.02 IGT (IMPAIRED GLUCOSE TOLERANCE): Status: RESOLVED | Noted: 2019-11-05 | Resolved: 2020-10-23

## 2020-10-23 PROBLEM — S72.009D: Status: RESOLVED | Noted: 2019-09-27 | Resolved: 2020-10-23

## 2020-10-23 PROCEDURE — 73560 XR KNEE 1 OR 2 VIEW LEFT: ICD-10-PCS | Mod: 26,LT,, | Performed by: RADIOLOGY

## 2020-10-23 PROCEDURE — 99499 NO LOS: ICD-10-PCS | Mod: S$PBB,,, | Performed by: NURSE PRACTITIONER

## 2020-10-23 PROCEDURE — 99214 OFFICE O/P EST MOD 30 MIN: CPT | Mod: S$PBB,,, | Performed by: HOSPITALIST

## 2020-10-23 PROCEDURE — 99999 PR PBB SHADOW E&M-EST. PATIENT-LVL IV: ICD-10-PCS | Mod: PBBFAC,,, | Performed by: NURSE PRACTITIONER

## 2020-10-23 PROCEDURE — 99214 OFFICE O/P EST MOD 30 MIN: CPT | Mod: PBBFAC,25

## 2020-10-23 PROCEDURE — 99499 UNLISTED E&M SERVICE: CPT | Mod: S$PBB,,, | Performed by: NURSE PRACTITIONER

## 2020-10-23 PROCEDURE — 99999 PR PBB SHADOW E&M-EST. PATIENT-LVL IV: CPT | Mod: PBBFAC,,, | Performed by: NURSE PRACTITIONER

## 2020-10-23 PROCEDURE — 73560 X-RAY EXAM OF KNEE 1 OR 2: CPT | Mod: TC,LT

## 2020-10-23 PROCEDURE — 99214 PR OFFICE/OUTPT VISIT, EST, LEVL IV, 30-39 MIN: ICD-10-PCS | Mod: S$PBB,,, | Performed by: HOSPITALIST

## 2020-10-23 PROCEDURE — 73560 X-RAY EXAM OF KNEE 1 OR 2: CPT | Mod: 26,LT,, | Performed by: RADIOLOGY

## 2020-10-23 PROCEDURE — 99999 PR PBB SHADOW E&M-EST. PATIENT-LVL IV: CPT | Mod: PBBFAC,,,

## 2020-10-23 PROCEDURE — 99214 OFFICE O/P EST MOD 30 MIN: CPT | Mod: PBBFAC,25,27 | Performed by: NURSE PRACTITIONER

## 2020-10-23 PROCEDURE — 99999 PR PBB SHADOW E&M-EST. PATIENT-LVL IV: ICD-10-PCS | Mod: PBBFAC,,,

## 2020-10-23 RX ORDER — IBUPROFEN 100 MG/5ML
1000 SUSPENSION, ORAL (FINAL DOSE FORM) ORAL DAILY
COMMUNITY

## 2020-10-23 NOTE — HPI
"This is a 50 y.o. male  who presents today for a preoperative evaluation in preparation for Orthopedic  surgery. Scheduled for 11/2/2020.  States surgery is indicated for "left knee replacement because it is bent out of place".   Patient is new to me.  Details of current problem: The duration of problem has been for 10 years with worsening in the past 2 years .   Reports symptoms of terrible pain, difficulty walking or standing for prolonged periods of time.  Aggravating Factors include: walking standing, & sitting.  Relieving factors are  using a walker and a cane to walk, wears braces, heat, & ice application .  Treated with MS and Oxycodone for chronic pain.  Reports pain: 7-8/10  The history has been obtained by speaking with the patient and reviewing the electronic medical record and/or outside health information. Significant health conditions for the perioperative period are discussed below in assessment and plan.     Patient reports current health status to be Fair.  Denies any new symptoms before surgery.     Problems of concern for the perioperative period:    RLS  Spinal Stenosis  Chronic Opiod use  PTSD  Urinary incontinence  Low testosterone  Elevated LFTs  KIARA  Fibromyalgia  Congenital adrenal hyperplasia  Tremor  "

## 2020-10-23 NOTE — Clinical Note
Good day- clearance is pending repeat labs and recommendations from Hepatology who has been following Mr. Santos for his liver issues.  Thank you for the consultation, Dalton

## 2020-10-23 NOTE — PROGRESS NOTES
"[address]  Progress Note    Patient Name: Ari Santos  MRN: [id number]  Date of Evaluation- 01/13/2021  PCP- Siva Mitchell MD    Future cases for Ari Santos [[id number]]     Case ID Status Date Time Gregg Procedure Provider Location    [id number] [facility] 11/2/2020  1:20  ARTHROPLASTY, KNEE:DEPUY-ATTUNE REVISION: SERINA iASSIST:CABLES ON HOLD Donte Andrade III, MD [[id number]] [facility] OR          HPI:  This is a 50 y.o. male  who presents today for a preoperative evaluation in preparation for Orthopedic  surgery. Scheduled for 11/2/2020.  States surgery is indicated for "left knee replacement because it is bent out of place".   Patient is new to me.  Details of current problem: The duration of problem has been for 10 years with worsening in the past 2 years .   Reports symptoms of terrible pain, difficulty walking or standing for prolonged periods of time.  Aggravating Factors include: walking standing, & sitting.  Relieving factors are  using a walker and a cane to walk, wears braces, heat, & ice application .  Treated with MS and Oxycodone for chronic pain.  Reports pain: 7-8/10  The history has been obtained by speaking with the patient and reviewing the electronic medical record and/or outside health information. Significant health conditions for the perioperative period are discussed below in assessment and plan.     Patient reports current health status to be Fair.  Denies any new symptoms before surgery.     Problems of concern for the perioperative period:    RLS  Spinal Stenosis  Chronic Opiod use  PTSD  Urinary incontinence  Low testosterone  Elevated LFTs  KIARA  Fibromyalgia  Congenital adrenal hyperplasia  Tremor      Subjective/ Objective:     Chief complaint-Preoperative evaluation, Perioperative Medical management, complication reduction plan     Active cardiac conditions- none    Revised cardiac risk index predictors- none    Functional capacity -Examples of physical " activity ride stationary bike 15 minutes daily, PTX exercises 30 minutes  house work, can take a flight of stairs holding on to the railing, cutting the grass with a mower and painting----- He can undertake all the above activities without  chest pain,chest tightness, Shortness of breath ,dizziness,lightheadedness making his exercise tolerance more than 4 Mets.        Review of Systems   Constitutional: Positive for fatigue. Negative for chills and fever.   HENT: Positive for dental problem. Negative for trouble swallowing and voice change.         2 teeth removed 2 months ago   Eyes: Negative for photophobia and visual disturbance.        No acute visual changes   Respiratory: Negative for apnea, cough, chest tightness, shortness of breath and wheezing.         STOP bang  Score 2  Low risk KIARA     Cardiovascular: Negative for chest pain, palpitations and leg swelling.   Gastrointestinal: Negative for abdominal distention, abdominal pain, blood in stool, constipation, diarrhea, nausea and vomiting.        NO FLD, hepatitis, cirrhosis  No BRB or black tarry stool     Endocrine: Negative for cold intolerance, heat intolerance, polydipsia, polyphagia and polyuria.   Genitourinary: Positive for difficulty urinating. Negative for dysuria, flank pain, frequency, hematuria and urgency.        Nocturia    Some dribbling after urinating   Musculoskeletal: Positive for arthralgias, back pain, gait problem, joint swelling, myalgias, neck pain and neck stiffness.   Neurological: Positive for numbness. Negative for dizziness, tremors, seizures, syncope, weakness, light-headedness and headaches.        Hand tremor right arm  Numbness  Lower legs and feet  Tingling in hands- carpal tunnel   Psychiatric/Behavioral: Negative for suicidal ideas. The patient is nervous/anxious.      Family History   Problem Relation Age of Onset    Diabetes Maternal Grandfather     Cancer Mother     Heart disease Father     Autoimmune disease  Sister     Breast cancer Neg Hx     Ovarian cancer Neg Hx     Vaginal cancer Neg Hx     Endometrial cancer Neg Hx     Cervical cancer Neg Hx      Past Surgical History:   Procedure Laterality Date    BACK SURGERY      gender surgery      multiple surgeries since birth    LAMINECTOMY  09/13/2019    RADIOFREQUENCY ABLATION Left 5/9/2019    Procedure: RADIOFREQUENCY ABLATION, LEFT L3,4,5;  Surgeon: Messi Cabello MD;  Location: [facility];  Service: Pain Management;  Laterality: Left;  1 of 2    RADIOFREQUENCY ABLATION Right 5/23/2019    Procedure: RADIOFREQUENCY ABLATION, RIGHT L3,4,5;  Surgeon: Messi Cabello MD;  Location: [facility];  Service: Pain Management;  Laterality: Right;  2of 2    SPINE SURGERY      Sept 2019     TOTAL KNEE ARTHROPLASTY Right 1/31/2020    Procedure: ARTHROPLASTY, KNEE, TOTAL;  Surgeon: Donte Andrade III, MD;  Location: [address];  Service: Orthopedics;  Laterality: Right;       No anesthesia, bleeding, cardiac problems with previous surgeries/procedures. No PE DVT    No known familial problems with anesthesia, bleeding, cardiac problems with previous surgeries/procedures. No known PE DVT    Medications and Allergies reviewed in epic.     Physical Exam   Constitutional: He is oriented to person, place, and time. Vital signs are normal. He is cooperative.   HENT:   Head: Normocephalic.   Nose: Nose normal.   Mouth/Throat: Uvula is midline, oropharynx is clear and moist and mucous membranes are normal. Mucous membranes are moist. Oropharynx is clear.   Eyes: Lids are normal.   Neck: Trachea normal, normal range of motion and phonation normal. Carotid bruit is not present.   Cardiovascular: Normal rate, regular rhythm, normal heart sounds and normal pulses. Exam reveals no gallop and no friction rub.   No murmur heard.  Pulses:       Carotid pulses are 2+ on the right side and 2+ on the left side.       Radial pulses are 2+ on the right side and 2+ on the  left side.        Posterior tibial pulses are 2+ on the right side and 2+ on the left side.   Pulmonary/Chest: Effort normal and breath sounds normal.   Abdominal: Soft. Normal appearance and bowel sounds are normal. There is no abdominal tenderness.   Musculoskeletal: Normal range of motion.      Right lower leg: No edema.      Left lower leg: No edema.   Lymphadenopathy:        Head (right side): No submental, no submandibular, no tonsillar, no preauricular, no posterior auricular and no occipital adenopathy present.        Head (left side): No submental, no submandibular, no tonsillar, no preauricular, no posterior auricular and no occipital adenopathy present.        Right cervical: No superficial cervical adenopathy present.       Left cervical: No superficial cervical adenopathy present.   Neurological: He is alert and oriented to person, place, and time. GCS eye subscore is 4. GCS verbal subscore is 5. GCS motor subscore is 6.   Skin: Skin is warm and dry. Capillary refill takes 2 to 3 seconds.        Psychiatric: His speech is normal and behavior is normal. Memory, judgment and thought content normal. His mood appears anxious.   Patient expresses concern with his body and fear of being seen without clothes     Blood pressure 133/83, pulse 102, temperature 98.3 °F (36.8 °C), temperature source Oral, height 5' (1.524 m), weight 59.9 kg (132 lb), SpO2 99 %.      Investigations  Lab and Imaging have been reviewed in [facility].    Labs 8/21/2020  Hgb 14.8  HCT 46      BUN 16  Cr 0.7    Bili 0.4  Alk Phos 289  ALT 57  GFR >60    Review of old records- Was done and information gathered regards to events leading to surgery and health conditions of significance in the perioperative period.        Preoperative cardiac risk assessment-  The patient does not have any active cardiac conditions . Revised cardiac risk index predictors- ---.Functional capacity is more than 4 Mets. He will be undergoing a Orthopedic  "procedure that carries a Moderate Risk risk     The estimated risk of the rate of adverse cardiac outcomes  3.9%    No further cardiac work up is indicated prior to proceeding with the surgery     Orders Placed This Encounter    Comprehensive Metabolic Panel    Protime-INR    CBC Auto Differential       American Society of Anesthesiologists Physical status classification ( ASA ) class: 3     Postoperative pulmonary complication risk assessment: 1.6%     ARISCAT ( Canet) risk index- risk class -  Low, if duration of surgery is under 3 hours     Assessment/Plan:     Chronic, continuous use of opioids  morphine (MS CONTIN) 15 MG 12 hr tablet  oxyCODONE-acetaminophen (PERCOCET)  mg per tablet    Tremor  primidone (MYSOLINE) 50 MG Tab  Noted tremors in control on examination    Endocrine disorder in female-to-male transgender person  testosterone (ANDROGEL) 1 % (50 mg/5 gram) GlPk    HLD (hyperlipidemia)  atorvastatin (LIPITOR) 10 MG tablet    PTSD (post-traumatic stress disorder)  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 And Sir"  Has next visit November 12, 2020        Unintentional weight loss  Patient states he had intentional weight loss due to caloric restriction    KIARA (obstructive sleep apnea)  Had sleep study and it showed some low level sleep apnea  Wife reports he no longer has apneic episodes    This client has a possible diagnosis of obstructive sleep apnea (KIARA)    STOPBANG score: 2    Instructions were given to avoid the following: sleeping supine, weight gain ,alcoholic beverages , sedative medications, and CNS depressant use as these can all worsen KIARA.    Untreated sleep apnea has been shown to increase daytime sleepiness, hypertension, heart disease and stroke which were discussed with the patient " at this time    Please use caution with medications that induce respiratory depression in the perioperative period      Abnormal thyroid blood test  TSH 1.156 in 2/20    Adrenal insufficiency  Prednisone 10 mg daily  Please continue the maintenance steroid dose during the entire perioperative period   Please consider the potential for a stress dose of steroid       RLS (restless legs syndrome)  Requip 4mg nightly  Patient reports this medication controls the RLS    Urinary incontinence, urge  Patient reports some 'dribbling' of urine due to Urological surgeries in the past  Wears incontinence pads routinely    Abnormal LFTs  Liver function test improving over time.  MELD score 6- 1.9% estimated 3 month mortality.  Patient receives care at [facility]- clearance for surgery has been received.  Patient has finished liver workup      Current labs:  Alkaline Phosphatase 289--->282  AST 39--->65  ALT 57--->82  Tbil 0.4--->0.4  INR 0.9    Cr 0.8    Please take caution with usage of Medication that are hepatotoxic or  Metabolized in the liver     MR Elastoplasty 10/29/2020  Impression:     1. Cholelithiasis.  Gallstone is present within the distal cystic duct with proximal dilation possibly representing partial obstruction.  If there is clinical concern for cholecystitis, recommend HIDA scan for further evaluation.  2. Bilateral adrenal gland cysts.  Two additional subcentimeter enhancing foci adjacent to the right adrenal gland, indeterminate and too small to adequately characterize but favored to represent a benign etiology.  Recommend attention on follow-up imaging.  3. Subcentimeter cystic foci in the pancreatic tail, favored to represent pancreatic cyst or side branch IPMNs.  4. Liver fat fraction measures 2%, consistent with normal fat fraction.  5. Liver elasticity measures 3.26 kPa consistent with  F1 or F2 fibrosis.  6. Additional incidental findings as above.     MRI Abd w wo contrast INC MRCP  10/29/2020  Impression:     1. Cholelithiasis.  Gallstone is present within the distal cystic duct with proximal dilation possibly representing partial obstruction.  If there is clinical concern for cholecystitis, recommend HIDA scan for further evaluation.  2. Bilateral adrenal gland cysts.  Two additional subcentimeter enhancing foci adjacent to the right adrenal gland, indeterminate and too small to adequately characterize but favored to represent a benign etiology.  Recommend attention on follow-up imaging.  3. Subcentimeter cystic foci in the pancreatic tail, favored to represent pancreatic cyst or side branch IPMNs.  4. Liver fat fraction measures 2%, consistent with normal fat fraction.  5. Liver elasticity measures 3.26 kPa consistent with  F1 or F2 fibrosis.  6. Additional incidental findings as above.      Abnormal CT scan, pelvis  Impression:     1. No acute displaced fractures.  2. Multiple remote fractures as detailed above.  Ill-defined sclerosis of the right sacral ala favored to relate to remote trauma noting that a superimposed acute component would be difficult to entirely exclude.  3. Fluid distention of the endometrial cavity, suggesting cervical stenosis which can be seen with chronic inflammation/fibrosis, neoplastic process not excluded.  Clinical correlation recommended.  4. Bladder is distended with associated fat stranding, suggesting inflammatory/infectious cystitis versus bladder outlet obstruction.  5. Advanced degenerative changes of the visualized lumbar spine with laminectomy changes.  This report was flagged in Epic as abnormal.     Electronically signed by resident: Aniyah De La Torre  Date:                                            09/27/2019  Time:                                           17:07      Preventive perioperative care    Thromboembolic prophylaxis:  His risk factors for thrombosis include surgical procedure, age, reduced mobility and hormone replacement therapy.I  suggest  thromboembolic prophylaxis ( mechanical/pharmacological, weighing the risk benefits of pharmacological agent use considering justina procedural bleeding )  during the perioperative period.I suggested being active in the post operative period.      Postoperative pulmonary complication prophylaxis-Risk factors for post operative pulmonary complications include ASA class >2- I suggest incentive spirometry use, early ambulation and pain control so as to avoid diaphragmatic splinting       Renal complication prophylaxis- I suggest keeping him well hydrated and avoidance/ minimizing the use of  NSAID's,MCCARTHY 2 Inhibitors ,IV contrast if possible in the perioperative period.I suggested drinking 2 litre's of water a day      Surgical site Infection Prophylaxis-I  suggest appropriate antibiotic for Prophylaxis against Surgical site infections    In view of the patient  is at risk of postoperative urinary retention.  I suggest avoidance / minimizing the of  Benzodiazepines,Anticholinergic medication,antihistamines ( Benadryl) , if possible in the perioperative period. I suggest using the minimum possible use of opioids for the minimum period of time in the perioperative period. Benadryl avoidance suggested    This visit was focused on Preoperative evaluation, Perioperative Medical management, complication reduction plans. I suggest that the patient follows up with primary care or relevant sub specialists for ongoing health care.    I appreciate the opportunity to be involved in this patients care. Please feel free to contact me if there were any questions about this consultation.    Patient is optmized    Hepatology clearance---> pending completing testing on 10/29/2020  Per Hepat[facility],  PROSPER Scroggs NP, no contraindications from a liver perspective to proceed with surgery at this time    Dalton Mcdaniel NP  Perioperative Medicine  [facility]   Pager [phone]

## 2020-10-23 NOTE — ASSESSMENT & PLAN NOTE
"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 And Sir"  Has next visit November 12, 2020      "

## 2020-10-23 NOTE — Clinical Note
As you are aware, are still pending Hepatology clearance related to delayed testing and time restraints. Dr. Handley was consulted for advice concerning this case and given the current labs- Alkaline Phosphatase 282,AST 65, ALT 82,  Tbil 0.4, Cr 0.8, , INR 0.9.  Dr. Handley advised the patient did not have cirrhosis.   The patient's MELD score is 6 or 1.9% estimated 3 month mortality.   I saw no response from Hepatology regarding future procedures over the next 3 months.     Yours, Dalton

## 2020-10-23 NOTE — PATIENT INSTRUCTIONS
Preventive perioperative care    Thromboembolic prophylaxis:  His risk factors for thrombosis include surgical procedure, age, reduced mobility and hormone replacement therapy.I suggest  thromboembolic prophylaxis ( mechanical/pharmacological, weighing the risk benefits of pharmacological agent use considering justina procedural bleeding )  during the perioperative period.I suggested being active in the post operative period.      Postoperative pulmonary complication prophylaxis-Risk factors for post operative pulmonary complications include ASA class >2- I suggest incentive spirometry use, early ambulation and pain control so as to avoid diaphragmatic splinting       Renal complication prophylaxis- I suggest keeping him well hydrated and avoidance/ minimizing the use of  NSAID's,MCCARTHY 2 Inhibitors ,IV contrast if possible in the perioperative period.I suggested drinking 2 litre's of water a day      Surgical site Infection Prophylaxis-I  suggest appropriate antibiotic for Prophylaxis against Surgical site infections    In view of the patient  is at risk of postoperative urinary retention.  I suggest avoidance / minimizing the of  Benzodiazepines,Anticholinergic medication,antihistamines ( Benadryl) , if possible in the perioperative period. I suggest using the minimum possible use of opioids for the minimum period of time in the perioperative period. Benadryl avoidance suggested    This visit was focused on Preoperative evaluation, Perioperative Medical management, complication reduction plans. I suggest that the patient follows up with primary care or relevant sub specialists for ongoing health care.    I appreciate the opportunity to be involved in this patients care. Please feel free to contact me if there were any questions about this consultation.      Your surgery has been scheduled for:_11/2/20_    You should report to:  ____[facility], located on the [place] side of the first floor of the            [facility] ([phone])  ____[facility], located on the [facility] side of the            Second floor of the [facility] ([phone])  ____3rd Floor [facility] located on the [facility] side of the [facility] [phone]  __X__[facility]: located at [address] [place] [address]. Check in on the first floor of the hospital.  Please Note   - Tell your doctor if you take Aspirin, products containing Aspirin, herbal medications  or blood thinners, such as Coumadin, Ticlid, or Plavix.  (Consult your provider regarding holding or stopping before surgery).  - Arrange for someone to drive you home following surgery.  You will not be allowed to leave the surgical facility alone or drive yourself home following sedation and anesthesia.  Before Surgery  - Stop taking all herbal medications and vitamins 7 days prior to surgery  - No Motrin/Advil (Ibuprofen) 7 day before surgery  - No Aleve (Naproxen) 7 days before surgery  - Stop taking blood thinners_7_days before surgery  - No Goody's/BC  Powder 7 days before surgery  - Refrain from drinking alcoholic beverages for 24hours before and after surgery  - Stop or limit smoking _14_days before surgery  - You may take Tylenol for pain  Night before Surgery  - Do not eat or drink after midnight  - Take a shower or bath (shower is recommended).  Bathe with Hibiclens soap or an antibacterial soap from the neck down.  If not supplied by your surgeon, hibiclens soap will need to be purchased over the counter in pharmacy.  Rinse soap off thoroughly.  - Shampoo your hair with your regular shampoo  The Day of Surgery  - Take another bath or shower with hibiclens or any antibacterial soap, to reduce the chance of infection.  - Take heart and blood pressure medications with a small sip of water, as advised by the perioperative team.  - Do not take fluid  pills  - You may brush your teeth and rinse your mouth, but do not swall any additional water.   - Do not apply powder, body lotions or deodorant on the day of surgery.  - Do not wear moisturizer  - Wear comfortable clothes, such as a button front shirt and loose fitting pants.  - Leave all jewelry, including body piercings, and valuables at home.    - Bring any devices you will neeed after surgery such as crutches or canes.  - If you have sleep apnea, please bring your CPAP machine  In the event that your physical condition changes including the onset of a cold or respiratory illness, or if you have to delay or cancel your surgery, please notify your surgeon.   Anesthesia: Regional Anesthesia    Youre scheduled for surgery. During surgery, youll receive medicine called anesthesia to keep you comfortable and pain-free. Your surgeon has decided that youll receive regional anesthesia. This sheet tells you what to expect with this type of anesthesia.  What is regional anesthesia?  Regional anesthesia numbs one region of your body. The anesthesia may be given around nerves or into veins in your arms, neck, or legs (nerve block or [place] block). Or it may be sent into the spinal fluid (spinal anesthesia) or into the space just outside the spinal fluid (epidural anesthesia). You may also be given sedatives to help you relax.  Nerve block or [place] block  A small area of the body, such as an arm or leg, can be numbed using a nerve block or [place] block.  · Nerve block. During a nerve block, your skin is numbed. A needle is then inserted near nerves that serve the area to be numbed. Anesthetic is sent through the needle.  · IV regional or Gideon block. For this type of block, an IV line is put into a vein. The blood flow to the area to be numbed is blocked for a short time. Anesthetic is sent through the IV.  Spinal anesthesia  Spinal anesthesia numbs your body from about the waist down.  · Anesthetic is injected into the spinal  fluid. This is a substance that surrounds the spinal cord in your spinal column. The anesthetic blocks pain traveling from the body to the brain.  · To receive the anesthetic, your skin is numbed at the injection site on your back.  · A needle is then inserted into the spinal space. Anesthetic is sent into the spinal fluid through the needle.  Epidural anesthesia  Epidural anesthesia is most commonly used during childbirth and may also be used after surgical procedures of the chest, belly, and legs.  · Anesthetic is injected into the epidural space. This is just outside the dural sac which contains the spinal fluid.  · To receive the anesthetic, your skin is numbed at the injection site on your back.  · A needle is then inserted into the epidural space. Anesthetic is sent into the epidural space through the needle.  · A small flexible catheter may be attached to the needle and left in place. This allows for continuous injections or infusions of anesthetic.  Anesthesia tools and medicines that might be near you during your procedure  · Local anesthetic. This medicine is given through a needle numbs one region of your body.  · Electrocardiography leads (electrodes). These are used to record your heart rate and rhythm.  · Blood pressure cuff. A cuff is placed on your arm to keep track of your blood pressure.  · Pulse oximeter. This small clip is placed on the end of the finger. It measures your blood oxygen level.  · Sedatives. These medicines may be given through an IV. They help to relax you and keep you comfortable. You may stay awake or sleep lightly.  · Oxygen. You may be given oxygen through a facemask.  Risks and possible complications  Regional anesthesia carries some risks. These include:  · Nausea and vomiting  · Headache  · Backache  · Decreased blood pressure  · Allergic reaction to the anesthetic  · Ongoing numbness (rare)  · Irregular heartbeat (rare)  · Cardiac arrest (rare)   Date Last Reviewed:  12/1/2016  © 9622-6899 [employer]. [address] [place] [address], [place] [address]. All rights reserved. This information is not intended as a substitute for professional medical care. Always follow your healthcare professional's instructions.

## 2020-10-23 NOTE — ASSESSMENT & PLAN NOTE
Had sleep study and it showed some low level sleep apnea  Wife reports he no longer has apneic episodes    This client has a possible diagnosis of obstructive sleep apnea (KIARA)    STOPBANG score: 2    Instructions were given to avoid the following: sleeping supine, weight gain ,alcoholic beverages , sedative medications, and CNS depressant use as these can all worsen KIARA.    Untreated sleep apnea has been shown to increase daytime sleepiness, hypertension, heart disease and stroke which were discussed with the patient at this time    Please use caution with medications that induce respiratory depression in the perioperative period

## 2020-10-23 NOTE — ASSESSMENT & PLAN NOTE
Prednisone 10 mg daily  Please continue the maintenance steroid dose during the entire perioperative period   Please consider the potential for a stress dose of steroid

## 2020-10-26 ENCOUNTER — TELEPHONE (OUTPATIENT)
Dept: HEPATOLOGY | Facility: CLINIC | Age: 50
End: 2020-10-26

## 2020-10-26 PROBLEM — Z98.890 S/P LUMBAR LAMINECTOMY: Status: RESOLVED | Noted: 2019-10-28 | Resolved: 2020-10-26

## 2020-10-26 PROBLEM — R93.5 ABNORMAL CT SCAN, PELVIS: Status: RESOLVED | Noted: 2019-10-27 | Resolved: 2020-10-26

## 2020-10-26 PROBLEM — R63.4 UNINTENTIONAL WEIGHT LOSS: Status: RESOLVED | Noted: 2020-07-21 | Resolved: 2020-10-26

## 2020-10-26 RX ORDER — ONDANSETRON 2 MG/ML
4 INJECTION INTRAMUSCULAR; INTRAVENOUS EVERY 8 HOURS PRN
Status: CANCELLED | OUTPATIENT
Start: 2020-10-26

## 2020-10-26 RX ORDER — ACETAMINOPHEN 500 MG
1000 TABLET ORAL EVERY 6 HOURS
Status: CANCELLED | OUTPATIENT
Start: 2020-10-26 | End: 2020-10-28

## 2020-10-26 RX ORDER — PREGABALIN 25 MG/1
75 CAPSULE ORAL
Status: CANCELLED | OUTPATIENT
Start: 2020-10-26

## 2020-10-26 RX ORDER — SODIUM CHLORIDE 0.9 % (FLUSH) 0.9 %
10 SYRINGE (ML) INJECTION
Status: CANCELLED | OUTPATIENT
Start: 2020-10-26

## 2020-10-26 RX ORDER — MUPIROCIN 20 MG/G
1 OINTMENT TOPICAL 2 TIMES DAILY
Status: CANCELLED | OUTPATIENT
Start: 2020-10-26 | End: 2020-10-31

## 2020-10-26 RX ORDER — PREGABALIN 25 MG/1
75 CAPSULE ORAL NIGHTLY
Status: CANCELLED | OUTPATIENT
Start: 2020-10-26

## 2020-10-26 RX ORDER — OXYCODONE HYDROCHLORIDE 5 MG/1
10 TABLET ORAL
Status: CANCELLED | OUTPATIENT
Start: 2020-10-26

## 2020-10-26 RX ORDER — MUPIROCIN 20 MG/G
1 OINTMENT TOPICAL
Status: CANCELLED | OUTPATIENT
Start: 2020-10-26

## 2020-10-26 RX ORDER — TALC
6 POWDER (GRAM) TOPICAL NIGHTLY PRN
Status: CANCELLED | OUTPATIENT
Start: 2020-10-26

## 2020-10-26 RX ORDER — MIDAZOLAM HYDROCHLORIDE 1 MG/ML
1 INJECTION INTRAMUSCULAR; INTRAVENOUS EVERY 5 MIN PRN
Status: CANCELLED | OUTPATIENT
Start: 2020-10-26

## 2020-10-26 RX ORDER — ROPIVACAINE HYDROCHLORIDE 2 MG/ML
8 INJECTION, SOLUTION EPIDURAL; INFILTRATION; PERINEURAL CONTINUOUS
Status: CANCELLED | OUTPATIENT
Start: 2020-10-26

## 2020-10-26 RX ORDER — SODIUM CHLORIDE 9 MG/ML
INJECTION, SOLUTION INTRAVENOUS CONTINUOUS
Status: CANCELLED | OUTPATIENT
Start: 2020-10-26 | End: 2020-10-27

## 2020-10-26 RX ORDER — BISACODYL 10 MG
10 SUPPOSITORY, RECTAL RECTAL EVERY 12 HOURS PRN
Status: CANCELLED | OUTPATIENT
Start: 2020-10-26

## 2020-10-26 RX ORDER — CELECOXIB 100 MG/1
200 CAPSULE ORAL DAILY
Status: CANCELLED | OUTPATIENT
Start: 2020-10-27

## 2020-10-26 RX ORDER — POLYETHYLENE GLYCOL 3350 17 G/17G
17 POWDER, FOR SOLUTION ORAL DAILY
Status: CANCELLED | OUTPATIENT
Start: 2020-10-27

## 2020-10-26 RX ORDER — MORPHINE SULFATE 10 MG/ML
2 INJECTION, SOLUTION INTRAMUSCULAR; INTRAVENOUS
Status: CANCELLED | OUTPATIENT
Start: 2020-10-26

## 2020-10-26 RX ORDER — FENTANYL CITRATE 50 UG/ML
25 INJECTION, SOLUTION INTRAMUSCULAR; INTRAVENOUS EVERY 5 MIN PRN
Status: CANCELLED | OUTPATIENT
Start: 2020-10-26

## 2020-10-26 RX ORDER — AMOXICILLIN 250 MG
1 CAPSULE ORAL 2 TIMES DAILY
Status: CANCELLED | OUTPATIENT
Start: 2020-10-26

## 2020-10-26 RX ORDER — ACETAMINOPHEN 500 MG
1000 TABLET ORAL
Status: CANCELLED | OUTPATIENT
Start: 2020-10-26

## 2020-10-26 RX ORDER — CELECOXIB 100 MG/1
400 CAPSULE ORAL
Status: CANCELLED | OUTPATIENT
Start: 2020-10-26

## 2020-10-26 RX ORDER — OXYCODONE HYDROCHLORIDE 5 MG/1
5 TABLET ORAL
Status: CANCELLED | OUTPATIENT
Start: 2020-10-26

## 2020-10-26 RX ORDER — NALOXONE HCL 0.4 MG/ML
0.02 VIAL (ML) INJECTION
Status: CANCELLED | OUTPATIENT
Start: 2020-10-26

## 2020-10-26 RX ORDER — ASPIRIN 81 MG/1
81 TABLET ORAL 2 TIMES DAILY
Status: CANCELLED | OUTPATIENT
Start: 2020-10-26

## 2020-10-26 RX ORDER — FAMOTIDINE 20 MG/1
20 TABLET, FILM COATED ORAL 2 TIMES DAILY
Status: CANCELLED | OUTPATIENT
Start: 2020-10-26

## 2020-10-26 RX ORDER — SODIUM CHLORIDE 9 MG/ML
INJECTION, SOLUTION INTRAVENOUS
Status: CANCELLED | OUTPATIENT
Start: 2020-10-26

## 2020-10-26 RX ORDER — LIDOCAINE HYDROCHLORIDE 10 MG/ML
1 INJECTION, SOLUTION EPIDURAL; INFILTRATION; INTRACAUDAL; PERINEURAL
Status: CANCELLED | OUTPATIENT
Start: 2020-10-26

## 2020-10-26 NOTE — ASSESSMENT & PLAN NOTE
Impression:     1. No acute displaced fractures.  2. Multiple remote fractures as detailed above.  Ill-defined sclerosis of the right sacral ala favored to relate to remote trauma noting that a superimposed acute component would be difficult to entirely exclude.  3. Fluid distention of the endometrial cavity, suggesting cervical stenosis which can be seen with chronic inflammation/fibrosis, neoplastic process not excluded.  Clinical correlation recommended.  4. Bladder is distended with associated fat stranding, suggesting inflammatory/infectious cystitis versus bladder outlet obstruction.  5. Advanced degenerative changes of the visualized lumbar spine with laminectomy changes.  This report was flagged in Epic as abnormal.     Electronically signed by resident: Aniyah De La Torre  Date:                                            09/27/2019  Time:                                           17:07

## 2020-10-26 NOTE — H&P
CC: Left knee pain    Ari Santos is a 50 y.o. adult with history of Left knee pain. Pain is worse with activity and weight bearing.  Patient has experienced interference of activities of daily living due to decreased range of motion and an increase in joint pain and swelling.  Patient has failed non-operative treatment including NSAIDs, corticosteroid injections, viscosupplement injections, and activity modification.  Ari Santos currently ambulates using assistive device .     Relevant medical conditions of significance in perioperative period:  Spinal stenosis- managed by back and spine  S/p right knee replacement    Given documented medical comorbidities including those listed above and based off of CMS criteria patient would meet inpatient admission status guidelines. This case has been approved as inpatient.     Past Medical History:   Diagnosis Date    Abnormal CT scan, pelvis 10/27/2019    Abnormal thyroid blood test 5/6/2019    Adrenal insufficiency     Blister- healing 1/9/2020    Chronic bilateral low back pain with bilateral sciatica 3/19/2019    Congenital adrenal hyperplasia     IGT (impaired glucose tolerance) 11/5/2019    Insomnia due to medical condition     Multiple closed traumatic fractures of multiple bones of hip and pelvis with routine healing, subsequent encounter 9/27/2019    S/P lumbar laminectomy 10/28/2019    Spinal stenosis     Spinal stenosis of lumbar region with neurogenic claudication 3/19/2019    Status post sex reassignment surgery 3/19/2019    Hx of Congenital Adrenal Hyperplasia    Unintentional weight loss 7/21/2020       Past Surgical History:   Procedure Laterality Date    BACK SURGERY      gender surgery      multiple surgeries since birth    LAMINECTOMY  09/13/2019    RADIOFREQUENCY ABLATION Left 5/9/2019    Procedure: RADIOFREQUENCY ABLATION, LEFT L3,4,5;  Surgeon: Messi Cabello MD;  Location: [facility];  Service: Pain Management;   Laterality: Left;  1 of 2    RADIOFREQUENCY ABLATION Right 5/23/2019    Procedure: RADIOFREQUENCY ABLATION, RIGHT L3,4,5;  Surgeon: Messi Cabello MD;  Location: [facility];  Service: Pain Management;  Laterality: Right;  2of 2    SPINE SURGERY      Sept 2019     TOTAL KNEE ARTHROPLASTY Right 1/31/2020    Procedure: ARTHROPLASTY, KNEE, TOTAL;  Surgeon: Donte Andrade III, MD;  Location: [address];  Service: Orthopedics;  Laterality: Right;       Family History   Problem Relation Age of Onset    Diabetes Maternal Grandfather     Cancer Mother     Heart disease Father     Autoimmune disease Sister     Breast cancer Neg Hx     Ovarian cancer Neg Hx     Vaginal cancer Neg Hx     Endometrial cancer Neg Hx     Cervical cancer Neg Hx        Review of patient's allergies indicates:   Allergen Reactions    Bactrim [sulfamethoxazole-trimethoprim] Itching    Penicillins Rash         Current Outpatient Medications:     ALPRAZolam (XANAX) 1 MG tablet, Take 1 tablet (1 mg total) by mouth 3 (three) times daily as needed., Disp: 90 tablet, Rfl: 1    ascorbic acid, vitamin C, (VITAMIN C) 1000 MG tablet, Take 1,000 mg by mouth 2 (two) times daily., Disp: , Rfl:     atorvastatin (LIPITOR) 10 MG tablet, Take 1 tablet (10 mg total) by mouth every evening., Disp: 90 tablet, Rfl: 2    cranberry fruit extract (CRANBERRY ORAL), Take by mouth., Disp: , Rfl:     cyclobenzaprine (FLEXERIL) 10 MG tablet, Take 1 tablet (10 mg total) by mouth 3 (three) times daily as needed for Muscle spasms., Disp: 270 tablet, Rfl: 1    gabapentin (NEURONTIN) 800 MG tablet, Take 1 tablet by mouth three times a day and take 2 tablets at night (5 tablets a day, 4000mg), Disp: 450 tablet, Rfl: 0    Lactobacillus acidophilus (ACIDOPHILUS) Cap, Take by mouth once daily. , Disp: , Rfl:     lamoTRIgine (LAMICTAL) 200 MG tablet, Take 1 tablet (200 mg total) by mouth 2 (two) times daily., Disp: 60 tablet, Rfl: 5    loratadine  (CLARITIN) 10 mg tablet, Take 10 mg by mouth once daily. , Disp: , Rfl:     morphine (MS CONTIN) 15 MG 12 hr tablet, Take 1 tablet (15 mg total) by mouth 2 (two) times daily., Disp: 60 tablet, Rfl: 0    oxyCODONE-acetaminophen (PERCOCET)  mg per tablet, Take 1 tablet by mouth every 8 (eight) hours as needed for Pain., Disp: 90 tablet, Rfl: 0    predniSONE (DELTASONE) 10 MG tablet, Take 1 tablet (10 mg total) by mouth once daily., Disp: 100 tablet, Rfl: 5    primidone (MYSOLINE) 50 MG Tab, Take 2 tablets (100 mg total) by mouth 3 (three) times daily., Disp: 540 tablet, Rfl: 12    rOPINIRole (REQUIP) 4 MG tablet, Take 1 tablet (4 mg total) by mouth nightly., Disp: 90 tablet, Rfl: 12    testosterone (ANDROGEL) 1 % (50 mg/5 gram) GlPk, Apply 5 grams topically once daily., Disp: 30 packet, Rfl: 4    triamcinolone acetonide 0.1% (KENALOG) 0.1 % cream, Apply topically 2 (two) times daily., Disp: 45 g, Rfl: 1    zinc 50 mg Tab, once daily. , Disp: , Rfl:     acetaminophen (TYLENOL) 650 MG TbSR, Take 1 tablet (650 mg total) by mouth every 8 (eight) hours. (Patient not taking: Reported on 8/21/2020), Disp: 120 tablet, Rfl: 0    aspirin (ECOTRIN) 81 MG EC tablet, Take 1 tablet (81 mg total) by mouth 2 (two) times daily., Disp: 60 tablet, Rfl: 0    Review of Systems:  Constitutional: no fever or chills  Eyes: no visual changes  ENT: no nasal congestion or sore throat  Respiratory: no cough or shortness of breath  Cardiovascular: no chest pain or palpitations  Gastrointestinal: no nausea or vomiting, tolerating diet  Genitourinary: no hematuria or dysuria  Integument/Breast: no rash or pruritis  Hematologic/Lymphatic: no easy bruising or lymphadenopathy  Musculoskeletal: positive for knee pain  Neurological: no seizures or tremors  Behavioral/Psych: no auditory or visual hallucinations  Endocrine: no heat or cold intolerance    PE:  There were no vitals taken for this visit.  General: Pleasant, cooperative, NAD    Gait: antalgic  HEENT: NCAT, sclera nonicteric   Lungs: Respirations clear bilaterally; equal and unlabored.   CV: S1S2; 2+ bilateral upper and lower extremity pulses.   Skin: Intact throughout with no rashes, erythema, or lesions  Extremities: No LE edema,  no erythema or warmth of the skin in either lower extremity.    Left knee exam:  Knee Range of Motion:normal, pain with passive range of motion  Effusion:none  Condition of skin:intact  Location of tenderness:Medial joint line   Strength:normal  Stability: stable to testing    Hip Examination: painless PROM of hip     Radiographs: Radiographs reveal advanced degenerative changes including subchondral cyst formation, subchondral sclerosis, osteophyte formation, joint space narrowing.     Knee Alignment:  Significiant varus    Diagnosis: osteoarthritis Left knee    Plan: Left total knee arthroplasty    Due to the serious nature of total joint infection and the high prevalence of community acquired MRSA, vancomycin will be used perioperatively.

## 2020-10-26 NOTE — ASSESSMENT & PLAN NOTE
Liver function test improving over time.  MELD score 6- 1.9% estimated 3 month mortality.  Patient receives care at [facility]- clearance for surgery has been received.  Patient has finished liver workup      Current labs:  Alkaline Phosphatase 289--->282  AST 39--->65  ALT 57--->82  Tbil 0.4--->0.4  INR 0.9    Cr 0.8    Please take caution with usage of Medication that are hepatotoxic or  Metabolized in the liver     MR Elastoplasty 10/29/2020  Impression:     1. Cholelithiasis.  Gallstone is present within the distal cystic duct with proximal dilation possibly representing partial obstruction.  If there is clinical concern for cholecystitis, recommend HIDA scan for further evaluation.  2. Bilateral adrenal gland cysts.  Two additional subcentimeter enhancing foci adjacent to the right adrenal gland, indeterminate and too small to adequately characterize but favored to represent a benign etiology.  Recommend attention on follow-up imaging.  3. Subcentimeter cystic foci in the pancreatic tail, favored to represent pancreatic cyst or side branch IPMNs.  4. Liver fat fraction measures 2%, consistent with normal fat fraction.  5. Liver elasticity measures 3.26 kPa consistent with  F1 or F2 fibrosis.  6. Additional incidental findings as above.     MRI Abd w wo contrast INC MRCP 10/29/2020  Impression:     1. Cholelithiasis.  Gallstone is present within the distal cystic duct with proximal dilation possibly representing partial obstruction.  If there is clinical concern for cholecystitis, recommend HIDA scan for further evaluation.  2. Bilateral adrenal gland cysts.  Two additional subcentimeter enhancing foci adjacent to the right adrenal gland, indeterminate and too small to adequately characterize but favored to represent a benign etiology.  Recommend attention on follow-up imaging.  3. Subcentimeter cystic foci in the pancreatic tail, favored to represent pancreatic cyst or side branch IPMNs.  4.  Liver fat fraction measures 2%, consistent with normal fat fraction.  5. Liver elasticity measures 3.26 kPa consistent with  F1 or F2 fibrosis.  6. Additional incidental findings as above.

## 2020-10-26 NOTE — TELEPHONE ENCOUNTER
Pt did not complete testing as recommended. Please contact pt to help him reschedule both MRIs (MRI elasto and MRI abd) that he cancelled. Also reschedule my f/u appt to be 1 week after MRI completed

## 2020-10-26 NOTE — ASSESSMENT & PLAN NOTE
Patient reports some 'dribbling' of urine due to Urological surgeries in the past  Wears incontinence pads routinely

## 2020-10-26 NOTE — PROGRESS NOTES
Ari Santos is a 50 y.o. year old here today for a pre-operative visit in preparation for a Left total knee arthroplasty to be performed by Dr. Andrade  on 11/2/2020.  he was last seen and treated in the clinic on 10/6/2020. he will be medically optimized by the pre op center. There has been no significant change in medical status since last visit. No fever, chills, malaise, or unexplained weight change.      Allergies, Medications, past medical and surgical history reviewed.    Focused examination performed.    Patient declined to see surgeon today. All questions answered. Patient encouraged to call with questions. Contact information given.     Pre, justina, and post operative procedures and expectations discussed. Questions were answered. Ari Santos has been educated and is ready to proceed with surgery. Approximately 30 minutes was spent discussing surgical outcomes, plans, procedures pre, justina, and post operative expections and care.  Surgical consent signed.    Ari Santos will contact us if there are any questions, concerns, or changes in medical status prior to surgery.         COVID-19 test date: 10/30/2020     patient will be scheduled with [facility]. Orders in for [place].

## 2020-10-28 ENCOUNTER — TELEPHONE (OUTPATIENT)
Dept: HEPATOLOGY | Facility: CLINIC | Age: 50
End: 2020-10-28

## 2020-10-28 NOTE — TELEPHONE ENCOUNTER
Attempted to contact patient and schedule follow up after MRI's but patient didn't answer. Left patient a voicemail stating the purpose for the call. Awaiting a call back. MRI's are scheduled.     Instructions:    Pt did not complete testing as recommended. Please contact pt to help him reschedule both MRIs (MRI elasto and MRI abd) that he cancelled. Also reschedule my f/u appt to be 1 week after MRI completed

## 2020-10-29 ENCOUNTER — HOSPITAL ENCOUNTER (OUTPATIENT)
Dept: RADIOLOGY | Facility: HOSPITAL | Age: 50
Discharge: HOME | End: 2020-10-29
Attending: NURSE PRACTITIONER
Payer: MEDICARE

## 2020-10-29 DIAGNOSIS — R93.5 ABNORMAL FINDINGS ON DIAGNOSTIC IMAGING OF OTHER ABDOMINAL REGIONS, INCLUDING RETROPERITONEUM: ICD-10-CM

## 2020-10-29 DIAGNOSIS — R79.89 ABNORMAL LFTS: ICD-10-CM

## 2020-10-29 DIAGNOSIS — R74.8 ELEVATED ALKALINE PHOSPHATASE LEVEL: ICD-10-CM

## 2020-10-29 DIAGNOSIS — R74.8 ELEVATED LIVER ENZYMES: ICD-10-CM

## 2020-10-29 PROCEDURE — 74183 MRI ABD W/O CNTR FLWD CNTR: CPT | Mod: 26,GC,, | Performed by: RADIOLOGY

## 2020-10-29 PROCEDURE — 25500020 PHARM REV CODE 255: Performed by: NURSE PRACTITIONER

## 2020-10-29 PROCEDURE — 76391 MR ELASTOGRAPHY: ICD-10-PCS | Mod: 26,,, | Performed by: RADIOLOGY

## 2020-10-29 PROCEDURE — 76391 MR ELASTOGRAPHY: CPT | Mod: 26,,, | Performed by: RADIOLOGY

## 2020-10-29 PROCEDURE — A9585 GADOBUTROL INJECTION: HCPCS | Performed by: NURSE PRACTITIONER

## 2020-10-29 PROCEDURE — 76376 MRI ABDOMEN WITH AND WO_INC MRCP: ICD-10-PCS | Mod: 26,GC,, | Performed by: RADIOLOGY

## 2020-10-29 PROCEDURE — 74183 MRI ABDOMEN WITH AND WO_INC MRCP: ICD-10-PCS | Mod: 26,GC,, | Performed by: RADIOLOGY

## 2020-10-29 PROCEDURE — 76376 3D RENDER W/INTRP POSTPROCES: CPT | Mod: 26,GC,, | Performed by: RADIOLOGY

## 2020-10-29 PROCEDURE — 76391 MR ELASTOGRAPHY: CPT | Mod: TC

## 2020-10-29 PROCEDURE — 76376 3D RENDER W/INTRP POSTPROCES: CPT | Mod: TC

## 2020-10-29 RX ORDER — GADOBUTROL 604.72 MG/ML
10 INJECTION INTRAVENOUS
Status: COMPLETED | OUTPATIENT
Start: 2020-10-29 | End: 2020-10-29

## 2020-10-29 RX ADMIN — GADOBUTROL 10 ML: 604.72 INJECTION INTRAVENOUS at 08:10

## 2020-10-30 ENCOUNTER — TELEPHONE (OUTPATIENT)
Dept: HEPATOLOGY | Facility: CLINIC | Age: 50
End: 2020-10-30

## 2020-10-30 ENCOUNTER — LAB VISIT (OUTPATIENT)
Dept: SURGERY | Facility: CLINIC | Age: 50
End: 2020-10-30
Payer: MEDICARE

## 2020-10-30 ENCOUNTER — ANESTHESIA EVENT (OUTPATIENT)
Dept: SURGERY | Facility: HOSPITAL | Age: 50
DRG: 470 | End: 2020-10-30
Payer: MEDICARE

## 2020-10-30 ENCOUNTER — TELEPHONE (OUTPATIENT)
Dept: PHARMACY | Facility: CLINIC | Age: 50
End: 2020-10-30

## 2020-10-30 ENCOUNTER — LAB VISIT (OUTPATIENT)
Dept: LAB | Facility: HOSPITAL | Age: 50
End: 2020-10-30
Payer: MEDICARE

## 2020-10-30 DIAGNOSIS — Z96.652 STATUS POST TOTAL LEFT KNEE REPLACEMENT: Primary | ICD-10-CM

## 2020-10-30 DIAGNOSIS — E78.5 HYPERLIPIDEMIA, UNSPECIFIED HYPERLIPIDEMIA TYPE: ICD-10-CM

## 2020-10-30 DIAGNOSIS — R79.89 ABNORMAL THYROID BLOOD TEST: ICD-10-CM

## 2020-10-30 DIAGNOSIS — R74.8 ABNORMAL LIVER ENZYMES: ICD-10-CM

## 2020-10-30 DIAGNOSIS — R79.89 ABNORMAL LFTS: ICD-10-CM

## 2020-10-30 DIAGNOSIS — G47.33 OSA (OBSTRUCTIVE SLEEP APNEA): ICD-10-CM

## 2020-10-30 DIAGNOSIS — M19.011 ARTHRITIS OF RIGHT SHOULDER REGION: ICD-10-CM

## 2020-10-30 DIAGNOSIS — Z01.818 PRE-OP TESTING: ICD-10-CM

## 2020-10-30 DIAGNOSIS — R93.5 ABNORMAL CT SCAN, PELVIS: ICD-10-CM

## 2020-10-30 DIAGNOSIS — E27.40 ADRENAL INSUFFICIENCY: ICD-10-CM

## 2020-10-30 LAB
BASOPHILS # BLD AUTO: 0.04 K/UL (ref 0–0.2)
BASOPHILS NFR BLD: 0.5 % (ref 0–1.9)
DIFFERENTIAL METHOD: ABNORMAL
EOSINOPHIL # BLD AUTO: 0 K/UL (ref 0–0.5)
EOSINOPHIL NFR BLD: 0.5 % (ref 0–8)
ERYTHROCYTE [DISTWIDTH] IN BLOOD BY AUTOMATED COUNT: 12.6 % (ref 11.5–14.5)
HCT VFR BLD AUTO: 44.3 % (ref 40–54)
HGB BLD-MCNC: 14.6 G/DL (ref 14–18)
IMM GRANULOCYTES # BLD AUTO: 0.02 K/UL (ref 0–0.04)
IMM GRANULOCYTES NFR BLD AUTO: 0.2 % (ref 0–0.5)
INR PPP: 0.9 (ref 0.8–1.2)
LYMPHOCYTES # BLD AUTO: 1.5 K/UL (ref 1–4.8)
LYMPHOCYTES NFR BLD: 17.9 % (ref 18–48)
MCH RBC QN AUTO: 32.8 PG (ref 27–31)
MCHC RBC AUTO-ENTMCNC: 33 G/DL (ref 32–36)
MCV RBC AUTO: 100 FL (ref 82–98)
MONOCYTES # BLD AUTO: 0.3 K/UL (ref 0.3–1)
MONOCYTES NFR BLD: 3.1 % (ref 4–15)
NEUTROPHILS # BLD AUTO: 6.4 K/UL (ref 1.8–7.7)
NEUTROPHILS NFR BLD: 77.8 % (ref 38–73)
NRBC BLD-RTO: 0 /100 WBC
PLATELET # BLD AUTO: 323 K/UL (ref 150–350)
PMV BLD AUTO: 8.6 FL (ref 9.2–12.9)
PROTHROMBIN TIME: 10.5 SEC (ref 9–12.5)
RBC # BLD AUTO: 4.45 M/UL (ref 4.6–6.2)
WBC # BLD AUTO: 8.16 K/UL (ref 3.9–12.7)

## 2020-10-30 PROCEDURE — 85610 PROTHROMBIN TIME: CPT

## 2020-10-30 PROCEDURE — 36415 COLL VENOUS BLD VENIPUNCTURE: CPT

## 2020-10-30 PROCEDURE — 85025 COMPLETE CBC W/AUTO DIFF WBC: CPT

## 2020-10-30 PROCEDURE — U0003 INFECTIOUS AGENT DETECTION BY NUCLEIC ACID (DNA OR RNA); SEVERE ACUTE RESPIRATORY SYNDROME CORONAVIRUS 2 (SARS-COV-2) (CORONAVIRUS DISEASE [COVID-19]), AMPLIFIED PROBE TECHNIQUE, MAKING USE OF HIGH THROUGHPUT TECHNOLOGIES AS DESCRIBED BY CMS-2020-01-R: HCPCS

## 2020-10-30 RX ORDER — DOCUSATE SODIUM 100 MG/1
100 CAPSULE, LIQUID FILLED ORAL 2 TIMES DAILY PRN
Qty: 60 CAPSULE | Refills: 0 | Status: SHIPPED | OUTPATIENT
Start: 2020-10-30 | End: 2021-01-22 | Stop reason: HOSPADM

## 2020-10-30 RX ORDER — CELECOXIB 200 MG/1
200 CAPSULE ORAL DAILY
Qty: 30 CAPSULE | Refills: 0 | Status: SHIPPED | OUTPATIENT
Start: 2020-10-30 | End: 2020-11-29

## 2020-10-30 RX ORDER — OXYCODONE HYDROCHLORIDE 5 MG/1
TABLET ORAL
Qty: 50 TABLET | Refills: 0 | Status: SHIPPED | OUTPATIENT
Start: 2020-10-30 | End: 2021-01-22 | Stop reason: HOSPADM

## 2020-10-30 RX ORDER — ASPIRIN 81 MG/1
81 TABLET ORAL 2 TIMES DAILY
Qty: 60 TABLET | Refills: 0 | Status: SHIPPED | OUTPATIENT
Start: 2020-10-30 | End: 2021-01-22 | Stop reason: HOSPADM

## 2020-10-30 RX ORDER — ACETAMINOPHEN 325 MG/1
650 TABLET ORAL EVERY 8 HOURS PRN
Qty: 240 TABLET | Refills: 0 | Status: SHIPPED | OUTPATIENT
Start: 2020-10-30 | End: 2021-01-22 | Stop reason: HOSPADM

## 2020-10-30 NOTE — TELEPHONE ENCOUNTER
Pt due for f/u visit with me. Previous plan for MRIs then f/u with me after MRIs to discuss results and next steps, please contact pt to discuss MRI results and need for f/u visit     Called pt to discuss suggestion of gallstone with possible partial obstruction. MRI results sent to PBS MD to review for input.   Called pt wife (pt unavailable to discuss) and wife denies any GI symptoms, pain, fever, etc. Given ED/urgent care precautions if pt were to develop abd pain and/or fever, vomiting, etc. Wife verbalized understanding    Message copied to sam Salcedo that no prohibiting factors from liver standpoint for knee surgery (no cirrhosis) but waiting on [facility] input on gallstone

## 2020-10-31 LAB — SARS-COV-2 RNA RESP QL NAA+PROBE: NOT DETECTED

## 2020-10-31 NOTE — TELEPHONE ENCOUNTER
----- Message from Hector Quinones MD sent at 10/30/2020  2:37 PM CDT -----  Mariely- I reviewed the MRI. The upstream cystic duct is dilated, but the biliary ducts are not. No need for EUS/ERCP.     ----- Message -----  From: Mariely Robison NP  Sent: 10/30/2020   1:48 PM CDT  To: Hector Quinones MD    Hi Dr. Quinones: Would you mind reviewing and see what you think? MRCP done for eval of elevated alk phos. No abd pain, fever, nausea, vomiting.   MRI mentions There is a gallstone present within the distal cystic duct with proximal dilation of the duct.   Would this be something that may be evaluated further or intervened with a EUS +/- ERCP. My next step was also a liver biopsy if the MRI wasn't revealing but wasn't sure what you thought of the gallstone and ductal dilation     Thanks In advance!

## 2020-11-02 ENCOUNTER — TELEPHONE (OUTPATIENT)
Dept: HEPATOLOGY | Facility: CLINIC | Age: 50
End: 2020-11-02

## 2020-11-02 ENCOUNTER — ANESTHESIA (OUTPATIENT)
Dept: SURGERY | Facility: HOSPITAL | Age: 50
DRG: 470 | End: 2020-11-02
Payer: MEDICARE

## 2020-11-02 ENCOUNTER — PATIENT MESSAGE (OUTPATIENT)
Dept: PAIN MEDICINE | Facility: CLINIC | Age: 50
End: 2020-11-02

## 2020-11-02 DIAGNOSIS — K86.2 PANCREATIC CYST: ICD-10-CM

## 2020-11-02 DIAGNOSIS — R79.89 ABNORMAL LFTS: Primary | ICD-10-CM

## 2020-11-02 DIAGNOSIS — R93.2 ABNORMAL FINDINGS ON DIAGNOSTIC IMAGING OF LIVER AND BILIARY TRACT: ICD-10-CM

## 2020-11-02 DIAGNOSIS — D37.8 NEOPLASM OF UNCERTAIN BEHAVIOR OF OTHER SPECIFIED DIGESTIVE ORGANS: ICD-10-CM

## 2020-11-02 DIAGNOSIS — R63.4 WEIGHT LOSS: ICD-10-CM

## 2020-11-02 DIAGNOSIS — R74.8 ELEVATED ALKALINE PHOSPHATASE LEVEL: ICD-10-CM

## 2020-11-03 ENCOUNTER — TELEPHONE (OUTPATIENT)
Dept: ADMINISTRATIVE | Facility: OTHER | Age: 50
End: 2020-11-03

## 2020-11-04 ENCOUNTER — PATIENT MESSAGE (OUTPATIENT)
Dept: PAIN MEDICINE | Facility: OTHER | Age: 50
End: 2020-11-04

## 2020-11-04 DIAGNOSIS — M54.12 CERVICAL RADICULOPATHY: ICD-10-CM

## 2020-11-04 RX ORDER — MORPHINE SULFATE 15 MG/1
15 TABLET, FILM COATED, EXTENDED RELEASE ORAL 2 TIMES DAILY
Qty: 60 TABLET | Refills: 0 | Status: SHIPPED | OUTPATIENT
Start: 2020-11-04 | End: 2020-12-01 | Stop reason: SDUPTHER

## 2020-11-04 NOTE — TELEPHONE ENCOUNTER
Patient requesting refill on MS Contin 15mg  Last office visit 08.21.20   shows last refill on 10.03.20  Patient does have a pain contract on file with [facility]  Patient last UDS 08.21.20 was consistent with current therapy

## 2020-11-05 ENCOUNTER — LAB VISIT (OUTPATIENT)
Dept: LAB | Facility: HOSPITAL | Age: 50
End: 2020-11-05
Attending: NURSE PRACTITIONER
Payer: MEDICARE

## 2020-11-05 DIAGNOSIS — R79.89 ABNORMAL LFTS: ICD-10-CM

## 2020-11-05 DIAGNOSIS — D37.8 NEOPLASM OF UNCERTAIN BEHAVIOR OF OTHER SPECIFIED DIGESTIVE ORGANS: ICD-10-CM

## 2020-11-05 DIAGNOSIS — R63.4 WEIGHT LOSS: ICD-10-CM

## 2020-11-05 DIAGNOSIS — R93.2 ABNORMAL FINDINGS ON DIAGNOSTIC IMAGING OF LIVER AND BILIARY TRACT: ICD-10-CM

## 2020-11-05 PROCEDURE — 36415 COLL VENOUS BLD VENIPUNCTURE: CPT

## 2020-11-05 PROCEDURE — 86301 IMMUNOASSAY TUMOR CA 19-9: CPT

## 2020-11-05 PROCEDURE — 82105 ALPHA-FETOPROTEIN SERUM: CPT

## 2020-11-06 ENCOUNTER — OFFICE VISIT (OUTPATIENT)
Dept: HEPATOLOGY | Facility: CLINIC | Age: 50
End: 2020-11-06
Payer: MEDICARE

## 2020-11-06 DIAGNOSIS — R74.8 ELEVATED LIVER ENZYMES: ICD-10-CM

## 2020-11-06 DIAGNOSIS — R76.8 HEPATITIS B CORE ANTIBODY POSITIVE: ICD-10-CM

## 2020-11-06 DIAGNOSIS — R74.8 ELEVATED ALKALINE PHOSPHATASE LEVEL: Primary | ICD-10-CM

## 2020-11-06 DIAGNOSIS — K86.2 PANCREATIC CYST: ICD-10-CM

## 2020-11-06 DIAGNOSIS — K74.00 LIVER FIBROSIS: ICD-10-CM

## 2020-11-06 PROBLEM — R79.89 ABNORMAL LFTS: Status: RESOLVED | Noted: 2020-01-09 | Resolved: 2020-11-06

## 2020-11-06 LAB
AFP SERPL-MCNC: 3 NG/ML (ref 0–8.4)
CANCER AG19-9 SERPL-ACNC: 2 U/ML (ref 2–40)

## 2020-11-06 PROCEDURE — 99214 OFFICE O/P EST MOD 30 MIN: CPT | Mod: 95,,, | Performed by: NURSE PRACTITIONER

## 2020-11-06 PROCEDURE — 99214 PR OFFICE/OUTPT VISIT, EST, LEVL IV, 30-39 MIN: ICD-10-PCS | Mod: 95,,, | Performed by: NURSE PRACTITIONER

## 2020-11-06 NOTE — PROGRESS NOTES
The patient location is: [place]  The chief complaint leading to consultation is: elevated alk phos, ALT    Visit type: audiovisual    Face to Face time with patient: 45 minutes of total time spent on the encounter, which includes face to face time and non-face to face time preparing to see the patient (eg, review of tests), Obtaining and/or reviewing separately obtained history, Documenting clinical information in the electronic or other health record, Independently interpreting results (not separately reported) and communicating results to the patient/family/caregiver, or Care coordination (not separately reported).     Each patient to whom he or she provides medical services by telemedicine is:  (1) informed of the relationship between the physician and patient and the respective role of any other health care provider with respect to management of the patient; and (2) notified that he or she may decline to receive medical services by telemedicine and may withdraw from such care at any time.    Notes:     [facility] VISIT FOLLOW UP NOTE    PCP:  Siva Mitchell MD     CHIEF COMPLAINT: elevated liver enzymes, alk phos, liver fibrosis     HPI: This is a 50 y.o. White adult with PMH noted below, presenting for evaluation of elevated liver enzymes, alk phos, liver fibrosis     Followed by endocrine Dr. Alvarez for congenital adrenal hyperplasia, adrenal insufficiency, and gender dysphoria. On testosterone gel (Androgel) since 2004    Serological workup was negative for Trev's, alpha-1 antitrypsin deficiency, hemochromatosis, autoimmune etiology (including negative AMA), and viral hepatitis A, B and C   Normal ACE    Minimal Risk factors for NAFLD include HLD    Interval HPI: Presents today via video visit with wife. He does report taking Senna daily for years, which does have a risk for elevated liver enzymes, manuel after long term use >6 months  Does report Unintentional weight loss, loss of appetite,  weight has decreased from 222 to 135 lbs over the last year.   Do not suspect below medications causing abnormal liver enzymes:   Primidone taking for ~ 2 years  Lamictal for ~10-15 years  Lipitor ~4 years  Does report sister with Sjrogren's and Raunaud's    Was prescribed Antibiotics 9/2019 and 1/2020 pre-surgeries and also again in 2020 for a Bladder infection per wife, but liver enzymes remain elevated currently without recent antibiotic use   MRI/MRCP done that noted no CBD dilation or stone, did note cystic duct gallstone but advanced endoscopist does not think needs intervention and does not think is significantly contributing to elevated liver enzymes or alk phos because of location   MRI also noted panc cysts x2 and adrenal abnormalities - adrenal abnormalities routed to Dr. Alvarez, panc cysts to be evaluated with EUS, then needs panc cyst clinic long term    Labs done 10/2020 show elevated transaminase levels (ALT > AST, elevated since 9/2019)  Platelets WNL, alk phos elevated (increasing since 9/2019)  Synthetic liver functioning WNL    Lab Results   Component Value Date    ALT 82 (H) 10/29/2020    AST 65 (H) 10/29/2020    ALKPHOS 282 (H) 10/29/2020    BILITOT 0.4 10/29/2020    ALBUMIN 4.4 10/29/2020    INR 0.9 10/30/2020     10/30/2020     Abd U/S done 11/2019 showed normal liver, no lesions.   Multiple gallstones seen but no biliary ductal dilation or gallbladder wall thickening or pericholecystic fluid   No splenomegaly    MRI/MRCP 10/2020 noted   1. Cholelithiasis.  Gallstone is present within the distal cystic duct with proximal dilation possibly representing partial obstruction.  If there is clinical concern for cholecystitis, recommend HIDA scan for further evaluation.  2. Bilateral adrenal gland cysts.  Two additional subcentimeter enhancing foci adjacent to the right adrenal gland, indeterminate and too small to adequately characterize but favored to represent a benign etiology.  Recommend  attention on follow-up imaging.  3. Subcentimeter cystic foci in the pancreatic tail, favored to represent pancreatic cyst or side branch IPMNs.  4. Liver fat fraction measures 2%, consistent with normal fat fraction.  5. Liver elasticity measures 3.26 kPa consistent with  F1 or F2 fibrosis.    Denies family history of liver disease . Denies alcohol consumption currently or in the past-     Immunity to Hep A and B - needs TwinRIx vaccine, sent to [facility] pharm and ID    Denies jaundice, dark urine, abdominal distention, hematemesis, melena, slowed mentation. No abnormal skin rashes. No generalized joint or muscle pain.      Review of patient's allergies indicates:   Allergen Reactions    Bactrim [sulfamethoxazole-trimethoprim] Itching    Penicillins Rash       Current Outpatient Medications on File Prior to Visit   Medication Sig Dispense Refill    acetaminophen (TYLENOL) 650 MG TbSR Take 1 tablet (650 mg total) by mouth every 8 (eight) hours. (Patient not taking: Reported on 8/21/2020) 120 tablet 0    acetaminophen (TYLENOL) 325 MG tablet Take 2 tablets (650 mg total) by mouth every 8 (eight) hours as needed. 240 tablet 0    ALPRAZolam (XANAX) 1 MG tablet Take 1 tablet (1 mg total) by mouth 3 (three) times daily as needed. 90 tablet 1    ascorbic acid, vitamin C, (VITAMIN C) 1000 MG tablet Take 1,000 mg by mouth 2 (two) times daily.      aspirin (ECOTRIN) 81 MG EC tablet Take 1 tablet (81 mg total) by mouth 2 (two) times daily. 60 tablet 0    atorvastatin (LIPITOR) 10 MG tablet Take 1 tablet (10 mg total) by mouth every evening. 90 tablet 2    celecoxib (CELEBREX) 200 MG capsule Take 1 capsule (200 mg total) by mouth once daily. 30 capsule 0    cranberry fruit extract (CRANBERRY ORAL) Take by mouth.      cyclobenzaprine (FLEXERIL) 10 MG tablet Take 1 tablet (10 mg total) by mouth 3 (three) times daily as needed for Muscle spasms. 270 tablet 1    docusate sodium (COLACE) 100 MG capsule Take 1 capsule (100 mg  total) by mouth 2 (two) times daily as needed for Constipation. 60 capsule 0    gabapentin (NEURONTIN) 800 MG tablet Take 1 tablet by mouth three times a day and take 2 tablets at night (5 tablets a day, 4000mg) 450 tablet 0    Lactobacillus acidophilus (ACIDOPHILUS) Cap Take by mouth once daily.       lamoTRIgine (LAMICTAL) 200 MG tablet Take 1 tablet (200 mg total) by mouth 2 (two) times daily. 60 tablet 5    loratadine (CLARITIN) 10 mg tablet Take 10 mg by mouth once daily.       morphine (MS CONTIN) 15 MG 12 hr tablet Take 1 tablet (15 mg total) by mouth 2 (two) times daily. 60 tablet 0    oxyCODONE (ROXICODONE) 5 MG immediate release tablet Take 1-2 tabs by mouth every 4-6 hours as needed for pain 50 tablet 0    oxyCODONE-acetaminophen (PERCOCET)  mg per tablet Take 1 tablet by mouth every 8 (eight) hours as needed for Pain. 90 tablet 0    predniSONE (DELTASONE) 10 MG tablet Take 1 tablet (10 mg total) by mouth once daily. 100 tablet 5    primidone (MYSOLINE) 50 MG Tab Take 2 tablets (100 mg total) by mouth 3 (three) times daily. 540 tablet 12    rOPINIRole (REQUIP) 4 MG tablet Take 1 tablet (4 mg total) by mouth nightly. 90 tablet 12    testosterone (ANDROGEL) 1 % (50 mg/5 gram) GlPk Apply 5 grams topically once daily. 30 packet 4    triamcinolone acetonide 0.1% (KENALOG) 0.1 % cream Apply topically 2 (two) times daily. 45 g 1    zinc 50 mg Tab once daily.        No current facility-administered medications on file prior to visit.        PMHX:  has a past medical history of Abnormal CT scan, pelvis (10/27/2019), Abnormal thyroid blood test (5/6/2019), Adrenal insufficiency, Blister- healing (1/9/2020), Chronic bilateral low back pain with bilateral sciatica (3/19/2019), Congenital adrenal hyperplasia, IGT (impaired glucose tolerance) (11/5/2019), Insomnia due to medical condition, Multiple closed traumatic fractures of multiple bones of hip and pelvis with routine healing, subsequent  encounter (2019), Pancreatic cyst (2020), S/P lumbar laminectomy (10/28/2019), Spinal stenosis, Spinal stenosis of lumbar region with neurogenic claudication (3/19/2019), Status post sex reassignment surgery (3/19/2019), and Unintentional weight loss (2020).    PSHX:  has a past surgical history that includes Radiofrequency ablation (Left, 2019); Radiofrequency ablation (Right, 2019); Back surgery; Laminectomy (2019); Spine surgery; Total knee arthroplasty (Right, 2020); and gender surgery.    FAMILY HISTORY: Negative for liver disease, reviewed in EPIC    SOCIAL HISTORY:   Social History     Tobacco Use   Smoking Status Former Smoker    Quit date:     Years since quittin.8   Smokeless Tobacco Never Used   Tobacco Comment    was not a daily smoker-        Social History     Substance and Sexual Activity   Alcohol Use Not Currently    Frequency: Never    Drinks per session: Patient refused    Binge frequency: Never       Social History     Substance and Sexual Activity   Drug Use Never       ROS:   GENERAL: Denies fever, chills, +weight loss, denies fatigue  HEENT: Denies headaches, dizziness, vision/hearing changes  CARDIOVASCULAR: Denies chest pain, palpitations, or edema  RESPIRATORY: Denies dyspnea, cough  GI: Denies abdominal pain, rectal bleeding, nausea, vomiting. No change in bowel pattern or color  : Denies dysuria, hematuria   SKIN: Denies rash, itching   NEURO: Denies confusion, memory loss, or mood changes  PSYCH: Denies depression or anxiety  HEME/LYMPH: Denies easy bruising or bleeding    PHYSICAL EXAM:   Friendly White adult, in no acute distress; alert and oriented to person, place and time  VITALS: There were no vitals taken for this visit.  HENT: Normocephalic, without obvious abnormality. Oral mucosa pink and moist. Dentition good  EYES: Sclerae anicteric. No conjunctival pallor.   NECK: Supple. No masses or cervical adenopathy.  CARDIOVASCULAR:  AYSHA on video visit  RESPIRATORY: Normal respiratory effort.   GI: Soft, non-tender, non-distended. AYSHA hepatosplenomegaly  EXTREMITIES:  No clubbing, cyanosis or edema.  SKIN: Warm and dry. No jaundice. No rashes noted to exposed skin. No telangectasias noted. No palmar erythema.  NEURO:  Normal gait. No asterixis.  PSYCH:  Memory intact. Thought and speech pattern appropriate. Behavior normal. No depression or anxiety noted.    DIAGNOSTIC STUDIES:    ABD. U/S-    Done 11/2019  FINDINGS:  Liver: Normal in size, measuring 15.4 cm. Homogeneous echotexture. No focal hepatic lesions.     Gallbladder: Multiple gallstones are seen.  There is no gallbladder wall thickening or pericholecystic fluid.  No sonographic Joel's sign.     Biliary system: The common duct is not dilated, measuring 2 mm.  No intrahepatic ductal dilatation.     Spleen: Normal in size and echotexture, measuring 10.8 cm.     Miscellaneous: No upper abdominal ascites.     Impression:     Cholelithiasis without sonographic evidence of acute cholecystitis.    MRI- done 10/2020  Impression:     1. Cholelithiasis.  Gallstone is present within the distal cystic duct with proximal dilation possibly representing partial obstruction.  If there is clinical concern for cholecystitis, recommend HIDA scan for further evaluation.  2. Bilateral adrenal gland cysts.  Two additional subcentimeter enhancing foci adjacent to the right adrenal gland, indeterminate and too small to adequately characterize but favored to represent a benign etiology.  Recommend attention on follow-up imaging.  3. Subcentimeter cystic foci in the pancreatic tail, favored to represent pancreatic cyst or side branch IPMNs.      LIVER BIOPSY- to be arranged with EUS    MRI ELASTOGRAPHY - done 10/2020  1. Liver fat fraction measures 2%, consistent with normal fat fraction.  2. Liver elasticity measures 3.26 kPa consistent with  F1 or F2 fibrosis.      EDUCATION:  Discussed continue full workup  to assess for causes of elevated liver enzymes/alk phos including EUS with liver biopsy       ASSESSMENT & PLAN:  50 y.o. White adult with:  1. Elevated liver enzymes and alk phos  -- unclear etiology at this time, long term senna use may be contributing ? But unclear.  -- Serological workup was negative for Trev's, alpha-1 antitrypsin deficiency, hemochromatosis, autoimmune etiology (including negative AMA), and viral hepatitis A, B and C   Normal ACE  -- Labs note elevated transaminase levels (ALT > AST), elevated since 9/2019. Alk phos elevated since 9/2019, currently in 200s  --- synthetic liver function WNL  -- MRI/MRCP without clear etiology. Discussed results with advanced endoscopist and does not suspect cystic stone is contributing to lab abnormalities and does not need intervention at this time  -- given unclear etiology, will arrange EUS with liver biopsy   --- Hep A and B immunity: needs TwinRix, will send to [facility] pharm and ID    2. Liver fibrosis  -- F1-F2 on MRI, will investigate further with liver biopsy     3. Unintentional weight loss, now stable, trending up   -- AFP and Ca 19-9 WNL    4. panc cysts  -- will be assessed with EUS, then can follow chronically with panc cyst clinic     5. Mildly Elevated Ck  -- consider rheum referral, will forward to PCP in case he recommends further w/u     6. + Hep B core, negative sAg, suggests previous exposure to Hep B  Negative sAg, suggests previous exposure but no chronic/active Hep B. At risk for reactivation with any immunosuppression medication, steroids, chemo, etc.   -- will repeat sAga and DNA with next labs     Follow up in about 2 weeks (around 11/20/2020) for after liver biopsy completed.         Thank you for allowing me to participate in the care of Ari Santos    Mariely Robison NP-C    CC'ed note to:   LATISHA Corley NP, MD

## 2020-11-06 NOTE — PATIENT INSTRUCTIONS
1. MRI suggested possible stage 1-2 scar tissue in the liver, will be investigated further will liver biopsy   2. Endoscopy department will be contacting you to arrange endoscopy + liver biopsy. Please call the endoscopy department at [phone] and ask for Ree if you have any questions about scheduling   3. Recommend Hep A and B combination vaccine, see below   4. Labs so far negative for any genetic or autoimmune liver disease causing the elevated labs but we will assess for sure with liver biopsy   5.  Follow up 1-2 weeks after liver biopsy to discuss results       HEP A/B VACCINE  Your immunity markers show that you do NOT have immunity against Hepatitis A or B, so I recommend that you receive the combination Hepatitis A and B vaccine called TwinRix. This will protect your liver from these viruses, which can make your liver very sick.     Hep A can be transmitted through food and water and can cause significant liver injury. There is a Hepatitis A outbreak in [place] currently. Hep B vaccine is transmitted through blood or bodily fluids (there are no symptoms typically) and can develop longstanding (chronic) Hep B and there is no cure, so many people have it for life. Therefore, the vaccines provide immunity against these viruses that can cause harm to the liver.     The vaccine series is 3 vaccines: one now, one at 4 weeks and one 6 months after the 1st one. I sent the vaccine to the [facility] pharmacy. They will determine if the vaccines are covered by your insurance and arrange the vaccines if they are covered. Their number is [phone] if you have any questions or need to contact them.      If the vaccine is not covered at the pharmacy level, I sent an order that you can get the vaccine in the infectious disease department at [facility]. If that is the case, please call [phone] to schedule your vaccine administration appointment in the infectious disease department.  If you need to  proceed with vaccines with the infectious disease department, you can call to obtain a cost for these vaccines before you proceed, you can call the [facility] office at [phone] or [phone]

## 2020-11-06 NOTE — Clinical Note
CPK mildly elevated. Wasn't sure if you recommend further workup for mild elevation but just wanted to let you know in case you recommend anything further  Thanks  Mariely

## 2020-11-09 ENCOUNTER — PATIENT MESSAGE (OUTPATIENT)
Dept: HEPATOLOGY | Facility: CLINIC | Age: 50
End: 2020-11-09

## 2020-11-10 ENCOUNTER — PATIENT MESSAGE (OUTPATIENT)
Dept: PSYCHIATRY | Facility: CLINIC | Age: 50
End: 2020-11-10

## 2020-11-11 ENCOUNTER — OFFICE VISIT (OUTPATIENT)
Dept: PSYCHIATRY | Facility: CLINIC | Age: 50
End: 2020-11-11
Payer: MEDICARE

## 2020-11-11 ENCOUNTER — PATIENT MESSAGE (OUTPATIENT)
Dept: HEPATOLOGY | Facility: CLINIC | Age: 50
End: 2020-11-11

## 2020-11-11 DIAGNOSIS — F43.10 PTSD (POST-TRAUMATIC STRESS DISORDER): Primary | ICD-10-CM

## 2020-11-11 PROCEDURE — 90833 PR PSYCHOTHERAPY W/PATIENT W/E&M, 30 MIN (ADD ON): ICD-10-PCS | Mod: 95,,, | Performed by: PSYCHIATRY & NEUROLOGY

## 2020-11-11 PROCEDURE — 90833 PSYTX W PT W E/M 30 MIN: CPT | Mod: 95,,, | Performed by: PSYCHIATRY & NEUROLOGY

## 2020-11-11 PROCEDURE — 99214 OFFICE O/P EST MOD 30 MIN: CPT | Mod: 95,,, | Performed by: PSYCHIATRY & NEUROLOGY

## 2020-11-11 PROCEDURE — 99214 PR OFFICE/OUTPT VISIT, EST, LEVL IV, 30-39 MIN: ICD-10-PCS | Mod: 95,,, | Performed by: PSYCHIATRY & NEUROLOGY

## 2020-11-11 RX ORDER — LAMOTRIGINE 200 MG/1
200 TABLET ORAL 2 TIMES DAILY
Qty: 60 TABLET | Refills: 5 | Status: SHIPPED | OUTPATIENT
Start: 2020-11-11 | End: 2021-03-12 | Stop reason: SDUPTHER

## 2020-11-11 NOTE — PROGRESS NOTES
"Outpatient Psychiatry Follow-Up Visit (MD/NP)    11/11/2020 The patient location is: home  The chief complaint leading to consultation is: PTSD    Visit type: audiovisual    Face to Face time with patient:  24" *8"on meds and 16" supportive counseling.  34 minutes of total time spent on the encounter, which includes face to face time and non-face to face time preparing to see the patient (eg, review of tests), Obtaining and/or reviewing separately obtained history, Documenting clinical information in the electronic or other health record, Independently interpreting results (not separately reported) and communicating results to the patient/family/caregiver, or Care coordination (not separately reported).         Each patient to whom he or she provides medical services by telemedicine is:  (1) informed of the relationship between the physician and patient and the respective role of any other health care provider with respect to management of the patient; and (2) notified that he or she may decline to receive medical services by telemedicine and may withdraw from such care at any time.    Notes: See below.    Clinical Status of Patient:  Outpatient (Ambulatory)    Chief Complaint:  Ari Santos is a 50 y.o. adult who presents today for follow-up of depression.  Met with patient and no relatives at this visit.      Interval History and Content of Current Session:  Interim Events/Subjective Report/Content of Current Session: Patient indicates he is handling the virus pandemic situation well. Patient is in good self control and has no active thoughts of harming himself or others at this time,and has no signs of ricardo or psychosis. He gets nervous when he is around others and is secondary to his PTSD. Patient is alert and well focused at this time and is upbeat and hopeful re the future at this time. He is hoping to learn to play chess and he is enjoying his painting.     Psychotherapy:  · Target symptoms: " depression, anxiety   · Why chosen therapy is appropriate versus another modality: relevant to diagnosis  · Outcome monitoring methods: self-report  · Therapeutic intervention type: supportive psychotherapy  · Topics discussed/themes: dealing with his PTSD  · The patient's response to the intervention is accepting. The patient's progress toward treatment goals is good.   · Duration of intervention: 24 minutes.    Review of Systems   · PSYCHIATRIC: Pertinant items are noted in the narrative.    Past Medical, Family and Social History: The patient's past medical, family and social history have been reviewed and updated as appropriate within the electronic medical record - see encounter notes.    Compliance: yes    Side effects: None    Risk Parameters:  Patient reports no suicidal ideation  Patient reports no homicidal ideation  Patient reports no self-injurious behavior  Patient reports no violent behavior    Exam (detailed: at least 9 elements; comprehensive: all 15 elements)   Constitutional  Vitals:  Most recent vital signs, dated less than 90 days prior to this appointment, were reviewed.   There were no vitals filed for this visit. Patient reports that his vital signs are stable at recent medical visits.   General:  unremarkable, age appropriate, casually dressed, neatly groomed     Musculoskeletal  Muscle Strength/Tone:  hand tremor   Gait & Station:  non-ataxic, uses cane, uses walker, has braces on his legs. He is due for another knee surgery     Psychiatric  Speech:  no latency; no press, spontaneous   Mood & Affect:  steady  congruent and appropriate   Thought Process:  normal and logical   Associations:  intact   Thought Content:  normal, no suicidality, no homicidality, delusions, or paranoia   Insight:  has awareness of illness   Judgement: behavior is adequate to circumstances   Orientation:  grossly intact   Memory: intact for content of interview   Language: grossly intact   Attention Span &  Concentration:  able to focus   Fund of Knowledge:  intact and appropriate to age and level of education     Assessment and Diagnosis   Status/Progress: Based on the examination today, the patient's problem(s) is/are well controlled.  New problems have been presented today.   knee difficulties are not complicating management of the primary condition.  The working differential for this patient includes PTSD with anxiety and depression.     General Impression: Patient has a stable mood at this time and is in good self control and is not a danger to self or others at this time.      ICD-10-CM ICD-9-CM   1. PTSD (post-traumatic stress disorder)  F43.10 309.81       Intervention/Counseling/Treatment Plan   · Medication Management: The risks and benefits of medication were discussed with the patient. Patient agrees to continue Lamictal 200 mg bid, and Xanax 1 mg bid-tid prn.      Return to Clinic: 4 months

## 2020-11-14 ENCOUNTER — PATIENT MESSAGE (OUTPATIENT)
Dept: ORTHOPEDICS | Facility: CLINIC | Age: 50
End: 2020-11-14

## 2020-11-16 ENCOUNTER — TELEPHONE (OUTPATIENT)
Dept: ENDOSCOPY | Facility: HOSPITAL | Age: 50
End: 2020-11-16

## 2020-11-16 NOTE — TELEPHONE ENCOUNTER
----- Message from Ronit Thurston sent at 11/16/2020 11:59 AM CST -----  Contact: Pt @ [phone]  Pt would like to speak with Cookie regarding a procedure. Pt states he would like to schedule an endoscopy. Please follow up with Pt for scheduling.      Confirmed patient's contact info below:     Patient Name: Ari Santos     Phone Number: [phone]

## 2020-11-16 NOTE — TELEPHONE ENCOUNTER
Spoke with patient's wife. EUS scheduled for 12/3 at 2p. Reviewed prep instructions. Ms Santos verbalized understanding.

## 2020-11-19 ENCOUNTER — HOSPITAL ENCOUNTER (OUTPATIENT)
Facility: OTHER | Age: 50
Discharge: HOME | End: 2020-11-19
Attending: ANESTHESIOLOGY | Admitting: ANESTHESIOLOGY
Payer: MEDICARE

## 2020-11-19 VITALS
RESPIRATION RATE: 14 BRPM | BODY MASS INDEX: 25.91 KG/M2 | HEART RATE: 89 BPM | OXYGEN SATURATION: 97 % | TEMPERATURE: 98 F | HEIGHT: 60 IN | DIASTOLIC BLOOD PRESSURE: 94 MMHG | SYSTOLIC BLOOD PRESSURE: 137 MMHG | WEIGHT: 132 LBS

## 2020-11-19 DIAGNOSIS — M19.011 ARTHRITIS OF RIGHT SHOULDER REGION: Primary | ICD-10-CM

## 2020-11-19 DIAGNOSIS — G89.29 CHRONIC PAIN: ICD-10-CM

## 2020-11-19 PROCEDURE — 64417 PR NERVE BLOCK INJ, ANES/STEROID, AXILLARY, INCL IMAG GUIDANCE: ICD-10-PCS | Mod: RT,,, | Performed by: ANESTHESIOLOGY

## 2020-11-19 PROCEDURE — 64418 PR NERVE BLOCK INJ, ANES/STEROID, SUPRASCAPULAR: ICD-10-PCS | Mod: 59,RT,, | Performed by: ANESTHESIOLOGY

## 2020-11-19 PROCEDURE — 25000003 PHARM REV CODE 250: Performed by: ANESTHESIOLOGY

## 2020-11-19 PROCEDURE — 64417 NJX AA&/STRD AX NERVE IMG: CPT | Mod: RT | Performed by: ANESTHESIOLOGY

## 2020-11-19 PROCEDURE — 64417 NJX AA&/STRD AX NERVE IMG: CPT | Mod: RT,,, | Performed by: ANESTHESIOLOGY

## 2020-11-19 PROCEDURE — 64418 NJX AA&/STRD SPRSCAP NRV: CPT | Mod: 59,RT,, | Performed by: ANESTHESIOLOGY

## 2020-11-19 PROCEDURE — 25500020 PHARM REV CODE 255: Performed by: ANESTHESIOLOGY

## 2020-11-19 PROCEDURE — 64418 NJX AA&/STRD SPRSCAP NRV: CPT | Mod: RT | Performed by: ANESTHESIOLOGY

## 2020-11-19 PROCEDURE — 63600175 PHARM REV CODE 636 W HCPCS: Performed by: ANESTHESIOLOGY

## 2020-11-19 RX ORDER — SODIUM CHLORIDE 9 MG/ML
500 INJECTION, SOLUTION INTRAVENOUS CONTINUOUS
Status: DISCONTINUED | OUTPATIENT
Start: 2020-11-19 | End: 2020-11-19 | Stop reason: HOSPADM

## 2020-11-19 RX ORDER — BUPIVACAINE HYDROCHLORIDE 5 MG/ML
INJECTION, SOLUTION EPIDURAL; INTRACAUDAL
Status: DISCONTINUED | OUTPATIENT
Start: 2020-11-19 | End: 2020-11-19 | Stop reason: HOSPADM

## 2020-11-19 RX ORDER — LIDOCAINE HYDROCHLORIDE 10 MG/ML
INJECTION INFILTRATION; PERINEURAL
Status: DISCONTINUED | OUTPATIENT
Start: 2020-11-19 | End: 2020-11-19 | Stop reason: HOSPADM

## 2020-11-19 RX ORDER — METHYLPREDNISOLONE ACETATE 80 MG/ML
INJECTION, SUSPENSION INTRA-ARTICULAR; INTRALESIONAL; INTRAMUSCULAR; SOFT TISSUE
Status: DISCONTINUED | OUTPATIENT
Start: 2020-11-19 | End: 2020-11-19 | Stop reason: HOSPADM

## 2020-11-19 NOTE — OP NOTE
"Shoulder Articular Branch Nerve Block - Procedure Note    Pre-operative Diagnosis: Chronic Shoulder Pain  Post-operative Diagnosis: Chronic Shoulder Pain   Procedure Date: 11/19/2020  Procedure:  (1) Right Suprascapular Nerve Articular Branch Block    (2) Right Axillary Nerve Articular Branch Block  (3) Procedural Fluoroscopy      Surgeon: BLAISE CALDERA MD    Assistant: None    EBL: None    Complications: None    Specimens: None    Indications: This is a diagnostic and therapeutic intervention to alleviate pain and suffering, and reduce functional impairment associated with chronic shoulder pain.      The patients history and physical exam were reviewed. The risks, benefits and alternatives to the procedure were discussed, and all questions were answered to the patients satisfaction. The patient agreed to proceed, and written informed consent was verified.    Procedure in Detail: The patient was brought into the procedure room and positioned comfortably in prone position on the fluoroscopy table. The area of the Right posterior shoulder was prepped with Chloraprep and draped in a sterile manner. In a PA fluoroscopic view the target locations were identified.  The intensifier was adjusted with approximately 15 degrees of ipsilateral oblique rotation and 25 degrees of caudal tilt to optimize the view.  The target points were identified and marked on the skin.  Skin and subcutaneous tissues were anesthetized with 1% lidocaine using a 25G, 1.5" needle.  Next, a 22G 3.5" spinal needle was introduced through the anesthetized skin directed to contact os inferior to the spinoglenoid notch on the most lateral border of the glenoid fossa rim.  Iodinated contrast was administered demonstrating local accumulation over the target area.  Anesthesia was produced by injecting bupivacaine 0.25% 1 mL. The needle was repositioned.  Additional contrast was administered demonstrating local accumulation followed by administration " "of a 2nd dose of bupivacaine 0.5%.    Next, the junction of the greater tubercle and the surgical neck of the humerus was identified and marked on the skin.  The skin and subcutaneous tissues were infiltrated with lidocaine 1% using a 25 G, 1.5" needle.  A 22G 3.5"  spinal needle was introduced through the anesthetized skin and directed to contact os at the target location.  Iodinated contrast was administered demonstrated local accumulation of the target area.  Anesthesia was produced by injecting bupivacaine 0.52% 1 mL.    Following the injection, the needle was removed and a Band-Aid was placed.    "

## 2020-11-19 NOTE — H&P
HPI  Patient presenting for Procedure(s) (LRB):  BLOCK, NERVE, GLENOHUMERAL  need consent (Right)     Patient on Anti-coagulation No    No health changes since previous encounter    Past Medical History:   Diagnosis Date    Abnormal CT scan, pelvis 10/27/2019    Abnormal thyroid blood test 5/6/2019    Adrenal insufficiency     Blister- healing 1/9/2020    Chronic bilateral low back pain with bilateral sciatica 3/19/2019    Congenital adrenal hyperplasia     Hepatitis B core antibody positive 11/6/2020    Negative sAg, suggests previous exposure but no chronic/active Hep B. At risk for reactivation with any immunosuppression medication, steroids, chemo, etc.      IGT (impaired glucose tolerance) 11/5/2019    Insomnia due to medical condition     Multiple closed traumatic fractures of multiple bones of hip and pelvis with routine healing, subsequent encounter 9/27/2019    Pancreatic cyst 11/2/2020    S/P lumbar laminectomy 10/28/2019    Spinal stenosis     Spinal stenosis of lumbar region with neurogenic claudication 3/19/2019    Status post sex reassignment surgery 3/19/2019    Hx of Congenital Adrenal Hyperplasia    Unintentional weight loss 7/21/2020     Past Surgical History:   Procedure Laterality Date    BACK SURGERY      gender surgery      multiple surgeries since birth    LAMINECTOMY  09/13/2019    RADIOFREQUENCY ABLATION Left 5/9/2019    Procedure: RADIOFREQUENCY ABLATION, LEFT L3,4,5;  Surgeon: Messi Cabello MD;  Location: [facility];  Service: Pain Management;  Laterality: Left;  1 of 2    RADIOFREQUENCY ABLATION Right 5/23/2019    Procedure: RADIOFREQUENCY ABLATION, RIGHT L3,4,5;  Surgeon: Messi Cabello MD;  Location: [facility];  Service: Pain Management;  Laterality: Right;  2of 2    SPINE SURGERY      Sept 2019     TOTAL KNEE ARTHROPLASTY Right 1/31/2020    Procedure: ARTHROPLASTY, KNEE, TOTAL;  Surgeon: Donte Andrade III, MD;  Location: [facility] OR [address] [place] [address];   Service: Orthopedics;  Laterality: Right;     Review of patient's allergies indicates:   Allergen Reactions    Bactrim [sulfamethoxazole-trimethoprim] Itching    Penicillins Rash      No current facility-administered medications for this encounter.        PMHx, PSHx, Allergies, Medications reviewed in epic    ROS negative except pain complaints in HPI    OBJECTIVE:    There were no vitals taken for this visit.    PHYSICAL EXAMINATION:    GENERAL: Well appearing, in no acute distress, alert and oriented x3.  PSYCH:  Mood and affect appropriate.  SKIN: Skin color, texture, turgor normal, no rashes or lesions which will impact the procedure.  CV: RRR with palpation of the radial artery.  PULM: No evidence of respiratory difficulty, symmetric chest rise. Clear to auscultation.  NEURO: Cranial nerves grossly intact.    Plan:    Proceed with procedure as planned Procedure(s) (LRB):  BLOCK, NERVE, GLENOHUMERAL  need consent (Right)    Janelle Snyder  11/19/2020

## 2020-11-19 NOTE — DISCHARGE SUMMARY
Discharge Note  Short Stay      SUMMARY     Admit Date: 11/19/2020    Attending Physician: Janelle Snyder      Discharge Physician: Janelle Snyder      Discharge Date: 11/19/2020 4:00 PM    Procedure(s) (LRB):  BLOCK, NERVE, GLENOHUMERAL  need consent (Right)    Final Diagnosis: Right shoulder pain, unspecified chronicity [M25.511]    Disposition: Home or self care    Patient Instructions:   Current Discharge Medication List      CONTINUE these medications which have NOT CHANGED    Details   acetaminophen (TYLENOL) 650 MG TbSR Take 1 tablet (650 mg total) by mouth every 8 (eight) hours.  Qty: 120 tablet, Refills: 0      acetaminophen (TYLENOL) 325 MG tablet Take 2 tablets (650 mg total) by mouth every 8 (eight) hours as needed.  Qty: 240 tablet, Refills: 0    Associated Diagnoses: Status post total left knee replacement      ALPRAZolam (XANAX) 1 MG tablet Take 1 tablet (1 mg total) by mouth 3 (three) times daily as needed.  Qty: 90 tablet, Refills: 1    Associated Diagnoses: PTSD (post-traumatic stress disorder)      ascorbic acid, vitamin C, (VITAMIN C) 1000 MG tablet Take 1,000 mg by mouth 2 (two) times daily.      aspirin (ECOTRIN) 81 MG EC tablet Take 1 tablet (81 mg total) by mouth 2 (two) times daily.  Qty: 60 tablet, Refills: 0    Associated Diagnoses: Status post total left knee replacement      atorvastatin (LIPITOR) 10 MG tablet Take 1 tablet (10 mg total) by mouth every evening.  Qty: 90 tablet, Refills: 2    Associated Diagnoses: Hyperlipidemia, unspecified hyperlipidemia type      celecoxib (CELEBREX) 200 MG capsule Take 1 capsule (200 mg total) by mouth once daily.  Qty: 30 capsule, Refills: 0    Associated Diagnoses: Status post total left knee replacement      cranberry fruit extract (CRANBERRY ORAL) Take by mouth.      cyclobenzaprine (FLEXERIL) 10 MG tablet Take 1 tablet (10 mg total) by mouth 3 (three) times daily as needed for Muscle spasms.  Qty: 270 tablet, Refills: 1    Associated Diagnoses:  Muscle spasm      docusate sodium (COLACE) 100 MG capsule Take 1 capsule (100 mg total) by mouth 2 (two) times daily as needed for Constipation.  Qty: 60 capsule, Refills: 0    Associated Diagnoses: Status post total left knee replacement      gabapentin (NEURONTIN) 800 MG tablet Take 1 tablet by mouth three times a day and take 2 tablets at night (5 tablets a day, 4000mg)  Qty: 450 tablet, Refills: 0    Associated Diagnoses: Cervical radiculopathy      Lactobacillus acidophilus (ACIDOPHILUS) Cap Take by mouth once daily.       lamoTRIgine (LAMICTAL) 200 MG tablet Take 1 tablet (200 mg total) by mouth 2 (two) times daily.  Qty: 60 tablet, Refills: 5      loratadine (CLARITIN) 10 mg tablet Take 10 mg by mouth once daily.       morphine (MS CONTIN) 15 MG 12 hr tablet Take 1 tablet (15 mg total) by mouth 2 (two) times daily.  Qty: 60 tablet, Refills: 0    Comments: Quantity prescribed more than 7 day supply? Yes, quantity medically necessary  Associated Diagnoses: Cervical radiculopathy      oxyCODONE (ROXICODONE) 5 MG immediate release tablet Take 1-2 tabs by mouth every 4-6 hours as needed for pain  Qty: 50 tablet, Refills: 0    Comments: Greater than 7 day supply is medically necessary. Deliver to [place] for surgery 11/2/2020.  Associated Diagnoses: Status post total left knee replacement      oxyCODONE-acetaminophen (PERCOCET)  mg per tablet Take 1 tablet by mouth every 8 (eight) hours as needed for Pain.  Qty: 90 tablet, Refills: 0    Comments: Quantity prescribed more than 7 day supply? Yes, quantity medically necessary  Associated Diagnoses: S/P lumbar laminectomy; Cervical radiculopathy; Other chronic pain; Primary osteoarthritis of right knee      predniSONE (DELTASONE) 10 MG tablet Take 1 tablet (10 mg total) by mouth once daily.  Qty: 100 tablet, Refills: 5    Associated Diagnoses: Adrenal insufficiency      primidone (MYSOLINE) 50 MG Tab Take 2 tablets (100 mg total) by mouth 3 (three) times  daily.  Qty: 540 tablet, Refills: 12    Associated Diagnoses: Adrenal insufficiency      rOPINIRole (REQUIP) 4 MG tablet Take 1 tablet (4 mg total) by mouth nightly.  Qty: 90 tablet, Refills: 12    Associated Diagnoses: RLS (restless legs syndrome)      testosterone (ANDROGEL) 1 % (50 mg/5 gram) GlPk Apply 5 grams topically once daily.  Qty: 30 packet, Refills: 4    Associated Diagnoses: Status post sex reassignment surgery      triamcinolone acetonide 0.1% (KENALOG) 0.1 % cream Apply topically 2 (two) times daily.  Qty: 45 g, Refills: 1      zinc 50 mg Tab once daily.                  Discharge Diagnosis: Right shoulder pain, unspecified chronicity [M25.511]  Condition on Discharge: Stable with no complications to procedure   Diet on Discharge: Same as before.  Activity: as per instruction sheet.  Discharge to: Home with a responsible adult.  Follow up: 2-4 weeks       Please call my office or pager at [phone] if experienced any weakness or loss of sensation, fever > 101.5, pain uncontrolled with oral medications, persistent nausea/vomiting/or diarrhea, redness or drainage from the incisions, or any other worrisome concerns. If physician on call was not reached or could not communicate with our office for any reason please go to the nearest emergency department

## 2020-11-19 NOTE — DISCHARGE INSTRUCTIONS

## 2020-11-20 ENCOUNTER — TELEPHONE (OUTPATIENT)
Dept: HEPATOLOGY | Facility: CLINIC | Age: 50
End: 2020-11-20

## 2020-11-20 NOTE — TELEPHONE ENCOUNTER
Contacted patient and scheduled appointment as instructed. Patient agreed to time and date of appointment. Sent reminders in the mail.    Instructions:     Pt is scheduled for EUS liver biopsy on 12/3.      Please contact pt to schedule pt for f/u with me the week of Dec 21st to discuss biopsy results.     Thanks

## 2020-11-20 NOTE — TELEPHONE ENCOUNTER
Pt is scheduled for EUS liver biopsy on 12/3.     Please contact pt to schedule pt for f/u with me the week of Dec 21st to discuss biopsy results.    Thanks

## 2020-11-24 DIAGNOSIS — M54.12 CERVICAL RADICULOPATHY: Primary | ICD-10-CM

## 2020-11-24 DIAGNOSIS — G89.29 OTHER CHRONIC PAIN: ICD-10-CM

## 2020-11-24 DIAGNOSIS — M17.11 PRIMARY OSTEOARTHRITIS OF RIGHT KNEE: ICD-10-CM

## 2020-11-24 DIAGNOSIS — Z98.890 S/P LUMBAR LAMINECTOMY: ICD-10-CM

## 2020-11-24 RX ORDER — OXYCODONE AND ACETAMINOPHEN 10; 325 MG/1; MG/1
1 TABLET ORAL EVERY 8 HOURS PRN
Qty: 90 TABLET | Refills: 0 | Status: SHIPPED | OUTPATIENT
Start: 2020-11-24 | End: 2020-12-28 | Stop reason: SDUPTHER

## 2020-11-24 NOTE — TELEPHONE ENCOUNTER
Patient was last seen in the office 08/21/20 by Alejandra Phoenix. This is a  patient. Patient last received this medication 08/21/20. Patient completed UDS 08/21/20 and was consistent. Patient has a pain contract on file. Patient has no follow up scheduled

## 2020-11-27 ENCOUNTER — TELEPHONE (OUTPATIENT)
Dept: ENDOSCOPY | Facility: HOSPITAL | Age: 50
End: 2020-11-27

## 2020-11-27 DIAGNOSIS — Z01.818 PRE-OP TESTING: ICD-10-CM

## 2020-11-30 ENCOUNTER — TELEPHONE (OUTPATIENT)
Dept: ENDOSCOPY | Facility: HOSPITAL | Age: 50
End: 2020-11-30

## 2020-11-30 ENCOUNTER — PATIENT OUTREACH (OUTPATIENT)
Dept: ADMINISTRATIVE | Facility: OTHER | Age: 50
End: 2020-11-30

## 2020-11-30 ENCOUNTER — LAB VISIT (OUTPATIENT)
Dept: INTERNAL MEDICINE | Facility: CLINIC | Age: 50
End: 2020-11-30
Payer: MEDICARE

## 2020-11-30 DIAGNOSIS — Z01.818 PRE-OP TESTING: ICD-10-CM

## 2020-11-30 PROCEDURE — U0003 INFECTIOUS AGENT DETECTION BY NUCLEIC ACID (DNA OR RNA); SEVERE ACUTE RESPIRATORY SYNDROME CORONAVIRUS 2 (SARS-COV-2) (CORONAVIRUS DISEASE [COVID-19]), AMPLIFIED PROBE TECHNIQUE, MAKING USE OF HIGH THROUGHPUT TECHNOLOGIES AS DESCRIBED BY CMS-2020-01-R: HCPCS

## 2020-11-30 NOTE — PROGRESS NOTES
Health Maintenance Due   Topic Date Due    HIV Screening  03[birth year]27/1983    TETANUS VACCINE  03/27/1986    Complete Opioid Risk Tool  03/27/1986    Pneumococcal Vaccine (Medium Risk) (1 of 1 - PPSV23) 03/27/1987    Colorectal Cancer Screening  03/27/2018    Shingles Vaccine (2 of 2) 10/27/2020     Updates were requested from care everywhere.  Chart was reviewed for overdue Proactive [facility] Encounters (ARJUN) topics (CRS, Breast Cancer Screening, Eye exam)  Health Maintenance has been updated.  LINKS immunization registry triggered.  Immunizations were reconciled.

## 2020-11-30 NOTE — TELEPHONE ENCOUNTER
----- Message from Anastasia Osuna sent at 11/30/2020  2:59 PM CST -----  Contact: [phone] (M)  Pt wife is returning a missed call. Please call the pt wife regarding the appointment.

## 2020-11-30 NOTE — TELEPHONE ENCOUNTER
Received request to schedule patient for EUS on 12/3/20 at 2:00pm. Spoke with Patient's wife. Reviewed medical history and medications. Instructed on procedure and prep. Patient's wife verbalized understanding of instructions. E-mailed copy of instructions to address in chart.

## 2020-12-01 ENCOUNTER — OFFICE VISIT (OUTPATIENT)
Dept: PAIN MEDICINE | Facility: CLINIC | Age: 50
End: 2020-12-01
Payer: MEDICARE

## 2020-12-01 DIAGNOSIS — Z98.890 S/P LUMBAR LAMINECTOMY: ICD-10-CM

## 2020-12-01 DIAGNOSIS — M54.12 CERVICAL RADICULOPATHY: ICD-10-CM

## 2020-12-01 DIAGNOSIS — M17.11 PRIMARY OSTEOARTHRITIS OF RIGHT KNEE: ICD-10-CM

## 2020-12-01 DIAGNOSIS — M62.838 MUSCLE SPASM: ICD-10-CM

## 2020-12-01 DIAGNOSIS — M19.011 ARTHRITIS OF RIGHT SHOULDER REGION: Primary | ICD-10-CM

## 2020-12-01 DIAGNOSIS — G89.29 OTHER CHRONIC PAIN: ICD-10-CM

## 2020-12-01 DIAGNOSIS — M48.062 SPINAL STENOSIS OF LUMBAR REGION WITH NEUROGENIC CLAUDICATION: ICD-10-CM

## 2020-12-01 LAB — SARS-COV-2 RNA RESP QL NAA+PROBE: NOT DETECTED

## 2020-12-01 PROCEDURE — 99214 PR OFFICE/OUTPT VISIT, EST, LEVL IV, 30-39 MIN: ICD-10-PCS | Mod: 95,,, | Performed by: NURSE PRACTITIONER

## 2020-12-01 PROCEDURE — 99214 OFFICE O/P EST MOD 30 MIN: CPT | Mod: 95,,, | Performed by: NURSE PRACTITIONER

## 2020-12-01 RX ORDER — CYCLOBENZAPRINE HCL 10 MG
10 TABLET ORAL 3 TIMES DAILY PRN
Qty: 270 TABLET | Refills: 1 | Status: SHIPPED | OUTPATIENT
Start: 2020-12-01 | End: 2021-06-09 | Stop reason: SDUPTHER

## 2020-12-01 RX ORDER — GABAPENTIN 800 MG/1
TABLET ORAL
Qty: 450 TABLET | Refills: 0 | Status: SHIPPED | OUTPATIENT
Start: 2020-12-01 | End: 2021-03-01 | Stop reason: SDUPTHER

## 2020-12-01 RX ORDER — MORPHINE SULFATE 15 MG/1
15 TABLET, FILM COATED, EXTENDED RELEASE ORAL 2 TIMES DAILY
Qty: 60 TABLET | Refills: 0 | Status: SHIPPED | OUTPATIENT
Start: 2020-12-03 | End: 2021-01-03 | Stop reason: SDUPTHER

## 2020-12-01 NOTE — PROGRESS NOTES
Chronic Pain-Tele-Medicine-Established Note (Follow up visit)        The patient location is: Home  The chief complaint leading to consultation is: pain  Visit type: Virtual visit with synchronous audio and video  Total time spent with patient: 22 min  Each patient to whom he or she provides medical services by telemedicine is:  (1) informed of the relationship between the physician and patient and the respective role of any other health care provider with respect to management of the patient; and (2) notified that he or she may decline to receive medical services by telemedicine and may withdraw from such care at any time.          Referring Physician: No ref. provider found    Chief Complaint:   No chief complaint on file.       SUBJECTIVE: Disclaimer: This note has been generated using voice-recognition software. There may be typographical errors that have been missed during proof-reading    Interval History 12/1/2020:  The patient has a virtual video follow-up today for chronic pain and medication refills.  Since previous encounter, he underwent right-sided peripheral shoulder blocks on 11/19/2020 he reports approximately 60-70% relief for 1 day and then about 30% relief.  He does feel like his pain is not as severe as it was previously.  His primary complaint today is left knee pain which has been persistent.  He was previously scheduled for left total knee replacement last month with Dr. Andrade.  However, he is having further imaging and lab studies due to elevated liver enzymes.  He has a history of elevated liver enzymes which have slightly gone up and down over time.  He has been maintained on opioid medications for years.  He did have an abdominal ultrasound last year which did not show any significant abnormalities.  He continues to take morphine extended release twice daily with benefit.  Additionally, he takes Percocet as needed usually 2-3 times daily.  His pain today is 8/10.    Interval History  8/21/2020:  The patient is here for follow up of chronic pain and medication refill.  He has been having more of the left knee pain recently.  He is considering replacement with Dr. Andrade in the future.  They have also discussed Euflexxa, however, he feels like this will not help him.  He has been having more right shoulder pain.  He says that he has difficulty lifting his right arm at times.  He also says that he has had steroid injections into the right shoulder in the past with minimal benefit.  She wishes to avoid further steroids.  He is interested in another procedure for his shoulder.  He continues to take morphine long-acting medication which is helpful.  He also takes Percocet sparingly for breakthrough pain.  His pain today is 5/10.    Interval History 5/27/2020   since previous encounter the patient continues to have improvement after his right knee replacement he is currently in physical therapy.  He has been able to on some days decreased team a of oxycodone acetaminophen that he has been taking.  Continues to use morphine ER 15 mg b.i.d. Without any side effects, gabapentin 4000 mg per day and Flexeril p.r.n.  He is planning a left knee replacement in the future but it is currently on hold with the coronavirus outbreak.    Interval History 2/27/2020:  The patient is here for follow up of chronic pain.  Since last visit, he underwent right TKA by Dr. Andrade on 1/31/20.  He reports that he is recovering well.  He is no longer taking post op pain medications.  He takes his MS Contin 1-2 times per day.  He says he was unaware to take it scheduled.  He taking Percocet PRN.  He needs a refill of the Percocet today.  He denies any major adverse effects.  He does report that he fell last week onto his tailbone.  He has been using a donut pillow when sitting.  His pain today is 6/10.    Interval history 11/18/2019:  Since previous encounter the patient is status post lumbar laminectomy decompression from L1-S1  in September and has healed well subsequently and has undergone physical therapy.  He denies radicular symptoms to his lower extremities at this time but is having severe knee pain and is being evaluated for knee replacement to be done in January.  Otherwise the patient has been stable and has been utilizing his opioid medications appropriately he is taking MS Contin 15 mg b.i.d. along with oxycodone acetaminophen 10/325 t.i.d. p.r.n. without any side effects or evidence of misuse or abuse.  The patient also has been taking gabapentin 4000 mg per day but continues to have radicular pain symptoms in his upper extremities which was thought to be predominantly carpal tunnel he had an EMG/NCV with Neurology which showed a bilateral carpal tunnel with concomitant C7 radiculopathy.  Previous MRI of the cervical spine did reveal stenosis at C5-6 and C6-7 with moderate neuroforaminal stenosis.    Interval History 8/21/19:  Patient reports for follow up. He has seen neurosurgery and is scheduled for  Open L1-S1 laminectomy. He has also seen orthopedics for his bilateral knee pain. They have planned for knee braces after completion of his spinal surgery.  Patient reports continued back pain.  He is s/p RFA of bilateral L3,4,5 in 5/9/19 and 5/23/19 with 60% relief in his pain for 1 week.  Patient reports continued back pain, sharp, starts in his lower back and radiates to his feet. Patient also reports worsening of the neuropathic pain in the palms of his hands, burning, tingling pain worse at night.    Interval History 6/20/2019:  The patient is here for follow up of lower back pain.  He is s/p lumbar RFAs.  He is reporting minimal benefit of pain.  He feels as though previous RFAs from another provider were helpful.  However, he is reporting pain across the lower back with radiation down the back of both legs.  We previously discussed a surgical referral and he would like to pursue this option.  He has been taking Percocet  5/325 mg TID as well as oxycodone 15 mg for severe breakthrough pain.  He feels as though the 15 mg make him hyper and unable to sleep.  He would like to adjust the medications.  Additionally, he was weaning Gabapentin 800 mg and is now taking it BID.  However, he feels as though his shooting pain has worsened since this.  His pain today is 6/10.    Interval History 4/12/2019:  The patient returns for follow up of back pain.  We have received his records including previous lumbar and MRI.  There is no NCS/EMG report, however.  His records indicate lumbar RFAs over 6 months ago.  He reports that this was helpful for him until recently.  He had a left synovial cyst aspiration and L5-S1 TF MAYURI in the past as well.  He feels as though RFA was more helpful.  Additionally, he had an updated lumbar MRI since previous visit which does show significant arthritis and spinal stenosis.  He would like to hold off on surgical consult at this time.  He has decreased Gabapentin to 800 mg TID and has not noticed a change in pain.  He takes Percocet 5/325 mg TID PRN pain.  He also takes oxycodone 15 mg PRN, about 2-3 pills per week for severe pain.  This was a one time refill from Dr. Mitchell with intention of transition to our office.  He denies any bowel or bladder changes.  His pain today is 6/10.    Initial encounter:    Ari Santos presents to the clinic for the evaluation of lower back pain. The pain started 9 year ago starting indiously and symptoms have been worsening.    Brief history:  History of spinal stenosis and history of a cyst with drainage.    Patient has a pain management physician in [facility]    Pain Description:    The pain is located in the lower back area and radiates to the lower extremities bilaterally in the L5 distribution.      At BEST  5/10     At WORST  10/10 on the WORST day.      On average pain is rated as 7/10.     Today the pain is rated as 7/10    The pain is described as sharp  and intermittent      Symptoms interfere with daily activity and sleeping.     Exacerbating factors: Standing, Walking, Morning and Getting out of bed/chair.      Mitigating factors medications, physical therapy and rest.     Patient reports significant motor weakness and loss of sensations.  Patient denies any suicidal or homicidal ideations    Pain Medications:  Current:  Flexeril 10mg TID  Gabapentin 800mg QID  Lamictal 200mg qHS  Percocet 10/325  TID PRN  Xanax 1mg for 1 - 3 times/day  ropinrole 4mg  MS Contin 15 mg Q12    Tried in Past:  NSAIDs -with some relief  TCA -Never  SNRI -venlafaxine for depression -with side effects  Anti-convulsants -gabapentin     Physical Therapy/Home Exercise: yes completed last year with limited benefit     report:  Reviewed and consistent with medication use as prescribed.    Pain Procedures: previous RFA and MAYURI  Previous knee steroid injection without improvement, and euflexxa in the remote past -unsure of the benefit    5/9/19 Left L3,4,5 RFA  5/23/19 Right L3,4,5 RFA- 50-60% reduction for one week  11/19/20 Right peripheral shoulder blocks- significant relief for 1 day    Chiropractor -helps in the past  Acupuncture - never  TENS unit -helps occasionally  Spinal decompression lumbar decompressive surgery from L1-S1 September 2019  Joint replacement - Right TKA 1/31/20    Imaging:     Narrative     EXAMINATION:  MRI LUMBAR SPINE WITHOUT CONTRAST    CLINICAL HISTORY:  bilateral lower extremity radiculopathy and weakness in the L4/5 distribution with neurogenic claudication;  Spinal stenosis, lumbar region with neurogenic claudication    TECHNIQUE:  Sagittal T1, T2 and STIR images as well as axial T1 and T2 weighted images were obtained through the lumbar without the administration of contrast.    COMPARISON:  None    FINDINGS:  Alignment: There loss of the normal lumbar lordosis.  Minimal grade 1 anterolisthesis of L3 on L4 and L4 on L5.  There may also be minimal  retrolisthesis of L2 as relates to L3.    Vertebrae: The vertebral bodies maintain normal height and signal intensity.    Discs:  Multilevel, advanced loss of disc height is noted throughout the lumbar spine with disc desiccation.    Cord: Normal signal.  The conus terminates at the T12-L1 level.    Degenerative findings:    T12-L1: There is a minimal diffuse disc bulge with no facet arthropathy or ligamentum flavum hypertrophy.  The central canal and neural foramen are patent.    L1-L2:There is a large diffuse disc bulge within no significant facet arthropathy.  Ligamentum flavum hypertrophy is present.  There effacement of the anterior thecal sleeve and mild central canal stenosis as well as moderate to severe right and severe left neural foraminal stenosis.    L2-L3: There is a 6 mm cystic structure in the posterior left aspect of the central canal at the level of the midbody of L2.  This effaces the anterior thecal sleeve and occupies a portion of the left neural foramen.  Additionally, there is a large diffuse disc bulge as well as severe bilateral facet arthropathy at L2-L3.  No significant ligamentum flavum hypertrophy.  Moderate central canal stenosis and mild bilateral neural foraminal stenosis is present.    L3-L4: There is a large diffuse disc bulge with severe bilateral facet arthropathy.  No significant ligamentum flavum hypertrophy there are congenitally shortened pedicles.  This results in moderate central canal stenosis, mild left and moderate right neural foraminal stenosis.    L4-L5: There is a large diffuse disc bulge with severe bilateral facet arthropathy and mild ligamentum flavum hypertrophy.  These findings result in severe central canal stenosis and left neural foraminal stenosis as well as moderate to severe right neural foraminal stenosis.    L5-S1: There is a mild diffuse disc bulge with severe right and moderate to severe left neural foraminal stenosis.  Ligamentum flavum hypertrophy is  present    Paraspinal muscles & soft tissues: Normal.    Incidental findings:    -there is a small amount of fluid in the left sacroiliac joint    -2.6 cm T2 hyperintense, T1 hypointense rounded structure in the interpolar region of the left kidney    -2.3 cm rounded, circumscribed, uniformly T2 hyperintense, T1 hypointense focus emanating from the lateral limb of the left adrenal gland    -1.1 cm, heterogeneously T2 hyperintense focus emanating from the junction of the anterior and medial limb of the right adrenal gland      Impression       1. Minimal grade 1 anterolisthesis of L3 on L4 and L4 on L5 with minimal grade 1 retrolisthesis of L2 on L3.  2. Multilevel degenerative disc and joint disease resulting in severe central canal and neural foraminal stenosis at several levels.  3. Finding on the left kidney likely represents a Bosniak category 2 cyst.  4. Indeterminate bilateral adrenal gland nodules.  Further evaluation of these nodules as well as the left kidney finding can be performed with dedicated adrenal CT or MRI.  5.  This report was flagged in Epic as abnormal.         Past Medical History:   Diagnosis Date    Abnormal CT scan, pelvis 10/27/2019    Abnormal thyroid blood test 5/6/2019    Adrenal insufficiency     Blister- healing 1/9/2020    Chronic bilateral low back pain with bilateral sciatica 3/19/2019    Congenital adrenal hyperplasia     Hepatitis B core antibody positive 11/6/2020    Negative sAg, suggests previous exposure but no chronic/active Hep B. At risk for reactivation with any immunosuppression medication, steroids, chemo, etc.      IGT (impaired glucose tolerance) 11/5/2019    Insomnia due to medical condition     Multiple closed traumatic fractures of multiple bones of hip and pelvis with routine healing, subsequent encounter 9/27/2019    Pancreatic cyst 11/2/2020    S/P lumbar laminectomy 10/28/2019    Spinal stenosis     Spinal stenosis of lumbar region with neurogenic  claudication 3/19/2019    Status post sex reassignment surgery 3/19/2019    Hx of Congenital Adrenal Hyperplasia    Unintentional weight loss 2020     Past Surgical History:   Procedure Laterality Date    BACK SURGERY      gender surgery      multiple surgeries since birth    INJECTION OF ANESTHETIC AGENT AROUND NERVE Right 2020    Procedure: BLOCK, NERVE, GLENOHUMERAL  need consent;  Surgeon: Messi Cabello MD;  Location: [facility];  Service: Pain Management;  Laterality: Right;    LAMINECTOMY  2019    RADIOFREQUENCY ABLATION Left 2019    Procedure: RADIOFREQUENCY ABLATION, LEFT L3,4,5;  Surgeon: Messi Cabello MD;  Location: [facility];  Service: Pain Management;  Laterality: Left;  1 of 2    RADIOFREQUENCY ABLATION Right 2019    Procedure: RADIOFREQUENCY ABLATION, RIGHT L3,4,5;  Surgeon: Messi Cabello MD;  Location: [facility];  Service: Pain Management;  Laterality: Right;  2of 2    SPINE SURGERY      2019     TOTAL KNEE ARTHROPLASTY Right 2020    Procedure: ARTHROPLASTY, KNEE, TOTAL;  Surgeon: Donte Andrade III, MD;  Location: [facility] OR [address] [place] [address];  Service: Orthopedics;  Laterality: Right;     Social History     Socioeconomic History    Marital status:      Spouse name: Kristy Sainz    Number of children: 1    Years of education: Not on file    Highest education level: Not on file   Occupational History     Comment:  and artist   Social Needs    Financial resource strain: Not very hard    Food insecurity     Worry: Never true     Inability: Never true    Transportation needs     Medical: No     Non-medical: No   Tobacco Use    Smoking status: Former Smoker     Quit date: 2009     Years since quittin.9    Smokeless tobacco: Never Used    Tobacco comment: was not a daily smoker-    Substance and Sexual Activity    Alcohol use: Not Currently     Frequency: Never     Drinks per session: Patient refused      Binge frequency: Never    Drug use: Never    Sexual activity: Yes     Partners: Female   Lifestyle    Physical activity     Days per week: 3 days     Minutes per session: 30 min    Stress: To some extent   Relationships    Social connections     Talks on phone: More than three times a week     Gets together: Patient refused     Attends Sabianist service: Not on file     Active member of club or organization: No     Attends meetings of clubs or organizations: Never     Relationship status:    Other Topics Concern    Not on file   Social History Narrative    Lives in a house with his wife      Family History   Problem Relation Age of Onset    Diabetes Maternal Grandfather     Cancer Mother     Heart disease Father     Autoimmune disease Sister     Breast cancer Neg Hx     Ovarian cancer Neg Hx     Vaginal cancer Neg Hx     Endometrial cancer Neg Hx     Cervical cancer Neg Hx        Review of patient's allergies indicates:   Allergen Reactions    Bactrim [sulfamethoxazole-trimethoprim] Itching    Penicillins Rash       Current Outpatient Medications   Medication Sig    acetaminophen (TYLENOL) 650 MG TbSR Take 1 tablet (650 mg total) by mouth every 8 (eight) hours. (Patient not taking: Reported on 8/21/2020)    acetaminophen (TYLENOL) 325 MG tablet Take 2 tablets (650 mg total) by mouth every 8 (eight) hours as needed.    ALPRAZolam (XANAX) 1 MG tablet Take 1 tablet (1 mg total) by mouth 3 (three) times daily as needed.    ascorbic acid, vitamin C, (VITAMIN C) 1000 MG tablet Take 1,000 mg by mouth 2 (two) times daily.    aspirin (ECOTRIN) 81 MG EC tablet Take 1 tablet (81 mg total) by mouth 2 (two) times daily.    atorvastatin (LIPITOR) 10 MG tablet Take 1 tablet (10 mg total) by mouth every evening.    cranberry fruit extract (CRANBERRY ORAL) Take by mouth.    cyclobenzaprine (FLEXERIL) 10 MG tablet Take 1 tablet (10 mg total) by mouth 3 (three) times daily as needed for Muscle spasms.     docusate sodium (COLACE) 100 MG capsule Take 1 capsule (100 mg total) by mouth 2 (two) times daily as needed for Constipation.    gabapentin (NEURONTIN) 800 MG tablet Take 1 tablet by mouth three times a day and take 2 tablets at night (5 tablets a day, 4000mg)    Lactobacillus acidophilus (ACIDOPHILUS) Cap Take by mouth once daily.     lamoTRIgine (LAMICTAL) 200 MG tablet Take 1 tablet (200 mg total) by mouth 2 (two) times daily.    loratadine (CLARITIN) 10 mg tablet Take 10 mg by mouth once daily.     morphine (MS CONTIN) 15 MG 12 hr tablet Take 1 tablet (15 mg total) by mouth 2 (two) times daily.    oxyCODONE (ROXICODONE) 5 MG immediate release tablet Take 1-2 tabs by mouth every 4-6 hours as needed for pain    oxyCODONE-acetaminophen (PERCOCET)  mg per tablet Take 1 tablet by mouth every 8 (eight) hours as needed for Pain.    predniSONE (DELTASONE) 10 MG tablet Take 1 tablet (10 mg total) by mouth once daily.    primidone (MYSOLINE) 50 MG Tab Take 2 tablets (100 mg total) by mouth 3 (three) times daily.    rOPINIRole (REQUIP) 4 MG tablet Take 1 tablet (4 mg total) by mouth nightly.    testosterone (ANDROGEL) 1 % (50 mg/5 gram) GlPk Apply 5 grams topically once daily.    triamcinolone acetonide 0.1% (KENALOG) 0.1 % cream Apply topically 2 (two) times daily.    zinc 50 mg Tab once daily.      No current facility-administered medications for this visit.        REVIEW OF SYSTEMS:    GENERAL:  No weight loss, malaise or fevers.  HEENT:   No recent changes in vision or hearing  NECK:  Negative for lumps, no difficulty with swallowing.  RESPIRATORY:  Negative for cough, wheezing or shortness of breath, patient denies any recent URI.  CARDIOVASCULAR:  Negative for chest pain, leg swelling or palpitations.  GI:  Negative for abdominal discomfort, blood in stools or black stools or change in bowel habits.  MUSCULOSKELETAL:  See HPI.  SKIN:  Negative for lesions, rash, and itching.  PSYCH:   Significant psychosocial stressors including PTSD for sex re-assignment surgery.  Patient's sleep is disturbed secondary to pain.  HEMATOLOGY/LYMPHOLOGY:  Negative for prolonged bleeding, bruising easily or swollen nodes.  Patient is not currently taking any anti-coagulants  ENDO: No history of diabetes or thyroid dysfunction  NEURO:   No history of headaches, syncope, paralysis, seizures or tremors.  All other reviewed and negative other than HPI.    OBJECTIVE:    PHYSICAL EXAMINATION: (previous physical examination)    GENERAL: Well appearing, in no acute distress, alert and oriented x3.  PSYCH:  Mood and affect appropriate.  SKIN:  Well-healed incisions without any evidence of infection  HEAD/FACE:  Normocephalic, atraumatic.   CV: RRR with palpation of the radial artery.  PULM: No evidence of respiratory difficulty, symmetric chest rise.  EXT:  Decreased range of motion in the left knee. Brace on left knee. Well healing scar to right knee from recent TKA. TTP over right subacromial bursa.  Limited ROM of right shoulder with global pain. Positive NEER to right shoulder.  BACK:  There is significant pain with palpation over the facet joints of the lumbar spine bilaterally. There is decreased range of motion with extension to 15 degrees, and facet loading maneuvers cause reproducible pain.    NEURO:  No clonus.  Cranial nerves grossly intact  GAIT: Antalgic- ambulates with rolling walker.    Lab Results   Component Value Date    WBC 8.16 10/30/2020    HGB 14.6 10/30/2020    HCT 44.3 10/30/2020     (H) 10/30/2020     10/30/2020     CMP  Sodium   Date Value Ref Range Status   10/29/2020 139 136 - 145 mmol/L Final     Potassium   Date Value Ref Range Status   10/29/2020 5.0 3.5 - 5.1 mmol/L Final     Chloride   Date Value Ref Range Status   10/29/2020 102 95 - 110 mmol/L Final     CO2   Date Value Ref Range Status   10/29/2020 28 23 - 29 mmol/L Final     Glucose   Date Value Ref Range Status   10/29/2020  130 (H) 70 - 110 mg/dL Final     BUN   Date Value Ref Range Status   10/29/2020 15 6 - 20 mg/dL Final     Creatinine   Date Value Ref Range Status   10/29/2020 0.8 0.5 - 1.4 mg/dL Final     Calcium   Date Value Ref Range Status   10/29/2020 10.9 (H) 8.7 - 10.5 mg/dL Final     Total Protein   Date Value Ref Range Status   10/29/2020 7.9 6.0 - 8.4 g/dL Final     Albumin   Date Value Ref Range Status   10/29/2020 4.4 3.5 - 5.2 g/dL Final     Total Bilirubin   Date Value Ref Range Status   10/29/2020 0.4 0.1 - 1.0 mg/dL Final     Comment:     For infants and newborns, interpretation of results should be based  on gestational age, weight and in agreement with clinical  observations.  Premature Infant recommended reference ranges:  Up to 24 hours.............<8.0 mg/dL  Up to 48 hours............<12.0 mg/dL  3-5 days..................<15.0 mg/dL  6-29 days.................<15.0 mg/dL       Alkaline Phosphatase   Date Value Ref Range Status   10/29/2020 282 (H) 55 - 135 U/L Final     AST   Date Value Ref Range Status   10/29/2020 65 (H) 10 - 40 U/L Final     ALT   Date Value Ref Range Status   10/29/2020 82 (H) 10 - 44 U/L Final     Anion Gap   Date Value Ref Range Status   10/29/2020 9 8 - 16 mmol/L Final     eGFR if    Date Value Ref Range Status   10/29/2020 >60.0 >60 mL/min/1.73 m^2 Final     eGFR if non    Date Value Ref Range Status   10/29/2020 >60.0 >60 mL/min/1.73 m^2 Final     Comment:     Calculation used to obtain the estimated glomerular filtration  rate (eGFR) is the CKD-EPI equation.        Lab Results   Component Value Date    HGBA1C 5.2 02/27/2020     Lab Results   Component Value Date    TSH 1.516 02/27/2020     Free T4 - 0.81 (wnl)      ASSESSMENT: 50 y.o. year old adult with pain, consistent with     Encounter Diagnoses   Name Primary?    Arthritis of right shoulder region Yes    Other chronic pain     Primary osteoarthritis of right knee     S/P lumbar laminectomy      Spinal stenosis of lumbar region with neurogenic claudication        PLAN:     - Previous imaging was reviewed and discussed with the patient today.    - Continue to follow up with orthopedics.  He is planning for left knee replacement in the future after further imaging secondary to elevated liver enzymes.    - He is status post peripheral blocks of the right shoulder with significant short-term benefit.  He will hold off on any further procedures after his knee replacement.    - Continue oxycodone acetaminophen 10/325 mg TID PRN, #90.  This was filled last week and he does not need a refill today.    - Continue MS Contin 15 mg Q12h.  Will send for 12/03/2020.    - The patient is here today for a refill of current pain medications and they believe these provide effective pain control and improvements in quality of life.  The patient notes no serious side effects, and feels the benefits outweigh the risks.  The patient was reminded of the pain contract that they signed previously as well as the risks and benefits of the medication including possible death.  The updated [place] [employer] prescription monitoring program was reviewed, and the patient has been found to be compliant with current treatment plan.    - Pt has pain contract.  UDS previously performed is appropriate.      - RTC in 3 months or sooner if needed.    - Dr. Cabello was consulted on the patient and agrees with this plan.      The above plan and management options were discussed at length with patient. Patient is in agreement with the above and verbalized understanding.      Alejandra Phoenix     12/01/2020

## 2020-12-03 ENCOUNTER — ANESTHESIA EVENT (OUTPATIENT)
Dept: ENDOSCOPY | Facility: HOSPITAL | Age: 50
End: 2020-12-03
Payer: MEDICARE

## 2020-12-03 ENCOUNTER — ANESTHESIA (OUTPATIENT)
Dept: ENDOSCOPY | Facility: HOSPITAL | Age: 50
End: 2020-12-03
Payer: MEDICARE

## 2020-12-03 ENCOUNTER — HOSPITAL ENCOUNTER (OUTPATIENT)
Facility: HOSPITAL | Age: 50
Discharge: HOME | End: 2020-12-03
Attending: INTERNAL MEDICINE | Admitting: INTERNAL MEDICINE
Payer: MEDICARE

## 2020-12-03 VITALS
HEIGHT: 60 IN | BODY MASS INDEX: 25.52 KG/M2 | SYSTOLIC BLOOD PRESSURE: 104 MMHG | DIASTOLIC BLOOD PRESSURE: 70 MMHG | RESPIRATION RATE: 36 BRPM | WEIGHT: 130 LBS | OXYGEN SATURATION: 99 % | TEMPERATURE: 98 F | HEART RATE: 91 BPM

## 2020-12-03 DIAGNOSIS — K86.2 PANCREATIC CYST: Primary | ICD-10-CM

## 2020-12-03 DIAGNOSIS — R74.8 ELEVATED LIVER ENZYMES: ICD-10-CM

## 2020-12-03 DIAGNOSIS — K86.2 PANCREAS CYST: ICD-10-CM

## 2020-12-03 PROCEDURE — 88307 PR  SURG PATH,LEVEL V: ICD-10-PCS | Mod: 26,,, | Performed by: PATHOLOGY

## 2020-12-03 PROCEDURE — 88313 SPECIAL STAINS GROUP 2: CPT | Mod: 26,,, | Performed by: PATHOLOGY

## 2020-12-03 PROCEDURE — 43242 PR UPGI ENDOSCOPY,FN NEEDLE BX,GUIDED: ICD-10-PCS | Mod: ,,, | Performed by: INTERNAL MEDICINE

## 2020-12-03 PROCEDURE — 25000003 PHARM REV CODE 250: Performed by: NURSE ANESTHETIST, CERTIFIED REGISTERED

## 2020-12-03 PROCEDURE — D9220A PRA ANESTHESIA: Mod: CRNA,,, | Performed by: NURSE ANESTHETIST, CERTIFIED REGISTERED

## 2020-12-03 PROCEDURE — 88307 TISSUE EXAM BY PATHOLOGIST: CPT | Performed by: PATHOLOGY

## 2020-12-03 PROCEDURE — 27202131 HC NEEDLE, FNB SINGLE (ANY): Performed by: INTERNAL MEDICINE

## 2020-12-03 PROCEDURE — D9220A PRA ANESTHESIA: Mod: ANES,,, | Performed by: ANESTHESIOLOGY

## 2020-12-03 PROCEDURE — 43242 EGD US FINE NEEDLE BX/ASPIR: CPT | Mod: ,,, | Performed by: INTERNAL MEDICINE

## 2020-12-03 PROCEDURE — D9220A PRA ANESTHESIA: ICD-10-PCS | Mod: ANES,,, | Performed by: ANESTHESIOLOGY

## 2020-12-03 PROCEDURE — 88342 CHG IMMUNOCYTOCHEMISTRY: ICD-10-PCS | Mod: 26,,, | Performed by: PATHOLOGY

## 2020-12-03 PROCEDURE — 88313 PR  SPECIAL STAINS,GROUP II: ICD-10-PCS | Mod: 26,,, | Performed by: PATHOLOGY

## 2020-12-03 PROCEDURE — 25000003 PHARM REV CODE 250: Performed by: INTERNAL MEDICINE

## 2020-12-03 PROCEDURE — 88342 IMHCHEM/IMCYTCHM 1ST ANTB: CPT | Mod: 26,,, | Performed by: PATHOLOGY

## 2020-12-03 PROCEDURE — 37000008 HC ANESTHESIA 1ST 15 MINUTES: Performed by: INTERNAL MEDICINE

## 2020-12-03 PROCEDURE — 88313 SPECIAL STAINS GROUP 2: CPT | Performed by: PATHOLOGY

## 2020-12-03 PROCEDURE — D9220A PRA ANESTHESIA: ICD-10-PCS | Mod: CRNA,,, | Performed by: NURSE ANESTHETIST, CERTIFIED REGISTERED

## 2020-12-03 PROCEDURE — 37000009 HC ANESTHESIA EA ADD 15 MINS: Performed by: INTERNAL MEDICINE

## 2020-12-03 PROCEDURE — 43242 EGD US FINE NEEDLE BX/ASPIR: CPT | Performed by: INTERNAL MEDICINE

## 2020-12-03 PROCEDURE — 88342 IMHCHEM/IMCYTCHM 1ST ANTB: CPT | Performed by: PATHOLOGY

## 2020-12-03 PROCEDURE — 63600175 PHARM REV CODE 636 W HCPCS: Performed by: NURSE ANESTHETIST, CERTIFIED REGISTERED

## 2020-12-03 PROCEDURE — 88307 TISSUE EXAM BY PATHOLOGIST: CPT | Mod: 26,,, | Performed by: PATHOLOGY

## 2020-12-03 RX ORDER — LIDOCAINE HYDROCHLORIDE 20 MG/ML
INJECTION, SOLUTION EPIDURAL; INFILTRATION; INTRACAUDAL; PERINEURAL
Status: DISCONTINUED | OUTPATIENT
Start: 2020-12-03 | End: 2020-12-03

## 2020-12-03 RX ORDER — FENTANYL CITRATE 50 UG/ML
INJECTION, SOLUTION INTRAMUSCULAR; INTRAVENOUS
Status: DISCONTINUED | OUTPATIENT
Start: 2020-12-03 | End: 2020-12-03

## 2020-12-03 RX ORDER — PROPOFOL 10 MG/ML
VIAL (ML) INTRAVENOUS
Status: DISCONTINUED | OUTPATIENT
Start: 2020-12-03 | End: 2020-12-03

## 2020-12-03 RX ORDER — SODIUM CHLORIDE 9 MG/ML
INJECTION, SOLUTION INTRAVENOUS CONTINUOUS
Status: DISCONTINUED | OUTPATIENT
Start: 2020-12-03 | End: 2020-12-03 | Stop reason: HOSPADM

## 2020-12-03 RX ORDER — PROPOFOL 10 MG/ML
VIAL (ML) INTRAVENOUS CONTINUOUS PRN
Status: DISCONTINUED | OUTPATIENT
Start: 2020-12-03 | End: 2020-12-03

## 2020-12-03 RX ORDER — SODIUM CHLORIDE 0.9 % (FLUSH) 0.9 %
10 SYRINGE (ML) INJECTION
Status: DISCONTINUED | OUTPATIENT
Start: 2020-12-03 | End: 2020-12-03 | Stop reason: HOSPADM

## 2020-12-03 RX ADMIN — PROPOFOL 150 MCG/KG/MIN: 10 INJECTION, EMULSION INTRAVENOUS at 02:12

## 2020-12-03 RX ADMIN — LIDOCAINE HYDROCHLORIDE 80 MG: 20 INJECTION, SOLUTION EPIDURAL; INFILTRATION; INTRACAUDAL at 02:12

## 2020-12-03 RX ADMIN — SODIUM CHLORIDE 20 ML/HR: 0.9 INJECTION, SOLUTION INTRAVENOUS at 01:12

## 2020-12-03 RX ADMIN — PROPOFOL 40 MG: 10 INJECTION, EMULSION INTRAVENOUS at 02:12

## 2020-12-03 RX ADMIN — FENTANYL CITRATE 50 MCG: 50 INJECTION, SOLUTION INTRAMUSCULAR; INTRAVENOUS at 02:12

## 2020-12-03 NOTE — PLAN OF CARE
Patient tolerated procedure and anesthesia with no complaints of pain or nausea. Discharge material given and explained to patient. He verbalized understanding. Patient taken to ride via wheelchair in stable condition with no complaints.

## 2020-12-03 NOTE — ANESTHESIA PREPROCEDURE EVALUATION
[facility]  Anesthesia Pre-Operative Evaluation         Patient Name: Ari Santos  YOB: 1970  MRN: [id number]    SUBJECTIVE:     12/03/2020    Procedure(s) (LRB):  ULTRASOUND, UPPER GI TRACT, ENDOSCOPIC (N/A)    Ari Santos is a 50 y.o. adult here for above procedure    Drips:    sodium chloride 0.9% 20 mL/hr (12/03/20 1315)       Patient Active Problem List   Diagnosis    Fibromyalgia    Tremor    RLS (restless legs syndrome)    PTSD (post-traumatic stress disorder)    Congenital adrenal hyperplasia    Seasonal allergies    HLD (hyperlipidemia)    Renal cyst    Chronic pain    Neuropathic pain    Urinary incontinence, urge    Erectile disorder, generalized, mild    Carpal tunnel syndrome on both sides    Adrenal insufficiency    Endocrine disorder in female-to-male transgender person    Cervical radiculopathy    Chronic pain of both knees    Quadriceps weakness    Thoracic myofascial strain    Pelvic fluid collection    Low testosterone    Chronic, continuous use of opioids    Primary osteoarthritis of right knee    Chronic pain of right knee    Gait abnormality    KIARA (obstructive sleep apnea)    Spinal stenosis    Arthritis of right shoulder region    Status post total left knee replacement 11/2/2020    Pancreatic cyst    Elevated alkaline phosphatase level    Elevated liver enzymes    Liver fibrosis    Hepatitis B core antibody positive    Pancreas cyst       Review of patient's allergies indicates:   Allergen Reactions    Bactrim [sulfamethoxazole-trimethoprim] Itching    Penicillins Rash       No current facility-administered medications on file prior to encounter.      Current Outpatient Medications on File Prior to Encounter   Medication Sig Dispense Refill    acetaminophen (TYLENOL) 325 MG tablet Take 2 tablets (650 mg total) by mouth every 8 (eight) hours as needed. 240 tablet 0    ALPRAZolam (XANAX) 1 MG tablet Take 1  tablet (1 mg total) by mouth 3 (three) times daily as needed. 90 tablet 1    ascorbic acid, vitamin C, (VITAMIN C) 1000 MG tablet Take 1,000 mg by mouth 2 (two) times daily.      atorvastatin (LIPITOR) 10 MG tablet Take 1 tablet (10 mg total) by mouth every evening. 90 tablet 2    cranberry fruit extract (CRANBERRY ORAL) Take by mouth.      docusate sodium (COLACE) 100 MG capsule Take 1 capsule (100 mg total) by mouth 2 (two) times daily as needed for Constipation. 60 capsule 0    Lactobacillus acidophilus (ACIDOPHILUS) Cap Take by mouth once daily.       loratadine (CLARITIN) 10 mg tablet Take 10 mg by mouth once daily.       predniSONE (DELTASONE) 10 MG tablet Take 1 tablet (10 mg total) by mouth once daily. 100 tablet 5    rOPINIRole (REQUIP) 4 MG tablet Take 1 tablet (4 mg total) by mouth nightly. 90 tablet 12    zinc 50 mg Tab once daily.       acetaminophen (TYLENOL) 650 MG TbSR Take 1 tablet (650 mg total) by mouth every 8 (eight) hours. (Patient not taking: Reported on 8/21/2020) 120 tablet 0    aspirin (ECOTRIN) 81 MG EC tablet Take 1 tablet (81 mg total) by mouth 2 (two) times daily. 60 tablet 0    oxyCODONE (ROXICODONE) 5 MG immediate release tablet Take 1-2 tabs by mouth every 4-6 hours as needed for pain 50 tablet 0    primidone (MYSOLINE) 50 MG Tab Take 2 tablets (100 mg total) by mouth 3 (three) times daily. 540 tablet 12    testosterone (ANDROGEL) 1 % (50 mg/5 gram) GlPk Apply 5 grams topically once daily. 30 packet 4    triamcinolone acetonide 0.1% (KENALOG) 0.1 % cream Apply topically 2 (two) times daily. 45 g 1       Past Surgical History:   Procedure Laterality Date    BACK SURGERY      gender surgery      multiple surgeries since birth    INJECTION OF ANESTHETIC AGENT AROUND NERVE Right 11/19/2020    Procedure: BLOCK, NERVE, GLENOHUMERAL  need consent;  Surgeon: Messi Cabello MD;  [facility];  Service: Pain Management;  Laterality: Right;    LAMINECTOMY   2019    RADIOFREQUENCY ABLATION Left 2019    Procedure: RADIOFREQUENCY ABLATION, LEFT L3,4,5;  Surgeon: Messi Cabello MD;  Location: [facility];  Service: Pain Management;  Laterality: Left;  1 of 2    RADIOFREQUENCY ABLATION Right 2019    Procedure: RADIOFREQUENCY ABLATION, RIGHT L3,4,5;  Surgeon: Messi Cabello MD;  [facility];  Service: Pain Management;  Laterality: Right;  2of 2    SPINE SURGERY      2019     TOTAL KNEE ARTHROPLASTY Right 2020    Procedure: ARTHROPLASTY, KNEE, TOTAL;  Surgeon: Donte Andrade III, MD;  Location: [facility] OR [address] [place] [address];  Service: Orthopedics;  Laterality: Right;       Social History     Socioeconomic History    Marital status:      Spouse name: Kristy Sainz    Number of children: 1    Years of education: Not on file    Highest education level: Not on file   Occupational History     Comment:  and artist   Social Needs    Financial resource strain: Not very hard    Food insecurity     Worry: Never true     Inability: Never true    Transportation needs     Medical: No     Non-medical: No   Tobacco Use    Smoking status: Former Smoker     Quit date: 2009     Years since quittin.9    Smokeless tobacco: Never Used    Tobacco comment: was not a daily smoker-    Substance and Sexual Activity    Alcohol use: Not Currently     Frequency: Never     Drinks per session: Patient refused     Binge frequency: Never    Drug use: Never    Sexual activity: Yes     Partners: Female   Lifestyle    Physical activity     Days per week: 3 days     Minutes per session: 30 min    Stress: To some extent   Relationships    Social connections     Talks on phone: More than three times a week     Gets together: Patient refused     Attends [employer] service: Not on file     Active member of club or organization: No     Attends meetings of clubs or organizations: Never     Relationship status:    Other Topics  Concern    Not on file   Social History Narrative    Lives in a house with his wife          OBJECTIVE:     Vital Signs Range (Last 24H):  Temp:  [36.4 °C (97.5 °F)] 36.4 °C (97.5 °F)  Pulse:  [94] 94  Resp:  [14] 14  SpO2:  [95 %] 95 %  BP: (118)/(71) 118/71    Significant Labs:  Lab Results   Component Value Date    WBC 8.16 10/30/2020    HGB 14.6 10/30/2020    HCT 44.3 10/30/2020     10/30/2020    CHOL 218 (H) 03/18/2019    TRIG 159 (H) 03/18/2019    HDL 47 03/18/2019    ALT 82 (H) 10/29/2020    AST 65 (H) 10/29/2020     10/29/2020    K 5.0 10/29/2020     10/29/2020    CREATININE 0.8 10/29/2020    BUN 15 10/29/2020    CO2 28 10/29/2020    TSH 1.516 02/27/2020    INR 0.9 10/30/2020    HGBA1C 5.2 02/27/2020       Diagnostic Studies:    EKG:   Results for orders placed or performed during the hospital encounter of 08/29/19   SCHEDULED EKG 12-LEAD (to Muse)    Collection Time: 08/29/19  7:49 AM    Narrative    Test Reason : Z01.818,    Vent. Rate : 107 BPM     Atrial Rate : 107 BPM     P-R Int : 130 ms          QRS Dur : 076 ms      QT Int : 320 ms       P-R-T Axes : 061 063 050 degrees     QTc Int : 427 ms    Sinus tachycardia  Otherwise normal ECG    Confirmed by Harlan Vargas MD ([id number]) on 8/30/2019 2:34:10 PM    Referred By: MALI REVELES           Confirmed By:Harlan Vargas MD       2D ECHO:  TTE:  No results found for this or any previous visit.      ANA:  No results found for this or any previous visit.      Anesthesia Evaluation    I have reviewed the Patient Summary Reports.    I have reviewed the Nursing Notes. I have reviewed the NPO Status.   I have reviewed the Medications.   Steroids Taken In Past Year: Prednisone    Review of Systems  Anesthesia Hx:  No problems with previous Anesthesia  History of prior surgery of interest to airway management or planning:   Cardiovascular:  Functional Capacity good / => 4 METS        Physical Exam  General:  Well nourished     Airway/Jaw/Neck:  Airway Findings: Mouth Opening: Normal Tongue: Normal  General Airway Assessment: Adult  Mallampati: II  TM Distance: Normal, at least 6 cm  Jaw/Neck Findings:  Neck ROM: Normal ROM     Eyes/Ears/Nose:  EYES/EARS/NOSE FINDINGS: Normal   Dental:  Dental Findings: In tact   Chest/Lungs:  Chest/Lungs Findings: Clear to auscultation, Normal Respiratory Rate     Heart/Vascular:  Heart Findings: Rate: Normal  Rhythm: Regular Rhythm  Sounds: Normal  Vascular Findings: Normal    Abdomen:  Abdomen Findings:  Normal, Soft, Nontender      Skin:  Skin Findings: Normal    Mental Status:  Mental Status Findings:  Cooperative, Alert and Oriented         Anesthesia Plan  Type of Anesthesia, risks & benefits discussed:  Anesthesia Type:  general  Patient's Preference:   Intra-op Monitoring Plan: standard ASA monitors  Intra-op Monitoring Plan Comments:   Post Op Pain Control Plan: multimodal analgesia, IV/PO Opioids PRN and per primary service following discharge from PACU  Post Op Pain Control Plan Comments:   Induction:   IV  Beta Blocker:  Patient is not currently on a Beta-Blocker (No further documentation required).       Informed Consent: Patient understands risks and agrees with Anesthesia plan.  Questions answered. Anesthesia consent signed with patient.  ASA Score: 3     Day of Surgery Review of History & Physical:    H&P update referred to the surgeon.         Ready For Surgery From Anesthesia Perspective.

## 2020-12-03 NOTE — DISCHARGE INSTRUCTIONS
Endoscopic Ultrasound (EUS)    An endoscopic ultrasound (EUS) is a test to look at the inside of your gastrointestinal (GI) tract. It's commonly used to look for cancers or growths in the esophagus, stomach, pancreas, liver, and rectum. It can help to stage cancer (see how advanced a cancer is). EUS may also be used to help diagnose certain diseases or to drain cysts or abscesses.  What is EUS?  EUS shows both ultrasound images and live video of the GI tract. During the test, a flexible tube called an endoscope (scope) is used. At the end of the scope is a tiny video camera and light. The video camera sends live images to a monitor. The scope also contains a very small ultrasound device. This uses sound waves to create images and send them to a monitor.  A needle is passed through the scope. The needle can be used take a small sample of tissue for testing. This is called a biopsy. The needle can be used to take a sample of fluid. This is called fine-needle aspiration (FNA).  Risks and possible complications of EUS  Risks and possible complications include the following:  · Bleeding  · Infection  · A perforation (hole) in the digestive tract   · Risks of sedation or anesthesia   Before the test  Be prepared prior to the test:  · Tell your healthcare provider what medicine you take. This includes vitamins, herbs, and over-the-counter medicine. It also includes any blood thinners, such as warfarin, clopidogrel, ibuprofen, or daily aspirin. Ask your healthcare provider if you need to stop taking some or all of them before the test.  · You may be prescribed antibiotics to take before or after the test. This depends on the area being studied and what is done during the test. These medicines help prevent infection.  · Carefully follow the instructions for preparing for the test to make sure results are accurate. Instructions may include:  ¨ If youre having an EUS of the upper GI tract (esophagus, stomach, duodenum,  pancreas, liver):  § Do not eat or drink for 6 hours before the test.  ¨ If youre having an EUS of the lower GI tract (rectum):  § Before the test, do bowel prep as instructed to clean your rectum of stool. This may involve a clear liquid diet and using a laxative (liquid or pills) the night before the test. Or it may mean doing one or more enemas the morning of the test.  § Do not eat or drink for 6 hours before the test.  · Be sure to arrive on time at the facility. Bring your identification and health insurance card. Leave valuables at home. If you have them, bring X-rays or other test results with you.  Let the healthcare provider know  For your safety, tell the healthcare provider if you:  · Take insulin. Your dose may need to be changed on the day of your test.  · Are allergic to latex.  · Have any other allergies.  · Are taking blood thinners.   During the test  An endoscopic ultrasound usually takes place in a hospital. The procedure itself may take 1 to 2 hours. You will likely go home soon afterward. During the test:  · You lie on your left side on an exam table.  · An intravenous (IV) line will be put into a vein in your arm or hand. This line supplies fluids and medicines. To keep you comfortable during the test, you will be given a sedative medicine. This medicine prevents discomfort and will make you sleepy.  · If you are having an EUS of the upper GI tract, local anesthetic may be sprayed in your throat. This will help you be more comfortable as the healthcare provider inserts the scope. The healthcare provider then gently puts the flexible scope into your mouth or nose and down your throat.  · If youre having an EUS of the lower GI tract, the healthcare provider gently puts the flexible scope into your anus.  · During the test, the scope sends live video and ultrasound images from inside your body to nearby monitors. These are used to examine your GI tract. Specialized procedures, such as drainage,  are done as needed.  · The healthcare provider may discuss the results with you soon after the test. Biopsy results take several  days.  · In most cases, you can go home within a few hours of the test. When you leave the facility, have an adult family member or friend drive you, even if you don't feel that sleepy.  After the test  Here is what to expect after the test:  · You may feel tired from the sedative. This should wear off by the end of the day.  · If you had an upper digestive endoscopy, your throat may feel sore for a day or two. Over-the-counter sore throat lozenges and spray should help.  · You can eat and drink normally as soon as the test is done.  When to call the healthcare provider  Call your healthcare provider if you notice any of the following:  · Fever of 100.4°F (38.0°C) or higher, or as advised by your healthcare provider  · Shortness of breath  · Vomiting blood, blood in stool, or black stools  · Coughing or hoarse voice that wont go away   Date Last Reviewed: 7/1/2016  © 1973-4170 [employer]. [address] [place] [address]. All rights reserved. This information is not intended as a substitute for professional medical care. Always follow your healthcare professional's instructions.

## 2020-12-03 NOTE — PLAN OF CARE
Patient accidentally left discharge paper work. Called family and patient to give them Dr. Sharma's office number and to explained everything again. Both verbalized understanding.

## 2020-12-03 NOTE — [employer] PATIENT INSTRUCTIONS
Discharge Summary/Instructions after an Endoscopic Procedure  Patient Name: Ari Santos  Patient MRN: [id number]  Patient YOB: 1970     Thursday, December 03, 2020  Jonathan Sharma MD  RESTRICTIONS:  During your procedure today, you received medications for sedation.  These   medications may affect your judgment, balance and coordination.  Therefore,   for 24 hours, you have the following restrictions:   - DO NOT drive a car, operate machinery, make legal/financial decisions,   sign important papers or drink alcohol.    ACTIVITY:  Today: no heavy lifting, straining or running due to procedural   sedation/anesthesia.  The following day: return to full activity including work.  DIET:  Eat and drink normally unless instructed otherwise.     TREATMENT FOR COMMON SIDE EFFECTS:  - Mild abdominal pain, nausea, belching, bloating or excessive gas:  rest,   eat lightly and use a heating pad.  - Sore Throat: treat with throat lozenges and/or gargle with warm salt   water.  - Because air was used during the procedure, expelling large amounts of air   from your rectum or belching is normal.  - If a bowel prep was taken, you may not have a bowel movement for 1-3 days.    This is normal.  SYMPTOMS TO WATCH FOR AND REPORT TO YOUR PHYSICIAN:  1. Abdominal pain or bloating, other than gas cramps.  2. Chest pain.  3. Back pain.  4. Signs of infection such as: chills or fever occurring within 24 hours   after the procedure.  5. Rectal bleeding, which would show as bright red, maroon, or black stools.   (A tablespoon of blood from the rectum is not serious, especially if   hemorrhoids are present.)  6. Vomiting.  7. Weakness or dizziness.  GO DIRECTLY TO THE NEAREST EMERGENCY ROOM IF YOU HAVE ANY OF THE FOLLOWING:      Difficulty breathing              Chills and/or fever over 101 F   Persistent vomiting and/or vomiting blood   Severe abdominal pain   Severe chest pain   Black, tarry stools   Bleeding- more than one  tablespoon   Any other symptom or condition that you feel may need urgent attention  Your doctor recommends these additional instructions:  If any biopsies were taken, your doctors clinic will contact you in 1 to 2   weeks with any results.  - Discharge patient to home (ambulatory).   - Await path results.   - Perform CT scan (computed tomography) of the abdomen with contrast in 1   year for pancreas cyst surveillance.  For questions, problems or results please call your physician - Jonathan Sharma MD at Work:  [phone].  [facility], EMERGENCY ROOM PHONE NUMBER: [phone]  IF A COMPLICATION OR EMERGENCY SITUATION ARISES AND YOU ARE UNABLE TO REACH   YOUR PHYSICIAN - GO DIRECTLY TO THE EMERGENCY ROOM.  Jonathan Sharma MD  12/3/2020 2:51:40 PM  This report has been verified and signed electronically.  [employer]

## 2020-12-03 NOTE — BRIEF OP NOTE
Discharge Summary/Instructions after an Endoscopic Procedure    Patient Name: Ari Santos  Patient MRN: [id number]  Patient YOB: 1970    Thursday, December 03, 2020  Jonathan Sharma MD    RESTRICTIONS:  During your procedure today, you received medications for sedation.  These medications may affect your judgment, balance and coordination.  Therefore, for 24 hours, you have the following restrictions:     - DO NOT drive a car, operate machinery, make legal/financial decisions, sign important papers or drink alcohol.      ACTIVITY:  Today: no heavy lifting, straining or running due to procedural sedation/anesthesia.  The following day: return to full activity including work.    DIET:  Eat and drink normally unless instructed otherwise.     TREATMENT FOR COMMON SIDE EFFECTS:  - Mild abdominal pain, nausea, belching, bloating or excessive gas:  rest, eat lightly and use a heating pad.  - Sore Throat: treat with throat lozenges and/or gargle with warm salt water.  - Because air was used during the procedure, expelling large amounts of air from your rectum or belching is normal.  - If a bowel prep was taken, you may not have a bowel movement for 1-3 days.  This is normal.      SYMPTOMS TO WATCH FOR AND REPORT TO YOUR PHYSICIAN:  1. Abdominal pain or bloating, other than gas cramps.  2. Chest pain.  3. Back pain.  4. Signs of infection such as: chills or fever occurring within 24 hours after the procedure.  5. Rectal bleeding, which would show as bright red, maroon, or black stools. (A tablespoon of blood from the rectum is not serious, especially if hemorrhoids are present.)  6. Vomiting.  7. Weakness or dizziness.      GO DIRECTLY TO THE NEAREST EMERGENCY ROOM IF YOU HAVE ANY OF THE FOLLOWING:     Difficulty breathing              Chills and/or fever over 101 F   Persistent vomiting and/or vomiting blood   Severe abdominal pain   Severe chest pain   Black, tarry stools   Bleeding- more than one tablespoon   Any  other symptom or condition that you feel may need urgent attention    Your doctor recommends these additional instructions:  If any biopsies were taken, your doctors clinic will contact you in 1 to 2 weeks with any results.    - Discharge patient to home (ambulatory).   - Await path results.   - Perform CT scan (computed tomography) of the abdomen with contrast in 1 year for pancreas cyst surveillance.    For questions, problems or results please call your physician - Jonathan Sharma MD at Work:  [phone].    [facility], EMERGENCY ROOM PHONE NUMBER: [phone]    IF A COMPLICATION OR EMERGENCY SITUATION ARISES AND YOU ARE UNABLE TO REACH YOUR PHYSICIAN - GO DIRECTLY TO THE EMERGENCY ROOM.

## 2020-12-03 NOTE — H&P
History & Physical - Short Stay  Gastroenterology      SUBJECTIVE:     Procedure: EUS    Chief Complaint/Indication for Procedure: Pancreas cyst    History of Present Illness:  Patient is a 50 y.o. adult presents with abnormal LFT's and pancreas cyst here for EUS and liver biopsy.     PTA Medications   Medication Sig    acetaminophen (TYLENOL) 650 MG TbSR Take 1 tablet (650 mg total) by mouth every 8 (eight) hours. (Patient not taking: Reported on 8/21/2020)    acetaminophen (TYLENOL) 325 MG tablet Take 2 tablets (650 mg total) by mouth every 8 (eight) hours as needed.    ALPRAZolam (XANAX) 1 MG tablet Take 1 tablet (1 mg total) by mouth 3 (three) times daily as needed.    ascorbic acid, vitamin C, (VITAMIN C) 1000 MG tablet Take 1,000 mg by mouth 2 (two) times daily.    aspirin (ECOTRIN) 81 MG EC tablet Take 1 tablet (81 mg total) by mouth 2 (two) times daily.    atorvastatin (LIPITOR) 10 MG tablet Take 1 tablet (10 mg total) by mouth every evening.    cranberry fruit extract (CRANBERRY ORAL) Take by mouth.    cyclobenzaprine (FLEXERIL) 10 MG tablet Take 1 tablet (10 mg total) by mouth 3 (three) times daily as needed for Muscle spasms.    docusate sodium (COLACE) 100 MG capsule Take 1 capsule (100 mg total) by mouth 2 (two) times daily as needed for Constipation.    gabapentin (NEURONTIN) 800 MG tablet Take 1 tablet by mouth three times a day and take 2 tablets at night (5 tablets a day, 4000mg)    Lactobacillus acidophilus (ACIDOPHILUS) Cap Take by mouth once daily.     lamoTRIgine (LAMICTAL) 200 MG tablet Take 1 tablet (200 mg total) by mouth 2 (two) times daily.    loratadine (CLARITIN) 10 mg tablet Take 10 mg by mouth once daily.     morphine (MS CONTIN) 15 MG 12 hr tablet Take 1 tablet (15 mg total) by mouth 2 (two) times daily.    oxyCODONE (ROXICODONE) 5 MG immediate release tablet Take 1-2 tabs by mouth every 4-6 hours as needed for pain    oxyCODONE-acetaminophen (PERCOCET)  mg per  tablet Take 1 tablet by mouth every 8 (eight) hours as needed for Pain.    predniSONE (DELTASONE) 10 MG tablet Take 1 tablet (10 mg total) by mouth once daily.    primidone (MYSOLINE) 50 MG Tab Take 2 tablets (100 mg total) by mouth 3 (three) times daily.    rOPINIRole (REQUIP) 4 MG tablet Take 1 tablet (4 mg total) by mouth nightly.    testosterone (ANDROGEL) 1 % (50 mg/5 gram) GlPk Apply 5 grams topically once daily.    triamcinolone acetonide 0.1% (KENALOG) 0.1 % cream Apply topically 2 (two) times daily.    zinc 50 mg Tab once daily.        Review of patient's allergies indicates:   Allergen Reactions    Bactrim [sulfamethoxazole-trimethoprim] Itching    Penicillins Rash        Past Medical History:   Diagnosis Date    Abnormal CT scan, pelvis 10/27/2019    Abnormal thyroid blood test 5/6/2019    Adrenal insufficiency     Blister- healing 1/9/2020    Chronic bilateral low back pain with bilateral sciatica 3/19/2019    Congenital adrenal hyperplasia     Hepatitis B core antibody positive 11/6/2020    Negative sAg, suggests previous exposure but no chronic/active Hep B. At risk for reactivation with any immunosuppression medication, steroids, chemo, etc.      IGT (impaired glucose tolerance) 11/5/2019    Insomnia due to medical condition     Multiple closed traumatic fractures of multiple bones of hip and pelvis with routine healing, subsequent encounter 9/27/2019    Pancreatic cyst 11/2/2020    S/P lumbar laminectomy 10/28/2019    Spinal stenosis     Spinal stenosis of lumbar region with neurogenic claudication 3/19/2019    Status post sex reassignment surgery 3/19/2019    Hx of Congenital Adrenal Hyperplasia    Unintentional weight loss 7/21/2020     Past Surgical History:   Procedure Laterality Date    BACK SURGERY      gender surgery      multiple surgeries since birth    INJECTION OF ANESTHETIC AGENT AROUND NERVE Right 11/19/2020    Procedure: BLOCK, NERVE, GLENOHUMERAL  need  consent;  Surgeon: Messi Cabello MD;  Location: [facility];  Service: Pain Management;  Laterality: Right;    LAMINECTOMY  2019    RADIOFREQUENCY ABLATION Left 2019    Procedure: RADIOFREQUENCY ABLATION, LEFT L3,4,5;  Surgeon: Messi Cabello MD;  Location: [facility];  Service: Pain Management;  Laterality: Left;  1 of 2    RADIOFREQUENCY ABLATION Right 2019    Procedure: RADIOFREQUENCY ABLATION, RIGHT L3,4,5;  Surgeon: Messi Cabello MD;  Location: [facility];  Service: Pain Management;  Laterality: Right;  2of 2    SPINE SURGERY      2019     TOTAL KNEE ARTHROPLASTY Right 2020    Procedure: ARTHROPLASTY, KNEE, TOTAL;  Surgeon: Donte Andrade III, MD;  Location: [address];  Service: Orthopedics;  Laterality: Right;     Family History   Problem Relation Age of Onset    Diabetes Maternal Grandfather     Cancer Mother     Heart disease Father     Autoimmune disease Sister     Breast cancer Neg Hx     Ovarian cancer Neg Hx     Vaginal cancer Neg Hx     Endometrial cancer Neg Hx     Cervical cancer Neg Hx      Social History     Tobacco Use    Smoking status: Former Smoker     Quit date:      Years since quittin.9    Smokeless tobacco: Never Used    Tobacco comment: was not a daily smoker-    Substance Use Topics    Alcohol use: Not Currently     Frequency: Never     Drinks per session: Patient refused     Binge frequency: Never    Drug use: Never       Review of Systems:  Respiratory: no cough or shortness of breath  Cardiovascular: no chest pain or palpitations    OBJECTIVE:     Vital Signs (Most Recent)       Physical Exam:  General: well developed, well nourished  Lungs:  normal respiratory effort  Heart: regular rate, S1, S2 normal    Laboratory  CBC: No results for input(s): WBC, RBC, HGB, HCT, PLT, MCV, MCH, MCHC in the last 168 hours.  CMP: No results for input(s): GLU, CALCIUM, ALBUMIN, PROT, NA, K, CO2, CL, BUN, CREATININE,  ALKPHOS, ALT, AST, BILITOT in the last 168 hours.  Coagulation: No results for input(s): LABPROT, INR, APTT in the last 168 hours.      Diagnostic Results:      ASSESSMENT/PLAN:     Pancreas cyst  Abnormal LFT's    Plan: EUS and possible FNA of the liver    Anesthesia Plan: MAC    ASA Grade: ASA 3 - Patient with moderate systemic disease with functional limitations     The impression and plan was discussed in detail with the patient. All questions have been answered and the patient voices understanding of our plan at this point. The risk of the procedure was discussed in detail which includes but not limited to bleeding, infection, perforation in some cases requiring surgery with its spectrum of complications.

## 2020-12-03 NOTE — ANESTHESIA POSTPROCEDURE EVALUATION
Anesthesia Post Evaluation    Patient: Ari Santos    Procedure(s) Performed: Procedure(s) (LRB):  ULTRASOUND, UPPER GI TRACT, ENDOSCOPIC (N/A)    Final Anesthesia Type: general    Patient location during evaluation: PACU  Patient participation: Yes- Able to Participate  Level of consciousness: awake and alert and oriented  Post-procedure vital signs: reviewed and stable  Pain management: adequate  Airway patency: patent    PONV status at discharge: No PONV  Anesthetic complications: no      Cardiovascular status: blood pressure returned to baseline  Respiratory status: unassisted, spontaneous ventilation and room air  Hydration status: euvolemic  Follow-up not needed.          Vitals Value Taken Time   /70 12/03/20 1547   Temp 36.7 °C (98.1 °F) 12/03/20 1449   Pulse 85 12/03/20 1549   Resp 23 12/03/20 1549   SpO2 98 % 12/03/20 1549   Vitals shown include unvalidated device data.      No case tracking events are documented in the log.      Pain/Andreea Score: Andreea Score: 10 (12/3/2020  3:50 PM)

## 2020-12-03 NOTE — TRANSFER OF CARE
Anesthesia Transfer of Care Note    Patient: Ari Santos    Procedure(s) Performed: Procedure(s) (LRB):  ULTRASOUND, UPPER GI TRACT, ENDOSCOPIC (N/A)    Patient location: [facility]    Anesthesia Type: general    Transport from OR: Transported from OR on room air with adequate spontaneous ventilation    Post pain: adequate analgesia    Post assessment: no apparent anesthetic complications and tolerated procedure well    Post vital signs: stable    Level of consciousness: awake, alert and oriented    Nausea/Vomiting: no nausea/vomiting    Complications: none    Transfer of care protocol was followed      Last vitals:   Visit Vitals  /63 (BP Location: Left arm, Patient Position: Lying)   Pulse 87   Temp 36.7 °C (98.1 °F) (Temporal)   Resp 16   Ht 5' (1.524 m)   Wt 59 kg (130 lb)   SpO2 100%   Breastfeeding No   BMI 25.39 kg/m²

## 2020-12-04 ENCOUNTER — HOSPITAL ENCOUNTER (OUTPATIENT)
Dept: RADIOLOGY | Facility: OTHER | Age: 50
Discharge: HOME | End: 2020-12-04
Attending: INTERNAL MEDICINE
Payer: MEDICARE

## 2020-12-04 DIAGNOSIS — E24.2 DRUG-INDUCED CUSHING'S SYNDROME: ICD-10-CM

## 2020-12-04 DIAGNOSIS — E27.40 ADRENAL INSUFFICIENCY: ICD-10-CM

## 2020-12-04 DIAGNOSIS — E25.0 CONGENITAL ADRENAL HYPERPLASIA: ICD-10-CM

## 2020-12-04 PROCEDURE — 77080 DEXA BONE DENSITY SPINE HIP: ICD-10-PCS | Mod: 26,,, | Performed by: RADIOLOGY

## 2020-12-04 PROCEDURE — 77080 DXA BONE DENSITY AXIAL: CPT | Mod: 26,,, | Performed by: RADIOLOGY

## 2020-12-04 PROCEDURE — 77080 DXA BONE DENSITY AXIAL: CPT | Mod: TC

## 2020-12-07 LAB
FINAL PATHOLOGIC DIAGNOSIS: NORMAL
GROSS: NORMAL
Lab: NORMAL

## 2020-12-08 ENCOUNTER — PATIENT MESSAGE (OUTPATIENT)
Dept: HEPATOLOGY | Facility: CLINIC | Age: 50
End: 2020-12-08

## 2020-12-08 ENCOUNTER — PATIENT MESSAGE (OUTPATIENT)
Dept: ENDOCRINOLOGY | Facility: CLINIC | Age: 50
End: 2020-12-08

## 2020-12-09 ENCOUNTER — PATIENT MESSAGE (OUTPATIENT)
Dept: ORTHOPEDICS | Facility: CLINIC | Age: 50
End: 2020-12-09

## 2020-12-09 ENCOUNTER — TELEPHONE (OUTPATIENT)
Dept: HEPATOLOGY | Facility: CLINIC | Age: 50
End: 2020-12-09

## 2020-12-09 DIAGNOSIS — R74.8 ELEVATED ALKALINE PHOSPHATASE LEVEL: ICD-10-CM

## 2020-12-09 DIAGNOSIS — R74.8 ELEVATED LIVER ENZYMES: Primary | ICD-10-CM

## 2020-12-09 NOTE — TELEPHONE ENCOUNTER
Liver biopsy results sent to pt in [facility]    Please contact pt to schedule labs in 3 months (hepatic function panel, GGT), no follow up needed at that time   Ok to cancel upcoming f/u appt if pt wishes, all results already communicated in [facility]    Thanks

## 2020-12-09 NOTE — TELEPHONE ENCOUNTER
Contacted patient and scheduled appointment as instructed. Patient agreed to time and date of appointment. Sent reminders in the mail.    Instructions:    Liver biopsy results sent to pt in [facility]     Please contact pt to schedule labs in 3 months (hepatic function panel, GGT), no follow up needed at that time   Ok to cancel upcoming f/u appt if pt wishes, all results already communicated in [facility]     Thanks

## 2020-12-10 ENCOUNTER — TELEPHONE (OUTPATIENT)
Dept: ENDOCRINOLOGY | Facility: CLINIC | Age: 50
End: 2020-12-10

## 2020-12-10 RX ORDER — LANOLIN ALCOHOL/MO/W.PET/CERES
100 CREAM (GRAM) TOPICAL DAILY
COMMUNITY

## 2020-12-10 RX ORDER — FERROUS SULFATE 324(65)MG
325 TABLET, DELAYED RELEASE (ENTERIC COATED) ORAL DAILY
COMMUNITY
End: 2021-09-17

## 2020-12-11 ENCOUNTER — OFFICE VISIT (OUTPATIENT)
Dept: ENDOCRINOLOGY | Facility: CLINIC | Age: 50
End: 2020-12-11
Attending: INTERNAL MEDICINE
Payer: MEDICARE

## 2020-12-11 DIAGNOSIS — E27.40 ADRENAL INSUFFICIENCY: ICD-10-CM

## 2020-12-11 DIAGNOSIS — E25.0 CONGENITAL ADRENAL HYPERPLASIA: Primary | ICD-10-CM

## 2020-12-11 DIAGNOSIS — M85.80 OSTEOPENIA, UNSPECIFIED LOCATION: ICD-10-CM

## 2020-12-11 DIAGNOSIS — R79.9 ABNORMAL FINDING OF BLOOD CHEMISTRY, UNSPECIFIED: ICD-10-CM

## 2020-12-11 DIAGNOSIS — R93.3 ABNORMAL FINDINGS ON DIAGNOSTIC IMAGING OF OTHER PARTS OF DIGESTIVE TRACT: ICD-10-CM

## 2020-12-11 PROCEDURE — 99214 OFFICE O/P EST MOD 30 MIN: CPT | Mod: 95,,, | Performed by: INTERNAL MEDICINE

## 2020-12-11 PROCEDURE — 99214 PR OFFICE/OUTPT VISIT, EST, LEVL IV, 30-39 MIN: ICD-10-PCS | Mod: 95,,, | Performed by: INTERNAL MEDICINE

## 2020-12-11 NOTE — PROGRESS NOTES
Subjective:      Patient ID: Ari Santos is a 50 y.o. adult.    Chief Complaint:  CAH  History of Present Illness      Since last visit had EUS and liver biopsy.  -- did get stress dose steroids periop   Having TKR - left - planned 1/25     Ari Santos is here for follow up of CAH.       Raised female.     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     Saw Dr Taylor and this was very traumatic for him but he appreciated her kindness      Per her exam:  PELVIC:    External genitalia:  Normal Bartholins, Skenes and labia bilaterally.  Bilateral labia not fused superiorly.  Fleshy, erectile tissue around urethra (superior most aspect of mons).  Bilateral labia majora present.  Small dimple present at previous vaginal area--does not track with gentle exploration with os finder.   Urethra:  No caruncle, diverticulum or masses. Able to pass 14F catheter easily--needs to pass 7-8 cm before urine expelled (increased urethral length)?    Internal: deferred, as reports MAUREEN/BSO with vaginal closure      Started testosterone in 2004. Attempted to have phalloplasty in 2004, unsuccessful.         Current dose:     Testosterone 50mg/5g - 1 packet qd    When I initially saw him he was on a high dose of pred   Prednisone 10mg in the Am and 5mg at night    Since we have attempted to lower the dose to physiologic as we dont need to over treat the CAH      Current dose:     Prednisone 10 mg qd     BMD 12/4/20   Osteopenia.         Back pain is better   Trying to ultrasound his cane     Review of Systems   Constitutional: Negative for unexpected weight change.    Respiratory: Negative for shortness of breath.    Cardiovascular: Negative for chest pain.   Gastrointestinal: Negative for abdominal pain.   Musculoskeletal: Positive for back pain.     Objective:   Physical Exam  Psychiatric:         Attention and Perception: Attention normal.         Mood and Affect: Mood normal.         Speech: Speech normal.         Behavior: Behavior normal.         Thought Content: Thought content normal.         Cognition and Memory: Cognition normal.         Judgment: Judgment normal.       There were no vitals taken for this visit.    There is no height or weight on file to calculate BMI.    Lab Review:   Lab Results   Component Value Date    HGBA1C 5.2 02/27/2020     Lab Results   Component Value Date    CHOL 218 (H) 03/18/2019    HDL 47 03/18/2019    LDLCALC 139.2 03/18/2019    TRIG 159 (H) 03/18/2019    CHOLHDL 21.6 03/18/2019     Lab Results   Component Value Date     10/29/2020    K 5.0 10/29/2020     10/29/2020    CO2 28 10/29/2020     (H) 10/29/2020    BUN 15 10/29/2020    CREATININE 0.8 10/29/2020    CALCIUM 10.9 (H) 10/29/2020    PROT 7.9 10/29/2020    ALBUMIN 4.4 10/29/2020    BILITOT 0.4 10/29/2020    ALKPHOS 282 (H) 10/29/2020    AST 65 (H) 10/29/2020    ALT 82 (H) 10/29/2020    ANIONGAP 9 10/29/2020    ESTGFRAFRICA >60.0 10/29/2020    EGFRNONAA >60.0 10/29/2020    TSH 1.516 02/27/2020     Vit D, 25-Hydroxy   Date Value Ref Range Status   05/06/2019 36 30 - 96 ng/mL Final     Comment:     Vitamin D deficiency.........<10 ng/mL                              Vitamin D insufficiency......10-29 ng/mL       Vitamin D sufficiency........> or equal to 30 ng/mL  Vitamin D toxicity............>100 ng/mL     Results for AMAURY VENTURA (MRN [id number]) as of 3/5/2020 08:21   Ref. Range 2/27/2020 08:05   ACTH Latest Ref Range: 0 - 46 pg/mL 638 (H)   TSH Latest Ref Range: 0.400 - 4.000 uIU/mL 1.516   T3, Total Latest Ref Range: 60 - 180 ng/dL 88   DHEA-SO4 Latest Ref  Range: 56.2 - 282.9 ug/dL 4.3 (L)   Testosterone, Total Latest Ref Range: 5 - 73 ng/dL 441 (H)     Assessment and Plan     Congenital adrenal hyperplasia    cah with AI  Goal is to get him on the lowest dose pred that he can tolerate   For now continue 10 mg a day      Needs medic alert tag   Reviewed sick day precautions - handout reviewed  Do not need to normalize ACTH, 17 oh p or androgens           Adrenal insufficiency  As above     Endocrine disorder in female-to-male transgender person    Follow labs       Osteopenia    discussed implications  Reassuring not fracturing.   rda calcium and vit D  Follow over time  discussed indications for offering therapy        The patient location is: home  The chief complaint leading to consultation is: above    Visit type: audiovisual    Face to Face time with patient: 30 minutes of total time spent on the encounter, which includes face to face time and non-face to face time preparing to see the patient (eg, review of tests), Obtaining and/or reviewing separately obtained history, Documenting clinical information in the electronic or other health record, Independently interpreting results (not separately reported) and communicating results to the patient/family/caregiver, or Care coordination (not separately reported).         Each patient to whom he or she provides medical services by telemedicine is:  (1) informed of the relationship between the physician and patient and the respective role of any other health care provider with respect to management of the patient; and (2) notified that he or she may decline to receive medical services by telemedicine and may withdraw from such care at any time.

## 2020-12-11 NOTE — ASSESSMENT & PLAN NOTE
cah with AI  Goal is to get him on the lowest dose pred that he can tolerate   For now continue 10 mg a day      Needs medic alert tag   Reviewed sick day precautions - handout reviewed  Do not need to normalize ACTH, 17 oh p or androgens

## 2020-12-16 ENCOUNTER — PATIENT MESSAGE (OUTPATIENT)
Dept: ENDOCRINOLOGY | Facility: CLINIC | Age: 50
End: 2020-12-16

## 2020-12-16 DIAGNOSIS — E27.40 ADRENAL INSUFFICIENCY: ICD-10-CM

## 2020-12-16 RX ORDER — PREDNISONE 10 MG/1
10 TABLET ORAL DAILY
Qty: 90 TABLET | Refills: 5 | Status: ON HOLD | OUTPATIENT
Start: 2020-12-16 | End: 2021-05-22 | Stop reason: SDUPTHER

## 2020-12-21 ENCOUNTER — PATIENT MESSAGE (OUTPATIENT)
Dept: ADMINISTRATIVE | Facility: OTHER | Age: 50
End: 2020-12-21

## 2020-12-28 DIAGNOSIS — M54.12 CERVICAL RADICULOPATHY: ICD-10-CM

## 2020-12-28 DIAGNOSIS — F43.10 PTSD (POST-TRAUMATIC STRESS DISORDER): ICD-10-CM

## 2020-12-28 DIAGNOSIS — Z98.890 S/P LUMBAR LAMINECTOMY: ICD-10-CM

## 2020-12-28 DIAGNOSIS — G89.29 OTHER CHRONIC PAIN: ICD-10-CM

## 2020-12-28 DIAGNOSIS — M17.11 PRIMARY OSTEOARTHRITIS OF RIGHT KNEE: ICD-10-CM

## 2020-12-28 RX ORDER — ALPRAZOLAM 1 MG/1
1 TABLET ORAL 3 TIMES DAILY PRN
Qty: 90 TABLET | Refills: 1 | Status: SHIPPED | OUTPATIENT
Start: 2020-12-28 | End: 2021-02-28 | Stop reason: SDUPTHER

## 2020-12-28 RX ORDER — OXYCODONE AND ACETAMINOPHEN 10; 325 MG/1; MG/1
1 TABLET ORAL EVERY 8 HOURS PRN
Qty: 90 TABLET | Refills: 0 | Status: SHIPPED | OUTPATIENT
Start: 2020-12-28 | End: 2021-02-09 | Stop reason: SDUPTHER

## 2020-12-28 NOTE — TELEPHONE ENCOUNTER
Patient requesting refill on Percocet 10/325mg  Last office visit 12.01.20   shows last refill on 11.25.20  Patient does have a pain contract on file with [facility] department  Patient last UDS 08.21.20 was consistent with current therapy

## 2021-01-03 DIAGNOSIS — M54.12 CERVICAL RADICULOPATHY: Primary | ICD-10-CM

## 2021-01-04 ENCOUNTER — PATIENT MESSAGE (OUTPATIENT)
Dept: ADMINISTRATIVE | Facility: OTHER | Age: 51
End: 2021-01-04

## 2021-01-04 ENCOUNTER — PATIENT MESSAGE (OUTPATIENT)
Dept: INTERNAL MEDICINE | Facility: CLINIC | Age: 51
End: 2021-01-04

## 2021-01-04 RX ORDER — MORPHINE SULFATE 15 MG/1
15 TABLET, FILM COATED, EXTENDED RELEASE ORAL 2 TIMES DAILY
Qty: 60 TABLET | Refills: 0 | Status: SHIPPED | OUTPATIENT
Start: 2021-01-07 | End: 2021-02-01 | Stop reason: SDUPTHER

## 2021-01-05 ENCOUNTER — PATIENT MESSAGE (OUTPATIENT)
Dept: ADMINISTRATIVE | Facility: HOSPITAL | Age: 51
End: 2021-01-05

## 2021-01-07 ENCOUNTER — PATIENT MESSAGE (OUTPATIENT)
Dept: ORTHOPEDICS | Facility: CLINIC | Age: 51
End: 2021-01-07

## 2021-01-07 DIAGNOSIS — Z96.652 STATUS POST TOTAL LEFT KNEE REPLACEMENT: Primary | ICD-10-CM

## 2021-01-18 ENCOUNTER — PATIENT MESSAGE (OUTPATIENT)
Dept: ADMINISTRATIVE | Facility: OTHER | Age: 51
End: 2021-01-18

## 2021-01-20 ENCOUNTER — PATIENT MESSAGE (OUTPATIENT)
Dept: ORTHOPEDICS | Facility: CLINIC | Age: 51
End: 2021-01-20

## 2021-01-22 ENCOUNTER — TELEPHONE (OUTPATIENT)
Dept: ORTHOPEDICS | Facility: CLINIC | Age: 51
End: 2021-01-22

## 2021-01-22 ENCOUNTER — LAB VISIT (OUTPATIENT)
Dept: INTERNAL MEDICINE | Facility: CLINIC | Age: 51
DRG: 470 | End: 2021-01-22
Payer: MEDICARE

## 2021-01-22 DIAGNOSIS — Z96.652 STATUS POST TOTAL LEFT KNEE REPLACEMENT: ICD-10-CM

## 2021-01-22 PROCEDURE — U0003 INFECTIOUS AGENT DETECTION BY NUCLEIC ACID (DNA OR RNA); SEVERE ACUTE RESPIRATORY SYNDROME CORONAVIRUS 2 (SARS-COV-2) (CORONAVIRUS DISEASE [COVID-19]), AMPLIFIED PROBE TECHNIQUE, MAKING USE OF HIGH THROUGHPUT TECHNOLOGIES AS DESCRIBED BY CMS-2020-01-R: HCPCS

## 2021-01-22 RX ORDER — DEXTROMETHORPHAN HYDROBROMIDE, GUAIFENESIN 5; 100 MG/5ML; MG/5ML
650 LIQUID ORAL EVERY 12 HOURS PRN
Qty: 100 TABLET | Refills: 0 | Status: SHIPPED | OUTPATIENT
Start: 2021-01-22 | End: 2021-05-06

## 2021-01-22 RX ORDER — CELECOXIB 200 MG/1
200 CAPSULE ORAL DAILY
Qty: 30 CAPSULE | Refills: 0 | Status: SHIPPED | OUTPATIENT
Start: 2021-01-22 | End: 2021-02-26

## 2021-01-22 RX ORDER — DEXTROMETHORPHAN HYDROBROMIDE, GUAIFENESIN 5; 100 MG/5ML; MG/5ML
650 LIQUID ORAL EVERY 8 HOURS PRN
Qty: 120 TABLET | Refills: 0 | Status: SHIPPED | OUTPATIENT
Start: 2021-01-22 | End: 2021-01-22 | Stop reason: SDUPTHER

## 2021-01-22 RX ORDER — OXYCODONE HYDROCHLORIDE 5 MG/1
TABLET ORAL
Qty: 50 TABLET | Refills: 0 | Status: SHIPPED | OUTPATIENT
Start: 2021-01-22 | End: 2021-02-01 | Stop reason: SDUPTHER

## 2021-01-22 RX ORDER — ASPIRIN 81 MG/1
81 TABLET ORAL 2 TIMES DAILY
Qty: 60 TABLET | Refills: 0 | Status: SHIPPED | OUTPATIENT
Start: 2021-01-22 | End: 2021-05-06

## 2021-01-22 RX ORDER — DOCUSATE SODIUM 100 MG/1
100 CAPSULE, LIQUID FILLED ORAL 2 TIMES DAILY PRN
Qty: 60 CAPSULE | Refills: 0 | Status: SHIPPED | OUTPATIENT
Start: 2021-01-22

## 2021-01-23 LAB — SARS-COV-2 RNA RESP QL NAA+PROBE: NOT DETECTED

## 2021-01-25 ENCOUNTER — HOSPITAL ENCOUNTER (INPATIENT)
Facility: HOSPITAL | Age: 51
LOS: 2 days | Discharge: HOME | DRG: 470 | End: 2021-01-27
Attending: ORTHOPAEDIC SURGERY | Admitting: ORTHOPAEDIC SURGERY
Payer: MEDICARE

## 2021-01-25 ENCOUNTER — PATIENT MESSAGE (OUTPATIENT)
Dept: ADMINISTRATIVE | Facility: OTHER | Age: 51
End: 2021-01-25

## 2021-01-25 DIAGNOSIS — M25.562 LEFT KNEE PAIN, UNSPECIFIED CHRONICITY: ICD-10-CM

## 2021-01-25 DIAGNOSIS — M17.12 PRIMARY OSTEOARTHRITIS OF LEFT KNEE: Primary | ICD-10-CM

## 2021-01-25 PROCEDURE — 63600175 PHARM REV CODE 636 W HCPCS: Performed by: ANESTHESIOLOGY

## 2021-01-25 PROCEDURE — 63600175 PHARM REV CODE 636 W HCPCS: Performed by: NURSE PRACTITIONER

## 2021-01-25 PROCEDURE — 25000003 PHARM REV CODE 250: Performed by: NURSE PRACTITIONER

## 2021-01-25 PROCEDURE — 27447 PR TOTAL KNEE ARTHROPLASTY: ICD-10-PCS | Mod: 22,LT,GC, | Performed by: ORTHOPAEDIC SURGERY

## 2021-01-25 PROCEDURE — C1751 CATH, INF, PER/CENT/MIDLINE: HCPCS | Performed by: ANESTHESIOLOGY

## 2021-01-25 PROCEDURE — D9220A PRA ANESTHESIA: ICD-10-PCS | Mod: CRNA,,, | Performed by: NURSE ANESTHETIST, CERTIFIED REGISTERED

## 2021-01-25 PROCEDURE — 36000710: Performed by: ORTHOPAEDIC SURGERY

## 2021-01-25 PROCEDURE — 36000711: Performed by: ORTHOPAEDIC SURGERY

## 2021-01-25 PROCEDURE — 25000003 PHARM REV CODE 250: Performed by: ANESTHESIOLOGY

## 2021-01-25 PROCEDURE — 27447 TOTAL KNEE ARTHROPLASTY: CPT | Mod: 22,LT,GC, | Performed by: ORTHOPAEDIC SURGERY

## 2021-01-25 PROCEDURE — 63600175 PHARM REV CODE 636 W HCPCS: Performed by: NURSE ANESTHETIST, CERTIFIED REGISTERED

## 2021-01-25 PROCEDURE — 94761 N-INVAS EAR/PLS OXIMETRY MLT: CPT

## 2021-01-25 PROCEDURE — 11000001 HC ACUTE MED/SURG PRIVATE ROOM

## 2021-01-25 PROCEDURE — 37000009 HC ANESTHESIA EA ADD 15 MINS: Performed by: ORTHOPAEDIC SURGERY

## 2021-01-25 PROCEDURE — 71000033 HC RECOVERY, INTIAL HOUR: Performed by: ORTHOPAEDIC SURGERY

## 2021-01-25 PROCEDURE — 97116 GAIT TRAINING THERAPY: CPT

## 2021-01-25 PROCEDURE — 25000003 PHARM REV CODE 250: Performed by: ORTHOPAEDIC SURGERY

## 2021-01-25 PROCEDURE — 25000003 PHARM REV CODE 250: Performed by: NURSE ANESTHETIST, CERTIFIED REGISTERED

## 2021-01-25 PROCEDURE — 37000008 HC ANESTHESIA 1ST 15 MINUTES: Performed by: ORTHOPAEDIC SURGERY

## 2021-01-25 PROCEDURE — C1713 ANCHOR/SCREW BN/BN,TIS/BN: HCPCS | Performed by: ORTHOPAEDIC SURGERY

## 2021-01-25 PROCEDURE — C1776 JOINT DEVICE (IMPLANTABLE): HCPCS | Performed by: ORTHOPAEDIC SURGERY

## 2021-01-25 PROCEDURE — 94799 UNLISTED PULMONARY SVC/PX: CPT

## 2021-01-25 PROCEDURE — D9220A PRA ANESTHESIA: ICD-10-PCS | Mod: ANES,,, | Performed by: ANESTHESIOLOGY

## 2021-01-25 PROCEDURE — 27100025 HC TUBING, SET FLUID WARMER: Performed by: ANESTHESIOLOGY

## 2021-01-25 PROCEDURE — 97530 THERAPEUTIC ACTIVITIES: CPT

## 2021-01-25 PROCEDURE — 97165 OT EVAL LOW COMPLEX 30 MIN: CPT

## 2021-01-25 PROCEDURE — D9220A PRA ANESTHESIA: Mod: ANES,,, | Performed by: ANESTHESIOLOGY

## 2021-01-25 PROCEDURE — 63600175 PHARM REV CODE 636 W HCPCS: Performed by: ORTHOPAEDIC SURGERY

## 2021-01-25 PROCEDURE — D9220A PRA ANESTHESIA: Mod: CRNA,,, | Performed by: NURSE ANESTHETIST, CERTIFIED REGISTERED

## 2021-01-25 PROCEDURE — 99900035 HC TECH TIME PER 15 MIN (STAT)

## 2021-01-25 PROCEDURE — 97162 PT EVAL MOD COMPLEX 30 MIN: CPT

## 2021-01-25 PROCEDURE — 27201423 OPTIME MED/SURG SUP & DEVICES STERILE SUPPLY: Performed by: ORTHOPAEDIC SURGERY

## 2021-01-25 DEVICE — PATELLA ATTUNE MED PAT 38MM: Type: IMPLANTABLE DEVICE | Site: KNEE | Status: FUNCTIONAL

## 2021-01-25 DEVICE — IMPLANTABLE DEVICE: Type: IMPLANTABLE DEVICE | Site: KNEE | Status: FUNCTIONAL

## 2021-01-25 DEVICE — CEMENT BONE SURG SMPLX P RADPQ: Type: IMPLANTABLE DEVICE | Site: KNEE | Status: FUNCTIONAL

## 2021-01-25 RX ORDER — ALPRAZOLAM 0.5 MG/1
1 TABLET ORAL 3 TIMES DAILY PRN
Status: DISCONTINUED | OUTPATIENT
Start: 2021-01-25 | End: 2021-01-27 | Stop reason: HOSPADM

## 2021-01-25 RX ORDER — TRANEXAMIC ACID 100 MG/ML
1000 INJECTION, SOLUTION INTRAVENOUS
Status: DISCONTINUED | OUTPATIENT
Start: 2021-01-25 | End: 2021-01-25 | Stop reason: HOSPADM

## 2021-01-25 RX ORDER — SODIUM CHLORIDE 9 MG/ML
INJECTION, SOLUTION INTRAVENOUS CONTINUOUS
Status: ACTIVE | OUTPATIENT
Start: 2021-01-25 | End: 2021-01-26

## 2021-01-25 RX ORDER — ATORVASTATIN CALCIUM 10 MG/1
10 TABLET, FILM COATED ORAL NIGHTLY
Status: DISCONTINUED | OUTPATIENT
Start: 2021-01-25 | End: 2021-01-27 | Stop reason: HOSPADM

## 2021-01-25 RX ORDER — SODIUM CHLORIDE 9 MG/ML
INJECTION, SOLUTION INTRAVENOUS
Status: COMPLETED | OUTPATIENT
Start: 2021-01-25 | End: 2021-01-25

## 2021-01-25 RX ORDER — MUPIROCIN 20 MG/G
1 OINTMENT TOPICAL
Status: COMPLETED | OUTPATIENT
Start: 2021-01-25 | End: 2021-01-25

## 2021-01-25 RX ORDER — PROPOFOL 10 MG/ML
VIAL (ML) INTRAVENOUS
Status: DISCONTINUED | OUTPATIENT
Start: 2021-01-25 | End: 2021-01-25

## 2021-01-25 RX ORDER — NALOXONE HCL 0.4 MG/ML
0.02 VIAL (ML) INJECTION
Status: DISCONTINUED | OUTPATIENT
Start: 2021-01-25 | End: 2021-01-27 | Stop reason: HOSPADM

## 2021-01-25 RX ORDER — FAMOTIDINE 20 MG/1
20 TABLET, FILM COATED ORAL 2 TIMES DAILY
Status: DISCONTINUED | OUTPATIENT
Start: 2021-01-25 | End: 2021-01-27 | Stop reason: HOSPADM

## 2021-01-25 RX ORDER — FENTANYL CITRATE 50 UG/ML
25 INJECTION, SOLUTION INTRAMUSCULAR; INTRAVENOUS EVERY 5 MIN PRN
Status: DISCONTINUED | OUTPATIENT
Start: 2021-01-25 | End: 2021-01-25 | Stop reason: HOSPADM

## 2021-01-25 RX ORDER — VANCOMYCIN HYDROCHLORIDE 1 G/20ML
INJECTION, POWDER, LYOPHILIZED, FOR SOLUTION INTRAVENOUS
Status: DISCONTINUED | OUTPATIENT
Start: 2021-01-25 | End: 2021-01-25 | Stop reason: HOSPADM

## 2021-01-25 RX ORDER — MUPIROCIN 20 MG/G
1 OINTMENT TOPICAL 2 TIMES DAILY
Status: DISCONTINUED | OUTPATIENT
Start: 2021-01-25 | End: 2021-01-27 | Stop reason: HOSPADM

## 2021-01-25 RX ORDER — AMOXICILLIN 250 MG
1 CAPSULE ORAL 2 TIMES DAILY
Status: DISCONTINUED | OUTPATIENT
Start: 2021-01-25 | End: 2021-01-27 | Stop reason: HOSPADM

## 2021-01-25 RX ORDER — ROPIVACAINE/EPI/CLONIDINE/KET 2.46-0.005
SYRINGE (ML) INJECTION
Status: DISCONTINUED | OUTPATIENT
Start: 2021-01-25 | End: 2021-01-25 | Stop reason: HOSPADM

## 2021-01-25 RX ORDER — LIDOCAINE HYDROCHLORIDE 20 MG/ML
INJECTION INTRAVENOUS
Status: DISCONTINUED | OUTPATIENT
Start: 2021-01-25 | End: 2021-01-25

## 2021-01-25 RX ORDER — SODIUM CHLORIDE 0.9 % (FLUSH) 0.9 %
3 SYRINGE (ML) INJECTION
Status: DISCONTINUED | OUTPATIENT
Start: 2021-01-25 | End: 2021-01-25 | Stop reason: HOSPADM

## 2021-01-25 RX ORDER — MORPHINE SULFATE 15 MG/1
15 TABLET, FILM COATED, EXTENDED RELEASE ORAL 2 TIMES DAILY
Status: DISCONTINUED | OUTPATIENT
Start: 2021-01-25 | End: 2021-01-27 | Stop reason: HOSPADM

## 2021-01-25 RX ORDER — POLYETHYLENE GLYCOL 3350 17 G/17G
17 POWDER, FOR SOLUTION ORAL DAILY
Status: DISCONTINUED | OUTPATIENT
Start: 2021-01-26 | End: 2021-01-27 | Stop reason: HOSPADM

## 2021-01-25 RX ORDER — CELECOXIB 200 MG/1
400 CAPSULE ORAL
Status: COMPLETED | OUTPATIENT
Start: 2021-01-25 | End: 2021-01-25

## 2021-01-25 RX ORDER — TRANEXAMIC ACID 100 MG/ML
INJECTION, SOLUTION INTRAVENOUS
Status: DISCONTINUED | OUTPATIENT
Start: 2021-01-25 | End: 2021-01-25 | Stop reason: HOSPADM

## 2021-01-25 RX ORDER — KETAMINE HCL IN 0.9 % NACL 50 MG/5 ML
SYRINGE (ML) INTRAVENOUS
Status: DISCONTINUED | OUTPATIENT
Start: 2021-01-25 | End: 2021-01-25

## 2021-01-25 RX ORDER — FENTANYL CITRATE 50 UG/ML
INJECTION, SOLUTION INTRAMUSCULAR; INTRAVENOUS
Status: DISCONTINUED | OUTPATIENT
Start: 2021-01-25 | End: 2021-01-25

## 2021-01-25 RX ORDER — PREGABALIN 75 MG/1
75 CAPSULE ORAL
Status: COMPLETED | OUTPATIENT
Start: 2021-01-25 | End: 2021-01-25

## 2021-01-25 RX ORDER — PREDNISONE 5 MG/1
10 TABLET ORAL DAILY
Status: DISCONTINUED | OUTPATIENT
Start: 2021-01-26 | End: 2021-01-27 | Stop reason: HOSPADM

## 2021-01-25 RX ORDER — TRANEXAMIC ACID 100 MG/ML
1000 INJECTION, SOLUTION INTRAVENOUS
Status: COMPLETED | OUTPATIENT
Start: 2021-01-25 | End: 2021-01-25

## 2021-01-25 RX ORDER — ONDANSETRON 2 MG/ML
4 INJECTION INTRAMUSCULAR; INTRAVENOUS EVERY 8 HOURS PRN
Status: DISCONTINUED | OUTPATIENT
Start: 2021-01-25 | End: 2021-01-27 | Stop reason: HOSPADM

## 2021-01-25 RX ORDER — ROPIVACAINE HYDROCHLORIDE 2 MG/ML
8 INJECTION, SOLUTION EPIDURAL; INFILTRATION; PERINEURAL CONTINUOUS
Status: DISCONTINUED | OUTPATIENT
Start: 2021-01-25 | End: 2021-01-25

## 2021-01-25 RX ORDER — PHENYLEPHRINE HYDROCHLORIDE 10 MG/ML
INJECTION INTRAVENOUS
Status: DISCONTINUED | OUTPATIENT
Start: 2021-01-25 | End: 2021-01-25

## 2021-01-25 RX ORDER — OXYCODONE HYDROCHLORIDE 5 MG/1
10 TABLET ORAL
Status: DISCONTINUED | OUTPATIENT
Start: 2021-01-25 | End: 2021-01-27 | Stop reason: HOSPADM

## 2021-01-25 RX ORDER — MIDAZOLAM HYDROCHLORIDE 1 MG/ML
0.5 INJECTION INTRAMUSCULAR; INTRAVENOUS
Status: ACTIVE | OUTPATIENT
Start: 2021-01-25

## 2021-01-25 RX ORDER — CEFAZOLIN SODIUM 1 G/3ML
2 INJECTION, POWDER, FOR SOLUTION INTRAMUSCULAR; INTRAVENOUS
Status: COMPLETED | OUTPATIENT
Start: 2021-01-25 | End: 2021-01-25

## 2021-01-25 RX ORDER — FAMOTIDINE 10 MG/ML
INJECTION INTRAVENOUS
Status: DISCONTINUED | OUTPATIENT
Start: 2021-01-25 | End: 2021-01-25

## 2021-01-25 RX ORDER — OXYCODONE HYDROCHLORIDE 5 MG/1
5 TABLET ORAL
Status: DISCONTINUED | OUTPATIENT
Start: 2021-01-25 | End: 2021-01-27 | Stop reason: HOSPADM

## 2021-01-25 RX ORDER — CEFADROXIL 500 MG/1
500 CAPSULE ORAL EVERY 12 HOURS
Qty: 14 CAPSULE | Refills: 0 | Status: SHIPPED | OUTPATIENT
Start: 2021-01-25 | End: 2021-01-25 | Stop reason: HOSPADM

## 2021-01-25 RX ORDER — CEFAZOLIN SODIUM 1 G/3ML
2 INJECTION, POWDER, FOR SOLUTION INTRAMUSCULAR; INTRAVENOUS
Status: COMPLETED | OUTPATIENT
Start: 2021-01-25 | End: 2021-01-26

## 2021-01-25 RX ORDER — TALC
6 POWDER (GRAM) TOPICAL NIGHTLY PRN
Status: DISCONTINUED | OUTPATIENT
Start: 2021-01-26 | End: 2021-01-27 | Stop reason: HOSPADM

## 2021-01-25 RX ORDER — METOPROLOL TARTRATE 1 MG/ML
INJECTION, SOLUTION INTRAVENOUS
Status: DISCONTINUED | OUTPATIENT
Start: 2021-01-25 | End: 2021-01-25

## 2021-01-25 RX ORDER — PREGABALIN 75 MG/1
75 CAPSULE ORAL NIGHTLY
Status: DISCONTINUED | OUTPATIENT
Start: 2021-01-25 | End: 2021-01-25

## 2021-01-25 RX ORDER — GABAPENTIN 400 MG/1
800 CAPSULE ORAL 4 TIMES DAILY
Status: DISCONTINUED | OUTPATIENT
Start: 2021-01-25 | End: 2021-01-27 | Stop reason: HOSPADM

## 2021-01-25 RX ORDER — CYCLOBENZAPRINE HCL 10 MG
10 TABLET ORAL 3 TIMES DAILY PRN
Status: DISCONTINUED | OUTPATIENT
Start: 2021-01-25 | End: 2021-01-27 | Stop reason: HOSPADM

## 2021-01-25 RX ORDER — CARBOXYMETHYLCELLULOSE SODIUM 10 MG/ML
GEL OPHTHALMIC
Status: DISCONTINUED | OUTPATIENT
Start: 2021-01-25 | End: 2021-01-25

## 2021-01-25 RX ORDER — ACETAMINOPHEN 500 MG
1000 TABLET ORAL
Status: COMPLETED | OUTPATIENT
Start: 2021-01-25 | End: 2021-01-25

## 2021-01-25 RX ORDER — CELECOXIB 200 MG/1
200 CAPSULE ORAL DAILY
Status: DISCONTINUED | OUTPATIENT
Start: 2021-01-26 | End: 2021-01-27 | Stop reason: HOSPADM

## 2021-01-25 RX ORDER — LAMOTRIGINE 100 MG/1
200 TABLET ORAL 2 TIMES DAILY
Status: DISCONTINUED | OUTPATIENT
Start: 2021-01-25 | End: 2021-01-27 | Stop reason: HOSPADM

## 2021-01-25 RX ORDER — DEXMEDETOMIDINE HYDROCHLORIDE 100 UG/ML
INJECTION, SOLUTION INTRAVENOUS
Status: DISCONTINUED | OUTPATIENT
Start: 2021-01-25 | End: 2021-01-25

## 2021-01-25 RX ORDER — PRIMIDONE 50 MG/1
100 TABLET ORAL 3 TIMES DAILY
Status: DISCONTINUED | OUTPATIENT
Start: 2021-01-25 | End: 2021-01-27 | Stop reason: HOSPADM

## 2021-01-25 RX ORDER — BISACODYL 10 MG
10 SUPPOSITORY, RECTAL RECTAL EVERY 12 HOURS PRN
Status: DISCONTINUED | OUTPATIENT
Start: 2021-01-25 | End: 2021-01-27 | Stop reason: HOSPADM

## 2021-01-25 RX ORDER — MIDAZOLAM HYDROCHLORIDE 1 MG/ML
1 INJECTION INTRAMUSCULAR; INTRAVENOUS EVERY 5 MIN PRN
Status: DISCONTINUED | OUTPATIENT
Start: 2021-01-25 | End: 2021-01-25 | Stop reason: HOSPADM

## 2021-01-25 RX ORDER — MORPHINE SULFATE 2 MG/ML
2 INJECTION, SOLUTION INTRAMUSCULAR; INTRAVENOUS
Status: DISCONTINUED | OUTPATIENT
Start: 2021-01-25 | End: 2021-01-27 | Stop reason: HOSPADM

## 2021-01-25 RX ORDER — ESMOLOL HYDROCHLORIDE 10 MG/ML
INJECTION INTRAVENOUS
Status: DISCONTINUED | OUTPATIENT
Start: 2021-01-25 | End: 2021-01-25

## 2021-01-25 RX ORDER — SODIUM CHLORIDE 0.9 % (FLUSH) 0.9 %
10 SYRINGE (ML) INJECTION
Status: DISCONTINUED | OUTPATIENT
Start: 2021-01-25 | End: 2021-01-25 | Stop reason: HOSPADM

## 2021-01-25 RX ORDER — ROPINIROLE 2 MG/1
4 TABLET, FILM COATED ORAL NIGHTLY
Status: DISCONTINUED | OUTPATIENT
Start: 2021-01-25 | End: 2021-01-27 | Stop reason: HOSPADM

## 2021-01-25 RX ORDER — VANCOMYCIN HCL IN 5 % DEXTROSE 1G/250ML
1000 PLASTIC BAG, INJECTION (ML) INTRAVENOUS
Status: COMPLETED | OUTPATIENT
Start: 2021-01-25 | End: 2021-01-25

## 2021-01-25 RX ORDER — GABAPENTIN 400 MG/1
800 CAPSULE ORAL NIGHTLY
Status: DISCONTINUED | OUTPATIENT
Start: 2021-01-25 | End: 2021-01-27 | Stop reason: HOSPADM

## 2021-01-25 RX ORDER — ONDANSETRON 2 MG/ML
INJECTION INTRAMUSCULAR; INTRAVENOUS
Status: DISCONTINUED | OUTPATIENT
Start: 2021-01-25 | End: 2021-01-25

## 2021-01-25 RX ORDER — MIDAZOLAM HYDROCHLORIDE 1 MG/ML
INJECTION, SOLUTION INTRAMUSCULAR; INTRAVENOUS
Status: DISCONTINUED | OUTPATIENT
Start: 2021-01-25 | End: 2021-01-25

## 2021-01-25 RX ORDER — ONDANSETRON 2 MG/ML
4 INJECTION INTRAMUSCULAR; INTRAVENOUS ONCE AS NEEDED
Status: DISCONTINUED | OUTPATIENT
Start: 2021-01-25 | End: 2021-01-25 | Stop reason: HOSPADM

## 2021-01-25 RX ORDER — FENTANYL CITRATE 50 UG/ML
25 INJECTION, SOLUTION INTRAMUSCULAR; INTRAVENOUS EVERY 5 MIN PRN
Status: ACTIVE | OUTPATIENT
Start: 2021-01-25

## 2021-01-25 RX ORDER — ASPIRIN 81 MG/1
81 TABLET ORAL 2 TIMES DAILY
Status: DISCONTINUED | OUTPATIENT
Start: 2021-01-25 | End: 2021-01-27 | Stop reason: HOSPADM

## 2021-01-25 RX ORDER — NICARDIPINE HYDROCHLORIDE 2.5 MG/ML
INJECTION INTRAVENOUS
Status: DISCONTINUED | OUTPATIENT
Start: 2021-01-25 | End: 2021-01-25

## 2021-01-25 RX ORDER — ACETAMINOPHEN 500 MG
1000 TABLET ORAL EVERY 6 HOURS
Status: DISCONTINUED | OUTPATIENT
Start: 2021-01-25 | End: 2021-01-26

## 2021-01-25 RX ORDER — TALC
6 POWDER (GRAM) TOPICAL NIGHTLY PRN
Status: DISCONTINUED | OUTPATIENT
Start: 2021-01-25 | End: 2021-01-25 | Stop reason: HOSPADM

## 2021-01-25 RX ORDER — METHOCARBAMOL 750 MG/1
750 TABLET, FILM COATED ORAL ONCE
Status: COMPLETED | OUTPATIENT
Start: 2021-01-25 | End: 2021-01-25

## 2021-01-25 RX ORDER — LIDOCAINE HYDROCHLORIDE 10 MG/ML
1 INJECTION, SOLUTION EPIDURAL; INFILTRATION; INTRACAUDAL; PERINEURAL
Status: DISCONTINUED | OUTPATIENT
Start: 2021-01-25 | End: 2021-01-25 | Stop reason: HOSPADM

## 2021-01-25 RX ADMIN — ROPINIROLE HYDROCHLORIDE 4 MG: 2 TABLET, FILM COATED ORAL at 08:01

## 2021-01-25 RX ADMIN — TRANEXAMIC ACID 1000 MG: 100 INJECTION, SOLUTION INTRAVENOUS at 03:01

## 2021-01-25 RX ADMIN — METOPROLOL TARTRATE 2 MG: 5 INJECTION, SOLUTION INTRAVENOUS at 03:01

## 2021-01-25 RX ADMIN — SODIUM CHLORIDE: 0.9 INJECTION, SOLUTION INTRAVENOUS at 10:01

## 2021-01-25 RX ADMIN — MIDAZOLAM HYDROCHLORIDE 1 MG: 1 INJECTION, SOLUTION INTRAMUSCULAR; INTRAVENOUS at 03:01

## 2021-01-25 RX ADMIN — CEFAZOLIN 2 G: 330 INJECTION, POWDER, FOR SOLUTION INTRAMUSCULAR; INTRAVENOUS at 08:01

## 2021-01-25 RX ADMIN — ALPRAZOLAM 1 MG: 0.5 TABLET ORAL at 08:01

## 2021-01-25 RX ADMIN — CEFAZOLIN 2 G: 330 INJECTION, POWDER, FOR SOLUTION INTRAMUSCULAR; INTRAVENOUS at 01:01

## 2021-01-25 RX ADMIN — ACETAMINOPHEN 1000 MG: 500 TABLET ORAL at 11:01

## 2021-01-25 RX ADMIN — CEFAZOLIN 2 G: 330 INJECTION, POWDER, FOR SOLUTION INTRAMUSCULAR; INTRAVENOUS at 04:01

## 2021-01-25 RX ADMIN — ACETAMINOPHEN 1000 MG: 500 TABLET ORAL at 10:01

## 2021-01-25 RX ADMIN — FENTANYL CITRATE 25 MCG: 50 INJECTION, SOLUTION INTRAMUSCULAR; INTRAVENOUS at 03:01

## 2021-01-25 RX ADMIN — ACETAMINOPHEN 1000 MG: 500 TABLET ORAL at 06:01

## 2021-01-25 RX ADMIN — PHENYLEPHRINE HYDROCHLORIDE 50 MCG: 10 INJECTION INTRAVENOUS at 01:01

## 2021-01-25 RX ADMIN — MIDAZOLAM HYDROCHLORIDE 2 MG: 1 INJECTION, SOLUTION INTRAMUSCULAR; INTRAVENOUS at 12:01

## 2021-01-25 RX ADMIN — GABAPENTIN 800 MG: 400 CAPSULE ORAL at 08:01

## 2021-01-25 RX ADMIN — ESMOLOL HYDROCHLORIDE 20 MG: 100 INJECTION, SOLUTION INTRAVENOUS at 02:01

## 2021-01-25 RX ADMIN — Medication 10 MG: at 03:01

## 2021-01-25 RX ADMIN — FENTANYL CITRATE 50 MCG: 50 INJECTION, SOLUTION INTRAMUSCULAR; INTRAVENOUS at 12:01

## 2021-01-25 RX ADMIN — CELECOXIB 400 MG: 200 CAPSULE ORAL at 10:01

## 2021-01-25 RX ADMIN — OXYCODONE 10 MG: 5 TABLET ORAL at 08:01

## 2021-01-25 RX ADMIN — FAMOTIDINE 20 MG: 20 TABLET, FILM COATED ORAL at 08:01

## 2021-01-25 RX ADMIN — MEPIVACAINE HYDROCHLORIDE 0.5 ML: 15 INJECTION, SOLUTION EPIDURAL; INFILTRATION at 02:01

## 2021-01-25 RX ADMIN — LIDOCAINE HYDROCHLORIDE 50 MG: 20 INJECTION, SOLUTION INTRAVENOUS at 12:01

## 2021-01-25 RX ADMIN — PRIMIDONE 100 MG: 50 TABLET ORAL at 08:01

## 2021-01-25 RX ADMIN — LAMOTRIGINE 200 MG: 100 TABLET ORAL at 08:01

## 2021-01-25 RX ADMIN — VANCOMYCIN HYDROCHLORIDE 1000 MG: 1 INJECTION, POWDER, LYOPHILIZED, FOR SOLUTION INTRAVENOUS at 11:01

## 2021-01-25 RX ADMIN — PREGABALIN 75 MG: 75 CAPSULE ORAL at 10:01

## 2021-01-25 RX ADMIN — ASPIRIN 81 MG: 81 TABLET, COATED ORAL at 08:01

## 2021-01-25 RX ADMIN — MEPIVACAINE HYDROCHLORIDE 1 ML: 15 INJECTION, SOLUTION EPIDURAL; INFILTRATION at 03:01

## 2021-01-25 RX ADMIN — CARBOXYMETHYLCELLULOSE SODIUM 4 DROP: 10 GEL OPHTHALMIC at 03:01

## 2021-01-25 RX ADMIN — MEPIVACAINE HYDROCHLORIDE 3 ML: 15 INJECTION, SOLUTION EPIDURAL; INFILTRATION at 12:01

## 2021-01-25 RX ADMIN — PROPOFOL 30 MG: 10 INJECTION, EMULSION INTRAVENOUS at 03:01

## 2021-01-25 RX ADMIN — METOPROLOL TARTRATE 1 MG: 5 INJECTION, SOLUTION INTRAVENOUS at 03:01

## 2021-01-25 RX ADMIN — MORPHINE SULFATE 15 MG: 15 TABLET, FILM COATED, EXTENDED RELEASE ORAL at 08:01

## 2021-01-25 RX ADMIN — Medication 10 MG: at 04:01

## 2021-01-25 RX ADMIN — ESMOLOL HYDROCHLORIDE 20 MG: 100 INJECTION, SOLUTION INTRAVENOUS at 03:01

## 2021-01-25 RX ADMIN — HYDROCORTISONE 25 MG: 5 TABLET ORAL at 08:01

## 2021-01-25 RX ADMIN — SODIUM CHLORIDE: 0.9 INJECTION, SOLUTION INTRAVENOUS at 11:01

## 2021-01-25 RX ADMIN — Medication 10 MG: at 02:01

## 2021-01-25 RX ADMIN — MUPIROCIN 1 G: 20 OINTMENT TOPICAL at 10:01

## 2021-01-25 RX ADMIN — CARBOXYMETHYLCELLULOSE SODIUM 4 DROP: 10 GEL OPHTHALMIC at 01:01

## 2021-01-25 RX ADMIN — DOCUSATE SODIUM 50MG AND SENNOSIDES 8.6MG 1 TABLET: 8.6; 5 TABLET, FILM COATED ORAL at 08:01

## 2021-01-25 RX ADMIN — PROPOFOL 85 MG: 10 INJECTION, EMULSION INTRAVENOUS at 01:01

## 2021-01-25 RX ADMIN — HYDROCORTISONE SODIUM SUCCINATE 100 MG: 100 INJECTION, POWDER, FOR SOLUTION INTRAMUSCULAR; INTRAVENOUS at 01:01

## 2021-01-25 RX ADMIN — TRANEXAMIC ACID 1000 MG: 100 INJECTION, SOLUTION INTRAVENOUS at 01:01

## 2021-01-25 RX ADMIN — MELATONIN TAB 3 MG 6 MG: 3 TAB at 11:01

## 2021-01-25 RX ADMIN — ONDANSETRON 4 MG: 2 INJECTION, SOLUTION INTRAMUSCULAR; INTRAVENOUS at 03:01

## 2021-01-25 RX ADMIN — ATORVASTATIN CALCIUM 10 MG: 10 TABLET, FILM COATED ORAL at 08:01

## 2021-01-25 RX ADMIN — METHOCARBAMOL 750 MG: 750 TABLET ORAL at 04:01

## 2021-01-25 RX ADMIN — DEXMEDETOMIDINE HYDROCHLORIDE 8 MCG: 100 INJECTION, SOLUTION, CONCENTRATE INTRAVENOUS at 12:01

## 2021-01-25 RX ADMIN — NICARDIPINE HYDROCHLORIDE 0.2 MCG: 2.5 INJECTION INTRAVENOUS at 03:01

## 2021-01-25 RX ADMIN — PROPOFOL 20 MG: 10 INJECTION, EMULSION INTRAVENOUS at 12:01

## 2021-01-25 RX ADMIN — PROPOFOL 125 MCG/KG/MIN: 10 INJECTION, EMULSION INTRAVENOUS at 12:01

## 2021-01-25 RX ADMIN — NICARDIPINE HYDROCHLORIDE 0.4 MCG: 2.5 INJECTION INTRAVENOUS at 03:01

## 2021-01-25 RX ADMIN — FAMOTIDINE 20 MG: 10 INJECTION, SOLUTION INTRAVENOUS at 12:01

## 2021-01-25 RX ADMIN — MUPIROCIN 1 G: 20 OINTMENT TOPICAL at 08:01

## 2021-01-25 RX ADMIN — OXYCODONE 10 MG: 5 TABLET ORAL at 04:01

## 2021-01-25 RX ADMIN — Medication 20 MG: at 12:01

## 2021-01-26 LAB
GLUCOSE SERPL-MCNC: 110 MG/DL (ref 70–110)
HCT VFR BLD CALC: 31 %PCV (ref 36–54)
POC IONIZED CALCIUM: 1.18 MMOL/L (ref 1.06–1.42)
POC TCO2: 27 MMOL/L (ref 24–29)
POTASSIUM BLD-SCNC: 3.8 MMOL/L (ref 3.5–5.1)
SAMPLE: ABNORMAL
SODIUM BLD-SCNC: 143 MMOL/L (ref 136–145)

## 2021-01-26 PROCEDURE — 63600175 PHARM REV CODE 636 W HCPCS: Performed by: ANESTHESIOLOGY

## 2021-01-26 PROCEDURE — 25000003 PHARM REV CODE 250: Performed by: ORTHOPAEDIC SURGERY

## 2021-01-26 PROCEDURE — 85014 HEMATOCRIT: CPT

## 2021-01-26 PROCEDURE — 99232 SBSQ HOSP IP/OBS MODERATE 35: CPT | Mod: ,,, | Performed by: PHYSICIAN ASSISTANT

## 2021-01-26 PROCEDURE — 25000003 PHARM REV CODE 250: Performed by: PHYSICIAN ASSISTANT

## 2021-01-26 PROCEDURE — 99900035 HC TECH TIME PER 15 MIN (STAT)

## 2021-01-26 PROCEDURE — 99232 PR SUBSEQUENT HOSPITAL CARE,LEVL II: ICD-10-PCS | Mod: ,,, | Performed by: PHYSICIAN ASSISTANT

## 2021-01-26 PROCEDURE — 84132 ASSAY OF SERUM POTASSIUM: CPT

## 2021-01-26 PROCEDURE — 97116 GAIT TRAINING THERAPY: CPT

## 2021-01-26 PROCEDURE — 25000003 PHARM REV CODE 250: Performed by: ANESTHESIOLOGY

## 2021-01-26 PROCEDURE — 63600175 PHARM REV CODE 636 W HCPCS: Performed by: NURSE PRACTITIONER

## 2021-01-26 PROCEDURE — 82330 ASSAY OF CALCIUM: CPT

## 2021-01-26 PROCEDURE — 25000003 PHARM REV CODE 250: Performed by: STUDENT IN AN ORGANIZED HEALTH CARE EDUCATION/TRAINING PROGRAM

## 2021-01-26 PROCEDURE — 97110 THERAPEUTIC EXERCISES: CPT

## 2021-01-26 PROCEDURE — 11000001 HC ACUTE MED/SURG PRIVATE ROOM

## 2021-01-26 PROCEDURE — 97535 SELF CARE MNGMENT TRAINING: CPT

## 2021-01-26 PROCEDURE — 94761 N-INVAS EAR/PLS OXIMETRY MLT: CPT

## 2021-01-26 PROCEDURE — 25000003 PHARM REV CODE 250: Performed by: NURSE PRACTITIONER

## 2021-01-26 PROCEDURE — 97530 THERAPEUTIC ACTIVITIES: CPT

## 2021-01-26 PROCEDURE — 84295 ASSAY OF SERUM SODIUM: CPT

## 2021-01-26 PROCEDURE — 82803 BLOOD GASES ANY COMBINATION: CPT

## 2021-01-26 RX ORDER — TAMSULOSIN HYDROCHLORIDE 0.4 MG/1
0.8 CAPSULE ORAL ONCE
Status: COMPLETED | OUTPATIENT
Start: 2021-01-26 | End: 2021-01-26

## 2021-01-26 RX ORDER — ACETAMINOPHEN 500 MG
500 TABLET ORAL EVERY 6 HOURS
Status: DISCONTINUED | OUTPATIENT
Start: 2021-01-26 | End: 2021-01-27 | Stop reason: HOSPADM

## 2021-01-26 RX ORDER — TAMSULOSIN HYDROCHLORIDE 0.4 MG/1
0.4 CAPSULE ORAL DAILY
Status: DISCONTINUED | OUTPATIENT
Start: 2021-01-27 | End: 2021-01-27 | Stop reason: HOSPADM

## 2021-01-26 RX ORDER — SYRING-NEEDL,DISP,INSUL,0.3 ML 29 G X1/2"
296 SYRINGE, EMPTY DISPOSABLE MISCELLANEOUS ONCE
Status: COMPLETED | OUTPATIENT
Start: 2021-01-26 | End: 2021-01-26

## 2021-01-26 RX ADMIN — ACETAMINOPHEN 500 MG: 500 TABLET ORAL at 05:01

## 2021-01-26 RX ADMIN — MUPIROCIN 1 G: 20 OINTMENT TOPICAL at 10:01

## 2021-01-26 RX ADMIN — OXYCODONE 10 MG: 5 TABLET ORAL at 08:01

## 2021-01-26 RX ADMIN — MORPHINE SULFATE 15 MG: 15 TABLET, FILM COATED, EXTENDED RELEASE ORAL at 08:01

## 2021-01-26 RX ADMIN — PRIMIDONE 100 MG: 50 TABLET ORAL at 08:01

## 2021-01-26 RX ADMIN — DOCUSATE SODIUM 50MG AND SENNOSIDES 8.6MG 1 TABLET: 8.6; 5 TABLET, FILM COATED ORAL at 08:01

## 2021-01-26 RX ADMIN — CYCLOBENZAPRINE 10 MG: 10 TABLET, FILM COATED ORAL at 11:01

## 2021-01-26 RX ADMIN — OXYCODONE 10 MG: 5 TABLET ORAL at 03:01

## 2021-01-26 RX ADMIN — PREDNISONE 10 MG: 5 TABLET ORAL at 08:01

## 2021-01-26 RX ADMIN — ACETAMINOPHEN 500 MG: 500 TABLET ORAL at 10:01

## 2021-01-26 RX ADMIN — ALPRAZOLAM 1 MG: 0.5 TABLET ORAL at 04:01

## 2021-01-26 RX ADMIN — CELECOXIB 200 MG: 200 CAPSULE ORAL at 08:01

## 2021-01-26 RX ADMIN — ATORVASTATIN CALCIUM 10 MG: 10 TABLET, FILM COATED ORAL at 10:01

## 2021-01-26 RX ADMIN — MAGNESIUM CITRATE 296 ML: 1.75 LIQUID ORAL at 02:01

## 2021-01-26 RX ADMIN — PRIMIDONE 100 MG: 50 TABLET ORAL at 10:01

## 2021-01-26 RX ADMIN — PRIMIDONE 100 MG: 50 TABLET ORAL at 02:01

## 2021-01-26 RX ADMIN — BISACODYL 10 MG: 10 SUPPOSITORY RECTAL at 12:01

## 2021-01-26 RX ADMIN — ACETAMINOPHEN 1000 MG: 500 TABLET ORAL at 06:01

## 2021-01-26 RX ADMIN — FAMOTIDINE 20 MG: 20 TABLET, FILM COATED ORAL at 10:01

## 2021-01-26 RX ADMIN — ACETAMINOPHEN 500 MG: 500 TABLET ORAL at 11:01

## 2021-01-26 RX ADMIN — GABAPENTIN 800 MG: 400 CAPSULE ORAL at 10:01

## 2021-01-26 RX ADMIN — GABAPENTIN 800 MG: 400 CAPSULE ORAL at 04:01

## 2021-01-26 RX ADMIN — ROPINIROLE HYDROCHLORIDE 4 MG: 2 TABLET, FILM COATED ORAL at 10:01

## 2021-01-26 RX ADMIN — MORPHINE SULFATE 15 MG: 15 TABLET, FILM COATED, EXTENDED RELEASE ORAL at 10:01

## 2021-01-26 RX ADMIN — FAMOTIDINE 20 MG: 20 TABLET, FILM COATED ORAL at 08:01

## 2021-01-26 RX ADMIN — ASPIRIN 81 MG: 81 TABLET, COATED ORAL at 10:01

## 2021-01-26 RX ADMIN — TAMSULOSIN HYDROCHLORIDE 0.8 MG: 0.4 CAPSULE ORAL at 04:01

## 2021-01-26 RX ADMIN — ALPRAZOLAM 1 MG: 0.5 TABLET ORAL at 10:01

## 2021-01-26 RX ADMIN — OXYCODONE 10 MG: 5 TABLET ORAL at 10:01

## 2021-01-26 RX ADMIN — OXYCODONE 10 MG: 5 TABLET ORAL at 12:01

## 2021-01-26 RX ADMIN — GABAPENTIN 800 MG: 400 CAPSULE ORAL at 08:01

## 2021-01-26 RX ADMIN — POLYETHYLENE GLYCOL 3350 17 G: 17 POWDER, FOR SOLUTION ORAL at 08:01

## 2021-01-26 RX ADMIN — MELATONIN TAB 3 MG 6 MG: 3 TAB at 10:01

## 2021-01-26 RX ADMIN — LAMOTRIGINE 200 MG: 100 TABLET ORAL at 08:01

## 2021-01-26 RX ADMIN — OXYCODONE 10 MG: 5 TABLET ORAL at 07:01

## 2021-01-26 RX ADMIN — CEFAZOLIN 2 G: 330 INJECTION, POWDER, FOR SOLUTION INTRAMUSCULAR; INTRAVENOUS at 06:01

## 2021-01-26 RX ADMIN — MUPIROCIN 1 G: 20 OINTMENT TOPICAL at 08:01

## 2021-01-26 RX ADMIN — GABAPENTIN 800 MG: 400 CAPSULE ORAL at 12:01

## 2021-01-26 RX ADMIN — LAMOTRIGINE 200 MG: 100 TABLET ORAL at 10:01

## 2021-01-26 RX ADMIN — ALPRAZOLAM 1 MG: 0.5 TABLET ORAL at 11:01

## 2021-01-26 RX ADMIN — ASPIRIN 81 MG: 81 TABLET, COATED ORAL at 08:01

## 2021-01-27 VITALS
RESPIRATION RATE: 18 BRPM | WEIGHT: 135 LBS | TEMPERATURE: 98 F | OXYGEN SATURATION: 97 % | SYSTOLIC BLOOD PRESSURE: 107 MMHG | HEART RATE: 103 BPM | BODY MASS INDEX: 26.5 KG/M2 | DIASTOLIC BLOOD PRESSURE: 53 MMHG | HEIGHT: 60 IN

## 2021-01-27 LAB
BUN SERPL-MCNC: 9 MG/DL (ref 6–30)
CHLORIDE SERPL-SCNC: 105 MMOL/L (ref 95–110)
CREAT SERPL-MCNC: 0.5 MG/DL (ref 0.5–1.4)
GLUCOSE SERPL-MCNC: 100 MG/DL (ref 70–110)
HCT VFR BLD CALC: 31 %PCV (ref 36–54)
POC IONIZED CALCIUM: 1.13 MMOL/L (ref 1.06–1.42)
POC TCO2 (MEASURED): 25 MMOL/L (ref 23–29)
POTASSIUM BLD-SCNC: 3.5 MMOL/L (ref 3.5–5.1)
SAMPLE: ABNORMAL
SODIUM BLD-SCNC: 142 MMOL/L (ref 136–145)

## 2021-01-27 PROCEDURE — 97116 GAIT TRAINING THERAPY: CPT | Mod: CQ

## 2021-01-27 PROCEDURE — 82330 ASSAY OF CALCIUM: CPT

## 2021-01-27 PROCEDURE — 97535 SELF CARE MNGMENT TRAINING: CPT

## 2021-01-27 PROCEDURE — 97530 THERAPEUTIC ACTIVITIES: CPT | Mod: CQ

## 2021-01-27 PROCEDURE — 27000221 HC OXYGEN, UP TO 24 HOURS

## 2021-01-27 PROCEDURE — 84132 ASSAY OF SERUM POTASSIUM: CPT

## 2021-01-27 PROCEDURE — 99231 PR SUBSEQUENT HOSPITAL CARE,LEVL I: ICD-10-PCS | Mod: ,,, | Performed by: ANESTHESIOLOGY

## 2021-01-27 PROCEDURE — 99231 SBSQ HOSP IP/OBS SF/LOW 25: CPT | Mod: ,,, | Performed by: ANESTHESIOLOGY

## 2021-01-27 PROCEDURE — 25000003 PHARM REV CODE 250: Performed by: STUDENT IN AN ORGANIZED HEALTH CARE EDUCATION/TRAINING PROGRAM

## 2021-01-27 PROCEDURE — 94799 UNLISTED PULMONARY SVC/PX: CPT

## 2021-01-27 PROCEDURE — 94761 N-INVAS EAR/PLS OXIMETRY MLT: CPT

## 2021-01-27 PROCEDURE — 85014 HEMATOCRIT: CPT

## 2021-01-27 PROCEDURE — 25000003 PHARM REV CODE 250: Performed by: ANESTHESIOLOGY

## 2021-01-27 PROCEDURE — 97110 THERAPEUTIC EXERCISES: CPT | Mod: CQ

## 2021-01-27 PROCEDURE — 63600175 PHARM REV CODE 636 W HCPCS: Performed by: NURSE PRACTITIONER

## 2021-01-27 PROCEDURE — 82565 ASSAY OF CREATININE: CPT

## 2021-01-27 PROCEDURE — 25000003 PHARM REV CODE 250: Performed by: PHYSICIAN ASSISTANT

## 2021-01-27 PROCEDURE — 25000003 PHARM REV CODE 250: Performed by: NURSE PRACTITIONER

## 2021-01-27 PROCEDURE — 25000003 PHARM REV CODE 250: Performed by: ORTHOPAEDIC SURGERY

## 2021-01-27 PROCEDURE — 99900035 HC TECH TIME PER 15 MIN (STAT)

## 2021-01-27 PROCEDURE — 84295 ASSAY OF SERUM SODIUM: CPT

## 2021-01-27 PROCEDURE — 63600175 PHARM REV CODE 636 W HCPCS: Performed by: ANESTHESIOLOGY

## 2021-01-27 RX ADMIN — MORPHINE SULFATE 15 MG: 15 TABLET, FILM COATED, EXTENDED RELEASE ORAL at 08:01

## 2021-01-27 RX ADMIN — ALPRAZOLAM 1 MG: 0.5 TABLET ORAL at 09:01

## 2021-01-27 RX ADMIN — CELECOXIB 200 MG: 200 CAPSULE ORAL at 08:01

## 2021-01-27 RX ADMIN — ACETAMINOPHEN 500 MG: 500 TABLET ORAL at 05:01

## 2021-01-27 RX ADMIN — MORPHINE SULFATE 2 MG: 2 INJECTION, SOLUTION INTRAMUSCULAR; INTRAVENOUS at 03:01

## 2021-01-27 RX ADMIN — TAMSULOSIN HYDROCHLORIDE 0.4 MG: 0.4 CAPSULE ORAL at 08:01

## 2021-01-27 RX ADMIN — OXYCODONE 10 MG: 5 TABLET ORAL at 01:01

## 2021-01-27 RX ADMIN — ASPIRIN 81 MG: 81 TABLET, COATED ORAL at 08:01

## 2021-01-27 RX ADMIN — OXYCODONE 10 MG: 5 TABLET ORAL at 12:01

## 2021-01-27 RX ADMIN — FAMOTIDINE 20 MG: 20 TABLET, FILM COATED ORAL at 08:01

## 2021-01-27 RX ADMIN — OXYCODONE 10 MG: 5 TABLET ORAL at 08:01

## 2021-01-27 RX ADMIN — GABAPENTIN 800 MG: 400 CAPSULE ORAL at 08:01

## 2021-01-27 RX ADMIN — SODIUM CHLORIDE 1000 ML: 0.9 INJECTION, SOLUTION INTRAVENOUS at 05:01

## 2021-01-27 RX ADMIN — LAMOTRIGINE 200 MG: 100 TABLET ORAL at 08:01

## 2021-01-27 RX ADMIN — PREDNISONE 10 MG: 5 TABLET ORAL at 08:01

## 2021-01-27 RX ADMIN — OXYCODONE 5 MG: 5 TABLET ORAL at 05:01

## 2021-01-27 RX ADMIN — CYCLOBENZAPRINE 10 MG: 10 TABLET, FILM COATED ORAL at 08:01

## 2021-01-27 RX ADMIN — MUPIROCIN 1 G: 20 OINTMENT TOPICAL at 08:01

## 2021-01-27 RX ADMIN — PRIMIDONE 100 MG: 50 TABLET ORAL at 08:01

## 2021-01-28 ENCOUNTER — PATIENT MESSAGE (OUTPATIENT)
Dept: ADMINISTRATIVE | Facility: OTHER | Age: 51
End: 2021-01-28

## 2021-01-29 ENCOUNTER — PATIENT MESSAGE (OUTPATIENT)
Dept: ORTHOPEDICS | Facility: CLINIC | Age: 51
End: 2021-01-29

## 2021-01-31 ENCOUNTER — PATIENT MESSAGE (OUTPATIENT)
Dept: ORTHOPEDICS | Facility: CLINIC | Age: 51
End: 2021-01-31

## 2021-01-31 DIAGNOSIS — Z96.651 STATUS POST TOTAL RIGHT KNEE REPLACEMENT: Primary | ICD-10-CM

## 2021-02-01 ENCOUNTER — PATIENT MESSAGE (OUTPATIENT)
Dept: ADMINISTRATIVE | Facility: OTHER | Age: 51
End: 2021-02-01

## 2021-02-01 ENCOUNTER — PATIENT MESSAGE (OUTPATIENT)
Dept: ORTHOPEDICS | Facility: CLINIC | Age: 51
End: 2021-02-01

## 2021-02-01 DIAGNOSIS — M54.12 CERVICAL RADICULOPATHY: Primary | ICD-10-CM

## 2021-02-01 RX ORDER — OXYCODONE HYDROCHLORIDE 5 MG/1
TABLET ORAL
Qty: 30 TABLET | Refills: 0 | Status: SHIPPED | OUTPATIENT
Start: 2021-02-01 | End: 2021-03-01

## 2021-02-02 ENCOUNTER — OFFICE VISIT (OUTPATIENT)
Dept: ORTHOPEDICS | Facility: CLINIC | Age: 51
End: 2021-02-02
Payer: MEDICARE

## 2021-02-02 DIAGNOSIS — Z51.89 VISIT FOR WOUND CHECK: Primary | ICD-10-CM

## 2021-02-02 PROCEDURE — 99999 PR PBB SHADOW E&M-EST. PATIENT-LVL III: ICD-10-PCS | Mod: PBBFAC,,, | Performed by: NURSE PRACTITIONER

## 2021-02-02 PROCEDURE — 99999 PR PBB SHADOW E&M-EST. PATIENT-LVL III: CPT | Mod: PBBFAC,,, | Performed by: NURSE PRACTITIONER

## 2021-02-02 PROCEDURE — 99213 OFFICE O/P EST LOW 20 MIN: CPT | Mod: PBBFAC | Performed by: NURSE PRACTITIONER

## 2021-02-02 PROCEDURE — 99024 PR POST-OP FOLLOW-UP VISIT: ICD-10-PCS | Mod: POP,,, | Performed by: NURSE PRACTITIONER

## 2021-02-02 PROCEDURE — 99024 POSTOP FOLLOW-UP VISIT: CPT | Mod: POP,,, | Performed by: NURSE PRACTITIONER

## 2021-02-02 RX ORDER — MORPHINE SULFATE 15 MG/1
15 TABLET, FILM COATED, EXTENDED RELEASE ORAL 2 TIMES DAILY
Qty: 60 TABLET | Refills: 0 | Status: SHIPPED | OUTPATIENT
Start: 2021-02-08 | End: 2021-03-02 | Stop reason: SDUPTHER

## 2021-02-05 ENCOUNTER — TELEPHONE (OUTPATIENT)
Dept: ORTHOPEDICS | Facility: CLINIC | Age: 51
End: 2021-02-05

## 2021-02-05 ENCOUNTER — PATIENT MESSAGE (OUTPATIENT)
Dept: INTERNAL MEDICINE | Facility: CLINIC | Age: 51
End: 2021-02-05

## 2021-02-05 ENCOUNTER — PATIENT MESSAGE (OUTPATIENT)
Dept: ORTHOPEDICS | Facility: CLINIC | Age: 51
End: 2021-02-05

## 2021-02-05 ENCOUNTER — LAB VISIT (OUTPATIENT)
Dept: LAB | Facility: OTHER | Age: 51
End: 2021-02-05
Attending: INTERNAL MEDICINE
Payer: MEDICARE

## 2021-02-05 DIAGNOSIS — R39.9 UTI SYMPTOMS: ICD-10-CM

## 2021-02-05 LAB
BACTERIA #/AREA URNS HPF: ABNORMAL /HPF
BILIRUB UR QL STRIP: NEGATIVE
CLARITY UR: ABNORMAL
COLOR UR: YELLOW
GLUCOSE UR QL STRIP: NEGATIVE
HGB UR QL STRIP: NEGATIVE
KETONES UR QL STRIP: NEGATIVE
LEUKOCYTE ESTERASE UR QL STRIP: ABNORMAL
MICROSCOPIC COMMENT: ABNORMAL
NITRITE UR QL STRIP: NEGATIVE
PH UR STRIP: 7 [PH] (ref 5–8)
PROT UR QL STRIP: NEGATIVE
SP GR UR STRIP: <=1.005 (ref 1–1.03)
URN SPEC COLLECT METH UR: ABNORMAL
WBC #/AREA URNS HPF: 30 /HPF (ref 0–5)
WBC CLUMPS URNS QL MICRO: ABNORMAL

## 2021-02-05 PROCEDURE — 87086 URINE CULTURE/COLONY COUNT: CPT

## 2021-02-05 PROCEDURE — 87077 CULTURE AEROBIC IDENTIFY: CPT

## 2021-02-05 PROCEDURE — 81000 URINALYSIS NONAUTO W/SCOPE: CPT

## 2021-02-05 PROCEDURE — 87186 SC STD MICRODIL/AGAR DIL: CPT

## 2021-02-05 PROCEDURE — 87088 URINE BACTERIA CULTURE: CPT

## 2021-02-06 RX ORDER — FLUCONAZOLE 150 MG/1
150 TABLET ORAL DAILY
Qty: 1 TABLET | Refills: 0 | Status: SHIPPED | OUTPATIENT
Start: 2021-02-06 | End: 2021-02-07

## 2021-02-06 RX ORDER — SULFAMETHOXAZOLE AND TRIMETHOPRIM 800; 160 MG/1; MG/1
1 TABLET ORAL 2 TIMES DAILY
Qty: 14 TABLET | Refills: 0 | Status: SHIPPED | OUTPATIENT
Start: 2021-02-06 | End: 2021-02-08

## 2021-02-06 RX ORDER — SULFAMETHOXAZOLE AND TRIMETHOPRIM 800; 160 MG/1; MG/1
1 TABLET ORAL 2 TIMES DAILY
Qty: 14 TABLET | Refills: 0 | Status: SHIPPED | OUTPATIENT
Start: 2021-02-06 | End: 2021-02-06 | Stop reason: SDUPTHER

## 2021-02-08 LAB — BACTERIA UR CULT: ABNORMAL

## 2021-02-08 RX ORDER — CIPROFLOXACIN 500 MG/1
500 TABLET ORAL 2 TIMES DAILY
Qty: 14 TABLET | Refills: 0 | Status: SHIPPED | OUTPATIENT
Start: 2021-02-08 | End: 2021-02-15

## 2021-02-09 ENCOUNTER — OFFICE VISIT (OUTPATIENT)
Dept: ORTHOPEDICS | Facility: CLINIC | Age: 51
End: 2021-02-09
Payer: MEDICARE

## 2021-02-09 ENCOUNTER — PATIENT MESSAGE (OUTPATIENT)
Dept: ORTHOPEDICS | Facility: CLINIC | Age: 51
End: 2021-02-09

## 2021-02-09 DIAGNOSIS — M54.12 CERVICAL RADICULOPATHY: ICD-10-CM

## 2021-02-09 DIAGNOSIS — Z98.890 S/P LUMBAR LAMINECTOMY: ICD-10-CM

## 2021-02-09 DIAGNOSIS — Z96.659 STATUS POST KNEE REPLACEMENT, UNSPECIFIED LATERALITY: Primary | ICD-10-CM

## 2021-02-09 DIAGNOSIS — G89.29 OTHER CHRONIC PAIN: ICD-10-CM

## 2021-02-09 DIAGNOSIS — M17.11 PRIMARY OSTEOARTHRITIS OF RIGHT KNEE: ICD-10-CM

## 2021-02-09 PROCEDURE — 99214 OFFICE O/P EST MOD 30 MIN: CPT | Mod: PBBFAC | Performed by: NURSE PRACTITIONER

## 2021-02-09 PROCEDURE — 99024 POSTOP FOLLOW-UP VISIT: CPT | Mod: POP,,, | Performed by: NURSE PRACTITIONER

## 2021-02-09 PROCEDURE — 99999 PR PBB SHADOW E&M-EST. PATIENT-LVL IV: CPT | Mod: PBBFAC,,, | Performed by: NURSE PRACTITIONER

## 2021-02-09 PROCEDURE — 99024 PR POST-OP FOLLOW-UP VISIT: ICD-10-PCS | Mod: POP,,, | Performed by: NURSE PRACTITIONER

## 2021-02-09 PROCEDURE — 99999 PR PBB SHADOW E&M-EST. PATIENT-LVL IV: ICD-10-PCS | Mod: PBBFAC,,, | Performed by: NURSE PRACTITIONER

## 2021-02-09 RX ORDER — OXYCODONE AND ACETAMINOPHEN 10; 325 MG/1; MG/1
1 TABLET ORAL EVERY 6 HOURS PRN
Qty: 28 TABLET | Refills: 0 | Status: SHIPPED | OUTPATIENT
Start: 2021-02-09 | End: 2021-02-23 | Stop reason: SDUPTHER

## 2021-02-17 ENCOUNTER — PATIENT MESSAGE (OUTPATIENT)
Dept: ORTHOPEDICS | Facility: CLINIC | Age: 51
End: 2021-02-17

## 2021-02-23 ENCOUNTER — CLINICAL SUPPORT (OUTPATIENT)
Dept: REHABILITATION | Facility: HOSPITAL | Age: 51
End: 2021-02-23
Payer: MEDICARE

## 2021-02-23 ENCOUNTER — PATIENT MESSAGE (OUTPATIENT)
Dept: PAIN MEDICINE | Facility: CLINIC | Age: 51
End: 2021-02-23

## 2021-02-23 DIAGNOSIS — M25.562 CHRONIC PAIN OF LEFT KNEE: ICD-10-CM

## 2021-02-23 DIAGNOSIS — G89.29 OTHER CHRONIC PAIN: ICD-10-CM

## 2021-02-23 DIAGNOSIS — R26.9 IMPAIRED GAIT: ICD-10-CM

## 2021-02-23 DIAGNOSIS — M17.11 PRIMARY OSTEOARTHRITIS OF RIGHT KNEE: ICD-10-CM

## 2021-02-23 DIAGNOSIS — R53.1 DECREASED STRENGTH: ICD-10-CM

## 2021-02-23 DIAGNOSIS — M54.12 CERVICAL RADICULOPATHY: ICD-10-CM

## 2021-02-23 DIAGNOSIS — Z98.890 S/P LUMBAR LAMINECTOMY: Primary | ICD-10-CM

## 2021-02-23 DIAGNOSIS — M25.662 DECREASED RANGE OF MOTION OF LEFT KNEE: ICD-10-CM

## 2021-02-23 DIAGNOSIS — G89.29 CHRONIC PAIN OF LEFT KNEE: ICD-10-CM

## 2021-02-23 PROCEDURE — 97162 PT EVAL MOD COMPLEX 30 MIN: CPT

## 2021-02-23 PROCEDURE — 97110 THERAPEUTIC EXERCISES: CPT

## 2021-02-23 RX ORDER — OXYCODONE AND ACETAMINOPHEN 10; 325 MG/1; MG/1
1 TABLET ORAL EVERY 8 HOURS PRN
Qty: 90 TABLET | Refills: 0 | Status: SHIPPED | OUTPATIENT
Start: 2021-02-23 | End: 2021-03-02 | Stop reason: SDUPTHER

## 2021-02-24 ENCOUNTER — PATIENT MESSAGE (OUTPATIENT)
Dept: ADMINISTRATIVE | Facility: OTHER | Age: 51
End: 2021-02-24

## 2021-02-27 ENCOUNTER — PATIENT OUTREACH (OUTPATIENT)
Dept: ADMINISTRATIVE | Facility: OTHER | Age: 51
End: 2021-02-27

## 2021-02-28 DIAGNOSIS — F43.10 PTSD (POST-TRAUMATIC STRESS DISORDER): ICD-10-CM

## 2021-02-28 RX ORDER — ALPRAZOLAM 1 MG/1
1 TABLET ORAL 3 TIMES DAILY PRN
Qty: 90 TABLET | Refills: 1 | Status: SHIPPED | OUTPATIENT
Start: 2021-02-28 | End: 2021-04-30 | Stop reason: SDUPTHER

## 2021-03-01 ENCOUNTER — OFFICE VISIT (OUTPATIENT)
Dept: PAIN MEDICINE | Facility: CLINIC | Age: 51
End: 2021-03-01
Payer: MEDICARE

## 2021-03-01 ENCOUNTER — CLINICAL SUPPORT (OUTPATIENT)
Dept: REHABILITATION | Facility: HOSPITAL | Age: 51
End: 2021-03-01
Payer: MEDICARE

## 2021-03-01 DIAGNOSIS — M25.662 DECREASED RANGE OF MOTION OF LEFT KNEE: ICD-10-CM

## 2021-03-01 DIAGNOSIS — M47.816 LUMBAR SPONDYLOSIS: ICD-10-CM

## 2021-03-01 DIAGNOSIS — M54.12 CERVICAL RADICULOPATHY: ICD-10-CM

## 2021-03-01 DIAGNOSIS — M25.562 CHRONIC PAIN OF LEFT KNEE: ICD-10-CM

## 2021-03-01 DIAGNOSIS — G89.29 CHRONIC PAIN OF LEFT KNEE: ICD-10-CM

## 2021-03-01 DIAGNOSIS — R53.1 DECREASED STRENGTH: ICD-10-CM

## 2021-03-01 DIAGNOSIS — M17.11 PRIMARY OSTEOARTHRITIS OF RIGHT KNEE: ICD-10-CM

## 2021-03-01 DIAGNOSIS — G89.29 OTHER CHRONIC PAIN: Primary | ICD-10-CM

## 2021-03-01 DIAGNOSIS — G89.4 CHRONIC PAIN SYNDROME: ICD-10-CM

## 2021-03-01 DIAGNOSIS — R26.9 IMPAIRED GAIT: ICD-10-CM

## 2021-03-01 PROBLEM — M25.561 CHRONIC PAIN OF BOTH KNEES: Status: RESOLVED | Noted: 2019-12-04 | Resolved: 2021-03-01

## 2021-03-01 PROBLEM — M62.81 QUADRICEPS WEAKNESS: Status: RESOLVED | Noted: 2019-12-04 | Resolved: 2021-03-01

## 2021-03-01 PROBLEM — M25.561 CHRONIC PAIN OF RIGHT KNEE: Status: RESOLVED | Noted: 2020-02-18 | Resolved: 2021-03-01

## 2021-03-01 PROCEDURE — 97110 THERAPEUTIC EXERCISES: CPT

## 2021-03-01 PROCEDURE — 99213 OFFICE O/P EST LOW 20 MIN: CPT | Mod: 95,,, | Performed by: NURSE PRACTITIONER

## 2021-03-01 PROCEDURE — 99213 PR OFFICE/OUTPT VISIT, EST, LEVL III, 20-29 MIN: ICD-10-PCS | Mod: 95,,, | Performed by: NURSE PRACTITIONER

## 2021-03-01 RX ORDER — GABAPENTIN 800 MG/1
TABLET ORAL
Qty: 450 TABLET | Refills: 2 | Status: SHIPPED | OUTPATIENT
Start: 2021-03-01 | End: 2021-09-17 | Stop reason: SDUPTHER

## 2021-03-02 DIAGNOSIS — G89.29 OTHER CHRONIC PAIN: ICD-10-CM

## 2021-03-02 DIAGNOSIS — M54.12 CERVICAL RADICULOPATHY: ICD-10-CM

## 2021-03-02 DIAGNOSIS — Z98.890 S/P LUMBAR LAMINECTOMY: ICD-10-CM

## 2021-03-02 DIAGNOSIS — M17.11 PRIMARY OSTEOARTHRITIS OF RIGHT KNEE: ICD-10-CM

## 2021-03-02 RX ORDER — OXYCODONE AND ACETAMINOPHEN 10; 325 MG/1; MG/1
1 TABLET ORAL EVERY 8 HOURS PRN
Qty: 90 TABLET | Refills: 0 | Status: SHIPPED | OUTPATIENT
Start: 2021-03-02 | End: 2021-04-08 | Stop reason: SDUPTHER

## 2021-03-02 RX ORDER — MORPHINE SULFATE 15 MG/1
15 TABLET, FILM COATED, EXTENDED RELEASE ORAL 2 TIMES DAILY
Qty: 60 TABLET | Refills: 0 | Status: SHIPPED | OUTPATIENT
Start: 2021-03-08 | End: 2021-04-08 | Stop reason: SDUPTHER

## 2021-03-03 ENCOUNTER — CLINICAL SUPPORT (OUTPATIENT)
Dept: REHABILITATION | Facility: HOSPITAL | Age: 51
End: 2021-03-03
Payer: MEDICARE

## 2021-03-03 DIAGNOSIS — R53.1 DECREASED STRENGTH: ICD-10-CM

## 2021-03-03 DIAGNOSIS — R26.9 IMPAIRED GAIT: ICD-10-CM

## 2021-03-03 DIAGNOSIS — Z96.659 STATUS POST KNEE REPLACEMENT, UNSPECIFIED LATERALITY: Primary | ICD-10-CM

## 2021-03-03 DIAGNOSIS — G89.29 CHRONIC PAIN OF LEFT KNEE: ICD-10-CM

## 2021-03-03 DIAGNOSIS — Z96.652 STATUS POST TOTAL LEFT KNEE REPLACEMENT: ICD-10-CM

## 2021-03-03 DIAGNOSIS — M25.662 DECREASED RANGE OF MOTION OF LEFT KNEE: ICD-10-CM

## 2021-03-03 DIAGNOSIS — M25.562 CHRONIC PAIN OF LEFT KNEE: ICD-10-CM

## 2021-03-03 PROCEDURE — 97110 THERAPEUTIC EXERCISES: CPT

## 2021-03-03 PROCEDURE — 97140 MANUAL THERAPY 1/> REGIONS: CPT

## 2021-03-04 ENCOUNTER — CLINICAL SUPPORT (OUTPATIENT)
Dept: REHABILITATION | Facility: HOSPITAL | Age: 51
End: 2021-03-04
Payer: MEDICARE

## 2021-03-04 DIAGNOSIS — M25.562 CHRONIC PAIN OF LEFT KNEE: ICD-10-CM

## 2021-03-04 DIAGNOSIS — M25.662 DECREASED RANGE OF MOTION OF LEFT KNEE: ICD-10-CM

## 2021-03-04 DIAGNOSIS — R26.9 IMPAIRED GAIT: ICD-10-CM

## 2021-03-04 DIAGNOSIS — G89.29 CHRONIC PAIN OF LEFT KNEE: ICD-10-CM

## 2021-03-04 DIAGNOSIS — R53.1 DECREASED STRENGTH: ICD-10-CM

## 2021-03-04 PROCEDURE — 97110 THERAPEUTIC EXERCISES: CPT

## 2021-03-08 ENCOUNTER — TELEPHONE (OUTPATIENT)
Dept: REHABILITATION | Facility: HOSPITAL | Age: 51
End: 2021-03-08

## 2021-03-10 ENCOUNTER — CLINICAL SUPPORT (OUTPATIENT)
Dept: REHABILITATION | Facility: HOSPITAL | Age: 51
End: 2021-03-10
Payer: MEDICARE

## 2021-03-10 DIAGNOSIS — M25.562 CHRONIC PAIN OF LEFT KNEE: ICD-10-CM

## 2021-03-10 DIAGNOSIS — M25.662 DECREASED RANGE OF MOTION OF LEFT KNEE: ICD-10-CM

## 2021-03-10 DIAGNOSIS — R53.1 DECREASED STRENGTH: ICD-10-CM

## 2021-03-10 DIAGNOSIS — R26.9 IMPAIRED GAIT: ICD-10-CM

## 2021-03-10 DIAGNOSIS — G89.29 CHRONIC PAIN OF LEFT KNEE: ICD-10-CM

## 2021-03-10 PROCEDURE — 97110 THERAPEUTIC EXERCISES: CPT

## 2021-03-11 ENCOUNTER — OFFICE VISIT (OUTPATIENT)
Dept: ORTHOPEDICS | Facility: CLINIC | Age: 51
End: 2021-03-11
Payer: MEDICARE

## 2021-03-11 ENCOUNTER — HOSPITAL ENCOUNTER (OUTPATIENT)
Dept: RADIOLOGY | Facility: HOSPITAL | Age: 51
Discharge: HOME | End: 2021-03-11
Attending: ORTHOPAEDIC SURGERY
Payer: MEDICARE

## 2021-03-11 VITALS — BODY MASS INDEX: 24.74 KG/M2 | WEIGHT: 126 LBS | HEIGHT: 60 IN

## 2021-03-11 DIAGNOSIS — M21.70 LEG LENGTH DISCREPANCY: ICD-10-CM

## 2021-03-11 DIAGNOSIS — Z96.652 STATUS POST TOTAL LEFT KNEE REPLACEMENT: ICD-10-CM

## 2021-03-11 DIAGNOSIS — Z96.652 STATUS POST TOTAL LEFT KNEE REPLACEMENT: Primary | ICD-10-CM

## 2021-03-11 DIAGNOSIS — M17.11 PRIMARY OSTEOARTHRITIS OF RIGHT KNEE: ICD-10-CM

## 2021-03-11 PROCEDURE — 99213 OFFICE O/P EST LOW 20 MIN: CPT | Mod: PBBFAC,25 | Performed by: ORTHOPAEDIC SURGERY

## 2021-03-11 PROCEDURE — 99024 PR POST-OP FOLLOW-UP VISIT: ICD-10-PCS | Mod: POP,,, | Performed by: ORTHOPAEDIC SURGERY

## 2021-03-11 PROCEDURE — 73562 X-RAY EXAM OF KNEE 3: CPT | Mod: TC,LT

## 2021-03-11 PROCEDURE — 73562 XR KNEE 3 VIEW LEFT: ICD-10-PCS | Mod: 26,LT,, | Performed by: RADIOLOGY

## 2021-03-11 PROCEDURE — 99999 PR PBB SHADOW E&M-EST. PATIENT-LVL III: ICD-10-PCS | Mod: PBBFAC,,, | Performed by: ORTHOPAEDIC SURGERY

## 2021-03-11 PROCEDURE — 73562 X-RAY EXAM OF KNEE 3: CPT | Mod: 26,LT,, | Performed by: RADIOLOGY

## 2021-03-11 PROCEDURE — 99999 PR PBB SHADOW E&M-EST. PATIENT-LVL III: CPT | Mod: PBBFAC,,, | Performed by: ORTHOPAEDIC SURGERY

## 2021-03-11 PROCEDURE — 99024 POSTOP FOLLOW-UP VISIT: CPT | Mod: POP,,, | Performed by: ORTHOPAEDIC SURGERY

## 2021-03-12 ENCOUNTER — OFFICE VISIT (OUTPATIENT)
Dept: PSYCHIATRY | Facility: CLINIC | Age: 51
End: 2021-03-12
Payer: MEDICARE

## 2021-03-12 DIAGNOSIS — F43.10 PTSD (POST-TRAUMATIC STRESS DISORDER): Primary | ICD-10-CM

## 2021-03-12 PROCEDURE — 90833 PSYTX W PT W E/M 30 MIN: CPT | Mod: 95,,, | Performed by: PSYCHIATRY & NEUROLOGY

## 2021-03-12 PROCEDURE — 99213 PR OFFICE/OUTPT VISIT, EST, LEVL III, 20-29 MIN: ICD-10-PCS | Mod: 95,,, | Performed by: PSYCHIATRY & NEUROLOGY

## 2021-03-12 PROCEDURE — 99213 OFFICE O/P EST LOW 20 MIN: CPT | Mod: 95,,, | Performed by: PSYCHIATRY & NEUROLOGY

## 2021-03-12 PROCEDURE — 90833 PR PSYCHOTHERAPY W/PATIENT W/E&M, 30 MIN (ADD ON): ICD-10-PCS | Mod: 95,,, | Performed by: PSYCHIATRY & NEUROLOGY

## 2021-03-12 RX ORDER — LAMOTRIGINE 200 MG/1
200 TABLET ORAL 2 TIMES DAILY
Qty: 60 TABLET | Refills: 5 | Status: SHIPPED | OUTPATIENT
Start: 2021-03-12 | End: 2021-07-07 | Stop reason: SDUPTHER

## 2021-03-13 ENCOUNTER — IMMUNIZATION (OUTPATIENT)
Dept: PRIMARY CARE CLINIC | Facility: CLINIC | Age: 51
End: 2021-03-13
Payer: MEDICARE

## 2021-03-13 DIAGNOSIS — Z23 NEED FOR VACCINATION: Primary | ICD-10-CM

## 2021-03-13 PROCEDURE — 91300 PR SARS-COV- 2 COVID-19 VACCINE, NO PRSV, 30MCG/0.3ML, IM: CPT | Mod: S$GLB,,, | Performed by: INTERNAL MEDICINE

## 2021-03-13 PROCEDURE — 0001A PR IMMUNIZ ADMIN, SARS-COV-2 COVID-19 VACC, 30MCG/0.3ML, 1ST DOSE: ICD-10-PCS | Mod: CV19,S$GLB,, | Performed by: INTERNAL MEDICINE

## 2021-03-13 PROCEDURE — 0001A PR IMMUNIZ ADMIN, SARS-COV-2 COVID-19 VACC, 30MCG/0.3ML, 1ST DOSE: CPT | Mod: CV19,S$GLB,, | Performed by: INTERNAL MEDICINE

## 2021-03-13 PROCEDURE — 91300 PR SARS-COV- 2 COVID-19 VACCINE, NO PRSV, 30MCG/0.3ML, IM: ICD-10-PCS | Mod: S$GLB,,, | Performed by: INTERNAL MEDICINE

## 2021-03-13 RX ADMIN — Medication 0.3 ML: at 11:03

## 2021-03-15 ENCOUNTER — CLINICAL SUPPORT (OUTPATIENT)
Dept: REHABILITATION | Facility: HOSPITAL | Age: 51
End: 2021-03-15
Payer: MEDICARE

## 2021-03-15 DIAGNOSIS — R26.9 IMPAIRED GAIT: ICD-10-CM

## 2021-03-15 DIAGNOSIS — G89.29 CHRONIC PAIN OF LEFT KNEE: ICD-10-CM

## 2021-03-15 DIAGNOSIS — R53.1 DECREASED STRENGTH: ICD-10-CM

## 2021-03-15 DIAGNOSIS — M25.562 CHRONIC PAIN OF LEFT KNEE: ICD-10-CM

## 2021-03-15 DIAGNOSIS — M25.662 DECREASED RANGE OF MOTION OF LEFT KNEE: ICD-10-CM

## 2021-03-15 PROCEDURE — 97110 THERAPEUTIC EXERCISES: CPT

## 2021-03-15 PROCEDURE — 97112 NEUROMUSCULAR REEDUCATION: CPT

## 2021-03-17 ENCOUNTER — CLINICAL SUPPORT (OUTPATIENT)
Dept: REHABILITATION | Facility: HOSPITAL | Age: 51
End: 2021-03-17
Payer: MEDICARE

## 2021-03-17 ENCOUNTER — PATIENT MESSAGE (OUTPATIENT)
Dept: ENDOCRINOLOGY | Facility: CLINIC | Age: 51
End: 2021-03-17

## 2021-03-17 DIAGNOSIS — M25.662 DECREASED RANGE OF MOTION OF LEFT KNEE: ICD-10-CM

## 2021-03-17 DIAGNOSIS — R26.9 IMPAIRED GAIT: ICD-10-CM

## 2021-03-17 DIAGNOSIS — R53.1 DECREASED STRENGTH: ICD-10-CM

## 2021-03-17 DIAGNOSIS — M25.562 CHRONIC PAIN OF LEFT KNEE: ICD-10-CM

## 2021-03-17 DIAGNOSIS — G89.29 CHRONIC PAIN OF LEFT KNEE: ICD-10-CM

## 2021-03-17 PROCEDURE — 97110 THERAPEUTIC EXERCISES: CPT

## 2021-03-22 ENCOUNTER — CLINICAL SUPPORT (OUTPATIENT)
Dept: REHABILITATION | Facility: HOSPITAL | Age: 51
End: 2021-03-22
Payer: MEDICARE

## 2021-03-22 DIAGNOSIS — G89.29 CHRONIC PAIN OF LEFT KNEE: ICD-10-CM

## 2021-03-22 DIAGNOSIS — R26.9 IMPAIRED GAIT: ICD-10-CM

## 2021-03-22 DIAGNOSIS — M25.662 DECREASED RANGE OF MOTION OF LEFT KNEE: ICD-10-CM

## 2021-03-22 DIAGNOSIS — M25.562 CHRONIC PAIN OF LEFT KNEE: ICD-10-CM

## 2021-03-22 DIAGNOSIS — R53.1 DECREASED STRENGTH: ICD-10-CM

## 2021-03-22 PROCEDURE — 97110 THERAPEUTIC EXERCISES: CPT

## 2021-03-24 ENCOUNTER — PATIENT MESSAGE (OUTPATIENT)
Dept: REHABILITATION | Facility: HOSPITAL | Age: 51
End: 2021-03-24

## 2021-03-29 ENCOUNTER — CLINICAL SUPPORT (OUTPATIENT)
Dept: REHABILITATION | Facility: HOSPITAL | Age: 51
End: 2021-03-29
Payer: MEDICARE

## 2021-03-29 DIAGNOSIS — G89.29 CHRONIC PAIN OF LEFT KNEE: ICD-10-CM

## 2021-03-29 DIAGNOSIS — R26.9 IMPAIRED GAIT: ICD-10-CM

## 2021-03-29 DIAGNOSIS — M25.662 DECREASED RANGE OF MOTION OF LEFT KNEE: ICD-10-CM

## 2021-03-29 DIAGNOSIS — E78.5 HYPERLIPIDEMIA, UNSPECIFIED HYPERLIPIDEMIA TYPE: ICD-10-CM

## 2021-03-29 DIAGNOSIS — R53.1 DECREASED STRENGTH: ICD-10-CM

## 2021-03-29 DIAGNOSIS — M25.562 CHRONIC PAIN OF LEFT KNEE: ICD-10-CM

## 2021-03-29 PROCEDURE — 97110 THERAPEUTIC EXERCISES: CPT

## 2021-03-29 RX ORDER — ATORVASTATIN CALCIUM 10 MG/1
10 TABLET, FILM COATED ORAL NIGHTLY
Qty: 90 TABLET | Refills: 2 | Status: CANCELLED | OUTPATIENT
Start: 2021-03-29

## 2021-03-30 DIAGNOSIS — Z87.890 STATUS POST SEX REASSIGNMENT SURGERY: ICD-10-CM

## 2021-03-30 RX ORDER — TESTOSTERONE GEL, 1% 10 MG/G
1 GEL TRANSDERMAL DAILY
Qty: 30 PACKET | Refills: 4 | Status: SHIPPED | OUTPATIENT
Start: 2021-03-30 | End: 2021-07-28 | Stop reason: SDUPTHER

## 2021-03-31 ENCOUNTER — CLINICAL SUPPORT (OUTPATIENT)
Dept: REHABILITATION | Facility: HOSPITAL | Age: 51
End: 2021-03-31
Payer: MEDICARE

## 2021-03-31 DIAGNOSIS — M25.662 DECREASED RANGE OF MOTION OF LEFT KNEE: ICD-10-CM

## 2021-03-31 DIAGNOSIS — R26.9 IMPAIRED GAIT: ICD-10-CM

## 2021-03-31 DIAGNOSIS — G89.29 CHRONIC PAIN OF LEFT KNEE: ICD-10-CM

## 2021-03-31 DIAGNOSIS — M25.562 CHRONIC PAIN OF LEFT KNEE: ICD-10-CM

## 2021-03-31 DIAGNOSIS — E78.5 HYPERLIPIDEMIA, UNSPECIFIED HYPERLIPIDEMIA TYPE: ICD-10-CM

## 2021-03-31 DIAGNOSIS — R53.1 DECREASED STRENGTH: ICD-10-CM

## 2021-03-31 PROCEDURE — 97110 THERAPEUTIC EXERCISES: CPT

## 2021-03-31 RX ORDER — ATORVASTATIN CALCIUM 10 MG/1
10 TABLET, FILM COATED ORAL NIGHTLY
Qty: 90 TABLET | Refills: 2 | Status: CANCELLED | OUTPATIENT
Start: 2021-03-29

## 2021-04-01 DIAGNOSIS — E78.5 HYPERLIPIDEMIA, UNSPECIFIED HYPERLIPIDEMIA TYPE: ICD-10-CM

## 2021-04-03 ENCOUNTER — IMMUNIZATION (OUTPATIENT)
Dept: PRIMARY CARE CLINIC | Facility: CLINIC | Age: 51
End: 2021-04-03
Payer: MEDICARE

## 2021-04-03 DIAGNOSIS — Z23 NEED FOR VACCINATION: Primary | ICD-10-CM

## 2021-04-03 PROCEDURE — 0002A PR IMMUNIZ ADMIN, SARS-COV-2 COVID-19 VACC, 30MCG/0.3ML, 2ND DOSE: CPT | Mod: CV19,S$GLB,, | Performed by: INTERNAL MEDICINE

## 2021-04-03 PROCEDURE — 0002A PR IMMUNIZ ADMIN, SARS-COV-2 COVID-19 VACC, 30MCG/0.3ML, 2ND DOSE: ICD-10-PCS | Mod: CV19,S$GLB,, | Performed by: INTERNAL MEDICINE

## 2021-04-03 PROCEDURE — 91300 PR SARS-COV- 2 COVID-19 VACCINE, NO PRSV, 30MCG/0.3ML, IM: ICD-10-PCS | Mod: S$GLB,,, | Performed by: INTERNAL MEDICINE

## 2021-04-03 PROCEDURE — 91300 PR SARS-COV- 2 COVID-19 VACCINE, NO PRSV, 30MCG/0.3ML, IM: CPT | Mod: S$GLB,,, | Performed by: INTERNAL MEDICINE

## 2021-04-03 RX ADMIN — Medication 0.3 ML: at 12:04

## 2021-04-04 DIAGNOSIS — M54.12 CERVICAL RADICULOPATHY: ICD-10-CM

## 2021-04-04 RX ORDER — ATORVASTATIN CALCIUM 10 MG/1
10 TABLET, FILM COATED ORAL NIGHTLY
Qty: 90 TABLET | Refills: 2 | Status: SHIPPED | OUTPATIENT
Start: 2021-04-04 | End: 2021-11-28 | Stop reason: SDUPTHER

## 2021-04-04 RX ORDER — MORPHINE SULFATE 15 MG/1
15 TABLET, FILM COATED, EXTENDED RELEASE ORAL 2 TIMES DAILY
Qty: 60 TABLET | Refills: 0 | Status: CANCELLED | OUTPATIENT
Start: 2021-04-04 | End: 2021-05-04

## 2021-04-05 ENCOUNTER — PATIENT MESSAGE (OUTPATIENT)
Dept: ORTHOPEDICS | Facility: CLINIC | Age: 51
End: 2021-04-05

## 2021-04-07 ENCOUNTER — TELEPHONE (OUTPATIENT)
Dept: PAIN MEDICINE | Facility: CLINIC | Age: 51
End: 2021-04-07

## 2021-04-07 ENCOUNTER — PATIENT OUTREACH (OUTPATIENT)
Dept: ADMINISTRATIVE | Facility: OTHER | Age: 51
End: 2021-04-07

## 2021-04-07 ENCOUNTER — CLINICAL SUPPORT (OUTPATIENT)
Dept: REHABILITATION | Facility: HOSPITAL | Age: 51
End: 2021-04-07
Payer: MEDICARE

## 2021-04-07 ENCOUNTER — PATIENT MESSAGE (OUTPATIENT)
Dept: PAIN MEDICINE | Facility: CLINIC | Age: 51
End: 2021-04-07

## 2021-04-07 DIAGNOSIS — M25.562 CHRONIC PAIN OF LEFT KNEE: ICD-10-CM

## 2021-04-07 DIAGNOSIS — R26.9 IMPAIRED GAIT: ICD-10-CM

## 2021-04-07 DIAGNOSIS — M25.662 DECREASED RANGE OF MOTION OF LEFT KNEE: ICD-10-CM

## 2021-04-07 DIAGNOSIS — G89.29 CHRONIC PAIN OF LEFT KNEE: ICD-10-CM

## 2021-04-07 DIAGNOSIS — R53.1 DECREASED STRENGTH: ICD-10-CM

## 2021-04-07 PROCEDURE — 97110 THERAPEUTIC EXERCISES: CPT

## 2021-04-08 ENCOUNTER — TELEPHONE (OUTPATIENT)
Dept: PAIN MEDICINE | Facility: CLINIC | Age: 51
End: 2021-04-08

## 2021-04-08 ENCOUNTER — PATIENT MESSAGE (OUTPATIENT)
Dept: PAIN MEDICINE | Facility: CLINIC | Age: 51
End: 2021-04-08

## 2021-04-08 ENCOUNTER — OFFICE VISIT (OUTPATIENT)
Dept: PAIN MEDICINE | Facility: CLINIC | Age: 51
End: 2021-04-08
Payer: MEDICARE

## 2021-04-08 DIAGNOSIS — Z79.891 ENCOUNTER FOR LONG-TERM OPIATE ANALGESIC USE: ICD-10-CM

## 2021-04-08 DIAGNOSIS — M54.12 CERVICAL RADICULOPATHY: ICD-10-CM

## 2021-04-08 DIAGNOSIS — Z98.890 S/P LUMBAR LAMINECTOMY: ICD-10-CM

## 2021-04-08 DIAGNOSIS — G89.29 OTHER CHRONIC PAIN: ICD-10-CM

## 2021-04-08 DIAGNOSIS — G89.4 CHRONIC PAIN SYNDROME: Primary | ICD-10-CM

## 2021-04-08 DIAGNOSIS — M17.11 PRIMARY OSTEOARTHRITIS OF RIGHT KNEE: ICD-10-CM

## 2021-04-08 PROCEDURE — 99213 PR OFFICE/OUTPT VISIT, EST, LEVL III, 20-29 MIN: ICD-10-PCS | Mod: 95,,, | Performed by: NURSE PRACTITIONER

## 2021-04-08 PROCEDURE — 99213 OFFICE O/P EST LOW 20 MIN: CPT | Mod: 95,,, | Performed by: NURSE PRACTITIONER

## 2021-04-08 RX ORDER — MORPHINE SULFATE 15 MG/1
15 TABLET, FILM COATED, EXTENDED RELEASE ORAL 2 TIMES DAILY
Qty: 60 TABLET | Refills: 0 | Status: SHIPPED | OUTPATIENT
Start: 2021-04-08 | End: 2021-05-04 | Stop reason: SDUPTHER

## 2021-04-08 RX ORDER — OXYCODONE AND ACETAMINOPHEN 10; 325 MG/1; MG/1
1 TABLET ORAL EVERY 8 HOURS PRN
Qty: 90 TABLET | Refills: 0 | Status: SHIPPED | OUTPATIENT
Start: 2021-04-08 | End: 2021-06-04 | Stop reason: SDUPTHER

## 2021-04-12 ENCOUNTER — CLINICAL SUPPORT (OUTPATIENT)
Dept: REHABILITATION | Facility: HOSPITAL | Age: 51
End: 2021-04-12
Payer: MEDICARE

## 2021-04-12 DIAGNOSIS — M25.662 DECREASED RANGE OF MOTION OF LEFT KNEE: ICD-10-CM

## 2021-04-12 DIAGNOSIS — R53.1 DECREASED STRENGTH: ICD-10-CM

## 2021-04-12 DIAGNOSIS — M25.562 CHRONIC PAIN OF LEFT KNEE: ICD-10-CM

## 2021-04-12 DIAGNOSIS — G89.29 CHRONIC PAIN OF LEFT KNEE: ICD-10-CM

## 2021-04-12 DIAGNOSIS — R26.9 IMPAIRED GAIT: ICD-10-CM

## 2021-04-12 PROCEDURE — 97110 THERAPEUTIC EXERCISES: CPT

## 2021-04-14 ENCOUNTER — PATIENT MESSAGE (OUTPATIENT)
Dept: ORTHOPEDICS | Facility: CLINIC | Age: 51
End: 2021-04-14

## 2021-04-14 DIAGNOSIS — M17.11 PRIMARY OSTEOARTHRITIS OF RIGHT KNEE: ICD-10-CM

## 2021-04-14 DIAGNOSIS — Z96.652 STATUS POST TOTAL LEFT KNEE REPLACEMENT: Primary | ICD-10-CM

## 2021-04-22 ENCOUNTER — HOSPITAL ENCOUNTER (OUTPATIENT)
Dept: RADIOLOGY | Facility: HOSPITAL | Age: 51
Discharge: HOME | End: 2021-04-22
Attending: ORTHOPAEDIC SURGERY
Payer: MEDICARE

## 2021-04-22 ENCOUNTER — OFFICE VISIT (OUTPATIENT)
Dept: ORTHOPEDICS | Facility: CLINIC | Age: 51
End: 2021-04-22
Payer: MEDICARE

## 2021-04-22 VITALS — WEIGHT: 127 LBS | HEIGHT: 60 IN | BODY MASS INDEX: 24.94 KG/M2

## 2021-04-22 DIAGNOSIS — Z96.652 STATUS POST TOTAL LEFT KNEE REPLACEMENT: ICD-10-CM

## 2021-04-22 DIAGNOSIS — Z96.651 STATUS POST RIGHT KNEE REPLACEMENT: ICD-10-CM

## 2021-04-22 DIAGNOSIS — Z96.652 STATUS POST TOTAL LEFT KNEE REPLACEMENT: Primary | ICD-10-CM

## 2021-04-22 DIAGNOSIS — M17.11 PRIMARY OSTEOARTHRITIS OF RIGHT KNEE: ICD-10-CM

## 2021-04-22 PROCEDURE — 77073 XR HIP TO ANKLE: ICD-10-PCS | Mod: 26,,, | Performed by: RADIOLOGY

## 2021-04-22 PROCEDURE — 99024 POSTOP FOLLOW-UP VISIT: CPT | Mod: POP,,, | Performed by: ORTHOPAEDIC SURGERY

## 2021-04-22 PROCEDURE — 99213 OFFICE O/P EST LOW 20 MIN: CPT | Mod: PBBFAC,25 | Performed by: ORTHOPAEDIC SURGERY

## 2021-04-22 PROCEDURE — 99999 PR PBB SHADOW E&M-EST. PATIENT-LVL III: ICD-10-PCS | Mod: PBBFAC,,, | Performed by: ORTHOPAEDIC SURGERY

## 2021-04-22 PROCEDURE — 77073 BONE LENGTH STUDIES: CPT | Mod: TC

## 2021-04-22 PROCEDURE — 99024 PR POST-OP FOLLOW-UP VISIT: ICD-10-PCS | Mod: POP,,, | Performed by: ORTHOPAEDIC SURGERY

## 2021-04-22 PROCEDURE — 99999 PR PBB SHADOW E&M-EST. PATIENT-LVL III: CPT | Mod: PBBFAC,,, | Performed by: ORTHOPAEDIC SURGERY

## 2021-04-22 PROCEDURE — 77073 BONE LENGTH STUDIES: CPT | Mod: 26,,, | Performed by: RADIOLOGY

## 2021-04-25 ENCOUNTER — PATIENT MESSAGE (OUTPATIENT)
Dept: ADMINISTRATIVE | Facility: OTHER | Age: 51
End: 2021-04-25

## 2021-04-30 DIAGNOSIS — F43.10 PTSD (POST-TRAUMATIC STRESS DISORDER): ICD-10-CM

## 2021-04-30 RX ORDER — ALPRAZOLAM 1 MG/1
1 TABLET ORAL 3 TIMES DAILY PRN
Qty: 90 TABLET | Refills: 1 | Status: SHIPPED | OUTPATIENT
Start: 2021-04-30 | End: 2021-06-30 | Stop reason: SDUPTHER

## 2021-05-04 DIAGNOSIS — M54.12 CERVICAL RADICULOPATHY: Primary | ICD-10-CM

## 2021-05-04 RX ORDER — MORPHINE SULFATE 15 MG/1
15 TABLET, FILM COATED, EXTENDED RELEASE ORAL 2 TIMES DAILY
Qty: 60 TABLET | Refills: 0 | Status: SHIPPED | OUTPATIENT
Start: 2021-05-08 | End: 2021-06-13

## 2021-05-06 ENCOUNTER — OFFICE VISIT (OUTPATIENT)
Dept: SPINE | Facility: CLINIC | Age: 51
End: 2021-05-06
Payer: MEDICARE

## 2021-05-06 DIAGNOSIS — M54.40 CHRONIC LOW BACK PAIN WITH SCIATICA, SCIATICA LATERALITY UNSPECIFIED, UNSPECIFIED BACK PAIN LATERALITY: Primary | ICD-10-CM

## 2021-05-06 DIAGNOSIS — G89.29 CHRONIC LOW BACK PAIN WITH SCIATICA, SCIATICA LATERALITY UNSPECIFIED, UNSPECIFIED BACK PAIN LATERALITY: Primary | ICD-10-CM

## 2021-05-06 DIAGNOSIS — G89.4 CHRONIC PAIN SYNDROME: ICD-10-CM

## 2021-05-06 DIAGNOSIS — M48.062 SPINAL STENOSIS OF LUMBAR REGION WITH NEUROGENIC CLAUDICATION: ICD-10-CM

## 2021-05-06 DIAGNOSIS — F11.90 OPIOID USE: ICD-10-CM

## 2021-05-06 DIAGNOSIS — Z79.891 ENCOUNTER FOR LONG-TERM OPIATE ANALGESIC USE: ICD-10-CM

## 2021-05-06 PROCEDURE — 99214 OFFICE O/P EST MOD 30 MIN: CPT | Mod: 95,,, | Performed by: NURSE PRACTITIONER

## 2021-05-06 PROCEDURE — 99214 PR OFFICE/OUTPT VISIT, EST, LEVL IV, 30-39 MIN: ICD-10-PCS | Mod: 95,,, | Performed by: NURSE PRACTITIONER

## 2021-05-10 ENCOUNTER — DOCUMENTATION ONLY (OUTPATIENT)
Dept: REHABILITATION | Facility: HOSPITAL | Age: 51
End: 2021-05-10

## 2021-05-10 PROBLEM — M25.562 CHRONIC PAIN OF LEFT KNEE: Status: RESOLVED | Noted: 2021-02-23 | Resolved: 2021-05-10

## 2021-05-10 PROBLEM — R26.9 IMPAIRED GAIT: Status: RESOLVED | Noted: 2021-02-23 | Resolved: 2021-05-10

## 2021-05-10 PROBLEM — M25.662 DECREASED RANGE OF MOTION OF LEFT KNEE: Status: RESOLVED | Noted: 2021-02-23 | Resolved: 2021-05-10

## 2021-05-10 PROBLEM — G89.29 CHRONIC PAIN OF LEFT KNEE: Status: RESOLVED | Noted: 2021-02-23 | Resolved: 2021-05-10

## 2021-05-10 PROBLEM — R53.1 DECREASED STRENGTH: Status: RESOLVED | Noted: 2021-02-23 | Resolved: 2021-05-10

## 2021-05-16 ENCOUNTER — HOSPITAL ENCOUNTER (INPATIENT)
Facility: HOSPITAL | Age: 51
LOS: 6 days | Discharge: HOME | DRG: 414 | End: 2021-05-22
Attending: EMERGENCY MEDICINE | Admitting: SURGERY
Payer: MEDICARE

## 2021-05-16 DIAGNOSIS — K83.09 ASCENDING CHOLANGITIS: ICD-10-CM

## 2021-05-16 DIAGNOSIS — E27.40 ADRENAL INSUFFICIENCY: ICD-10-CM

## 2021-05-16 DIAGNOSIS — K81.0 ACUTE CHOLECYSTITIS: Primary | ICD-10-CM

## 2021-05-16 DIAGNOSIS — R06.02 SHORTNESS OF BREATH: ICD-10-CM

## 2021-05-16 LAB
ALBUMIN SERPL BCP-MCNC: 3.9 G/DL (ref 3.5–5.2)
ALP SERPL-CCNC: 300 U/L (ref 55–135)
ALT SERPL W/O P-5'-P-CCNC: 189 U/L (ref 10–44)
ANION GAP SERPL CALC-SCNC: 13 MMOL/L (ref 8–16)
AST SERPL-CCNC: 296 U/L (ref 10–40)
BACTERIA #/AREA URNS AUTO: ABNORMAL /HPF
BASOPHILS # BLD AUTO: 0.05 K/UL (ref 0–0.2)
BASOPHILS NFR BLD: 0.3 % (ref 0–1.9)
BILIRUB SERPL-MCNC: 1.2 MG/DL (ref 0.1–1)
BILIRUB UR QL STRIP: NEGATIVE
BUN SERPL-MCNC: 13 MG/DL (ref 6–20)
CALCIUM SERPL-MCNC: 9.4 MG/DL (ref 8.7–10.5)
CHLORIDE SERPL-SCNC: 99 MMOL/L (ref 95–110)
CLARITY UR REFRACT.AUTO: ABNORMAL
CO2 SERPL-SCNC: 22 MMOL/L (ref 23–29)
COLOR UR AUTO: ABNORMAL
CREAT SERPL-MCNC: 0.7 MG/DL (ref 0.5–1.4)
CTP QC/QA: YES
DIFFERENTIAL METHOD: ABNORMAL
EOSINOPHIL # BLD AUTO: 0 K/UL (ref 0–0.5)
EOSINOPHIL NFR BLD: 0 % (ref 0–8)
ERYTHROCYTE [DISTWIDTH] IN BLOOD BY AUTOMATED COUNT: 12.8 % (ref 11.5–14.5)
EST. GFR  (AFRICAN AMERICAN): >60 ML/MIN/1.73 M^2
EST. GFR  (NON AFRICAN AMERICAN): >60 ML/MIN/1.73 M^2
GLUCOSE SERPL-MCNC: 92 MG/DL (ref 70–110)
GLUCOSE UR QL STRIP: NEGATIVE
HCT VFR BLD AUTO: 46.1 % (ref 40–54)
HGB BLD-MCNC: 15.4 G/DL (ref 14–18)
HGB UR QL STRIP: NEGATIVE
IMM GRANULOCYTES # BLD AUTO: 0.09 K/UL (ref 0–0.04)
IMM GRANULOCYTES NFR BLD AUTO: 0.6 % (ref 0–0.5)
KETONES UR QL STRIP: ABNORMAL
LACTATE SERPL-SCNC: 1.4 MMOL/L (ref 0.5–2.2)
LACTATE SERPL-SCNC: 1.5 MMOL/L (ref 0.5–2.2)
LEUKOCYTE ESTERASE UR QL STRIP: ABNORMAL
LIPASE SERPL-CCNC: 8 U/L (ref 4–60)
LYMPHOCYTES # BLD AUTO: 0.8 K/UL (ref 1–4.8)
LYMPHOCYTES NFR BLD: 5 % (ref 18–48)
MAGNESIUM SERPL-MCNC: 1.8 MG/DL (ref 1.6–2.6)
MCH RBC QN AUTO: 33.3 PG (ref 27–31)
MCHC RBC AUTO-ENTMCNC: 33.4 G/DL (ref 32–36)
MCV RBC AUTO: 100 FL (ref 82–98)
MICROSCOPIC COMMENT: ABNORMAL
MONOCYTES # BLD AUTO: 0.7 K/UL (ref 0.3–1)
MONOCYTES NFR BLD: 4.5 % (ref 4–15)
NEUTROPHILS # BLD AUTO: 14.4 K/UL (ref 1.8–7.7)
NEUTROPHILS NFR BLD: 89.6 % (ref 38–73)
NITRITE UR QL STRIP: NEGATIVE
NON-SQ EPI CELLS #/AREA URNS AUTO: <1 /HPF
NRBC BLD-RTO: 0 /100 WBC
PH UR STRIP: 6 [PH] (ref 5–8)
PLATELET # BLD AUTO: 279 K/UL (ref 150–450)
PMV BLD AUTO: 9.2 FL (ref 9.2–12.9)
POTASSIUM SERPL-SCNC: 4.8 MMOL/L (ref 3.5–5.1)
PROT SERPL-MCNC: 7.5 G/DL (ref 6–8.4)
PROT UR QL STRIP: NEGATIVE
RBC # BLD AUTO: 4.62 M/UL (ref 4.6–6.2)
RBC #/AREA URNS AUTO: 2 /HPF (ref 0–4)
SARS-COV-2 RDRP RESP QL NAA+PROBE: NEGATIVE
SODIUM SERPL-SCNC: 134 MMOL/L (ref 136–145)
SP GR UR STRIP: 1.02 (ref 1–1.03)
SQUAMOUS #/AREA URNS AUTO: 2 /HPF
URN SPEC COLLECT METH UR: ABNORMAL
WBC # BLD AUTO: 16.08 K/UL (ref 3.9–12.7)
WBC #/AREA URNS AUTO: 11 /HPF (ref 0–5)
WBC CLUMPS UR QL AUTO: ABNORMAL

## 2021-05-16 PROCEDURE — 96376 TX/PRO/DX INJ SAME DRUG ADON: CPT

## 2021-05-16 PROCEDURE — 93010 EKG 12-LEAD: ICD-10-PCS | Mod: ,,, | Performed by: INTERNAL MEDICINE

## 2021-05-16 PROCEDURE — 83690 ASSAY OF LIPASE: CPT | Performed by: EMERGENCY MEDICINE

## 2021-05-16 PROCEDURE — 87040 BLOOD CULTURE FOR BACTERIA: CPT | Performed by: EMERGENCY MEDICINE

## 2021-05-16 PROCEDURE — 80053 COMPREHEN METABOLIC PANEL: CPT | Performed by: EMERGENCY MEDICINE

## 2021-05-16 PROCEDURE — 20600001 HC STEP DOWN PRIVATE ROOM

## 2021-05-16 PROCEDURE — 93005 ELECTROCARDIOGRAM TRACING: CPT

## 2021-05-16 PROCEDURE — 99285 EMERGENCY DEPT VISIT HI MDM: CPT | Mod: 25

## 2021-05-16 PROCEDURE — 87077 CULTURE AEROBIC IDENTIFY: CPT | Performed by: EMERGENCY MEDICINE

## 2021-05-16 PROCEDURE — 96366 THER/PROPH/DIAG IV INF ADDON: CPT

## 2021-05-16 PROCEDURE — 87186 SC STD MICRODIL/AGAR DIL: CPT | Performed by: EMERGENCY MEDICINE

## 2021-05-16 PROCEDURE — 96367 TX/PROPH/DG ADDL SEQ IV INF: CPT

## 2021-05-16 PROCEDURE — 25000003 PHARM REV CODE 250

## 2021-05-16 PROCEDURE — 99285 PR EMERGENCY DEPT VISIT,LEVEL V: ICD-10-PCS | Mod: CS,,, | Performed by: EMERGENCY MEDICINE

## 2021-05-16 PROCEDURE — 63600175 PHARM REV CODE 636 W HCPCS

## 2021-05-16 PROCEDURE — U0002 COVID-19 LAB TEST NON-CDC: HCPCS

## 2021-05-16 PROCEDURE — 25000003 PHARM REV CODE 250: Performed by: EMERGENCY MEDICINE

## 2021-05-16 PROCEDURE — 96375 TX/PRO/DX INJ NEW DRUG ADDON: CPT

## 2021-05-16 PROCEDURE — 96361 HYDRATE IV INFUSION ADD-ON: CPT

## 2021-05-16 PROCEDURE — 81001 URINALYSIS AUTO W/SCOPE: CPT | Performed by: EMERGENCY MEDICINE

## 2021-05-16 PROCEDURE — 96372 THER/PROPH/DIAG INJ SC/IM: CPT | Mod: 59

## 2021-05-16 PROCEDURE — S0030 INJECTION, METRONIDAZOLE: HCPCS | Performed by: EMERGENCY MEDICINE

## 2021-05-16 PROCEDURE — 85025 COMPLETE CBC W/AUTO DIFF WBC: CPT | Performed by: EMERGENCY MEDICINE

## 2021-05-16 PROCEDURE — 96365 THER/PROPH/DIAG IV INF INIT: CPT

## 2021-05-16 PROCEDURE — 83605 ASSAY OF LACTIC ACID: CPT | Performed by: EMERGENCY MEDICINE

## 2021-05-16 PROCEDURE — 87086 URINE CULTURE/COLONY COUNT: CPT | Performed by: EMERGENCY MEDICINE

## 2021-05-16 PROCEDURE — 87088 URINE BACTERIA CULTURE: CPT | Performed by: EMERGENCY MEDICINE

## 2021-05-16 PROCEDURE — 99285 EMERGENCY DEPT VISIT HI MDM: CPT | Mod: CS,,, | Performed by: EMERGENCY MEDICINE

## 2021-05-16 PROCEDURE — 93010 ELECTROCARDIOGRAM REPORT: CPT | Mod: ,,, | Performed by: INTERNAL MEDICINE

## 2021-05-16 PROCEDURE — 63600175 PHARM REV CODE 636 W HCPCS: Performed by: EMERGENCY MEDICINE

## 2021-05-16 PROCEDURE — 83735 ASSAY OF MAGNESIUM: CPT | Performed by: EMERGENCY MEDICINE

## 2021-05-16 RX ORDER — ONDANSETRON 2 MG/ML
4 INJECTION INTRAMUSCULAR; INTRAVENOUS EVERY 6 HOURS PRN
Status: DISCONTINUED | OUTPATIENT
Start: 2021-05-16 | End: 2021-05-22 | Stop reason: HOSPADM

## 2021-05-16 RX ORDER — MORPHINE SULFATE 4 MG/ML
4 INJECTION, SOLUTION INTRAMUSCULAR; INTRAVENOUS
Status: COMPLETED | OUTPATIENT
Start: 2021-05-16 | End: 2021-05-16

## 2021-05-16 RX ORDER — ENOXAPARIN SODIUM 100 MG/ML
40 INJECTION SUBCUTANEOUS EVERY 24 HOURS
Status: DISCONTINUED | OUTPATIENT
Start: 2021-05-16 | End: 2021-05-22 | Stop reason: HOSPADM

## 2021-05-16 RX ORDER — ACETAMINOPHEN 325 MG/1
650 TABLET ORAL
Status: COMPLETED | OUTPATIENT
Start: 2021-05-16 | End: 2021-05-16

## 2021-05-16 RX ORDER — PROCHLORPERAZINE EDISYLATE 5 MG/ML
5 INJECTION INTRAMUSCULAR; INTRAVENOUS EVERY 6 HOURS PRN
Status: DISCONTINUED | OUTPATIENT
Start: 2021-05-16 | End: 2021-05-22 | Stop reason: HOSPADM

## 2021-05-16 RX ORDER — LIDOCAINE HYDROCHLORIDE 10 MG/ML
1 INJECTION, SOLUTION EPIDURAL; INFILTRATION; INTRACAUDAL; PERINEURAL ONCE
Status: DISCONTINUED | OUTPATIENT
Start: 2021-05-16 | End: 2021-05-17

## 2021-05-16 RX ORDER — GLUCAGON 1 MG
1 KIT INJECTION
Status: DISCONTINUED | OUTPATIENT
Start: 2021-05-16 | End: 2021-05-22 | Stop reason: HOSPADM

## 2021-05-16 RX ORDER — SODIUM CHLORIDE 0.9 % (FLUSH) 0.9 %
10 SYRINGE (ML) INJECTION EVERY 6 HOURS PRN
Status: DISCONTINUED | OUTPATIENT
Start: 2021-05-16 | End: 2021-05-22 | Stop reason: HOSPADM

## 2021-05-16 RX ORDER — OXYCODONE HYDROCHLORIDE 10 MG/1
10 TABLET ORAL EVERY 4 HOURS PRN
Status: DISCONTINUED | OUTPATIENT
Start: 2021-05-16 | End: 2021-05-22 | Stop reason: HOSPADM

## 2021-05-16 RX ORDER — METRONIDAZOLE 500 MG/100ML
500 INJECTION, SOLUTION INTRAVENOUS ONCE
Status: COMPLETED | OUTPATIENT
Start: 2021-05-16 | End: 2021-05-16

## 2021-05-16 RX ORDER — CYCLOBENZAPRINE HCL 5 MG
10 TABLET ORAL 3 TIMES DAILY PRN
Status: DISCONTINUED | OUTPATIENT
Start: 2021-05-16 | End: 2021-05-22 | Stop reason: HOSPADM

## 2021-05-16 RX ORDER — OXYCODONE HYDROCHLORIDE 5 MG/1
5 TABLET ORAL EVERY 4 HOURS PRN
Status: DISCONTINUED | OUTPATIENT
Start: 2021-05-16 | End: 2021-05-22 | Stop reason: HOSPADM

## 2021-05-16 RX ORDER — ATORVASTATIN CALCIUM 10 MG/1
10 TABLET, FILM COATED ORAL NIGHTLY
Status: DISCONTINUED | OUTPATIENT
Start: 2021-05-16 | End: 2021-05-22 | Stop reason: HOSPADM

## 2021-05-16 RX ORDER — SODIUM CHLORIDE, SODIUM LACTATE, POTASSIUM CHLORIDE, CALCIUM CHLORIDE 600; 310; 30; 20 MG/100ML; MG/100ML; MG/100ML; MG/100ML
INJECTION, SOLUTION INTRAVENOUS CONTINUOUS
Status: DISCONTINUED | OUTPATIENT
Start: 2021-05-16 | End: 2021-05-19

## 2021-05-16 RX ORDER — ACETAMINOPHEN 325 MG/1
650 TABLET ORAL EVERY 8 HOURS PRN
Status: DISCONTINUED | OUTPATIENT
Start: 2021-05-16 | End: 2021-05-18

## 2021-05-16 RX ORDER — ROPINIROLE 2 MG/1
4 TABLET, FILM COATED ORAL NIGHTLY
Status: DISCONTINUED | OUTPATIENT
Start: 2021-05-16 | End: 2021-05-22 | Stop reason: HOSPADM

## 2021-05-16 RX ORDER — ONDANSETRON 2 MG/ML
4 INJECTION INTRAMUSCULAR; INTRAVENOUS
Status: COMPLETED | OUTPATIENT
Start: 2021-05-16 | End: 2021-05-16

## 2021-05-16 RX ORDER — INSULIN ASPART 100 [IU]/ML
1-10 INJECTION, SOLUTION INTRAVENOUS; SUBCUTANEOUS EVERY 6 HOURS PRN
Status: DISCONTINUED | OUTPATIENT
Start: 2021-05-16 | End: 2021-05-22 | Stop reason: HOSPADM

## 2021-05-16 RX ADMIN — HYDROCORTISONE SODIUM SUCCINATE 50 MG: 100 INJECTION, POWDER, FOR SOLUTION INTRAMUSCULAR; INTRAVENOUS at 10:05

## 2021-05-16 RX ADMIN — ONDANSETRON 4 MG: 2 INJECTION INTRAMUSCULAR; INTRAVENOUS at 05:05

## 2021-05-16 RX ADMIN — CEFTRIAXONE 2 G: 2 INJECTION, SOLUTION INTRAVENOUS at 07:05

## 2021-05-16 RX ADMIN — ENOXAPARIN SODIUM 40 MG: 40 INJECTION SUBCUTANEOUS at 10:05

## 2021-05-16 RX ADMIN — MORPHINE SULFATE 4 MG: 4 INJECTION INTRAVENOUS at 07:05

## 2021-05-16 RX ADMIN — ATORVASTATIN CALCIUM 10 MG: 10 TABLET, FILM COATED ORAL at 10:05

## 2021-05-16 RX ADMIN — METRONIDAZOLE 500 MG: 500 INJECTION, SOLUTION INTRAVENOUS at 08:05

## 2021-05-16 RX ADMIN — MORPHINE SULFATE 4 MG: 4 INJECTION INTRAVENOUS at 05:05

## 2021-05-16 RX ADMIN — LAMOTRIGINE 200 MG: 100 TABLET ORAL at 10:05

## 2021-05-16 RX ADMIN — SODIUM CHLORIDE 1770 ML: 0.9 INJECTION, SOLUTION INTRAVENOUS at 05:05

## 2021-05-16 RX ADMIN — SODIUM CHLORIDE, SODIUM LACTATE, POTASSIUM CHLORIDE, AND CALCIUM CHLORIDE: .6; .31; .03; .02 INJECTION, SOLUTION INTRAVENOUS at 10:05

## 2021-05-16 RX ADMIN — ROPINIROLE HYDROCHLORIDE 4 MG: 2 TABLET, FILM COATED ORAL at 10:05

## 2021-05-16 RX ADMIN — OXYCODONE HYDROCHLORIDE 10 MG: 10 TABLET ORAL at 10:05

## 2021-05-16 RX ADMIN — ACETAMINOPHEN 650 MG: 325 TABLET ORAL at 05:05

## 2021-05-17 ENCOUNTER — ANESTHESIA EVENT (OUTPATIENT)
Dept: SURGERY | Facility: HOSPITAL | Age: 51
DRG: 414 | End: 2021-05-17
Payer: MEDICARE

## 2021-05-17 ENCOUNTER — ANESTHESIA (OUTPATIENT)
Dept: ENDOSCOPY | Facility: HOSPITAL | Age: 51
DRG: 414 | End: 2021-05-17
Payer: MEDICARE

## 2021-05-17 ENCOUNTER — ANESTHESIA EVENT (OUTPATIENT)
Dept: ENDOSCOPY | Facility: HOSPITAL | Age: 51
DRG: 414 | End: 2021-05-17
Payer: MEDICARE

## 2021-05-17 ENCOUNTER — PATIENT MESSAGE (OUTPATIENT)
Dept: SURGERY | Facility: CLINIC | Age: 51
End: 2021-05-17

## 2021-05-17 PROBLEM — K83.09 ASCENDING CHOLANGITIS: Status: ACTIVE | Noted: 2021-05-17

## 2021-05-17 LAB
ALBUMIN SERPL BCP-MCNC: 3.1 G/DL (ref 3.5–5.2)
ALP SERPL-CCNC: 266 U/L (ref 55–135)
ALT SERPL W/O P-5'-P-CCNC: 156 U/L (ref 10–44)
ANION GAP SERPL CALC-SCNC: 10 MMOL/L (ref 8–16)
AST SERPL-CCNC: 145 U/L (ref 10–40)
BASOPHILS # BLD AUTO: 0.05 K/UL (ref 0–0.2)
BASOPHILS NFR BLD: 0.4 % (ref 0–1.9)
BILIRUB SERPL-MCNC: 1.7 MG/DL (ref 0.1–1)
BUN SERPL-MCNC: 11 MG/DL (ref 6–20)
CALCIUM SERPL-MCNC: 8.6 MG/DL (ref 8.7–10.5)
CHLORIDE SERPL-SCNC: 102 MMOL/L (ref 95–110)
CO2 SERPL-SCNC: 21 MMOL/L (ref 23–29)
CREAT SERPL-MCNC: 0.6 MG/DL (ref 0.5–1.4)
DIFFERENTIAL METHOD: ABNORMAL
EOSINOPHIL # BLD AUTO: 0 K/UL (ref 0–0.5)
EOSINOPHIL NFR BLD: 0.1 % (ref 0–8)
ERYTHROCYTE [DISTWIDTH] IN BLOOD BY AUTOMATED COUNT: 13.1 % (ref 11.5–14.5)
EST. GFR  (AFRICAN AMERICAN): >60 ML/MIN/1.73 M^2
EST. GFR  (NON AFRICAN AMERICAN): >60 ML/MIN/1.73 M^2
GLUCOSE SERPL-MCNC: 82 MG/DL (ref 70–110)
HCT VFR BLD AUTO: 40.7 % (ref 40–54)
HGB BLD-MCNC: 13.2 G/DL (ref 14–18)
IMM GRANULOCYTES # BLD AUTO: 0.06 K/UL (ref 0–0.04)
IMM GRANULOCYTES NFR BLD AUTO: 0.4 % (ref 0–0.5)
LYMPHOCYTES # BLD AUTO: 1.1 K/UL (ref 1–4.8)
LYMPHOCYTES NFR BLD: 7.8 % (ref 18–48)
MAGNESIUM SERPL-MCNC: 1.6 MG/DL (ref 1.6–2.6)
MCH RBC QN AUTO: 31.5 PG (ref 27–31)
MCHC RBC AUTO-ENTMCNC: 32.4 G/DL (ref 32–36)
MCV RBC AUTO: 97 FL (ref 82–98)
MONOCYTES # BLD AUTO: 0.3 K/UL (ref 0.3–1)
MONOCYTES NFR BLD: 2.2 % (ref 4–15)
NEUTROPHILS # BLD AUTO: 12.3 K/UL (ref 1.8–7.7)
NEUTROPHILS NFR BLD: 89.1 % (ref 38–73)
NRBC BLD-RTO: 0 /100 WBC
PHOSPHATE SERPL-MCNC: 3.7 MG/DL (ref 2.7–4.5)
PLATELET # BLD AUTO: 271 K/UL (ref 150–450)
PMV BLD AUTO: 9.3 FL (ref 9.2–12.9)
POCT GLUCOSE: 122 MG/DL (ref 70–110)
POCT GLUCOSE: 62 MG/DL (ref 70–110)
POCT GLUCOSE: 64 MG/DL (ref 70–110)
POCT GLUCOSE: 72 MG/DL (ref 70–110)
POCT GLUCOSE: 77 MG/DL (ref 70–110)
POCT GLUCOSE: 80 MG/DL (ref 70–110)
POCT GLUCOSE: 86 MG/DL (ref 70–110)
POTASSIUM SERPL-SCNC: 3.7 MMOL/L (ref 3.5–5.1)
PROT SERPL-MCNC: 6 G/DL (ref 6–8.4)
RBC # BLD AUTO: 4.19 M/UL (ref 4.6–6.2)
SODIUM SERPL-SCNC: 133 MMOL/L (ref 136–145)
WBC # BLD AUTO: 13.85 K/UL (ref 3.9–12.7)

## 2021-05-17 PROCEDURE — 99223 PR INITIAL HOSPITAL CARE,LEVL III: ICD-10-PCS | Mod: 25,GC,, | Performed by: INTERNAL MEDICINE

## 2021-05-17 PROCEDURE — 84100 ASSAY OF PHOSPHORUS: CPT

## 2021-05-17 PROCEDURE — 43259 PR ENDOSCOPIC ULTRASOUND EXAM: ICD-10-PCS | Mod: ,,, | Performed by: INTERNAL MEDICINE

## 2021-05-17 PROCEDURE — 99499 UNLISTED E&M SERVICE: CPT | Mod: ,,, | Performed by: SURGERY

## 2021-05-17 PROCEDURE — 43259 EGD US EXAM DUODENUM/JEJUNUM: CPT | Performed by: INTERNAL MEDICINE

## 2021-05-17 PROCEDURE — 63600175 PHARM REV CODE 636 W HCPCS: Performed by: NURSE ANESTHETIST, CERTIFIED REGISTERED

## 2021-05-17 PROCEDURE — 20600001 HC STEP DOWN PRIVATE ROOM

## 2021-05-17 PROCEDURE — 25000003 PHARM REV CODE 250

## 2021-05-17 PROCEDURE — 85025 COMPLETE CBC W/AUTO DIFF WBC: CPT

## 2021-05-17 PROCEDURE — 25000003 PHARM REV CODE 250: Performed by: STUDENT IN AN ORGANIZED HEALTH CARE EDUCATION/TRAINING PROGRAM

## 2021-05-17 PROCEDURE — 94761 N-INVAS EAR/PLS OXIMETRY MLT: CPT

## 2021-05-17 PROCEDURE — 99499 NO LOS: ICD-10-PCS | Mod: ,,, | Performed by: SURGERY

## 2021-05-17 PROCEDURE — 63600175 PHARM REV CODE 636 W HCPCS: Performed by: INTERNAL MEDICINE

## 2021-05-17 PROCEDURE — D9220A PRA ANESTHESIA: ICD-10-PCS | Mod: CRNA,,, | Performed by: NURSE ANESTHETIST, CERTIFIED REGISTERED

## 2021-05-17 PROCEDURE — 43259 EGD US EXAM DUODENUM/JEJUNUM: CPT | Mod: ,,, | Performed by: INTERNAL MEDICINE

## 2021-05-17 PROCEDURE — 25000003 PHARM REV CODE 250: Performed by: NURSE ANESTHETIST, CERTIFIED REGISTERED

## 2021-05-17 PROCEDURE — D9220A PRA ANESTHESIA: Mod: CRNA,,, | Performed by: NURSE ANESTHETIST, CERTIFIED REGISTERED

## 2021-05-17 PROCEDURE — 83735 ASSAY OF MAGNESIUM: CPT

## 2021-05-17 PROCEDURE — 25000003 PHARM REV CODE 250: Performed by: INTERNAL MEDICINE

## 2021-05-17 PROCEDURE — D9220A PRA ANESTHESIA: ICD-10-PCS | Mod: ANES,,, | Performed by: ANESTHESIOLOGY

## 2021-05-17 PROCEDURE — 80053 COMPREHEN METABOLIC PANEL: CPT

## 2021-05-17 PROCEDURE — D9220A PRA ANESTHESIA: Mod: ANES,,, | Performed by: ANESTHESIOLOGY

## 2021-05-17 PROCEDURE — 63600175 PHARM REV CODE 636 W HCPCS

## 2021-05-17 PROCEDURE — 37000009 HC ANESTHESIA EA ADD 15 MINS: Performed by: INTERNAL MEDICINE

## 2021-05-17 PROCEDURE — 37000008 HC ANESTHESIA 1ST 15 MINUTES: Performed by: INTERNAL MEDICINE

## 2021-05-17 PROCEDURE — 63600175 PHARM REV CODE 636 W HCPCS: Performed by: STUDENT IN AN ORGANIZED HEALTH CARE EDUCATION/TRAINING PROGRAM

## 2021-05-17 PROCEDURE — 36415 COLL VENOUS BLD VENIPUNCTURE: CPT

## 2021-05-17 PROCEDURE — 99223 1ST HOSP IP/OBS HIGH 75: CPT | Mod: 25,GC,, | Performed by: INTERNAL MEDICINE

## 2021-05-17 PROCEDURE — 82962 GLUCOSE BLOOD TEST: CPT | Performed by: INTERNAL MEDICINE

## 2021-05-17 RX ORDER — SIMETHICONE 80 MG
1 TABLET,CHEWABLE ORAL 3 TIMES DAILY PRN
Status: DISCONTINUED | OUTPATIENT
Start: 2021-05-17 | End: 2021-05-22 | Stop reason: HOSPADM

## 2021-05-17 RX ORDER — FENTANYL CITRATE 50 UG/ML
INJECTION, SOLUTION INTRAMUSCULAR; INTRAVENOUS
Status: DISCONTINUED | OUTPATIENT
Start: 2021-05-17 | End: 2021-05-17

## 2021-05-17 RX ORDER — PROPOFOL 10 MG/ML
VIAL (ML) INTRAVENOUS
Status: DISCONTINUED | OUTPATIENT
Start: 2021-05-17 | End: 2021-05-17

## 2021-05-17 RX ORDER — POLYETHYLENE GLYCOL 3350 17 G/17G
17 POWDER, FOR SOLUTION ORAL DAILY
Status: DISCONTINUED | OUTPATIENT
Start: 2021-05-17 | End: 2021-05-22 | Stop reason: HOSPADM

## 2021-05-17 RX ORDER — ONDANSETRON 2 MG/ML
4 INJECTION INTRAMUSCULAR; INTRAVENOUS DAILY PRN
Status: DISCONTINUED | OUTPATIENT
Start: 2021-05-17 | End: 2021-05-17 | Stop reason: HOSPADM

## 2021-05-17 RX ORDER — FAMOTIDINE 10 MG/ML
INJECTION INTRAVENOUS
Status: DISCONTINUED | OUTPATIENT
Start: 2021-05-17 | End: 2021-05-17

## 2021-05-17 RX ORDER — MIDAZOLAM HYDROCHLORIDE 1 MG/ML
INJECTION, SOLUTION INTRAMUSCULAR; INTRAVENOUS
Status: DISCONTINUED | OUTPATIENT
Start: 2021-05-17 | End: 2021-05-17

## 2021-05-17 RX ORDER — TALC
6 POWDER (GRAM) TOPICAL NIGHTLY PRN
Status: DISCONTINUED | OUTPATIENT
Start: 2021-05-17 | End: 2021-05-22 | Stop reason: HOSPADM

## 2021-05-17 RX ORDER — ONDANSETRON 2 MG/ML
INJECTION INTRAMUSCULAR; INTRAVENOUS
Status: DISCONTINUED | OUTPATIENT
Start: 2021-05-17 | End: 2021-05-17

## 2021-05-17 RX ORDER — ALPRAZOLAM 1 MG/1
1 TABLET ORAL ONCE
Status: COMPLETED | OUTPATIENT
Start: 2021-05-17 | End: 2021-05-17

## 2021-05-17 RX ORDER — LIDOCAINE HYDROCHLORIDE 20 MG/ML
INJECTION INTRAVENOUS
Status: DISCONTINUED | OUTPATIENT
Start: 2021-05-17 | End: 2021-05-17

## 2021-05-17 RX ORDER — PROPOFOL 10 MG/ML
VIAL (ML) INTRAVENOUS CONTINUOUS PRN
Status: DISCONTINUED | OUTPATIENT
Start: 2021-05-17 | End: 2021-05-17

## 2021-05-17 RX ORDER — POLYETHYLENE GLYCOL 3350 17 G/17G
17 POWDER, FOR SOLUTION ORAL DAILY
Status: DISCONTINUED | OUTPATIENT
Start: 2021-05-18 | End: 2021-05-17

## 2021-05-17 RX ADMIN — ACETAMINOPHEN 650 MG: 325 TABLET ORAL at 12:05

## 2021-05-17 RX ADMIN — OXYCODONE HYDROCHLORIDE 10 MG: 10 TABLET ORAL at 01:05

## 2021-05-17 RX ADMIN — PIPERACILLIN SODIUM AND TAZOBACTAM SODIUM 4.5 G: 4; .5 INJECTION, POWDER, FOR SOLUTION INTRAVENOUS at 10:05

## 2021-05-17 RX ADMIN — LIDOCAINE HYDROCHLORIDE 100 MG: 20 INJECTION, SOLUTION INTRAVENOUS at 02:05

## 2021-05-17 RX ADMIN — HYDROCORTISONE SODIUM SUCCINATE 100 MG: 100 INJECTION, POWDER, FOR SOLUTION INTRAMUSCULAR; INTRAVENOUS at 02:05

## 2021-05-17 RX ADMIN — PIPERACILLIN SODIUM AND TAZOBACTAM SODIUM 4.5 G: 4; .5 INJECTION, POWDER, FOR SOLUTION INTRAVENOUS at 05:05

## 2021-05-17 RX ADMIN — OXYCODONE HYDROCHLORIDE 10 MG: 10 TABLET ORAL at 05:05

## 2021-05-17 RX ADMIN — DEXTROSE MONOHYDRATE 12.5 G: 25 INJECTION, SOLUTION INTRAVENOUS at 12:05

## 2021-05-17 RX ADMIN — MIDAZOLAM HYDROCHLORIDE 2 MG: 1 INJECTION, SOLUTION INTRAMUSCULAR; INTRAVENOUS at 02:05

## 2021-05-17 RX ADMIN — ACETAMINOPHEN 650 MG: 325 TABLET ORAL at 11:05

## 2021-05-17 RX ADMIN — FAMOTIDINE 20 MG: 10 INJECTION, SOLUTION INTRAVENOUS at 02:05

## 2021-05-17 RX ADMIN — HYDROCORTISONE SODIUM SUCCINATE 50 MG: 100 INJECTION, POWDER, FOR SOLUTION INTRAMUSCULAR; INTRAVENOUS at 06:05

## 2021-05-17 RX ADMIN — ALPRAZOLAM 1 MG: 1 TABLET ORAL at 05:05

## 2021-05-17 RX ADMIN — OXYCODONE HYDROCHLORIDE 10 MG: 10 TABLET ORAL at 09:05

## 2021-05-17 RX ADMIN — FENTANYL CITRATE 100 MCG: 50 INJECTION, SOLUTION INTRAMUSCULAR; INTRAVENOUS at 02:05

## 2021-05-17 RX ADMIN — ROPINIROLE HYDROCHLORIDE 4 MG: 2 TABLET, FILM COATED ORAL at 09:05

## 2021-05-17 RX ADMIN — SODIUM CHLORIDE: 0.9 INJECTION, SOLUTION INTRAVENOUS at 02:05

## 2021-05-17 RX ADMIN — MELATONIN TAB 3 MG 6 MG: 3 TAB at 11:05

## 2021-05-17 RX ADMIN — GLYCOPYRROLATE 0.2 MG: 0.2 INJECTION, SOLUTION INTRAMUSCULAR; INTRAVITREAL at 02:05

## 2021-05-17 RX ADMIN — SODIUM CHLORIDE, SODIUM LACTATE, POTASSIUM CHLORIDE, AND CALCIUM CHLORIDE 1000 ML: .6; .31; .03; .02 INJECTION, SOLUTION INTRAVENOUS at 10:05

## 2021-05-17 RX ADMIN — SODIUM CHLORIDE, SODIUM LACTATE, POTASSIUM CHLORIDE, AND CALCIUM CHLORIDE: .6; .31; .03; .02 INJECTION, SOLUTION INTRAVENOUS at 05:05

## 2021-05-17 RX ADMIN — PIPERACILLIN SODIUM AND TAZOBACTAM SODIUM 4.5 G: 4; .5 INJECTION, POWDER, FOR SOLUTION INTRAVENOUS at 01:05

## 2021-05-17 RX ADMIN — OXYCODONE HYDROCHLORIDE 10 MG: 10 TABLET ORAL at 06:05

## 2021-05-17 RX ADMIN — SIMETHICONE 80 MG: 80 TABLET, CHEWABLE ORAL at 11:05

## 2021-05-17 RX ADMIN — HYDROCORTISONE SODIUM SUCCINATE 50 MG: 100 INJECTION, POWDER, FOR SOLUTION INTRAMUSCULAR; INTRAVENOUS at 01:05

## 2021-05-17 RX ADMIN — ENOXAPARIN SODIUM 40 MG: 40 INJECTION SUBCUTANEOUS at 05:05

## 2021-05-17 RX ADMIN — ONDANSETRON 4 MG: 2 INJECTION, SOLUTION INTRAMUSCULAR; INTRAVENOUS at 02:05

## 2021-05-17 RX ADMIN — ATORVASTATIN CALCIUM 10 MG: 10 TABLET, FILM COATED ORAL at 09:05

## 2021-05-17 RX ADMIN — HYDROCORTISONE SODIUM SUCCINATE 50 MG: 100 INJECTION, POWDER, FOR SOLUTION INTRAMUSCULAR; INTRAVENOUS at 09:05

## 2021-05-17 RX ADMIN — SODIUM CHLORIDE, SODIUM LACTATE, POTASSIUM CHLORIDE, AND CALCIUM CHLORIDE: .6; .31; .03; .02 INJECTION, SOLUTION INTRAVENOUS at 10:05

## 2021-05-17 RX ADMIN — Medication 150 MCG/KG/MIN: at 02:05

## 2021-05-17 RX ADMIN — PROPOFOL 40 MG: 10 INJECTION, EMULSION INTRAVENOUS at 02:05

## 2021-05-17 RX ADMIN — ACETAMINOPHEN 650 MG: 325 TABLET ORAL at 08:05

## 2021-05-17 RX ADMIN — ONDANSETRON 4 MG: 2 INJECTION INTRAMUSCULAR; INTRAVENOUS at 06:05

## 2021-05-17 RX ADMIN — OXYCODONE HYDROCHLORIDE 10 MG: 10 TABLET ORAL at 10:05

## 2021-05-17 RX ADMIN — DEXTROSE MONOHYDRATE 12.5 G: 25 INJECTION, SOLUTION INTRAVENOUS at 01:05

## 2021-05-17 RX ADMIN — LAMOTRIGINE 200 MG: 100 TABLET ORAL at 08:05

## 2021-05-17 RX ADMIN — MELATONIN TAB 3 MG 6 MG: 3 TAB at 12:05

## 2021-05-17 RX ADMIN — LAMOTRIGINE 200 MG: 100 TABLET ORAL at 09:05

## 2021-05-18 ENCOUNTER — PATIENT MESSAGE (OUTPATIENT)
Dept: PSYCHIATRY | Facility: CLINIC | Age: 51
End: 2021-05-18

## 2021-05-18 ENCOUNTER — ANESTHESIA (OUTPATIENT)
Dept: SURGERY | Facility: HOSPITAL | Age: 51
DRG: 414 | End: 2021-05-18
Payer: MEDICARE

## 2021-05-18 LAB
ALBUMIN SERPL BCP-MCNC: 2.4 G/DL (ref 3.5–5.2)
ALP SERPL-CCNC: 291 U/L (ref 55–135)
ALT SERPL W/O P-5'-P-CCNC: 109 U/L (ref 10–44)
ANION GAP SERPL CALC-SCNC: 11 MMOL/L (ref 8–16)
APTT BLDCRRT: 33.1 SEC (ref 21–32)
AST SERPL-CCNC: 75 U/L (ref 10–40)
BASOPHILS # BLD AUTO: 0.05 K/UL (ref 0–0.2)
BASOPHILS NFR BLD: 0.3 % (ref 0–1.9)
BILIRUB SERPL-MCNC: 1.7 MG/DL (ref 0.1–1)
BUN SERPL-MCNC: 10 MG/DL (ref 6–20)
CALCIUM SERPL-MCNC: 8.6 MG/DL (ref 8.7–10.5)
CHLORIDE SERPL-SCNC: 106 MMOL/L (ref 95–110)
CO2 SERPL-SCNC: 19 MMOL/L (ref 23–29)
CREAT SERPL-MCNC: 0.6 MG/DL (ref 0.5–1.4)
DIFFERENTIAL METHOD: ABNORMAL
EOSINOPHIL # BLD AUTO: 0 K/UL (ref 0–0.5)
EOSINOPHIL NFR BLD: 0 % (ref 0–8)
ERYTHROCYTE [DISTWIDTH] IN BLOOD BY AUTOMATED COUNT: 13.1 % (ref 11.5–14.5)
EST. GFR  (AFRICAN AMERICAN): >60 ML/MIN/1.73 M^2
EST. GFR  (NON AFRICAN AMERICAN): >60 ML/MIN/1.73 M^2
GLUCOSE SERPL-MCNC: 90 MG/DL (ref 70–110)
HCT VFR BLD AUTO: 37.4 % (ref 40–54)
HGB BLD-MCNC: 12.6 G/DL (ref 14–18)
IMM GRANULOCYTES # BLD AUTO: 0.17 K/UL (ref 0–0.04)
IMM GRANULOCYTES NFR BLD AUTO: 1.1 % (ref 0–0.5)
INR PPP: 1.2 (ref 0.8–1.2)
LYMPHOCYTES # BLD AUTO: 0.8 K/UL (ref 1–4.8)
LYMPHOCYTES NFR BLD: 5 % (ref 18–48)
MAGNESIUM SERPL-MCNC: 1.7 MG/DL (ref 1.6–2.6)
MCH RBC QN AUTO: 33 PG (ref 27–31)
MCHC RBC AUTO-ENTMCNC: 33.7 G/DL (ref 32–36)
MCV RBC AUTO: 98 FL (ref 82–98)
MONOCYTES # BLD AUTO: 0.4 K/UL (ref 0.3–1)
MONOCYTES NFR BLD: 2.9 % (ref 4–15)
NEUTROPHILS # BLD AUTO: 13.9 K/UL (ref 1.8–7.7)
NEUTROPHILS NFR BLD: 90.7 % (ref 38–73)
NRBC BLD-RTO: 0 /100 WBC
PHOSPHATE SERPL-MCNC: 2.4 MG/DL (ref 2.7–4.5)
PLATELET # BLD AUTO: 231 K/UL (ref 150–450)
PLATELET BLD QL SMEAR: ABNORMAL
PMV BLD AUTO: 9 FL (ref 9.2–12.9)
POCT GLUCOSE: 122 MG/DL (ref 70–110)
POCT GLUCOSE: 88 MG/DL (ref 70–110)
POCT GLUCOSE: 97 MG/DL (ref 70–110)
POTASSIUM SERPL-SCNC: 3.3 MMOL/L (ref 3.5–5.1)
PROT SERPL-MCNC: 5.3 G/DL (ref 6–8.4)
PROTHROMBIN TIME: 12.9 SEC (ref 9–12.5)
RBC # BLD AUTO: 3.82 M/UL (ref 4.6–6.2)
SODIUM SERPL-SCNC: 136 MMOL/L (ref 136–145)
WBC # BLD AUTO: 15.28 K/UL (ref 3.9–12.7)

## 2021-05-18 PROCEDURE — 63600175 PHARM REV CODE 636 W HCPCS: Performed by: STUDENT IN AN ORGANIZED HEALTH CARE EDUCATION/TRAINING PROGRAM

## 2021-05-18 PROCEDURE — 85610 PROTHROMBIN TIME: CPT | Performed by: SURGERY

## 2021-05-18 PROCEDURE — 25000003 PHARM REV CODE 250: Performed by: STUDENT IN AN ORGANIZED HEALTH CARE EDUCATION/TRAINING PROGRAM

## 2021-05-18 PROCEDURE — 27201423 OPTIME MED/SURG SUP & DEVICES STERILE SUPPLY: Performed by: SURGERY

## 2021-05-18 PROCEDURE — 20600001 HC STEP DOWN PRIVATE ROOM

## 2021-05-18 PROCEDURE — 85730 THROMBOPLASTIN TIME PARTIAL: CPT | Performed by: SURGERY

## 2021-05-18 PROCEDURE — D9220A PRA ANESTHESIA: Mod: ANES,,, | Performed by: ANESTHESIOLOGY

## 2021-05-18 PROCEDURE — 25000003 PHARM REV CODE 250: Performed by: INTERNAL MEDICINE

## 2021-05-18 PROCEDURE — 76942 ECHO GUIDE FOR BIOPSY: CPT | Mod: 26,,, | Performed by: ANESTHESIOLOGY

## 2021-05-18 PROCEDURE — 63600175 PHARM REV CODE 636 W HCPCS: Performed by: INTERNAL MEDICINE

## 2021-05-18 PROCEDURE — 25000003 PHARM REV CODE 250: Performed by: NURSE ANESTHETIST, CERTIFIED REGISTERED

## 2021-05-18 PROCEDURE — 25000003 PHARM REV CODE 250

## 2021-05-18 PROCEDURE — 64450 NJX AA&/STRD OTHER PN/BRANCH: CPT | Performed by: ANESTHESIOLOGY

## 2021-05-18 PROCEDURE — 80053 COMPREHEN METABOLIC PANEL: CPT | Performed by: INTERNAL MEDICINE

## 2021-05-18 PROCEDURE — D9220A PRA ANESTHESIA: ICD-10-PCS | Mod: CRNA,,, | Performed by: NURSE ANESTHETIST, CERTIFIED REGISTERED

## 2021-05-18 PROCEDURE — D9220A PRA ANESTHESIA: ICD-10-PCS | Mod: ANES,,, | Performed by: ANESTHESIOLOGY

## 2021-05-18 PROCEDURE — 94761 N-INVAS EAR/PLS OXIMETRY MLT: CPT

## 2021-05-18 PROCEDURE — 88304 TISSUE EXAM BY PATHOLOGIST: CPT | Performed by: PATHOLOGY

## 2021-05-18 PROCEDURE — 37000008 HC ANESTHESIA 1ST 15 MINUTES: Performed by: SURGERY

## 2021-05-18 PROCEDURE — 47600 PR REMOVAL GALLBLADDER: ICD-10-PCS | Mod: GC,,, | Performed by: SURGERY

## 2021-05-18 PROCEDURE — 25000003 PHARM REV CODE 250: Performed by: ANESTHESIOLOGY

## 2021-05-18 PROCEDURE — 85025 COMPLETE CBC W/AUTO DIFF WBC: CPT | Performed by: INTERNAL MEDICINE

## 2021-05-18 PROCEDURE — 88304 PR  SURG PATH,LEVEL III: ICD-10-PCS | Mod: 26,,, | Performed by: PATHOLOGY

## 2021-05-18 PROCEDURE — 76942 PR U/S GUIDANCE FOR NEEDLE GUIDANCE: ICD-10-PCS | Mod: 26,,, | Performed by: ANESTHESIOLOGY

## 2021-05-18 PROCEDURE — 36000709 HC OR TIME LEV III EA ADD 15 MIN: Performed by: SURGERY

## 2021-05-18 PROCEDURE — 63600175 PHARM REV CODE 636 W HCPCS: Performed by: NURSE ANESTHETIST, CERTIFIED REGISTERED

## 2021-05-18 PROCEDURE — 71000015 HC POSTOP RECOV 1ST HR: Performed by: SURGERY

## 2021-05-18 PROCEDURE — D9220A PRA ANESTHESIA: Mod: CRNA,,, | Performed by: NURSE ANESTHETIST, CERTIFIED REGISTERED

## 2021-05-18 PROCEDURE — 25000003 PHARM REV CODE 250: Performed by: SURGERY

## 2021-05-18 PROCEDURE — 64450 PR NERVE BLOCK INJ, ANES/STEROID, OTHER PERIPHERAL: ICD-10-PCS | Mod: 59,50,, | Performed by: ANESTHESIOLOGY

## 2021-05-18 PROCEDURE — 71000033 HC RECOVERY, INTIAL HOUR: Performed by: SURGERY

## 2021-05-18 PROCEDURE — 83735 ASSAY OF MAGNESIUM: CPT | Performed by: INTERNAL MEDICINE

## 2021-05-18 PROCEDURE — 36000708 HC OR TIME LEV III 1ST 15 MIN: Performed by: SURGERY

## 2021-05-18 PROCEDURE — 37000009 HC ANESTHESIA EA ADD 15 MINS: Performed by: SURGERY

## 2021-05-18 PROCEDURE — S0020 INJECTION, BUPIVICAINE HYDRO: HCPCS | Performed by: ANESTHESIOLOGY

## 2021-05-18 PROCEDURE — 84100 ASSAY OF PHOSPHORUS: CPT | Performed by: INTERNAL MEDICINE

## 2021-05-18 PROCEDURE — 64450 NJX AA&/STRD OTHER PN/BRANCH: CPT | Mod: 59,50,, | Performed by: ANESTHESIOLOGY

## 2021-05-18 PROCEDURE — 36415 COLL VENOUS BLD VENIPUNCTURE: CPT | Performed by: INTERNAL MEDICINE

## 2021-05-18 PROCEDURE — 88304 TISSUE EXAM BY PATHOLOGIST: CPT | Mod: 26,,, | Performed by: PATHOLOGY

## 2021-05-18 PROCEDURE — 47600 CHOLECYSTECTOMY: CPT | Mod: GC,,, | Performed by: SURGERY

## 2021-05-18 RX ORDER — NEOSTIGMINE METHYLSULFATE 0.5 MG/ML
INJECTION, SOLUTION INTRAVENOUS
Status: DISCONTINUED | OUTPATIENT
Start: 2021-05-18 | End: 2021-05-18

## 2021-05-18 RX ORDER — ACETAMINOPHEN 10 MG/ML
INJECTION, SOLUTION INTRAVENOUS
Status: DISCONTINUED | OUTPATIENT
Start: 2021-05-18 | End: 2021-05-18

## 2021-05-18 RX ORDER — HYDROMORPHONE HYDROCHLORIDE 1 MG/ML
0.2 INJECTION, SOLUTION INTRAMUSCULAR; INTRAVENOUS; SUBCUTANEOUS EVERY 5 MIN PRN
Status: DISCONTINUED | OUTPATIENT
Start: 2021-05-18 | End: 2021-05-18 | Stop reason: HOSPADM

## 2021-05-18 RX ORDER — SODIUM CHLORIDE, SODIUM LACTATE, POTASSIUM CHLORIDE, CALCIUM CHLORIDE 600; 310; 30; 20 MG/100ML; MG/100ML; MG/100ML; MG/100ML
INJECTION, SOLUTION INTRAVENOUS CONTINUOUS
Status: DISCONTINUED | OUTPATIENT
Start: 2021-05-18 | End: 2021-05-19

## 2021-05-18 RX ORDER — FENTANYL CITRATE 50 UG/ML
INJECTION, SOLUTION INTRAMUSCULAR; INTRAVENOUS
Status: DISCONTINUED | OUTPATIENT
Start: 2021-05-18 | End: 2021-05-18

## 2021-05-18 RX ORDER — HYDROMORPHONE HYDROCHLORIDE 2 MG/ML
INJECTION, SOLUTION INTRAMUSCULAR; INTRAVENOUS; SUBCUTANEOUS
Status: DISCONTINUED | OUTPATIENT
Start: 2021-05-18 | End: 2021-05-18

## 2021-05-18 RX ORDER — HALOPERIDOL 5 MG/ML
0.5 INJECTION INTRAMUSCULAR EVERY 10 MIN PRN
Status: DISCONTINUED | OUTPATIENT
Start: 2021-05-18 | End: 2021-05-18 | Stop reason: HOSPADM

## 2021-05-18 RX ORDER — ROCURONIUM BROMIDE 10 MG/ML
INJECTION, SOLUTION INTRAVENOUS
Status: DISCONTINUED | OUTPATIENT
Start: 2021-05-18 | End: 2021-05-18

## 2021-05-18 RX ORDER — ONDANSETRON 2 MG/ML
INJECTION INTRAMUSCULAR; INTRAVENOUS
Status: DISCONTINUED | OUTPATIENT
Start: 2021-05-18 | End: 2021-05-18

## 2021-05-18 RX ORDER — MUPIROCIN 20 MG/G
OINTMENT TOPICAL 2 TIMES DAILY
Status: DISCONTINUED | OUTPATIENT
Start: 2021-05-18 | End: 2021-05-22 | Stop reason: HOSPADM

## 2021-05-18 RX ORDER — BUPIVACAINE HYDROCHLORIDE 7.5 MG/ML
INJECTION, SOLUTION EPIDURAL; RETROBULBAR
Status: DISCONTINUED | OUTPATIENT
Start: 2021-05-18 | End: 2021-05-18

## 2021-05-18 RX ORDER — PROPOFOL 10 MG/ML
VIAL (ML) INTRAVENOUS
Status: DISCONTINUED | OUTPATIENT
Start: 2021-05-18 | End: 2021-05-18

## 2021-05-18 RX ORDER — POTASSIUM CHLORIDE 7.45 MG/ML
10 INJECTION INTRAVENOUS
Status: DISPENSED | OUTPATIENT
Start: 2021-05-18 | End: 2021-05-18

## 2021-05-18 RX ORDER — FAMOTIDINE 10 MG/ML
INJECTION INTRAVENOUS
Status: DISCONTINUED | OUTPATIENT
Start: 2021-05-18 | End: 2021-05-18

## 2021-05-18 RX ORDER — KETAMINE HCL IN 0.9 % NACL 50 MG/5 ML
SYRINGE (ML) INTRAVENOUS
Status: DISCONTINUED | OUTPATIENT
Start: 2021-05-18 | End: 2021-05-18

## 2021-05-18 RX ORDER — LORAZEPAM 2 MG/ML
0.25 INJECTION INTRAMUSCULAR ONCE AS NEEDED
Status: DISCONTINUED | OUTPATIENT
Start: 2021-05-18 | End: 2021-05-18 | Stop reason: HOSPADM

## 2021-05-18 RX ORDER — SODIUM CHLORIDE 0.9 % (FLUSH) 0.9 %
3 SYRINGE (ML) INJECTION EVERY 6 HOURS
Status: DISCONTINUED | OUTPATIENT
Start: 2021-05-18 | End: 2021-05-18 | Stop reason: HOSPADM

## 2021-05-18 RX ORDER — LIDOCAINE HYDROCHLORIDE 20 MG/ML
INJECTION INTRAVENOUS
Status: DISCONTINUED | OUTPATIENT
Start: 2021-05-18 | End: 2021-05-18

## 2021-05-18 RX ORDER — MIDAZOLAM HYDROCHLORIDE 1 MG/ML
INJECTION, SOLUTION INTRAMUSCULAR; INTRAVENOUS
Status: DISCONTINUED | OUTPATIENT
Start: 2021-05-18 | End: 2021-05-18

## 2021-05-18 RX ORDER — ACETAMINOPHEN 500 MG
1000 TABLET ORAL 3 TIMES DAILY
Status: DISCONTINUED | OUTPATIENT
Start: 2021-05-18 | End: 2021-05-22 | Stop reason: HOSPADM

## 2021-05-18 RX ORDER — MAGNESIUM SULFATE HEPTAHYDRATE 40 MG/ML
2 INJECTION, SOLUTION INTRAVENOUS ONCE
Status: COMPLETED | OUTPATIENT
Start: 2021-05-18 | End: 2021-05-18

## 2021-05-18 RX ADMIN — OXYCODONE HYDROCHLORIDE 10 MG: 10 TABLET ORAL at 06:05

## 2021-05-18 RX ADMIN — MIDAZOLAM HYDROCHLORIDE 4 MG: 1 INJECTION, SOLUTION INTRAMUSCULAR; INTRAVENOUS at 01:05

## 2021-05-18 RX ADMIN — ONDANSETRON 4 MG: 2 INJECTION INTRAMUSCULAR; INTRAVENOUS at 04:05

## 2021-05-18 RX ADMIN — SODIUM CHLORIDE, SODIUM LACTATE, POTASSIUM CHLORIDE, AND CALCIUM CHLORIDE: .6; .31; .03; .02 INJECTION, SOLUTION INTRAVENOUS at 10:05

## 2021-05-18 RX ADMIN — ACETAMINOPHEN 1000 MG: 500 TABLET, FILM COATED ORAL at 09:05

## 2021-05-18 RX ADMIN — PROPOFOL 130 MG: 10 INJECTION, EMULSION INTRAVENOUS at 01:05

## 2021-05-18 RX ADMIN — ATORVASTATIN CALCIUM 10 MG: 10 TABLET, FILM COATED ORAL at 09:05

## 2021-05-18 RX ADMIN — OXYCODONE 5 MG: 5 TABLET ORAL at 05:05

## 2021-05-18 RX ADMIN — LAMOTRIGINE 200 MG: 100 TABLET ORAL at 09:05

## 2021-05-18 RX ADMIN — SODIUM CHLORIDE: 0.9 INJECTION, SOLUTION INTRAVENOUS at 01:05

## 2021-05-18 RX ADMIN — PIPERACILLIN SODIUM AND TAZOBACTAM SODIUM 4.5 G: 4; .5 INJECTION, POWDER, FOR SOLUTION INTRAVENOUS at 10:05

## 2021-05-18 RX ADMIN — ONDANSETRON 4 MG: 2 INJECTION, SOLUTION INTRAMUSCULAR; INTRAVENOUS at 02:05

## 2021-05-18 RX ADMIN — OXYCODONE HYDROCHLORIDE 10 MG: 10 TABLET ORAL at 03:05

## 2021-05-18 RX ADMIN — ACETAMINOPHEN 1000 MG: 10 INJECTION, SOLUTION INTRAVENOUS at 01:05

## 2021-05-18 RX ADMIN — Medication 10 MG: at 01:05

## 2021-05-18 RX ADMIN — OXYCODONE HYDROCHLORIDE 10 MG: 10 TABLET ORAL at 08:05

## 2021-05-18 RX ADMIN — GLYCOPYRROLATE 0.5 MCG: 0.2 INJECTION, SOLUTION INTRAMUSCULAR; INTRAVITREAL at 02:05

## 2021-05-18 RX ADMIN — PROCHLORPERAZINE EDISYLATE 5 MG: 5 INJECTION INTRAMUSCULAR; INTRAVENOUS at 06:05

## 2021-05-18 RX ADMIN — SODIUM CHLORIDE, SODIUM LACTATE, POTASSIUM CHLORIDE, AND CALCIUM CHLORIDE: .6; .31; .03; .02 INJECTION, SOLUTION INTRAVENOUS at 04:05

## 2021-05-18 RX ADMIN — LIDOCAINE HYDROCHLORIDE 100 MG: 20 INJECTION, SOLUTION INTRAVENOUS at 01:05

## 2021-05-18 RX ADMIN — MAGNESIUM SULFATE 2 G: 2 INJECTION INTRAVENOUS at 04:05

## 2021-05-18 RX ADMIN — PIPERACILLIN SODIUM AND TAZOBACTAM SODIUM 4.5 G: 4; .5 INJECTION, POWDER, FOR SOLUTION INTRAVENOUS at 02:05

## 2021-05-18 RX ADMIN — SODIUM CHLORIDE, SODIUM LACTATE, POTASSIUM CHLORIDE, AND CALCIUM CHLORIDE 1000 ML: .6; .31; .03; .02 INJECTION, SOLUTION INTRAVENOUS at 03:05

## 2021-05-18 RX ADMIN — LAMOTRIGINE 200 MG: 100 TABLET ORAL at 08:05

## 2021-05-18 RX ADMIN — NEOSTIGMINE METHYLSULFATE 5 MG: 0.5 INJECTION INTRAVENOUS at 02:05

## 2021-05-18 RX ADMIN — METHOCARBAMOL 500 MG: 100 INJECTION, SOLUTION INTRAMUSCULAR; INTRAVENOUS at 09:05

## 2021-05-18 RX ADMIN — HYDROCORTISONE SODIUM SUCCINATE 50 MG: 100 INJECTION, POWDER, FOR SOLUTION INTRAMUSCULAR; INTRAVENOUS at 10:05

## 2021-05-18 RX ADMIN — ROPINIROLE HYDROCHLORIDE 4 MG: 2 TABLET, FILM COATED ORAL at 10:05

## 2021-05-18 RX ADMIN — HYDROCORTISONE SODIUM SUCCINATE 100 MG: 100 INJECTION, POWDER, FOR SOLUTION INTRAMUSCULAR; INTRAVENOUS at 01:05

## 2021-05-18 RX ADMIN — FENTANYL CITRATE 50 MCG: 50 INJECTION, SOLUTION INTRAMUSCULAR; INTRAVENOUS at 01:05

## 2021-05-18 RX ADMIN — MELATONIN TAB 3 MG 6 MG: 3 TAB at 10:05

## 2021-05-18 RX ADMIN — HYDROMORPHONE HYDROCHLORIDE 1 MG: 2 INJECTION INTRAMUSCULAR; INTRAVENOUS; SUBCUTANEOUS at 01:05

## 2021-05-18 RX ADMIN — HYDROMORPHONE HYDROCHLORIDE 1 MG: 2 INJECTION INTRAMUSCULAR; INTRAVENOUS; SUBCUTANEOUS at 02:05

## 2021-05-18 RX ADMIN — OXYCODONE HYDROCHLORIDE 10 MG: 10 TABLET ORAL at 10:05

## 2021-05-18 RX ADMIN — BUPIVACAINE HYDROCHLORIDE 30 ML: 7.5 INJECTION, SOLUTION EPIDURAL; RETROBULBAR at 03:05

## 2021-05-18 RX ADMIN — Medication 30 MG: at 01:05

## 2021-05-18 RX ADMIN — ROCURONIUM BROMIDE 50 MG: 10 INJECTION, SOLUTION INTRAVENOUS at 01:05

## 2021-05-18 RX ADMIN — MUPIROCIN: 20 OINTMENT TOPICAL at 09:05

## 2021-05-18 RX ADMIN — HYDROCORTISONE SODIUM SUCCINATE 50 MG: 100 INJECTION, POWDER, FOR SOLUTION INTRAMUSCULAR; INTRAVENOUS at 05:05

## 2021-05-18 RX ADMIN — HYDROCORTISONE SODIUM SUCCINATE 50 MG: 100 INJECTION, POWDER, FOR SOLUTION INTRAMUSCULAR; INTRAVENOUS at 04:05

## 2021-05-18 RX ADMIN — ACETAMINOPHEN 1000 MG: 500 TABLET, FILM COATED ORAL at 03:05

## 2021-05-18 RX ADMIN — ENOXAPARIN SODIUM 40 MG: 40 INJECTION SUBCUTANEOUS at 04:05

## 2021-05-18 RX ADMIN — FAMOTIDINE 20 MG: 10 INJECTION, SOLUTION INTRAVENOUS at 01:05

## 2021-05-18 RX ADMIN — METHOCARBAMOL 500 MG: 100 INJECTION, SOLUTION INTRAMUSCULAR; INTRAVENOUS at 03:05

## 2021-05-18 RX ADMIN — OXYCODONE HYDROCHLORIDE 10 MG: 10 TABLET ORAL at 02:05

## 2021-05-18 RX ADMIN — PIPERACILLIN SODIUM AND TAZOBACTAM SODIUM 4.5 G: 4; .5 INJECTION, POWDER, FOR SOLUTION INTRAVENOUS at 06:05

## 2021-05-19 LAB
ALBUMIN SERPL BCP-MCNC: 2.3 G/DL (ref 3.5–5.2)
ALP SERPL-CCNC: 269 U/L (ref 55–135)
ALT SERPL W/O P-5'-P-CCNC: 88 U/L (ref 10–44)
ANION GAP SERPL CALC-SCNC: 11 MMOL/L (ref 8–16)
AST SERPL-CCNC: 63 U/L (ref 10–40)
BACTERIA UR CULT: ABNORMAL
BASOPHILS NFR BLD: 0 % (ref 0–1.9)
BILIRUB SERPL-MCNC: 1 MG/DL (ref 0.1–1)
BUN SERPL-MCNC: 9 MG/DL (ref 6–20)
CALCIUM SERPL-MCNC: 9 MG/DL (ref 8.7–10.5)
CHLORIDE SERPL-SCNC: 104 MMOL/L (ref 95–110)
CO2 SERPL-SCNC: 21 MMOL/L (ref 23–29)
CREAT SERPL-MCNC: 0.5 MG/DL (ref 0.5–1.4)
DIFFERENTIAL METHOD: ABNORMAL
EOSINOPHIL NFR BLD: 0 % (ref 0–8)
ERYTHROCYTE [DISTWIDTH] IN BLOOD BY AUTOMATED COUNT: 13 % (ref 11.5–14.5)
EST. GFR  (AFRICAN AMERICAN): >60 ML/MIN/1.73 M^2
EST. GFR  (NON AFRICAN AMERICAN): >60 ML/MIN/1.73 M^2
GLUCOSE SERPL-MCNC: 94 MG/DL (ref 70–110)
HCT VFR BLD AUTO: 38.2 % (ref 40–54)
HGB BLD-MCNC: 12.9 G/DL (ref 14–18)
IMM GRANULOCYTES # BLD AUTO: ABNORMAL K/UL (ref 0–0.04)
IMM GRANULOCYTES NFR BLD AUTO: ABNORMAL % (ref 0–0.5)
LYMPHOCYTES NFR BLD: 2 % (ref 18–48)
MAGNESIUM SERPL-MCNC: 1.9 MG/DL (ref 1.6–2.6)
MCH RBC QN AUTO: 32.8 PG (ref 27–31)
MCHC RBC AUTO-ENTMCNC: 33.8 G/DL (ref 32–36)
MCV RBC AUTO: 97 FL (ref 82–98)
MONOCYTES NFR BLD: 3 % (ref 4–15)
NEUTROPHILS NFR BLD: 78 % (ref 38–73)
NEUTS BAND NFR BLD MANUAL: 17 %
NRBC BLD-RTO: 0 /100 WBC
PHOSPHATE SERPL-MCNC: 2.5 MG/DL (ref 2.7–4.5)
PLATELET # BLD AUTO: 286 K/UL (ref 150–450)
PMV BLD AUTO: 9.5 FL (ref 9.2–12.9)
POCT GLUCOSE: 107 MG/DL (ref 70–110)
POCT GLUCOSE: 115 MG/DL (ref 70–110)
POCT GLUCOSE: 143 MG/DL (ref 70–110)
POCT GLUCOSE: 157 MG/DL (ref 70–110)
POCT GLUCOSE: 161 MG/DL (ref 70–110)
POCT GLUCOSE: 166 MG/DL (ref 70–110)
POTASSIUM SERPL-SCNC: 4.2 MMOL/L (ref 3.5–5.1)
PROT SERPL-MCNC: 5.6 G/DL (ref 6–8.4)
RBC # BLD AUTO: 3.93 M/UL (ref 4.6–6.2)
SODIUM SERPL-SCNC: 136 MMOL/L (ref 136–145)
TOXIC GRANULES BLD QL SMEAR: PRESENT
WBC # BLD AUTO: 13.82 K/UL (ref 3.9–12.7)

## 2021-05-19 PROCEDURE — 63600175 PHARM REV CODE 636 W HCPCS: Performed by: STUDENT IN AN ORGANIZED HEALTH CARE EDUCATION/TRAINING PROGRAM

## 2021-05-19 PROCEDURE — 99900035 HC TECH TIME PER 15 MIN (STAT)

## 2021-05-19 PROCEDURE — 84100 ASSAY OF PHOSPHORUS: CPT | Performed by: STUDENT IN AN ORGANIZED HEALTH CARE EDUCATION/TRAINING PROGRAM

## 2021-05-19 PROCEDURE — 85007 BL SMEAR W/DIFF WBC COUNT: CPT | Performed by: STUDENT IN AN ORGANIZED HEALTH CARE EDUCATION/TRAINING PROGRAM

## 2021-05-19 PROCEDURE — 25000003 PHARM REV CODE 250: Performed by: SURGERY

## 2021-05-19 PROCEDURE — 20600001 HC STEP DOWN PRIVATE ROOM

## 2021-05-19 PROCEDURE — 25000003 PHARM REV CODE 250: Performed by: STUDENT IN AN ORGANIZED HEALTH CARE EDUCATION/TRAINING PROGRAM

## 2021-05-19 PROCEDURE — 80053 COMPREHEN METABOLIC PANEL: CPT | Performed by: STUDENT IN AN ORGANIZED HEALTH CARE EDUCATION/TRAINING PROGRAM

## 2021-05-19 PROCEDURE — 63600175 PHARM REV CODE 636 W HCPCS: Performed by: SURGERY

## 2021-05-19 PROCEDURE — 87040 BLOOD CULTURE FOR BACTERIA: CPT | Performed by: STUDENT IN AN ORGANIZED HEALTH CARE EDUCATION/TRAINING PROGRAM

## 2021-05-19 PROCEDURE — 83735 ASSAY OF MAGNESIUM: CPT | Performed by: STUDENT IN AN ORGANIZED HEALTH CARE EDUCATION/TRAINING PROGRAM

## 2021-05-19 PROCEDURE — 94761 N-INVAS EAR/PLS OXIMETRY MLT: CPT

## 2021-05-19 PROCEDURE — 36415 COLL VENOUS BLD VENIPUNCTURE: CPT | Performed by: STUDENT IN AN ORGANIZED HEALTH CARE EDUCATION/TRAINING PROGRAM

## 2021-05-19 PROCEDURE — 85027 COMPLETE CBC AUTOMATED: CPT | Performed by: STUDENT IN AN ORGANIZED HEALTH CARE EDUCATION/TRAINING PROGRAM

## 2021-05-19 RX ORDER — TAMSULOSIN HYDROCHLORIDE 0.4 MG/1
0.4 CAPSULE ORAL DAILY
Status: DISCONTINUED | OUTPATIENT
Start: 2021-05-19 | End: 2021-05-22 | Stop reason: HOSPADM

## 2021-05-19 RX ORDER — SYRING-NEEDL,DISP,INSUL,0.3 ML 29 G X1/2"
296 SYRINGE, EMPTY DISPOSABLE MISCELLANEOUS ONCE
Status: DISCONTINUED | OUTPATIENT
Start: 2021-05-19 | End: 2021-05-21

## 2021-05-19 RX ORDER — BISACODYL 10 MG
10 SUPPOSITORY, RECTAL RECTAL DAILY
Status: DISCONTINUED | OUTPATIENT
Start: 2021-05-19 | End: 2021-05-22 | Stop reason: HOSPADM

## 2021-05-19 RX ORDER — ALPRAZOLAM 0.5 MG/1
0.5 TABLET ORAL 2 TIMES DAILY PRN
Status: DISCONTINUED | OUTPATIENT
Start: 2021-05-19 | End: 2021-05-22 | Stop reason: HOSPADM

## 2021-05-19 RX ORDER — AMOXICILLIN 250 MG
2 CAPSULE ORAL 2 TIMES DAILY
Status: DISCONTINUED | OUTPATIENT
Start: 2021-05-19 | End: 2021-05-22 | Stop reason: HOSPADM

## 2021-05-19 RX ORDER — GABAPENTIN 300 MG/1
300 CAPSULE ORAL 3 TIMES DAILY
Status: DISCONTINUED | OUTPATIENT
Start: 2021-05-19 | End: 2021-05-22 | Stop reason: HOSPADM

## 2021-05-19 RX ORDER — KETOROLAC TROMETHAMINE 15 MG/ML
15 INJECTION, SOLUTION INTRAMUSCULAR; INTRAVENOUS 3 TIMES DAILY
Status: COMPLETED | OUTPATIENT
Start: 2021-05-19 | End: 2021-05-22

## 2021-05-19 RX ORDER — ALPRAZOLAM 0.5 MG/1
0.5 TABLET ORAL ONCE
Status: COMPLETED | OUTPATIENT
Start: 2021-05-19 | End: 2021-05-19

## 2021-05-19 RX ORDER — KETOROLAC TROMETHAMINE 15 MG/ML
15 INJECTION, SOLUTION INTRAMUSCULAR; INTRAVENOUS ONCE
Status: COMPLETED | OUTPATIENT
Start: 2021-05-19 | End: 2021-05-19

## 2021-05-19 RX ORDER — LIDOCAINE 50 MG/G
2 PATCH TOPICAL DAILY
Status: DISCONTINUED | OUTPATIENT
Start: 2021-05-19 | End: 2021-05-22 | Stop reason: HOSPADM

## 2021-05-19 RX ORDER — VANCOMYCIN HCL IN 5 % DEXTROSE 1G/250ML
15 PLASTIC BAG, INJECTION (ML) INTRAVENOUS
Status: DISCONTINUED | OUTPATIENT
Start: 2021-05-20 | End: 2021-05-22 | Stop reason: HOSPADM

## 2021-05-19 RX ADMIN — OXYCODONE HYDROCHLORIDE 10 MG: 10 TABLET ORAL at 06:05

## 2021-05-19 RX ADMIN — MUPIROCIN: 20 OINTMENT TOPICAL at 10:05

## 2021-05-19 RX ADMIN — VANCOMYCIN HYDROCHLORIDE 1500 MG: 1.5 INJECTION, POWDER, LYOPHILIZED, FOR SOLUTION INTRAVENOUS at 12:05

## 2021-05-19 RX ADMIN — CYCLOBENZAPRINE HYDROCHLORIDE 10 MG: 5 TABLET, FILM COATED ORAL at 07:05

## 2021-05-19 RX ADMIN — GABAPENTIN 300 MG: 300 CAPSULE ORAL at 09:05

## 2021-05-19 RX ADMIN — LAMOTRIGINE 200 MG: 100 TABLET ORAL at 10:05

## 2021-05-19 RX ADMIN — METHOCARBAMOL 500 MG: 100 INJECTION, SOLUTION INTRAMUSCULAR; INTRAVENOUS at 02:05

## 2021-05-19 RX ADMIN — OXYCODONE HYDROCHLORIDE 10 MG: 10 TABLET ORAL at 12:05

## 2021-05-19 RX ADMIN — BISACODYL 10 MG: 10 SUPPOSITORY RECTAL at 05:05

## 2021-05-19 RX ADMIN — HYDROCORTISONE SODIUM SUCCINATE 50 MG: 100 INJECTION, POWDER, FOR SOLUTION INTRAMUSCULAR; INTRAVENOUS at 10:05

## 2021-05-19 RX ADMIN — ROPINIROLE HYDROCHLORIDE 4 MG: 2 TABLET, FILM COATED ORAL at 10:05

## 2021-05-19 RX ADMIN — POLYETHYLENE GLYCOL 3350 17 G: 17 POWDER, FOR SOLUTION ORAL at 09:05

## 2021-05-19 RX ADMIN — ACETAMINOPHEN 1000 MG: 500 TABLET, FILM COATED ORAL at 09:05

## 2021-05-19 RX ADMIN — GABAPENTIN 300 MG: 300 CAPSULE ORAL at 02:05

## 2021-05-19 RX ADMIN — LIDOCAINE 2 PATCH: 50 PATCH TOPICAL at 12:05

## 2021-05-19 RX ADMIN — OXYCODONE 5 MG: 5 TABLET ORAL at 07:05

## 2021-05-19 RX ADMIN — ACETAMINOPHEN 1000 MG: 500 TABLET, FILM COATED ORAL at 10:05

## 2021-05-19 RX ADMIN — ALPRAZOLAM 0.5 MG: 0.5 TABLET ORAL at 12:05

## 2021-05-19 RX ADMIN — TAMSULOSIN HYDROCHLORIDE 0.4 MG: 0.4 CAPSULE ORAL at 05:05

## 2021-05-19 RX ADMIN — DOCUSATE SODIUM 50MG AND SENNOSIDES 8.6MG 2 TABLET: 8.6; 5 TABLET, FILM COATED ORAL at 05:05

## 2021-05-19 RX ADMIN — PIPERACILLIN SODIUM AND TAZOBACTAM SODIUM 4.5 G: 4; .5 INJECTION, POWDER, FOR SOLUTION INTRAVENOUS at 02:05

## 2021-05-19 RX ADMIN — GABAPENTIN 300 MG: 300 CAPSULE ORAL at 10:05

## 2021-05-19 RX ADMIN — OXYCODONE 5 MG: 5 TABLET ORAL at 02:05

## 2021-05-19 RX ADMIN — ALPRAZOLAM 0.5 MG: 0.5 TABLET ORAL at 10:05

## 2021-05-19 RX ADMIN — KETOROLAC TROMETHAMINE 15 MG: 15 INJECTION, SOLUTION INTRAMUSCULAR; INTRAVENOUS at 09:05

## 2021-05-19 RX ADMIN — ALPRAZOLAM 0.5 MG: 0.5 TABLET ORAL at 09:05

## 2021-05-19 RX ADMIN — LAMOTRIGINE 200 MG: 100 TABLET ORAL at 09:05

## 2021-05-19 RX ADMIN — METHOCARBAMOL 500 MG: 100 INJECTION, SOLUTION INTRAMUSCULAR; INTRAVENOUS at 10:05

## 2021-05-19 RX ADMIN — PIPERACILLIN SODIUM AND TAZOBACTAM SODIUM 4.5 G: 4; .5 INJECTION, POWDER, FOR SOLUTION INTRAVENOUS at 05:05

## 2021-05-19 RX ADMIN — HYDROCORTISONE SODIUM SUCCINATE 50 MG: 100 INJECTION, POWDER, FOR SOLUTION INTRAMUSCULAR; INTRAVENOUS at 02:05

## 2021-05-19 RX ADMIN — PIPERACILLIN SODIUM AND TAZOBACTAM SODIUM 4.5 G: 4; .5 INJECTION, POWDER, FOR SOLUTION INTRAVENOUS at 09:05

## 2021-05-19 RX ADMIN — CYCLOBENZAPRINE HYDROCHLORIDE 10 MG: 5 TABLET, FILM COATED ORAL at 12:05

## 2021-05-19 RX ADMIN — ENOXAPARIN SODIUM 40 MG: 40 INJECTION SUBCUTANEOUS at 05:05

## 2021-05-19 RX ADMIN — ATORVASTATIN CALCIUM 10 MG: 10 TABLET, FILM COATED ORAL at 10:05

## 2021-05-19 RX ADMIN — ACETAMINOPHEN 1000 MG: 500 TABLET, FILM COATED ORAL at 02:05

## 2021-05-19 RX ADMIN — HYDROCORTISONE SODIUM SUCCINATE 50 MG: 100 INJECTION, POWDER, FOR SOLUTION INTRAMUSCULAR; INTRAVENOUS at 06:05

## 2021-05-19 RX ADMIN — KETOROLAC TROMETHAMINE 15 MG: 15 INJECTION, SOLUTION INTRAMUSCULAR; INTRAVENOUS at 05:05

## 2021-05-19 RX ADMIN — KETOROLAC TROMETHAMINE 15 MG: 15 INJECTION, SOLUTION INTRAMUSCULAR; INTRAVENOUS at 10:05

## 2021-05-19 RX ADMIN — OXYCODONE HYDROCHLORIDE 10 MG: 10 TABLET ORAL at 02:05

## 2021-05-19 RX ADMIN — MUPIROCIN: 20 OINTMENT TOPICAL at 09:05

## 2021-05-19 RX ADMIN — METHOCARBAMOL 500 MG: 100 INJECTION, SOLUTION INTRAMUSCULAR; INTRAVENOUS at 09:05

## 2021-05-19 RX ADMIN — OXYCODONE HYDROCHLORIDE 10 MG: 10 TABLET ORAL at 05:05

## 2021-05-20 LAB
ALBUMIN SERPL BCP-MCNC: 2.2 G/DL (ref 3.5–5.2)
ALP SERPL-CCNC: 327 U/L (ref 55–135)
ALT SERPL W/O P-5'-P-CCNC: 60 U/L (ref 10–44)
ANION GAP SERPL CALC-SCNC: 12 MMOL/L (ref 8–16)
AST SERPL-CCNC: 39 U/L (ref 10–40)
BASOPHILS # BLD AUTO: 0.02 K/UL (ref 0–0.2)
BASOPHILS NFR BLD: 0.1 % (ref 0–1.9)
BILIRUB SERPL-MCNC: 2.2 MG/DL (ref 0.1–1)
BUN SERPL-MCNC: 7 MG/DL (ref 6–20)
CALCIUM SERPL-MCNC: 8.8 MG/DL (ref 8.7–10.5)
CHLORIDE SERPL-SCNC: 100 MMOL/L (ref 95–110)
CO2 SERPL-SCNC: 24 MMOL/L (ref 23–29)
CREAT SERPL-MCNC: 0.6 MG/DL (ref 0.5–1.4)
DIFFERENTIAL METHOD: ABNORMAL
EOSINOPHIL # BLD AUTO: 0 K/UL (ref 0–0.5)
EOSINOPHIL NFR BLD: 0.1 % (ref 0–8)
ERYTHROCYTE [DISTWIDTH] IN BLOOD BY AUTOMATED COUNT: 13 % (ref 11.5–14.5)
EST. GFR  (AFRICAN AMERICAN): >60 ML/MIN/1.73 M^2
EST. GFR  (NON AFRICAN AMERICAN): >60 ML/MIN/1.73 M^2
GLUCOSE SERPL-MCNC: 128 MG/DL (ref 70–110)
HCT VFR BLD AUTO: 35.8 % (ref 40–54)
HGB BLD-MCNC: 12.4 G/DL (ref 14–18)
IMM GRANULOCYTES # BLD AUTO: 0.04 K/UL (ref 0–0.04)
IMM GRANULOCYTES NFR BLD AUTO: 0.3 % (ref 0–0.5)
LYMPHOCYTES # BLD AUTO: 0.5 K/UL (ref 1–4.8)
LYMPHOCYTES NFR BLD: 4 % (ref 18–48)
MAGNESIUM SERPL-MCNC: 2.2 MG/DL (ref 1.6–2.6)
MCH RBC QN AUTO: 32.4 PG (ref 27–31)
MCHC RBC AUTO-ENTMCNC: 34.6 G/DL (ref 32–36)
MCV RBC AUTO: 94 FL (ref 82–98)
MONOCYTES # BLD AUTO: 0.4 K/UL (ref 0.3–1)
MONOCYTES NFR BLD: 2.9 % (ref 4–15)
NEUTROPHILS # BLD AUTO: 12.3 K/UL (ref 1.8–7.7)
NEUTROPHILS NFR BLD: 92.6 % (ref 38–73)
NRBC BLD-RTO: 0 /100 WBC
PHOSPHATE SERPL-MCNC: 2 MG/DL (ref 2.7–4.5)
PLATELET # BLD AUTO: 294 K/UL (ref 150–450)
PLATELET BLD QL SMEAR: ABNORMAL
PMV BLD AUTO: 9 FL (ref 9.2–12.9)
POCT GLUCOSE: 138 MG/DL (ref 70–110)
POCT GLUCOSE: 148 MG/DL (ref 70–110)
POTASSIUM SERPL-SCNC: 2.8 MMOL/L (ref 3.5–5.1)
PROT SERPL-MCNC: 5.7 G/DL (ref 6–8.4)
RBC # BLD AUTO: 3.83 M/UL (ref 4.6–6.2)
SODIUM SERPL-SCNC: 136 MMOL/L (ref 136–145)
WBC # BLD AUTO: 13.34 K/UL (ref 3.9–12.7)

## 2021-05-20 PROCEDURE — 36415 COLL VENOUS BLD VENIPUNCTURE: CPT | Performed by: SURGERY

## 2021-05-20 PROCEDURE — 97535 SELF CARE MNGMENT TRAINING: CPT

## 2021-05-20 PROCEDURE — 94799 UNLISTED PULMONARY SVC/PX: CPT

## 2021-05-20 PROCEDURE — 80202 ASSAY OF VANCOMYCIN: CPT | Performed by: SURGERY

## 2021-05-20 PROCEDURE — 25000003 PHARM REV CODE 250: Performed by: STUDENT IN AN ORGANIZED HEALTH CARE EDUCATION/TRAINING PROGRAM

## 2021-05-20 PROCEDURE — 20600001 HC STEP DOWN PRIVATE ROOM

## 2021-05-20 PROCEDURE — 36415 COLL VENOUS BLD VENIPUNCTURE: CPT | Performed by: STUDENT IN AN ORGANIZED HEALTH CARE EDUCATION/TRAINING PROGRAM

## 2021-05-20 PROCEDURE — 85025 COMPLETE CBC W/AUTO DIFF WBC: CPT | Performed by: STUDENT IN AN ORGANIZED HEALTH CARE EDUCATION/TRAINING PROGRAM

## 2021-05-20 PROCEDURE — 63600175 PHARM REV CODE 636 W HCPCS: Performed by: STUDENT IN AN ORGANIZED HEALTH CARE EDUCATION/TRAINING PROGRAM

## 2021-05-20 PROCEDURE — 84100 ASSAY OF PHOSPHORUS: CPT | Performed by: STUDENT IN AN ORGANIZED HEALTH CARE EDUCATION/TRAINING PROGRAM

## 2021-05-20 PROCEDURE — 97530 THERAPEUTIC ACTIVITIES: CPT

## 2021-05-20 PROCEDURE — 25000003 PHARM REV CODE 250: Performed by: SURGERY

## 2021-05-20 PROCEDURE — 97162 PT EVAL MOD COMPLEX 30 MIN: CPT

## 2021-05-20 PROCEDURE — 63600175 PHARM REV CODE 636 W HCPCS: Performed by: SURGERY

## 2021-05-20 PROCEDURE — 80053 COMPREHEN METABOLIC PANEL: CPT | Performed by: STUDENT IN AN ORGANIZED HEALTH CARE EDUCATION/TRAINING PROGRAM

## 2021-05-20 PROCEDURE — 83735 ASSAY OF MAGNESIUM: CPT | Performed by: STUDENT IN AN ORGANIZED HEALTH CARE EDUCATION/TRAINING PROGRAM

## 2021-05-20 PROCEDURE — 97165 OT EVAL LOW COMPLEX 30 MIN: CPT

## 2021-05-20 RX ORDER — POTASSIUM CHLORIDE 7.45 MG/ML
10 INJECTION INTRAVENOUS
Status: COMPLETED | OUTPATIENT
Start: 2021-05-20 | End: 2021-05-20

## 2021-05-20 RX ORDER — SYRING-NEEDL,DISP,INSUL,0.3 ML 29 G X1/2"
296 SYRINGE, EMPTY DISPOSABLE MISCELLANEOUS ONCE
Status: COMPLETED | OUTPATIENT
Start: 2021-05-20 | End: 2021-05-20

## 2021-05-20 RX ORDER — PSEUDOEPHEDRINE/ACETAMINOPHEN 30MG-500MG
100 TABLET ORAL ONCE
Status: COMPLETED | OUTPATIENT
Start: 2021-05-20 | End: 2021-05-20

## 2021-05-20 RX ORDER — POTASSIUM CHLORIDE 7.45 MG/ML
10 INJECTION INTRAVENOUS
Status: DISPENSED | OUTPATIENT
Start: 2021-05-20 | End: 2021-05-20

## 2021-05-20 RX ORDER — CALCIUM CARBONATE 200(500)MG
500 TABLET,CHEWABLE ORAL 2 TIMES DAILY
Status: DISCONTINUED | OUTPATIENT
Start: 2021-05-20 | End: 2021-05-22 | Stop reason: HOSPADM

## 2021-05-20 RX ADMIN — PIPERACILLIN SODIUM AND TAZOBACTAM SODIUM 4.5 G: 4; .5 INJECTION, POWDER, FOR SOLUTION INTRAVENOUS at 05:05

## 2021-05-20 RX ADMIN — Medication 100 ML: at 05:05

## 2021-05-20 RX ADMIN — METHOCARBAMOL 500 MG: 100 INJECTION, SOLUTION INTRAMUSCULAR; INTRAVENOUS at 09:05

## 2021-05-20 RX ADMIN — VANCOMYCIN HYDROCHLORIDE 1000 MG: 1 INJECTION, POWDER, LYOPHILIZED, FOR SOLUTION INTRAVENOUS at 12:05

## 2021-05-20 RX ADMIN — KETOROLAC TROMETHAMINE 15 MG: 15 INJECTION, SOLUTION INTRAMUSCULAR; INTRAVENOUS at 08:05

## 2021-05-20 RX ADMIN — ENOXAPARIN SODIUM 40 MG: 40 INJECTION SUBCUTANEOUS at 05:05

## 2021-05-20 RX ADMIN — GABAPENTIN 300 MG: 300 CAPSULE ORAL at 08:05

## 2021-05-20 RX ADMIN — POTASSIUM CHLORIDE 10 MEQ: 7.46 INJECTION, SOLUTION INTRAVENOUS at 06:05

## 2021-05-20 RX ADMIN — HYDROCORTISONE SODIUM SUCCINATE 37.5 MG: 100 INJECTION, POWDER, FOR SOLUTION INTRAMUSCULAR; INTRAVENOUS at 09:05

## 2021-05-20 RX ADMIN — MAGNESIUM CITRATE 296 ML: 1.75 LIQUID ORAL at 05:05

## 2021-05-20 RX ADMIN — POTASSIUM CHLORIDE 10 MEQ: 7.46 INJECTION, SOLUTION INTRAVENOUS at 03:05

## 2021-05-20 RX ADMIN — LAMOTRIGINE 200 MG: 100 TABLET ORAL at 08:05

## 2021-05-20 RX ADMIN — ACETAMINOPHEN 1000 MG: 500 TABLET, FILM COATED ORAL at 08:05

## 2021-05-20 RX ADMIN — HYDROCORTISONE SODIUM SUCCINATE 50 MG: 100 INJECTION, POWDER, FOR SOLUTION INTRAMUSCULAR; INTRAVENOUS at 05:05

## 2021-05-20 RX ADMIN — DOCUSATE SODIUM 50MG AND SENNOSIDES 8.6MG 2 TABLET: 8.6; 5 TABLET, FILM COATED ORAL at 08:05

## 2021-05-20 RX ADMIN — ATORVASTATIN CALCIUM 10 MG: 10 TABLET, FILM COATED ORAL at 08:05

## 2021-05-20 RX ADMIN — LIDOCAINE 2 PATCH: 50 PATCH TOPICAL at 08:05

## 2021-05-20 RX ADMIN — SODIUM CHLORIDE 500 ML: 0.9 INJECTION, SOLUTION INTRAVENOUS at 05:05

## 2021-05-20 RX ADMIN — ALPRAZOLAM 0.5 MG: 0.5 TABLET ORAL at 08:05

## 2021-05-20 RX ADMIN — ACETAMINOPHEN 1000 MG: 500 TABLET, FILM COATED ORAL at 02:05

## 2021-05-20 RX ADMIN — ROPINIROLE HYDROCHLORIDE 4 MG: 2 TABLET, FILM COATED ORAL at 08:05

## 2021-05-20 RX ADMIN — PIPERACILLIN SODIUM AND TAZOBACTAM SODIUM 4.5 G: 4; .5 INJECTION, POWDER, FOR SOLUTION INTRAVENOUS at 09:05

## 2021-05-20 RX ADMIN — TAMSULOSIN HYDROCHLORIDE 0.4 MG: 0.4 CAPSULE ORAL at 08:05

## 2021-05-20 RX ADMIN — MUPIROCIN: 20 OINTMENT TOPICAL at 09:05

## 2021-05-20 RX ADMIN — SODIUM PHOSPHATE, MONOBASIC, MONOHYDRATE 20.01 MMOL: 276; 142 INJECTION, SOLUTION INTRAVENOUS at 02:05

## 2021-05-20 RX ADMIN — POTASSIUM CHLORIDE 10 MEQ: 7.46 INJECTION, SOLUTION INTRAVENOUS at 07:05

## 2021-05-20 RX ADMIN — POTASSIUM CHLORIDE 10 MEQ: 10 INJECTION, SOLUTION INTRAVENOUS at 11:05

## 2021-05-20 RX ADMIN — POLYETHYLENE GLYCOL 3350 17 G: 17 POWDER, FOR SOLUTION ORAL at 08:05

## 2021-05-20 RX ADMIN — KETOROLAC TROMETHAMINE 15 MG: 15 INJECTION, SOLUTION INTRAMUSCULAR; INTRAVENOUS at 02:05

## 2021-05-20 RX ADMIN — POTASSIUM CHLORIDE 10 MEQ: 10 INJECTION, SOLUTION INTRAVENOUS at 10:05

## 2021-05-20 RX ADMIN — BISACODYL 10 MG: 10 SUPPOSITORY RECTAL at 08:05

## 2021-05-20 RX ADMIN — POTASSIUM CHLORIDE 10 MEQ: 7.46 INJECTION, SOLUTION INTRAVENOUS at 02:05

## 2021-05-20 RX ADMIN — METHOCARBAMOL 500 MG: 100 INJECTION, SOLUTION INTRAMUSCULAR; INTRAVENOUS at 03:05

## 2021-05-20 RX ADMIN — PIPERACILLIN SODIUM AND TAZOBACTAM SODIUM 4.5 G: 4; .5 INJECTION, POWDER, FOR SOLUTION INTRAVENOUS at 03:05

## 2021-05-20 RX ADMIN — HYDROCORTISONE SODIUM SUCCINATE 37.5 MG: 100 INJECTION, POWDER, FOR SOLUTION INTRAMUSCULAR; INTRAVENOUS at 02:05

## 2021-05-20 RX ADMIN — GABAPENTIN 300 MG: 300 CAPSULE ORAL at 02:05

## 2021-05-20 RX ADMIN — POTASSIUM CHLORIDE 10 MEQ: 7.46 INJECTION, SOLUTION INTRAVENOUS at 05:05

## 2021-05-21 LAB
ALBUMIN SERPL BCP-MCNC: 2.2 G/DL (ref 3.5–5.2)
ALP SERPL-CCNC: 359 U/L (ref 55–135)
ALT SERPL W/O P-5'-P-CCNC: 57 U/L (ref 10–44)
ANION GAP SERPL CALC-SCNC: 16 MMOL/L (ref 8–16)
AST SERPL-CCNC: 45 U/L (ref 10–40)
BACTERIA BLD CULT: ABNORMAL
BASOPHILS # BLD AUTO: 0.04 K/UL (ref 0–0.2)
BASOPHILS NFR BLD: 0.3 % (ref 0–1.9)
BILIRUB SERPL-MCNC: 2.2 MG/DL (ref 0.1–1)
BUN SERPL-MCNC: 11 MG/DL (ref 6–20)
CALCIUM SERPL-MCNC: 8.7 MG/DL (ref 8.7–10.5)
CHLORIDE SERPL-SCNC: 95 MMOL/L (ref 95–110)
CO2 SERPL-SCNC: 23 MMOL/L (ref 23–29)
CREAT SERPL-MCNC: 0.6 MG/DL (ref 0.5–1.4)
DIFFERENTIAL METHOD: ABNORMAL
EOSINOPHIL # BLD AUTO: 0 K/UL (ref 0–0.5)
EOSINOPHIL NFR BLD: 0.1 % (ref 0–8)
ERYTHROCYTE [DISTWIDTH] IN BLOOD BY AUTOMATED COUNT: 12.9 % (ref 11.5–14.5)
EST. GFR  (AFRICAN AMERICAN): >60 ML/MIN/1.73 M^2
EST. GFR  (NON AFRICAN AMERICAN): >60 ML/MIN/1.73 M^2
GLUCOSE SERPL-MCNC: 120 MG/DL (ref 70–110)
HCT VFR BLD AUTO: 38.7 % (ref 40–54)
HGB BLD-MCNC: 13.4 G/DL (ref 14–18)
IMM GRANULOCYTES # BLD AUTO: 0.07 K/UL (ref 0–0.04)
IMM GRANULOCYTES NFR BLD AUTO: 0.5 % (ref 0–0.5)
LYMPHOCYTES # BLD AUTO: 0.8 K/UL (ref 1–4.8)
LYMPHOCYTES NFR BLD: 5.8 % (ref 18–48)
MAGNESIUM SERPL-MCNC: 2.3 MG/DL (ref 1.6–2.6)
MCH RBC QN AUTO: 31.6 PG (ref 27–31)
MCHC RBC AUTO-ENTMCNC: 34.6 G/DL (ref 32–36)
MCV RBC AUTO: 91 FL (ref 82–98)
MONOCYTES # BLD AUTO: 0.5 K/UL (ref 0.3–1)
MONOCYTES NFR BLD: 3.7 % (ref 4–15)
NEUTROPHILS # BLD AUTO: 12.9 K/UL (ref 1.8–7.7)
NEUTROPHILS NFR BLD: 89.6 % (ref 38–73)
NRBC BLD-RTO: 0 /100 WBC
PHOSPHATE SERPL-MCNC: 3 MG/DL (ref 2.7–4.5)
PLATELET # BLD AUTO: 311 K/UL (ref 150–450)
PMV BLD AUTO: 9.6 FL (ref 9.2–12.9)
POCT GLUCOSE: 102 MG/DL (ref 70–110)
POCT GLUCOSE: 123 MG/DL (ref 70–110)
POCT GLUCOSE: 96 MG/DL (ref 70–110)
POTASSIUM SERPL-SCNC: 3.4 MMOL/L (ref 3.5–5.1)
PROT SERPL-MCNC: 5.9 G/DL (ref 6–8.4)
RBC # BLD AUTO: 4.24 M/UL (ref 4.6–6.2)
SODIUM SERPL-SCNC: 134 MMOL/L (ref 136–145)
VANCOMYCIN TROUGH SERPL-MCNC: 10 UG/ML (ref 10–22)
WBC # BLD AUTO: 14.42 K/UL (ref 3.9–12.7)

## 2021-05-21 PROCEDURE — 94799 UNLISTED PULMONARY SVC/PX: CPT

## 2021-05-21 PROCEDURE — 63600175 PHARM REV CODE 636 W HCPCS: Performed by: STUDENT IN AN ORGANIZED HEALTH CARE EDUCATION/TRAINING PROGRAM

## 2021-05-21 PROCEDURE — 25000003 PHARM REV CODE 250: Performed by: STUDENT IN AN ORGANIZED HEALTH CARE EDUCATION/TRAINING PROGRAM

## 2021-05-21 PROCEDURE — 83735 ASSAY OF MAGNESIUM: CPT | Performed by: STUDENT IN AN ORGANIZED HEALTH CARE EDUCATION/TRAINING PROGRAM

## 2021-05-21 PROCEDURE — 20600001 HC STEP DOWN PRIVATE ROOM

## 2021-05-21 PROCEDURE — 84100 ASSAY OF PHOSPHORUS: CPT | Performed by: STUDENT IN AN ORGANIZED HEALTH CARE EDUCATION/TRAINING PROGRAM

## 2021-05-21 PROCEDURE — 94761 N-INVAS EAR/PLS OXIMETRY MLT: CPT

## 2021-05-21 PROCEDURE — 36415 COLL VENOUS BLD VENIPUNCTURE: CPT | Performed by: STUDENT IN AN ORGANIZED HEALTH CARE EDUCATION/TRAINING PROGRAM

## 2021-05-21 PROCEDURE — 63600175 PHARM REV CODE 636 W HCPCS: Performed by: SURGERY

## 2021-05-21 PROCEDURE — 25000003 PHARM REV CODE 250: Performed by: SURGERY

## 2021-05-21 PROCEDURE — 99900035 HC TECH TIME PER 15 MIN (STAT)

## 2021-05-21 PROCEDURE — 85025 COMPLETE CBC W/AUTO DIFF WBC: CPT | Performed by: STUDENT IN AN ORGANIZED HEALTH CARE EDUCATION/TRAINING PROGRAM

## 2021-05-21 PROCEDURE — 80053 COMPREHEN METABOLIC PANEL: CPT | Performed by: STUDENT IN AN ORGANIZED HEALTH CARE EDUCATION/TRAINING PROGRAM

## 2021-05-21 RX ORDER — PSEUDOEPHEDRINE/ACETAMINOPHEN 30MG-500MG
100 TABLET ORAL 3 TIMES DAILY
Status: DISCONTINUED | OUTPATIENT
Start: 2021-05-21 | End: 2021-05-21

## 2021-05-21 RX ORDER — BENZONATATE 100 MG/1
100 CAPSULE ORAL 3 TIMES DAILY PRN
Status: DISCONTINUED | OUTPATIENT
Start: 2021-05-21 | End: 2021-05-22 | Stop reason: HOSPADM

## 2021-05-21 RX ORDER — SYRING-NEEDL,DISP,INSUL,0.3 ML 29 G X1/2"
296 SYRINGE, EMPTY DISPOSABLE MISCELLANEOUS
Status: DISCONTINUED | OUTPATIENT
Start: 2021-05-21 | End: 2021-05-21

## 2021-05-21 RX ORDER — SODIUM CHLORIDE 0.9 % (FLUSH) 0.9 %
3 SYRINGE (ML) INJECTION
Status: DISCONTINUED | OUTPATIENT
Start: 2021-05-22 | End: 2021-05-22 | Stop reason: HOSPADM

## 2021-05-21 RX ORDER — SODIUM CHLORIDE 9 MG/ML
INJECTION, SOLUTION INTRAVENOUS CONTINUOUS
Status: DISCONTINUED | OUTPATIENT
Start: 2021-05-21 | End: 2021-05-22 | Stop reason: HOSPADM

## 2021-05-21 RX ORDER — SODIUM CHLORIDE 0.9 % (FLUSH) 0.9 %
.1-1 SYRINGE (ML) INJECTION
Status: DISCONTINUED | OUTPATIENT
Start: 2021-05-21 | End: 2021-05-22 | Stop reason: HOSPADM

## 2021-05-21 RX ADMIN — VANCOMYCIN HYDROCHLORIDE 1000 MG: 1 INJECTION, POWDER, LYOPHILIZED, FOR SOLUTION INTRAVENOUS at 12:05

## 2021-05-21 RX ADMIN — METHOCARBAMOL 500 MG: 100 INJECTION, SOLUTION INTRAMUSCULAR; INTRAVENOUS at 02:05

## 2021-05-21 RX ADMIN — PIPERACILLIN SODIUM AND TAZOBACTAM SODIUM 4.5 G: 4; .5 INJECTION, POWDER, FOR SOLUTION INTRAVENOUS at 05:05

## 2021-05-21 RX ADMIN — HYDROCORTISONE SODIUM SUCCINATE 37.5 MG: 100 INJECTION, POWDER, FOR SOLUTION INTRAMUSCULAR; INTRAVENOUS at 02:05

## 2021-05-21 RX ADMIN — ACETAMINOPHEN 1000 MG: 500 TABLET, FILM COATED ORAL at 08:05

## 2021-05-21 RX ADMIN — CALCIUM CARBONATE (ANTACID) CHEW TAB 500 MG 500 MG: 500 CHEW TAB at 08:05

## 2021-05-21 RX ADMIN — MELATONIN TAB 3 MG 6 MG: 3 TAB at 10:05

## 2021-05-21 RX ADMIN — LIDOCAINE 2 PATCH: 50 PATCH TOPICAL at 10:05

## 2021-05-21 RX ADMIN — BENZONATATE 100 MG: 100 CAPSULE ORAL at 05:05

## 2021-05-21 RX ADMIN — METHOCARBAMOL 500 MG: 100 INJECTION, SOLUTION INTRAMUSCULAR; INTRAVENOUS at 10:05

## 2021-05-21 RX ADMIN — VANCOMYCIN HYDROCHLORIDE 1000 MG: 1 INJECTION, POWDER, LYOPHILIZED, FOR SOLUTION INTRAVENOUS at 11:05

## 2021-05-21 RX ADMIN — GABAPENTIN 300 MG: 300 CAPSULE ORAL at 08:05

## 2021-05-21 RX ADMIN — KETOROLAC TROMETHAMINE 15 MG: 15 INJECTION, SOLUTION INTRAMUSCULAR; INTRAVENOUS at 08:05

## 2021-05-21 RX ADMIN — HYDROCORTISONE SODIUM SUCCINATE 37.5 MG: 100 INJECTION, POWDER, FOR SOLUTION INTRAMUSCULAR; INTRAVENOUS at 09:05

## 2021-05-21 RX ADMIN — HYDROCORTISONE SODIUM SUCCINATE 37.5 MG: 100 INJECTION, POWDER, FOR SOLUTION INTRAMUSCULAR; INTRAVENOUS at 05:05

## 2021-05-21 RX ADMIN — ALPRAZOLAM 0.5 MG: 0.5 TABLET ORAL at 06:05

## 2021-05-21 RX ADMIN — ENOXAPARIN SODIUM 40 MG: 40 INJECTION SUBCUTANEOUS at 05:05

## 2021-05-21 RX ADMIN — MUPIROCIN: 20 OINTMENT TOPICAL at 08:05

## 2021-05-21 RX ADMIN — KETOROLAC TROMETHAMINE 15 MG: 15 INJECTION, SOLUTION INTRAMUSCULAR; INTRAVENOUS at 02:05

## 2021-05-21 RX ADMIN — ROPINIROLE HYDROCHLORIDE 4 MG: 2 TABLET, FILM COATED ORAL at 08:05

## 2021-05-21 RX ADMIN — BENZONATATE 100 MG: 100 CAPSULE ORAL at 10:05

## 2021-05-21 RX ADMIN — PIPERACILLIN SODIUM AND TAZOBACTAM SODIUM 4.5 G: 4; .5 INJECTION, POWDER, FOR SOLUTION INTRAVENOUS at 10:05

## 2021-05-21 RX ADMIN — KETOROLAC TROMETHAMINE 15 MG: 15 INJECTION, SOLUTION INTRAMUSCULAR; INTRAVENOUS at 10:05

## 2021-05-21 RX ADMIN — PIPERACILLIN SODIUM AND TAZOBACTAM SODIUM 4.5 G: 4; .5 INJECTION, POWDER, FOR SOLUTION INTRAVENOUS at 03:05

## 2021-05-21 RX ADMIN — CALCIUM CARBONATE (ANTACID) CHEW TAB 500 MG 500 MG: 500 CHEW TAB at 12:05

## 2021-05-21 RX ADMIN — MUPIROCIN: 20 OINTMENT TOPICAL at 10:05

## 2021-05-21 RX ADMIN — Medication 100 ML: at 02:05

## 2021-05-21 RX ADMIN — ALPRAZOLAM 0.5 MG: 0.5 TABLET ORAL at 03:05

## 2021-05-21 RX ADMIN — LAMOTRIGINE 200 MG: 100 TABLET ORAL at 08:05

## 2021-05-21 RX ADMIN — SODIUM CHLORIDE: 0.9 INJECTION, SOLUTION INTRAVENOUS at 08:05

## 2021-05-21 RX ADMIN — METHOCARBAMOL 500 MG: 100 INJECTION, SOLUTION INTRAMUSCULAR; INTRAVENOUS at 08:05

## 2021-05-21 RX ADMIN — ATORVASTATIN CALCIUM 10 MG: 10 TABLET, FILM COATED ORAL at 08:05

## 2021-05-22 VITALS
BODY MASS INDEX: 29.86 KG/M2 | RESPIRATION RATE: 20 BRPM | DIASTOLIC BLOOD PRESSURE: 59 MMHG | HEART RATE: 98 BPM | WEIGHT: 152.13 LBS | OXYGEN SATURATION: 94 % | SYSTOLIC BLOOD PRESSURE: 116 MMHG | TEMPERATURE: 99 F | HEIGHT: 60 IN

## 2021-05-22 LAB
ALBUMIN SERPL BCP-MCNC: 1.8 G/DL (ref 3.5–5.2)
ALP SERPL-CCNC: 321 U/L (ref 55–135)
ALT SERPL W/O P-5'-P-CCNC: 40 U/L (ref 10–44)
ANION GAP SERPL CALC-SCNC: 8 MMOL/L (ref 8–16)
AST SERPL-CCNC: 41 U/L (ref 10–40)
BACTERIA BLD CULT: ABNORMAL
BASOPHILS # BLD AUTO: 0.01 K/UL (ref 0–0.2)
BASOPHILS NFR BLD: 0.1 % (ref 0–1.9)
BILIRUB SERPL-MCNC: 0.9 MG/DL (ref 0.1–1)
BUN SERPL-MCNC: 9 MG/DL (ref 6–20)
CALCIUM SERPL-MCNC: 7.9 MG/DL (ref 8.7–10.5)
CHLORIDE SERPL-SCNC: 106 MMOL/L (ref 95–110)
CO2 SERPL-SCNC: 23 MMOL/L (ref 23–29)
CREAT SERPL-MCNC: 0.5 MG/DL (ref 0.5–1.4)
DIFFERENTIAL METHOD: ABNORMAL
EOSINOPHIL # BLD AUTO: 0 K/UL (ref 0–0.5)
EOSINOPHIL NFR BLD: 0.2 % (ref 0–8)
ERYTHROCYTE [DISTWIDTH] IN BLOOD BY AUTOMATED COUNT: 13.3 % (ref 11.5–14.5)
EST. GFR  (AFRICAN AMERICAN): >60 ML/MIN/1.73 M^2
EST. GFR  (NON AFRICAN AMERICAN): >60 ML/MIN/1.73 M^2
GLUCOSE SERPL-MCNC: 100 MG/DL (ref 70–110)
HCT VFR BLD AUTO: 33.3 % (ref 40–54)
HGB BLD-MCNC: 11.3 G/DL (ref 14–18)
IMM GRANULOCYTES # BLD AUTO: 0.2 K/UL (ref 0–0.04)
IMM GRANULOCYTES NFR BLD AUTO: 2 % (ref 0–0.5)
LYMPHOCYTES # BLD AUTO: 1.1 K/UL (ref 1–4.8)
LYMPHOCYTES NFR BLD: 11.3 % (ref 18–48)
MAGNESIUM SERPL-MCNC: 2.3 MG/DL (ref 1.6–2.6)
MCH RBC QN AUTO: 32.4 PG (ref 27–31)
MCHC RBC AUTO-ENTMCNC: 33.9 G/DL (ref 32–36)
MCV RBC AUTO: 95 FL (ref 82–98)
MONOCYTES # BLD AUTO: 0.4 K/UL (ref 0.3–1)
MONOCYTES NFR BLD: 4.3 % (ref 4–15)
NEUTROPHILS # BLD AUTO: 8 K/UL (ref 1.8–7.7)
NEUTROPHILS NFR BLD: 82.1 % (ref 38–73)
NRBC BLD-RTO: 0 /100 WBC
PHOSPHATE SERPL-MCNC: 2.8 MG/DL (ref 2.7–4.5)
PLATELET # BLD AUTO: 342 K/UL (ref 150–450)
PMV BLD AUTO: 9.1 FL (ref 9.2–12.9)
POCT GLUCOSE: 130 MG/DL (ref 70–110)
POCT GLUCOSE: 93 MG/DL (ref 70–110)
POTASSIUM SERPL-SCNC: 3.5 MMOL/L (ref 3.5–5.1)
PROT SERPL-MCNC: 4.8 G/DL (ref 6–8.4)
RBC # BLD AUTO: 3.49 M/UL (ref 4.6–6.2)
SODIUM SERPL-SCNC: 137 MMOL/L (ref 136–145)
WBC # BLD AUTO: 9.79 K/UL (ref 3.9–12.7)

## 2021-05-22 PROCEDURE — 63600175 PHARM REV CODE 636 W HCPCS: Performed by: STUDENT IN AN ORGANIZED HEALTH CARE EDUCATION/TRAINING PROGRAM

## 2021-05-22 PROCEDURE — 25000003 PHARM REV CODE 250: Performed by: STUDENT IN AN ORGANIZED HEALTH CARE EDUCATION/TRAINING PROGRAM

## 2021-05-22 PROCEDURE — 85025 COMPLETE CBC W/AUTO DIFF WBC: CPT | Performed by: STUDENT IN AN ORGANIZED HEALTH CARE EDUCATION/TRAINING PROGRAM

## 2021-05-22 PROCEDURE — 83735 ASSAY OF MAGNESIUM: CPT | Performed by: STUDENT IN AN ORGANIZED HEALTH CARE EDUCATION/TRAINING PROGRAM

## 2021-05-22 PROCEDURE — 80053 COMPREHEN METABOLIC PANEL: CPT | Performed by: STUDENT IN AN ORGANIZED HEALTH CARE EDUCATION/TRAINING PROGRAM

## 2021-05-22 PROCEDURE — 99900035 HC TECH TIME PER 15 MIN (STAT)

## 2021-05-22 PROCEDURE — 36415 COLL VENOUS BLD VENIPUNCTURE: CPT | Performed by: STUDENT IN AN ORGANIZED HEALTH CARE EDUCATION/TRAINING PROGRAM

## 2021-05-22 PROCEDURE — 84100 ASSAY OF PHOSPHORUS: CPT | Performed by: STUDENT IN AN ORGANIZED HEALTH CARE EDUCATION/TRAINING PROGRAM

## 2021-05-22 RX ORDER — PREDNISONE 10 MG/1
TABLET ORAL
Qty: 90 TABLET | Refills: 5 | Status: SHIPPED | OUTPATIENT
Start: 2021-05-22 | End: 2021-07-28 | Stop reason: SDUPTHER

## 2021-05-22 RX ADMIN — POLYETHYLENE GLYCOL 3350 17 G: 17 POWDER, FOR SOLUTION ORAL at 09:05

## 2021-05-22 RX ADMIN — DOCUSATE SODIUM 50MG AND SENNOSIDES 8.6MG 2 TABLET: 8.6; 5 TABLET, FILM COATED ORAL at 09:05

## 2021-05-22 RX ADMIN — SODIUM CHLORIDE: 0.9 INJECTION, SOLUTION INTRAVENOUS at 05:05

## 2021-05-22 RX ADMIN — ALPRAZOLAM 0.5 MG: 0.5 TABLET ORAL at 09:05

## 2021-05-22 RX ADMIN — BENZONATATE 100 MG: 100 CAPSULE ORAL at 05:05

## 2021-05-22 RX ADMIN — KETOROLAC TROMETHAMINE 15 MG: 15 INJECTION, SOLUTION INTRAMUSCULAR; INTRAVENOUS at 09:05

## 2021-05-22 RX ADMIN — LAMOTRIGINE 200 MG: 100 TABLET ORAL at 09:05

## 2021-05-22 RX ADMIN — PIPERACILLIN SODIUM AND TAZOBACTAM SODIUM 4.5 G: 4; .5 INJECTION, POWDER, FOR SOLUTION INTRAVENOUS at 02:05

## 2021-05-22 RX ADMIN — TAMSULOSIN HYDROCHLORIDE 0.4 MG: 0.4 CAPSULE ORAL at 09:05

## 2021-05-22 RX ADMIN — GABAPENTIN 300 MG: 300 CAPSULE ORAL at 09:05

## 2021-05-22 RX ADMIN — METHOCARBAMOL 500 MG: 100 INJECTION, SOLUTION INTRAMUSCULAR; INTRAVENOUS at 09:05

## 2021-05-22 RX ADMIN — HYDROCORTISONE SODIUM SUCCINATE 37.5 MG: 100 INJECTION, POWDER, FOR SOLUTION INTRAMUSCULAR; INTRAVENOUS at 05:05

## 2021-05-22 RX ADMIN — ACETAMINOPHEN 1000 MG: 500 TABLET, FILM COATED ORAL at 09:05

## 2021-05-22 RX ADMIN — CALCIUM CARBONATE (ANTACID) CHEW TAB 500 MG 500 MG: 500 CHEW TAB at 09:05

## 2021-05-24 ENCOUNTER — TELEPHONE (OUTPATIENT)
Dept: SURGERY | Facility: CLINIC | Age: 51
End: 2021-05-24

## 2021-05-24 LAB
BACTERIA BLD CULT: NORMAL
BACTERIA BLD CULT: NORMAL

## 2021-05-25 ENCOUNTER — PATIENT OUTREACH (OUTPATIENT)
Dept: ADMINISTRATIVE | Facility: CLINIC | Age: 51
End: 2021-05-25

## 2021-05-25 ENCOUNTER — PATIENT MESSAGE (OUTPATIENT)
Dept: ORTHOPEDICS | Facility: CLINIC | Age: 51
End: 2021-05-25

## 2021-05-25 DIAGNOSIS — Z90.49 HISTORY OF CHOLECYSTECTOMY: Primary | ICD-10-CM

## 2021-05-25 LAB
FINAL PATHOLOGIC DIAGNOSIS: NORMAL
Lab: NORMAL

## 2021-05-27 NOTE — OUTPATIENT SUBJECTIVE & OBJECTIVE
Outpatient Subjective & Objective     Chief complaint-Preoperative evaluation, Perioperative Medical management, complication reduction plan     Active cardiac conditions- none    Revised cardiac risk index predictors- none    Functional capacity -Examples of physical activity ride stationary bike 15 minutes daily, PTX exercises 30 minutes  house work, can take a flight of stairs holding on to the railing, cutting the grass with a mower and painting----- He can undertake all the above activities without  chest pain,chest tightness, Shortness of breath ,dizziness,lightheadedness making his exercise tolerance more than 4 Mets.        Review of Systems   Constitutional: Positive for fatigue. Negative for chills and fever.   HENT: Positive for dental problem. Negative for trouble swallowing and voice change.         2 teeth removed 2 months ago   Eyes: Negative for photophobia and visual disturbance.        No acute visual changes   Respiratory: Negative for apnea, cough, chest tightness, shortness of breath and wheezing.         STOP bang  Score 2  Low risk KIARA     Cardiovascular: Negative for chest pain, palpitations and leg swelling.   Gastrointestinal: Negative for abdominal distention, abdominal pain, blood in stool, constipation, diarrhea, nausea and vomiting.        NO FLD, hepatitis, cirrhosis  No BRB or black tarry stool     Endocrine: Negative for cold intolerance, heat intolerance, polydipsia, polyphagia and polyuria.   Genitourinary: Positive for difficulty urinating. Negative for dysuria, flank pain, frequency, hematuria and urgency.        Nocturia    Some dribbling after urinating   Musculoskeletal: Positive for arthralgias, back pain, gait problem, joint swelling, myalgias, neck pain and neck stiffness.   Neurological: Positive for numbness. Negative for dizziness, tremors, seizures, syncope, weakness, light-headedness and headaches.        Hand tremor right arm  Numbness  Lower legs and feet  Tingling  Pt is being taken by stretcher into IR lab D for procedure prep.   in hands- carpal tunnel   Psychiatric/Behavioral: Negative for suicidal ideas. The patient is nervous/anxious.      Family History   Problem Relation Age of Onset    Diabetes Maternal Grandfather     Cancer Mother     Heart disease Father     Autoimmune disease Sister     Breast cancer Neg Hx     Ovarian cancer Neg Hx     Vaginal cancer Neg Hx     Endometrial cancer Neg Hx     Cervical cancer Neg Hx      Past Surgical History:   Procedure Laterality Date    BACK SURGERY      gender surgery      multiple surgeries since birth    LAMINECTOMY  09/13/2019    RADIOFREQUENCY ABLATION Left 5/9/2019    Procedure: RADIOFREQUENCY ABLATION, LEFT L3,4,5;  Surgeon: Messi Cabello MD;  Location: [facility];  Service: Pain Management;  Laterality: Left;  1 of 2    RADIOFREQUENCY ABLATION Right 5/23/2019    Procedure: RADIOFREQUENCY ABLATION, RIGHT L3,4,5;  Surgeon: Messi Cabello MD;  Location: [facility];  Service: Pain Management;  Laterality: Right;  2of 2    SPINE SURGERY      Sept 2019     TOTAL KNEE ARTHROPLASTY Right 1/31/2020    Procedure: ARTHROPLASTY, KNEE, TOTAL;  Surgeon: Donte Andrade III, MD;  Location: [address];  Service: Orthopedics;  Laterality: Right;       No anesthesia, bleeding, cardiac problems with previous surgeries/procedures. No PE DVT    No known familial problems with anesthesia, bleeding, cardiac problems with previous surgeries/procedures. No known PE DVT    Medications and Allergies reviewed in epic.     Physical Exam   Constitutional: He is oriented to person, place, and time. Vital signs are normal. He is cooperative.   HENT:   Head: Normocephalic.   Nose: Nose normal.   Mouth/Throat: Uvula is midline, oropharynx is clear and moist and mucous membranes are normal. Mucous membranes are moist. Oropharynx is clear.   Eyes: Lids are normal.   Neck: Trachea normal, normal range of motion and phonation normal. Carotid bruit is not present.   Cardiovascular: Normal  rate, regular rhythm, normal heart sounds and normal pulses. Exam reveals no gallop and no friction rub.   No murmur heard.  Pulses:       Carotid pulses are 2+ on the right side and 2+ on the left side.       Radial pulses are 2+ on the right side and 2+ on the left side.        Posterior tibial pulses are 2+ on the right side and 2+ on the left side.   Pulmonary/Chest: Effort normal and breath sounds normal.   Abdominal: Soft. Normal appearance and bowel sounds are normal. There is no abdominal tenderness.   Musculoskeletal: Normal range of motion.      Right lower leg: No edema.      Left lower leg: No edema.   Lymphadenopathy:        Head (right side): No submental, no submandibular, no tonsillar, no preauricular, no posterior auricular and no occipital adenopathy present.        Head (left side): No submental, no submandibular, no tonsillar, no preauricular, no posterior auricular and no occipital adenopathy present.        Right cervical: No superficial cervical adenopathy present.       Left cervical: No superficial cervical adenopathy present.   Neurological: He is alert and oriented to person, place, and time. GCS eye subscore is 4. GCS verbal subscore is 5. GCS motor subscore is 6.   Skin: Skin is warm and dry. Capillary refill takes 2 to 3 seconds.        Psychiatric: His speech is normal and behavior is normal. Memory, judgment and thought content normal. His mood appears anxious.   Patient expresses concern with his body and fear of being seen without clothes     Blood pressure 133/83, pulse 102, temperature 98.3 °F (36.8 °C), temperature source Oral, height 5' (1.524 m), weight 59.9 kg (132 lb), SpO2 99 %.      Investigations  Lab and Imaging have been reviewed in [facility].    Labs 8/21/2020  Hgb 14.8  HCT 46      BUN 16  Cr 0.7    Bili 0.4  Alk Phos 289  ALT 57  GFR >60    Review of old records- Was done and information gathered regards to events leading to surgery and health conditions of  significance in the perioperative period.    Outpatient Subjective & Objective

## 2021-06-01 ENCOUNTER — CARE AT HOME (OUTPATIENT)
Dept: HOME HEALTH SERVICES | Facility: CLINIC | Age: 51
End: 2021-06-01
Payer: MEDICARE

## 2021-06-01 VITALS
DIASTOLIC BLOOD PRESSURE: 60 MMHG | TEMPERATURE: 97 F | SYSTOLIC BLOOD PRESSURE: 125 MMHG | OXYGEN SATURATION: 99 % | HEART RATE: 78 BPM | RESPIRATION RATE: 20 BRPM

## 2021-06-01 DIAGNOSIS — Z90.49 HISTORY OF CHOLECYSTECTOMY: ICD-10-CM

## 2021-06-01 DIAGNOSIS — G25.81 RLS (RESTLESS LEGS SYNDROME): ICD-10-CM

## 2021-06-01 DIAGNOSIS — K81.0 ACUTE CHOLECYSTITIS: ICD-10-CM

## 2021-06-01 DIAGNOSIS — M17.12 PRIMARY OSTEOARTHRITIS OF LEFT KNEE: ICD-10-CM

## 2021-06-01 DIAGNOSIS — E25.0 CONGENITAL ADRENAL HYPERPLASIA: ICD-10-CM

## 2021-06-01 DIAGNOSIS — G89.29 OTHER CHRONIC PAIN: ICD-10-CM

## 2021-06-01 PROCEDURE — 99497 ADVNCD CARE PLAN 30 MIN: CPT | Mod: 25,S$GLB,, | Performed by: NURSE PRACTITIONER

## 2021-06-01 PROCEDURE — 99497 PR ADVNCD CARE PLAN 30 MIN: ICD-10-PCS | Mod: 25,S$GLB,, | Performed by: NURSE PRACTITIONER

## 2021-06-01 PROCEDURE — 99495 TRANSJ CARE MGMT MOD F2F 14D: CPT | Mod: S$GLB,,, | Performed by: NURSE PRACTITIONER

## 2021-06-01 PROCEDURE — 99495 TCM SERVICES (MODERATE COMPLEXITY): ICD-10-PCS | Mod: S$GLB,,, | Performed by: NURSE PRACTITIONER

## 2021-06-03 ENCOUNTER — OFFICE VISIT (OUTPATIENT)
Dept: SURGERY | Facility: CLINIC | Age: 51
End: 2021-06-03
Payer: MEDICARE

## 2021-06-03 ENCOUNTER — TELEPHONE (OUTPATIENT)
Dept: SURGERY | Facility: CLINIC | Age: 51
End: 2021-06-03

## 2021-06-03 VITALS
DIASTOLIC BLOOD PRESSURE: 67 MMHG | SYSTOLIC BLOOD PRESSURE: 119 MMHG | WEIGHT: 152.13 LBS | HEART RATE: 114 BPM | BODY MASS INDEX: 29.86 KG/M2 | HEIGHT: 60 IN

## 2021-06-03 DIAGNOSIS — R30.0 BURNING WITH URINATION: Primary | ICD-10-CM

## 2021-06-03 PROCEDURE — 99214 OFFICE O/P EST MOD 30 MIN: CPT | Mod: PBBFAC | Performed by: SURGERY

## 2021-06-03 PROCEDURE — 99999 PR PBB SHADOW E&M-EST. PATIENT-LVL IV: CPT | Mod: PBBFAC,,, | Performed by: SURGERY

## 2021-06-03 PROCEDURE — 99024 PR POST-OP FOLLOW-UP VISIT: ICD-10-PCS | Mod: POP,,, | Performed by: SURGERY

## 2021-06-03 PROCEDURE — 99024 POSTOP FOLLOW-UP VISIT: CPT | Mod: POP,,, | Performed by: SURGERY

## 2021-06-03 PROCEDURE — 99999 PR PBB SHADOW E&M-EST. PATIENT-LVL IV: ICD-10-PCS | Mod: PBBFAC,,, | Performed by: SURGERY

## 2021-06-03 RX ORDER — CIPROFLOXACIN 500 MG/1
500 TABLET ORAL
COMMUNITY
End: 2021-07-07

## 2021-06-04 ENCOUNTER — OFFICE VISIT (OUTPATIENT)
Dept: PAIN MEDICINE | Facility: CLINIC | Age: 51
End: 2021-06-04
Payer: MEDICARE

## 2021-06-04 DIAGNOSIS — Z98.890 S/P LUMBAR LAMINECTOMY: ICD-10-CM

## 2021-06-04 DIAGNOSIS — G89.29 OTHER CHRONIC PAIN: ICD-10-CM

## 2021-06-04 DIAGNOSIS — Z79.891 ENCOUNTER FOR LONG-TERM OPIATE ANALGESIC USE: ICD-10-CM

## 2021-06-04 DIAGNOSIS — M19.011 ARTHRITIS OF RIGHT SHOULDER REGION: ICD-10-CM

## 2021-06-04 DIAGNOSIS — M54.12 CERVICAL RADICULOPATHY: ICD-10-CM

## 2021-06-04 DIAGNOSIS — G89.4 CHRONIC PAIN SYNDROME: Primary | ICD-10-CM

## 2021-06-04 DIAGNOSIS — M17.11 PRIMARY OSTEOARTHRITIS OF RIGHT KNEE: ICD-10-CM

## 2021-06-04 PROCEDURE — 99214 OFFICE O/P EST MOD 30 MIN: CPT | Mod: 95,,, | Performed by: NURSE PRACTITIONER

## 2021-06-04 PROCEDURE — 99214 PR OFFICE/OUTPT VISIT, EST, LEVL IV, 30-39 MIN: ICD-10-PCS | Mod: 95,,, | Performed by: NURSE PRACTITIONER

## 2021-06-04 RX ORDER — OXYCODONE AND ACETAMINOPHEN 10; 325 MG/1; MG/1
1 TABLET ORAL EVERY 8 HOURS PRN
Qty: 90 TABLET | Refills: 0 | Status: SHIPPED | OUTPATIENT
Start: 2021-06-04 | End: 2021-07-10

## 2021-06-04 NOTE — OUTPATIENT SUBJECTIVE & OBJECTIVE
"Outpatient Subjective & Objective     Chief complaint-Preoperative evaluation, Perioperative Medical management, complication reduction plan     Active cardiac conditions- none    Revised cardiac risk index predictors- none    Functional capacity -Examples of physical activity, walks with a walker,   can take 1 flight of stairs , was at parents house for x mas and tooks stairs while there ----- She can undertake all the above activities without  chest pain,chest tightness, Shortness of breath ,dizziness,lightheadedness making her exercise tolerance more,  than 4 Mets.        Review of Systems   Constitutional: Negative for chills and fever.        No unusual weight changes     HENT:        STOPBANG score 1 / 8      Age over 50        Eyes:        No new visual changes   Respiratory:        No cough , phlegm    No Hemoptysis   Cardiovascular:        As noted   Gastrointestinal:        No overt GI/ blood losses  Bowel movements- Regular   Endocrine:        Prednisone use > 20 mg daily for 3 weeks- none    Genitourinary:        No urinary hesitancy    Musculoskeletal:        As above      Skin: Negative for rash.   Neurological: Negative for syncope.        No unilateral weakness   Hematological:        Current use of Anticoagulants  None    Psychiatric/Behavioral:           No SI/HI   No vascular stenting             No anesthesia, bleeding, cardiac problems, PONV with previous surgeries/procedures.  Medications and Allergies reviewed in epic.  FH- No anesthesia,bleeding / venous thrombosis ,in family      Physical Exam     Vitals - 1 value per visit 1/9/2020   SYSTOLIC 116   DIASTOLIC 70   PULSE 107   TEMPERATURE 97.6   RESPIRATIONS    SPO2 99   Weight (lb) 131.39   Weight (kg) 59.6   HEIGHT 5' 0"   BODY MASS INDEX 25.66         Physical Exam  Constitutional- General appearance-Conscious,Coherent  Eyes- No conjunctival icterus,pupils  round  and reactive to light   ENT-Oral cavity- moist  , Hearing grossly normal " Hospitalist Progress Note      Name:  Kris Craven /Age/Sex: 1940  (78 y.o. female)   MRN & CSN:  [id number] & [id number] Admission Date/Time: 2021  4:35 PM   [facility] PCP: Sierra Reyes, [address] Day: 5    Assessment and Plan: Kris Craven is a 78 y.o.  female  who presents with Acute metabolic encephalopathy    Acute metabolic encephalopathy. Multifactorial due to progressive weakness and metabolic derangements and infectious process. Mental status fluctuates, currently appropriate. Sepsis. Source is possibly pneumonia, less likely UTI, still with hypotension and leukocytosis. On Abx. Blood cultures have no growth to date. Mutiple organ failure with evidence of hypotension, cirrhosis, malnutrition, encephalopathy, profound weakness, and CHF. Constipation: improved. Bilateral hydronephrosis, cont with Roberts catheter. Hypotension, persistent. Chronic diastolic heart failure, remains with edema and pleural effusions. Elevated troponin felt due to multiple organ failure and sepsis. Weakness, unable to participate with Pt/Ot d/t hypotension. 10. Liver cirrhosis d/t VILLANUEVA    Overall patient has not improved. She remains weak and hypotensive. She remains with multiple organ failure. Discussion was held with the patient and the son and they wish to pursue comfort measures only. Hospice and palliative care teams have evaluated the patient and their input is appreciated. At this time patient's son will be making arrangements for him to stay with his mother to provide 24-hour care. The plan is for the patient to be discharged home with hospice on     35 minutes was spent with the patient today, more than 50% was counseling the patient regarding her condition and coordinating her discharge plan and care plan.     Diet DIET RENAL;   DVT Prophylaxis [x] Lovenox, []  Heparin, [] SCDs, [] Ambulation   GI Prophylaxis [] PPI,  [] H2 Blocker,  [] Carafate,   Neck- No thyromegaly ,Trachea -central, No jugular venous distension,   No Carotid Bruit   Cardiovascular -Heart Sounds- Normal  and  no murmur   , No gallop rhythm   Respiratory - Normal Respiratory Effort, Normal breath sounds,  no wheeze  and  no forced expiratory wheeze    Peripheral pitting pedal edema-- none , no calf pain   Gastrointestinal -Soft abdomen, No palpable masses, Non Tender,Liver,Spleen not palpable. No-- free fluid and shifting dullness  Musculoskeletal- No finger Clubbing. Strength grossly normal   Lymphatic-No Palpable cervical, axillary,Inguinal lymphadenopathy   Psychiatric - normal effect,Orientation  Rt Dorsalis pedis pulses-palpable    Lt Dorsalis pedis pulses- palpable   Rt Posterior tibial pulses -palpable   Left posterior tibial pulses -palpable   Miscellaneous -  no asterixis,  Surgical scarunder breasts  ,  no renal bruit and  tattoos  Investigations  Lab and Imaging have been reviewed in [facility].    Review of Medicine tests    EKG- I had independently reviewed the EKG from--8/29/2019   It was reported to be showing     Sinus tachycardia  Otherwise normal ECG    9/10/2019       The perfusion scan is free of evidence from myocardial ischemia or injury.      There is  normal wall motion at rest.      Review of clinical lab tests:  Lab Results   Component Value Date    CREATININE 0.7 10/28/2019    HGB 14.7 01/09/2020     (H) 01/09/2020       Review of old records- Was done and information gathered regards to events leading to surgery and health conditions of significance in the perioperative period.    Outpatient Subjective & Objective    [x] Diet/Tube Feeds   Code Status DNR-CC   Disposition Patient requires continued admission due to multiple organ failure   MDM [] Low, [] Moderate,[x]  High  Patient's risk as above due to care coordination of multiple medical problems     History of Present Illness:     Chief Complaint: Acute metabolic encephalopathy  Gordo Wallace is a 78 y.o.  female  who presents with altered mental status and abdominal pain and constipation. Patient was found to have also bilateral hydronephrosis. She received laxatives and had Roberts catheter placed. However during the stay patient was found to have leukocytosis and hypotension. There was concern for sepsis and patient was started on antibiotics. There was concern for volume overload in the setting of underlying liver cirrhosis, low albumin, diastolic heart failure. Patient did not respond to IV fluids or to diuretics. Patient remained weak. Discussion was held with the patient and the son, and they wish to pursue conservative approach with comfort measures. In light of the patient's multiple medical problems and multiple organ failure it was felt appropriate to obtain consultation with palliative care and hospice. At this time the plan is for the patient to go home on 6/5 with hospice. We will discontinue medications not serving purpose of comfort at that time. Code status is DNR-CC    Objective: Intake/Output Summary (Last 24 hours) at 6/4/2021 1350  Last data filed at 6/4/2021 1101  Gross per 24 hour   Intake 420 ml   Output 650 ml   Net -230 ml      Vitals:   Vitals:    06/04/21 1045   BP: (!) 90/56   Pulse:    Resp:    Temp:    SpO2:      Physical Exam:   GEN Awake, in bed, appear weak  HENT Mucous membranes are moist. Oral pharynx without exudates, no evidence of thrush. NECK Supple, no apparent thyromegaly or masses. RESP no wheezing, decreased breath sounds at the bases, no accessory muscle usage. CARDIO/VASC S1/S2 auscultated.  RRR, mild JVD, + 1 edema over both ankles. GI Abdomen is soft without significant tenderness, +BS. SKIN No rash  NEURO patient is weak, requires max assist with activities.         Medications:   Medications:    levothyroxine  100 mcg Oral Daily    sodium chloride flush  5-40 mL Intravenous 2 times per day    enoxaparin  40 mg Subcutaneous Daily    sennosides-docusate sodium  1 tablet Oral BID    polyethylene glycol  17 g Oral BID    aspirin  325 mg Oral Daily    cefTRIAXone (ROCEPHIN) IV  1,000 mg Intravenous Q24H    midodrine  10 mg Oral TID WC      Infusions:    sodium chloride       PRN Meds: sodium chloride flush, 5-40 mL, PRN  sodium chloride, 25 mL, PRN  promethazine, 12.5 mg, Q6H PRN   Or  ondansetron, 4 mg, Q6H PRN  acetaminophen, 650 mg, Q6H PRN   Or  acetaminophen, 650 mg, Q6H PRN  fleet, 1 enema, Daily PRN          Electronically signed by Kamaljit Corcoran MD on 6/4/2021 at 1:50 PM

## 2021-06-09 ENCOUNTER — PATIENT MESSAGE (OUTPATIENT)
Dept: PAIN MEDICINE | Facility: CLINIC | Age: 51
End: 2021-06-09

## 2021-06-09 DIAGNOSIS — M62.838 MUSCLE SPASM: ICD-10-CM

## 2021-06-09 RX ORDER — CYCLOBENZAPRINE HCL 10 MG
10 TABLET ORAL 3 TIMES DAILY PRN
Qty: 270 TABLET | Refills: 1 | Status: SHIPPED | OUTPATIENT
Start: 2021-06-09 | End: 2021-11-29 | Stop reason: SDUPTHER

## 2021-06-10 RX ORDER — TRIAMCINOLONE ACETONIDE 1 MG/G
CREAM TOPICAL 2 TIMES DAILY
Qty: 45 G | Refills: 1 | Status: SHIPPED | OUTPATIENT
Start: 2021-06-10 | End: 2022-07-11 | Stop reason: SDUPTHER

## 2021-06-18 ENCOUNTER — PATIENT MESSAGE (OUTPATIENT)
Dept: NEUROLOGY | Facility: CLINIC | Age: 51
End: 2021-06-18

## 2021-06-18 DIAGNOSIS — E27.40 ADRENAL INSUFFICIENCY: ICD-10-CM

## 2021-06-23 ENCOUNTER — PATIENT MESSAGE (OUTPATIENT)
Dept: PSYCHIATRY | Facility: CLINIC | Age: 51
End: 2021-06-23

## 2021-06-30 RX ORDER — PRIMIDONE 50 MG/1
150 TABLET ORAL 3 TIMES DAILY
Qty: 540 TABLET | Refills: 11 | Status: SHIPPED | OUTPATIENT
Start: 2021-06-30 | End: 2021-12-08 | Stop reason: SDUPTHER

## 2021-06-30 RX ORDER — ALPRAZOLAM 1 MG/1
1 TABLET ORAL 3 TIMES DAILY PRN
Qty: 90 TABLET | Refills: 0 | Status: SHIPPED | OUTPATIENT
Start: 2021-06-30 | End: 2021-08-02 | Stop reason: SDUPTHER

## 2021-07-06 ENCOUNTER — PATIENT MESSAGE (OUTPATIENT)
Dept: PSYCHIATRY | Facility: CLINIC | Age: 51
End: 2021-07-06

## 2021-07-06 ENCOUNTER — PATIENT MESSAGE (OUTPATIENT)
Dept: PAIN MEDICINE | Facility: CLINIC | Age: 51
End: 2021-07-06

## 2021-07-06 ENCOUNTER — PATIENT MESSAGE (OUTPATIENT)
Dept: SURGERY | Facility: CLINIC | Age: 51
End: 2021-07-06

## 2021-07-07 ENCOUNTER — OFFICE VISIT (OUTPATIENT)
Dept: PAIN MEDICINE | Facility: CLINIC | Age: 51
End: 2021-07-07
Payer: MEDICARE

## 2021-07-07 ENCOUNTER — OFFICE VISIT (OUTPATIENT)
Dept: PSYCHIATRY | Facility: CLINIC | Age: 51
End: 2021-07-07
Payer: MEDICARE

## 2021-07-07 ENCOUNTER — PATIENT MESSAGE (OUTPATIENT)
Dept: PAIN MEDICINE | Facility: CLINIC | Age: 51
End: 2021-07-07

## 2021-07-07 DIAGNOSIS — M54.12 CERVICAL RADICULOPATHY: ICD-10-CM

## 2021-07-07 DIAGNOSIS — Z79.891 ENCOUNTER FOR LONG-TERM OPIATE ANALGESIC USE: ICD-10-CM

## 2021-07-07 DIAGNOSIS — G89.4 CHRONIC PAIN SYNDROME: Primary | ICD-10-CM

## 2021-07-07 DIAGNOSIS — M79.2 NEUROPATHIC PAIN: ICD-10-CM

## 2021-07-07 DIAGNOSIS — F43.10 PTSD (POST-TRAUMATIC STRESS DISORDER): Primary | ICD-10-CM

## 2021-07-07 DIAGNOSIS — Z98.890 S/P LUMBAR LAMINECTOMY: ICD-10-CM

## 2021-07-07 PROCEDURE — 99214 OFFICE O/P EST MOD 30 MIN: CPT | Mod: 95,,, | Performed by: PSYCHIATRY & NEUROLOGY

## 2021-07-07 PROCEDURE — 90833 PSYTX W PT W E/M 30 MIN: CPT | Mod: 95,,, | Performed by: PSYCHIATRY & NEUROLOGY

## 2021-07-07 PROCEDURE — 99214 PR OFFICE/OUTPT VISIT, EST, LEVL IV, 30-39 MIN: ICD-10-PCS | Mod: 95,,, | Performed by: NURSE PRACTITIONER

## 2021-07-07 PROCEDURE — 99214 OFFICE O/P EST MOD 30 MIN: CPT | Mod: 95,,, | Performed by: NURSE PRACTITIONER

## 2021-07-07 PROCEDURE — 90833 PR PSYCHOTHERAPY W/PATIENT W/E&M, 30 MIN (ADD ON): ICD-10-PCS | Mod: 95,,, | Performed by: PSYCHIATRY & NEUROLOGY

## 2021-07-07 PROCEDURE — 99214 PR OFFICE/OUTPT VISIT, EST, LEVL IV, 30-39 MIN: ICD-10-PCS | Mod: 95,,, | Performed by: PSYCHIATRY & NEUROLOGY

## 2021-07-07 RX ORDER — LAMOTRIGINE 200 MG/1
200 TABLET ORAL 2 TIMES DAILY
Qty: 60 TABLET | Refills: 5 | Status: SHIPPED | OUTPATIENT
Start: 2021-07-07 | End: 2021-10-08 | Stop reason: SDUPTHER

## 2021-07-22 ENCOUNTER — PATIENT OUTREACH (OUTPATIENT)
Dept: ADMINISTRATIVE | Facility: OTHER | Age: 51
End: 2021-07-22

## 2021-07-28 ENCOUNTER — PATIENT MESSAGE (OUTPATIENT)
Dept: INTERNAL MEDICINE | Facility: CLINIC | Age: 51
End: 2021-07-28

## 2021-07-28 ENCOUNTER — OFFICE VISIT (OUTPATIENT)
Dept: ENDOCRINOLOGY | Facility: CLINIC | Age: 51
End: 2021-07-28
Payer: MEDICARE

## 2021-07-28 ENCOUNTER — PATIENT MESSAGE (OUTPATIENT)
Dept: ORTHOPEDICS | Facility: CLINIC | Age: 51
End: 2021-07-28

## 2021-07-28 DIAGNOSIS — M85.80 OSTEOPENIA, UNSPECIFIED LOCATION: ICD-10-CM

## 2021-07-28 DIAGNOSIS — E27.40 ADRENAL INSUFFICIENCY: ICD-10-CM

## 2021-07-28 DIAGNOSIS — Z87.890 STATUS POST SEX REASSIGNMENT SURGERY: ICD-10-CM

## 2021-07-28 DIAGNOSIS — E25.0 CONGENITAL ADRENAL HYPERPLASIA: Primary | ICD-10-CM

## 2021-07-28 DIAGNOSIS — R79.9 ABNORMAL FINDING OF BLOOD CHEMISTRY, UNSPECIFIED: ICD-10-CM

## 2021-07-28 PROCEDURE — 99214 OFFICE O/P EST MOD 30 MIN: CPT | Mod: 95,KX,, | Performed by: INTERNAL MEDICINE

## 2021-07-28 PROCEDURE — 99214 PR OFFICE/OUTPT VISIT, EST, LEVL IV, 30-39 MIN: ICD-10-PCS | Mod: 95,KX,, | Performed by: INTERNAL MEDICINE

## 2021-07-28 RX ORDER — PREDNISONE 10 MG/1
10 TABLET ORAL DAILY
Qty: 150 TABLET | Refills: 3 | Status: SHIPPED | OUTPATIENT
Start: 2021-07-28 | End: 2021-10-16

## 2021-07-28 RX ORDER — TESTOSTERONE GEL, 1% 10 MG/G
1 GEL TRANSDERMAL DAILY
Qty: 30 PACKET | Refills: 5 | Status: SHIPPED | OUTPATIENT
Start: 2021-07-28 | End: 2022-02-17 | Stop reason: SDUPTHER

## 2021-08-01 ENCOUNTER — PATIENT MESSAGE (OUTPATIENT)
Dept: PAIN MEDICINE | Facility: CLINIC | Age: 51
End: 2021-08-01

## 2021-08-01 ENCOUNTER — PATIENT MESSAGE (OUTPATIENT)
Dept: PSYCHIATRY | Facility: CLINIC | Age: 51
End: 2021-08-01

## 2021-08-02 RX ORDER — ALPRAZOLAM 1 MG/1
1 TABLET ORAL 3 TIMES DAILY PRN
Qty: 90 TABLET | Refills: 0 | Status: SHIPPED | OUTPATIENT
Start: 2021-08-02 | End: 2021-09-01 | Stop reason: SDUPTHER

## 2021-08-02 RX ORDER — OXYCODONE AND ACETAMINOPHEN 10; 325 MG/1; MG/1
1 TABLET ORAL EVERY 8 HOURS PRN
Qty: 90 TABLET | Refills: 0 | Status: SHIPPED | OUTPATIENT
Start: 2021-08-02 | End: 2021-08-16 | Stop reason: SDUPTHER

## 2021-08-16 ENCOUNTER — OFFICE VISIT (OUTPATIENT)
Dept: PAIN MEDICINE | Facility: CLINIC | Age: 51
End: 2021-08-16
Payer: MEDICARE

## 2021-08-16 DIAGNOSIS — Z98.890 S/P LUMBAR LAMINECTOMY: ICD-10-CM

## 2021-08-16 DIAGNOSIS — M47.816 LUMBAR SPONDYLOSIS: ICD-10-CM

## 2021-08-16 DIAGNOSIS — G89.4 CHRONIC PAIN SYNDROME: Primary | ICD-10-CM

## 2021-08-16 DIAGNOSIS — M53.3 SACROILIAC JOINT PAIN: ICD-10-CM

## 2021-08-16 PROCEDURE — 99214 PR OFFICE/OUTPT VISIT, EST, LEVL IV, 30-39 MIN: ICD-10-PCS | Mod: 95,,, | Performed by: ANESTHESIOLOGY

## 2021-08-16 PROCEDURE — 99214 OFFICE O/P EST MOD 30 MIN: CPT | Mod: 95,,, | Performed by: ANESTHESIOLOGY

## 2021-08-16 RX ORDER — OXYCODONE AND ACETAMINOPHEN 10; 325 MG/1; MG/1
1 TABLET ORAL EVERY 6 HOURS PRN
Qty: 120 TABLET | Refills: 0 | Status: SHIPPED | OUTPATIENT
Start: 2021-08-25 | End: 2021-09-03 | Stop reason: SDUPTHER

## 2021-08-23 ENCOUNTER — IMMUNIZATION (OUTPATIENT)
Dept: INTERNAL MEDICINE | Facility: CLINIC | Age: 51
End: 2021-08-23
Payer: MEDICARE

## 2021-08-23 ENCOUNTER — LAB VISIT (OUTPATIENT)
Dept: LAB | Facility: HOSPITAL | Age: 51
End: 2021-08-23
Attending: INTERNAL MEDICINE
Payer: MEDICARE

## 2021-08-23 DIAGNOSIS — E25.0 CONGENITAL ADRENAL HYPERPLASIA: ICD-10-CM

## 2021-08-23 DIAGNOSIS — R79.9 ABNORMAL FINDING OF BLOOD CHEMISTRY, UNSPECIFIED: ICD-10-CM

## 2021-08-23 DIAGNOSIS — E27.40 ADRENAL INSUFFICIENCY: ICD-10-CM

## 2021-08-23 DIAGNOSIS — Z23 NEED FOR VACCINATION: Primary | ICD-10-CM

## 2021-08-23 LAB
ALBUMIN SERPL BCP-MCNC: 3.8 G/DL (ref 3.5–5.2)
ALP SERPL-CCNC: 158 U/L (ref 55–135)
ALT SERPL W/O P-5'-P-CCNC: 39 U/L (ref 10–44)
ANION GAP SERPL CALC-SCNC: 10 MMOL/L (ref 8–16)
AST SERPL-CCNC: 36 U/L (ref 10–40)
BILIRUB SERPL-MCNC: 0.2 MG/DL (ref 0.1–1)
BUN SERPL-MCNC: 10 MG/DL (ref 6–20)
CALCIUM SERPL-MCNC: 9.8 MG/DL (ref 8.7–10.5)
CHLORIDE SERPL-SCNC: 103 MMOL/L (ref 95–110)
CHOLEST SERPL-MCNC: 192 MG/DL (ref 120–199)
CHOLEST/HDLC SERPL: 3.3 {RATIO} (ref 2–5)
CO2 SERPL-SCNC: 26 MMOL/L (ref 23–29)
CREAT SERPL-MCNC: 0.7 MG/DL (ref 0.5–1.4)
DHEA-S SERPL-MCNC: 4.8 UG/DL (ref 136.2–447.6)
ERYTHROCYTE [DISTWIDTH] IN BLOOD BY AUTOMATED COUNT: 13.8 % (ref 11.5–14.5)
EST. GFR  (AFRICAN AMERICAN): >60 ML/MIN/1.73 M^2
EST. GFR  (NON AFRICAN AMERICAN): >60 ML/MIN/1.73 M^2
GLUCOSE SERPL-MCNC: 96 MG/DL (ref 70–110)
HCT VFR BLD AUTO: 44.7 % (ref 40–54)
HDLC SERPL-MCNC: 58 MG/DL (ref 40–75)
HDLC SERPL: 30.2 % (ref 20–50)
HGB BLD-MCNC: 14.3 G/DL (ref 14–18)
LDLC SERPL CALC-MCNC: 111.8 MG/DL (ref 63–159)
MCH RBC QN AUTO: 32.7 PG (ref 27–31)
MCHC RBC AUTO-ENTMCNC: 32 G/DL (ref 32–36)
MCV RBC AUTO: 102 FL (ref 82–98)
NONHDLC SERPL-MCNC: 134 MG/DL
PLATELET # BLD AUTO: 346 K/UL (ref 150–450)
PMV BLD AUTO: 9.1 FL (ref 9.2–12.9)
POTASSIUM SERPL-SCNC: 5.2 MMOL/L (ref 3.5–5.1)
PROT SERPL-MCNC: 6.8 G/DL (ref 6–8.4)
RBC # BLD AUTO: 4.37 M/UL (ref 4.6–6.2)
SODIUM SERPL-SCNC: 139 MMOL/L (ref 136–145)
TESTOST SERPL-MCNC: 643 NG/DL (ref 304–1227)
TRIGL SERPL-MCNC: 111 MG/DL (ref 30–150)
TSH SERPL DL<=0.005 MIU/L-ACNC: 1.2 UIU/ML (ref 0.4–4)
WBC # BLD AUTO: 6.61 K/UL (ref 3.9–12.7)

## 2021-08-23 PROCEDURE — 85027 COMPLETE CBC AUTOMATED: CPT | Performed by: INTERNAL MEDICINE

## 2021-08-23 PROCEDURE — 83498 ASY HYDROXYPROGESTERONE 17-D: CPT | Performed by: INTERNAL MEDICINE

## 2021-08-23 PROCEDURE — 91300 COVID-19, MRNA, LNP-S, PF, 30 MCG/0.3 ML DOSE VACCINE: CPT | Mod: ,,, | Performed by: INTERNAL MEDICINE

## 2021-08-23 PROCEDURE — 36415 COLL VENOUS BLD VENIPUNCTURE: CPT | Performed by: INTERNAL MEDICINE

## 2021-08-23 PROCEDURE — 83036 HEMOGLOBIN GLYCOSYLATED A1C: CPT | Performed by: INTERNAL MEDICINE

## 2021-08-23 PROCEDURE — 84403 ASSAY OF TOTAL TESTOSTERONE: CPT | Performed by: INTERNAL MEDICINE

## 2021-08-23 PROCEDURE — 82024 ASSAY OF ACTH: CPT | Performed by: INTERNAL MEDICINE

## 2021-08-23 PROCEDURE — 80053 COMPREHEN METABOLIC PANEL: CPT | Performed by: INTERNAL MEDICINE

## 2021-08-23 PROCEDURE — 82627 DEHYDROEPIANDROSTERONE: CPT | Performed by: INTERNAL MEDICINE

## 2021-08-23 PROCEDURE — 0003A COVID-19, MRNA, LNP-S, PF, 30 MCG/0.3 ML DOSE VACCINE: ICD-10-PCS | Mod: CV19,,, | Performed by: INTERNAL MEDICINE

## 2021-08-23 PROCEDURE — 91300 COVID-19, MRNA, LNP-S, PF, 30 MCG/0.3 ML DOSE VACCINE: ICD-10-PCS | Mod: ,,, | Performed by: INTERNAL MEDICINE

## 2021-08-23 PROCEDURE — 84443 ASSAY THYROID STIM HORMONE: CPT | Performed by: INTERNAL MEDICINE

## 2021-08-23 PROCEDURE — 80061 LIPID PANEL: CPT | Performed by: INTERNAL MEDICINE

## 2021-08-23 PROCEDURE — 0003A COVID-19, MRNA, LNP-S, PF, 30 MCG/0.3 ML DOSE VACCINE: CPT | Mod: CV19,,, | Performed by: INTERNAL MEDICINE

## 2021-08-24 ENCOUNTER — PATIENT MESSAGE (OUTPATIENT)
Dept: ENDOCRINOLOGY | Facility: CLINIC | Age: 51
End: 2021-08-24

## 2021-08-24 LAB
ACTH PLAS-MCNC: 11 PG/ML (ref 0–46)
ESTIMATED AVG GLUCOSE: 105 MG/DL (ref 68–131)
HBA1C MFR BLD: 5.3 % (ref 4–5.6)

## 2021-08-26 ENCOUNTER — TELEPHONE (OUTPATIENT)
Dept: ORTHOPEDICS | Facility: CLINIC | Age: 51
End: 2021-08-26

## 2021-08-26 ENCOUNTER — PATIENT MESSAGE (OUTPATIENT)
Dept: ENDOCRINOLOGY | Facility: CLINIC | Age: 51
End: 2021-08-26

## 2021-08-26 ENCOUNTER — PATIENT OUTREACH (OUTPATIENT)
Dept: ADMINISTRATIVE | Facility: OTHER | Age: 51
End: 2021-08-26

## 2021-08-26 ENCOUNTER — PATIENT MESSAGE (OUTPATIENT)
Dept: ORTHOPEDICS | Facility: CLINIC | Age: 51
End: 2021-08-26

## 2021-08-26 LAB — 17OHP SERPL-MCNC: 157 NG/DL (ref 40–239)

## 2021-08-27 RX ORDER — PREDNISONE 5 MG/1
5 TABLET ORAL DAILY
Qty: 100 TABLET | Refills: 3 | Status: SHIPPED | OUTPATIENT
Start: 2021-08-27 | End: 2022-06-19 | Stop reason: SDUPTHER

## 2021-08-27 RX ORDER — PREDNISONE 1 MG/1
4 TABLET ORAL DAILY
Qty: 360 TABLET | Refills: 3 | Status: SHIPPED | OUTPATIENT
Start: 2021-08-27 | End: 2022-02-23

## 2021-08-31 ENCOUNTER — PATIENT MESSAGE (OUTPATIENT)
Dept: PSYCHIATRY | Facility: CLINIC | Age: 51
End: 2021-08-31

## 2021-09-01 ENCOUNTER — PATIENT MESSAGE (OUTPATIENT)
Dept: ENDOCRINOLOGY | Facility: CLINIC | Age: 51
End: 2021-09-01

## 2021-09-01 RX ORDER — ALPRAZOLAM 1 MG/1
1 TABLET ORAL 3 TIMES DAILY PRN
Qty: 90 TABLET | Refills: 0 | Status: SHIPPED | OUTPATIENT
Start: 2021-09-01 | End: 2021-09-29 | Stop reason: SDUPTHER

## 2021-09-02 ENCOUNTER — PATIENT MESSAGE (OUTPATIENT)
Dept: PAIN MEDICINE | Facility: CLINIC | Age: 51
End: 2021-09-02

## 2021-09-03 RX ORDER — OXYCODONE AND ACETAMINOPHEN 10; 325 MG/1; MG/1
1 TABLET ORAL EVERY 6 HOURS PRN
Qty: 120 TABLET | Refills: 0 | Status: SHIPPED | OUTPATIENT
Start: 2021-09-03 | End: 2021-10-03

## 2021-09-17 ENCOUNTER — OFFICE VISIT (OUTPATIENT)
Dept: PAIN MEDICINE | Facility: CLINIC | Age: 51
End: 2021-09-17
Payer: MEDICARE

## 2021-09-17 VITALS — HEART RATE: 100 BPM | SYSTOLIC BLOOD PRESSURE: 116 MMHG | TEMPERATURE: 99 F | DIASTOLIC BLOOD PRESSURE: 71 MMHG

## 2021-09-17 DIAGNOSIS — Z79.891 ENCOUNTER FOR LONG-TERM OPIATE ANALGESIC USE: ICD-10-CM

## 2021-09-17 DIAGNOSIS — M53.3 SACROILIAC JOINT PAIN: ICD-10-CM

## 2021-09-17 DIAGNOSIS — M54.12 CERVICAL RADICULOPATHY: ICD-10-CM

## 2021-09-17 DIAGNOSIS — Z98.890 S/P LUMBAR LAMINECTOMY: ICD-10-CM

## 2021-09-17 DIAGNOSIS — M47.816 LUMBAR SPONDYLOSIS: ICD-10-CM

## 2021-09-17 DIAGNOSIS — G89.4 CHRONIC PAIN SYNDROME: Primary | ICD-10-CM

## 2021-09-17 PROCEDURE — 99999 PR PBB SHADOW E&M-EST. PATIENT-LVL III: CPT | Mod: PBBFAC,,, | Performed by: NURSE PRACTITIONER

## 2021-09-17 PROCEDURE — 99999 PR PBB SHADOW E&M-EST. PATIENT-LVL III: ICD-10-PCS | Mod: PBBFAC,,, | Performed by: NURSE PRACTITIONER

## 2021-09-17 PROCEDURE — 99214 OFFICE O/P EST MOD 30 MIN: CPT | Mod: S$PBB,,, | Performed by: NURSE PRACTITIONER

## 2021-09-17 PROCEDURE — 99213 OFFICE O/P EST LOW 20 MIN: CPT | Mod: PBBFAC | Performed by: NURSE PRACTITIONER

## 2021-09-17 PROCEDURE — 99214 PR OFFICE/OUTPT VISIT, EST, LEVL IV, 30-39 MIN: ICD-10-PCS | Mod: S$PBB,,, | Performed by: NURSE PRACTITIONER

## 2021-09-17 RX ORDER — GABAPENTIN 800 MG/1
TABLET ORAL
Qty: 450 TABLET | Refills: 2 | Status: SHIPPED | OUTPATIENT
Start: 2021-09-17 | End: 2022-06-19 | Stop reason: SDUPTHER

## 2021-09-29 ENCOUNTER — PATIENT MESSAGE (OUTPATIENT)
Dept: PSYCHIATRY | Facility: CLINIC | Age: 51
End: 2021-09-29

## 2021-09-29 RX ORDER — ALPRAZOLAM 1 MG/1
1 TABLET ORAL 3 TIMES DAILY PRN
Qty: 90 TABLET | Refills: 2 | Status: SHIPPED | OUTPATIENT
Start: 2021-09-29 | End: 2022-01-03 | Stop reason: SDUPTHER

## 2021-10-01 ENCOUNTER — OFFICE VISIT (OUTPATIENT)
Dept: ORTHOPEDICS | Facility: CLINIC | Age: 51
End: 2021-10-01
Payer: MEDICARE

## 2021-10-01 VITALS — WEIGHT: 127.88 LBS | BODY MASS INDEX: 25.11 KG/M2 | HEIGHT: 60 IN

## 2021-10-01 DIAGNOSIS — M21.70 LEG LENGTH DISCREPANCY: ICD-10-CM

## 2021-10-01 DIAGNOSIS — Z96.651 STATUS POST RIGHT KNEE REPLACEMENT: Primary | ICD-10-CM

## 2021-10-01 PROCEDURE — 99999 PR PBB SHADOW E&M-EST. PATIENT-LVL III: ICD-10-PCS | Mod: PBBFAC,,, | Performed by: ORTHOPAEDIC SURGERY

## 2021-10-01 PROCEDURE — 99212 OFFICE O/P EST SF 10 MIN: CPT | Mod: S$PBB,,, | Performed by: ORTHOPAEDIC SURGERY

## 2021-10-01 PROCEDURE — 99212 PR OFFICE/OUTPT VISIT, EST, LEVL II, 10-19 MIN: ICD-10-PCS | Mod: S$PBB,,, | Performed by: ORTHOPAEDIC SURGERY

## 2021-10-01 PROCEDURE — 99213 OFFICE O/P EST LOW 20 MIN: CPT | Mod: PBBFAC | Performed by: ORTHOPAEDIC SURGERY

## 2021-10-01 PROCEDURE — 99999 PR PBB SHADOW E&M-EST. PATIENT-LVL III: CPT | Mod: PBBFAC,,, | Performed by: ORTHOPAEDIC SURGERY

## 2021-10-04 ENCOUNTER — PATIENT MESSAGE (OUTPATIENT)
Dept: ADMINISTRATIVE | Facility: HOSPITAL | Age: 51
End: 2021-10-04

## 2021-10-08 ENCOUNTER — OFFICE VISIT (OUTPATIENT)
Dept: PSYCHIATRY | Facility: CLINIC | Age: 51
End: 2021-10-08
Payer: MEDICARE

## 2021-10-08 DIAGNOSIS — F43.10 PTSD (POST-TRAUMATIC STRESS DISORDER): Primary | ICD-10-CM

## 2021-10-08 PROCEDURE — 90833 PSYTX W PT W E/M 30 MIN: CPT | Mod: 95,,, | Performed by: PSYCHIATRY & NEUROLOGY

## 2021-10-08 PROCEDURE — 99213 OFFICE O/P EST LOW 20 MIN: CPT | Mod: 95,,, | Performed by: PSYCHIATRY & NEUROLOGY

## 2021-10-08 PROCEDURE — 99213 PR OFFICE/OUTPT VISIT, EST, LEVL III, 20-29 MIN: ICD-10-PCS | Mod: 95,,, | Performed by: PSYCHIATRY & NEUROLOGY

## 2021-10-08 PROCEDURE — 90833 PR PSYCHOTHERAPY W/PATIENT W/E&M, 30 MIN (ADD ON): ICD-10-PCS | Mod: 95,,, | Performed by: PSYCHIATRY & NEUROLOGY

## 2021-10-08 RX ORDER — LAMOTRIGINE 200 MG/1
200 TABLET ORAL 2 TIMES DAILY
Qty: 60 TABLET | Refills: 5 | Status: SHIPPED | OUTPATIENT
Start: 2021-10-08 | End: 2022-04-10 | Stop reason: SDUPTHER

## 2021-10-13 ENCOUNTER — TELEPHONE (OUTPATIENT)
Dept: ENDOSCOPY | Facility: HOSPITAL | Age: 51
End: 2021-10-13

## 2021-10-13 DIAGNOSIS — K86.2 PANCREATIC CYST: Primary | ICD-10-CM

## 2021-10-20 ENCOUNTER — PATIENT MESSAGE (OUTPATIENT)
Dept: PAIN MEDICINE | Facility: CLINIC | Age: 51
End: 2021-10-20
Payer: MEDICARE

## 2021-10-26 ENCOUNTER — PATIENT MESSAGE (OUTPATIENT)
Dept: PAIN MEDICINE | Facility: CLINIC | Age: 51
End: 2021-10-26
Payer: MEDICARE

## 2021-10-27 ENCOUNTER — OFFICE VISIT (OUTPATIENT)
Dept: PAIN MEDICINE | Facility: CLINIC | Age: 51
End: 2021-10-27
Payer: MEDICARE

## 2021-10-27 DIAGNOSIS — M47.816 LUMBAR SPONDYLOSIS: ICD-10-CM

## 2021-10-27 DIAGNOSIS — M53.3 SACROILIAC JOINT PAIN: ICD-10-CM

## 2021-10-27 DIAGNOSIS — G89.4 CHRONIC PAIN SYNDROME: Primary | ICD-10-CM

## 2021-10-27 DIAGNOSIS — Z98.890 S/P LUMBAR LAMINECTOMY: ICD-10-CM

## 2021-10-27 PROCEDURE — 99214 PR OFFICE/OUTPT VISIT, EST, LEVL IV, 30-39 MIN: ICD-10-PCS | Mod: 95,,, | Performed by: NURSE PRACTITIONER

## 2021-10-27 PROCEDURE — 99214 OFFICE O/P EST MOD 30 MIN: CPT | Mod: 95,,, | Performed by: NURSE PRACTITIONER

## 2021-11-03 ENCOUNTER — PATIENT MESSAGE (OUTPATIENT)
Dept: PSYCHIATRY | Facility: CLINIC | Age: 51
End: 2021-11-03
Payer: MEDICARE

## 2021-11-08 ENCOUNTER — OFFICE VISIT (OUTPATIENT)
Dept: SURGERY | Facility: CLINIC | Age: 51
End: 2021-11-08
Payer: MEDICARE

## 2021-11-08 ENCOUNTER — TELEPHONE (OUTPATIENT)
Dept: SURGERY | Facility: CLINIC | Age: 51
End: 2021-11-08
Payer: MEDICARE

## 2021-11-08 VITALS
WEIGHT: 127 LBS | HEART RATE: 109 BPM | DIASTOLIC BLOOD PRESSURE: 74 MMHG | SYSTOLIC BLOOD PRESSURE: 118 MMHG | OXYGEN SATURATION: 98 % | BODY MASS INDEX: 27.48 KG/M2

## 2021-11-08 DIAGNOSIS — K64.1 HEMORRHOIDS THAT PROLAPSE WITH STRAINING, BUT RETRACT SPONTANEOUSLY: Primary | ICD-10-CM

## 2021-11-08 PROCEDURE — 99203 PR OFFICE/OUTPT VISIT, NEW, LEVL III, 30-44 MIN: ICD-10-PCS | Mod: S$PBB,,, | Performed by: SURGERY

## 2021-11-08 PROCEDURE — 99203 OFFICE O/P NEW LOW 30 MIN: CPT | Mod: S$PBB,,, | Performed by: SURGERY

## 2021-11-08 PROCEDURE — 99999 PR PBB SHADOW E&M-EST. PATIENT-LVL III: ICD-10-PCS | Mod: PBBFAC,,, | Performed by: SURGERY

## 2021-11-08 PROCEDURE — 99999 PR PBB SHADOW E&M-EST. PATIENT-LVL III: CPT | Mod: PBBFAC,,, | Performed by: SURGERY

## 2021-11-08 PROCEDURE — 99213 OFFICE O/P EST LOW 20 MIN: CPT | Mod: PBBFAC | Performed by: SURGERY

## 2021-11-26 ENCOUNTER — PATIENT MESSAGE (OUTPATIENT)
Dept: PAIN MEDICINE | Facility: CLINIC | Age: 51
End: 2021-11-26
Payer: MEDICARE

## 2021-11-26 ENCOUNTER — TELEPHONE (OUTPATIENT)
Dept: PAIN MEDICINE | Facility: CLINIC | Age: 51
End: 2021-11-26
Payer: MEDICARE

## 2021-11-26 DIAGNOSIS — G89.4 CHRONIC PAIN SYNDROME: Primary | ICD-10-CM

## 2021-11-28 DIAGNOSIS — E78.5 HYPERLIPIDEMIA, UNSPECIFIED HYPERLIPIDEMIA TYPE: ICD-10-CM

## 2021-11-28 RX ORDER — OXYCODONE AND ACETAMINOPHEN 10; 325 MG/1; MG/1
1 TABLET ORAL EVERY 8 HOURS PRN
Qty: 90 TABLET | Refills: 0 | Status: SHIPPED | OUTPATIENT
Start: 2021-11-28 | End: 2021-12-30 | Stop reason: SDUPTHER

## 2021-11-29 ENCOUNTER — OFFICE VISIT (OUTPATIENT)
Dept: PAIN MEDICINE | Facility: CLINIC | Age: 51
End: 2021-11-29
Payer: MEDICARE

## 2021-11-29 DIAGNOSIS — G89.4 CHRONIC PAIN SYNDROME: Primary | ICD-10-CM

## 2021-11-29 DIAGNOSIS — M62.838 MUSCLE SPASM: ICD-10-CM

## 2021-11-29 DIAGNOSIS — M79.2 NEUROPATHIC PAIN: ICD-10-CM

## 2021-11-29 PROCEDURE — 99213 OFFICE O/P EST LOW 20 MIN: CPT | Mod: 95,,, | Performed by: NURSE PRACTITIONER

## 2021-11-29 PROCEDURE — 99213 PR OFFICE/OUTPT VISIT, EST, LEVL III, 20-29 MIN: ICD-10-PCS | Mod: 95,,, | Performed by: NURSE PRACTITIONER

## 2021-11-29 RX ORDER — CYCLOBENZAPRINE HCL 10 MG
10 TABLET ORAL 3 TIMES DAILY PRN
Qty: 270 TABLET | Refills: 1 | Status: SHIPPED | OUTPATIENT
Start: 2021-11-29 | End: 2022-05-11 | Stop reason: SDUPTHER

## 2021-11-29 RX ORDER — ATORVASTATIN CALCIUM 10 MG/1
10 TABLET, FILM COATED ORAL NIGHTLY
Qty: 90 TABLET | Refills: 2 | Status: SHIPPED | OUTPATIENT
Start: 2021-11-29 | End: 2022-09-21 | Stop reason: SDUPTHER

## 2021-11-30 ENCOUNTER — PATIENT MESSAGE (OUTPATIENT)
Dept: INTERNAL MEDICINE | Facility: CLINIC | Age: 51
End: 2021-11-30
Payer: MEDICARE

## 2021-11-30 ENCOUNTER — PATIENT MESSAGE (OUTPATIENT)
Dept: NEUROLOGY | Facility: CLINIC | Age: 51
End: 2021-11-30
Payer: MEDICARE

## 2021-11-30 DIAGNOSIS — G25.81 RLS (RESTLESS LEGS SYNDROME): ICD-10-CM

## 2021-11-30 RX ORDER — ROPINIROLE 4 MG/1
4 TABLET, FILM COATED ORAL NIGHTLY
Qty: 90 TABLET | Refills: 0 | Status: SHIPPED | OUTPATIENT
Start: 2021-11-30 | End: 2021-12-07

## 2021-12-03 ENCOUNTER — IMMUNIZATION (OUTPATIENT)
Dept: INTERNAL MEDICINE | Facility: CLINIC | Age: 51
End: 2021-12-03
Payer: MEDICARE

## 2021-12-03 PROCEDURE — 90686 IIV4 VACC NO PRSV 0.5 ML IM: CPT | Mod: PBBFAC

## 2021-12-30 ENCOUNTER — PATIENT MESSAGE (OUTPATIENT)
Dept: PAIN MEDICINE | Facility: CLINIC | Age: 51
End: 2021-12-30
Payer: MEDICARE

## 2021-12-30 DIAGNOSIS — G89.4 CHRONIC PAIN SYNDROME: ICD-10-CM

## 2021-12-30 RX ORDER — OXYCODONE AND ACETAMINOPHEN 10; 325 MG/1; MG/1
1 TABLET ORAL EVERY 8 HOURS PRN
Qty: 90 TABLET | Refills: 0 | Status: SHIPPED | OUTPATIENT
Start: 2021-12-30 | End: 2022-02-08 | Stop reason: SDUPTHER

## 2021-12-30 NOTE — TELEPHONE ENCOUNTER
Patient requesting refill on oxycodone 10mg  Last office visit 1129/21   shows last refill on 11/28/21  [facility]  Patient last UDS 8/21/2020 was consistent with current therapy  CODEINE  Not Detected  Not Detected    MORPHINE  Present  Present    6-ACETYLMORPHINE  Not Detected  Not Detected    OXYCODONE  Present  Present    NOROYXCODONE  Present  Present    OXYMORPHONE  Not Detected  Not Detected    NOROXYMORPHONE  Not Detected  Not Detected    HYDROCODONE  Not Detected  Not Detected    NORHYDROCODONE  Not Detected  Not Detected    HYDROMORPHONE  Not Detected  Not Detected    BUPRENORPHINE  Not Detected  Not Detected    NORUBPRENORPHINE  Not Detected  Not Detected    FENTANYL  Not Detected  Not Detected    NORFENTANYL  Not Detected  Not Detected    MEPERIDINE METABOLITE  Not Detected  Not Detected    TAPENTADOL  Not Detected  Not Detected    TAPENTADOL-O-SULF  Not Detected  Not Detected    METHADONE  Not Detected  Not Detected    PROPOXYPHENE  Not Detected  Not Detected    TRAMADOL  Not Detected  Not Detected    AMPHETAMINE  Not Detected  Not Detected    METHAMPHETAMINE  Not Detected  Not Detected    MDMA- ECSTASY  Not Detected  Not Detected    MDA  Not Detected  Not Detected    MDEA- Deanna  Not Detected  Not Detected    METHYLPHENIDATE  Not Detected  Not Detected    PHENTERMINE  Not Detected  Not Detected    BENZOYLECGONINE  Not Detected  Not Detected    ALPRAZOLAM  Not Detected  Present    ALPHA-OH-ALPRAZOLAM  Present  Not Detected    CLONAZEPAM  Not Detected  Not Detected    7-AMINOCLONAZEPAM  Not Detected  Not Detected    DIAZEPAM  Not Detected  Not Detected    NORDIAZEPAM  Not Detected  Not Detected    OXAZEPAM  Not Detected  Not Detected    TEMAZEPAM  Not Detected  Not Detected    Lorazepam  Not Detected  Not Detected    MIDAZOLAM  Not Detected  Not Detected    ZOLPIDEM  Not Detected  Not Detected    BARBITURATES  Present   Present    Creatinine, Urine 20.0 - 400.0 mg/dL 113.0  58.9    ETHYL GLUCURONIDE  Not Detected  Not Detected    MARIJUANA METABOLITE  Not Detected  Not Detected    PCP  Not Detected  Not Detected    CARISOPRODOL  Not Detected  Not Detected CM    Comment: The carisoprodol immunoassay has cross-reactivity to   carisoprodol and meprobamate.    PAIN MANAGEMENT DRUG PANEL  See Below  See Below CM    Comment: Methodology: Qualitative Enzyme Immunoassay and

## 2022-01-03 ENCOUNTER — OFFICE VISIT (OUTPATIENT)
Dept: NEUROLOGY | Facility: CLINIC | Age: 52
End: 2022-01-03
Payer: MEDICARE

## 2022-01-03 DIAGNOSIS — G25.81 RLS (RESTLESS LEGS SYNDROME): ICD-10-CM

## 2022-01-03 DIAGNOSIS — R25.1 TREMOR: ICD-10-CM

## 2022-01-03 PROCEDURE — 99214 PR OFFICE/OUTPT VISIT, EST, LEVL IV, 30-39 MIN: ICD-10-PCS | Mod: 95,,, | Performed by: PSYCHIATRY & NEUROLOGY

## 2022-01-03 PROCEDURE — 99214 OFFICE O/P EST MOD 30 MIN: CPT | Mod: 95,,, | Performed by: PSYCHIATRY & NEUROLOGY

## 2022-01-03 RX ORDER — ALPRAZOLAM 1 MG/1
1 TABLET ORAL 3 TIMES DAILY PRN
Qty: 90 TABLET | Refills: 2 | Status: SHIPPED | OUTPATIENT
Start: 2022-01-03 | End: 2022-04-10 | Stop reason: SDUPTHER

## 2022-01-03 NOTE — ASSESSMENT & PLAN NOTE
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 Well managed with primidone.  Suggested continue Primidone 150mg TID.  No oversedation.    Likely tremor due to predisone/gabapentin however if this worsens may consider him to have essential tremor.  No PDism noted on video exam despite RLS

## 2022-01-03 NOTE — PROGRESS NOTES
"The patient location is: home  The chief complaint leading to consultation is: Temor    Visit type: audiovisual    Face to Face time with patient: 20mins  20 minutes of total time spent on the encounter, which includes face to face time and non-face to face time preparing to see the patient (eg, review of tests), Obtaining and/or reviewing separately obtained history, Documenting clinical information in the electronic or other health record, Independently interpreting results (not separately reported) and communicating results to the patient/family/caregiver, or Care coordination (not separately reported).         Each patient to whom he or she provides medical services by telemedicine is:  (1) informed of the relationship between the physician and patient and the respective role of any other health care provider with respect to management of the patient; and (2) notified that he or she may decline to receive medical services by telemedicine and may withdraw from such care at any time.    Notes:     [facility]    PCP/Referring Provider: No referring provider defined for this encounter.  Date of Service: 1/3/2022    Chief Complaint: RLS and tremors      Interval Hx    Since last visit,    Started primidone 150mg PO TID  No oversedation    Making art  No oversedation    Uncle has hand tremors    Continues to have arthritis and fibromyalgia knees  No shuffling gait    Has been off latuda 3 years when he noticed a tremor    Continues ropinirole 4mg QHS  No impulsive behavior  RLS is well controlled    On gabapetnin chronically  On prednisone chronically    "PriorHPI: Ari Santos is a R HANDED 51 y.o. adult with a medical issues significant for Lspine surg 2019 (stenosis), R knee replacement, concussions, adrenal hyperplasia, fibromyalgia, coming for help with hand tremors and RLS. For RLS, he's had this for 20 years and feels like it's well controlled. He feels a bilateral leg sensation improved by " "movement. These sensations start around 9PM. He feels like this is well controlled with 4mg requip at night. Takes Gabapentin 800mg TID and 1600 QHS. Unknown if ETOH sensitive.  Hand tremor for 10 years.  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 Primidone 100mg PO TID. This helps tremors a 1/2 hour after taking primidone. He notices tremors worse when paining upright and less when he's drawing. 30 minutes after primidone he has no tremor. Primidone lasts 3-4 hours.    Balance issues. Falls frequently. Imbalance since 10 years. He attributes this to needing a knee replacement.    Not overly sedated.    Currently on lamictal    Uncle has a tremor.    Medication history:  -Tried propranolol and it did not seem to help      Neuroleptic exposure:  -Was on latuda 2 years ago    PD Review of Symptoms:  Anosmia: none  Depression: as above  Cognitive slowing: mild  Orthostasis: mild  Falls: yes  Micrographia: none  Sleep issues:  -RBD: several reactions to nightmares - some PTSD"    Review of Systems:   Review of Systems   Constitutional: Negative for fever.   HENT: Negative for congestion.    Eyes: Negative for double vision.   Respiratory: Negative for cough and shortness of breath.    Cardiovascular: Negative for chest pain and leg swelling.   Gastrointestinal: Negative for nausea.   Genitourinary: Negative for dysuria.   Musculoskeletal: Negative for falls.   Skin: Negative for rash.   Neurological: Positive for tremors. Negative for speech change and headaches.   Psychiatric/Behavioral: Negative for depression.         Current Medications:  Outpatient Encounter Medications as of 1/3/2022   Medication Sig Dispense Refill    ALPRAZolam (XANAX) 1 MG tablet Take 1 tablet (1 mg total) by mouth 3 (three) times daily as needed for Anxiety. 90 tablet 2    ascorbic acid, vitamin C, (VITAMIN C) 1000 MG tablet Take 1,000 mg by mouth 2 (two) times daily.      atorvastatin (LIPITOR) 10 MG tablet Take 1 tablet (10 mg " total) by mouth every evening. 90 tablet 2    cranberry fruit extract (CRANBERRY ORAL) Take by mouth.      cyanocobalamin (VITAMIN B-12) 1000 MCG tablet Take 100 mcg by mouth once daily.      cyclobenzaprine (FLEXERIL) 10 MG tablet Take 1 tablet (10 mg total) by mouth 3 (three) times daily as needed for Muscle spasms. 270 tablet 1    docusate sodium (COLACE) 100 MG capsule Take 1 capsule (100 mg total) by mouth 2 (two) times daily as needed for Constipation. 60 capsule 0    gabapentin (NEURONTIN) 800 MG tablet Take 1 tablet by mouth three times a day and take 2 tablets at night (5 tablets a day, 4000mg) 450 tablet 2    Lactobacillus acidophilus (ACIDOPHILUS) Cap Take by mouth once daily.       lamoTRIgine (LAMICTAL) 200 MG tablet Take 1 tablet (200 mg total) by mouth 2 (two) times daily. 60 tablet 5    loratadine (CLARITIN) 10 mg tablet Take 10 mg by mouth once daily.       oxyCODONE-acetaminophen (PERCOCET)  mg per tablet Take 1 tablet by mouth every 8 (eight) hours as needed for Pain. 90 tablet 0    predniSONE (DELTASONE) 1 MG tablet Take 4 tablets (4 mg total) by mouth once daily. Take with the 5 mg pill for a total of 9 mg a day 360 tablet 3    predniSONE (DELTASONE) 5 MG tablet Take 1 tablet (5 mg total) by mouth once daily. Take with the 4 of 1 mg pills for a total of 9 mg a day 100 tablet 3    primidone (MYSOLINE) 50 MG Tab Take 3 tablets (150 mg total) by mouth 3 (three) times daily. 270 tablet 0    rOPINIRole (REQUIP) 4 MG tablet TAKE 1 TABLET BY MOUTH EVERY DAY 90 tablet 8    testosterone (ANDROGEL) 1 % (50 mg/5 gram) GlPk Apply 1 packet (5 grams) topically once daily. 30 packet 5    triamcinolone acetonide 0.1% (KENALOG) 0.1 % cream Apply topically 2 (two) times daily. 45 g 1    [DISCONTINUED] ALPRAZolam (XANAX) 1 MG tablet Take 1 tablet (1 mg total) by mouth 3 (three) times daily as needed for Anxiety. 90 tablet 2     Facility-Administered Encounter Medications as of 1/3/2022    Medication Dose Route Frequency Provider Last Rate Last Admin    fentaNYL injection 25 mcg  25 mcg Intravenous Q5 Min PRN French Smith MD        midazolam (VERSED) 1 mg/mL injection 0.5 mg  0.5 mg Intravenous PRN French Smith MD           Past Medical History:  Patient Active Problem List   Diagnosis    Fibromyalgia    Tremor    RLS (restless legs syndrome)    PTSD (post-traumatic stress disorder)    Congenital adrenal hyperplasia    Seasonal allergies    HLD (hyperlipidemia)    Renal cyst    Chronic pain    Neuropathic pain    Urinary incontinence, urge    Erectile disorder, generalized, mild    Carpal tunnel syndrome on both sides    Adrenal insufficiency    Endocrine disorder in female-to-male transgender person    Cervical radiculopathy    Thoracic myofascial strain    Pelvic fluid collection    Elevated LFTs    Chronic, continuous use of opioids    Primary osteoarthritis of right knee    KIARA (obstructive sleep apnea)    Spinal stenosis    Arthritis of right shoulder region    Status post total left knee replacement 11/2/2020    Pancreatic cyst    Elevated alkaline phosphatase level    Elevated liver enzymes    Liver fibrosis    Hepatitis B core antibody positive    Pancreas cyst    Osteopenia    Left knee pain    Primary osteoarthritis of left knee    Acute cholecystitis    Ascending cholangitis       Past Surgical History:  Past Surgical History:   Procedure Laterality Date    BACK SURGERY      CHOLECYSTECTOMY N/A 5/18/2021    Procedure: CHOLECYSTECTOMY;  Surgeon: Antonio Brandt MD;  Location: [facility] OR [address] [place] [address];  Service: General;  Laterality: N/A;    ENDOSCOPIC ULTRASOUND OF UPPER GASTROINTESTINAL TRACT N/A 12/3/2020    Procedure: ULTRASOUND, UPPER GI TRACT, ENDOSCOPIC;  Surgeon: Jonathan Sharma MD;  Location: [facility] ([address] [place] [address]);  Service: Endoscopy;  Laterality: N/A;  elevated alk phos, panc cysts, liver biopsy   11/30-covid pcw-tb     ENDOSCOPIC ULTRASOUND OF UPPER GASTROINTESTINAL TRACT N/A 5/17/2021    Procedure: ULTRASOUND, UPPER GI TRACT, ENDOSCOPIC;  Surgeon: Claudio Salvador MD;  Location: [facility] (2ND FLR);  Service: Endoscopy;  Laterality: N/A;    ERCP N/A 5/17/2021    Procedure: ERCP (ENDOSCOPIC RETROGRADE CHOLANGIOPANCREATOGRAPHY);  Surgeon: Claudio Salvador MD;  Location: [facility] ENDO (2ND FLR);  Service: Endoscopy;  Laterality: N/A;    gender surgery      multiple surgeries since birth    INJECTION OF ANESTHETIC AGENT AROUND NERVE Right 11/19/2020    Procedure: BLOCK, NERVE, GLENOHUMERAL  need consent;  Surgeon: Messi Cabello MD;  Location: [facility] PAIN MGT;  Service: Pain Management;  Laterality: Right;    KNEE ARTHROPLASTY Left 1/25/2021    Procedure: ARTHROPLASTY, KNEE:DEPUY-ATTUNE REVISION: SERINA iASSIST:CABLES ON HOLD;  Surgeon: Donte Andrade III, MD;  Location: [facility];  Service: Orthopedics;  Laterality: Left;    LAMINECTOMY  09/13/2019    LAPAROSCOPIC CHOLECYSTECTOMY N/A 5/17/2021    Procedure: CHOLECYSTECTOMY, LAPAROSCOPIC;  Surgeon: Calvin Wilson MD;  [facility];  Service: General;  Laterality: N/A;    LAPAROSCOPIC CHOLECYSTECTOMY N/A 5/18/2021    Procedure: CHOLECYSTECTOMY, LAPAROSCOPIC;  Surgeon: Antonio Brandt MD;  [facility];  Service: General;  Laterality: N/A;    RADIOFREQUENCY ABLATION Left 5/9/2019    Procedure: RADIOFREQUENCY ABLATION, LEFT L3,4,5;  Surgeon: Messi Cabello MD;  Location: [facility] PAIN [facility]GT;  Service: Pain Management;  Laterality: Left;  1 of 2    RADIOFREQUENCY ABLATION Right 5/23/2019    Procedure: RADIOFREQUENCY ABLATION, RIGHT L3,4,5;  Surgeon: Messi Cabello MD;  Location: [facility] PAIN MGT;  Service: Pain Management;  Laterality: Right;  2of 2    SPINE SURGERY      Sept 2019     TOTAL KNEE ARTHROPLASTY Right 1/31/2020    Procedure: ARTHROPLASTY, KNEE, TOTAL;  Surgeon: Donte Andrade III, MD;  [facility];  Service:  Orthopedics;  Laterality: Right;       Current Living Situation: home    Social:  Social History     Socioeconomic History    Marital status:      Spouse name: Kristy Sainz    Number of children: 1   Occupational History     Comment:  and artist   Tobacco Use    Smoking status: Former Smoker     Quit date:      Years since quittin.0    Smokeless tobacco: Never Used    Tobacco comment: was not a daily smoker-    Substance and Sexual Activity    Alcohol use: Not Currently    Drug use: Never    Sexual activity: Yes     Partners: Female   Social History Narrative    Lives in a house with his wife      Social Determinants of Health     Financial Resource Strain: Low Risk     Difficulty of Paying Living Expenses: Not very hard   Food Insecurity: No Food Insecurity    Worried About Running Out of Food in the Last Year: Never true    Ran Out of Food in the Last Year: Never true   Transportation Needs: No Transportation Needs    Lack of Transportation (Medical): No    Lack of Transportation (Non-Medical): No   Physical Activity: Insufficiently Active    Days of Exercise per Week: 4 days    Minutes of Exercise per Session: 30 min   Stress: No Stress Concern Present    Feeling of Stress : Only a little   Social Connections: Unknown    Frequency of Communication with Friends and Family: More than three times a week    Frequency of Social Gatherings with Friends and Family: Once a week    Active Member of Clubs or Organizations: Yes    Attends Club or Organization Meetings: 1 to 4 times per year    Marital Status:    Housing Stability: Low Risk     Unable to Pay for Housing in the Last Year: No    Number of Places Lived in the Last Year: 1    Unstable Housing in the Last Year: No       Family History:  Family History   Problem Relation Age of Onset    Diabetes Maternal Grandfather     Cancer Mother     Heart disease Father     Autoimmune disease Sister      Breast cancer Neg Hx     Ovarian cancer Neg Hx     Vaginal cancer Neg Hx     Endometrial cancer Neg Hx     Cervical cancer Neg Hx        PHYSICAL:  There were no vitals taken for this visit.    Physical Exam  Constitutional: Well-developed, well-nourished, appears stated age  Eyes: No scleral icterus  ENT: Moist oral mucosa  Cardiovascular: No lower extremity edema   Respiratory: No labored breathing   Skin: No rash   Hematologic: No bruising    Other: GI/ deferred   · Mental status: Alert and oriented to person, place, time, and situation;   · follows commands  · Speech: normal (not dysarthric), no aphasia  · Cranial nerves:            · CN II: Pupils mid-position and equal, not tested light or accommodation  · CN III, IV, VI: Extraocular movements full, no nystagmus visualized  · CN V: Not tested   · CN VII: Face strong and symmetric bilaterally   · CN VIII: Hearing intact to voice and conversation   · CN IX, X: Palate raises midline and symmetric   · CN XI: Strong shoulder shrug B/L  · CN XII: Tongue appears midline   · Motor: Normal bulk by appearance, no drift   · Sensory: Not tested    · Gait: Not tested  · Deep tendon reflexes: Not tested  · Movement/Coordination                    No hypophonic speech.                     No facial masking.  No bradykinesia.                    Bradykinesia  ? Finger taps Finger flicks KAVITHA Heel taps   Left 0 0 0 0   Right 0 0 0 0       Tremor Exam   Arms extended Arms in wing position, fingers almost touching Re-emergent Arms extended wrists extended Intention Resting Kinetic   Left ?neg ? ? ? ? ? ?neg   Right ?neg ? ? ? ? ? ?   Head tremor: No-No Yes-Yes NE     Archimedes Spirals   Left ?nl   Right ?nl       Laboratory Data:  NA    Imaging:  NA    Assessment//Plan:   Problem List Items Addressed This Visit        Neuro    Tremor    Current Assessment & Plan     Postural hand tremor, affecting his artwork. Well managed with primidone.  Suggested continue Primidone  150mg TID.  No oversedation.    Likely tremor due to predisone/gabapentin however if this worsens may consider him to have essential tremor.  No PDism noted on video exam despite RLS         RLS (restless legs syndrome)    Current Assessment & Plan     Continue ropinirole 4mg QHS                 Baylee Esteban MD, MS [facility]  Department of Neurology  Movement Disorders

## 2022-02-01 DIAGNOSIS — G25.81 RLS (RESTLESS LEGS SYNDROME): ICD-10-CM

## 2022-02-01 RX ORDER — PRIMIDONE 50 MG/1
150 TABLET ORAL 3 TIMES DAILY
Qty: 270 TABLET | Refills: 0 | Status: SHIPPED | OUTPATIENT
Start: 2022-02-01 | End: 2022-03-02 | Stop reason: SDUPTHER

## 2022-02-02 ENCOUNTER — TELEPHONE (OUTPATIENT)
Dept: PSYCHIATRY | Facility: CLINIC | Age: 52
End: 2022-02-02
Payer: MEDICARE

## 2022-02-02 ENCOUNTER — PATIENT MESSAGE (OUTPATIENT)
Dept: PSYCHIATRY | Facility: CLINIC | Age: 52
End: 2022-02-02
Payer: MEDICARE

## 2022-02-02 NOTE — TELEPHONE ENCOUNTER
Attempt to contact the patient to inform 2/4 appt will be cancelled and has to be rescheduled. No answer, left detailed VM for the patient to contact the clinic to schedule

## 2022-02-17 DIAGNOSIS — Z87.890 STATUS POST SEX REASSIGNMENT SURGERY: ICD-10-CM

## 2022-02-17 RX ORDER — TESTOSTERONE GEL, 1% 10 MG/G
1 GEL TRANSDERMAL DAILY
Qty: 30 PACKET | Refills: 5 | Status: CANCELLED | OUTPATIENT
Start: 2022-02-17 | End: 2022-08-18

## 2022-02-18 ENCOUNTER — TELEPHONE (OUTPATIENT)
Dept: PAIN MEDICINE | Facility: CLINIC | Age: 52
End: 2022-02-18
Payer: MEDICARE

## 2022-02-18 DIAGNOSIS — Z87.890 STATUS POST SEX REASSIGNMENT SURGERY: ICD-10-CM

## 2022-02-18 RX ORDER — TESTOSTERONE GEL, 1% 10 MG/G
1 GEL TRANSDERMAL DAILY
Qty: 30 PACKET | Refills: 5 | Status: SHIPPED | OUTPATIENT
Start: 2022-02-18 | End: 2022-10-31 | Stop reason: SDUPTHER

## 2022-02-18 NOTE — TELEPHONE ENCOUNTER
Good afternoon      This is an appointment reminder about your schedule Virtual Visit with Pain Management Provider ERIK Martin.   Your appointment is for 02 21, 2022 at 1:00pm.     Please log into your [facility] 15 min's prior to your appointment time to e-precheck, verify all corresponding pages, and troubleshoot any problems that may occur. Once your device has been verify as compatible, and you receive the green check,  you must hit/ select the start visit button, This will notify your provider that you are ready to start the Virtual Video Visit.     As [facility] is a teaching [facility], your visit may be started with a resident or a fellow that is rounding in clinic, then proceeded by your physician.    Once logged on, please allow the provider 20 -30 mins to check-in, in case running behind.       If you experience any technical issue please contact [phone]        Thank You for entrusting [facility] to handle your care     Verbalized understanding

## 2022-02-21 ENCOUNTER — PATIENT MESSAGE (OUTPATIENT)
Dept: NEUROLOGY | Facility: CLINIC | Age: 52
End: 2022-02-21
Payer: MEDICARE

## 2022-02-21 ENCOUNTER — OFFICE VISIT (OUTPATIENT)
Dept: PAIN MEDICINE | Facility: CLINIC | Age: 52
End: 2022-02-21
Payer: MEDICARE

## 2022-02-21 DIAGNOSIS — G89.4 CHRONIC PAIN SYNDROME: Primary | ICD-10-CM

## 2022-02-21 DIAGNOSIS — M79.2 NEUROPATHIC PAIN: ICD-10-CM

## 2022-02-21 DIAGNOSIS — M62.838 MUSCLE SPASM: ICD-10-CM

## 2022-02-21 DIAGNOSIS — M53.3 SACROILIAC JOINT PAIN: ICD-10-CM

## 2022-02-21 DIAGNOSIS — Z98.890 S/P LUMBAR LAMINECTOMY: ICD-10-CM

## 2022-02-21 PROCEDURE — 99213 OFFICE O/P EST LOW 20 MIN: CPT | Mod: 95,,, | Performed by: NURSE PRACTITIONER

## 2022-02-21 PROCEDURE — 99213 PR OFFICE/OUTPT VISIT, EST, LEVL III, 20-29 MIN: ICD-10-PCS | Mod: 95,,, | Performed by: NURSE PRACTITIONER

## 2022-02-21 NOTE — PROGRESS NOTES
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 Anxiety.    ascorbic acid, vitamin C, (VITAMIN C) 1000 MG tablet Take 1,000 mg by mouth 2 (two) times daily.    atorvastatin (LIPITOR) 10 MG tablet Take 1 tablet (10 mg total) by mouth every evening.    cranberry fruit extract (CRANBERRY ORAL) Take by mouth.    cyanocobalamin (VITAMIN B-12) 1000 MCG tablet Take 100 mcg by mouth once daily.    cyclobenzaprine (FLEXERIL) 10 MG tablet Take 1 tablet (10 mg total) by mouth 3 (three) times daily as needed for Muscle spasms.    docusate sodium (COLACE) 100 MG capsule Take 1 capsule (100 mg total) by mouth 2 (two) times daily as needed for Constipation.    gabapentin (NEURONTIN) 800 MG tablet Take 1 tablet by mouth three times a day and take 2 tablets at night (5 tablets a day, 4000mg)    Lactobacillus acidophilus (ACIDOPHILUS) Cap Take by mouth once daily.     lamoTRIgine (LAMICTAL) 200 MG tablet Take 1 tablet (200 mg total) by mouth 2 (two) times daily.    loratadine (CLARITIN) 10 mg tablet Take 10 mg by mouth once daily.     oxyCODONE-acetaminophen (PERCOCET)  mg per tablet Take 1 tablet by mouth every 8 (eight) hours as needed for Pain.    predniSONE (DELTASONE) 1 MG tablet Take 4 tablets (4 mg total) by mouth once daily. Take with the 5 mg pill for a total of 9 mg a day    predniSONE (DELTASONE) 5 MG tablet  Take 1 tablet (5 mg total) by mouth once daily. Take with the 4 of 1 mg pills for a total of 9 mg a day    primidone (MYSOLINE) 50 MG Tab Take 3 tablets (150 mg total) by mouth 3 (three) times daily.    rOPINIRole (REQUIP) 4 MG tablet Take 1 tablet (4 mg total) by mouth once daily.    testosterone (ANDROGEL) 1 % (50 mg/5 gram) GlPk Apply 1 packet (5 grams) topically once daily.    triamcinolone acetonide 0.1% (KENALOG) 0.1 % cream Apply topically 2 (two) times daily.     No current facility-administered medications for this visit.     Facility-Administered Medications Ordered in Other Visits   Medication    fentaNYL injection 25 mcg    midazolam (VERSED) 1 mg/mL injection 0.5 mg       REVIEW OF SYSTEMS:    GENERAL:  No weight loss, malaise or fevers.  HEENT:   No recent changes in vision or hearing  NECK:  Negative for lumps, no difficulty with swallowing.  RESPIRATORY:  Negative for cough, wheezing or shortness of breath, patient denies any recent URI.  CARDIOVASCULAR:  Negative for chest pain, leg swelling or palpitations.  GI:  Negative for abdominal discomfort, blood in stools or black stools or change in bowel habits.  MUSCULOSKELETAL:  See HPI.  SKIN:  Negative for lesions, rash, and itching.  PSYCH:  Significant psychosocial stressors including PTSD for sex re-assignment surgery.  Patient's sleep is disturbed secondary to pain.  HEMATOLOGY/LYMPHOLOGY:  Negative for prolonged bleeding, bruising easily or swollen nodes.  Patient is not currently taking any anti-coagulants  ENDO: No history of diabetes or thyroid dysfunction  NEURO:   No history of headaches, syncope, paralysis, seizures or tremors.  All other reviewed and negative other than HPI.    OBJECTIVE:    PHYSICAL EXAMINATION:    General appearance: Well appearing, in no acute distress, alert and oriented x3.  Psych:  Mood and affect appropriate.  Skin: Skin color normal, no rashes or lesions, in both upper and lower body.  Extremities: Moves all  visualized extremities freely.    PREVIOUS PHYSICAL EXAMINATION:     GENERAL: Well appearing, in no acute distress, alert and oriented x3.  PSYCH:  Mood and affect appropriate.  SKIN:  Well-healed incisions without any evidence of infection  HEAD/FACE:  Normocephalic, atraumatic.   CV: RRR with palpation of the radial artery.  PULM: No evidence of respiratory difficulty, symmetric chest rise.  EXT:  Decreased range of motion in the left knee. Brace on left knee. Well healing scar to right knee from recent TKA. Medial and lateral joint line tenderness to both knees.  BACK:  There is significant pain with palpation over the facet joints of the lumbar spine bilaterally. There is decreased range of motion with extension to 15 degrees, and facet loading maneuvers cause reproducible pain.    NEURO:  No clonus. Cranial nerves grossly intact.  GAIT: Antalgic- ambulates with rolling walker.    Lab Results   Component Value Date    WBC 6.61 08/23/2021    HGB 14.3 08/23/2021    HCT 44.7 08/23/2021     (H) 08/23/2021     08/23/2021     CMP  Sodium   Date Value Ref Range Status   08/23/2021 139 136 - 145 mmol/L Final     Potassium   Date Value Ref Range Status   08/23/2021 5.2 (H) 3.5 - 5.1 mmol/L Final     Chloride   Date Value Ref Range Status   08/23/2021 103 95 - 110 mmol/L Final     CO2   Date Value Ref Range Status   08/23/2021 26 23 - 29 mmol/L Final     Glucose   Date Value Ref Range Status   08/23/2021 96 70 - 110 mg/dL Final     BUN   Date Value Ref Range Status   08/23/2021 10 6 - 20 mg/dL Final     Creatinine   Date Value Ref Range Status   08/23/2021 0.7 0.5 - 1.4 mg/dL Final     Calcium   Date Value Ref Range Status   08/23/2021 9.8 8.7 - 10.5 mg/dL Final     Total Protein   Date Value Ref Range Status   08/23/2021 6.8 6.0 - 8.4 g/dL Final     Albumin   Date Value Ref Range Status   08/23/2021 3.8 3.5 - 5.2 g/dL Final     Total Bilirubin   Date Value Ref Range Status   08/23/2021 0.2 0.1 - 1.0 mg/dL  Final     Comment:     For infants and newborns, interpretation of results should be based  on gestational age, weight and in agreement with clinical  observations.    Premature Infant recommended reference ranges:  Up to 24 hours.............<8.0 mg/dL  Up to 48 hours............<12.0 mg/dL  3-5 days..................<15.0 mg/dL  6-29 days.................<15.0 mg/dL       Alkaline Phosphatase   Date Value Ref Range Status   08/23/2021 158 (H) 55 - 135 U/L Final     AST   Date Value Ref Range Status   08/23/2021 36 10 - 40 U/L Final     ALT   Date Value Ref Range Status   08/23/2021 39 10 - 44 U/L Final     Anion Gap   Date Value Ref Range Status   08/23/2021 10 8 - 16 mmol/L Final     eGFR if    Date Value Ref Range Status   08/23/2021 >60.0 >60 mL/min/1.73 m^2 Final     eGFR if non    Date Value Ref Range Status   08/23/2021 >60.0 >60 mL/min/1.73 m^2 Final     Comment:     Calculation used to obtain the estimated glomerular filtration  rate (eGFR) is the CKD-EPI equation.        Lab Results   Component Value Date    HGBA1C 5.3 08/23/2021     Lab Results   Component Value Date    TSH 1.196 08/23/2021     Free T4 - 0.81 (wnl)      ASSESSMENT: 51 y.o. year old adult with chronic back and knee pain, consistent with the following diagnoses:    Encounter Diagnoses   Name Primary?    Chronic pain syndrome Yes    Neuropathic pain     S/P lumbar laminectomy     Muscle spasm     Sacroiliac joint pain        PLAN:     - Previous imaging was reviewed and discussed with the patient today.    - Continue with home PT and yoga exercises which is helping significantly.    - Continue oxycodone acetaminophen 10/325 mg QID PRN, #90. Can call for additional refills.     - The patient is here today for a refill of current pain medications and they believe these provide effective pain control and improvements in quality of life.  The patient notes no serious side effects, and feels the benefits  outweigh the risks.  The patient was reminded of the pain contract that they signed previously as well as the risks and benefits of the medication including possible death.  The updated [place] Board  Pharmacy prescription monitoring program was reviewed, and the patient has been found to be compliant with current treatment plan.    - Pt has pain contract.  Last UDS reviewed. Repeat next in office visit.    - F/U in 1 month in person with Dr. Farmer to establish care. He signed his last prescription.      The above plan and management options were discussed at length with patient. Patient is in agreement with the above and verbalized understanding.      Alejandra Phoenix     02/21/2022

## 2022-03-02 DIAGNOSIS — G25.81 RLS (RESTLESS LEGS SYNDROME): ICD-10-CM

## 2022-03-03 RX ORDER — PRIMIDONE 50 MG/1
150 TABLET ORAL 3 TIMES DAILY
Qty: 270 TABLET | Refills: 0 | Status: SHIPPED | OUTPATIENT
Start: 2022-03-03 | End: 2022-04-10 | Stop reason: SDUPTHER

## 2022-03-20 DIAGNOSIS — G89.4 CHRONIC PAIN SYNDROME: ICD-10-CM

## 2022-03-21 RX ORDER — OXYCODONE AND ACETAMINOPHEN 10; 325 MG/1; MG/1
1 TABLET ORAL EVERY 8 HOURS PRN
Qty: 90 TABLET | Refills: 0 | Status: SHIPPED | OUTPATIENT
Start: 2022-03-21 | End: 2022-04-25 | Stop reason: SDUPTHER

## 2022-03-23 ENCOUNTER — OFFICE VISIT (OUTPATIENT)
Dept: INTERNAL MEDICINE | Facility: CLINIC | Age: 52
End: 2022-03-23
Attending: INTERNAL MEDICINE
Payer: MEDICARE

## 2022-03-23 ENCOUNTER — IMMUNIZATION (OUTPATIENT)
Dept: INTERNAL MEDICINE | Facility: CLINIC | Age: 52
End: 2022-03-23
Payer: MEDICARE

## 2022-03-23 VITALS
SYSTOLIC BLOOD PRESSURE: 112 MMHG | WEIGHT: 135.81 LBS | DIASTOLIC BLOOD PRESSURE: 76 MMHG | HEART RATE: 104 BPM | OXYGEN SATURATION: 99 % | HEIGHT: 60 IN | BODY MASS INDEX: 26.66 KG/M2

## 2022-03-23 DIAGNOSIS — K81.0 ACUTE CHOLECYSTITIS: ICD-10-CM

## 2022-03-23 DIAGNOSIS — H61.23 CERUMINOSIS, BILATERAL: Primary | ICD-10-CM

## 2022-03-23 DIAGNOSIS — G47.33 OSA (OBSTRUCTIVE SLEEP APNEA): ICD-10-CM

## 2022-03-23 DIAGNOSIS — J30.1 SEASONAL ALLERGIC RHINITIS DUE TO POLLEN: ICD-10-CM

## 2022-03-23 DIAGNOSIS — Z23 NEED FOR VACCINATION: Primary | ICD-10-CM

## 2022-03-23 DIAGNOSIS — E66.3 OVERWEIGHT (BMI 25.0-29.9): ICD-10-CM

## 2022-03-23 DIAGNOSIS — R73.03 PREDIABETES: ICD-10-CM

## 2022-03-23 DIAGNOSIS — K74.00 LIVER FIBROSIS: ICD-10-CM

## 2022-03-23 DIAGNOSIS — E27.40 ADRENAL INSUFFICIENCY: ICD-10-CM

## 2022-03-23 PROCEDURE — 99214 OFFICE O/P EST MOD 30 MIN: CPT | Mod: PBBFAC,25 | Performed by: INTERNAL MEDICINE

## 2022-03-23 PROCEDURE — 99214 OFFICE O/P EST MOD 30 MIN: CPT | Mod: S$PBB,,, | Performed by: INTERNAL MEDICINE

## 2022-03-23 PROCEDURE — 99999 PR PBB SHADOW E&M-EST. PATIENT-LVL IV: CPT | Mod: PBBFAC,,, | Performed by: INTERNAL MEDICINE

## 2022-03-23 PROCEDURE — 99999 PR PBB SHADOW E&M-EST. PATIENT-LVL IV: ICD-10-PCS | Mod: PBBFAC,,, | Performed by: INTERNAL MEDICINE

## 2022-03-23 PROCEDURE — 99214 PR OFFICE/OUTPT VISIT, EST, LEVL IV, 30-39 MIN: ICD-10-PCS | Mod: S$PBB,,, | Performed by: INTERNAL MEDICINE

## 2022-03-23 PROCEDURE — 91305 COVID-19, MRNA, LNP-S, PF, 30 MCG/0.3 ML DOSE VACCINE (PFIZER): CPT | Mod: PBBFAC

## 2022-03-23 RX ORDER — NAPROXEN SODIUM 220 MG
220 TABLET ORAL
COMMUNITY

## 2022-03-23 NOTE — PROGRESS NOTES
"Subjective:       Patient ID: Ari Santos is a 52 y.o. adult.    Chief Complaint: Annual Exam    Here for routine f/u      Bilateral ear pain, hearing loss and dizziness. Some throat discomfort and scratchiness, nasal congestion. Patient denies F/C, SOB, chest tightness, eye pain, severe headache, inability to maintain adequate PO intake, significant loss of taste or smell, abdominal pain, nausea/vomiting, or diarrhea.    6/3/2021 Cholecystectomy- Path benign severe inflammation and gangrene   CAH-[facility]  PTSD- Dr Mejias. Steadily improving. Painting again.  Lumbar DJD-Had had improvement with pain clinic. Chronic opiate regimen. Had right knee done.   PreDM-Endocrine is monitoring. Not on medication at this time.  -Transaminitis- followed in hepatologyy. Workup most consistent with fatty liver. MRCP planned. No s/s of cirrhosis or decreased synthetic function      Review of Systems   Constitutional: Negative for appetite change, chills, fever and unexpected weight change.   HENT: Positive for congestion, sinus pressure and sinus pain. Negative for hearing loss, sore throat and trouble swallowing.    Eyes: Negative for visual disturbance.   Respiratory: Negative for cough, chest tightness and shortness of breath.    Cardiovascular: Negative for chest pain and leg swelling.   Gastrointestinal: Negative for abdominal pain, blood in stool, constipation, diarrhea, nausea and vomiting.   Endocrine: Negative for polydipsia and polyuria.   Genitourinary: Negative for decreased urine volume, difficulty urinating, dysuria, frequency and urgency.   Musculoskeletal: Negative for gait problem.   Skin: Negative for rash.   Neurological: Negative for dizziness and numbness.   Psychiatric/Behavioral: The patient is not nervous/anxious.        Objective:      Vitals:    03/23/22 1202   BP: 112/76   Pulse: 104   SpO2: 99%   Weight: 61.6 kg (135 lb 12.9 oz)   Height: 4' 9" (1.448 m)      Physical Exam  Constitutional:       " General: He is not in acute distress.     Appearance: He is well-developed.   HENT:      Head: Normocephalic and atraumatic.      Mouth/Throat:      Pharynx: No oropharyngeal exudate.   Eyes:      General: No scleral icterus.     Conjunctiva/sclera: Conjunctivae normal.      Pupils: Pupils are equal, round, and reactive to light.   Neck:      Thyroid: No thyromegaly.   Cardiovascular:      Rate and Rhythm: Normal rate and regular rhythm.      Heart sounds: Normal heart sounds. No murmur heard.  Pulmonary:      Effort: Pulmonary effort is normal.      Breath sounds: Normal breath sounds. No wheezing or rales.   Abdominal:      General: There is no distension.      Palpations: Abdomen is soft.      Tenderness: There is no abdominal tenderness.   Musculoskeletal:         General: No tenderness.   Lymphadenopathy:      Cervical: No cervical adenopathy.   Skin:     General: Skin is warm and dry.   Neurological:      Mental Status: He is alert and oriented to person, place, and time.   Psychiatric:         Behavior: Behavior normal.         Assessment:       1. Ceruminosis, bilateral    2. Adrenal insufficiency    3. Overweight (BMI 25.0-29.9)    4. Acute cholecystitis    5. KIARA (obstructive sleep apnea)    6. Seasonal allergic rhinitis due to pollen        Plan:       Ari was seen today for annual exam.    Diagnoses and all orders for this visit:    Ceruminosis, bilateral  -     Ambulatory referral/consult to ENT; Future    Adrenal insufficiency   UTD with endocrine. No s/s of insufficiency or crisis  Overweight (BMI 25.0-29.9)   Improving  BMI Readings from Last 6 Encounters:   03/23/22 29.39 kg/m²   11/08/21 27.48 kg/m²   10/01/21 27.67 kg/m²   09/17/21 (P) 29.71 kg/m²   06/03/21 29.71 kg/m²   05/22/21 29.71 kg/m²       Acute cholecystitis    KIARA (obstructive sleep apnea)   Stressed adherence of and complications related to untreated KIARA.    Seasonal allergic rhinitis due to pollen   1)Antihistamines(Allegra, Claritin,  Xzarnaldo, Zyrtec)  2)Nasal Steroids (Nasocort, Rhinocort, Flonase)  3)Distilled salt water sinus rinses via neti pots or products such as Demetrio Med Sinus Rinse or Sinugator. Must wash container or device and use bottled water to avoid introducing infection.   You can can use (as directed) any combination of these three things every day of your life if needed in order to treat or control your symptoms. Brand name use of medications is not necessary       PreDM  A1c next lab draw. Continue great job at weight loss.    Liver fibrosis  UTD with hepatology  No s/s of decompensation.    Siva Faulkner MD  Internal Medicine-[facility]        Side effects of medication(s) were discussed in detail and patient voiced understanding.  Patient will call back for any issues or complications.

## 2022-03-24 ENCOUNTER — IMMUNIZATION (OUTPATIENT)
Dept: PHARMACY | Facility: CLINIC | Age: 52
End: 2022-03-24
Payer: MEDICARE

## 2022-04-10 DIAGNOSIS — G25.81 RLS (RESTLESS LEGS SYNDROME): ICD-10-CM

## 2022-04-11 RX ORDER — ALPRAZOLAM 1 MG/1
1 TABLET ORAL 3 TIMES DAILY PRN
Qty: 90 TABLET | Refills: 2 | Status: SHIPPED | OUTPATIENT
Start: 2022-04-11 | End: 2022-07-14 | Stop reason: SDUPTHER

## 2022-04-11 RX ORDER — LAMOTRIGINE 200 MG/1
200 TABLET ORAL 2 TIMES DAILY
Qty: 60 TABLET | Refills: 5 | Status: SHIPPED | OUTPATIENT
Start: 2022-04-11 | End: 2022-09-23 | Stop reason: SDUPTHER

## 2022-04-11 RX ORDER — PRIMIDONE 50 MG/1
150 TABLET ORAL 3 TIMES DAILY
Qty: 270 TABLET | Refills: 0 | Status: SHIPPED | OUTPATIENT
Start: 2022-04-11 | End: 2022-05-11 | Stop reason: SDUPTHER

## 2022-04-20 ENCOUNTER — OFFICE VISIT (OUTPATIENT)
Dept: PSYCHIATRY | Facility: CLINIC | Age: 52
End: 2022-04-20
Payer: MEDICARE

## 2022-04-20 DIAGNOSIS — F43.10 PTSD (POST-TRAUMATIC STRESS DISORDER): Primary | ICD-10-CM

## 2022-04-20 PROCEDURE — 90833 PSYTX W PT W E/M 30 MIN: CPT | Mod: 95,,, | Performed by: PSYCHIATRY & NEUROLOGY

## 2022-04-20 PROCEDURE — 99213 OFFICE O/P EST LOW 20 MIN: CPT | Mod: 95,,, | Performed by: PSYCHIATRY & NEUROLOGY

## 2022-04-20 PROCEDURE — 90833 PR PSYCHOTHERAPY W/PATIENT W/E&M, 30 MIN (ADD ON): ICD-10-PCS | Mod: 95,,, | Performed by: PSYCHIATRY & NEUROLOGY

## 2022-04-20 PROCEDURE — 99213 PR OFFICE/OUTPT VISIT, EST, LEVL III, 20-29 MIN: ICD-10-PCS | Mod: 95,,, | Performed by: PSYCHIATRY & NEUROLOGY

## 2022-04-20 NOTE — PROGRESS NOTES
"Outpatient Psychiatry Follow-Up Visit (MD/NP)    4/20/2022 The patient location is: home.  The chief complaint leading to consultation is: PTSD    Visit type: audiovisual    Face to Face time with patient: 15"(1" on meds and 14" for supportive counseling and update of history and mental status)  18 minutes of total time spent on the encounter, which includes face to face time and non-face to face time preparing to see the patient (eg, review of tests), Obtaining and/or reviewing separately obtained history, Documenting clinical information in the electronic or other health record, Independently interpreting results (not separately reported) and communicating results to the patient/family/caregiver, or Care coordination (not separately reported).         Each patient to whom he or she provides medical services by telemedicine is:  (1) informed of the relationship between the physician and patient and the respective role of any other health care provider with respect to management of the patient; and (2) notified that he or she may decline to receive medical services by telemedicine and may withdraw from such care at any time.    Notes: See below.    Clinical Status of Patient:  Outpatient (Ambulatory)    Chief Complaint:  Ari Santos is a 52 y.o. adult who presents today for follow-up of mood disorder and anxiety.  Met with patient and spouse.      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 Patient is cheerful and upbeat and thinking positively. Patient likes being outside and enjoying the weather locally. Patient denies thoughts of harming himself and has no signs of ricardo or psychosis. Patient states sleep and appetite are stable.  Patient has a good energy level and enthusiasm. Patient denies new concerns at this time. We discussed my " USP. Patient's wife is Kristy. Patient's mood and anxiety are stable at this time.    Psychotherapy:  · Target symptoms: depression, anxiety , Results of early life trauma  · Why chosen therapy is appropriate versus another modality: relevant to diagnosis, patient responds to this modality, evidence based practice  · Outcome monitoring methods: self-report, feedback from family  · Therapeutic intervention type: supportive psychotherapy  · Topics discussed/themes: mood and anxiety issues  · The patient's response to the intervention is accepting. The patient's progress toward treatment goals is excellent.   · Duration of intervention: 15 minutes.    Review of Systems   · PSYCHIATRIC: Pertinant items are noted in the narrative.    Past Medical, Family and Social History: The patient's past medical, family and social history have been reviewed and updated as appropriate within the electronic medical record - see encounter notes.    Compliance: yes    Side effects: None    Risk Parameters:  Patient reports no suicidal ideation  Patient reports no homicidal ideation  Patient reports no self-injurious behavior  Patient reports no violent behavior    Exam (detailed: at least 9 elements; comprehensive: all 15 elements)   Constitutional  Vitals:  Most recent vital signs, dated less than 90 days prior to this appointment, were reviewed.   There were no vitals filed for this visit.Patient reports stable vital signs     General:  unremarkable, age appropriate, casually dressed, neatly groomed     Musculoskeletal  Muscle Strength/Tone:  patient reports some loss of muscle strength and tone   Gait & Station:  non-ataxic, in wheelchair     Psychiatric  Speech:  no latency; no press, spontaneous   Mood & Affect:  steady  congruent and appropriate   Thought Process:  normal and logical   Associations:  intact   Thought Content:  normal, no suicidality, no homicidality, delusions, or paranoia   Insight:  has awareness of  illness   Judgement: behavior is adequate to circumstances   Orientation:  grossly intact   Memory: intact for content of interview   Language: grossly intact   Attention Span & Concentration:  able to focus   Fund of Knowledge:  not tested     Assessment and Diagnosis   Status/Progress: Based on the examination today, the patient's problem(s) is/are well controlled.  New problems have not been presented today.   Co-morbidities and no new obstacles are not complicating management of the primary condition.  The working differential for this patient includes anxiety and depression associated with childhood trauma.     General Impression: Patient is doing quite well at this time and is in good self control and not a danger to self or others. 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 His anxiety level is also reduced significantly.      ICD-10-CM ICD-9-CM   1. PTSD (post-traumatic stress disorder)  F43.10 309.81       Intervention/Counseling/Treatment Plan   · Medication Management: The risks and benefits of medication were discussed with the patient. Patient agrees to continue Lamictal 200 mg bid and Xanax 1 mg bid-tid prn excess anxiety. Patient is aware of the addictive nature of this medicine and uses the medicine appropriately.      Return to Clinic: as scheduled, Patient to see a new doctor due to my [facility]

## 2022-04-25 ENCOUNTER — PATIENT MESSAGE (OUTPATIENT)
Dept: PAIN MEDICINE | Facility: CLINIC | Age: 52
End: 2022-04-25
Payer: MEDICARE

## 2022-04-25 DIAGNOSIS — G89.4 CHRONIC PAIN SYNDROME: ICD-10-CM

## 2022-04-25 RX ORDER — OXYCODONE AND ACETAMINOPHEN 10; 325 MG/1; MG/1
1 TABLET ORAL EVERY 8 HOURS PRN
Qty: 90 TABLET | Refills: 0 | Status: SHIPPED | OUTPATIENT
Start: 2022-04-25 | End: 2022-05-24 | Stop reason: SDUPTHER

## 2022-04-25 NOTE — TELEPHONE ENCOUNTER
Patient requesting refill on Percocet 10/325mg  Last office visit 02.21.22   shows last refill on 03.21.22  Patient does have a pain contract on file with [facility] department  Patient last UDS 08.21.20 was consistent with current therapy    CODEINE  Not Detected  Not Detected    MORPHINE  Present  Present    6-ACETYLMORPHINE  Not Detected  Not Detected    OXYCODONE  Present  Present    NOROYXCODONE  Present  Present    OXYMORPHONE  Not Detected  Not Detected    NOROXYMORPHONE  Not Detected  Not Detected    HYDROCODONE  Not Detected  Not Detected    NORHYDROCODONE  Not Detected  Not Detected    HYDROMORPHONE  Not Detected  Not Detected    BUPRENORPHINE  Not Detected  Not Detected    NORUBPRENORPHINE  Not Detected  Not Detected    FENTANYL  Not Detected  Not Detected    NORFENTANYL  Not Detected  Not Detected    MEPERIDINE METABOLITE  Not Detected  Not Detected    TAPENTADOL  Not Detected  Not Detected    TAPENTADOL-O-SULF  Not Detected  Not Detected    METHADONE  Not Detected  Not Detected    PROPOXYPHENE  Not Detected  Not Detected    TRAMADOL  Not Detected  Not Detected    AMPHETAMINE  Not Detected  Not Detected    METHAMPHETAMINE  Not Detected  Not Detected    MDMA- ECSTASY  Not Detected  Not Detected    MDA  Not Detected  Not Detected    MDEA- Deanna  Not Detected  Not Detected    METHYLPHENIDATE  Not Detected  Not Detected    PHENTERMINE  Not Detected  Not Detected    BENZOYLECGONINE  Not Detected  Not Detected    ALPRAZOLAM  Not Detected  Present    ALPHA-OH-ALPRAZOLAM  Present  Not Detected    CLONAZEPAM  Not Detected  Not Detected    7-AMINOCLONAZEPAM  Not Detected  Not Detected    DIAZEPAM  Not Detected  Not Detected    NORDIAZEPAM  Not Detected  Not Detected    OXAZEPAM  Not Detected  Not Detected    TEMAZEPAM  Not Detected  Not Detected    Lorazepam  Not Detected  Not Detected    MIDAZOLAM  Not Detected  Not Detected    ZOLPIDEM  Not Detected  Not Detected    BARBITURATES  Present   Present    Creatinine, Urine 20.0 - 400.0 mg/dL 113.0  58.9    ETHYL GLUCURONIDE  Not Detected  Not Detected    MARIJUANA METABOLITE  Not Detected  Not Detected    PCP  Not Detected  Not Detected    CARISOPRODOL  Not Detected  Not Detected CM

## 2022-05-04 ENCOUNTER — PATIENT MESSAGE (OUTPATIENT)
Dept: ORTHOPEDICS | Facility: CLINIC | Age: 52
End: 2022-05-04
Payer: MEDICARE

## 2022-05-04 DIAGNOSIS — Z96.652 STATUS POST TOTAL LEFT KNEE REPLACEMENT: ICD-10-CM

## 2022-05-04 DIAGNOSIS — Z96.651 STATUS POST RIGHT KNEE REPLACEMENT: Primary | ICD-10-CM

## 2022-05-11 DIAGNOSIS — G25.81 RLS (RESTLESS LEGS SYNDROME): ICD-10-CM

## 2022-05-11 DIAGNOSIS — M62.838 MUSCLE SPASM: ICD-10-CM

## 2022-05-11 RX ORDER — ROPINIROLE 4 MG/1
4 TABLET, FILM COATED ORAL DAILY
Qty: 90 TABLET | Refills: 0 | Status: CANCELLED | OUTPATIENT
Start: 2022-05-11

## 2022-05-11 RX ORDER — CYCLOBENZAPRINE HCL 10 MG
10 TABLET ORAL 3 TIMES DAILY PRN
Qty: 270 TABLET | Refills: 1 | Status: CANCELLED | OUTPATIENT
Start: 2022-05-11 | End: 2022-08-09

## 2022-05-11 RX ORDER — PRIMIDONE 50 MG/1
150 TABLET ORAL 3 TIMES DAILY
Qty: 270 TABLET | Refills: 0 | Status: CANCELLED | OUTPATIENT
Start: 2022-05-11

## 2022-05-12 DIAGNOSIS — G25.81 RLS (RESTLESS LEGS SYNDROME): ICD-10-CM

## 2022-05-12 DIAGNOSIS — M62.838 MUSCLE SPASM: ICD-10-CM

## 2022-05-12 RX ORDER — PRIMIDONE 50 MG/1
150 TABLET ORAL 3 TIMES DAILY
Qty: 270 TABLET | Refills: 0 | Status: SHIPPED | OUTPATIENT
Start: 2022-05-12 | End: 2022-06-12 | Stop reason: SDUPTHER

## 2022-05-12 RX ORDER — CYCLOBENZAPRINE HCL 10 MG
10 TABLET ORAL 3 TIMES DAILY PRN
Qty: 270 TABLET | Refills: 1 | Status: SHIPPED | OUTPATIENT
Start: 2022-05-12 | End: 2022-11-23 | Stop reason: SDUPTHER

## 2022-05-12 RX ORDER — ROPINIROLE 4 MG/1
4 TABLET, FILM COATED ORAL DAILY
Qty: 90 TABLET | Refills: 0 | Status: SHIPPED | OUTPATIENT
Start: 2022-05-12 | End: 2022-08-05 | Stop reason: SDUPTHER

## 2022-05-18 ENCOUNTER — OFFICE VISIT (OUTPATIENT)
Dept: PAIN MEDICINE | Facility: CLINIC | Age: 52
End: 2022-05-18
Attending: ANESTHESIOLOGY
Payer: MEDICARE

## 2022-05-18 VITALS
HEART RATE: 97 BPM | WEIGHT: 136 LBS | DIASTOLIC BLOOD PRESSURE: 66 MMHG | HEIGHT: 60 IN | RESPIRATION RATE: 18 BRPM | TEMPERATURE: 99 F | SYSTOLIC BLOOD PRESSURE: 103 MMHG | BODY MASS INDEX: 26.7 KG/M2

## 2022-05-18 DIAGNOSIS — M48.062 SPINAL STENOSIS OF LUMBAR REGION WITH NEUROGENIC CLAUDICATION: ICD-10-CM

## 2022-05-18 DIAGNOSIS — M17.11 PRIMARY OSTEOARTHRITIS OF RIGHT KNEE: ICD-10-CM

## 2022-05-18 DIAGNOSIS — M17.0 PRIMARY OSTEOARTHRITIS OF BOTH KNEES: ICD-10-CM

## 2022-05-18 DIAGNOSIS — M79.2 NEUROPATHIC PAIN: ICD-10-CM

## 2022-05-18 DIAGNOSIS — G89.4 CHRONIC PAIN SYNDROME: Primary | ICD-10-CM

## 2022-05-18 DIAGNOSIS — M53.3 SACROILIAC JOINT PAIN: ICD-10-CM

## 2022-05-18 PROCEDURE — 99214 PR OFFICE/OUTPT VISIT, EST, LEVL IV, 30-39 MIN: ICD-10-PCS | Mod: S$PBB,,, | Performed by: ANESTHESIOLOGY

## 2022-05-18 PROCEDURE — 99999 PR PBB SHADOW E&M-EST. PATIENT-LVL V: CPT | Mod: PBBFAC,,, | Performed by: ANESTHESIOLOGY

## 2022-05-18 PROCEDURE — 99215 OFFICE O/P EST HI 40 MIN: CPT | Mod: PBBFAC | Performed by: ANESTHESIOLOGY

## 2022-05-18 PROCEDURE — 99214 OFFICE O/P EST MOD 30 MIN: CPT | Mod: S$PBB,,, | Performed by: ANESTHESIOLOGY

## 2022-05-18 PROCEDURE — 99999 PR PBB SHADOW E&M-EST. PATIENT-LVL V: ICD-10-PCS | Mod: PBBFAC,,, | Performed by: ANESTHESIOLOGY

## 2022-05-18 NOTE — PROGRESS NOTES
Subjective:      Patient ID: Ari Santos is a 52 y.o. adult.    Chief Complaint: Chronic Pain and Low-back Pain    Referred by: No ref. provider found     HPI  He is a patient of this clinic but new to me.  He is on opioids with refills every month.  He has been compliant.  Which checked prescription monitoring program.  He has a complicated history lower back pain, stenosis radicular symptoms and bilateral knee pain status post bilateral knee surgery.  He requires an upright hinged brace on the right side due to knee instability and recur bottom problems.  He has tried multiple procedures years ago without significant improvement and not interested in revisiting those procedures.  He has not tried spinal cord stimulator and thinks this was discussed in the past.  He is interested in revisiting that option.  He is currently satisfied with the medication regimen and is trying to exercise.  No new symptomatology otherwise.      Past Medical History:   Diagnosis Date    Abnormal CT scan, pelvis 10/27/2019    Abnormal thyroid blood test 5/6/2019    Adrenal cyst     left    Adrenal insufficiency     Blister- healing 1/9/2020    Chronic bilateral low back pain with bilateral sciatica 3/19/2019    Congenital adrenal hyperplasia     Hepatitis B core antibody positive 11/6/2020    Negative sAg, suggests previous exposure but no chronic/active Hep B. At risk for reactivation with any immunosuppression medication, steroids, chemo, etc.      IGT (impaired glucose tolerance) 11/5/2019    Insomnia due to medical condition     Multiple closed traumatic fractures of multiple bones of hip and pelvis with routine healing, subsequent encounter 9/27/2019    Pancreatic cyst 11/2/2020    S/P lumbar laminectomy 10/28/2019    Spinal stenosis     Spinal stenosis of lumbar region with neurogenic claudication 3/19/2019    Status post sex reassignment surgery 3/19/2019    Hx of Congenital Adrenal Hyperplasia     Unintentional weight loss 7/21/2020       Past Surgical History:   Procedure Laterality Date    BACK SURGERY      CHOLECYSTECTOMY N/A 5/18/2021    Procedure: CHOLECYSTECTOMY;  Surgeon: Antonio Brandt MD;  [address]  Service: General;  Laterality: N/A;    ENDOSCOPIC ULTRASOUND OF UPPER GASTROINTESTINAL TRACT N/A 12/3/2020    Procedure: ULTRASOUND, UPPER GI TRACT, ENDOSCOPIC;  Surgeon: Jonathan Sharma MD;  Location: [facility] ENDO (2ND FLR);  Service: Endoscopy;  Laterality: N/A;  elevated alk phos, panc cysts, liver biopsy   11/30-covid pcw-tb    ENDOSCOPIC ULTRASOUND OF UPPER GASTROINTESTINAL TRACT N/A 5/17/2021    Procedure: ULTRASOUND, UPPER GI TRACT, ENDOSCOPIC;  Surgeon: Claudio Salvador MD;  [facility]  Service: Endoscopy;  Laterality: N/A;    ERCP N/A 5/17/2021    Procedure: ERCP (ENDOSCOPIC RETROGRADE CHOLANGIOPANCREATOGRAPHY);  Surgeon: Claudio Salvador MD;  [facility]  Service: Endoscopy;  Laterality: N/A;    gender surgery      multiple surgeries since birth    INJECTION OF ANESTHETIC AGENT AROUND NERVE Right 11/19/2020    Procedure: BLOCK, NERVE, GLENOHUMERAL  need consent;  Surgeon: Messi Cabello MD;  Location: [facility] PAIN T;  Service: Pain Management;  Laterality: Right;    KNEE ARTHROPLASTY Left 1/25/2021    Procedure: ARTHROPLASTY, KNEE:DEPUY-ATTUNE REVISION: SERINA iASSIST:CABLES ON HOLD;  Surgeon: Donte Andrade III, MD;  Location: [facility];  Service: Orthopedics;  Laterality: Left;    LAMINECTOMY  09/13/2019    LAPAROSCOPIC CHOLECYSTECTOMY N/A 5/17/2021    Procedure: CHOLECYSTECTOMY, LAPAROSCOPIC;  Surgeon: Calvin Wilson MD;  [address];  Service: General;  Laterality: N/A;    LAPAROSCOPIC CHOLECYSTECTOMY N/A 5/18/2021    Procedure: CHOLECYSTECTOMY, LAPAROSCOPIC;  Surgeon: Antonio Brandt MD;  Location: [address];  Service: General;  Laterality: N/A;    RADIOFREQUENCY ABLATION Left 5/9/2019     Procedure: RADIOFREQUENCY ABLATION, LEFT L3,4,5;  Surgeon: Messi Cabello MD;  Location: [facility] PAIN MGT;  Service: Pain Management;  Laterality: Left;  1 of 2    RADIOFREQUENCY ABLATION Right 5/23/2019    Procedure: RADIOFREQUENCY ABLATION, RIGHT L3,4,5;  Surgeon: Messi Cabello MD;  Location: [facility] PAIN MGT;  Service: Pain Management;  Laterality: Right;  2of 2    SPINE SURGERY      Sept 2019     TOTAL KNEE ARTHROPLASTY Right 1/31/2020    Procedure: ARTHROPLASTY, KNEE, TOTAL;  Surgeon: Donte Andrade III, MD;  [facility];  Service: Orthopedics;  Laterality: Right;       Review of patient's allergies indicates:   Allergen Reactions    Bactrim [sulfamethoxazole-trimethoprim] Itching    Penicillins Rash       Current Outpatient Medications   Medication Sig Dispense Refill    ALPRAZolam (XANAX) 1 MG tablet Take 1 tablet (1 mg total) by mouth 3 (three) times daily as needed for Anxiety. 90 tablet 2    ascorbic acid, vitamin C, (VITAMIN C) 1000 MG tablet Take 1,000 mg by mouth 2 (two) times daily.      atorvastatin (LIPITOR) 10 MG tablet Take 1 tablet (10 mg total) by mouth every evening. 90 tablet 2    cranberry fruit extract (CRANBERRY ORAL) Take by mouth.      cyanocobalamin (VITAMIN B-12) 1000 MCG tablet Take 100 mcg by mouth once daily.      cyclobenzaprine (FLEXERIL) 10 MG tablet Take 1 tablet (10 mg total) by mouth 3 (three) times daily as needed for Muscle spasms. 270 tablet 1    docusate sodium (COLACE) 100 MG capsule Take 1 capsule (100 mg total) by mouth 2 (two) times daily as needed for Constipation. 60 capsule 0    gabapentin (NEURONTIN) 800 MG tablet Take 1 tablet by mouth three times a day and take 2 tablets at night (5 tablets a day, 4000mg) (Patient taking differently: Take 1 tablet by mouth three times a day and take 2 tablets at night (5 tablets a day, 4000mg)) 450 tablet 2    Lactobacillus acidophilus Cap Take by mouth once daily.       lamoTRIgine (LAMICTAL) 200  MG tablet Take 1 tablet (200 mg total) by mouth 2 (two) times daily. 60 tablet 5    loratadine (CLARITIN) 10 mg tablet Take 10 mg by mouth once daily.       naproxen sodium (ANAPROX) 220 MG tablet Take 220 mg by mouth.      oxyCODONE-acetaminophen (PERCOCET)  mg per tablet Take 1 tablet by mouth every 8 (eight) hours as needed for Pain. 90 tablet 0    predniSONE (DELTASONE) 5 MG tablet Take 1 tablet (5 mg total) by mouth once daily. Take with the 4 of 1 mg pills for a total of 9 mg a day 100 tablet 3    primidone (MYSOLINE) 50 MG Tab Take 3 tablets (150 mg total) by mouth 3 (three) times daily. 270 tablet 0    rOPINIRole (REQUIP) 4 MG tablet Take 1 tablet (4 mg total) by mouth once daily. 90 tablet 0    testosterone (ANDROGEL) 1 % (50 mg/5 gram) GlPk Apply 1 packet (5 grams) topically once daily. 30 packet 5    triamcinolone acetonide 0.1% (KENALOG) 0.1 % cream Apply topically 2 (two) times daily. 45 g 1     No current facility-administered medications for this visit.     Facility-Administered Medications Ordered in Other Visits   Medication Dose Route Frequency Provider Last Rate Last Admin    fentaNYL injection 25 mcg  25 mcg Intravenous Q5 Min PRN French Smith MD        midazolam (VERSED) 1 mg/mL injection 0.5 mg  0.5 mg Intravenous PRN French Smith MD           Family History   Problem Relation Age of Onset    Diabetes Maternal Grandfather     Cancer Mother     Heart disease Father     Autoimmune disease Sister     Breast cancer Neg Hx     Ovarian cancer Neg Hx     Vaginal cancer Neg Hx     Endometrial cancer Neg Hx     Cervical cancer Neg Hx        Social History     Socioeconomic History    Marital status:      Spouse name: Kristy Sainz    Number of children: 1   Occupational History     Comment:  and artist   Tobacco Use    Smoking status: Former Smoker     Quit date:      Years since quittin.3    Smokeless tobacco: Never  "Used    Tobacco comment: was not a daily smoker-    Substance and Sexual Activity    Alcohol use: Not Currently    Drug use: Never    Sexual activity: Yes     Partners: Female   Social History Narrative    Lives in a house with his wife      Social Determinants of Health     Financial Resource Strain: Low Risk     Difficulty of Paying Living Expenses: Not hard at all   Food Insecurity: No Food Insecurity    Worried About Running Out of Food in the Last Year: Never true    Ran Out of Food in the Last Year: Never true   Transportation Needs: No Transportation Needs    Lack of Transportation (Medical): No    Lack of Transportation (Non-Medical): No   Physical Activity: Insufficiently Active    Days of Exercise per Week: 3 days    Minutes of Exercise per Session: 20 min   Stress: Stress Concern Present    Feeling of Stress : To some extent   Social Connections: Unknown    Frequency of Communication with Friends and Family: More than three times a week    Frequency of Social Gatherings with Friends and Family: Patient refused    Active Member of Clubs or Organizations: No    Attends Club or Organization Meetings: Patient refused    Marital Status:    Housing Stability: Low Risk     Unable to Pay for Housing in the Last Year: No    Number of Places Lived in the Last Year: 1    Unstable Housing in the Last Year: No           ROS        Objective:   /66   Pulse 97   Temp 98.9 °F (37.2 °C)   Resp 18   Ht 4' 9" (1.448 m)   Wt 61.7 kg (136 lb)   BMI 29.43 kg/m²   Pain Disability Index Review:  Last 3 PDI Scores 8/21/2020 2/27/2020 11/18/2019   Pain Disability Index (PDI) 22 33 30     Normocephalic.  Atraumatic.  Affect appropriate.  Breathing unlabored.  Extra ocular muscles intact.           Ortho/SPM Exam    bilateral knee joint line tenderness.  Scars of previous surgery.  No edema.  No erythema seen.  Straight leg raise negative bilaterally.  His muscle strength in the distal muscles " was 4/5 and his knee extensors on the right side were 3/5.  Assessment:       Encounter Diagnoses   Name Primary?    Chronic pain syndrome Yes    Neuropathic pain     Sacroiliac joint pain     Spinal stenosis of lumbar region with neurogenic claudication     Primary osteoarthritis of right knee     Primary osteoarthritis of both knees          Plan:   We discussed with the patient the assessment and recommendations. The following is the plan we agreed on:  1. Discussed with him at length the option of spinal cord stimulator and he will think about it.  Consider spinal cord stimulator in the future  2. He does not need a refill today but may needed next week.  He will call when it is time.  3. Return as needed.  Otherwise follow-up in 3 months.        Ari was seen today for chronic pain and low-back pain.    Diagnoses and all orders for this visit:    Chronic pain syndrome    Neuropathic pain    Sacroiliac joint pain    Spinal stenosis of lumbar region with neurogenic claudication    Primary osteoarthritis of right knee    Primary osteoarthritis of both knees

## 2022-05-20 ENCOUNTER — PATIENT MESSAGE (OUTPATIENT)
Dept: ORTHOPEDICS | Facility: CLINIC | Age: 52
End: 2022-05-20
Payer: MEDICARE

## 2022-05-24 ENCOUNTER — PATIENT MESSAGE (OUTPATIENT)
Dept: PAIN MEDICINE | Facility: CLINIC | Age: 52
End: 2022-05-24
Payer: MEDICARE

## 2022-05-24 DIAGNOSIS — G89.4 CHRONIC PAIN SYNDROME: ICD-10-CM

## 2022-05-24 RX ORDER — OXYCODONE AND ACETAMINOPHEN 10; 325 MG/1; MG/1
1 TABLET ORAL EVERY 8 HOURS PRN
Qty: 90 TABLET | Refills: 0 | Status: SHIPPED | OUTPATIENT
Start: 2022-05-24 | End: 2022-06-27 | Stop reason: SDUPTHER

## 2022-05-24 NOTE — TELEPHONE ENCOUNTER
Patient requesting refill on Percocet  Last office visit 05.18.22   shows last refill on 04.25.22  Patient does have a pain contract on file with [facility] Pain Management department  Patient last UDS 08.21.20 was consistent with current therapy    CODEINE  Not Detected  Not Detected    MORPHINE  Present  Present    6-ACETYLMORPHINE  Not Detected  Not Detected    OXYCODONE  Present  Present    NOROYXCODONE  Present  Present    OXYMORPHONE  Not Detected  Not Detected    NOROXYMORPHONE  Not Detected  Not Detected    HYDROCODONE  Not Detected  Not Detected    NORHYDROCODONE  Not Detected  Not Detected    HYDROMORPHONE  Not Detected  Not Detected    BUPRENORPHINE  Not Detected  Not Detected    NORUBPRENORPHINE  Not Detected  Not Detected    FENTANYL  Not Detected  Not Detected    NORFENTANYL  Not Detected  Not Detected    MEPERIDINE METABOLITE  Not Detected  Not Detected    TAPENTADOL  Not Detected  Not Detected    TAPENTADOL-O-SULF  Not Detected  Not Detected    METHADONE  Not Detected  Not Detected    PROPOXYPHENE  Not Detected  Not Detected    TRAMADOL  Not Detected  Not Detected    AMPHETAMINE  Not Detected  Not Detected    METHAMPHETAMINE  Not Detected  Not Detected    MDMA- ECSTASY  Not Detected  Not Detected    MDA  Not Detected  Not Detected    MDEA- Deanna  Not Detected  Not Detected    METHYLPHENIDATE  Not Detected  Not Detected    PHENTERMINE  Not Detected  Not Detected    BENZOYLECGONINE  Not Detected  Not Detected    ALPRAZOLAM  Not Detected  Present    ALPHA-OH-ALPRAZOLAM  Present  Not Detected    CLONAZEPAM  Not Detected  Not Detected    7-AMINOCLONAZEPAM  Not Detected  Not Detected    DIAZEPAM  Not Detected  Not Detected    NORDIAZEPAM  Not Detected  Not Detected    OXAZEPAM  Not Detected  Not Detected    TEMAZEPAM  Not Detected  Not Detected    Lorazepam  Not Detected  Not Detected    MIDAZOLAM  Not Detected  Not Detected    ZOLPIDEM  Not Detected  Not Detected    BARBITURATES  Present  Present     Creatinine, Urine 20.0 - 400.0 mg/dL 113.0  58.9    ETHYL GLUCURONIDE  Not Detected  Not Detected    MARIJUANA METABOLITE  Not Detected  Not Detected    PCP  Not Detected  Not Detected    CARISOPRODOL  Not Detected  Not Detected

## 2022-05-31 ENCOUNTER — HOSPITAL ENCOUNTER (OUTPATIENT)
Dept: RADIOLOGY | Facility: HOSPITAL | Age: 52
Discharge: HOME | End: 2022-05-31
Attending: ORTHOPAEDIC SURGERY
Payer: MEDICARE

## 2022-05-31 ENCOUNTER — OFFICE VISIT (OUTPATIENT)
Dept: ORTHOPEDICS | Facility: CLINIC | Age: 52
End: 2022-05-31
Payer: MEDICARE

## 2022-05-31 VITALS — HEIGHT: 60 IN | WEIGHT: 136 LBS | BODY MASS INDEX: 26.7 KG/M2

## 2022-05-31 DIAGNOSIS — Z96.652 STATUS POST TOTAL LEFT KNEE REPLACEMENT: ICD-10-CM

## 2022-05-31 DIAGNOSIS — Z96.651 STATUS POST RIGHT KNEE REPLACEMENT: ICD-10-CM

## 2022-05-31 DIAGNOSIS — Z96.651 STATUS POST RIGHT KNEE REPLACEMENT: Primary | ICD-10-CM

## 2022-05-31 PROCEDURE — 73562 X-RAY EXAM OF KNEE 3: CPT | Mod: TC,50

## 2022-05-31 PROCEDURE — 99213 OFFICE O/P EST LOW 20 MIN: CPT | Mod: S$PBB,,, | Performed by: ORTHOPAEDIC SURGERY

## 2022-05-31 PROCEDURE — 99213 OFFICE O/P EST LOW 20 MIN: CPT | Mod: PBBFAC | Performed by: ORTHOPAEDIC SURGERY

## 2022-05-31 PROCEDURE — 99999 PR PBB SHADOW E&M-EST. PATIENT-LVL III: CPT | Mod: PBBFAC,,, | Performed by: ORTHOPAEDIC SURGERY

## 2022-05-31 PROCEDURE — 73562 XR KNEE ORTHO BILAT: ICD-10-PCS | Mod: 26,50,, | Performed by: RADIOLOGY

## 2022-05-31 PROCEDURE — 99999 PR PBB SHADOW E&M-EST. PATIENT-LVL III: ICD-10-PCS | Mod: PBBFAC,,, | Performed by: ORTHOPAEDIC SURGERY

## 2022-05-31 PROCEDURE — 73562 X-RAY EXAM OF KNEE 3: CPT | Mod: 26,50,, | Performed by: RADIOLOGY

## 2022-05-31 PROCEDURE — 99213 PR OFFICE/OUTPT VISIT, EST, LEVL III, 20-29 MIN: ICD-10-PCS | Mod: S$PBB,,, | Performed by: ORTHOPAEDIC SURGERY

## 2022-05-31 NOTE — PROGRESS NOTES
Subjective:     HPI:   Ari Santos is a 52 y.o. adult who presents for annual follow up left TKA    Date of surgery: R TKA 1/31/20, L TKA 1/25/21    Medications: none    Assistive Devices: none    Limitations: none    Some R quad soreness, doing a lot more activity, walking more, pool exercises  Wearing brace on R knee daily, hard to put on by himself, keeps sliding down but hockey garter helping, brace hard to wear outside in hot weather when doing art    guardian brace with quad assist and locks + stays to try to prevent hyperextension    You've helped me 100%     Objective:   Body mass index is 29.44 kg/m².  Exam:    Gait: stable, back-knees on R    Incision: healed    No change in stability    L -5 to 125, 5 valgus, no lag  R -8 to 120, 5 valgus, 40 deg ext lag    Imaging:  Indication:  Exam status post left total knee arthroplasty  Exam Ordered: Radiographs of the left knee include a standing anteroposterior view, a lateral view, and a sunrise view  Details of Examination: Todays exam show a well fixed, well positioned total knee arthroplasty with no evidence of wear, osteolysis, or loosening.  Impression:  Status post left total knee arthroplasty, implant in good position with no abnormality        Assessment:       ICD-10-CM ICD-9-CM   1. Status post right knee replacement  Z96.651 V43.65   2. Status post total left knee replacement  Z96.652 V43.65        Doing well     Plan:       Patient is doing very well with their total knee arthroplasty.  They will continue with their routine care of the knee replacement and see me back for their follow-up at the routine interval.  If there are problems in the interim they will see me back sooner.    F/u for brace re-fresh    5 year follow up for standard xrays      Orders Placed This Encounter   Procedures    HME - OTHER     Order Specific Question:   Type of Equipment:     Answer:   R knee with brace with quad assist and locks + stays to try to prevent  "hyperextension     Order Specific Question:   Height:     Answer:   4' 9" (1.448 m)     Order Specific Question:   Weight:     Answer:   61.7 kg (136 lb 0.4 oz)             Past Medical History:   Diagnosis Date    Abnormal CT scan, pelvis 10/27/2019    Abnormal thyroid blood test 5/6/2019    Adrenal cyst     left    Adrenal insufficiency     Blister- healing 1/9/2020    Chronic bilateral low back pain with bilateral sciatica 3/19/2019    Congenital adrenal hyperplasia     Hepatitis B core antibody positive 11/6/2020    Negative sAg, suggests previous exposure but no chronic/active Hep B. At risk for reactivation with any immunosuppression medication, steroids, chemo, etc.      IGT (impaired glucose tolerance) 11/5/2019    Insomnia due to medical condition     Multiple closed traumatic fractures of multiple bones of hip and pelvis with routine healing, subsequent encounter 9/27/2019    Pancreatic cyst 11/2/2020    S/P lumbar laminectomy 10/28/2019    Spinal stenosis     Spinal stenosis of lumbar region with neurogenic claudication 3/19/2019    Status post sex reassignment surgery 3/19/2019    Hx of Congenital Adrenal Hyperplasia    Unintentional weight loss 7/21/2020       Past Surgical History:   Procedure Laterality Date    BACK SURGERY      CHOLECYSTECTOMY N/A 5/18/2021    Procedure: CHOLECYSTECTOMY;  Surgeon: Antonio Brandt MD;  Location: [facility] OR [address] [place] [address];  Service: General;  Laterality: N/A;    ENDOSCOPIC ULTRASOUND OF UPPER GASTROINTESTINAL TRACT N/A 12/3/2020    Procedure: ULTRASOUND, UPPER GI TRACT, ENDOSCOPIC;  Surgeon: Jonathan Sharma MD;  Location: [facility] ([address] [place] [address]);  Service: Endoscopy;  Laterality: N/A;  elevated alk phos, panc cysts, liver biopsy   11/30-covid pcw-tb    ENDOSCOPIC ULTRASOUND OF UPPER GASTROINTESTINAL TRACT N/A 5/17/2021    Procedure: ULTRASOUND, UPPER GI TRACT, ENDOSCOPIC;  Surgeon: Claudio Salvador MD;  Location: [facility] ENDO ([address] [place] [address]);  Service: Endoscopy;  " Laterality: N/A;    ERCP N/A 5/17/2021    Procedure: ERCP (ENDOSCOPIC RETROGRADE CHOLANGIOPANCREATOGRAPHY);  Surgeon: Claudio Salvador MD;  Location: [facility] ENDO (2ND FLR);  Service: Endoscopy;  Laterality: N/A;    gender surgery      multiple surgeries since birth    INJECTION OF ANESTHETIC AGENT AROUND NERVE Right 11/19/2020    Procedure: BLOCK, NERVE, GLENOHUMERAL  need consent;  Surgeon: Msesi Cabello MD;  [facility];  Service: Pain Management;  Laterality: Right;    KNEE ARTHROPLASTY Left 1/25/2021    Procedure: ARTHROPLASTY, KNEE:DEPUY-ATTUNE REVISION: SERINA iASSIST:CABLES ON HOLD;  Surgeon: Donte Andrade III, MD;  Location: [facility];  Service: Orthopedics;  Laterality: Left;    LAMINECTOMY  09/13/2019    LAPAROSCOPIC CHOLECYSTECTOMY N/A 5/17/2021    Procedure: CHOLECYSTECTOMY, LAPAROSCOPIC;  Surgeon: Calvin Wilson MD;  Location: [address];  Service: General;  Laterality: N/A;    LAPAROSCOPIC CHOLECYSTECTOMY N/A 5/18/2021    Procedure: CHOLECYSTECTOMY, LAPAROSCOPIC;  Surgeon: Antonio Brandt MD;  [address];  Service: General;  Laterality: N/A;    RADIOFREQUENCY ABLATION Left 5/9/2019    Procedure: RADIOFREQUENCY ABLATION, LEFT L3,4,5;  Surgeon: Messi Cabello MD;  Location: [facility];  Service: Pain Management;  Laterality: Left;  1 of 2    RADIOFREQUENCY ABLATION Right 5/23/2019    Procedure: RADIOFREQUENCY ABLATION, RIGHT L3,4,5;  Surgeon: Messi Cabello MD;  Location: [facility];  Service: Pain Management;  Laterality: Right;  2of 2    SPINE SURGERY      Sept 2019     TOTAL KNEE ARTHROPLASTY Right 1/31/2020    Procedure: ARTHROPLASTY, KNEE, TOTAL;  Surgeon: Donte Andrade III, MD;  Location[address];  Service: Orthopedics;  Laterality: Right;       Family History   Problem Relation Age of Onset    Diabetes Maternal Grandfather     Cancer Mother     Heart disease Father     Autoimmune disease Sister     Breast cancer Neg  Hx     Ovarian cancer Neg Hx     Vaginal cancer Neg Hx     Endometrial cancer Neg Hx     Cervical cancer Neg Hx        Social History     Socioeconomic History    Marital status:      Spouse name: Kristy Sainz    Number of children: 1   Occupational History     Comment:  and artist   Tobacco Use    Smoking status: Former Smoker     Quit date:      Years since quittin.4    Smokeless tobacco: Never Used    Tobacco comment: was not a daily smoker-    Substance and Sexual Activity    Alcohol use: Not Currently    Drug use: Never    Sexual activity: Yes     Partners: Female   Social History Narrative    Lives in a house with his wife      Social Determinants of Health     Financial Resource Strain: Low Risk     Difficulty of Paying Living Expenses: Not hard at all   Food Insecurity: No Food Insecurity    Worried About Running Out of Food in the Last Year: Never true    Ran Out of Food in the Last Year: Never true   Transportation Needs: No Transportation Needs    Lack of Transportation (Medical): No    Lack of Transportation (Non-Medical): No   Physical Activity: Insufficiently Active    Days of Exercise per Week: 3 days    Minutes of Exercise per Session: 20 min   Stress: Stress Concern Present    Feeling of Stress : To some extent   Social Connections: Unknown    Frequency of Communication with Friends and Family: More than three times a week    Frequency of Social Gatherings with Friends and Family: Patient refused    Active Member of Clubs or Organizations: No    Attends Club or Organization Meetings: Patient refused    Marital Status:    Housing Stability: Low Risk     Unable to Pay for Housing in the Last Year: No    Number of Places Lived in the Last Year: 1    Unstable Housing in the Last Year: No

## 2022-06-12 DIAGNOSIS — G25.81 RLS (RESTLESS LEGS SYNDROME): ICD-10-CM

## 2022-06-13 RX ORDER — PRIMIDONE 50 MG/1
150 TABLET ORAL 3 TIMES DAILY
Qty: 270 TABLET | Refills: 0 | Status: SHIPPED | OUTPATIENT
Start: 2022-06-13 | End: 2022-07-18 | Stop reason: SDUPTHER

## 2022-06-19 DIAGNOSIS — M54.12 CERVICAL RADICULOPATHY: ICD-10-CM

## 2022-06-20 RX ORDER — GABAPENTIN 800 MG/1
TABLET ORAL
Qty: 450 TABLET | Refills: 2 | Status: SHIPPED | OUTPATIENT
Start: 2022-06-20 | End: 2022-11-29 | Stop reason: SDUPTHER

## 2022-06-27 DIAGNOSIS — G89.4 CHRONIC PAIN SYNDROME: ICD-10-CM

## 2022-06-27 NOTE — TELEPHONE ENCOUNTER
Patient requesting refill on oxycodone   Last office visit 5/18/22   shows last refill on 5/26/22  [facility]  Patient last UDS 8/21/20was consistent with current therapy  CODEINE  Not Detected  Not Detected    MORPHINE  Present  Present    6-ACETYLMORPHINE  Not Detected  Not Detected    OXYCODONE  Present  Present    NOROYXCODONE  Present  Present    OXYMORPHONE  Not Detected  Not Detected    NOROXYMORPHONE  Not Detected  Not Detected    HYDROCODONE  Not Detected  Not Detected    NORHYDROCODONE  Not Detected  Not Detected    HYDROMORPHONE  Not Detected  Not Detected    BUPRENORPHINE  Not Detected  Not Detected    NORUBPRENORPHINE  Not Detected  Not Detected    FENTANYL  Not Detected  Not Detected    NORFENTANYL  Not Detected  Not Detected    MEPERIDINE METABOLITE  Not Detected  Not Detected    TAPENTADOL  Not Detected  Not Detected    TAPENTADOL-O-SULF  Not Detected  Not Detected    METHADONE  Not Detected  Not Detected    PROPOXYPHENE  Not Detected  Not Detected    TRAMADOL  Not Detected  Not Detected    AMPHETAMINE  Not Detected  Not Detected    METHAMPHETAMINE  Not Detected  Not Detected    MDMA- ECSTASY  Not Detected  Not Detected    MDA  Not Detected  Not Detected    MDEA- Deanna  Not Detected  Not Detected    METHYLPHENIDATE  Not Detected  Not Detected    PHENTERMINE  Not Detected  Not Detected    BENZOYLECGONINE  Not Detected  Not Detected    ALPRAZOLAM  Not Detected  Present    ALPHA-OH-ALPRAZOLAM  Present  Not Detected    CLONAZEPAM  Not Detected  Not Detected    7-AMINOCLONAZEPAM  Not Detected  Not Detected    DIAZEPAM  Not Detected  Not Detected    NORDIAZEPAM  Not Detected  Not Detected    OXAZEPAM  Not Detected  Not Detected    TEMAZEPAM  Not Detected  Not Detected    Lorazepam  Not Detected  Not Detected    MIDAZOLAM  Not Detected  Not Detected    ZOLPIDEM  Not Detected  Not Detected    BARBITURATES  Present  Present     Creatinine, Urine 20.0 - 400.0 mg/dL 113.0  58.9    ETHYL GLUCURONIDE  Not Detected  Not Detected    MARIJUANA METABOLITE  Not Detected  Not Detected    PCP  Not Detected  Not Detected    CARISOPRODOL  Not Detected  Not Detected CM

## 2022-06-28 RX ORDER — OXYCODONE AND ACETAMINOPHEN 10; 325 MG/1; MG/1
1 TABLET ORAL EVERY 8 HOURS PRN
Qty: 90 TABLET | Refills: 0 | Status: SHIPPED | OUTPATIENT
Start: 2022-06-28 | End: 2022-08-03 | Stop reason: SDUPTHER

## 2022-07-11 ENCOUNTER — PATIENT MESSAGE (OUTPATIENT)
Dept: PAIN MEDICINE | Facility: CLINIC | Age: 52
End: 2022-07-11
Payer: MEDICARE

## 2022-07-12 RX ORDER — TRIAMCINOLONE ACETONIDE 1 MG/G
CREAM TOPICAL 2 TIMES DAILY
Qty: 45 G | Refills: 0 | Status: SHIPPED | OUTPATIENT
Start: 2022-07-12 | End: 2023-05-24 | Stop reason: SDUPTHER

## 2022-07-12 NOTE — TELEPHONE ENCOUNTER
Care Due:                  Date            Visit Type   Department     Provider  --------------------------------------------------------------------------------                                MYCHART                              ANNUAL                              CHECKUP/PHY  [facility] INTERNAL  Last Visit: 03-      S            PAMELA Mitchell  Next Visit: None Scheduled  None         None Found                                                            Last  Test          Frequency    Reason                     Performed    Due Date  --------------------------------------------------------------------------------    CMP.........  12 months..  atorvastatin.............  08- 08-    Lipid Panel.  12 months..  atorvastatin.............  08- 08-    [employer] Embedded Care Gaps. Reference number: [id number]. 7/12/2022   5:51:06 PM CDT

## 2022-07-12 NOTE — TELEPHONE ENCOUNTER
Refill Decision Note   Ari Santos  is requesting a refill authorization.  Brief Assessment and Rationale for Refill:  Approve    -Medication-Related Problems Identified: Requires labs  Medication Therapy Plan:       Medication Reconciliation Completed: No   Comments:     Provider Staff:     Action is required for this patient.   Please see care gap opportunities below in Care Due Message.     Thanks!  [facility]     Appointments      Date Provider   Last Visit   3/23/2022 Siva Mitchell MD   Next Visit   Visit date not found Siva Mitchell MD     Note composed:5:58 PM 07/12/2022           Note composed:5:58 PM 07/12/2022

## 2022-07-14 ENCOUNTER — PATIENT MESSAGE (OUTPATIENT)
Dept: PSYCHIATRY | Facility: CLINIC | Age: 52
End: 2022-07-14
Payer: MEDICARE

## 2022-07-14 RX ORDER — ALPRAZOLAM 1 MG/1
1 TABLET ORAL 3 TIMES DAILY PRN
Qty: 90 TABLET | Refills: 2 | Status: SHIPPED | OUTPATIENT
Start: 2022-07-14 | End: 2022-09-23 | Stop reason: SDUPTHER

## 2022-07-14 RX ORDER — LAMOTRIGINE 200 MG/1
200 TABLET ORAL 2 TIMES DAILY
Qty: 60 TABLET | Refills: 5 | Status: SHIPPED | OUTPATIENT
Start: 2022-07-14 | End: 2022-08-13

## 2022-07-17 ENCOUNTER — PATIENT MESSAGE (OUTPATIENT)
Dept: NEUROLOGY | Facility: CLINIC | Age: 52
End: 2022-07-17
Payer: MEDICARE

## 2022-07-17 DIAGNOSIS — G25.81 RLS (RESTLESS LEGS SYNDROME): ICD-10-CM

## 2022-07-18 RX ORDER — PRIMIDONE 50 MG/1
150 TABLET ORAL 3 TIMES DAILY
Qty: 270 TABLET | Refills: 0 | Status: SHIPPED | OUTPATIENT
Start: 2022-07-18 | End: 2022-08-15 | Stop reason: SDUPTHER

## 2022-08-02 ENCOUNTER — PATIENT MESSAGE (OUTPATIENT)
Dept: NEUROLOGY | Facility: CLINIC | Age: 52
End: 2022-08-02
Payer: MEDICARE

## 2022-08-02 ENCOUNTER — PATIENT MESSAGE (OUTPATIENT)
Dept: PAIN MEDICINE | Facility: CLINIC | Age: 52
End: 2022-08-02
Payer: MEDICARE

## 2022-08-02 DIAGNOSIS — G25.81 RLS (RESTLESS LEGS SYNDROME): ICD-10-CM

## 2022-08-03 DIAGNOSIS — G89.4 CHRONIC PAIN SYNDROME: ICD-10-CM

## 2022-08-03 RX ORDER — OXYCODONE AND ACETAMINOPHEN 10; 325 MG/1; MG/1
1 TABLET ORAL EVERY 8 HOURS PRN
Qty: 90 TABLET | Refills: 0 | Status: SHIPPED | OUTPATIENT
Start: 2022-08-03 | End: 2022-09-21 | Stop reason: SDUPTHER

## 2022-08-03 NOTE — TELEPHONE ENCOUNTER
Patient requesting refill on oxycodone percocet 10mg  Last office visit 5/18/22   shows last refill on 6/28/22  Patient [facility]  Patient last UDS 8/21/20  was consistent with current therapy  CODEINE  Not Detected  Not Detected    MORPHINE  Present  Present    6-ACETYLMORPHINE  Not Detected  Not Detected    OXYCODONE  Present  Present    NOROYXCODONE  Present  Present    OXYMORPHONE  Not Detected  Not Detected    NOROXYMORPHONE  Not Detected  Not Detected    HYDROCODONE  Not Detected  Not Detected    NORHYDROCODONE  Not Detected  Not Detected    HYDROMORPHONE  Not Detected  Not Detected    BUPRENORPHINE  Not Detected  Not Detected    NORUBPRENORPHINE  Not Detected  Not Detected    FENTANYL  Not Detected  Not Detected    NORFENTANYL  Not Detected  Not Detected    MEPERIDINE METABOLITE  Not Detected  Not Detected    TAPENTADOL  Not Detected  Not Detected    TAPENTADOL-O-SULF  Not Detected  Not Detected    METHADONE  Not Detected  Not Detected    PROPOXYPHENE  Not Detected  Not Detected    TRAMADOL  Not Detected  Not Detected    AMPHETAMINE  Not Detected  Not Detected    METHAMPHETAMINE  Not Detected  Not Detected    MDMA- ECSTASY  Not Detected  Not Detected    MDA  Not Detected  Not Detected    MDEA- Deanna  Not Detected  Not Detected    METHYLPHENIDATE  Not Detected  Not Detected    PHENTERMINE  Not Detected  Not Detected    BENZOYLECGONINE  Not Detected  Not Detected    ALPRAZOLAM  Not Detected  Present    ALPHA-OH-ALPRAZOLAM  Present  Not Detected    CLONAZEPAM  Not Detected  Not Detected    7-AMINOCLONAZEPAM  Not Detected  Not Detected    DIAZEPAM  Not Detected  Not Detected    NORDIAZEPAM  Not Detected  Not Detected    OXAZEPAM  Not Detected  Not Detected    TEMAZEPAM  Not Detected  Not Detected    Lorazepam  Not Detected  Not Detected    MIDAZOLAM  Not Detected  Not Detected    ZOLPIDEM  Not Detected  Not Detected    BARBITURATES  Present   Present    Creatinine, Urine 20.0 - 400.0 mg/dL 113.0  58.9    ETHYL GLUCURONIDE  Not Detected  Not Detected    MARIJUANA METABOLITE  Not Detected  Not Detected    PCP  Not Detected  Not Detected    CARISOPRODOL  Not Detected  Not Detected CM    Comment: The carisoprodol immunoassay has cross-reactivity to   carisoprodol and meprobamate.    PAIN MANAGEMENT DRUG PANEL  See Below  See Below

## 2022-08-05 RX ORDER — ROPINIROLE 4 MG/1
4 TABLET, FILM COATED ORAL DAILY
Qty: 90 TABLET | Refills: 1 | Status: SHIPPED | OUTPATIENT
Start: 2022-08-05 | End: 2023-01-24 | Stop reason: SDUPTHER

## 2022-08-15 DIAGNOSIS — G25.81 RLS (RESTLESS LEGS SYNDROME): ICD-10-CM

## 2022-08-15 RX ORDER — PRIMIDONE 50 MG/1
150 TABLET ORAL 3 TIMES DAILY
Qty: 270 TABLET | Refills: 0 | Status: SHIPPED | OUTPATIENT
Start: 2022-08-15 | End: 2022-09-21 | Stop reason: SDUPTHER

## 2022-08-17 ENCOUNTER — OFFICE VISIT (OUTPATIENT)
Dept: PAIN MEDICINE | Facility: CLINIC | Age: 52
End: 2022-08-17
Payer: MEDICARE

## 2022-08-17 DIAGNOSIS — M17.11 PRIMARY OSTEOARTHRITIS OF RIGHT KNEE: ICD-10-CM

## 2022-08-17 DIAGNOSIS — M48.062 SPINAL STENOSIS OF LUMBAR REGION WITH NEUROGENIC CLAUDICATION: ICD-10-CM

## 2022-08-17 DIAGNOSIS — Z98.890 S/P LUMBAR LAMINECTOMY: ICD-10-CM

## 2022-08-17 DIAGNOSIS — G89.4 CHRONIC PAIN SYNDROME: Primary | ICD-10-CM

## 2022-08-17 DIAGNOSIS — M79.2 NEUROPATHIC PAIN: ICD-10-CM

## 2022-08-17 PROCEDURE — 99213 PR OFFICE/OUTPT VISIT, EST, LEVL III, 20-29 MIN: ICD-10-PCS | Mod: 95,,, | Performed by: NURSE PRACTITIONER

## 2022-08-17 PROCEDURE — 99213 OFFICE O/P EST LOW 20 MIN: CPT | Mod: 95,,, | Performed by: NURSE PRACTITIONER

## 2022-08-17 NOTE — PROGRESS NOTES
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 Reactions    Bactrim [sulfamethoxazole-trimethoprim] Itching    Penicillins Rash       Current Outpatient Medications   Medication Sig    ALPRAZolam (XANAX) 1 MG tablet Take 1 tablet (1 mg total) by mouth 3 (three) times daily as needed for Anxiety.    ascorbic acid, vitamin C, (VITAMIN C) 1000 MG tablet Take 1,000 mg by mouth 2 (two) times daily.    atorvastatin (LIPITOR) 10 MG tablet Take 1 tablet (10 mg total) by mouth every evening.    cranberry fruit extract (CRANBERRY ORAL) Take by mouth.    cyanocobalamin (VITAMIN B-12) 1000 MCG tablet Take 100 mcg by mouth once daily.    cyclobenzaprine (FLEXERIL) 10 MG tablet Take 1 tablet (10 mg total) by mouth 3 (three) times daily as needed for Muscle spasms.    docusate sodium (COLACE) 100 MG capsule Take 1 capsule (100 mg total) by mouth 2 (two) times daily as needed for Constipation.    gabapentin (NEURONTIN) 800 MG tablet Take 1 tablet by mouth three times a day and take 2 tablets at night (5 tablets a day, 4000mg)    Lactobacillus acidophilus Cap Take by mouth once daily.     lamoTRIgine (LAMICTAL) 200 MG tablet Take 1 tablet (200 mg total) by mouth 2 (two) times daily.    loratadine (CLARITIN) 10 mg tablet Take 10 mg by mouth once daily.      naproxen sodium (ANAPROX) 220 MG tablet Take 220 mg by mouth.    oxyCODONE-acetaminophen (PERCOCET)  mg per tablet Take 1 tablet by mouth every 8 (eight) hours as needed for Pain.    predniSONE (DELTASONE) 5 MG tablet Take 1 tablet (5 mg total) by mouth once daily. Take with the 4 of 1 mg pills for a total of 9 mg a day    primidone (MYSOLINE) 50 MG Tab Take 3 tablets (150 mg total) by mouth 3 (three) times daily.    rOPINIRole (REQUIP) 4 MG tablet Take 1 tablet (4 mg total) by mouth once daily.    testosterone (ANDROGEL) 1 % (50 mg/5 gram) GlPk Apply 1 packet (5 grams) topically once daily.    triamcinolone acetonide 0.1% (KENALOG) 0.1 % cream Apply topically 2 (two) times daily.     No current facility-administered medications for this visit.     Facility-Administered Medications Ordered in Other Visits   Medication    fentaNYL injection 25 mcg    midazolam (VERSED) 1 mg/mL injection 0.5 mg       REVIEW OF SYSTEMS:    GENERAL:  No weight loss, malaise or fevers.  HEENT:   No recent changes in vision or hearing  NECK:  Negative for lumps, no difficulty with swallowing.  RESPIRATORY:  Negative for cough, wheezing or shortness of breath, patient denies any recent URI.  CARDIOVASCULAR:  Negative for chest pain, leg swelling or palpitations.  GI:  Negative for abdominal discomfort, blood in stools or black stools or change in bowel habits.  MUSCULOSKELETAL:  See HPI.  SKIN:  Negative for lesions, rash, and itching.  PSYCH:  Significant psychosocial stressors including PTSD for sex re-assignment surgery.  Patient's sleep is disturbed secondary to pain.  HEMATOLOGY/LYMPHOLOGY:  Negative for prolonged bleeding, bruising easily or swollen nodes.  Patient is not currently taking any anti-coagulants  ENDO: No history of diabetes or thyroid dysfunction  NEURO:   No history of headaches, syncope, paralysis, seizures or tremors.  All other reviewed and negative other than HPI.    OBJECTIVE:    PHYSICAL  EXAMINATION:    General appearance: Well appearing, in no acute distress, alert and oriented x3.  Psych:  Mood and affect appropriate.  Skin: Skin color normal, no rashes or lesions, in both upper and lower body.  Extremities: Moves all visualized extremities freely.    PREVIOUS PHYSICAL EXAMINATION:     GENERAL: Well appearing, in no acute distress, alert and oriented x3.  PSYCH:  Mood and affect appropriate.  SKIN:  Well-healed incisions without any evidence of infection  HEAD/FACE:  Normocephalic, atraumatic.   CV: RRR with palpation of the radial artery.  PULM: No evidence of respiratory difficulty, symmetric chest rise.  EXT:  Decreased range of motion in the left knee. Brace on left knee. Well healing scar to right knee from recent TKA. Medial and lateral joint line tenderness to both knees.  BACK:  There is significant pain with palpation over the facet joints of the lumbar spine bilaterally. There is decreased range of motion with extension to 15 degrees, and facet loading maneuvers cause reproducible pain.    NEURO:  No clonus. Cranial nerves grossly intact.  GAIT: Antalgic- ambulates with rolling walker.    Lab Results   Component Value Date    WBC 6.61 08/23/2021    HGB 14.3 08/23/2021    HCT 44.7 08/23/2021     (H) 08/23/2021     08/23/2021     CMP  Sodium   Date Value Ref Range Status   08/23/2021 139 136 - 145 mmol/L Final     Potassium   Date Value Ref Range Status   08/23/2021 5.2 (H) 3.5 - 5.1 mmol/L Final     Chloride   Date Value Ref Range Status   08/23/2021 103 95 - 110 mmol/L Final     CO2   Date Value Ref Range Status   08/23/2021 26 23 - 29 mmol/L Final     Glucose   Date Value Ref Range Status   08/23/2021 96 70 - 110 mg/dL Final     BUN   Date Value Ref Range Status   08/23/2021 10 6 - 20 mg/dL Final     Creatinine   Date Value Ref Range Status   08/23/2021 0.7 0.5 - 1.4 mg/dL Final     Calcium   Date Value Ref Range Status   08/23/2021 9.8 8.7 - 10.5 mg/dL Final     Total  Protein   Date Value Ref Range Status   08/23/2021 6.8 6.0 - 8.4 g/dL Final     Albumin   Date Value Ref Range Status   08/23/2021 3.8 3.5 - 5.2 g/dL Final     Total Bilirubin   Date Value Ref Range Status   08/23/2021 0.2 0.1 - 1.0 mg/dL Final     Comment:     For infants and newborns, interpretation of results should be based  on gestational age, weight and in agreement with clinical  observations.    Premature Infant recommended reference ranges:  Up to 24 hours.............<8.0 mg/dL  Up to 48 hours............<12.0 mg/dL  3-5 days..................<15.0 mg/dL  6-29 days.................<15.0 mg/dL       Alkaline Phosphatase   Date Value Ref Range Status   08/23/2021 158 (H) 55 - 135 U/L Final     AST   Date Value Ref Range Status   08/23/2021 36 10 - 40 U/L Final     ALT   Date Value Ref Range Status   08/23/2021 39 10 - 44 U/L Final     Anion Gap   Date Value Ref Range Status   08/23/2021 10 8 - 16 mmol/L Final     eGFR if    Date Value Ref Range Status   08/23/2021 >60.0 >60 mL/min/1.73 m^2 Final     eGFR if non    Date Value Ref Range Status   08/23/2021 >60.0 >60 mL/min/1.73 m^2 Final     Comment:     Calculation used to obtain the estimated glomerular filtration  rate (eGFR) is the CKD-EPI equation.        Lab Results   Component Value Date    HGBA1C 5.3 08/23/2021     Lab Results   Component Value Date    TSH 1.196 08/23/2021     Free T4 - 0.81 (wnl)      ASSESSMENT: 52 y.o. year old adult with chronic back and knee pain, consistent with the following diagnoses:    Encounter Diagnoses   Name Primary?    Chronic pain syndrome Yes    Spinal stenosis of lumbar region with neurogenic claudication     Primary osteoarthritis of right knee     Neuropathic pain     S/P lumbar laminectomy        PLAN:     - Previous imaging was reviewed and discussed with the patient today.    - Continue with home PT and yoga exercises which is helping significantly.    - Continue oxycodone  acetaminophen 10/325 mg QID PRN, #90. Can call for additional refills as needed.    - The patient is here today for a refill of current pain medications and they believe these provide effective pain control and improvements in quality of life.  The patient notes no serious side effects, and feels the benefits outweigh the risks.  The patient was reminded of the pain contract that they signed previously as well as the risks and benefits of the medication including possible death.  The updated [place] Board  Pharmacy prescription monitoring program was reviewed, and the patient has been found to be compliant with current treatment plan.    - Pt has pain contract.  Last UDS reviewed. Repeat next in office visit.    - F/U in 3 months or sooner if needed.    - Dr. Farmer was consulted on the patient and agrees with this plan.        The above plan and management options were discussed at length with patient. Patient is in agreement with the above and verbalized understanding.      Alejandra Phoenix     08/17/2022

## 2022-09-21 ENCOUNTER — PATIENT MESSAGE (OUTPATIENT)
Dept: PAIN MEDICINE | Facility: CLINIC | Age: 52
End: 2022-09-21
Payer: MEDICARE

## 2022-09-21 ENCOUNTER — PATIENT MESSAGE (OUTPATIENT)
Dept: NEUROLOGY | Facility: CLINIC | Age: 52
End: 2022-09-21
Payer: MEDICARE

## 2022-09-21 DIAGNOSIS — E78.5 HYPERLIPIDEMIA, UNSPECIFIED HYPERLIPIDEMIA TYPE: ICD-10-CM

## 2022-09-21 DIAGNOSIS — G25.81 RLS (RESTLESS LEGS SYNDROME): ICD-10-CM

## 2022-09-21 DIAGNOSIS — G89.4 CHRONIC PAIN SYNDROME: ICD-10-CM

## 2022-09-21 RX ORDER — OXYCODONE AND ACETAMINOPHEN 10; 325 MG/1; MG/1
1 TABLET ORAL EVERY 8 HOURS PRN
Qty: 90 TABLET | Refills: 0 | Status: SHIPPED | OUTPATIENT
Start: 2022-09-21 | End: 2022-10-26 | Stop reason: SDUPTHER

## 2022-09-21 RX ORDER — PRIMIDONE 50 MG/1
150 TABLET ORAL 3 TIMES DAILY
Qty: 270 TABLET | Refills: 0 | Status: SHIPPED | OUTPATIENT
Start: 2022-09-21 | End: 2022-10-12 | Stop reason: SDUPTHER

## 2022-09-21 NOTE — TELEPHONE ENCOUNTER
Refill Routing Note   Medication(s) are not appropriate for processing by [facility] for the following reason(s):      - Required laboratory values are outdated    [facility] action(s):  Defer          Medication reconciliation completed: No     Appointments  past 12m or future 3m with PCP    Date Provider   Last Visit   3/23/2022 Siva Mitchell MD   Next Visit   Visit date not found Siva Mitchell MD   ED visits in past 90 days: 0        Note composed:12:01 PM 09/21/2022

## 2022-09-21 NOTE — TELEPHONE ENCOUNTER
Patient requesting refill on Percocet 10/325mg  Last office visit 08.17.22   shows last refill on 08.04.22  Patient does have a pain contract on file with [facility] department  Patient last UDS 08.21.20 was consistent with current therapy      CODEINE  Not Detected  Not Detected    MORPHINE  Present  Present    6-ACETYLMORPHINE  Not Detected  Not Detected    OXYCODONE  Present  Present    NOROYXCODONE  Present  Present    OXYMORPHONE  Not Detected  Not Detected    NOROXYMORPHONE  Not Detected  Not Detected    HYDROCODONE  Not Detected  Not Detected    NORHYDROCODONE  Not Detected  Not Detected    HYDROMORPHONE  Not Detected  Not Detected    BUPRENORPHINE  Not Detected  Not Detected    NORUBPRENORPHINE  Not Detected  Not Detected    FENTANYL  Not Detected  Not Detected    NORFENTANYL  Not Detected  Not Detected    MEPERIDINE METABOLITE  Not Detected  Not Detected    TAPENTADOL  Not Detected  Not Detected    TAPENTADOL-O-SULF  Not Detected  Not Detected    METHADONE  Not Detected  Not Detected    PROPOXYPHENE  Not Detected  Not Detected    TRAMADOL  Not Detected  Not Detected    AMPHETAMINE  Not Detected  Not Detected    METHAMPHETAMINE  Not Detected  Not Detected    MDMA- ECSTASY  Not Detected  Not Detected    MDA  Not Detected  Not Detected    MDEA- Deanna  Not Detected  Not Detected    METHYLPHENIDATE  Not Detected  Not Detected    PHENTERMINE  Not Detected  Not Detected    BENZOYLECGONINE  Not Detected  Not Detected    ALPRAZOLAM  Not Detected  Present    ALPHA-OH-ALPRAZOLAM  Present  Not Detected    CLONAZEPAM  Not Detected  Not Detected    7-AMINOCLONAZEPAM  Not Detected  Not Detected    DIAZEPAM  Not Detected  Not Detected    NORDIAZEPAM  Not Detected  Not Detected    OXAZEPAM  Not Detected  Not Detected    TEMAZEPAM  Not Detected  Not Detected    Lorazepam  Not Detected  Not Detected    MIDAZOLAM  Not Detected  Not Detected    ZOLPIDEM  Not Detected  Not Detected    BARBITURATES  Present   Present    Creatinine, Urine 20.0 - 400.0 mg/dL 113.0  58.9    ETHYL GLUCURONIDE  Not Detected  Not Detected    MARIJUANA METABOLITE  Not Detected  Not Detected    PCP  Not Detected  Not Detected    CARISOPRODOL  Not Detected  Not Detected CM

## 2022-09-21 NOTE — TELEPHONE ENCOUNTER
Care Due:                  Date            Visit Type   Department     Provider  --------------------------------------------------------------------------------                                MYCHART                              ANNUAL                              CHECKUP/PHY  [facility] INTERNAL  Last Visit: 03-      S            PAMELA Mitchell  Next Visit: None Scheduled  None         None Found                                                            Last  Test          Frequency    Reason                     Performed    Due Date  --------------------------------------------------------------------------------    CMP.........  12 months..  atorvastatin.............  08- 08-    Lipid Panel.  12 months..  atorvastatin.............  08- 08-    [employer] Embedded Care Gaps. Reference number: [id number]. 9/21/2022   11:51:07 AM CDT

## 2022-09-23 ENCOUNTER — OFFICE VISIT (OUTPATIENT)
Dept: PSYCHIATRY | Facility: CLINIC | Age: 52
End: 2022-09-23
Payer: MEDICARE

## 2022-09-23 ENCOUNTER — TELEPHONE (OUTPATIENT)
Dept: SURGERY | Facility: CLINIC | Age: 52
End: 2022-09-23
Payer: MEDICARE

## 2022-09-23 VITALS
WEIGHT: 133.19 LBS | DIASTOLIC BLOOD PRESSURE: 67 MMHG | SYSTOLIC BLOOD PRESSURE: 123 MMHG | BODY MASS INDEX: 28.82 KG/M2 | HEART RATE: 99 BPM

## 2022-09-23 DIAGNOSIS — F41.9 ANXIETY: ICD-10-CM

## 2022-09-23 DIAGNOSIS — F43.10 PTSD (POST-TRAUMATIC STRESS DISORDER): Primary | ICD-10-CM

## 2022-09-23 DIAGNOSIS — Z79.899 OTHER LONG TERM (CURRENT) DRUG THERAPY: ICD-10-CM

## 2022-09-23 LAB
AMPHET+METHAMPHET UR QL: NEGATIVE
BARBITURATES UR QL SCN>200 NG/ML: ABNORMAL
BENZODIAZ UR QL SCN>200 NG/ML: ABNORMAL
BZE UR QL SCN: NEGATIVE
CANNABINOIDS UR QL SCN: NEGATIVE
CREAT UR-MCNC: 18 MG/DL (ref 23–375)
ETHANOL UR-MCNC: <10 MG/DL
METHADONE UR QL SCN>300 NG/ML: NEGATIVE
OPIATES UR QL SCN: NEGATIVE
PCP UR QL SCN>25 NG/ML: NEGATIVE
TOXICOLOGY INFORMATION: ABNORMAL

## 2022-09-23 PROCEDURE — 99999 PR PBB SHADOW E&M-EST. PATIENT-LVL II: CPT | Mod: PBBFAC,,, | Performed by: NURSE PRACTITIONER

## 2022-09-23 PROCEDURE — 99999 PR PBB SHADOW E&M-EST. PATIENT-LVL II: ICD-10-PCS | Mod: PBBFAC,,, | Performed by: NURSE PRACTITIONER

## 2022-09-23 PROCEDURE — 80307 DRUG TEST PRSMV CHEM ANLYZR: CPT | Performed by: NURSE PRACTITIONER

## 2022-09-23 PROCEDURE — 99214 PR OFFICE/OUTPT VISIT, EST, LEVL IV, 30-39 MIN: ICD-10-PCS | Mod: S$PBB,,, | Performed by: NURSE PRACTITIONER

## 2022-09-23 PROCEDURE — 99212 OFFICE O/P EST SF 10 MIN: CPT | Mod: PBBFAC | Performed by: NURSE PRACTITIONER

## 2022-09-23 PROCEDURE — 99214 OFFICE O/P EST MOD 30 MIN: CPT | Mod: S$PBB,,, | Performed by: NURSE PRACTITIONER

## 2022-09-23 RX ORDER — ALPRAZOLAM 1 MG/1
1 TABLET ORAL 3 TIMES DAILY PRN
Qty: 90 TABLET | Refills: 2 | Status: SHIPPED | OUTPATIENT
Start: 2022-09-23 | End: 2023-01-30 | Stop reason: SDUPTHER

## 2022-09-23 RX ORDER — ATORVASTATIN CALCIUM 10 MG/1
10 TABLET, FILM COATED ORAL NIGHTLY
Qty: 90 TABLET | Refills: 0 | Status: SHIPPED | OUTPATIENT
Start: 2022-09-23 | End: 2023-01-04 | Stop reason: SDUPTHER

## 2022-09-23 RX ORDER — LAMOTRIGINE 200 MG/1
200 TABLET ORAL 2 TIMES DAILY
Qty: 60 TABLET | Refills: 2 | Status: SHIPPED | OUTPATIENT
Start: 2022-09-23 | End: 2022-12-19 | Stop reason: SDUPTHER

## 2022-09-23 NOTE — PROGRESS NOTES
Outpatient Psychiatry Follow-Up Visit (MD/NP)    9/23/2022    Clinical Status of Patient:  Outpatient (Ambulatory)    Chief Complaint:  Ari Santos is a 52 y.o. adult who presents today for follow-up of mood disorder and anxiety.  Met with patient.      Interval History and Content of Current Session:    Lives with his wife in the area. Moved from the [place] around 3-4 years ago after his wife obtained a position at [employer]. 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 He enjoys going outside to pain year round, something he was not able to do while living in [place]. Outside of his artwork he enjoys playing video games. Patient presents with a euthymic mood and affect. He is pleasant, easily engages in conversation. Dressed casually with apparent adequate attention to grooming and ADLs. He walks with a cane.    HPI/Psychiatric Review Of Systems (is patient experiencing or having changes in):    Mood: stable. Euthymic. Denies depressive symptoms. No ricardo.   Anhedonia/interest: denies  Guilt/hopelessness: denies  Concentration: denies concerns  Sleep: regulated, averages 8-9 hours nightly, restorative  Energy: no change from baseline, no excessive fatigue, no daytime napping  Appetite: adequate, no change from baseline.  SI/SIB/VI/HI: denies  Anxiety/panic: denies concerns. Reports history of generalized anxiety. Reports anxiety is adequately controlled with alprazolam. No panic attacks. No agoraphobia.   Paranoia: denies   AVH: denies  Substance use: non-smoker. No ETOH. Denies all substance use.     Medications:    Lamotrigine 200 mg BID -- compliant, denies SE including rash  Alprazolam 1 mg TID -- takes 2x daily regularly, sometimes 3x daily    Medical hx --     spinal stenosis, fibromyalgia, chronic pain, RLS. Hx of 2 knee replacements.    Brief synopsis:  Euthymic, minimal anxiety,no active PTSD symptoms      Review of Systems   PSYCHIATRIC: Pertinant items are  noted in the narrative.  CONSTITUTIONAL: No weight gain or loss.   MUSCULOSKELETAL: chronic pain  CARDIOVASCULAR: No tachycardia or chest pain.  GASTROINTESTINAL: No nausea, vomiting, pain, constipation or diarrhea.  GENITOURINARY: No frequency, dysuria or sexual dysfunction.    Past Medical, Family and Social History: The patient's past medical, family and social history have been reviewed and updated as appropriate within the electronic medical record - see encounter notes.    Compliance: yes    Side effects: None    Risk Parameters:  Patient reports no suicidal ideation  Patient reports no homicidal ideation  Patient reports no self-injurious behavior  Patient reports no violent behavior    Exam (detailed: at least 9 elements; comprehensive: all 15 elements)   Constitutional  Vitals:  Most recent vital signs, dated less than 90 days prior to this appointment, were reviewed.   Vitals:    09/23/22 1312   BP: 123/67   Pulse: 99   Weight: 60.4 kg (133 lb 2.5 oz)        General:  unremarkable, age appropriate, dressed casually     Musculoskeletal  Muscle Strength/Tone:  no spasicity, no rigidity, no cogwheeling   Gait & Station:  uses cane     Psychiatric  Speech:  no latency; no press   Mood & Affect:  euthymic  congruent and appropriate   Thought Process:  normal and logical   Associations:  intact   Thought Content:  normal, no suicidality, no homicidality, delusions, or paranoia   Insight:  intact   Judgement: behavior is adequate to circumstances   Orientation:  grossly intact   Memory: intact for content of interview   Language: grossly intact   Attention Span & Concentration:  able to focus   Fund of Knowledge:  intact and appropriate to age and level of education     Assessment and Diagnosis   Status/Progress: Based on the examination today, the patient's problem(s) is/are well controlled.  New problems have not been presented today.   Co-morbidities, Diagnostic uncertainty, and Lack of compliance are not  complicating management of the primary condition.  There are no active rule-out diagnoses for this patient at this time.     General Impression: Ari Santos is a 52 y.o. adult with a psychiatric hx of PTSD and anxiety presenting without active symptoms of PTSD or HELDER. Unclear origin of PTSD diagnosis. 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 Reports being prescribed lamotrigine and alprazolam for 10+ years. Remote hx of self-injurious behavior in adolescence. No hx of suicide attempts. No hx of drug abuse. Lives with his wife. 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        ICD-10-CM ICD-9-CM   1. PTSD (post-traumatic stress disorder)  F43.10 309.81   2. Anxiety  F41.9 300.00   3. Other long term (current) drug therapy  Z79.899 V58.69       Intervention/Counseling/Treatment Plan   Reviewed patient's symptoms, social hx, and psychiatric hx today  Patient requests to continue his medications as prescribed  Discussed risks and adverse effects associated with chronic use of benzodiazepines. Warned patient regarding potential for respiratory depression with combination of opioids and benzodiazepines  Patient denies adverse effects with current medications  Ideally, would prefer not to prescribe benzodiazepine with an individual on chronic opioid treatment  Will work to establish rapport  Will recommend gradual taper of alprazolam dose      Return to Clinic: 3 months      Face-to-face time spent: 25 minutes  35 minutes total time spent. This includes face to face time and non-face to face time preparing to see the patient (eg, review of tests), obtaining and/or reviewing separately obtained history, documenting clinical information in the electronic or other health record, independently interpreting results and communicating results to the patient/family/caregiver, or care coordinator.

## 2022-09-26 ENCOUNTER — PATIENT MESSAGE (OUTPATIENT)
Dept: SURGERY | Facility: CLINIC | Age: 52
End: 2022-09-26

## 2022-09-26 ENCOUNTER — CLINICAL SUPPORT (OUTPATIENT)
Dept: INTERNAL MEDICINE | Facility: CLINIC | Age: 52
End: 2022-09-26
Payer: MEDICARE

## 2022-09-26 ENCOUNTER — TELEPHONE (OUTPATIENT)
Dept: SURGERY | Facility: CLINIC | Age: 52
End: 2022-09-26
Payer: MEDICARE

## 2022-09-26 ENCOUNTER — PATIENT MESSAGE (OUTPATIENT)
Dept: SURGERY | Facility: CLINIC | Age: 52
End: 2022-09-26
Payer: MEDICARE

## 2022-09-26 ENCOUNTER — OFFICE VISIT (OUTPATIENT)
Dept: SURGERY | Facility: CLINIC | Age: 52
End: 2022-09-26
Payer: MEDICARE

## 2022-09-26 VITALS
HEART RATE: 99 BPM | WEIGHT: 133.81 LBS | SYSTOLIC BLOOD PRESSURE: 129 MMHG | DIASTOLIC BLOOD PRESSURE: 76 MMHG | BODY MASS INDEX: 26.27 KG/M2 | HEIGHT: 60 IN

## 2022-09-26 DIAGNOSIS — K64.2 GRADE III HEMORRHOIDS: Primary | ICD-10-CM

## 2022-09-26 DIAGNOSIS — K62.3 RECTAL PROLAPSE: Primary | ICD-10-CM

## 2022-09-26 DIAGNOSIS — Z23 IMMUNIZATION DUE: Primary | ICD-10-CM

## 2022-09-26 PROCEDURE — 99213 PR OFFICE/OUTPT VISIT, EST, LEVL III, 20-29 MIN: ICD-10-PCS | Mod: S$PBB,,, | Performed by: SURGERY

## 2022-09-26 PROCEDURE — 99213 OFFICE O/P EST LOW 20 MIN: CPT | Mod: S$PBB,,, | Performed by: SURGERY

## 2022-09-26 PROCEDURE — 99999 PR PBB SHADOW E&M-EST. PATIENT-LVL III: ICD-10-PCS | Mod: PBBFAC,,, | Performed by: SURGERY

## 2022-09-26 PROCEDURE — G0008 ADMIN INFLUENZA VIRUS VAC: HCPCS | Mod: PBBFAC

## 2022-09-26 PROCEDURE — 99999 PR PBB SHADOW E&M-EST. PATIENT-LVL III: CPT | Mod: PBBFAC,,, | Performed by: SURGERY

## 2022-09-26 PROCEDURE — 99213 OFFICE O/P EST LOW 20 MIN: CPT | Mod: PBBFAC,25 | Performed by: SURGERY

## 2022-09-26 RX ORDER — METRONIDAZOLE 500 MG/1
TABLET ORAL
Qty: 2 TABLET | Refills: 0 | Status: SHIPPED | OUTPATIENT
Start: 2022-09-26 | End: 2022-11-29

## 2022-09-26 RX ORDER — CIPROFLOXACIN 500 MG/1
TABLET ORAL
Qty: 2 TABLET | Refills: 0 | Status: SHIPPED | OUTPATIENT
Start: 2022-09-26 | End: 2022-11-29

## 2022-09-26 NOTE — PROGRESS NOTES
Colon & Rectal Surgery Clinic Follow Up    HPI:   Ari Santos is a 52 y.o. adult who presents for follow up of prolapsing hemorrhoids.  Feels that prolapsing tissue has been worse, his wife states that it looks like prolapsing rectum.  He has made dietary changes, increased fiber intake.  Bowel movements are easier.  Still straining some. Reports occasional passage of mucus and tenesmus.       Objective:   Vitals:    09/26/22 1107   BP: 129/76   Pulse: 99        Physical Exam   Gen: well developed male, NAD  HEENT: normocephalic, atraumatic, PERRL, EOMI   CV: RRR, no murmurs  Resp: nonlabored, CTAB   Abd: soft, NTND, midline scar   MSK: knee brace in place, decreased range of motion   Anorectal: 2 columns of external hemorrhoids with mucosal prolapse and inflammation, intact rectal tone, prolapse of hemorrhoid tissue with straining, full thickness prolapse not visualized on exam.      Assessment and Plan:   Ari Santos  is a 52 y.o. adult who presents for follow up of prolapsing hemorrhoids    - After clinic visit, patient went to bathroom and returned with full thickness rectal prolapse that was reduced.   - will plan for robotic suture rectopexy on 10/4.  Full bowel prep with cipro/flagyl   - will consent day of surgery as prolapse was seen after clinic visit   - will discuss perioperative steroid plan with patient's endocrinologist.     Genia Ku MD  Staff Surgeon   Colon & Rectal Surgery

## 2022-09-26 NOTE — PROGRESS NOTES
"Patient was given vaccine information sheet for the Flu Vaccine. The area of injection was palpated using the acromion process as a landmark. This area was cleaned with alcohol. Using a 25g 1" safety needle, 0.5mL of the vaccine was placed into the right deltoid muscle. The injection site was dressed with a bandage. Patient experienced no complications and was discharged in stable condition. Fluarix vaccine Lot: [id number] Exp: 06/30/2023.      "

## 2022-09-26 NOTE — TELEPHONE ENCOUNTER
Spoke with patient's wife, Kristy, regarding surgical appointment. Confirmed surgical date of 10/4. Informed that prep instructions would be sent via [employer]. Advised to call pre-admit office to schedule an appointment as soon as possible. Requested to contact office if unable to open instructions.

## 2022-09-27 ENCOUNTER — PES CALL (OUTPATIENT)
Dept: ADMINISTRATIVE | Facility: OTHER | Age: 52
End: 2022-09-27
Payer: MEDICARE

## 2022-09-30 ENCOUNTER — HOSPITAL ENCOUNTER (OUTPATIENT)
Dept: PREADMISSION TESTING | Facility: OTHER | Age: 52
Discharge: HOME | End: 2022-09-30
Attending: SURGERY
Payer: MEDICARE

## 2022-09-30 VITALS
WEIGHT: 133 LBS | HEART RATE: 96 BPM | OXYGEN SATURATION: 99 % | DIASTOLIC BLOOD PRESSURE: 65 MMHG | SYSTOLIC BLOOD PRESSURE: 106 MMHG | BODY MASS INDEX: 28.78 KG/M2 | TEMPERATURE: 99 F

## 2022-09-30 DIAGNOSIS — Z01.818 PREOP TESTING: Primary | ICD-10-CM

## 2022-09-30 DIAGNOSIS — N81.6 RECTOCELE: ICD-10-CM

## 2022-09-30 LAB
ABO + RH BLD: NORMAL
ANION GAP SERPL CALC-SCNC: 9 MMOL/L (ref 8–16)
BASOPHILS # BLD AUTO: 0.06 K/UL (ref 0–0.2)
BASOPHILS NFR BLD: 0.9 % (ref 0–1.9)
BLD GP AB SCN CELLS X3 SERPL QL: NORMAL
BUN SERPL-MCNC: 13 MG/DL (ref 6–20)
CALCIUM SERPL-MCNC: 9.4 MG/DL (ref 8.7–10.5)
CHLORIDE SERPL-SCNC: 104 MMOL/L (ref 95–110)
CO2 SERPL-SCNC: 22 MMOL/L (ref 23–29)
CREAT SERPL-MCNC: 0.7 MG/DL (ref 0.5–1.4)
DIFFERENTIAL METHOD: ABNORMAL
EOSINOPHIL # BLD AUTO: 0.1 K/UL (ref 0–0.5)
EOSINOPHIL NFR BLD: 1 % (ref 0–8)
ERYTHROCYTE [DISTWIDTH] IN BLOOD BY AUTOMATED COUNT: 12.1 % (ref 11.5–14.5)
EST. GFR  (NO RACE VARIABLE): >60 ML/MIN/1.73 M^2
GLUCOSE SERPL-MCNC: 94 MG/DL (ref 70–110)
HCT VFR BLD AUTO: 41.8 % (ref 40–54)
HGB BLD-MCNC: 14.5 G/DL (ref 14–18)
IMM GRANULOCYTES # BLD AUTO: 0.01 K/UL (ref 0–0.04)
IMM GRANULOCYTES NFR BLD AUTO: 0.1 % (ref 0–0.5)
LYMPHOCYTES # BLD AUTO: 2.2 K/UL (ref 1–4.8)
LYMPHOCYTES NFR BLD: 32.6 % (ref 18–48)
MCH RBC QN AUTO: 33.8 PG (ref 27–31)
MCHC RBC AUTO-ENTMCNC: 34.7 G/DL (ref 32–36)
MCV RBC AUTO: 97 FL (ref 82–98)
MONOCYTES # BLD AUTO: 0.3 K/UL (ref 0.3–1)
MONOCYTES NFR BLD: 4.7 % (ref 4–15)
NEUTROPHILS # BLD AUTO: 4.1 K/UL (ref 1.8–7.7)
NEUTROPHILS NFR BLD: 60.7 % (ref 38–73)
NRBC BLD-RTO: 0 /100 WBC
PLATELET # BLD AUTO: 298 K/UL (ref 150–450)
PMV BLD AUTO: 8.8 FL (ref 9.2–12.9)
POTASSIUM SERPL-SCNC: 4.9 MMOL/L (ref 3.5–5.1)
RBC # BLD AUTO: 4.29 M/UL (ref 4.6–6.2)
SODIUM SERPL-SCNC: 135 MMOL/L (ref 136–145)
WBC # BLD AUTO: 6.78 K/UL (ref 3.9–12.7)

## 2022-09-30 PROCEDURE — 80048 BASIC METABOLIC PNL TOTAL CA: CPT | Performed by: ANESTHESIOLOGY

## 2022-09-30 PROCEDURE — 36415 COLL VENOUS BLD VENIPUNCTURE: CPT | Performed by: ANESTHESIOLOGY

## 2022-09-30 PROCEDURE — 85025 COMPLETE CBC W/AUTO DIFF WBC: CPT | Performed by: ANESTHESIOLOGY

## 2022-09-30 PROCEDURE — 86901 BLOOD TYPING SEROLOGIC RH(D): CPT | Performed by: NURSE PRACTITIONER

## 2022-09-30 RX ORDER — SODIUM CHLORIDE, SODIUM LACTATE, POTASSIUM CHLORIDE, CALCIUM CHLORIDE 600; 310; 30; 20 MG/100ML; MG/100ML; MG/100ML; MG/100ML
INJECTION, SOLUTION INTRAVENOUS CONTINUOUS
Status: CANCELLED | OUTPATIENT
Start: 2022-09-30

## 2022-09-30 RX ORDER — LIDOCAINE HYDROCHLORIDE 10 MG/ML
0.5 INJECTION, SOLUTION EPIDURAL; INFILTRATION; INTRACAUDAL; PERINEURAL ONCE
Status: CANCELLED | OUTPATIENT
Start: 2022-09-30 | End: 2022-09-30

## 2022-09-30 NOTE — DISCHARGE INSTRUCTIONS
Information to Prepare you for your Surgery    PRE-ADMIT TESTING -  [phone]    [address] [facility]          Your surgery has been scheduled at [facility]. We are pleased to have the opportunity to serve you. For Further Information please call [phone].    On the day of surgery please report to the Information Desk on the 1st floor.    CONTACT YOUR PHYSICIAN'S OFFICE THE DAY PRIOR TO YOUR SURGERY TO OBTAIN YOUR ARRIVAL TIME.     The evening before surgery do not eat anything after 9 p.m. ( this includes hard candy, chewing gum and mints).  You may only have GATORADE, POWERADE AND WATER  from 9 p.m. until you leave your home.   DO NOT DRINK ANY LIQUIDS ON THE WAY TO THE HOSPITAL.      Why does your anesthesiologist allow you to drink Gatorade/Powerade before surgery?  Gatorade/Powerade helps to increase your comfort before surgery and to decrease your nausea after surgery. The carbohydrates in Gatorade/Powerade help reduce your body's stress response to surgery.  If you are a diabetic-drink only water prior to surgery.      Current Visitor policy(12/27/2021) - Patients may have 2 visitors pre and post procedure. Only 2 visitors will be allowed in the Surgical building with the patient.     SPECIAL MEDICATION INSTRUCTIONS: TAKE medications checked off by the Anesthesiologist on your Medication List.    Angiogram Patients: Take medications as instructed by your physician, including aspirin.     Surgery Patients:    If you take ASPIRIN - Your PHYSICIAN/SURGEON will need to inform you IF/OR when you need to stop taking aspirin prior to your surgery.     Do Not take any medications containing IBUPROFEN.    Do Not Wear any make-up (especially eye make-up) to surgery. Please remove any false eyelashes or eyelash extensions. If you arrive the day of surgery with makeup/eyelashes on you will be required to remove prior to surgery. (There is a risk of corneal  abrasions if eye makeup/eyelash extensions are not removed)      Leave all valuables at home.   Do Not wear any jewelry or watches, including any metal in body piercings. Jewelry must be removed prior to coming to the hospital.  There is a possibility that rings that are unable to be removed may be cut off if they are on the surgical extremity.    Please remove all hair extensions, wigs, clips and any other metal accessories/ ornaments from your hair.  These items may pose a flammable/fire risk in Surgery and must be removed.    Do not shave your surgical area at least 5 days prior to your surgery. The surgical prep will be performed at the hospital according to Infection Control regulations.    Contact Lens must be removed before surgery. Either do not wear the contact lens or bring a case and solution for storage.  Please bring a container for eyeglasses or dentures as required.  Bring any paperwork your physician has provided, such as consent forms,  history and physicals, doctor's orders, etc.   Bring comfortable clothes that are loose fitting to wear upon discharge. Take into consideration the type of surgery being performed.  Maintain your diet as advised per your physician the day prior to surgery.      Adequate rest the night before surgery is advised.   Park in the Parking lot behind the hospital or in the [email or web address] [facility] across the street from the parking lot. Parking is complimentary.  If you will be discharged the same day as your procedure, please arrange for a responsible adult to drive you home or to accompany you if traveling by taxi.   YOU WILL NOT BE PERMITTED TO DRIVE OR TO LEAVE THE HOSPITAL ALONE AFTER SURGERY.   If you are being discharged the same day, it is strongly recommended that you arrange for someone to remain with you for the first 24 hrs following your surgery.    The Surgeon will speak to your family/visitor after your surgery regarding the outcome of your surgery and post op  care.  The Surgeon may speak to you after your surgery, but there is a possibility you may not remember the details.  Please check with your family members regarding the conversation with the Surgeon.    We strongly recommend whoever is bringing you home be present for discharge instructions.  This will ensure a thorough understanding for your post op home care.    ALL CHILDREN MUST ALWAYS BE ACCOMPANIED BY AN ADULT.    Visitors-Refer to current Visitor policy handouts.    Thank you for your cooperation.  The Staff of [facility].            Bathing Instructions with Hibiclens    Shower the evening before and morning of your procedure with Hibiclens:  Wash your face with water and your regular face wash/soap  Apply Hibiclens directly on your skin or on a wet washcloth and wash gently. When showering: Move away from the shower stream when applying Hibiclens to avoid rinsing off too soon.  Rinse thoroughly with warm water  Do not dilute Hibiclens        Dry off as usual, do not use any deodorant, powder, body lotions, perfume, after shave or cologne.

## 2022-10-03 ENCOUNTER — TELEPHONE (OUTPATIENT)
Dept: SURGERY | Facility: CLINIC | Age: 52
End: 2022-10-03
Payer: MEDICARE

## 2022-10-05 ENCOUNTER — TELEPHONE (OUTPATIENT)
Dept: SURGERY | Facility: CLINIC | Age: 52
End: 2022-10-05
Payer: MEDICARE

## 2022-10-05 NOTE — TELEPHONE ENCOUNTER
Spoke with patient's wife and she will speak to  regarding rescheduling surgery with Dr. Ramirez. Wife states that he would like to wait for Dr. Ku but will discuss this with him and let us know either way.

## 2022-10-12 DIAGNOSIS — Z87.890 STATUS POST SEX REASSIGNMENT SURGERY: ICD-10-CM

## 2022-10-12 DIAGNOSIS — G25.81 RLS (RESTLESS LEGS SYNDROME): ICD-10-CM

## 2022-10-12 RX ORDER — PRIMIDONE 50 MG/1
150 TABLET ORAL 3 TIMES DAILY
Qty: 270 TABLET | Refills: 0 | Status: CANCELLED | OUTPATIENT
Start: 2022-10-12

## 2022-10-12 RX ORDER — TESTOSTERONE GEL, 1% 10 MG/G
1 GEL TRANSDERMAL DAILY
Qty: 30 PACKET | Refills: 5 | Status: CANCELLED | OUTPATIENT
Start: 2022-10-12 | End: 2023-04-12

## 2022-10-14 ENCOUNTER — PATIENT MESSAGE (OUTPATIENT)
Dept: PHARMACY | Facility: CLINIC | Age: 52
End: 2022-10-14
Payer: MEDICARE

## 2022-10-14 ENCOUNTER — TELEPHONE (OUTPATIENT)
Dept: PHARMACY | Facility: CLINIC | Age: 52
End: 2022-10-14
Payer: MEDICARE

## 2022-10-19 ENCOUNTER — PATIENT MESSAGE (OUTPATIENT)
Dept: NEUROLOGY | Facility: CLINIC | Age: 52
End: 2022-10-19
Payer: MEDICARE

## 2022-10-25 ENCOUNTER — PATIENT MESSAGE (OUTPATIENT)
Dept: PAIN MEDICINE | Facility: CLINIC | Age: 52
End: 2022-10-25
Payer: MEDICARE

## 2022-10-25 DIAGNOSIS — G25.81 RLS (RESTLESS LEGS SYNDROME): ICD-10-CM

## 2022-10-25 RX ORDER — PRIMIDONE 50 MG/1
150 TABLET ORAL 3 TIMES DAILY
Qty: 270 TABLET | Refills: 0 | Status: SHIPPED | OUTPATIENT
Start: 2022-10-25 | End: 2022-11-20 | Stop reason: SDUPTHER

## 2022-10-26 DIAGNOSIS — Z87.890 STATUS POST SEX REASSIGNMENT SURGERY: ICD-10-CM

## 2022-10-26 DIAGNOSIS — G89.4 CHRONIC PAIN SYNDROME: ICD-10-CM

## 2022-10-26 RX ORDER — TESTOSTERONE GEL, 1% 10 MG/G
1 GEL TRANSDERMAL DAILY
Qty: 30 PACKET | Refills: 5 | Status: CANCELLED | OUTPATIENT
Start: 2022-10-12 | End: 2023-04-12

## 2022-10-26 RX ORDER — OXYCODONE AND ACETAMINOPHEN 10; 325 MG/1; MG/1
1 TABLET ORAL EVERY 8 HOURS PRN
Qty: 90 TABLET | Refills: 0 | Status: SHIPPED | OUTPATIENT
Start: 2022-10-26 | End: 2022-11-29 | Stop reason: SDUPTHER

## 2022-10-26 NOTE — TELEPHONE ENCOUNTER
Patient requesting refill on oxycodone 10mg  Last office visit 8/17/22   shows last refill on 9/21/22  Patient does have a pain contract on file with [facility] department  Patient last UDS 8/21/20 was consistent with current therapy     CODEINE  Not Detected  Not Detected    MORPHINE  Present  Present    6-ACETYLMORPHINE  Not Detected  Not Detected    OXYCODONE  Present  Present    NOROYXCODONE  Present  Present    OXYMORPHONE  Not Detected  Not Detected    NOROXYMORPHONE  Not Detected  Not Detected    HYDROCODONE  Not Detected  Not Detected    NORHYDROCODONE  Not Detected  Not Detected    HYDROMORPHONE  Not Detected  Not Detected    BUPRENORPHINE  Not Detected  Not Detected    NORUBPRENORPHINE  Not Detected  Not Detected    FENTANYL  Not Detected  Not Detected    NORFENTANYL  Not Detected  Not Detected    MEPERIDINE METABOLITE  Not Detected  Not Detected    TAPENTADOL  Not Detected  Not Detected    TAPENTADOL-O-SULF  Not Detected  Not Detected    METHADONE  Not Detected  Not Detected    PROPOXYPHENE  Not Detected  Not Detected    TRAMADOL  Not Detected  Not Detected    AMPHETAMINE  Not Detected  Not Detected    METHAMPHETAMINE  Not Detected  Not Detected    MDMA- ECSTASY  Not Detected  Not Detected    MDA  Not Detected  Not Detected    MDEA- Deanna  Not Detected  Not Detected    METHYLPHENIDATE  Not Detected  Not Detected    PHENTERMINE  Not Detected  Not Detected    BENZOYLECGONINE  Not Detected  Not Detected    ALPRAZOLAM  Not Detected  Present    ALPHA-OH-ALPRAZOLAM  Present  Not Detected    CLONAZEPAM  Not Detected  Not Detected    7-AMINOCLONAZEPAM  Not Detected  Not Detected    DIAZEPAM  Not Detected  Not Detected    NORDIAZEPAM  Not Detected  Not Detected    OXAZEPAM  Not Detected  Not Detected    TEMAZEPAM  Not Detected  Not Detected    Lorazepam  Not Detected  Not Detected    MIDAZOLAM  Not Detected  Not Detected    ZOLPIDEM  Not Detected  Not Detected    BARBITURATES  Present   Present    Creatinine, Urine 20.0 - 400.0 mg/dL 113.0  58.9    ETHYL GLUCURONIDE  Not Detected  Not Detected    MARIJUANA METABOLITE  Not Detected  Not Detected    PCP  Not Detected  Not Detected    CARISOPRODOL  Not Detected  Not Detected CM

## 2022-10-31 DIAGNOSIS — Z87.890 STATUS POST SEX REASSIGNMENT SURGERY: ICD-10-CM

## 2022-11-01 RX ORDER — TESTOSTERONE GEL, 1% 10 MG/G
1 GEL TRANSDERMAL DAILY
Qty: 30 PACKET | Refills: 5 | Status: SHIPPED | OUTPATIENT
Start: 2022-11-01 | End: 2023-01-04 | Stop reason: SDUPTHER

## 2022-11-20 ENCOUNTER — PATIENT MESSAGE (OUTPATIENT)
Dept: PAIN MEDICINE | Facility: CLINIC | Age: 52
End: 2022-11-20
Payer: MEDICARE

## 2022-11-20 DIAGNOSIS — G25.81 RLS (RESTLESS LEGS SYNDROME): ICD-10-CM

## 2022-11-21 RX ORDER — PRIMIDONE 50 MG/1
150 TABLET ORAL 3 TIMES DAILY
Qty: 270 TABLET | Refills: 0 | Status: SHIPPED | OUTPATIENT
Start: 2022-11-21 | End: 2022-12-19 | Stop reason: SDUPTHER

## 2022-11-22 ENCOUNTER — PATIENT MESSAGE (OUTPATIENT)
Dept: SURGERY | Facility: CLINIC | Age: 52
End: 2022-11-22
Payer: MEDICARE

## 2022-11-23 DIAGNOSIS — M62.838 MUSCLE SPASM: ICD-10-CM

## 2022-11-23 RX ORDER — CYCLOBENZAPRINE HCL 10 MG
10 TABLET ORAL 3 TIMES DAILY PRN
Qty: 270 TABLET | Refills: 1 | Status: SHIPPED | OUTPATIENT
Start: 2022-11-23 | End: 2023-05-18 | Stop reason: SDUPTHER

## 2022-11-28 ENCOUNTER — TELEPHONE (OUTPATIENT)
Dept: PAIN MEDICINE | Facility: CLINIC | Age: 52
End: 2022-11-28
Payer: MEDICARE

## 2022-11-29 ENCOUNTER — OFFICE VISIT (OUTPATIENT)
Dept: PAIN MEDICINE | Facility: CLINIC | Age: 52
End: 2022-11-29
Payer: MEDICARE

## 2022-11-29 ENCOUNTER — PATIENT MESSAGE (OUTPATIENT)
Dept: ENDOCRINOLOGY | Facility: CLINIC | Age: 52
End: 2022-11-29
Payer: MEDICARE

## 2022-11-29 DIAGNOSIS — G89.4 CHRONIC PAIN SYNDROME: ICD-10-CM

## 2022-11-29 DIAGNOSIS — G89.4 CHRONIC PAIN SYNDROME: Primary | ICD-10-CM

## 2022-11-29 DIAGNOSIS — Z98.890 S/P LUMBAR LAMINECTOMY: ICD-10-CM

## 2022-11-29 DIAGNOSIS — M79.2 NEUROPATHIC PAIN: ICD-10-CM

## 2022-11-29 DIAGNOSIS — E25.0 CONGENITAL ADRENAL HYPERPLASIA: Primary | ICD-10-CM

## 2022-11-29 DIAGNOSIS — T38.0X5D ADVERSE EFFECT OF GLUCOCORT/SYNTH ANALOG, SUBS: ICD-10-CM

## 2022-11-29 DIAGNOSIS — R79.9 ABNORMAL FINDING OF BLOOD CHEMISTRY, UNSPECIFIED: ICD-10-CM

## 2022-11-29 DIAGNOSIS — M17.11 PRIMARY OSTEOARTHRITIS OF RIGHT KNEE: ICD-10-CM

## 2022-11-29 DIAGNOSIS — M54.12 CERVICAL RADICULOPATHY: ICD-10-CM

## 2022-11-29 DIAGNOSIS — M85.80 OSTEOPENIA, UNSPECIFIED LOCATION: ICD-10-CM

## 2022-11-29 PROCEDURE — 99214 OFFICE O/P EST MOD 30 MIN: CPT | Mod: 95,,, | Performed by: NURSE PRACTITIONER

## 2022-11-29 PROCEDURE — 99214 PR OFFICE/OUTPT VISIT, EST, LEVL IV, 30-39 MIN: ICD-10-PCS | Mod: 95,,, | Performed by: NURSE PRACTITIONER

## 2022-11-29 RX ORDER — GABAPENTIN 800 MG/1
TABLET ORAL
Qty: 450 TABLET | Refills: 2 | Status: SHIPPED | OUTPATIENT
Start: 2022-11-29 | End: 2023-06-13 | Stop reason: SDUPTHER

## 2022-11-29 RX ORDER — OXYCODONE AND ACETAMINOPHEN 10; 325 MG/1; MG/1
1 TABLET ORAL EVERY 8 HOURS PRN
Qty: 90 TABLET | Refills: 0 | Status: ON HOLD | OUTPATIENT
Start: 2022-11-29 | End: 2023-01-18 | Stop reason: SDUPTHER

## 2022-11-29 NOTE — PROGRESS NOTES
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    Active Member of Clubs or Organizations: No    Attends Club or Organization Meetings: Patient refused    Marital Status:    Housing Stability: Low Risk     Unable to Pay for Housing in the Last Year: No    Number of Places Lived in the Last Year: 1    Unstable Housing in the Last Year: No     Family History   Problem Relation Age of Onset    Diabetes Maternal Grandfather     Cancer Mother     Heart disease Father     Autoimmune disease Sister     Breast cancer Neg Hx     Ovarian cancer Neg Hx     Vaginal cancer Neg Hx     Endometrial cancer Neg Hx     Cervical cancer Neg Hx        Review of patient's allergies indicates:   Allergen Reactions    Bactrim [sulfamethoxazole-trimethoprim] Itching    Penicillins Rash       Current Outpatient Medications   Medication Sig    ALPRAZolam (XANAX) 1 MG tablet Take 1 tablet (1 mg total) by mouth 3 (three) times daily as needed for Anxiety.    ascorbic acid, vitamin C, (VITAMIN C) 1000 MG tablet Take 1,000 mg by mouth 2 (two) times daily.    atorvastatin (LIPITOR) 10 MG tablet Take 1 tablet (10 mg total) by mouth every evening.    ciprofloxacin HCl (CIPRO) 500 MG tablet Take 1 tab at 2100 and 2300 the night before surgery    cranberry fruit extract (CRANBERRY ORAL) Take by mouth.    cyanocobalamin (VITAMIN B-12) 1000 MCG tablet Take 100 mcg by mouth once daily.    cyclobenzaprine  (FLEXERIL) 10 MG tablet Take 1 tablet (10 mg total) by mouth 3 (three) times daily as needed for Muscle spasms.    docusate sodium (COLACE) 100 MG capsule Take 1 capsule (100 mg total) by mouth 2 (two) times daily as needed for Constipation.    gabapentin (NEURONTIN) 800 MG tablet Take 1 tablet by mouth three times a day and take 2 tablets at night (5 tablets a day, 4000mg)    Lactobacillus acidophilus Cap Take by mouth once daily.     lamoTRIgine (LAMICTAL) 200 MG tablet Take 1 tablet (200 mg total) by mouth 2 (two) times daily.    loratadine (CLARITIN) 10 mg tablet Take 10 mg by mouth once daily.     metroNIDAZOLE (FLAGYL) 500 MG tablet Take 1 tab at 2100 and at 2300 the night before surgery    naproxen sodium (ANAPROX) 220 MG tablet Take 220 mg by mouth.    oxyCODONE-acetaminophen (PERCOCET)  mg per tablet Take 1 tablet by mouth every 8 (eight) hours as needed for Pain.    predniSONE (DELTASONE) 5 MG tablet Take 1 tablet (5 mg total) by mouth once daily. Take with the 4 of 1 mg pills for a total of 9 mg a day    primidone (MYSOLINE) 50 MG Tab Take 3 tablets (150 mg total) by mouth 3 (three) times daily.    rOPINIRole (REQUIP) 4 MG tablet Take 1 tablet (4 mg total) by mouth once daily.    testosterone (ANDROGEL) 1 % (50 mg/5 gram) GlPk Apply 1 packet (5 grams) topically once daily.    triamcinolone acetonide 0.1% (KENALOG) 0.1 % cream Apply topically 2 (two) times daily.     No current facility-administered medications for this visit.     Facility-Administered Medications Ordered in Other Visits   Medication    fentaNYL injection 25 mcg    midazolam (VERSED) 1 mg/mL injection 0.5 mg       REVIEW OF SYSTEMS:    GENERAL:  No weight loss, malaise or fevers.  HEENT:   No recent changes in vision or hearing  NECK:  Negative for lumps, no difficulty with swallowing.  RESPIRATORY:  Negative for cough, wheezing or shortness of breath, patient denies any recent URI.  CARDIOVASCULAR:  Negative for chest pain, leg  swelling or palpitations.  GI:  Negative for abdominal discomfort, blood in stools or black stools or change in bowel habits.  MUSCULOSKELETAL:  See HPI.  SKIN:  Negative for lesions, rash, and itching.  PSYCH:  Significant psychosocial stressors including PTSD for sex re-assignment surgery.  Patient's sleep is disturbed secondary to pain.  HEMATOLOGY/LYMPHOLOGY:  Negative for prolonged bleeding, bruising easily or swollen nodes.  Patient is not currently taking any anti-coagulants  ENDO: No history of diabetes or thyroid dysfunction  NEURO:   No history of headaches, syncope, paralysis, seizures or tremors.  All other reviewed and negative other than HPI.    OBJECTIVE:    PHYSICAL EXAMINATION:    General appearance: Well appearing, in no acute distress, alert and oriented x3.  Psych:  Mood and affect appropriate.  Skin: Skin color normal, no rashes or lesions, in both upper and lower body.  Extremities: Moves all visualized extremities freely.    PREVIOUS PHYSICAL EXAMINATION:     GENERAL: Well appearing, in no acute distress, alert and oriented x3.  PSYCH:  Mood and affect appropriate.  SKIN:  Well-healed incisions without any evidence of infection  HEAD/FACE:  Normocephalic, atraumatic.   CV: RRR with palpation of the radial artery.  PULM: No evidence of respiratory difficulty, symmetric chest rise.  EXT:  Decreased range of motion in the left knee. Brace on left knee. Well healing scar to right knee from recent TKA. Medial and lateral joint line tenderness to both knees.  BACK:  There is significant pain with palpation over the facet joints of the lumbar spine bilaterally. There is decreased range of motion with extension to 15 degrees, and facet loading maneuvers cause reproducible pain.    NEURO:  No clonus. Cranial nerves grossly intact.  GAIT: Antalgic- ambulates with rolling walker.    Lab Results   Component Value Date    WBC 6.78 09/30/2022    HGB 14.5 09/30/2022    HCT 41.8 09/30/2022    MCV 97  09/30/2022     09/30/2022     CMP  Sodium   Date Value Ref Range Status   09/30/2022 135 (L) 136 - 145 mmol/L Final     Potassium   Date Value Ref Range Status   09/30/2022 4.9 3.5 - 5.1 mmol/L Final     Chloride   Date Value Ref Range Status   09/30/2022 104 95 - 110 mmol/L Final     CO2   Date Value Ref Range Status   09/30/2022 22 (L) 23 - 29 mmol/L Final     Glucose   Date Value Ref Range Status   09/30/2022 94 70 - 110 mg/dL Final     BUN   Date Value Ref Range Status   09/30/2022 13 6 - 20 mg/dL Final     Creatinine   Date Value Ref Range Status   09/30/2022 0.7 0.5 - 1.4 mg/dL Final     Calcium   Date Value Ref Range Status   09/30/2022 9.4 8.7 - 10.5 mg/dL Final     Total Protein   Date Value Ref Range Status   08/23/2021 6.8 6.0 - 8.4 g/dL Final     Albumin   Date Value Ref Range Status   08/23/2021 3.8 3.5 - 5.2 g/dL Final     Total Bilirubin   Date Value Ref Range Status   08/23/2021 0.2 0.1 - 1.0 mg/dL Final     Comment:     For infants and newborns, interpretation of results should be based  on gestational age, weight and in agreement with clinical  observations.    Premature Infant recommended reference ranges:  Up to 24 hours.............<8.0 mg/dL  Up to 48 hours............<12.0 mg/dL  3-5 days..................<15.0 mg/dL  6-29 days.................<15.0 mg/dL       Alkaline Phosphatase   Date Value Ref Range Status   08/23/2021 158 (H) 55 - 135 U/L Final     AST   Date Value Ref Range Status   08/23/2021 36 10 - 40 U/L Final     ALT   Date Value Ref Range Status   08/23/2021 39 10 - 44 U/L Final     Anion Gap   Date Value Ref Range Status   09/30/2022 9 8 - 16 mmol/L Final     eGFR if    Date Value Ref Range Status   08/23/2021 >60.0 >60 mL/min/1.73 m^2 Final     eGFR if non    Date Value Ref Range Status   08/23/2021 >60.0 >60 mL/min/1.73 m^2 Final     Comment:     Calculation used to obtain the estimated glomerular filtration  rate (eGFR) is the CKD-EPI  equation.        Lab Results   Component Value Date    HGBA1C 5.3 08/23/2021     Lab Results   Component Value Date    TSH 1.196 08/23/2021     Free T4 - 0.81 (wnl)      ASSESSMENT: 52 y.o. year old adult with chronic back and knee pain, consistent with the following diagnoses:    Encounter Diagnoses   Name Primary?    Cervical radiculopathy     Chronic pain syndrome Yes    Neuropathic pain     S/P lumbar laminectomy     Primary osteoarthritis of right knee          PLAN:     - Previous imaging was reviewed and discussed with the patient today.    - We discussed shoulder interventions if pain persists.    - Continue with home PT and yoga exercises which we discussed today.    - Continue oxycodone acetaminophen 10/325 mg QID PRN, #90. Can call for additional refills as needed.    - The patient is here today for a refill of current pain medications and they believe these provide effective pain control and improvements in quality of life.  The patient notes no serious side effects, and feels the benefits outweigh the risks.  The patient was reminded of the pain contract that they signed previously as well as the risks and benefits of the medication including possible death.  The updated [place] [employer] prescription monitoring program was reviewed, and the patient has been found to be compliant with current treatment plan.    - Pt has pain contract.  Last UDS reviewed. Repeat next in office visit.    - F/U in 3 months or sooner if needed.    - Dr. Farmer was consulted on the patient and agrees with this plan.        The above plan and management options were discussed at length with patient. Patient is in agreement with the above and verbalized understanding.      Alejandra Phoenix     11/29/2022

## 2022-11-30 ENCOUNTER — IMMUNIZATION (OUTPATIENT)
Dept: INTERNAL MEDICINE | Facility: CLINIC | Age: 52
End: 2022-11-30
Payer: MEDICARE

## 2022-11-30 DIAGNOSIS — Z23 NEED FOR VACCINATION: Primary | ICD-10-CM

## 2022-11-30 PROCEDURE — 91312 COVID-19, MRNA, LNP-S, BIVALENT BOOSTER, PF, 30 MCG/0.3 ML DOSE: CPT | Mod: PBBFAC

## 2022-11-30 PROCEDURE — 0124A COVID-19, MRNA, LNP-S, BIVALENT BOOSTER, PF, 30 MCG/0.3 ML DOSE: CPT | Mod: PBBFAC

## 2022-12-01 ENCOUNTER — PATIENT MESSAGE (OUTPATIENT)
Dept: ENDOCRINOLOGY | Facility: CLINIC | Age: 52
End: 2022-12-01
Payer: MEDICARE

## 2022-12-15 ENCOUNTER — TELEPHONE (OUTPATIENT)
Dept: SURGERY | Facility: CLINIC | Age: 52
End: 2022-12-15
Payer: MEDICARE

## 2022-12-15 ENCOUNTER — PATIENT MESSAGE (OUTPATIENT)
Dept: SURGERY | Facility: CLINIC | Age: 52
End: 2022-12-15
Payer: MEDICARE

## 2022-12-15 ENCOUNTER — PATIENT MESSAGE (OUTPATIENT)
Dept: ENDOCRINOLOGY | Facility: CLINIC | Age: 52
End: 2022-12-15
Payer: MEDICARE

## 2022-12-15 NOTE — TELEPHONE ENCOUNTER
Left message for pt to return call regarding f/u in clinic and surgery dates. CRS number provided to return call.

## 2022-12-16 ENCOUNTER — OFFICE VISIT (OUTPATIENT)
Dept: SURGERY | Facility: CLINIC | Age: 52
End: 2022-12-16
Payer: MEDICARE

## 2022-12-16 DIAGNOSIS — K62.3 RECTAL PROLAPSE: Primary | ICD-10-CM

## 2022-12-16 PROCEDURE — 99999 PR PBB SHADOW E&M-EST. PATIENT-LVL III: CPT | Mod: PBBFAC,,, | Performed by: SURGERY

## 2022-12-16 PROCEDURE — 99999 PR PBB SHADOW E&M-EST. PATIENT-LVL III: ICD-10-PCS | Mod: PBBFAC,,, | Performed by: SURGERY

## 2022-12-16 PROCEDURE — 99213 PR OFFICE/OUTPT VISIT, EST, LEVL III, 20-29 MIN: ICD-10-PCS | Mod: S$PBB,,, | Performed by: SURGERY

## 2022-12-16 PROCEDURE — 99213 OFFICE O/P EST LOW 20 MIN: CPT | Mod: PBBFAC | Performed by: SURGERY

## 2022-12-16 PROCEDURE — 99213 OFFICE O/P EST LOW 20 MIN: CPT | Mod: S$PBB,,, | Performed by: SURGERY

## 2022-12-16 NOTE — PROGRESS NOTES
Colon [facility] Rectal [facility] Clinic Follow Up    HPI:   Ari Santos is a 52 y.o. adult who presents for follow up of rectal prolapse.  Presents to reschedule surgery and sign consent. No changes since last visit.  Remains on bowel regimen to help with constipation.        Objective:   There were no vitals filed for this visit.     Physical Exam   Gen: well developed male, NAD  HEENT: normocephalic, atraumatic, PERRL, EOMI   CV: RRR, no murmurs  Resp: nonlabored, CTAB   Abd: soft, NTND   MSK: no gross deformities       Assessment and Plan:   Ari Santos  is a 52 y.o. adult who presents for follow up of rectal prolapse.     - Will proceed to OR on 1/17/23 for robotic suture rectopexy   - Per Dr. Alvarez, will have 100 mg hydrocortisone on call for OR and then 50 mgq8 until discharge for CAH   - Pre-op anesthesia  - Bowel prep      Genia Ku MD  Staff Surgeon   Colon & Rectal Surgery

## 2022-12-18 ENCOUNTER — PATIENT MESSAGE (OUTPATIENT)
Dept: NEUROLOGY | Facility: CLINIC | Age: 52
End: 2022-12-18
Payer: MEDICARE

## 2022-12-18 ENCOUNTER — PATIENT MESSAGE (OUTPATIENT)
Dept: PSYCHIATRY | Facility: CLINIC | Age: 52
End: 2022-12-18
Payer: MEDICARE

## 2022-12-18 DIAGNOSIS — G25.81 RLS (RESTLESS LEGS SYNDROME): ICD-10-CM

## 2022-12-19 RX ORDER — LAMOTRIGINE 200 MG/1
200 TABLET ORAL 2 TIMES DAILY
Qty: 180 TABLET | Refills: 0 | Status: SHIPPED | OUTPATIENT
Start: 2022-12-19 | End: 2023-01-30 | Stop reason: SDUPTHER

## 2022-12-19 RX ORDER — PRIMIDONE 50 MG/1
150 TABLET ORAL 3 TIMES DAILY
Qty: 810 TABLET | Refills: 0 | Status: SHIPPED | OUTPATIENT
Start: 2022-12-19 | End: 2023-03-22 | Stop reason: SDUPTHER

## 2022-12-21 ENCOUNTER — DOCUMENTATION ONLY (OUTPATIENT)
Dept: PSYCHIATRY | Facility: CLINIC | Age: 52
End: 2022-12-21
Payer: MEDICARE

## 2022-12-21 NOTE — PROGRESS NOTES
Patient arrived approximately 30 minutes late for his telemedicine appointment today. Patient directed to reschedule appointment. Talked briefly with patient. Patient states things have been going well. Mood remains stable. Anxiety well-controlled. Denies lethality concerns.     This was not a billable encounter.

## 2022-12-31 NOTE — PLAN OF CARE
[facility]  Physical Therapy Initial Evaluation    Name: Ari Santos  Clinic Number: [id number]    Therapy Diagnosis:   Encounter Diagnoses   Name Primary?    Chronic pain of both knees     Quadriceps weakness     Decreased functional mobility and endurance      Physician: Donte Andrade III, *    Physician Orders: PT Eval and Treat   eval and treat with modalities: pre-hab TKA, B varus knee arthritis, R side extensor lag. Specific focus on quad strengthening     Medical Diagnosis from Referral: M17.0 (ICD-10-CM) - Arthritis of both knees   Evaluation Date: 12/3/2019  Authorization Period Expiration: 11/14/2020  Plan of Care Expiration: 1/10/2020  Visit # / Visits authorized: 1/ 1    Time In: 1:00pm  Time Out: 1:50pm  Total Billable Time: 50 minutes    Precautions: Standard    Subjective   Date of onset:   History of current condition - Ari reports: Having spinal stenosis and walking with a rolling walker with a seat for many years. He underwent back surgery in September and had home health PT following. He has had good improvement in back pain and now able to get in<>out of shower without his wife's assistance. Main complaint is B knee pain R>L. He is being seen by orthopedics and plans to schedule R TKA in January with Dr. Andrade. His Right leg has been weak since childhood. He reports having PTSD and a lot of mistrust in the medical community. He was born with Adrenal hyperplasia and was getting prednisone injections in his thighs on a regular basis. Due to the injections being done more often in the R thigh, his quads adhered to the bone and required surgery to remove part of his rectus femoris. He has had sex reassignment surgery and preferred gender pronoun is male. His wife is present with him today and assisted in the past medical history portion of subjective. Pt reports having short term memory loss and difficulty recalling specifics of his medical history. She will  be bringing him to his outpatient PT visits following surgery 3 times per week.      Medical History:   Past Medical History:   Diagnosis Date    Adrenal insufficiency     Arthritis     Chronic bilateral low back pain with bilateral sciatica 3/19/2019    Congenital adrenal hyperplasia     Hyperlipidemia     Spinal stenosis     Spinal stenosis of lumbar region with neurogenic claudication 3/19/2019    Status post sex reassignment surgery 3/19/2019    Hx of Congenital Adrenal Hyperplasia       Surgical History:   Ari Santos  has a past surgical history that includes Radiofrequency ablation (Left, 5/9/2019); Radiofrequency ablation (Right, 5/23/2019); Back surgery; and Laminectomy (09/13/2019).    Medications:   Ari has a current medication list which includes the following prescription(s): alprazolam, atorvastatin, cyclobenzaprine, famotidine, gabapentin, lactobacillus acidophilus, lamotrigine, loratadine, morphine, nystatin, oxycodone-acetaminophen, prednisone, prednisone, primidone, ropinirole, ropinirole, testosterone, triamcinolone acetonide 0.1%, and zinc.    Allergies:   Review of patient's allergies indicates:   Allergen Reactions    Penicillins Rash        Imaging, bone scan films:   Right: No fracture or dislocation.  Small joint effusion.  Severe narrowing of medial tibiofemoral compartment with severe genu varum.    Left: No fracture or dislocation.  Small joint effusion.  Severe narrowing of medial tibiofemoral compartment with severe genu varum.  Bulky tricompartmental osteophytes and multiple intra-articular ossific bodies.          Prior Therapy: yes, HHPT after spinal surgery 9/13/2019  Social History:  lives with their spouse in apartment  Occupation: not working  Prior Level of Function: walking with RW, assistance from spouse for shower transfers  Current Level of Function: difficulty with walking >1 block, prolonged standing, squatting, lifting; unable to assist with  household chores    Pain:  Current 7/10, worst 9/10, best 5/10   Location: bilateral anterior/ posterior knees   Description: Aching  Aggravating Factors: Standing, Bending, Walking and Getting out of bed/chair  Easing Factors: rest and braces    Pts goals: To work on strengthening his legs and balance in preparation for his R TKA    Objective     Observation: ambulating with 4 wheeled walker with seat slow gait speed; decreased lateral stability during stance phase of gait with varus deformity       Range of Motion:   Knee Left active Right Active   Flexion 98 100   Extension -10 -15       Lower Extremity Strength  Right LE  Left LE    Knee extension: 2/5 Knee extension: 4/5   Knee flexion: 3+/5 Knee flexion: 4/5   Hip flexion: 3+/5 Hip flexion: 4/5   Hip extension:  2/5 Hip extension: 2/5   Hip abduction: 3-/5 Hip abduction: 3+/5   Hip adduction: 4-/5 Hip adduction 4/5   Ankle dorsiflexion: 4-/5 Ankle dorsiflexion: 4+/5     SLR extension la deg (slight improvement with cuing for QS)      Function:    - SLS R: unable  - SLS L: unable  - Squat: dysfunctional painful, unable to break parallel or perform without UE support   - Sit <--> Stand:mod I   - Bed Mobility: mod I with increased time and effort  -TU sec      Palpation: tenderness to palpation medial joint line R      Flexibility:    Ely's test: R = 80 degrees ; L = 90 degrees        CMS Impairment/Limitation/Restriction for FOTO knee Survey    Therapist reviewed FOTO scores for Ari Santos on 12/3/2019.   FOTO documents entered into [facility] - see Media section.    Limitation Score: 67%  Category: Mobility    Current : CL = least 60% but < 80% impaired, limited or restricted  Goal: CK = at least 40% but < 60% impaired, limited or restricted  Discharge: N/A         TREATMENT   Treatment Time In: 1:25  Treatment Time Out: 1:40pm  Total Treatment time separate from Evaluation: 15 minutes    Ari received therapeutic exercises to develop strength and  "endurance for 15 minutes including:    QS with tactile/ verbal cueing into towel roll x 10  SLR with cueing for QS 5 x 2  Supine hip abduction/ adduction x 10  Hamstring sets 3" x 10 (increased fatigue)  LAQ x 10    Home Exercises and Patient Education Provided    Education provided:   - HEP, role of PT prior to and following TKA    Written Home Exercises Provided: yes.  Exercises were reviewed and Ari was able to demonstrate them prior to the end of the session.  Ari demonstrated good  understanding of the education provided.     See EMR under Patient Instructions for exercises provided 12/3/2019.    Assessment   Ari is a 49 y.o. female referred to outpatient Physical Therapy with a medical diagnosis of arthritis both knees and to address quadriceps weakness prior to scheduled R TKA. Pt presents with decreased B knee ROM flexion/ extension, decreased functional mobility and gait difficulty, decreased LE flexibility, quad atrophy R with extensor lag.     Pt prognosis is Fair.   Pt will benefit from skilled outpatient Physical Therapy to address the deficits stated above and in the chart below, provide pt/family education, and to maximize pt's level of independence.     Plan of care discussed with patient: Yes  Pt's spiritual, cultural and educational needs considered and patient is agreeable to the plan of care and goals as stated below:     Anticipated Barriers for therapy: recent lumbar fusion    Medical Necessity is demonstrated by the following  History  Co-morbidities and personal factors that may impact the plan of care Co-morbidities:   prior lumbar surgery and prior R thigh surgery with quadriceps removal    Personal Factors:   no deficits     moderate   Examination  Body Structures and Functions, activity limitations and participation restrictions that may impact the plan of care Body Regions:   back  lower extremities  trunk    Body Systems:    ROM  strength  balance  gait  transfers  motor control  scar " formation    Participation Restrictions:   Walking, transfers    Activity limitations:   Learning and applying knowledge  no deficits    General Tasks and Commands  no deficits    Communication  no deficits    Mobility  lifting and carrying objects  walking  driving (bike, car, motorcycle)    Self care  washing oneself (bathing, drying, washing hands)  looking after one's health    Domestic Life  doing house work (cleaning house, washing dishes, laundry)    Interactions/Relationships  no deficits    Life Areas  employment    Community and Social Life  recreation and leisure         high   Clinical Presentation evolving clinical presentation with changing clinical characteristics moderate   Decision Making/ Complexity Score: moderate     Goals:  Short term (2 weeks):  1. Pt to be independent with home exercise program for improved self management of condition  Long term (4 weeks)  1. Patient to have improved gait speed as noted by TUG <20 sec for decreased risk for falls  2. Pt to have decreased subjective report of disability as noted by <60% on FOTO knee questionnaire   3. Pt's R knee quad strength to be 3-/5 or greater for improved performance of ADL's such sit to stand transfers    Plan   Plan of care Certification: 12/3/2019 to 1/10/2020.    Outpatient Physical Therapy 2 times weekly for 4 weeks to include the following interventions: Gait Training, Manual Therapy, Moist Heat/ Ice, Neuromuscular Re-ed, Patient Education, Therapeutic Activites and Therapeutic Exercise.     Dayan Bruce, PT   No

## 2023-01-03 ENCOUNTER — LAB VISIT (OUTPATIENT)
Dept: LAB | Facility: HOSPITAL | Age: 53
End: 2023-01-03
Attending: INTERNAL MEDICINE
Payer: MEDICARE

## 2023-01-03 ENCOUNTER — PATIENT MESSAGE (OUTPATIENT)
Dept: NEUROLOGY | Facility: CLINIC | Age: 53
End: 2023-01-03
Payer: MEDICARE

## 2023-01-03 DIAGNOSIS — E25.0 CONGENITAL ADRENAL HYPERPLASIA: ICD-10-CM

## 2023-01-03 DIAGNOSIS — T38.0X5D ADVERSE EFFECT OF GLUCOCORT/SYNTH ANALOG, SUBS: ICD-10-CM

## 2023-01-03 DIAGNOSIS — M85.80 OSTEOPENIA, UNSPECIFIED LOCATION: ICD-10-CM

## 2023-01-03 DIAGNOSIS — R79.9 ABNORMAL FINDING OF BLOOD CHEMISTRY, UNSPECIFIED: ICD-10-CM

## 2023-01-03 PROCEDURE — 84443 ASSAY THYROID STIM HORMONE: CPT | Performed by: INTERNAL MEDICINE

## 2023-01-03 PROCEDURE — 82024 ASSAY OF ACTH: CPT | Performed by: INTERNAL MEDICINE

## 2023-01-03 PROCEDURE — 84403 ASSAY OF TOTAL TESTOSTERONE: CPT | Performed by: INTERNAL MEDICINE

## 2023-01-03 PROCEDURE — 85027 COMPLETE CBC AUTOMATED: CPT | Performed by: INTERNAL MEDICINE

## 2023-01-03 PROCEDURE — 36415 COLL VENOUS BLD VENIPUNCTURE: CPT | Mod: PN | Performed by: INTERNAL MEDICINE

## 2023-01-03 PROCEDURE — 83036 HEMOGLOBIN GLYCOSYLATED A1C: CPT | Performed by: INTERNAL MEDICINE

## 2023-01-03 PROCEDURE — 80053 COMPREHEN METABOLIC PANEL: CPT | Performed by: INTERNAL MEDICINE

## 2023-01-04 ENCOUNTER — OFFICE VISIT (OUTPATIENT)
Dept: ENDOCRINOLOGY | Facility: CLINIC | Age: 53
End: 2023-01-04
Payer: MEDICARE

## 2023-01-04 DIAGNOSIS — E25.0 CONGENITAL ADRENAL HYPERPLASIA: Primary | ICD-10-CM

## 2023-01-04 DIAGNOSIS — Z87.890 STATUS POST SEX REASSIGNMENT SURGERY: ICD-10-CM

## 2023-01-04 DIAGNOSIS — M85.80 OSTEOPENIA, UNSPECIFIED LOCATION: ICD-10-CM

## 2023-01-04 DIAGNOSIS — F64.0 TRANSGENDER MAN ON HORMONE THERAPY: ICD-10-CM

## 2023-01-04 DIAGNOSIS — E27.40 ADRENAL INSUFFICIENCY: ICD-10-CM

## 2023-01-04 DIAGNOSIS — E78.5 HYPERLIPIDEMIA, UNSPECIFIED HYPERLIPIDEMIA TYPE: ICD-10-CM

## 2023-01-04 DIAGNOSIS — E24.2 DRUG-INDUCED CUSHING'S SYNDROME: ICD-10-CM

## 2023-01-04 DIAGNOSIS — Z79.899 TRANSGENDER MAN ON HORMONE THERAPY: ICD-10-CM

## 2023-01-04 LAB
ALBUMIN SERPL BCP-MCNC: 4.1 G/DL (ref 3.5–5.2)
ALP SERPL-CCNC: 124 U/L (ref 55–135)
ALT SERPL W/O P-5'-P-CCNC: 29 U/L (ref 10–44)
ANION GAP SERPL CALC-SCNC: 11 MMOL/L (ref 8–16)
AST SERPL-CCNC: 32 U/L (ref 10–40)
BILIRUB SERPL-MCNC: 0.2 MG/DL (ref 0.1–1)
BUN SERPL-MCNC: 11 MG/DL (ref 6–20)
CALCIUM SERPL-MCNC: 9.3 MG/DL (ref 8.7–10.5)
CHLORIDE SERPL-SCNC: 105 MMOL/L (ref 95–110)
CO2 SERPL-SCNC: 24 MMOL/L (ref 23–29)
CREAT SERPL-MCNC: 0.8 MG/DL (ref 0.5–1.4)
ERYTHROCYTE [DISTWIDTH] IN BLOOD BY AUTOMATED COUNT: 12.6 % (ref 11.5–14.5)
EST. GFR  (NO RACE VARIABLE): >60 ML/MIN/1.73 M^2
ESTIMATED AVG GLUCOSE: 105 MG/DL (ref 68–131)
GLUCOSE SERPL-MCNC: 85 MG/DL (ref 70–110)
HBA1C MFR BLD: 5.3 % (ref 4–5.6)
HCT VFR BLD AUTO: 40.2 % (ref 40–54)
HGB BLD-MCNC: 13 G/DL (ref 14–18)
MCH RBC QN AUTO: 33.6 PG (ref 27–31)
MCHC RBC AUTO-ENTMCNC: 32.3 G/DL (ref 32–36)
MCV RBC AUTO: 104 FL (ref 82–98)
PLATELET # BLD AUTO: 307 K/UL (ref 150–450)
PMV BLD AUTO: 8.9 FL (ref 9.2–12.9)
POTASSIUM SERPL-SCNC: 4.5 MMOL/L (ref 3.5–5.1)
PROT SERPL-MCNC: 6.9 G/DL (ref 6–8.4)
RBC # BLD AUTO: 3.87 M/UL (ref 4.6–6.2)
SODIUM SERPL-SCNC: 140 MMOL/L (ref 136–145)
TESTOST SERPL-MCNC: 996 NG/DL (ref 304–1227)
TSH SERPL DL<=0.005 MIU/L-ACNC: 0.68 UIU/ML (ref 0.4–4)
WBC # BLD AUTO: 6.43 K/UL (ref 3.9–12.7)

## 2023-01-04 PROCEDURE — 99214 OFFICE O/P EST MOD 30 MIN: CPT | Mod: 95,,, | Performed by: INTERNAL MEDICINE

## 2023-01-04 PROCEDURE — 99214 PR OFFICE/OUTPT VISIT, EST, LEVL IV, 30-39 MIN: ICD-10-PCS | Mod: 95,,, | Performed by: INTERNAL MEDICINE

## 2023-01-04 RX ORDER — PREDNISONE 5 MG/1
5 TABLET ORAL DAILY
Qty: 200 TABLET | Refills: 3 | Status: SHIPPED | OUTPATIENT
Start: 2023-01-04

## 2023-01-04 RX ORDER — TESTOSTERONE GEL, 1% 10 MG/G
1 GEL TRANSDERMAL DAILY
Qty: 30 PACKET | Refills: 5 | Status: SHIPPED | OUTPATIENT
Start: 2023-01-04 | End: 2023-09-06 | Stop reason: SDUPTHER

## 2023-01-04 NOTE — ASSESSMENT & PLAN NOTE
cah with AI  Goal is to get him on the lowest dose pred that he can tolerate     continue 5 mg a day-  Follow labs.      Needs medic alert tag   Reviewed sick day precautions  Do not need to normalize ACTH, 17 oh p or androgens

## 2023-01-04 NOTE — PROGRESS NOTES
Subjective:      Patient ID: Ari Santos is a 52 y.o. adult.    Chief Complaint:  CAH  History of Present Illness       Was raised female.     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     Saw Dr Taylor and this was very traumatic for him but he appreciated her kindness      Per her exam:  PELVIC:    External genitalia:  Normal Bartholins, Skenes and labia bilaterally.  Bilateral labia not fused superiorly.  Fleshy, erectile tissue around urethra (superior most aspect of mons).  Bilateral labia majora present.  Small dimple present at previous vaginal area--does not track with gentle exploration with os finder.   Urethra:  No caruncle, diverticulum or masses. Able to pass 14F catheter easily--needs to pass 7-8 cm before urine expelled (increased urethral length)?    Internal: deferred, as reports MAUREEN/BSO with vaginal closure      Started testosterone in 2004. Attempted to have phalloplasty in 2004, unsuccessful.         Current dose:     Testosterone 50mg/5g - 1 packet qd      With regards to the treatment of the adrenal insufficiency due to CAH    When I initially saw him he was on a high dose of pred   Prednisone 10mg in the Am and 5mg at night    Since we have attempted to lower the dose to physiologic as we dont need to over treat the CAH      Current dose:   Prednisone  5  mg qd --  He and his wife are aware of when they need stress dose steroids    BMD 12/4/20   Osteopenia.           Back pain is an issue -  Working with Pain Management.    Trying to use his cane - does fall       Review of Systems   As above     Objective:   Physical  Exam  Psychiatric:         Attention and Perception: Attention normal.         Mood and Affect: Mood normal.         Speech: Speech normal.         Behavior: Behavior normal.         Thought Content: Thought content normal.         Cognition and Memory: Cognition normal.         Judgment: Judgment normal.     There were no vitals taken for this visit.    There is no height or weight on file to calculate BMI.    Lab Review:   Lab Results   Component Value Date    HGBA1C 5.3 01/03/2023     Lab Results   Component Value Date    CHOL 192 08/23/2021    HDL 58 08/23/2021    LDLCALC 111.8 08/23/2021    TRIG 111 08/23/2021    CHOLHDL 30.2 08/23/2021     Lab Results   Component Value Date     01/03/2023    K 4.5 01/03/2023     01/03/2023    CO2 24 01/03/2023    GLU 85 01/03/2023    BUN 11 01/03/2023    CREATININE 0.8 01/03/2023    CALCIUM 9.3 01/03/2023    PROT 6.9 01/03/2023    ALBUMIN 4.1 01/03/2023    BILITOT 0.2 01/03/2023    ALKPHOS 124 01/03/2023    AST 32 01/03/2023    ALT 29 01/03/2023    ANIONGAP 11 01/03/2023    ESTGFRAFRICA >60.0 08/23/2021    EGFRNONAA >60.0 08/23/2021    TSH 0.683 01/03/2023     Vit D, 25-Hydroxy   Date Value Ref Range Status   05/06/2019 36 30 - 96 ng/mL Final     Comment:     Vitamin D deficiency.........<10 ng/mL                              Vitamin D insufficiency......10-29 ng/mL       Vitamin D sufficiency........> or equal to 30 ng/mL  Vitamin D toxicity............>100 ng/mL          Assessment and Plan     Congenital adrenal hyperplasia    cah with AI  Goal is to get him on the lowest dose pred that he can tolerate     continue 5 mg a day-  Follow labs.      Needs medic alert tag   Reviewed sick day precautions  Do not need to normalize ACTH, 17 oh p or androgens           Adrenal insufficiency  As above     Is aware of stress dose needed for surgery    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    Follow labs    continue treatment    Osteopenia    discussed implications  Reassuring  not fracturing.   rda calcium and vit D  Follow over time - repeat      discussed indications for offering therapy        The patient location is: home  The chief complaint leading to consultation is: above    Visit type: audiovisual    Face to Face time with patient: 30 minutes of total time spent on the encounter, which includes face to face time and non-face to face time preparing to see the patient (eg, review of tests), Obtaining and/or reviewing separately obtained history, Documenting clinical information in the electronic or other health record, Independently interpreting results (not separately reported) and communicating results to the patient/family/caregiver, or Care coordination (not separately reported).         Each patient to whom he or she provides medical services by telemedicine is:  (1) informed of the relationship between the physician and patient and the respective role of any other health care provider with respect to management of the patient; and (2) notified that he or she may decline to receive medical services by telemedicine and may withdraw from such care at any time.

## 2023-01-04 NOTE — PATIENT INSTRUCTIONS
Thank you for completing a virtual visit with me!     Per our conversation,  continue the prednisone 5 mg a day as well as the topical testosterone.  Remember if you have fever or get sick ....you will need double - triple the dose of prednisone.      Remember for surgery... you will need IV hydrocortisone prior to the surgery.  Please have your surgeon reach out to me if they have any questions    We will plan a telemedicine or an in-clinic visit in 12 months with labs and a bone density prior to that appointment.    My staff will contact you to schedule the above.     Please let me know if you have any other questions.    Thank you,  Delfina Alvarez MD

## 2023-01-04 NOTE — ASSESSMENT & PLAN NOTE
discussed implications  Reassuring not fracturing.   rda calcium and vit D  Follow over time - repeat      discussed indications for offering therapy

## 2023-01-05 RX ORDER — ATORVASTATIN CALCIUM 10 MG/1
10 TABLET, FILM COATED ORAL NIGHTLY
Qty: 90 TABLET | Refills: 1 | Status: SHIPPED | OUTPATIENT
Start: 2023-01-05 | End: 2023-05-23 | Stop reason: DRUGHIGH

## 2023-01-05 NOTE — TELEPHONE ENCOUNTER
Refill Routing Note   Medication(s) are not appropriate for processing by [facility] for the following reason(s):      - Required laboratory values are outdated    [facility] action(s):  Defer Medication-related problems identified:   Requires labs  Requires appointment        Medication reconciliation completed: No     Appointments  past 12m or future 3m with PCP    Date Provider   Last Visit   3/23/2022 Siva Mitchell MD   Next Visit   Visit date not found Siva Mitchell MD   ED visits in past 90 days: 0        Note composed:10:33 PM 01/04/2023

## 2023-01-05 NOTE — TELEPHONE ENCOUNTER
Care Due:                  Date            Visit Type   Department     Provider  --------------------------------------------------------------------------------                                MYCHART                              ANNUAL                              CHECKUP/PHY  [facility] INTERNAL  Last Visit: 03-      S            PAMELA Mitchell  Next Visit: None Scheduled  None         None Found                                                            Last  Test          Frequency    Reason                     Performed    Due Date  --------------------------------------------------------------------------------    Office Visit  12 months..  atorvastatin.............  03- 03-    Lipid Panel.  12 months..  atorvastatin.............  08- 08-    [employer] Embedded Care Gaps. Reference number: [id number]. 1/04/2023   9:50:51 PM CST

## 2023-01-06 LAB — ACTH PLAS-MCNC: 210 PG/ML (ref 0–46)

## 2023-01-10 ENCOUNTER — ANESTHESIA EVENT (OUTPATIENT)
Dept: SURGERY | Facility: OTHER | Age: 53
DRG: 331 | End: 2023-01-10
Payer: MEDICARE

## 2023-01-10 ENCOUNTER — HOSPITAL ENCOUNTER (OUTPATIENT)
Dept: PREADMISSION TESTING | Facility: OTHER | Age: 53
Discharge: HOME | End: 2023-01-10
Attending: SURGERY
Payer: MEDICARE

## 2023-01-10 VITALS
BODY MASS INDEX: 25.52 KG/M2 | SYSTOLIC BLOOD PRESSURE: 116 MMHG | HEIGHT: 60 IN | RESPIRATION RATE: 16 BRPM | TEMPERATURE: 98 F | WEIGHT: 130 LBS | HEART RATE: 103 BPM | OXYGEN SATURATION: 99 % | DIASTOLIC BLOOD PRESSURE: 70 MMHG

## 2023-01-10 RX ORDER — SODIUM CHLORIDE, SODIUM LACTATE, POTASSIUM CHLORIDE, CALCIUM CHLORIDE 600; 310; 30; 20 MG/100ML; MG/100ML; MG/100ML; MG/100ML
INJECTION, SOLUTION INTRAVENOUS CONTINUOUS
Status: CANCELLED | OUTPATIENT
Start: 2023-01-10

## 2023-01-10 NOTE — DISCHARGE INSTRUCTIONS
Information to Prepare you for your Surgery    PRE-ADMIT TESTING -  [phone]    [address] [facility]          Your surgery has been scheduled at [facility]. We are pleased to have the opportunity to serve you. For Further Information please call [phone].    On the day of surgery please report to the Information Desk on the 1st floor.    CONTACT YOUR PHYSICIAN'S OFFICE THE DAY PRIOR TO YOUR SURGERY TO OBTAIN YOUR ARRIVAL TIME.     The evening before surgery do not eat anything after 9 p.m. ( this includes hard candy, chewing gum and mints).  You may only have GATORADE, POWERADE AND WATER  from 9 p.m. until you leave your home.   DO NOT DRINK ANY LIQUIDS ON THE WAY TO THE HOSPITAL.      Why does your anesthesiologist allow you to drink Gatorade/Powerade before surgery?  Gatorade/Powerade helps to increase your comfort before surgery and to decrease your nausea after surgery. The carbohydrates in Gatorade/Powerade help reduce your body's stress response to surgery.  If you are a diabetic-drink only water prior to surgery.      Current Visitor policy(12/27/2021) - Patients may have 2 visitors pre and post procedure. Only 2 visitors will be allowed in the Surgical building with the patient. No one under the age of 12 will be allowed into the facility.    SPECIAL MEDICATION INSTRUCTIONS: TAKE medications checked off by the Anesthesiologist on your Medication List.    Angiogram Patients: Take medications as instructed by your physician, including aspirin.     Surgery Patients:    If you take ASPIRIN - Your PHYSICIAN/SURGEON will need to inform you IF/OR when you need to stop taking aspirin prior to your surgery.     The week prior to surgery do not ot take any medications containing IBUPROFEN or NSAIDS ( Advil, Motrin, Goodys, BC, Aleve, Naproxen etc) If you are not sure if you should take a medicine please call your surgeon's office.  Ok to take  Tylenol    Do Not Wear any make-up (especially eye make-up) to surgery. Please remove any false eyelashes or eyelash extensions. If you arrive the day of surgery with makeup/eyelashes on you will be required to remove prior to surgery. (There is a risk of corneal abrasions if eye makeup/eyelash extensions are not removed)      Leave all valuables at home.   Do Not wear any jewelry or watches, including any metal in body piercings. Jewelry must be removed prior to coming to the hospital.  There is a possibility that rings that are unable to be removed may be cut off if they are on the surgical extremity.    Please remove all hair extensions, wigs, clips and any other metal accessories/ ornaments from your hair.  These items may pose a flammable/fire risk in Surgery and must be removed.    Do not shave your surgical area at least 5 days prior to your surgery. The surgical prep will be performed at the hospital according to Infection Control regulations.    Contact Lens must be removed before surgery. Either do not wear the contact lens or bring a case and solution for storage.  Please bring a container for eyeglasses or dentures as required.  Bring any paperwork your physician has provided, such as consent forms,  history and physicals, doctor's orders, etc.   Bring comfortable clothes that are loose fitting to wear upon discharge. Take into consideration the type of surgery being performed.  Maintain your diet as advised per your physician the day prior to surgery.      Adequate rest the night before surgery is advised.   Park in the Parking lot behind the hospital or in the [facility] [facility] across the street from the parking lot. Parking is complimentary.  If you will be discharged the same day as your procedure, please arrange for a responsible adult to drive you home or to accompany you if traveling by taxi.   YOU WILL NOT BE PERMITTED TO DRIVE OR TO LEAVE THE HOSPITAL ALONE AFTER SURGERY.   If you are  being discharged the same day, it is strongly recommended that you arrange for someone to remain with you for the first 24 hrs following your surgery.    The Surgeon will speak to your family/visitor after your surgery regarding the outcome of your surgery and post op care.  The Surgeon may speak to you after your surgery, but there is a possibility you may not remember the details.  Please check with your family members regarding the conversation with the Surgeon.    We strongly recommend whoever is bringing you home be present for discharge instructions.  This will ensure a thorough understanding for your post op home care.    ALL CHILDREN MUST ALWAYS BE ACCOMPANIED BY AN ADULT.    Visitors-Refer to current Visitor policy handouts.    Thank you for your cooperation.  The Staff of [facility].            Bathing Instructions with Hibiclens    Shower the evening before and morning of your procedure with Chlorhexidine (Hibiclens)  do not use Chlorhexidine on your face or genitals. Do not get in your eyes.  Wash your face with water and your regular face wash/soap  Use your regular shampoo  Apply Chlorhexidine (Hibiclens) directly on your skin or on a wet washcloth and wash gently. When showering: Move away from the shower stream when applying Chlorhexidine (Hibiclens) to avoid rinsing off too soon.  Rinse thoroughly with warm water  Do not dilute Chlorhexidine (Hibiclens)   Dry off as usual, do not use any deodorant, powder, body lotions, perfume, after shave or cologne.

## 2023-01-10 NOTE — ANESTHESIA PREPROCEDURE EVALUATION
01/10/2023  Ari Santos is a 52 y.o., adult.      Pre-op Assessment    I have reviewed the Patient Summary Reports.     I have reviewed the Nursing Notes. I have reviewed the NPO Status.   I have reviewed the Medications.     Review of Systems  Anesthesia Hx:  No problems with previous Anesthesia  Denies Family Hx of Anesthesia complications.   Denies Personal Hx of Anesthesia complications.   Social:  Non-Smoker, Former Smoker    Hematology/Oncology:  Hematology Normal   Oncology Normal     EENT/Dental:EENT/Dental Normal   Cardiovascular:  Cardiovascular Normal Exercise tolerance: good     Pulmonary:   Sleep Apnea    Renal/:  Renal/ Normal     Hepatic/GI:   Liver Disease,    Musculoskeletal:  Spine Disorders: lumbar and cervical Chronic Pain    Neurological:  Neurology Normal Restless leg syndrome   Endocrine:  Endocrine Normal Congenital adrenal hyperplasia   Dermatological:  Skin Normal    Psych:  Psychiatric Normal  transgender         Physical Exam  General: Well nourished, Cooperative, Oriented and Alert    Airway:  Mouth Opening: Normal  TM Distance: Normal  Neck ROM: Normal ROM    Dental:  Intact        Anesthesia Plan  Type of Anesthesia, risks & benefits discussed:    Anesthesia Type: Gen ETT  Intra-op Monitoring Plan: Standard ASA Monitors  Post Op Pain Control Plan: multimodal analgesia and peripheral nerve block  Induction:  IV  Airway Plan: Video and Direct  Informed Consent: Informed consent signed with the Patient and all parties understand the risks and agree with anesthesia plan.  All questions answered.   ASA Score: 2  Day of Surgery Review of History & Physical: H&P Update referred to the surgeon/provider.    Ready For Surgery From Anesthesia Perspective.     .

## 2023-01-13 ENCOUNTER — TELEPHONE (OUTPATIENT)
Dept: SURGERY | Facility: CLINIC | Age: 53
End: 2023-01-13
Payer: MEDICARE

## 2023-01-13 NOTE — TELEPHONE ENCOUNTER
Spoke with pt's wife Kristy regarding 0530 arrival time and location for procedure on 1/17. Kristy verbally confirms time, location and prep for procedure. Denies questions.       ----- Message from Anastasia Haas sent at 1/13/2023  3:43 PM CST -----  Contact: Pt  Pt is requesting a callback in regards to instructions and arrival time for 1/17. Please adv pt. Pt also received missed call.       Confirmed contact below:   Contact Name:Ari Santos  Phone Number: [phone] (Pt spouse)

## 2023-01-15 DIAGNOSIS — G25.81 RLS (RESTLESS LEGS SYNDROME): ICD-10-CM

## 2023-01-15 RX ORDER — ROPINIROLE 4 MG/1
4 TABLET, FILM COATED ORAL DAILY
Qty: 90 TABLET | Refills: 1 | Status: CANCELLED | OUTPATIENT
Start: 2023-01-15

## 2023-01-17 ENCOUNTER — HOSPITAL ENCOUNTER (INPATIENT)
Facility: OTHER | Age: 53
LOS: 1 days | Discharge: HOME | DRG: 331 | End: 2023-01-18
Attending: SURGERY | Admitting: SURGERY
Payer: MEDICARE

## 2023-01-17 ENCOUNTER — ANESTHESIA (OUTPATIENT)
Dept: SURGERY | Facility: OTHER | Age: 53
DRG: 331 | End: 2023-01-17
Payer: MEDICARE

## 2023-01-17 DIAGNOSIS — K62.3 RECTAL PROLAPSE: ICD-10-CM

## 2023-01-17 PROCEDURE — 63600175 PHARM REV CODE 636 W HCPCS: Performed by: ANESTHESIOLOGY

## 2023-01-17 PROCEDURE — 36000713 HC OR TIME LEV V EA ADD 15 MIN: Performed by: SURGERY

## 2023-01-17 PROCEDURE — 71000015 HC POSTOP RECOV 1ST HR: Performed by: SURGERY

## 2023-01-17 PROCEDURE — 71000039 HC RECOVERY, EACH ADD'L HOUR: Performed by: SURGERY

## 2023-01-17 PROCEDURE — 36000712 HC OR TIME LEV V 1ST 15 MIN: Performed by: SURGERY

## 2023-01-17 PROCEDURE — 63600175 PHARM REV CODE 636 W HCPCS

## 2023-01-17 PROCEDURE — 25000003 PHARM REV CODE 250: Performed by: ANESTHESIOLOGY

## 2023-01-17 PROCEDURE — 27201423 OPTIME MED/SURG SUP & DEVICES STERILE SUPPLY: Performed by: SURGERY

## 2023-01-17 PROCEDURE — 45400 PR LAP, SURG PROCTOPEXY: ICD-10-PCS | Mod: ,,, | Performed by: SURGERY

## 2023-01-17 PROCEDURE — C9290 INJ, BUPIVACAINE LIPOSOME: HCPCS | Performed by: ANESTHESIOLOGY

## 2023-01-17 PROCEDURE — 12000002 HC ACUTE/MED SURGE SEMI-PRIVATE ROOM

## 2023-01-17 PROCEDURE — 63600175 PHARM REV CODE 636 W HCPCS: Performed by: STUDENT IN AN ORGANIZED HEALTH CARE EDUCATION/TRAINING PROGRAM

## 2023-01-17 PROCEDURE — P9045 ALBUMIN (HUMAN), 5%, 250 ML: HCPCS | Mod: JG | Performed by: STUDENT IN AN ORGANIZED HEALTH CARE EDUCATION/TRAINING PROGRAM

## 2023-01-17 PROCEDURE — 71000033 HC RECOVERY, INTIAL HOUR: Performed by: SURGERY

## 2023-01-17 PROCEDURE — 25000003 PHARM REV CODE 250: Performed by: STUDENT IN AN ORGANIZED HEALTH CARE EDUCATION/TRAINING PROGRAM

## 2023-01-17 PROCEDURE — 64488 TAP BLOCK BI INJECTION: CPT | Performed by: ANESTHESIOLOGY

## 2023-01-17 PROCEDURE — 63600175 PHARM REV CODE 636 W HCPCS: Performed by: SURGERY

## 2023-01-17 PROCEDURE — 37000008 HC ANESTHESIA 1ST 15 MINUTES: Performed by: SURGERY

## 2023-01-17 PROCEDURE — 37000009 HC ANESTHESIA EA ADD 15 MINS: Performed by: SURGERY

## 2023-01-17 PROCEDURE — 71000016 HC POSTOP RECOV ADDL HR: Performed by: SURGERY

## 2023-01-17 PROCEDURE — 25000003 PHARM REV CODE 250: Performed by: SURGERY

## 2023-01-17 PROCEDURE — 45400 LAPAROSCOPIC PROC: CPT | Mod: ,,, | Performed by: SURGERY

## 2023-01-17 RX ORDER — ATORVASTATIN CALCIUM 10 MG/1
10 TABLET, FILM COATED ORAL NIGHTLY
Status: DISCONTINUED | OUTPATIENT
Start: 2023-01-17 | End: 2023-01-18 | Stop reason: HOSPADM

## 2023-01-17 RX ORDER — GABAPENTIN 400 MG/1
1600 CAPSULE ORAL NIGHTLY
Status: DISCONTINUED | OUTPATIENT
Start: 2023-01-17 | End: 2023-01-18 | Stop reason: HOSPADM

## 2023-01-17 RX ORDER — ROCURONIUM BROMIDE 10 MG/ML
INJECTION, SOLUTION INTRAVENOUS
Status: DISCONTINUED | OUTPATIENT
Start: 2023-01-17 | End: 2023-01-17

## 2023-01-17 RX ORDER — PRIMIDONE 50 MG/1
150 TABLET ORAL 3 TIMES DAILY
Status: DISCONTINUED | OUTPATIENT
Start: 2023-01-17 | End: 2023-01-18 | Stop reason: HOSPADM

## 2023-01-17 RX ORDER — HEPARIN SODIUM 5000 [USP'U]/ML
5000 INJECTION, SOLUTION INTRAVENOUS; SUBCUTANEOUS ONCE
Status: DISCONTINUED | OUTPATIENT
Start: 2023-01-17 | End: 2023-01-17

## 2023-01-17 RX ORDER — MIDAZOLAM HYDROCHLORIDE 1 MG/ML
INJECTION INTRAMUSCULAR; INTRAVENOUS
Status: DISCONTINUED | OUTPATIENT
Start: 2023-01-17 | End: 2023-01-17

## 2023-01-17 RX ORDER — BUPIVACAINE HYDROCHLORIDE 2.5 MG/ML
INJECTION, SOLUTION EPIDURAL; INFILTRATION; INTRACAUDAL
Status: COMPLETED | OUTPATIENT
Start: 2023-01-17 | End: 2023-01-17

## 2023-01-17 RX ORDER — ONDANSETRON 2 MG/ML
INJECTION INTRAMUSCULAR; INTRAVENOUS
Status: DISCONTINUED | OUTPATIENT
Start: 2023-01-17 | End: 2023-01-17

## 2023-01-17 RX ORDER — HYDROMORPHONE HYDROCHLORIDE 2 MG/ML
0.4 INJECTION, SOLUTION INTRAMUSCULAR; INTRAVENOUS; SUBCUTANEOUS EVERY 5 MIN PRN
Status: DISCONTINUED | OUTPATIENT
Start: 2023-01-17 | End: 2023-01-17

## 2023-01-17 RX ORDER — LAMOTRIGINE 100 MG/1
200 TABLET ORAL 2 TIMES DAILY
Status: DISCONTINUED | OUTPATIENT
Start: 2023-01-17 | End: 2023-01-18

## 2023-01-17 RX ORDER — IBUPROFEN 400 MG/1
400 TABLET ORAL EVERY 6 HOURS
Status: DISCONTINUED | OUTPATIENT
Start: 2023-01-17 | End: 2023-01-18 | Stop reason: HOSPADM

## 2023-01-17 RX ORDER — OXYCODONE AND ACETAMINOPHEN 10; 325 MG/1; MG/1
1 TABLET ORAL EVERY 8 HOURS PRN
Status: DISCONTINUED | OUTPATIENT
Start: 2023-01-17 | End: 2023-01-17

## 2023-01-17 RX ORDER — ALBUMIN HUMAN 50 G/1000ML
SOLUTION INTRAVENOUS CONTINUOUS PRN
Status: DISCONTINUED | OUTPATIENT
Start: 2023-01-17 | End: 2023-01-17

## 2023-01-17 RX ORDER — DOCUSATE SODIUM 100 MG/1
100 CAPSULE, LIQUID FILLED ORAL 2 TIMES DAILY PRN
Status: DISCONTINUED | OUTPATIENT
Start: 2023-01-17 | End: 2023-01-18 | Stop reason: HOSPADM

## 2023-01-17 RX ORDER — ALPRAZOLAM 0.5 MG/1
1 TABLET ORAL 3 TIMES DAILY PRN
Status: DISCONTINUED | OUTPATIENT
Start: 2023-01-17 | End: 2023-01-18 | Stop reason: HOSPADM

## 2023-01-17 RX ORDER — DEXAMETHASONE SODIUM PHOSPHATE 4 MG/ML
INJECTION, SOLUTION INTRA-ARTICULAR; INTRALESIONAL; INTRAMUSCULAR; INTRAVENOUS; SOFT TISSUE
Status: DISCONTINUED | OUTPATIENT
Start: 2023-01-17 | End: 2023-01-17

## 2023-01-17 RX ORDER — ROPINIROLE 1 MG/1
4 TABLET, FILM COATED ORAL ONCE
Status: COMPLETED | OUTPATIENT
Start: 2023-01-17 | End: 2023-01-17

## 2023-01-17 RX ORDER — OXYCODONE HYDROCHLORIDE 5 MG/1
5 TABLET ORAL
Status: DISCONTINUED | OUTPATIENT
Start: 2023-01-17 | End: 2023-01-17

## 2023-01-17 RX ORDER — LIDOCAINE HYDROCHLORIDE 20 MG/ML
INJECTION INTRAVENOUS
Status: DISCONTINUED | OUTPATIENT
Start: 2023-01-17 | End: 2023-01-17

## 2023-01-17 RX ORDER — SODIUM CHLORIDE, SODIUM LACTATE, POTASSIUM CHLORIDE, CALCIUM CHLORIDE 600; 310; 30; 20 MG/100ML; MG/100ML; MG/100ML; MG/100ML
INJECTION, SOLUTION INTRAVENOUS CONTINUOUS
Status: DISCONTINUED | OUTPATIENT
Start: 2023-01-17 | End: 2023-01-17

## 2023-01-17 RX ORDER — ACETAMINOPHEN 325 MG/1
650 TABLET ORAL EVERY 6 HOURS
Status: DISCONTINUED | OUTPATIENT
Start: 2023-01-17 | End: 2023-01-17

## 2023-01-17 RX ORDER — PROCHLORPERAZINE EDISYLATE 5 MG/ML
5 INJECTION INTRAMUSCULAR; INTRAVENOUS EVERY 30 MIN PRN
Status: DISCONTINUED | OUTPATIENT
Start: 2023-01-17 | End: 2023-01-17

## 2023-01-17 RX ORDER — KETAMINE HCL IN 0.9 % NACL 50 MG/5 ML
SYRINGE (ML) INTRAVENOUS
Status: DISCONTINUED | OUTPATIENT
Start: 2023-01-17 | End: 2023-01-17

## 2023-01-17 RX ORDER — ROPINIROLE 1 MG/1
4 TABLET, FILM COATED ORAL DAILY
Status: DISCONTINUED | OUTPATIENT
Start: 2023-01-17 | End: 2023-01-17

## 2023-01-17 RX ORDER — SODIUM CHLORIDE 0.9 % (FLUSH) 0.9 %
3 SYRINGE (ML) INJECTION
Status: DISCONTINUED | OUTPATIENT
Start: 2023-01-17 | End: 2023-01-17

## 2023-01-17 RX ORDER — L. ACIDOPHILUS/L.BULGARICUS 100MM CELL
1 GRANULES IN PACKET (EA) ORAL DAILY
Status: DISCONTINUED | OUTPATIENT
Start: 2023-01-17 | End: 2023-01-18 | Stop reason: HOSPADM

## 2023-01-17 RX ORDER — ASCORBIC ACID 500 MG
1000 TABLET ORAL 2 TIMES DAILY
Status: DISCONTINUED | OUTPATIENT
Start: 2023-01-17 | End: 2023-01-18 | Stop reason: HOSPADM

## 2023-01-17 RX ORDER — FENTANYL CITRATE 50 UG/ML
INJECTION, SOLUTION INTRAMUSCULAR; INTRAVENOUS
Status: DISCONTINUED | OUTPATIENT
Start: 2023-01-17 | End: 2023-01-17

## 2023-01-17 RX ORDER — PROPOFOL 10 MG/ML
VIAL (ML) INTRAVENOUS
Status: DISCONTINUED | OUTPATIENT
Start: 2023-01-17 | End: 2023-01-17

## 2023-01-17 RX ORDER — CYCLOBENZAPRINE HCL 5 MG
10 TABLET ORAL 3 TIMES DAILY PRN
Status: DISCONTINUED | OUTPATIENT
Start: 2023-01-17 | End: 2023-01-18 | Stop reason: HOSPADM

## 2023-01-17 RX ORDER — PHENYLEPHRINE HYDROCHLORIDE 10 MG/ML
INJECTION INTRAVENOUS
Status: DISCONTINUED | OUTPATIENT
Start: 2023-01-17 | End: 2023-01-17

## 2023-01-17 RX ORDER — HYDROMORPHONE HYDROCHLORIDE 2 MG/ML
0.5 INJECTION, SOLUTION INTRAMUSCULAR; INTRAVENOUS; SUBCUTANEOUS EVERY 6 HOURS PRN
Status: DISCONTINUED | OUTPATIENT
Start: 2023-01-17 | End: 2023-01-18 | Stop reason: HOSPADM

## 2023-01-17 RX ORDER — MEPERIDINE HYDROCHLORIDE 25 MG/ML
12.5 INJECTION INTRAMUSCULAR; INTRAVENOUS; SUBCUTANEOUS ONCE AS NEEDED
Status: DISCONTINUED | OUTPATIENT
Start: 2023-01-17 | End: 2023-01-17

## 2023-01-17 RX ORDER — OXYCODONE AND ACETAMINOPHEN 10; 325 MG/1; MG/1
1 TABLET ORAL EVERY 4 HOURS PRN
Status: DISCONTINUED | OUTPATIENT
Start: 2023-01-17 | End: 2023-01-18 | Stop reason: HOSPADM

## 2023-01-17 RX ORDER — PREDNISONE 2.5 MG/1
5 TABLET ORAL DAILY
Status: DISCONTINUED | OUTPATIENT
Start: 2023-01-17 | End: 2023-01-18 | Stop reason: HOSPADM

## 2023-01-17 RX ORDER — CEFAZOLIN SODIUM 1 G/3ML
INJECTION, POWDER, FOR SOLUTION INTRAMUSCULAR; INTRAVENOUS
Status: DISCONTINUED | OUTPATIENT
Start: 2023-01-17 | End: 2023-01-17

## 2023-01-17 RX ADMIN — DEXAMETHASONE SODIUM PHOSPHATE 4 MG: 4 INJECTION, SOLUTION INTRAMUSCULAR; INTRAVENOUS at 07:01

## 2023-01-17 RX ADMIN — SUGAMMADEX 200 MG: 100 INJECTION, SOLUTION INTRAVENOUS at 09:01

## 2023-01-17 RX ADMIN — ONDANSETRON HYDROCHLORIDE 4 MG: 2 INJECTION INTRAMUSCULAR; INTRAVENOUS at 09:01

## 2023-01-17 RX ADMIN — MIDAZOLAM HYDROCHLORIDE 2 MG: 1 INJECTION, SOLUTION INTRAMUSCULAR; INTRAVENOUS at 07:01

## 2023-01-17 RX ADMIN — HYDROCORTISONE SODIUM SUCCINATE 50 MG: 100 INJECTION, POWDER, FOR SOLUTION INTRAMUSCULAR; INTRAVENOUS at 02:01

## 2023-01-17 RX ADMIN — PRIMIDONE 150 MG: 50 TABLET ORAL at 09:01

## 2023-01-17 RX ADMIN — ALPRAZOLAM 1 MG: 0.5 TABLET ORAL at 12:01

## 2023-01-17 RX ADMIN — IBUPROFEN 400 MG: 400 TABLET, FILM COATED ORAL at 12:01

## 2023-01-17 RX ADMIN — LAMOTRIGINE 200 MG: 100 TABLET ORAL at 09:01

## 2023-01-17 RX ADMIN — ROCURONIUM BROMIDE 20 MG: 10 SOLUTION INTRAVENOUS at 08:01

## 2023-01-17 RX ADMIN — Medication 30 MG: at 07:01

## 2023-01-17 RX ADMIN — PHENYLEPHRINE HYDROCHLORIDE 100 MCG: 10 INJECTION INTRAVENOUS at 08:01

## 2023-01-17 RX ADMIN — BUPIVACAINE HYDROCHLORIDE 40 ML: 2.5 INJECTION, SOLUTION EPIDURAL; INFILTRATION; INTRACAUDAL; PERINEURAL at 07:01

## 2023-01-17 RX ADMIN — ROPINIROLE HYDROCHLORIDE 4 MG: 1 TABLET, FILM COATED ORAL at 06:01

## 2023-01-17 RX ADMIN — OXYCODONE HYDROCHLORIDE 5 MG: 5 TABLET ORAL at 10:01

## 2023-01-17 RX ADMIN — OXYCODONE HYDROCHLORIDE AND ACETAMINOPHEN 1000 MG: 500 TABLET ORAL at 09:01

## 2023-01-17 RX ADMIN — SODIUM CHLORIDE, SODIUM LACTATE, POTASSIUM CHLORIDE, AND CALCIUM CHLORIDE: 600; 310; 30; 20 INJECTION, SOLUTION INTRAVENOUS at 06:01

## 2023-01-17 RX ADMIN — HYDROMORPHONE HYDROCHLORIDE 0.4 MG: 2 INJECTION INTRAMUSCULAR; INTRAVENOUS; SUBCUTANEOUS at 10:01

## 2023-01-17 RX ADMIN — IBUPROFEN 400 MG: 400 TABLET, FILM COATED ORAL at 05:01

## 2023-01-17 RX ADMIN — HYDROCORTISONE SODIUM SUCCINATE 50 MG: 100 INJECTION, POWDER, FOR SOLUTION INTRAMUSCULAR; INTRAVENOUS at 09:01

## 2023-01-17 RX ADMIN — GABAPENTIN 1600 MG: 400 CAPSULE ORAL at 09:01

## 2023-01-17 RX ADMIN — GLYCOPYRROLATE 0.2 MG: 0.2 INJECTION, SOLUTION INTRAMUSCULAR; INTRAVITREAL at 07:01

## 2023-01-17 RX ADMIN — LACTOBACILLUS ACIDOPHILUS / LACTOBACILLUS BULGARICUS 1 EACH: 100 MILLION CFU STRENGTH GRANULES at 12:01

## 2023-01-17 RX ADMIN — PROPOFOL 180 MG: 10 INJECTION, EMULSION INTRAVENOUS at 07:01

## 2023-01-17 RX ADMIN — OXYCODONE AND ACETAMINOPHEN 1 TABLET: 10; 325 TABLET ORAL at 09:01

## 2023-01-17 RX ADMIN — OXYCODONE AND ACETAMINOPHEN 1 TABLET: 10; 325 TABLET ORAL at 03:01

## 2023-01-17 RX ADMIN — OXYCODONE HYDROCHLORIDE AND ACETAMINOPHEN 1000 MG: 500 TABLET ORAL at 01:01

## 2023-01-17 RX ADMIN — LIDOCAINE HYDROCHLORIDE 100 MG: 20 INJECTION, SOLUTION INTRAVENOUS at 07:01

## 2023-01-17 RX ADMIN — ATORVASTATIN CALCIUM 10 MG: 10 TABLET, FILM COATED ORAL at 09:01

## 2023-01-17 RX ADMIN — SODIUM CHLORIDE, SODIUM LACTATE, POTASSIUM CHLORIDE, AND CALCIUM CHLORIDE: 600; 310; 30; 20 INJECTION, SOLUTION INTRAVENOUS at 08:01

## 2023-01-17 RX ADMIN — BUPIVACAINE 20 ML: 13.3 INJECTION, SUSPENSION, LIPOSOMAL INFILTRATION at 07:01

## 2023-01-17 RX ADMIN — PHENYLEPHRINE HYDROCHLORIDE 200 MCG: 10 INJECTION INTRAVENOUS at 07:01

## 2023-01-17 RX ADMIN — ALBUMIN (HUMAN): 12.5 SOLUTION INTRAVENOUS at 08:01

## 2023-01-17 RX ADMIN — HYDROMORPHONE HYDROCHLORIDE 0.4 MG: 2 INJECTION INTRAMUSCULAR; INTRAVENOUS; SUBCUTANEOUS at 09:01

## 2023-01-17 RX ADMIN — HYDROCORTISONE SODIUM SUCCINATE 100 MG: 100 INJECTION, POWDER, FOR SOLUTION INTRAMUSCULAR; INTRAVENOUS at 07:01

## 2023-01-17 RX ADMIN — CEFAZOLIN 2 G: 330 INJECTION, POWDER, FOR SOLUTION INTRAMUSCULAR; INTRAVENOUS at 07:01

## 2023-01-17 RX ADMIN — ALPRAZOLAM 1 MG: 0.5 TABLET ORAL at 08:01

## 2023-01-17 RX ADMIN — ROCURONIUM BROMIDE 40 MG: 10 SOLUTION INTRAVENOUS at 07:01

## 2023-01-17 RX ADMIN — GABAPENTIN 800 MG: 300 CAPSULE ORAL at 12:01

## 2023-01-17 RX ADMIN — GABAPENTIN 800 MG: 300 CAPSULE ORAL at 05:01

## 2023-01-17 RX ADMIN — FENTANYL CITRATE 100 MCG: 50 INJECTION, SOLUTION INTRAMUSCULAR; INTRAVENOUS at 07:01

## 2023-01-17 RX ADMIN — PRIMIDONE 150 MG: 50 TABLET ORAL at 02:01

## 2023-01-17 NOTE — DISCHARGE INSTRUCTIONS
Your Surgeon Gave You EXPAREL® (bupivacaine liposome injectable suspension)    To help control your pain after surgery, your surgeon injected EXPAREL into your surgical incision just before the end of the procedure.   EXPAREL is a local analgesic that contains the local anesthetic bupivacaine. Local anesthetics provide pain relief by numbing the tissue  around the surgical site.   EXPAREL is specifically designed to release pain medication over time and can control pain for up to 72 hours.   In addition to EXPAREL, your surgeon may provide other pain medications to control your pain.   Each patient is different and responds differently to painmedication. Depending on how you respond to EXPAREL, you may require less  additional pain medication during your recovery.    Possible Side Effects    Side effects can occur with any medication and it is important not to ignore anything you may be experiencing. Some patients who  received EXPAREL experienced nausea, vomiting, or constipation. Rarely, patients who receive bupivacaine (the active ingredient in  EXPAREL) have experienced numbness and tingling in their mouth or lips, lightheadedness, or anxiety. Speak with your doctor right  away if you think you may be experiencing any of these sensations, or if you have other questions regarding possible side effects.    Your Recovery    When your pain is under control, your body can better focus on healing. This is not the time to test your pain tolerance, or grin and  bear it.Work with your surgeon and nurse to make your recovery as speedy and pain-free as possible.   Follow the post-op orders your nurse gave you.   Eat a healthy diet and drink plenty of water. Surgery stresses your body; your body responds by needing more energy to heal.      Important Information About EXPAREL  Products that contain bupivacaine, like EXPAREL, may cause a temporary loss of  sensation or the ability to move in the area where bupivacaine  was injected.    Date Administered: 1/17/2023  Time Administered: 07:12AM    Other Forms of Bupivicaine should not be administered within 96hrs following administration of EXPAREL.

## 2023-01-17 NOTE — TRANSFER OF CARE
"Anesthesia Transfer of Care Note    Patient: Ari Santos    Procedure(s) Performed: Procedure(s) (LRB):  robotic rectopexy (N/A)    Patient location: PACU    Anesthesia Type: general    Transport from OR: Transported from OR on 6-10 L/min O2 by face mask with adequate spontaneous ventilation    Post pain: adequate analgesia    Post assessment: no apparent anesthetic complications and tolerated procedure well    Post vital signs: stable    Level of consciousness: responds to stimulation    Nausea/Vomiting: no nausea/vomiting    Complications: none    Transfer of care protocol was followed      Last vitals:   Visit Vitals  /72 (BP Location: Right arm, Patient Position: Lying)   Pulse 86   Temp 36.4 °C (97.5 °F) (Oral)   Resp 18   Ht 4' 9" (1.448 m)   Wt 59 kg (130 lb)   SpO2 100%   Breastfeeding No   BMI 28.13 kg/m²     "

## 2023-01-17 NOTE — H&P
CRS  History and Physical      SUBJECTIVE:     Chief Complaint: surgery    History of Present Illness:    Ari Santos is a 52 y.o. adult presents with for robotic rectopexy.  Completed prep.  No questions.      Review of patient's allergies indicates:   Allergen Reactions    Bactrim [sulfamethoxazole-trimethoprim] Itching    Penicillins Rash       Past Medical History:   Diagnosis Date    Abnormal CT scan, pelvis 10/27/2019    Abnormal thyroid blood test 05/06/2019    Adrenal cyst     left    Adrenal insufficiency     Blister- healing 01/09/2020    Chronic bilateral low back pain with bilateral sciatica 03/19/2019    Congenital adrenal hyperplasia     Hepatitis B core antibody positive 11/06/2020    Negative sAg, suggests previous exposure but no chronic/active Hep B. At risk for reactivation with any immunosuppression medication, steroids, chemo, etc.      IGT (impaired glucose tolerance) 11/05/2019    Insomnia due to medical condition     Multiple closed traumatic fractures of multiple bones of hip and pelvis with routine healing, subsequent encounter 09/27/2019    Pancreatic cyst 11/02/2020    Restless leg syndrome     S/P lumbar laminectomy 10/28/2019    Spinal stenosis     Spinal stenosis of lumbar region with neurogenic claudication 03/19/2019    Status post sex reassignment surgery 03/19/2019    Hx of Congenital Adrenal Hyperplasia    Unintentional weight loss 07/21/2020     Past Surgical History:   Procedure Laterality Date    BACK SURGERY      CHOLECYSTECTOMY N/A 5/18/2021    Procedure: CHOLECYSTECTOMY;  Surgeon: Antonio Brandt MD;  Location: [facility] OR [address] [place] [address];  Service: General;  Laterality: N/A;    ENDOSCOPIC ULTRASOUND OF UPPER GASTROINTESTINAL TRACT N/A 12/3/2020    Procedure: ULTRASOUND, UPPER GI TRACT, ENDOSCOPIC;  Surgeon: Jonathan Sharma MD;  Location: [facility] ([address] [place] [address]);  Service: Endoscopy;  Laterality: N/A;  elevated alk phos, panc cysts, liver biopsy   11/30-covid pcw-tb     ENDOSCOPIC ULTRASOUND OF UPPER GASTROINTESTINAL TRACT N/A 5/17/2021    Procedure: ULTRASOUND, UPPER GI TRACT, ENDOSCOPIC;  Surgeon: Claudio Salvador MD;  Location: [facility] ENDO (2ND FLR);  Service: Endoscopy;  Laterality: N/A;    ERCP N/A 5/17/2021    Procedure: ERCP (ENDOSCOPIC RETROGRADE CHOLANGIOPANCREATOGRAPHY);  Surgeon: Claudio Salvador MD;  Location: [facility] ENDO (2ND FLR);  Service: Endoscopy;  Laterality: N/A;    gender surgery      multiple surgeries since birth    INJECTION OF ANESTHETIC AGENT AROUND NERVE Right 11/19/2020    Procedure: BLOCK, NERVE, GLENOHUMERAL  need consent;  Surgeon: Messi Cabello MD;  Location: [facility] PAIN MGT;  Service: Pain Management;  Laterality: Right;    KNEE ARTHROPLASTY Left 1/25/2021    Procedure: ARTHROPLASTY, KNEE:DEPUY-ATTUNE REVISION: SERINA iASSIST:CABLES ON HOLD;  Surgeon: Donte Andrade III, MD;  Location: [facility];  Service: Orthopedics;  Laterality: Left;    LAMINECTOMY  09/13/2019    LAPAROSCOPIC CHOLECYSTECTOMY N/A 5/17/2021    Procedure: CHOLECYSTECTOMY, LAPAROSCOPIC;  Surgeon: Calvin Wilson MD;  [facility];  Service: General;  Laterality: N/A;    LAPAROSCOPIC CHOLECYSTECTOMY N/A 5/18/2021    Procedure: CHOLECYSTECTOMY, LAPAROSCOPIC;  Surgeon: Antonio Brandt MD;  Location: [facility] OR 2ND FLR;  Service: General;  Laterality: N/A;    RADIOFREQUENCY ABLATION Left 5/9/2019    Procedure: RADIOFREQUENCY ABLATION, LEFT L3,4,5;  Surgeon: Messi Cabello MD;  Location: [facility] PAIN MGT;  Service: Pain Management;  Laterality: Left;  1 of 2    RADIOFREQUENCY ABLATION Right 5/23/2019    Procedure: RADIOFREQUENCY ABLATION, RIGHT L3,4,5;  Surgeon: Messi Cabello MD;  Location: [facility] PAIN MGT;  Service: Pain Management;  Laterality: Right;  2of 2    SPINE SURGERY      Sept 2019     TOTAL KNEE ARTHROPLASTY Right 1/31/2020    Procedure: ARTHROPLASTY, KNEE, TOTAL;  Surgeon: Donte Andrade III, MD;  [facility];  Service: Orthopedics;  Laterality:  "Right;     Family History   Problem Relation Age of Onset    Diabetes Maternal Grandfather     Cancer Mother     Heart disease Father     Autoimmune disease Sister     Breast cancer Neg Hx     Ovarian cancer Neg Hx     Vaginal cancer Neg Hx     Endometrial cancer Neg Hx     Cervical cancer Neg Hx      Social History     Tobacco Use    Smoking status: Former     Types: Cigarettes     Quit date:      Years since quittin.0    Smokeless tobacco: Never    Tobacco comments:     was not a daily smoker-    Substance Use Topics    Alcohol use: Not Currently    Drug use: Never        Review of Systems:  Review of Systems   All other systems reviewed and are negative.    OBJECTIVE:     Vital Signs (Most Recent)  /69 (BP Location: Left arm, Patient Position: Lying)   Pulse 90   Temp 98.4 °F (36.9 °C) (Oral)   Resp 20   Ht 4' 9" (1.448 m)   Wt 59 kg (130 lb)   SpO2 98%   Breastfeeding No   BMI 28.13 kg/m²     Physical Exam:  General: 52 y.o. adult in no distress   Neuro: alert and oriented x 4.  Moves all extremities.     HEENT: normocephalic, atraumatic, PERRL, EOMI   Respiratory: respirations are even and unlabored  Cardiac: regular rate and rhythm  Abdomen: soft, NTND, well healed midline scar  Extremities: Warm dry and intact  Skin: no rashes      Labs: NA    Imaging: NA      ASSESSMENT/PLAN:     52 y.o. adult with full thickness rectal prolapse who presents for robotic rectopexy    - to OR for robotic rectopexy   - 100 mg solucortef in OR for CAH   - overnight admission for observation     Genia Ku MD  Staff Surgeon  Colon & Rectal Surgery   "

## 2023-01-17 NOTE — ANESTHESIA PROCEDURE NOTES
Intubation    Date/Time: 1/17/2023 7:37 AM  Performed by: Derrek Bonilla CRNA  Authorized by: Jignesh Peña MD     Intubation:     Induction:  Intravenous    Intubated:  Postinduction    Mask Ventilation:  Easy mask    Attempts:  1    Attempted By:  CRNA    Method of Intubation:  Video laryngoscopy    Blade:  Dunaway 3    Laryngeal View Grade: Grade I - full view of cords      Difficult Airway Encountered?: No      Complications:  None    Airway Device:  Oral endotracheal tube    Airway Device Size:  7.5    Style/Cuff Inflation:  Cuffed (inflated to minimal occlusive pressure)    Tube secured:  20    Secured at:  The lips    Placement Verified By:  Capnometry    Complicating Factors:  None    Findings Post-Intubation:  BS equal bilateral and atraumatic/condition of teeth unchanged

## 2023-01-17 NOTE — ANESTHESIA POSTPROCEDURE EVALUATION
Anesthesia Post Evaluation    Patient: Ari Santos    Procedure(s) Performed: Procedure(s) (LRB):  robotic rectopexy (N/A)    Final Anesthesia Type: general      Patient location during evaluation: PACU  Patient participation: Yes- Able to Participate  Level of consciousness: awake and alert  Post-procedure vital signs: reviewed and stable  Pain management: adequate  Airway patency: patent    PONV status at discharge: No PONV  Anesthetic complications: no      Cardiovascular status: blood pressure returned to baseline and stable  Respiratory status: room air  Hydration status: euvolemic  Follow-up not needed.          Vitals Value Taken Time   /73 01/17/23 1012   Temp 36.4 °C (97.5 °F) 01/17/23 0939   Pulse 82 01/17/23 1014   Resp 18 01/17/23 1000   SpO2 100 % 01/17/23 1014   Vitals shown include unvalidated device data.      No case tracking events are documented in the log.      Pain/Andreea Score: Pain Rating Prior to Med Admin: 6 (1/17/2023 10:02 AM)  Andreea Score: 8 (1/17/2023 10:10 AM)

## 2023-01-17 NOTE — OR NURSING
Notified heparin was not given this am. Stated that was ok just to be sure pt is up out of bed and ambulates.

## 2023-01-17 NOTE — BRIEF OP NOTE
01/17/2023    Pre-op diagnosis: rectal prolapse    Post-op diagnosis: same     Procedure: robotic suture rectopexy     Surgeon: Dr. Genia Ku    Assistants: none     Anesthesia: GETA + regional     Specimens: none    EBL: 50 cc    Complications: None     Genia Ku MD  Staff Surgeon   Colon & Rectal Surgery

## 2023-01-17 NOTE — ANESTHESIA PROCEDURE NOTES
Peripheral Block    Patient location during procedure: pre-op   Block not for primary anesthetic.  Reason for block: at surgeon's request and post-op pain management   Post-op Pain Location: abdominal wall pain   Timeout: 1/17/2023 7:00 AM   End time: 1/17/2023 7:05 AM    Staffing  Authorizing Provider: Solomon Reeves MD  Performing Provider: Solomon Reeves MD    Preanesthetic Checklist  Completed: patient identified, IV checked, site marked, risks and benefits discussed, surgical consent, monitors and equipment checked, pre-op evaluation and timeout performed  Peripheral Block  Patient position: supine  Prep: ChloraPrep  Patient monitoring: cardiac monitor, heart rate, continuous pulse ox, continuous capnometry and frequent blood pressure checks  Block type: TAP  Laterality: bilateral  Injection technique: single shot  Needle  Needle type: Stimuplex   Needle gauge: 21 G  Needle length: 4 in  Needle localization: ultrasound guidance   -ultrasound image captured on disc.  Assessment  Injection assessment: negative aspiration, negative parasthesia and local visualized surrounding nerve  Paresthesia pain: none  Heart rate change: no  Slow fractionated injection: yes  Pain Tolerance: comfortable throughout block and no complaints  Medications:    Medications: bupivacaine (pf) (MARCAINE) injection 0.25% - Perineural   40 mL - 1/17/2023 7:05:00 AM    Additional Notes  VSS.  [facility] RN monitoring vitals throughout procedure.  Patient tolerated procedure well.

## 2023-01-18 VITALS
BODY MASS INDEX: 25.52 KG/M2 | WEIGHT: 130 LBS | DIASTOLIC BLOOD PRESSURE: 67 MMHG | HEART RATE: 97 BPM | TEMPERATURE: 98 F | HEIGHT: 60 IN | RESPIRATION RATE: 18 BRPM | OXYGEN SATURATION: 98 % | SYSTOLIC BLOOD PRESSURE: 117 MMHG

## 2023-01-18 PROCEDURE — 63600175 PHARM REV CODE 636 W HCPCS: Performed by: SURGERY

## 2023-01-18 PROCEDURE — 25000003 PHARM REV CODE 250: Performed by: SURGERY

## 2023-01-18 RX ORDER — LAMOTRIGINE 100 MG/1
100 TABLET ORAL DAILY
Status: DISCONTINUED | OUTPATIENT
Start: 2023-01-18 | End: 2023-01-18 | Stop reason: HOSPADM

## 2023-01-18 RX ORDER — OXYCODONE AND ACETAMINOPHEN 10; 325 MG/1; MG/1
1 TABLET ORAL EVERY 4 HOURS PRN
Qty: 20 TABLET | Refills: 0 | Status: SHIPPED | OUTPATIENT
Start: 2023-01-18 | End: 2023-02-01 | Stop reason: SDUPTHER

## 2023-01-18 RX ORDER — LAMOTRIGINE 100 MG/1
200 TABLET ORAL NIGHTLY
Status: DISCONTINUED | OUTPATIENT
Start: 2023-01-18 | End: 2023-01-18 | Stop reason: HOSPADM

## 2023-01-18 RX ADMIN — PREDNISONE 5 MG: 2.5 TABLET ORAL at 08:01

## 2023-01-18 RX ADMIN — OXYCODONE HYDROCHLORIDE AND ACETAMINOPHEN 1000 MG: 500 TABLET ORAL at 08:01

## 2023-01-18 RX ADMIN — LACTOBACILLUS ACIDOPHILUS / LACTOBACILLUS BULGARICUS 1 EACH: 100 MILLION CFU STRENGTH GRANULES at 08:01

## 2023-01-18 RX ADMIN — GABAPENTIN 800 MG: 300 CAPSULE ORAL at 08:01

## 2023-01-18 RX ADMIN — HYDROCORTISONE SODIUM SUCCINATE 50 MG: 100 INJECTION, POWDER, FOR SOLUTION INTRAMUSCULAR; INTRAVENOUS at 06:01

## 2023-01-18 RX ADMIN — IBUPROFEN 400 MG: 400 TABLET, FILM COATED ORAL at 06:01

## 2023-01-18 RX ADMIN — LAMOTRIGINE 100 MG: 100 TABLET ORAL at 08:01

## 2023-01-18 RX ADMIN — IBUPROFEN 400 MG: 400 TABLET, FILM COATED ORAL at 12:01

## 2023-01-18 RX ADMIN — OXYCODONE AND ACETAMINOPHEN 1 TABLET: 10; 325 TABLET ORAL at 08:01

## 2023-01-18 RX ADMIN — PRIMIDONE 150 MG: 50 TABLET ORAL at 08:01

## 2023-01-18 RX ADMIN — OXYCODONE AND ACETAMINOPHEN 1 TABLET: 10; 325 TABLET ORAL at 04:01

## 2023-01-18 NOTE — OR NURSING
Ari Santos has met all discharge criteria from Phase II. Vital Signs are stable, ambulating  without difficulty. Discharge instructions given, patient verbalized understanding. Discharged from facility via wheelchair in stable condition.

## 2023-01-18 NOTE — OR NURSING
Report Given to oncoming berny Zaldivar RN. Patient resting quietly no distress noted, call light in reach will continue to monitor

## 2023-01-18 NOTE — OR NURSING
Report received from AYAAN Woods Patient care assumed. Patient eating dinner tolerating well no complaints. Call light in reach wife at bedside. Instructed to call with needs.

## 2023-01-18 NOTE — PATIENT INSTRUCTIONS
Colorectal Surgery Post Op Instructions:    1. Take tylenol and ibuprofen for pain.  Take prescription pain medication as needed for breakthrough.  Notify your pain management doctor that you have had surgery and have received breakthrough pain medication.   2. Take laxative daily while taking pain medication.    3. Call [phone] for worsening pain, inability to urinate or fever > 101, redness or drainage from incisions.  [facility] after surgery     Genia Ku MD  Staff Surgeon  Colon & Rectal Surgery

## 2023-01-18 NOTE — DISCHARGE SUMMARY
[facility]  Discharge Note  Short Stay    Procedure(s) (LRB):  robotic rectopexy (N/A)      OUTCOME: Patient tolerated treatment/procedure well without complication and is now ready for discharge.    DISPOSITION: Home or Self Care    FINAL DIAGNOSIS:  Rectal prolapse    FOLLOWUP: In clinic    DISCHARGE INSTRUCTIONS:      1. Take tylenol and ibuprofen for pain.  Take prescription pain medication as needed for breakthrough.  Notify your pain management doctor that you have had surgery and have received breakthrough pain medication.   2. Take laxative daily while taking pain medication.    3. Call [phone] for worsening pain, inability to urinate or fever > 101, redness or drainage from incisions.  [facility] after surgery     Genia Ku MD  Staff Surgeon   Colon & Rectal Surgery

## 2023-01-20 ENCOUNTER — PATIENT MESSAGE (OUTPATIENT)
Dept: SURGERY | Facility: CLINIC | Age: 53
End: 2023-01-20
Payer: MEDICARE

## 2023-01-24 ENCOUNTER — PATIENT MESSAGE (OUTPATIENT)
Dept: NEUROLOGY | Facility: CLINIC | Age: 53
End: 2023-01-24
Payer: MEDICARE

## 2023-01-24 DIAGNOSIS — G25.81 RLS (RESTLESS LEGS SYNDROME): ICD-10-CM

## 2023-01-24 RX ORDER — ROPINIROLE 4 MG/1
4 TABLET, FILM COATED ORAL DAILY
Qty: 90 TABLET | Refills: 1 | Status: CANCELLED | OUTPATIENT
Start: 2023-01-15

## 2023-01-24 RX ORDER — ROPINIROLE 4 MG/1
4 TABLET, FILM COATED ORAL DAILY
Qty: 90 TABLET | Refills: 1 | Status: SHIPPED | OUTPATIENT
Start: 2023-01-24 | End: 2023-06-25 | Stop reason: SDUPTHER

## 2023-01-25 ENCOUNTER — PATIENT MESSAGE (OUTPATIENT)
Dept: NEUROLOGY | Facility: CLINIC | Age: 53
End: 2023-01-25
Payer: MEDICARE

## 2023-01-30 ENCOUNTER — OFFICE VISIT (OUTPATIENT)
Dept: PSYCHIATRY | Facility: CLINIC | Age: 53
End: 2023-01-30
Payer: MEDICARE

## 2023-01-30 DIAGNOSIS — F43.10 PTSD (POST-TRAUMATIC STRESS DISORDER): Primary | ICD-10-CM

## 2023-01-30 DIAGNOSIS — F41.9 ANXIETY: ICD-10-CM

## 2023-01-30 PROCEDURE — 99214 OFFICE O/P EST MOD 30 MIN: CPT | Mod: 95,,, | Performed by: NURSE PRACTITIONER

## 2023-01-30 PROCEDURE — 99214 PR OFFICE/OUTPT VISIT, EST, LEVL IV, 30-39 MIN: ICD-10-PCS | Mod: 95,,, | Performed by: NURSE PRACTITIONER

## 2023-01-30 RX ORDER — ALPRAZOLAM 1 MG/1
1 TABLET ORAL 3 TIMES DAILY PRN
Qty: 90 TABLET | Refills: 2 | Status: SHIPPED | OUTPATIENT
Start: 2023-01-30 | End: 2023-05-02 | Stop reason: SDUPTHER

## 2023-01-30 RX ORDER — LAMOTRIGINE 200 MG/1
200 TABLET ORAL 2 TIMES DAILY
Qty: 180 TABLET | Refills: 0 | Status: SHIPPED | OUTPATIENT
Start: 2023-01-30 | End: 2023-05-02 | Stop reason: SDUPTHER

## 2023-01-30 NOTE — PROGRESS NOTES
"Outpatient Psychiatry Follow-Up Visit (MD/NP)    1/30/2023    Clinical Status of Patient:  Outpatient (Ambulatory)    Chief Complaint:  Ari Santos is a 52 y.o. adult who presents today for follow-up of mood disorder and anxiety.  Met with patient.      The patient location is: [place]   The chief complaint leading to consultation is: depression/anxiety    Visit type: audiovisual    Each patient to whom he or she provides medical services by telemedicine is:  (1) informed of the relationship between the physician and patient and the respective role of any other health care provider with respect to management of the patient; and (2) notified that he or she may decline to receive medical services by telemedicine and may withdraw from such care at any time.    Notes:       Interval History and Content of Current Session:    Reports his holidays went well, spent it with family. Recently had surgery, states that he is recovering at this time, pain is manageable. Patient reports his mood has been stable, "I'm not tanking, everything is as normal as it can for someone with PTSD." Denies all sx associated with PTSD. Denies persistent depressed mood. Continues to engage in activities that he enjoys, "I'm in good spirits because art helps me." Patient denies concerns regarding anxiety, states that it is adequately controlled at this time, "I have anxiety but when I take my Xanax everything is good." Denies breakthrough anxiety. No panic attacks. No ricardo. No psychosis. No lethality concerns.     Patient presents with euthymic mood and affect. He appears to be dressed appropriately. He is mildly guarded, responses to questions are relatively short, "I know you have to ask all these questions but I'm good, really, I'm good."    HPI/Psychiatric Review Of Systems (is patient experiencing or having changes in):    Mood: euthymic  Anhedonia/interest: denies  Guilt/hopelessness: denies  Concentration: no concerns " expressed  Sleep: adequate, denies insomnia  Energy: baseline, denies excessive fatigue  Appetite: baseline, reports weight is stable  SI/SIB/VI/HI: denies all   Anxiety/panic: denies concerns, feels anxiety is adequately treated  Paranoia: denies  AVH: denies  Substance use: denies all    Medications:    Lamotrigine 200 mg BID -- compliant, denies SE including rash  Alprazolam 1 mg TID -- takes 2-3x daily     Brief synopsis:  Euthymic, no evident PTSD symptoms      Review of Systems   PSYCHIATRIC: Pertinant items are noted in the narrative.  CONSTITUTIONAL: No weight gain or loss.   MUSCULOSKELETAL: chronic pain  CARDIOVASCULAR: No tachycardia or chest pain.  GASTROINTESTINAL: No nausea, vomiting, pain, constipation or diarrhea.  GENITOURINARY: No frequency, dysuria or sexual dysfunction.    Past Medical, Family and Social History: The patient's past medical, family and social history have been reviewed and updated as appropriate within the electronic medical record - see encounter notes.    Compliance: see above    Side effects: see above    Risk Parameters:  Patient reports no suicidal ideation  Patient reports no homicidal ideation  Patient reports no self-injurious behavior  Patient reports no violent behavior    Exam (detailed: at least 9 elements; comprehensive: all 15 elements)   Constitutional  Vitals:  Most recent vital signs, dated less than 90 days prior to this appointment, were reviewed.   There were no vitals filed for this visit.       General:  unremarkable, age appropriate, dressed casually     Musculoskeletal  Muscle Strength/Tone:  no spasicity, no rigidity, no cogwheeling   Gait & Station:  uses cane     Psychiatric  Speech:  no latency; no press   Mood & Affect:  euthymic  congruent and appropriate   Thought Process:  normal and logical   Associations:  intact   Thought Content:  normal, no suicidality, no homicidality, delusions, or paranoia   Insight:  intact   Judgement: behavior is adequate  to circumstances   Orientation:  grossly intact   Memory: intact for content of interview   Language: grossly intact   Attention Span & Concentration:  able to focus   Fund of Knowledge:  intact and appropriate to age and level of education     Assessment and Diagnosis   Status/Progress: Based on the examination today, the patient's problem(s) is/are well controlled.  New problems have not been presented today.   Co-morbidities, Diagnostic uncertainty, and Lack of compliance are not complicating management of the primary condition.  There are no active rule-out diagnoses for this patient at this time.     General Impression: Ari Santos is a 52 y.o. adult with a psychiatric hx of PTSD and anxiety presenting without active symptoms of PTSD or HELDER. Unclear origin of PTSD diagnosis. 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 Reports being prescribed lamotrigine and alprazolam for 10+ years. Remains unclear as to why patient is prescribed mood stabilizer vs SSRI/SNRI antidepressant. Remote hx of self-injurious behavior in adolescence. No hx of suicide attempts. No hx of drug abuse. Lives with his wife. 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     01/30/23 -- Euthymic mood. Denies anxiety concerns. No evident PTSD sx        ICD-10-CM ICD-9-CM   1. PTSD (post-traumatic stress disorder)  F43.10 309.81   2. Anxiety  F41.9 300.00         Intervention/Counseling/Treatment Plan   Reviewed patient's symptoms,and medication regimen  Spent time educating patient on risks associated with chronic benzodiazepine use  Risk of dependence   Risks associated with chronic use, especially with aging (falls, possible cognitive impairment)  Risks when used with opioids, decreased respiratory drive, risk of overdose   Strongly encouraged patient to utilize 1/2 tablet of alprazolam at least 1x daily instead of full tablet  Patient  mildly receptive to medication, appears hesitant, change in affect when discussing   Will give patient time to process education and recommendation  Plan on reiterating education at next appointment and proceeding with gradual taper     Medication Management  Prescription drug management was employed during the encounter, as medications were prescribed, or considered but not prescribed.   [facility] reviewed  The risks and benefits of medication were discussed with the patient.  Possible expectable adverse effects of any current or proposed individual psychotropic agents were discussed with this patient.  Counseling was provided on the importance of full compliance with any prescribed medication.  Detailed instructions were provided to the patient regarding the administration of any prescribed medication.  Patient voiced understanding    Return to Clinic: 3 months

## 2023-01-31 ENCOUNTER — PATIENT MESSAGE (OUTPATIENT)
Dept: PAIN MEDICINE | Facility: CLINIC | Age: 53
End: 2023-01-31
Payer: MEDICARE

## 2023-02-01 ENCOUNTER — HOSPITAL ENCOUNTER (OUTPATIENT)
Dept: RADIOLOGY | Facility: HOSPITAL | Age: 53
Discharge: HOME | End: 2023-02-01
Attending: PODIATRIST
Payer: MEDICARE

## 2023-02-01 ENCOUNTER — OFFICE VISIT (OUTPATIENT)
Dept: PODIATRY | Facility: CLINIC | Age: 53
End: 2023-02-01
Payer: MEDICARE

## 2023-02-01 VITALS — WEIGHT: 130.06 LBS | BODY MASS INDEX: 25.53 KG/M2 | HEIGHT: 60 IN

## 2023-02-01 DIAGNOSIS — M54.12 CERVICAL RADICULOPATHY: Primary | ICD-10-CM

## 2023-02-01 DIAGNOSIS — G89.4 CHRONIC PAIN SYNDROME: ICD-10-CM

## 2023-02-01 DIAGNOSIS — L97.512 CHRONIC TOE ULCER, RIGHT, WITH FAT LAYER EXPOSED: Primary | ICD-10-CM

## 2023-02-01 DIAGNOSIS — M79.2 NEUROPATHIC PAIN: ICD-10-CM

## 2023-02-01 DIAGNOSIS — R20.2 PARESTHESIA: ICD-10-CM

## 2023-02-01 DIAGNOSIS — B35.3 TINEA PEDIS, UNSPECIFIED LATERALITY: ICD-10-CM

## 2023-02-01 DIAGNOSIS — L97.512 CHRONIC TOE ULCER, RIGHT, WITH FAT LAYER EXPOSED: ICD-10-CM

## 2023-02-01 PROCEDURE — 73630 X-RAY EXAM OF FOOT: CPT | Mod: 26,RT,, | Performed by: RADIOLOGY

## 2023-02-01 PROCEDURE — 99203 OFFICE O/P NEW LOW 30 MIN: CPT | Mod: S$PBB,,, | Performed by: PODIATRIST

## 2023-02-01 PROCEDURE — 99999 PR PBB SHADOW E&M-EST. PATIENT-LVL III: ICD-10-PCS | Mod: PBBFAC,,, | Performed by: PODIATRIST

## 2023-02-01 PROCEDURE — 99213 OFFICE O/P EST LOW 20 MIN: CPT | Mod: PBBFAC,PO | Performed by: PODIATRIST

## 2023-02-01 PROCEDURE — 73630 X-RAY EXAM OF FOOT: CPT | Mod: TC,FY,PO,RT

## 2023-02-01 PROCEDURE — 99203 PR OFFICE/OUTPT VISIT, NEW, LEVL III, 30-44 MIN: ICD-10-PCS | Mod: S$PBB,,, | Performed by: PODIATRIST

## 2023-02-01 PROCEDURE — 99999 PR PBB SHADOW E&M-EST. PATIENT-LVL III: CPT | Mod: PBBFAC,,, | Performed by: PODIATRIST

## 2023-02-01 PROCEDURE — 73630 XR FOOT COMPLETE 3 VIEW RIGHT: ICD-10-PCS | Mod: 26,RT,, | Performed by: RADIOLOGY

## 2023-02-01 RX ORDER — DICLOFENAC SODIUM 10 MG/G
2 GEL TOPICAL DAILY
Qty: 100 G | Refills: 3 | Status: SHIPPED | OUTPATIENT
Start: 2023-02-01

## 2023-02-01 RX ORDER — DOXYCYCLINE 100 MG/1
100 CAPSULE ORAL EVERY 12 HOURS
Qty: 20 CAPSULE | Refills: 0 | Status: SHIPPED | OUTPATIENT
Start: 2023-02-01 | End: 2023-03-10

## 2023-02-01 RX ORDER — KETOCONAZOLE 20 MG/G
CREAM TOPICAL DAILY
Qty: 60 G | Refills: 3 | Status: SHIPPED | OUTPATIENT
Start: 2023-02-01

## 2023-02-01 RX ORDER — OXYCODONE AND ACETAMINOPHEN 10; 325 MG/1; MG/1
1 TABLET ORAL EVERY 8 HOURS PRN
Qty: 90 TABLET | Refills: 0 | Status: SHIPPED | OUTPATIENT
Start: 2023-02-01 | End: 2023-03-10 | Stop reason: SDUPTHER

## 2023-02-01 NOTE — PROGRESS NOTES
Colon & Rectal Surgery Clinic Follow Up    HPI:   Ari Santos is a 52 y.o. adult who presents for follow up after robotic suture rectopexy for full thickness rectal prolapse on 1/17/23.  He denies any further prolapse.  Remains on a bowel regimen and is avoiding straining.  Incisions healing well.       Objective:   Vitals:    02/03/23 1557   BP: 112/68   Pulse: 95   Resp: 19        Physical Exam   Gen: well developed male, NAD  HEENT: normocephalic, atraumatic, PERRL, EOMI  CV: RRR, no murmurs  Resp: nonlabored, CTAB   Abd: soft, NTND, incisions well approximated without erythema or drainage   MSK: no gross deformities     Assessment and Plan:   Ari Santos  is a 52 y.o. adult who presents for follow up after robotic suture rectopexy    - doing well, continue to avoid constipation and straining  - incisions healing well  - discussed slightly higher increased risk of recurrence due to chronic steroids   - will call to schedule colonoscopy when he is due       Genia Ku MD  Staff Surgeon   Colon & Rectal Surgery

## 2023-02-01 NOTE — TELEPHONE ENCOUNTER
Patient requesting refill on oxycodone 10 mg   Last office visit 11/29/22   shows last refill on 11/29/22  Patient does have a pain contract on file with [facility] department  Patient last UDS 8/21/20 was consistent with current therapy   OXYCODONE  Present  Present

## 2023-02-01 NOTE — PROGRESS NOTES
Subjective:      Patient ID: Ari Santos is a 52 y.o. adult.    Chief Complaint: Foot Problem    Ari is a 52 y.o. adult who presents to the podiatry clinic  with complaint of  right foot pain. Onset of the symptoms was several weeks ago. Precipitating event: none known. Current symptoms include: ability to bear weight, but with some pain. Aggravating factors: any weight bearing. Symptoms have progressed to a point and plateaued. Patient has had no prior foot problems. Evaluation to date: none. Treatment to date: none. Patients rates pain 4/10 on pain scale. Reports itching both feet. Also reports blister right 5th toe x several weeks.     Review of Systems   Constitutional: Negative for chills.   Cardiovascular:  Negative for chest pain and claudication.   Respiratory:  Negative for cough.    Skin:  Positive for color change, dry skin and nail changes.   Musculoskeletal:  Positive for joint pain.   Gastrointestinal:  Negative for nausea.   Neurological:  Positive for paresthesias. Negative for numbness.   Psychiatric/Behavioral:  The patient is not nervous/anxious.          Objective:      Physical Exam  Constitutional:       Appearance: He is well-developed.      Comments: Oriented to time, place, and person.   Cardiovascular:      Comments: DP and PT pulses are palpable bilaterally. 3 sec capillary refill time and toes and feet are warm to touch proximally .  There is  hair growth on the feet and toes b/l. There is no edema b/l. No spider veins or varicosities present b/l.     Musculoskeletal:      Comments: Equinus noted b/l ankles with < 10 deg DF noted. MMT 5/5 in DF/PF/Inv/Ev resistance with no reproduction of pain in any direction. Passive range of motion of ankle and pedal joints is painless b/l.     Feet:      Right foot:      Skin integrity: No callus or dry skin.      Left foot:      Skin integrity: No callus or dry skin.   Lymphadenopathy:      Comments: Negative lymphadenopathy bilateral  popliteal fossa and tarsal tunnel.   Skin:     Comments: Right 5th toe ulceration 0.3cmx0.3cmx0.1cm    Scaling dryness in a moccasin distribution is noted to the bilateral lower extremities with associated erythema.           Neurological:      Mental Status: He is alert.      Comments: Light touch, proprioception, and sharp/dull sensation are all intact bilaterally. Protective threshold with the [place]-Wienstein monofilament is intact bilaterally.    Subjective paresthesias with no clearly identifiable source or trigger.        Psychiatric:         Behavior: Behavior is cooperative.             Assessment:       Encounter Diagnoses   Name Primary?    Chronic toe ulcer, right, with fat layer exposed Yes    Tinea pedis, unspecified laterality          Plan:       Ari was seen today for foot problem.    Diagnoses and all orders for this visit:    Chronic toe ulcer, right, with fat layer exposed  -     Ankle Brachial Indices (RUSTY); Future  -     X-Ray Foot Complete Right; Future    Tinea pedis, unspecified laterality    Other orders  -     doxycycline (VIBRAMYCIN) 100 MG Cap; Take 1 capsule (100 mg total) by mouth every 12 (twelve) hours.  -     ketoconazole (NIZORAL) 2 % cream; Apply topically once daily.  -     diclofenac sodium (VOLTAREN) 1 % Gel; Apply 2 g topically once daily.      I counseled the patient on his conditions, their implications and medical management.    Rx. Doxycycline    Ketoconazole 2% topical cream prescribed for treatment of aforementioned tinea pedis. Patient will use this medication as directed in addition to thourougly drying between toes daily, and applying powder as needed    Rx Voltaren gel to be applied to affected area up to 3-4 x daily as needed for pain    Xray right foot    RUSTY ordered.     Instructed on daily betadine application.       F/u 2 weeks.

## 2023-02-02 ENCOUNTER — TELEPHONE (OUTPATIENT)
Dept: SURGERY | Facility: CLINIC | Age: 53
End: 2023-02-02
Payer: MEDICARE

## 2023-02-03 ENCOUNTER — OFFICE VISIT (OUTPATIENT)
Dept: SURGERY | Facility: CLINIC | Age: 53
End: 2023-02-03
Payer: MEDICARE

## 2023-02-03 VITALS
BODY MASS INDEX: 27.06 KG/M2 | HEART RATE: 95 BPM | DIASTOLIC BLOOD PRESSURE: 68 MMHG | OXYGEN SATURATION: 100 % | RESPIRATION RATE: 19 BRPM | SYSTOLIC BLOOD PRESSURE: 112 MMHG | WEIGHT: 137.81 LBS | HEIGHT: 60 IN

## 2023-02-03 DIAGNOSIS — K62.3 RECTAL PROLAPSE: Primary | ICD-10-CM

## 2023-02-03 PROCEDURE — 99214 OFFICE O/P EST MOD 30 MIN: CPT | Mod: PBBFAC | Performed by: SURGERY

## 2023-02-03 PROCEDURE — 99999 PR PBB SHADOW E&M-EST. PATIENT-LVL IV: ICD-10-PCS | Mod: PBBFAC,,, | Performed by: SURGERY

## 2023-02-03 PROCEDURE — 99024 PR POST-OP FOLLOW-UP VISIT: ICD-10-PCS | Mod: POP,,, | Performed by: SURGERY

## 2023-02-03 PROCEDURE — 99999 PR PBB SHADOW E&M-EST. PATIENT-LVL IV: CPT | Mod: PBBFAC,,, | Performed by: SURGERY

## 2023-02-03 PROCEDURE — 99024 POSTOP FOLLOW-UP VISIT: CPT | Mod: POP,,, | Performed by: SURGERY

## 2023-02-05 ENCOUNTER — PATIENT MESSAGE (OUTPATIENT)
Dept: SURGERY | Facility: CLINIC | Age: 53
End: 2023-02-05
Payer: MEDICARE

## 2023-02-10 ENCOUNTER — HOSPITAL ENCOUNTER (OUTPATIENT)
Dept: RADIOLOGY | Facility: OTHER | Age: 53
Discharge: HOME | End: 2023-02-10
Attending: INTERNAL MEDICINE
Payer: MEDICARE

## 2023-02-10 DIAGNOSIS — E24.2 DRUG-INDUCED CUSHING'S SYNDROME: ICD-10-CM

## 2023-02-10 DIAGNOSIS — E27.40 ADRENAL INSUFFICIENCY: ICD-10-CM

## 2023-02-10 DIAGNOSIS — E25.0 CONGENITAL ADRENAL HYPERPLASIA: ICD-10-CM

## 2023-02-10 PROCEDURE — 77080 DEXA BONE DENSITY SPINE HIP: ICD-10-PCS | Mod: 26,,, | Performed by: RADIOLOGY

## 2023-02-10 PROCEDURE — 77080 DXA BONE DENSITY AXIAL: CPT | Mod: TC

## 2023-02-10 PROCEDURE — 77080 DXA BONE DENSITY AXIAL: CPT | Mod: 26,,, | Performed by: RADIOLOGY

## 2023-02-13 ENCOUNTER — OFFICE VISIT (OUTPATIENT)
Dept: NEUROLOGY | Facility: CLINIC | Age: 53
End: 2023-02-13
Payer: MEDICARE

## 2023-02-13 DIAGNOSIS — G25.81 RLS (RESTLESS LEGS SYNDROME): Primary | ICD-10-CM

## 2023-02-13 DIAGNOSIS — E61.1 IRON DEFICIENCY: ICD-10-CM

## 2023-02-13 DIAGNOSIS — R25.1 TREMOR: ICD-10-CM

## 2023-02-13 PROCEDURE — 99215 OFFICE O/P EST HI 40 MIN: CPT | Mod: 95,,, | Performed by: PSYCHIATRY & NEUROLOGY

## 2023-02-13 PROCEDURE — 99215 PR OFFICE/OUTPT VISIT, EST, LEVL V, 40-54 MIN: ICD-10-PCS | Mod: 95,,, | Performed by: PSYCHIATRY & NEUROLOGY

## 2023-02-13 NOTE — PROGRESS NOTES
"The patient location is: home  The chief complaint leading to consultation is: Temor    Visit type: audiovisual    Face to Face time with patient: 20mins  20 minutes of total time spent on the encounter, which includes face to face time and non-face to face time preparing to see the patient (eg, review of tests), Obtaining and/or reviewing separately obtained history, Documenting clinical information in the electronic or other health record, Independently interpreting results (not separately reported) and communicating results to the patient/family/caregiver, or Care coordination (not separately reported).     Each patient to whom he or she provides medical services by telemedicine is:  (1) informed of the relationship between the physician and patient and the respective role of any other health care provider with respect to management of the patient; and (2) notified that he or she may decline to receive medical services by telemedicine and may withdraw from such care at any time.    Notes:     [facility]    PCP/Referring Provider: No referring provider defined for this encounter.  Date of Service: 2/13/2023    Chief Complaint: RLS and tremors      Interval Hx    Since last visit,  Started Fe, tumeric, vitamin D    Continues primidone 150mg PO TID  No oversedation    Making art  No oversedation    Uncle has hand tremors    Continues to have arthritis and fibromyalgia knees  No shuffling gait    Has been off latuda 3 years when he noticed a tremor    Continues ropinirole 4mg QHS  No impulsive behavior  RLS is well controlled    On gabapetnin chronically 800mg 5 pills/day  On prednisone chronically    "PriorHPI: Ari Santos is a R HANDED 52 y.o. adult with a medical issues significant for Lspine surg 2019 (stenosis), R knee replacement, concussions, adrenal hyperplasia, fibromyalgia, coming for help with hand tremors and RLS. For RLS, he's had this for 20 years and feels like it's well " "controlled. He feels a bilateral leg sensation improved by movement. These sensations start around 9PM. He feels like this is well controlled with 4mg requip at night. Takes Gabapentin 800mg TID and 1600 QHS. Unknown if ETOH sensitive.  Hand tremor for 10 years.  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 Primidone 100mg PO TID. This helps tremors a 1/2 hour after taking primidone. He notices tremors worse when paining upright and less when he's drawing. 30 minutes after primidone he has no tremor. Primidone lasts 3-4 hours.    Balance issues. Falls frequently. Imbalance since 10 years. He attributes this to needing a knee replacement.    Not overly sedated.    Currently on lamictal    Uncle has a tremor.    Medication history:  -Tried propranolol and it did not seem to help      Neuroleptic exposure:  -Was on latuda 2 years ago    PD Review of Symptoms:  Anosmia: none  Depression: as above  Cognitive slowing: mild  Orthostasis: mild  Falls: yes  Micrographia: none  Sleep issues:  -RBD: several reactions to nightmares - some PTSD"    Review of Systems:   Review of Systems   Constitutional:  Negative for fever.   HENT:  Negative for congestion.    Eyes:  Negative for double vision.   Respiratory:  Negative for cough and shortness of breath.    Cardiovascular:  Negative for chest pain and leg swelling.   Gastrointestinal:  Negative for nausea.   Genitourinary:  Negative for dysuria.   Musculoskeletal:  Negative for falls.   Skin:  Negative for rash.   Neurological:  Positive for tremors. Negative for speech change and headaches.   Psychiatric/Behavioral:  Negative for depression.        Current Medications:  Outpatient Encounter Medications as of 2/13/2023   Medication Sig Dispense Refill    ALPRAZolam (XANAX) 1 MG tablet Take 1 tablet (1 mg total) by mouth 3 (three) times daily as needed for Anxiety. 90 tablet 2    ascorbic acid, vitamin C, (VITAMIN C) 1000 MG tablet Take 1,000 mg by mouth 2 (two) times " daily.      atorvastatin (LIPITOR) 10 MG tablet Take 1 tablet (10 mg total) by mouth every evening. 90 tablet 1    cranberry fruit extract (CRANBERRY ORAL) Take by mouth.      cyanocobalamin (VITAMIN B-12) 1000 MCG tablet Take 100 mcg by mouth once daily.      cyclobenzaprine (FLEXERIL) 10 MG tablet Take 1 tablet (10 mg total) by mouth 3 (three) times daily as needed for Muscle spasms. 270 tablet 1    diclofenac sodium (VOLTAREN) 1 % Gel Apply 2 g topically once daily. 100 g 3    docusate sodium (COLACE) 100 MG capsule Take 1 capsule (100 mg total) by mouth 2 (two) times daily as needed for Constipation. 60 capsule 0    doxycycline (VIBRAMYCIN) 100 MG Cap Take 1 capsule (100 mg total) by mouth every 12 (twelve) hours. 20 capsule 0    gabapentin (NEURONTIN) 800 MG tablet Take 1 tablet by mouth three times a day and take 2 tablets at night (5 tablets a day, 4000mg) 450 tablet 2    ketoconazole (NIZORAL) 2 % cream Apply topically once daily. 60 g 3    Lactobacillus acidophilus Cap Take by mouth once daily.       lamoTRIgine (LAMICTAL) 200 MG tablet Take 1 tablet (200 mg total) by mouth 2 (two) times daily. 180 tablet 0    loratadine (CLARITIN) 10 mg tablet Take 10 mg by mouth once daily.       naproxen sodium (ANAPROX) 220 MG tablet Take 220 mg by mouth.      oxyCODONE-acetaminophen (PERCOCET)  mg per tablet Take 1 tablet by mouth every 8 (eight) hours as needed for Pain. 90 tablet 0    predniSONE (DELTASONE) 5 MG tablet Take 1 tablet (5 mg total) by mouth once daily. Double the dose if sick 200 tablet 3    primidone (MYSOLINE) 50 MG Tab Take 3 tablets (150 mg total) by mouth 3 (three) times daily. 810 tablet 0    rOPINIRole (REQUIP) 4 MG tablet Take 1 tablet (4 mg total) by mouth once daily. 90 tablet 1    testosterone (ANDROGEL) 1 % (50 mg/5 gram) GlPk Apply 1 packet (5 grams) topically once daily. 30 packet 5    triamcinolone acetonide 0.1% (KENALOG) 0.1 % cream Apply topically 2 (two) times daily. 45 g 0     [DISCONTINUED] ALPRAZolam (XANAX) 1 MG tablet Take 1 tablet (1 mg total) by mouth 3 (three) times daily as needed for Anxiety. 90 tablet 2    [DISCONTINUED] lamoTRIgine (LAMICTAL) 200 MG tablet Take 1 tablet (200 mg total) by mouth 2 (two) times daily. 180 tablet 0    [DISCONTINUED] oxyCODONE-acetaminophen (PERCOCET)  mg per tablet Take 1 tablet by mouth every 8 (eight) hours as needed for Pain. 90 tablet 0    [DISCONTINUED] oxyCODONE-acetaminophen (PERCOCET)  mg per tablet Take 1 tablet by mouth every 4 (four) hours as needed for Pain (Take normal pain prescription as prescribed by pain management provider.  Take additional percocet as needed for post surgical pain only.). 20 tablet 0    [DISCONTINUED] rOPINIRole (REQUIP) 4 MG tablet Take 1 tablet (4 mg total) by mouth once daily. 90 tablet 1     Facility-Administered Encounter Medications as of 2/13/2023   Medication Dose Route Frequency Provider Last Rate Last Admin    fentaNYL injection 25 mcg  25 mcg Intravenous Q5 Min PRN French Smith MD        midazolam (VERSED) 1 mg/mL injection 0.5 mg  0.5 mg Intravenous PRN French Smith MD        [DISCONTINUED] ALPRAZolam tablet 1 mg  1 mg Oral TID PRN Genia Ku MD   1 mg at 01/17/23 2005    [DISCONTINUED] ascorbic acid (vitamin C) tablet 1,000 mg  1,000 mg Oral BID Genia Ku MD   1,000 mg at 01/18/23 0808    [DISCONTINUED] atorvastatin tablet 10 mg  10 mg Oral QHS Genia Ku MD   10 mg at 01/17/23 2143    [DISCONTINUED] cyclobenzaprine tablet 10 mg  10 mg Oral TID PRN Genia Ku MD        [DISCONTINUED] docusate sodium capsule 100 mg  100 mg Oral BID PRN Genia Ku MD        [DISCONTINUED] gabapentin capsule 1,600 mg  1,600 mg Oral QHS Genia Ku MD   1,600 mg at 01/17/23 2144    [DISCONTINUED] gabapentin capsule 800 mg  800 mg Oral TID Genia Ku MD   800 mg at 01/18/23 0805    [DISCONTINUED] hydrocortisone sodium succinate injection 50 mg  50 mg Intravenous  Q8H Genia Ku MD   50 mg at 01/18/23 0610    [DISCONTINUED] HYDROmorphone (PF) injection 0.5 mg  0.5 mg Intravenous Q6H PRN Genia Ku MD        [DISCONTINUED] ibuprofen tablet 400 mg  400 mg Oral Q6H Genia Ku MD   400 mg at 01/18/23 0611    [DISCONTINUED] lactobacillus acidophilus & bulgar 100 million cell packet 1 each  1 packet Oral Daily Genia Ku MD   1 each at 01/18/23 0810    [DISCONTINUED] lamoTRIgine tablet 200 mg  200 mg Oral BID Genia Ku MD   200 mg at 01/17/23 2146    [DISCONTINUED] oxyCODONE-acetaminophen  mg per tablet 1 tablet  1 tablet Oral Q4H PRN Genia Ku MD   1 tablet at 01/18/23 0803    [DISCONTINUED] predniSONE tablet 5 mg  5 mg Oral Daily Genia Ku MD   5 mg at 01/18/23 0807    [DISCONTINUED] primidone tablet 150 mg  150 mg Oral TID Genia Ku MD   150 mg at 01/18/23 0808       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    Cervical radiculopathy    Thoracic myofascial strain    Pelvic fluid collection    Elevated LFTs    Chronic, continuous use of opioids    Primary osteoarthritis of right knee    KIARA (obstructive sleep apnea)    Spinal stenosis    Arthritis of right shoulder region    Status post total left knee replacement 11/2/2020    Pancreatic cyst    Elevated alkaline phosphatase level    Elevated liver enzymes    Liver fibrosis    Hepatitis B core antibody positive    Pancreas cyst    Osteopenia    Left knee pain    Primary osteoarthritis of left knee    Acute cholecystitis    Ascending cholangitis    Grade III hemorrhoids    Rectal  prolapse       Past Surgical History:  Past Surgical History:   Procedure Laterality Date    BACK SURGERY      CHOLECYSTECTOMY N/A 5/18/2021    Procedure: CHOLECYSTECTOMY;  Surgeon: Antonio Brandt MD;  Location: [address];  Service: General;  Laterality: N/A;    ENDOSCOPIC ULTRASOUND OF UPPER GASTROINTESTINAL TRACT N/A 12/3/2020    Procedure: ULTRASOUND, UPPER GI TRACT, ENDOSCOPIC;  Surgeon: Jonathan Sharma MD;  [facility]  Service: Endoscopy;  Laterality: N/A;  elevated alk phos, panc cysts, liver biopsy   11/30-covid pcw-tb    ENDOSCOPIC ULTRASOUND OF UPPER GASTROINTESTINAL TRACT N/A 5/17/2021    Procedure: ULTRASOUND, UPPER GI TRACT, ENDOSCOPIC;  Surgeon: Claudio Salvador MD;  [facility]  Service: Endoscopy;  Laterality: N/A;    ERCP N/A 5/17/2021    Procedure: ERCP (ENDOSCOPIC RETROGRADE CHOLANGIOPANCREATOGRAPHY);  Surgeon: Claudio Salvador MD;  [facility]  Service: Endoscopy;  Laterality: N/A;    gender surgery      multiple surgeries since birth    INJECTION OF ANESTHETIC AGENT AROUND NERVE Right 11/19/2020    Procedure: BLOCK, NERVE, GLENOHUMERAL  need consent;  Surgeon: Messi Cabello MD;  Location: [facility];  Service: Pain Management;  Laterality: Right;    KNEE ARTHROPLASTY Left 1/25/2021    Procedure: ARTHROPLASTY, KNEE:DEPUY-ATTUNE REVISION: SERINA iASSIST:CABLES ON HOLD;  Surgeon: Donte Andrade III, MD;  Location: [facility];  Service: Orthopedics;  Laterality: Left;    LAMINECTOMY  09/13/2019    LAPAROSCOPIC CHOLECYSTECTOMY N/A 5/17/2021    Procedure: CHOLECYSTECTOMY, LAPAROSCOPIC;  Surgeon: Calvin Wilson MD;  [address];  Service: General;  Laterality: N/A;    LAPAROSCOPIC CHOLECYSTECTOMY N/A 5/18/2021    Procedure: CHOLECYSTECTOMY, LAPAROSCOPIC;  Surgeon: Antonio Brandt MD;  Location: [address];  Service: General;  Laterality: N/A;    RADIOFREQUENCY ABLATION Left 5/9/2019    Procedure:  RADIOFREQUENCY ABLATION, LEFT L3,4,5;  Surgeon: Messi Cabello MD;  Location: [facility] PAIN T;  Service: Pain Management;  Laterality: Left;  1 of 2    RADIOFREQUENCY ABLATION Right 2019    Procedure: RADIOFREQUENCY ABLATION, RIGHT L3,4,5;  Surgeon: Messi Cabello MD;  Location: [facility] PAIN T;  Service: Pain Management;  Laterality: Right;  2of 2    ROBOT-ASSISTED LAPAROSCOPIC RECTOPEXY N/A 2023    Procedure: ROBOTIC RECTOPEXY;  Surgeon: Genia Ku MD;  Location: [facility] OR;  Service: Colon and Rectal;  Laterality: N/A;    SPINE SURGERY      2019     TOTAL KNEE ARTHROPLASTY Right 2020    Procedure: ARTHROPLASTY, KNEE, TOTAL;  Surgeon: Donte Andrade III, MD;  Location: [address];  Service: Orthopedics;  Laterality: Right;       Current Living Situation: home    Social:  Social History     Socioeconomic History    Marital status:      Spouse name: Kristy Sainz    Number of children: 1   Occupational History     Comment:  and artist   Tobacco Use    Smoking status: Former     Types: Cigarettes     Quit date: 2009     Years since quittin.1    Smokeless tobacco: Never    Tobacco comments:     was not a daily smoker-    Substance and Sexual Activity    Alcohol use: Not Currently    Drug use: Never    Sexual activity: Yes     Partners: Female   Social History Narrative    Lives in a house with his wife      Social Determinants of Health     Financial Resource Strain: Low Risk     Difficulty of Paying Living Expenses: Not hard at all   Food Insecurity: No Food Insecurity    Worried About Running Out of Food in the Last Year: Never true    Ran Out of Food in the Last Year: Never true   Transportation Needs: No Transportation Needs    Lack of Transportation (Medical): No    Lack of Transportation (Non-Medical): No   Physical Activity: Insufficiently Active    Days of Exercise per Week: 4 days    Minutes of Exercise per Session: 20 min   Stress: Stress Concern  Present    Feeling of Stress : To some extent   Social Connections: Unknown    Frequency of Communication with Friends and Family: More than three times a week    Frequency of Social Gatherings with Friends and Family: Patient refused    Active Member of Clubs or Organizations: No    Attends Club or Organization Meetings: Patient refused    Marital Status:    Housing Stability: Low Risk     Unable to Pay for Housing in the Last Year: No    Number of Places Lived in the Last Year: 1    Unstable Housing in the Last Year: No       Family History:  Family History   Problem Relation Age of Onset    Diabetes Maternal Grandfather     Cancer Mother     Heart disease Father     Autoimmune disease Sister     Breast cancer Neg Hx     Ovarian cancer Neg Hx     Vaginal cancer Neg Hx     Endometrial cancer Neg Hx     Cervical cancer Neg Hx        PHYSICAL:  There were no vitals taken for this visit.    Physical Exam  Constitutional: Well-developed, well-nourished, appears stated age  Eyes: No scleral icterus  ENT: Moist oral mucosa  Cardiovascular: No lower extremity edema   Respiratory: No labored breathing   Skin: No rash   Hematologic: No bruising    Other: GI/ deferred   Mental status: Alert and oriented to person, place, time, and situation;   follows commands  Speech: normal (not dysarthric), no aphasia  Cranial nerves:            CN II: Pupils mid-position and equal, not tested light or accommodation  CN III, IV, VI: Extraocular movements full, no nystagmus visualized  CN V: Not tested   CN VII: Face strong and symmetric bilaterally   CN VIII: Hearing intact to voice and conversation   CN IX, X: Palate raises midline and symmetric   CN XI: Strong shoulder shrug B/L  CN XII: Tongue appears midline   Motor: Normal bulk by appearance, no drift   Sensory: Not tested    Gait: Not tested  Deep tendon reflexes: Not tested  Movement/Coordination                    No hypophonic speech.                     No facial  masking.  No bradykinesia.                    Bradykinesia  ? Finger taps Finger flicks KAVITHA Heel taps   Left 0 0 0 0   Right 0 0 0 0       Tremor Exam   Arms extended Arms in wing position, fingers almost touching Re-emergent Arms extended wrists extended Intention Resting Kinetic   Left ?+ ? ? ? ? ? ?neg   Right ?+ ? ? ? ? ? ?   Head tremor: No-No Yes-Yes NE     Archimedes Spirals   Left ?nl   Right ?nl       Laboratory Data:  NA    Imaging:  NA    EMG  EMG/NCV with Neurology which showed a bilateral carpal tunnel with concomitant C7 radiculopathy. Previous MRI of the cervical spine did reveal stenosis at C5-6 and C6-7 with moderate neuroforaminal stenosis.    Assessment//Plan:   Problem List Items Addressed This Visit          Neuro    Tremor    Current Assessment & Plan     Postural hand tremor, affecting his artwork. Well managed with primidone.  Suggested continue Primidone 150mg TID.  No oversedation.    Likely tremor due to predisone/gabapentin however if this worsens may consider him to have essential tremor.  No PDism noted on video exam despite RLS         RLS (restless legs syndrome)    Current Assessment & Plan     Continue ropinirole 4mg QHS  May cut in half and straddle time where RLS begins  May also change gabapentin timing            Baylee Esteban MD, MS  [facility]  Department of Neurology  Movement Disorders

## 2023-02-13 NOTE — ASSESSMENT & PLAN NOTE
Continue ropinirole 4mg QHS  May cut in half and straddle time where RLS begins  May also change gabapentin timing

## 2023-02-27 DIAGNOSIS — M79.2 NEUROPATHIC PAIN: ICD-10-CM

## 2023-02-27 DIAGNOSIS — G89.4 CHRONIC PAIN SYNDROME: ICD-10-CM

## 2023-02-27 DIAGNOSIS — M54.12 CERVICAL RADICULOPATHY: ICD-10-CM

## 2023-02-27 RX ORDER — OXYCODONE AND ACETAMINOPHEN 10; 325 MG/1; MG/1
1 TABLET ORAL EVERY 8 HOURS PRN
Qty: 90 TABLET | Refills: 0 | OUTPATIENT
Start: 2023-02-27

## 2023-03-01 ENCOUNTER — TELEPHONE (OUTPATIENT)
Dept: PAIN MEDICINE | Facility: CLINIC | Age: 53
End: 2023-03-01
Payer: MEDICARE

## 2023-03-01 NOTE — TELEPHONE ENCOUNTER
Staff lvm that a appointment is needed for medication refill to please call the office at [phone] to set up appt.

## 2023-03-02 ENCOUNTER — PATIENT MESSAGE (OUTPATIENT)
Dept: PAIN MEDICINE | Facility: CLINIC | Age: 53
End: 2023-03-02
Payer: MEDICARE

## 2023-03-09 ENCOUNTER — TELEPHONE (OUTPATIENT)
Dept: PAIN MEDICINE | Facility: CLINIC | Age: 53
End: 2023-03-09

## 2023-03-10 ENCOUNTER — OFFICE VISIT (OUTPATIENT)
Dept: PAIN MEDICINE | Facility: CLINIC | Age: 53
End: 2023-03-10
Payer: MEDICARE

## 2023-03-10 VITALS
SYSTOLIC BLOOD PRESSURE: 108 MMHG | WEIGHT: 135.38 LBS | BODY MASS INDEX: 26.58 KG/M2 | HEIGHT: 60 IN | DIASTOLIC BLOOD PRESSURE: 76 MMHG | HEART RATE: 98 BPM

## 2023-03-10 DIAGNOSIS — G89.4 CHRONIC PAIN SYNDROME: ICD-10-CM

## 2023-03-10 DIAGNOSIS — M47.816 LUMBAR SPONDYLOSIS: ICD-10-CM

## 2023-03-10 DIAGNOSIS — M62.838 MUSCLE SPASM: ICD-10-CM

## 2023-03-10 DIAGNOSIS — M54.12 CERVICAL RADICULOPATHY: ICD-10-CM

## 2023-03-10 DIAGNOSIS — M79.2 NEUROPATHIC PAIN: ICD-10-CM

## 2023-03-10 DIAGNOSIS — Z79.891 ENCOUNTER FOR LONG-TERM OPIATE ANALGESIC USE: Primary | ICD-10-CM

## 2023-03-10 DIAGNOSIS — Z98.890 S/P LUMBAR LAMINECTOMY: ICD-10-CM

## 2023-03-10 PROCEDURE — 99214 OFFICE O/P EST MOD 30 MIN: CPT | Mod: S$PBB,,, | Performed by: NURSE PRACTITIONER

## 2023-03-10 PROCEDURE — 99999 PR PBB SHADOW E&M-EST. PATIENT-LVL IV: CPT | Mod: PBBFAC,,, | Performed by: NURSE PRACTITIONER

## 2023-03-10 PROCEDURE — 99214 OFFICE O/P EST MOD 30 MIN: CPT | Mod: PBBFAC | Performed by: NURSE PRACTITIONER

## 2023-03-10 PROCEDURE — 99214 PR OFFICE/OUTPT VISIT, EST, LEVL IV, 30-39 MIN: ICD-10-PCS | Mod: S$PBB,,, | Performed by: NURSE PRACTITIONER

## 2023-03-10 PROCEDURE — 80326 AMPHETAMINES 5 OR MORE: CPT | Performed by: NURSE PRACTITIONER

## 2023-03-10 PROCEDURE — 99999 PR PBB SHADOW E&M-EST. PATIENT-LVL IV: ICD-10-PCS | Mod: PBBFAC,,, | Performed by: NURSE PRACTITIONER

## 2023-03-10 RX ORDER — OXYCODONE AND ACETAMINOPHEN 10; 325 MG/1; MG/1
1 TABLET ORAL EVERY 8 HOURS PRN
Qty: 90 TABLET | Refills: 0 | Status: SHIPPED | OUTPATIENT
Start: 2023-03-10 | End: 2023-04-05 | Stop reason: SDUPTHER

## 2023-03-10 NOTE — PROGRESS NOTES
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    Frequency of Communication with Friends and Family: More than three times a week    Frequency of Social Gatherings with Friends and Family: Patient refused    Active Member of Clubs or Organizations: No    Attends Club or Organization Meetings: Patient refused    Marital Status:    Housing Stability: Low Risk     Unable to Pay for Housing in the Last Year: No    Number of Places Lived in the Last Year: 1    Unstable Housing in the Last Year: No     Family History   Problem Relation Age of Onset    Diabetes Maternal Grandfather     Cancer Mother     Heart disease Father     Autoimmune disease Sister     Breast cancer Neg Hx     Ovarian cancer Neg Hx     Vaginal cancer Neg Hx     Endometrial cancer Neg Hx     Cervical cancer Neg Hx        Review of patient's allergies indicates:   Allergen Reactions    Bactrim [sulfamethoxazole-trimethoprim] Itching    Penicillins Rash       Current Outpatient Medications   Medication Sig    ALPRAZolam (XANAX) 1 MG tablet Take 1 tablet (1 mg total) by mouth 3 (three) times daily as needed for Anxiety.    ascorbic acid, vitamin C, (VITAMIN C) 1000 MG tablet Take 1,000 mg by mouth 2 (two) times daily.    atorvastatin (LIPITOR) 10 MG tablet Take 1 tablet (10 mg total) by mouth every evening.    cranberry fruit extract (CRANBERRY ORAL) Take by  mouth.    cyanocobalamin (VITAMIN B-12) 1000 MCG tablet Take 100 mcg by mouth once daily.    cyclobenzaprine (FLEXERIL) 10 MG tablet Take 1 tablet (10 mg total) by mouth 3 (three) times daily as needed for Muscle spasms.    diclofenac sodium (VOLTAREN) 1 % Gel Apply 2 g topically once daily.    docusate sodium (COLACE) 100 MG capsule Take 1 capsule (100 mg total) by mouth 2 (two) times daily as needed for Constipation.    gabapentin (NEURONTIN) 800 MG tablet Take 1 tablet by mouth three times a day and take 2 tablets at night (5 tablets a day, 4000mg)    ketoconazole (NIZORAL) 2 % cream Apply topically once daily.    Lactobacillus acidophilus Cap Take by mouth once daily.     lamoTRIgine (LAMICTAL) 200 MG tablet Take 1 tablet (200 mg total) by mouth 2 (two) times daily.    loratadine (CLARITIN) 10 mg tablet Take 10 mg by mouth once daily.     naproxen sodium (ANAPROX) 220 MG tablet Take 220 mg by mouth.    oxyCODONE-acetaminophen (PERCOCET)  mg per tablet Take 1 tablet by mouth every 8 (eight) hours as needed for Pain.    predniSONE (DELTASONE) 5 MG tablet Take 1 tablet (5 mg total) by mouth once daily. Double the dose if sick    primidone (MYSOLINE) 50 MG Tab Take 3 tablets (150 mg total) by mouth 3 (three) times daily.    rOPINIRole (REQUIP) 4 MG tablet Take 1 tablet (4 mg total) by mouth once daily.    testosterone (ANDROGEL) 1 % (50 mg/5 gram) GlPk Apply 1 packet (5 grams) topically once daily.    triamcinolone acetonide 0.1% (KENALOG) 0.1 % cream Apply topically 2 (two) times daily.    doxycycline (VIBRAMYCIN) 100 MG Cap Take 1 capsule (100 mg total) by mouth every 12 (twelve) hours.     No current facility-administered medications for this visit.     Facility-Administered Medications Ordered in Other Visits   Medication    fentaNYL injection 25 mcg    midazolam (VERSED) 1 mg/mL injection 0.5 mg       REVIEW OF SYSTEMS:    GENERAL:  No weight loss, malaise or fevers.  HEENT:   No recent changes in vision  "or hearing  NECK:  Negative for lumps, no difficulty with swallowing.  RESPIRATORY:  Negative for cough, wheezing or shortness of breath, patient denies any recent URI.  CARDIOVASCULAR:  Negative for chest pain, leg swelling or palpitations.  GI:  Negative for abdominal discomfort, blood in stools or black stools or change in bowel habits.  MUSCULOSKELETAL:  See HPI.  SKIN:  Negative for lesions, rash, and itching.  PSYCH:  Significant psychosocial stressors including PTSD for sex re-assignment surgery.  Patient's sleep is disturbed secondary to pain.  HEMATOLOGY/LYMPHOLOGY:  Negative for prolonged bleeding, bruising easily or swollen nodes.  Patient is not currently taking any anti-coagulants  ENDO: No history of diabetes or thyroid dysfunction  NEURO:   No history of headaches, syncope, paralysis, seizures or tremors.  All other reviewed and negative other than HPI.    OBJECTIVE:    /76 (BP Location: Left arm, Patient Position: Sitting, BP Method: Medium (Automatic))   Pulse 98   Ht 4' 9" (1.448 m)   Wt 61.4 kg (135 lb 5.8 oz)   BMI 29.29 kg/m²     PHYSICAL EXAMINATION:     GENERAL: Well appearing, in no acute distress, alert and oriented x3.  PSYCH:  Mood and affect appropriate.  SKIN:  Well-healed incisions without any evidence of infection  HEAD/FACE:  Normocephalic, atraumatic.   PULM: No evidence of respiratory difficulty, symmetric chest rise.  EXT:  Decreased range of motion in the left knee. Brace on left knee. Well healing scar to right knee from recent TKA. Medial and lateral joint line tenderness to both knees.  BACK:  There is significant pain with palpation over the facet joints and paraspinals of the lumbar spine bilaterally. There is decreased range of motion with extension to 15 degrees, and facet loading maneuvers cause reproducible pain.    NEURO:  No clonus. Cranial nerves grossly intact.  GAIT: Antalgic- ambulates with rolling walker.    Lab Results   Component Value Date    WBC 6.43 " 01/03/2023    HGB 13.0 (L) 01/03/2023    HCT 40.2 01/03/2023     (H) 01/03/2023     01/03/2023     CMP  Sodium   Date Value Ref Range Status   01/03/2023 140 136 - 145 mmol/L Final     Potassium   Date Value Ref Range Status   01/03/2023 4.5 3.5 - 5.1 mmol/L Final     Chloride   Date Value Ref Range Status   01/03/2023 105 95 - 110 mmol/L Final     CO2   Date Value Ref Range Status   01/03/2023 24 23 - 29 mmol/L Final     Glucose   Date Value Ref Range Status   01/03/2023 85 70 - 110 mg/dL Final     BUN   Date Value Ref Range Status   01/03/2023 11 6 - 20 mg/dL Final     Creatinine   Date Value Ref Range Status   01/03/2023 0.8 0.5 - 1.4 mg/dL Final     Calcium   Date Value Ref Range Status   01/03/2023 9.3 8.7 - 10.5 mg/dL Final     Total Protein   Date Value Ref Range Status   01/03/2023 6.9 6.0 - 8.4 g/dL Final     Albumin   Date Value Ref Range Status   01/03/2023 4.1 3.5 - 5.2 g/dL Final     Total Bilirubin   Date Value Ref Range Status   01/03/2023 0.2 0.1 - 1.0 mg/dL Final     Comment:     For infants and newborns, interpretation of results should be based  on gestational age, weight and in agreement with clinical  observations.    Premature Infant recommended reference ranges:  Up to 24 hours.............<8.0 mg/dL  Up to 48 hours............<12.0 mg/dL  3-5 days..................<15.0 mg/dL  6-29 days.................<15.0 mg/dL       Alkaline Phosphatase   Date Value Ref Range Status   01/03/2023 124 55 - 135 U/L Final     AST   Date Value Ref Range Status   01/03/2023 32 10 - 40 U/L Final     ALT   Date Value Ref Range Status   01/03/2023 29 10 - 44 U/L Final     Anion Gap   Date Value Ref Range Status   01/03/2023 11 8 - 16 mmol/L Final     eGFR if    Date Value Ref Range Status   08/23/2021 >60.0 >60 mL/min/1.73 m^2 Final     eGFR if non    Date Value Ref Range Status   08/23/2021 >60.0 >60 mL/min/1.73 m^2 Final     Comment:     Calculation used to obtain  the estimated glomerular filtration  rate (eGFR) is the CKD-EPI equation.        Lab Results   Component Value Date    HGBA1C 5.3 01/03/2023     Lab Results   Component Value Date    TSH 0.683 01/03/2023     Free T4 - 0.81 (wnl)      ASSESSMENT: 52 y.o. year old adult with chronic back and knee pain, consistent with the following diagnoses:    Encounter Diagnoses   Name Primary?    Cervical radiculopathy     Chronic pain syndrome     S/P lumbar laminectomy     Neuropathic pain     Muscle spasm     Encounter for long-term opiate analgesic use Yes    Lumbar spondylosis        PLAN:     - Previous imaging was reviewed and discussed with the patient today.    - Continue with home PT and yoga exercises which we discussed today.    - Continue oxycodone acetaminophen 10/325 mg QID PRN, #90. Can call for additional refills as needed.    - The patient is here today for a refill of current pain medications and they believe these provide effective pain control and improvements in quality of life.  The patient notes no serious side effects, and feels the benefits outweigh the risks.  The patient was reminded of the pain contract that they signed previously as well as the risks and benefits of the medication including possible death.  The updated [place] Board [employer] prescription monitoring program was reviewed, and the patient has been found to be compliant with current treatment plan.    - Pt has pain contract.  Last UDS reviewed. Repeat today.    - F/U in 3 months or sooner if needed.    - Dr. Farmer was consulted on the patient and agrees with this plan.        The above plan and management options were discussed at length with patient. Patient is in agreement with the above and verbalized understanding.      Alejandra Phoenix     03/10/2023

## 2023-03-15 LAB
6MAM UR QL: NOT DETECTED
7AMINOCLONAZEPAM UR QL: NOT DETECTED
A-OH ALPRAZ UR QL: PRESENT
ALPHA-OH-MIDAZOLAM: NOT DETECTED
ALPRAZ UR QL: PRESENT
AMPHET UR QL SCN: NOT DETECTED
ANNOTATION COMMENT IMP: NORMAL
ANNOTATION COMMENT IMP: NORMAL
BARBITURATES UR QL: PRESENT
BUPRENORPHINE UR QL: NOT DETECTED
BZE UR QL: NOT DETECTED
CARBOXYTHC UR QL: NOT DETECTED
CARISOPRODOL UR QL: NOT DETECTED
CLONAZEPAM UR QL: NOT DETECTED
CODEINE UR QL: NOT DETECTED
CREAT UR-MCNC: 27.8 MG/DL (ref 20–400)
DIAZEPAM UR QL: NOT DETECTED
ETHYL GLUCURONIDE UR QL: NOT DETECTED
FENTANYL UR QL: NOT DETECTED
GABAPENTIN: PRESENT
HYDROCODONE UR QL: NOT DETECTED
HYDROMORPHONE UR QL: NOT DETECTED
LORAZEPAM UR QL: NOT DETECTED
MDA UR QL: NOT DETECTED
MDEA UR QL: NOT DETECTED
MDMA UR QL: NOT DETECTED
ME-PHENIDATE UR QL: NOT DETECTED
METHADONE UR QL: NOT DETECTED
METHAMPHET UR QL: NOT DETECTED
MIDAZOLAM UR QL SCN: NOT DETECTED
MORPHINE UR QL: NOT DETECTED
NALOXONE: NOT DETECTED
NORBUPRENORPHINE UR QL CFM: NOT DETECTED
NORDIAZEPAM UR QL: NOT DETECTED
NORFENTANYL UR QL: NOT DETECTED
NORHYDROCODONE UR QL CFM: NOT DETECTED
NORMEPERIDINE UR QL CFM: NOT DETECTED
NOROXYCODONE UR QL CFM: PRESENT
NOROXYMORPHONE UR QL SCN: PRESENT
OXAZEPAM UR QL: NOT DETECTED
OXYCODONE UR QL: PRESENT
OXYMORPHONE UR QL: PRESENT
PATHOLOGY STUDY: NORMAL
PCP UR QL: NOT DETECTED
PHENTERMINE UR QL: NOT DETECTED
PREGABALIN: NOT DETECTED
SERVICE CMNT-IMP: NORMAL
TAPENTADOL UR QL SCN: NOT DETECTED
TAPENTADOL UR QL SCN: NOT DETECTED
TEMAZEPAM UR QL: NOT DETECTED
TRAMADOL UR QL: NOT DETECTED
ZOLPIDEM METABOLITE: NOT DETECTED
ZOLPIDEM UR QL: NOT DETECTED

## 2023-03-22 DIAGNOSIS — G25.81 RLS (RESTLESS LEGS SYNDROME): ICD-10-CM

## 2023-03-23 RX ORDER — PRIMIDONE 50 MG/1
150 TABLET ORAL 3 TIMES DAILY
Qty: 810 TABLET | Refills: 0 | Status: SHIPPED | OUTPATIENT
Start: 2023-03-23 | End: 2023-06-18 | Stop reason: SDUPTHER

## 2023-04-04 ENCOUNTER — OFFICE VISIT (OUTPATIENT)
Dept: INTERNAL MEDICINE | Facility: CLINIC | Age: 53
End: 2023-04-04
Attending: INTERNAL MEDICINE
Payer: MEDICARE

## 2023-04-04 ENCOUNTER — PATIENT MESSAGE (OUTPATIENT)
Dept: PAIN MEDICINE | Facility: CLINIC | Age: 53
End: 2023-04-04
Payer: MEDICARE

## 2023-04-04 VITALS
WEIGHT: 139.31 LBS | OXYGEN SATURATION: 98 % | SYSTOLIC BLOOD PRESSURE: 100 MMHG | DIASTOLIC BLOOD PRESSURE: 64 MMHG | BODY MASS INDEX: 27.35 KG/M2 | HEART RATE: 102 BPM | HEIGHT: 60 IN

## 2023-04-04 DIAGNOSIS — R79.9 ABNORMAL FINDING OF BLOOD CHEMISTRY, UNSPECIFIED: ICD-10-CM

## 2023-04-04 DIAGNOSIS — K74.00 LIVER FIBROSIS: ICD-10-CM

## 2023-04-04 DIAGNOSIS — K86.2 PANCREAS CYST: ICD-10-CM

## 2023-04-04 DIAGNOSIS — R09.89 DECREASED PULSES IN FEET: ICD-10-CM

## 2023-04-04 DIAGNOSIS — Z00.00 ANNUAL PHYSICAL EXAM: Primary | ICD-10-CM

## 2023-04-04 DIAGNOSIS — Z11.4 ENCOUNTER FOR SCREENING FOR HIV: ICD-10-CM

## 2023-04-04 DIAGNOSIS — R74.8 ELEVATED ALKALINE PHOSPHATASE LEVEL: ICD-10-CM

## 2023-04-04 DIAGNOSIS — G89.29 OTHER CHRONIC PAIN: ICD-10-CM

## 2023-04-04 DIAGNOSIS — E25.0 CONGENITAL ADRENAL HYPERPLASIA: ICD-10-CM

## 2023-04-04 DIAGNOSIS — E27.40 ADRENAL INSUFFICIENCY: ICD-10-CM

## 2023-04-04 DIAGNOSIS — N39.41 URINARY INCONTINENCE, URGE: ICD-10-CM

## 2023-04-04 DIAGNOSIS — F43.10 PTSD (POST-TRAUMATIC STRESS DISORDER): ICD-10-CM

## 2023-04-04 DIAGNOSIS — E78.5 HYPERLIPIDEMIA, UNSPECIFIED HYPERLIPIDEMIA TYPE: ICD-10-CM

## 2023-04-04 PROBLEM — R79.89 ELEVATED LFTS: Status: RESOLVED | Noted: 2020-01-09 | Resolved: 2023-04-04

## 2023-04-04 PROBLEM — K64.2 GRADE III HEMORRHOIDS: Status: RESOLVED | Noted: 2022-09-26 | Resolved: 2023-04-04

## 2023-04-04 PROBLEM — K81.0 ACUTE CHOLECYSTITIS: Status: RESOLVED | Noted: 2021-05-16 | Resolved: 2023-04-04

## 2023-04-04 PROCEDURE — 99999 PR PBB SHADOW E&M-EST. PATIENT-LVL V: ICD-10-PCS | Mod: PBBFAC,,, | Performed by: INTERNAL MEDICINE

## 2023-04-04 PROCEDURE — 99214 PR OFFICE/OUTPT VISIT, EST, LEVL IV, 30-39 MIN: ICD-10-PCS | Mod: S$PBB,,, | Performed by: INTERNAL MEDICINE

## 2023-04-04 PROCEDURE — 99214 OFFICE O/P EST MOD 30 MIN: CPT | Mod: S$PBB,,, | Performed by: INTERNAL MEDICINE

## 2023-04-04 PROCEDURE — 99215 OFFICE O/P EST HI 40 MIN: CPT | Mod: PBBFAC | Performed by: INTERNAL MEDICINE

## 2023-04-04 PROCEDURE — 99999 PR PBB SHADOW E&M-EST. PATIENT-LVL V: CPT | Mod: PBBFAC,,, | Performed by: INTERNAL MEDICINE

## 2023-04-04 NOTE — PROGRESS NOTES
"Subjective:       Patient ID: Ari Sainz is a 53 y.o. adult.    Chief Complaint: Annual Exam    Here for routine f/u      Left sided ankle pain. Pain with ambulation. Gait is off. Leg length discrepancy.   CAH-PanuntiPrednisone  5  mg qd --  He and his wife are aware of when they need stress dose steroids   PTSD- Dr Mejias. Steadily improving. Painting again.  Lumbar DJD-Had had improvement with pain clinic. Chronic opiate regimen. Had right knee done.   PreDM-Endocrine is monitoring. Not on medication at this time.  -Transaminitis- followed in hepatologyy. Workup most consistent with fatty liver. MRCP planned. No s/s of cirrhosis or decreased synthetic function.  6/3/2021 Cholecystectomy- Path benign severe inflammation and gangrene       Roney Bruce NP at 1/30/2023    robotic rectopexy   Rectal prolapse         Review of Systems   Constitutional:  Negative for appetite change, chills, fever and unexpected weight change.   HENT:  Negative for hearing loss, sore throat and trouble swallowing.    Eyes:  Negative for visual disturbance.   Respiratory:  Negative for cough, chest tightness and shortness of breath.    Cardiovascular:  Negative for chest pain and leg swelling.   Gastrointestinal:  Negative for abdominal pain, blood in stool, constipation, diarrhea, nausea and vomiting.   Endocrine: Negative for polydipsia and polyuria.   Genitourinary:  Negative for decreased urine volume, difficulty urinating, dysuria, frequency and urgency.   Musculoskeletal:  Negative for gait problem.   Skin:  Negative for rash.   Neurological:  Negative for dizziness and numbness.   Psychiatric/Behavioral:  The patient is not nervous/anxious.      Objective:      Vitals:    04/04/23 1451   BP: 100/64   BP Location: Left arm   Patient Position: Sitting   Pulse: 102   SpO2: 98%   Weight: 63.2 kg (139 lb 5.3 oz)   Height: 4' 9" (1.448 m)      Physical Exam  Vitals and nursing note reviewed.   Constitutional:       " General: He is not in acute distress.     Appearance: Normal appearance. He is well-developed.   HENT:      Head: Normocephalic and atraumatic.      Mouth/Throat:      Pharynx: No oropharyngeal exudate.   Eyes:      General: No scleral icterus.     Conjunctiva/sclera: Conjunctivae normal.      Pupils: Pupils are equal, round, and reactive to light.   Neck:      Thyroid: No thyromegaly.   Cardiovascular:      Rate and Rhythm: Normal rate and regular rhythm.      Heart sounds: Normal heart sounds. No murmur heard.  Pulmonary:      Effort: Pulmonary effort is normal.      Breath sounds: Normal breath sounds. No wheezing or rales.   Abdominal:      General: There is no distension.   Musculoskeletal:         General: No tenderness.   Lymphadenopathy:      Cervical: No cervical adenopathy.   Skin:     General: Skin is warm and dry.   Neurological:      Mental Status: He is alert and oriented to person, place, and time.   Psychiatric:         Behavior: Behavior normal.       Assessment:       1. Annual physical exam    2. Encounter for screening for HIV    3. Pancreas cyst    4. Adrenal insufficiency    5. Congenital adrenal hyperplasia    6. Urinary incontinence, urge    7. Hyperlipidemia, unspecified hyperlipidemia type    8. PTSD (post-traumatic stress disorder)    9. Other chronic pain    10. Decreased pulses in feet    11. Liver fibrosis    12. Elevated alkaline phosphatase level    13. Abnormal finding of blood chemistry, unspecified        Plan:       Ari was seen today for annual exam.    Diagnoses and all orders for this visit:    Annual physical exam  -     Comprehensive Metabolic Panel; Future  -     Lipid Panel; Future  -     TSH; Future  -     CBC Auto Differential; Future  -     Hemoglobin A1C; Future    Encounter for screening for HIV    Pancreas cyst     Adrenal insufficiency   Hx of . No acute signs.    Congenital adrenal hyperplasia  -     Comprehensive Metabolic Panel; Future  -     Lipid Panel;  Future  -     TSH; Future  -     CBC Auto Differential; Future  -     Hemoglobin A1C; Future    Urinary incontinence, urge    Hyperlipidemia, unspecified hyperlipidemia type   Tolerating statin. Continue this.     PTSD (post-traumatic stress disorder)   controlled. Continue current regimen    Other chronic pain   Rule blood flow.   F/u pain clinic    Decreased pulses in feet  -     US Lower Extremity Arteries Right; Future    Liver fibrosis  -     Comprehensive Metabolic Panel; Future    Elevated alkaline phosphatase level  -     Comprehensive Metabolic Panel; Future  -     Lipid Panel; Future  -     TSH; Future  -     CBC Auto Differential; Future  -     Hemoglobin A1C; Future    Abnormal finding of blood chemistry, unspecified  -     Hemoglobin A1C; Future           Siva Faulkner MD  Internal Medicine-[facility]        Side effects of medication(s) were discussed in detail and patient voiced understanding.  Patient will call back for any issues or complications.

## 2023-04-05 DIAGNOSIS — M54.12 CERVICAL RADICULOPATHY: ICD-10-CM

## 2023-04-05 DIAGNOSIS — M79.2 NEUROPATHIC PAIN: ICD-10-CM

## 2023-04-05 DIAGNOSIS — G89.4 CHRONIC PAIN SYNDROME: ICD-10-CM

## 2023-04-05 RX ORDER — OXYCODONE AND ACETAMINOPHEN 10; 325 MG/1; MG/1
1 TABLET ORAL EVERY 8 HOURS PRN
Qty: 90 TABLET | Refills: 0 | Status: SHIPPED | OUTPATIENT
Start: 2023-04-08 | End: 2023-05-18 | Stop reason: SDUPTHER

## 2023-04-05 NOTE — TELEPHONE ENCOUNTER
Patient requesting refill on oxycodone 10 mg   Last office visit 3/10/23   shows last refill on 3/10/23  Patient does have a pain contract on file with [facility] department  Patient last UDS3/10/23 was consistent with current therapy   CODEINE  Not Detected   Not Detected  Not Detected    MORPHINE  Not Detected   Present  Present    6-ACETYLMORPHINE  Not Detected   Not Detected  Not Detected    OXYCODONE  Present   Present  Present    NOROYXCODONE  Present   Present  Present    OXYMORPHONE  Present   Not Detected  Not Detected    NOROXYMORPHONE  Present   Not Detected  Not Detected    HYDROCODONE  Not Detected   Not Detected  Not Detected    NORHYDROCODONE  Not Detected   Not Detected  Not Detected    HYDROMORPHONE  Not Detected   Not Detected  Not Detected    BUPRENORPHINE  Not Detected   Not Detected  Not Detected    NORUBPRENORPHINE  Not Detected   Not Detected  Not Detected    FENTANYL  Not Detected   Not Detected  Not Detected    NORFENTANYL  Not Detected   Not Detected  Not Detected    MEPERIDINE METABOLITE  Not Detected   Not Detected  Not Detected    TAPENTADOL  Not Detected   Not Detected  Not Detected    TAPENTADOL-O-SULF  Not Detected   Not Detected  Not Detected    METHADONE  Not Detected   Not Detected  Not Detected    TRAMADOL  Not Detected   Not Detected  Not Detected    AMPHETAMINE  Not Detected   Not Detected  Not Detected    METHAMPHETAMINE  Not Detected   Not Detected  Not Detected    MDMA- ECSTASY  Not Detected   Not Detected  Not Detected    MDA  Not Detected   Not Detected  Not Detected    MDEA- Deanna  Not Detected   Not Detected  Not Detected    METHYLPHENIDATE  Not Detected   Not Detected  Not Detected    PHENTERMINE  Not Detected   Not Detected  Not Detected    BENZOYLECGONINE  Not Detected   Not Detected  Not Detected    ALPRAZOLAM  Present   Not Detected  Present    ALPHA-OH-ALPRAZOLAM  Present   Present  Not Detected    CLONAZEPAM  Not Detected   Not Detected  Not  Detected    7-AMINOCLONAZEPAM  Not Detected   Not Detected  Not Detected    DIAZEPAM  Not Detected   Not Detected  Not Detected    NORDIAZEPAM  Not Detected   Not Detected  Not Detected    OXAZEPAM  Not Detected   Not Detected  Not Detected    TEMAZEPAM  Not Detected   Not Detected  Not Detected    Lorazepam  Not Detected   Not Detected  Not Detected    MIDAZOLAM  Not Detected   Not Detected  Not Detected    ZOLPIDEM  Not Detected   Not Detected  Not Detected    BARBITURATES  Present   Present  Present    Creatinine, Urine 20.0 - 400.0 mg/dL 27.8  18.0 Low  R  113.0  58.9    ETHYL GLUCURONIDE  Not Detected   Not Detected  Not Detected    MARIJUANA METABOLITE  Not Detected   Not Detected  Not Detected    PCP  Not Detected   Not Detected  Not Detected    CARISOPRODOL  Not Detected   Not Detected CM  Not Detected CM    Comment: The carisoprodol immunoassay has cross-reactivity to   carisoprodol and meprobamate.    Naloxone  Not Detected       Gabapentin  Present       Pregabalin  Not Detected       Alpha-OH-Midazolam  Not Detected       Zolpidem Metabolite  Not Detected       PAIN MANAGEMENT DRUG PANEL  See Below   See Below CM  See Below CM    Comment: Methodology: Qualitative Enzyme Immunoassay and Qualitative   Liquid Chromatography-Tandem Mass Spectrometry,   Quantitative Spectrophotometry   The absence of expected drug(s) and/or drug metabolite(s)   may indicate non-compliance, inappropriate timing of   specimen collection relative to drug administration, poor   drug absorption, diluted/adulterated urine, or limitations   of testing. The concentration must be greater than or equal   to the cutoff to be reported as present.  If specific drug   concentrations are required, contact the laboratory within   two weeks of specimen collection to request quantification   by a second analytical technique. Interpretive questions   should be directed to the laboratory.   Results based on immunoassay detection that do not  match   clinical expectations should be   interpreted with caution. Confirmatory testing by mass   spectrometry for immunoassay-based results is available, if   ordered within two weeks of specimen collection. Additional   charges apply.   For medical purposes only; not valid for forensic use.   This test was developed and its performance characteristics   determined by [facility]. It has not been cleared or   approved by the [place] Food and Drug Administration. This test   was performed in a CLIA certified laboratory and is   intended for clinical purposes.    EER PAIN MGT AARON,HIGH RES/EMIT, INTERP  See Note   See Note CM  See Note CM    Comment: Authorized individuals can access the [facility]   Enhanced Report using the following link:     [email or web address]   Performed By: [facility]   [address] [place] [id number]   : Calvin Telles MD, PhD    [facility]  [facility]

## 2023-04-10 ENCOUNTER — PATIENT MESSAGE (OUTPATIENT)
Dept: INTERNAL MEDICINE | Facility: CLINIC | Age: 53
End: 2023-04-10
Payer: MEDICARE

## 2023-04-10 ENCOUNTER — HOSPITAL ENCOUNTER (OUTPATIENT)
Dept: RADIOLOGY | Facility: OTHER | Age: 53
Discharge: HOME | End: 2023-04-10
Attending: INTERNAL MEDICINE
Payer: MEDICARE

## 2023-04-10 DIAGNOSIS — R09.89 DECREASED PULSES IN FEET: ICD-10-CM

## 2023-04-10 PROCEDURE — 93926 LOWER EXTREMITY STUDY: CPT | Mod: TC,RT

## 2023-04-10 PROCEDURE — 93926 US LOWER EXTREMITY ARTERIES RIGHT: ICD-10-PCS | Mod: 26,RT,, | Performed by: RADIOLOGY

## 2023-04-10 PROCEDURE — 93926 LOWER EXTREMITY STUDY: CPT | Mod: 26,RT,, | Performed by: RADIOLOGY

## 2023-04-26 NOTE — PROGRESS NOTES
Colon & Rectal Surgery Clinic Follow Up    HPI:   Ari Sainz is a 53 y.o. adult who presents for follow up of after robotic rectopexy on 1/17.  Has remained on bowel regimen.  Only two episodes of constipation related to dietary changes. Has noticed some blood per rectum and discomfort on one side of his anus.  No prolapse.       Objective:   Vitals:    04/28/23 1101   BP: 114/64   Pulse: 94   Resp: 19        Physical Exam   Gen: well developed male, NAD  HEENT: normocephalic, atraumatic, PERRL, EOMI  CV: RRR, no murmurs  Resp: nonlabored, CTAB   Abd: soft, NTND   MSK:no gross deformities   Anorectal: external hemorrhoid with mucosal prolapse and irritation in the right anterolateral position, ADRIANO with intact tone, no masses.  No prolapse with strain.       Assessment and Plan:   Ari Sainz  is a 53 y.o. adult who presents for follow up after rectopexy, now with hemorrhoid    - patient with hemorrhoid with evidence of inflammation in right anterolateral position  - will treat with course of anusol suppositories, continue bowel regimen   - patient due for colonoscopy this year, will message when ready to schedule  - follow up in 3-4 weeks if no improvement in hemorrhoid before then      Genia Ku MD  Staff Surgeon   Colon & Rectal Surgery

## 2023-04-28 ENCOUNTER — OFFICE VISIT (OUTPATIENT)
Dept: SURGERY | Facility: CLINIC | Age: 53
End: 2023-04-28
Payer: MEDICARE

## 2023-04-28 VITALS
DIASTOLIC BLOOD PRESSURE: 64 MMHG | WEIGHT: 134.5 LBS | OXYGEN SATURATION: 98 % | HEIGHT: 60 IN | SYSTOLIC BLOOD PRESSURE: 114 MMHG | BODY MASS INDEX: 26.41 KG/M2 | RESPIRATION RATE: 19 BRPM | HEART RATE: 94 BPM

## 2023-04-28 DIAGNOSIS — K64.2 GRADE III HEMORRHOIDS: Primary | ICD-10-CM

## 2023-04-28 PROCEDURE — 99213 PR OFFICE/OUTPT VISIT, EST, LEVL III, 20-29 MIN: ICD-10-PCS | Mod: S$PBB,,, | Performed by: SURGERY

## 2023-04-28 PROCEDURE — 99999 PR PBB SHADOW E&M-EST. PATIENT-LVL IV: ICD-10-PCS | Mod: PBBFAC,,, | Performed by: SURGERY

## 2023-04-28 PROCEDURE — 99999 PR PBB SHADOW E&M-EST. PATIENT-LVL IV: CPT | Mod: PBBFAC,,, | Performed by: SURGERY

## 2023-04-28 PROCEDURE — 99214 OFFICE O/P EST MOD 30 MIN: CPT | Mod: PBBFAC | Performed by: SURGERY

## 2023-04-28 PROCEDURE — 99213 OFFICE O/P EST LOW 20 MIN: CPT | Mod: S$PBB,,, | Performed by: SURGERY

## 2023-04-28 RX ORDER — HYDROCORTISONE ACETATE 25 MG/1
25 SUPPOSITORY RECTAL 2 TIMES DAILY
Qty: 20 SUPPOSITORY | Refills: 0 | Status: SHIPPED | OUTPATIENT
Start: 2023-04-28 | End: 2023-05-08

## 2023-05-02 ENCOUNTER — OFFICE VISIT (OUTPATIENT)
Dept: PSYCHIATRY | Facility: CLINIC | Age: 53
End: 2023-05-02
Payer: MEDICARE

## 2023-05-02 ENCOUNTER — PATIENT MESSAGE (OUTPATIENT)
Dept: PSYCHIATRY | Facility: CLINIC | Age: 53
End: 2023-05-02

## 2023-05-02 DIAGNOSIS — F43.10 PTSD (POST-TRAUMATIC STRESS DISORDER): Primary | ICD-10-CM

## 2023-05-02 DIAGNOSIS — F40.00 AGORAPHOBIA: ICD-10-CM

## 2023-05-02 PROCEDURE — 99214 OFFICE O/P EST MOD 30 MIN: CPT | Mod: 95,,, | Performed by: NURSE PRACTITIONER

## 2023-05-02 PROCEDURE — 99214 PR OFFICE/OUTPT VISIT, EST, LEVL IV, 30-39 MIN: ICD-10-PCS | Mod: 95,,, | Performed by: NURSE PRACTITIONER

## 2023-05-02 RX ORDER — LAMOTRIGINE 200 MG/1
200 TABLET ORAL 2 TIMES DAILY
Qty: 180 TABLET | Refills: 1 | Status: SHIPPED | OUTPATIENT
Start: 2023-05-02 | End: 2023-12-01 | Stop reason: SDUPTHER

## 2023-05-02 RX ORDER — ALPRAZOLAM 1 MG/1
1 TABLET ORAL 3 TIMES DAILY PRN
Qty: 90 TABLET | Refills: 2 | Status: SHIPPED | OUTPATIENT
Start: 2023-05-02 | End: 2023-09-06 | Stop reason: SDUPTHER

## 2023-05-02 NOTE — PROGRESS NOTES
"Outpatient Psychiatry Follow-Up Visit (MD/NP)    5/2/2023    Clinical Status of Patient:  Outpatient (Ambulatory)    Chief Complaint:  Ari Sainz is a 53 y.o. adult who presents today for follow-up of mood disorder and anxiety.  Met with patient.      The patient location is: [place]   The chief complaint leading to consultation is: depression/anxiety    Visit type: audiovisual    Each patient to whom he or she provides medical services by telemedicine is:  (1) informed of the relationship between the physician and patient and the respective role of any other health care provider with respect to management of the patient; and (2) notified that he or she may decline to receive medical services by telemedicine and may withdraw from such care at any time.    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 Denies depression, no anhedonia. Patient reports that he has been feeling good, enjoying time outside on his property painting. States that he has been even happier lately, as his artwork has been recognized by a popular [employer] channel. Patient reports that his anxiety has been adequately controlled overall. Patient better able to express his symptoms of anxiety today. Largely appears to be a fear associated with leaving the home, being in crowded spaces, attending appointments. Patient avoids leaving the home whenever possible, aside from the occasional time he will go to the [employer]. Typically fears these situations due to overwhelming concern that he will fall. States that he typically takes alprazolam BID, however on days where he leaves the home he will take the 3rd dose. Patient otherwise denies concerns. No psychosis. No ricardo. No lethality concerns.     HPI/Psychiatric Review Of Systems (is patient experiencing or having changes in):    Mood: " "euthymic mood and affect  Anhedonia/interest: denies  Guilt/hopelessness: denies  Concentration: no concerns expressed  Sleep: regulated, "normal."  Energy: adequate  Appetite: adequate, weight stable   SI/SIB/VI/HI: denies all   Anxiety/panic: as above, avoidance of multiple social situations   Paranoia: denies   AVH: denies  Substance use: denies all     Medications:    Lamotrigine 200 mg BID -- compliant, denies SE including rash  Alprazolam 1 mg TID -- takes 2x most days      Brief synopsis:  Euthymic, agoraphobia (?)      Review of Systems   PSYCHIATRIC: Pertinant items are noted in the narrative.  CONSTITUTIONAL: No weight gain or loss.   MUSCULOSKELETAL: chronic pain  CARDIOVASCULAR: No tachycardia or chest pain.  GASTROINTESTINAL: No nausea, vomiting, pain, constipation or diarrhea.  GENITOURINARY: No frequency, dysuria or sexual dysfunction.    Past Medical, Family and Social History: The patient's past medical, family and social history have been reviewed and updated as appropriate within the electronic medical record - see encounter notes.    Compliance: see above    Side effects: see above    Risk Parameters:  Patient reports no suicidal ideation  Patient reports no homicidal ideation  Patient reports no self-injurious behavior  Patient reports no violent behavior    Exam (detailed: at least 9 elements; comprehensive: all 15 elements)   Constitutional  Vitals:  Most recent vital signs, dated less than 90 days prior to this appointment, were reviewed.   There were no vitals filed for this visit.       General:  unremarkable, age appropriate, dressed casually     Musculoskeletal  Muscle Strength/Tone:  no spasicity, no rigidity, no cogwheeling   Gait & Station:  uses cane     Psychiatric  Speech:  no latency; no press   Mood & Affect:  euthymic  congruent and appropriate   Thought Process:  normal and logical   Associations:  intact   Thought Content:  normal, no suicidality, no homicidality, delusions, or " paranoia   Insight:  intact   Judgement: behavior is adequate to circumstances   Orientation:  grossly intact   Memory: intact for content of interview   Language: grossly intact   Attention Span & Concentration:  able to focus   Fund of Knowledge:  intact and appropriate to age and level of education     Assessment and Diagnosis   Status/Progress: Based on the examination today, the patient's problem(s) is/are well controlled.  New problems have not been presented today.   Co-morbidities, Diagnostic uncertainty, and Lack of compliance are not complicating management of the primary condition.  There are no active rule-out diagnoses for this patient at this time.     General Impression: Ari Sainz is a 53 y.o. adult with a psychiatric hx of PTSD and anxiety presenting without active symptoms of PTSD or HELDER. Unclear origin of PTSD diagnosis. 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 Medical hx significant for fibromyalgia, lumbar laminectomy decompression from L1-S1, KIARA. Reports being prescribed lamotrigine and alprazolam for 10+ years. Remains unclear as to why patient is prescribed mood stabilizer vs SSRI/SNRI antidepressant. Remote hx of self-injurious behavior in adolescence. No hx of suicide attempts. No hx of drug abuse. Lives with his wife. 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    01/30/23 -- Euthymic mood. Denies anxiety concerns. No evident PTSD sx    05/02/23 -- no appreciable change in sx. Anxiety sx explored in more depth today, appears consistent with agoraphobia.         ICD-10-CM ICD-9-CM   1. PTSD (post-traumatic stress disorder)  F43.10 309.81   2. Agoraphobia  F40.00 300.22       Intervention/Counseling/Treatment Plan   Reviewed patient's symptoms,and medication regimen  Continue medications as prescribed  Patient voiced understanding of risks associated with chronic  benzodiazepine use, especially when combined with opioid pain medication  Patient extremely reluctant to adjust dose  Will continue to encourage decrease in total daily dose of alprazolam    Medication Management  Prescription drug management was employed during the encounter, as medications were prescribed, or considered but not prescribed.   [facility] reviewed  The risks and benefits of medication were discussed with the patient.  Possible expectable adverse effects of any current or proposed individual psychotropic agents were discussed with this patient.  Counseling was provided on the importance of full compliance with any prescribed medication.  Detailed instructions were provided to the patient regarding the administration of any prescribed medication.  Patient voiced understanding    Return to Clinic: 3 months    Face-to-face time spent: 20 minutes  32 minutes total time spent. This includes face to face time and non-face to face time preparing to see the patient (eg, review of tests), obtaining and/or reviewing separately obtained history, documenting clinical information in the electronic or other health record, independently interpreting results and communicating results to the patient/family/caregiver, or care coordinator.

## 2023-05-12 ENCOUNTER — PATIENT MESSAGE (OUTPATIENT)
Dept: PAIN MEDICINE | Facility: CLINIC | Age: 53
End: 2023-05-12
Payer: MEDICARE

## 2023-05-12 ENCOUNTER — TELEPHONE (OUTPATIENT)
Dept: NEUROLOGY | Facility: CLINIC | Age: 53
End: 2023-05-12
Payer: MEDICARE

## 2023-05-12 ENCOUNTER — PATIENT MESSAGE (OUTPATIENT)
Dept: NEUROLOGY | Facility: CLINIC | Age: 53
End: 2023-05-12
Payer: MEDICARE

## 2023-05-18 DIAGNOSIS — G89.4 CHRONIC PAIN SYNDROME: ICD-10-CM

## 2023-05-18 DIAGNOSIS — M79.2 NEUROPATHIC PAIN: ICD-10-CM

## 2023-05-18 DIAGNOSIS — M62.838 MUSCLE SPASM: ICD-10-CM

## 2023-05-18 DIAGNOSIS — M54.12 CERVICAL RADICULOPATHY: ICD-10-CM

## 2023-05-18 RX ORDER — OXYCODONE AND ACETAMINOPHEN 10; 325 MG/1; MG/1
1 TABLET ORAL EVERY 8 HOURS PRN
Qty: 90 TABLET | Refills: 0 | Status: SHIPPED | OUTPATIENT
Start: 2023-05-18 | End: 2023-06-15 | Stop reason: SDUPTHER

## 2023-05-18 RX ORDER — CYCLOBENZAPRINE HCL 10 MG
10 TABLET ORAL 3 TIMES DAILY PRN
Qty: 270 TABLET | Refills: 0 | Status: SHIPPED | OUTPATIENT
Start: 2023-05-18 | End: 2023-08-16 | Stop reason: SDUPTHER

## 2023-05-18 NOTE — TELEPHONE ENCOUNTER
Patient requesting refill on percocet 10 mg   Last office visit 3/10/23   shows last refill on 4/10/23  Patient [facility]  Patient last UDS 3/10/23 was consistent with current therapy   CODEINE  Not Detected   Not Detected  Not Detected    MORPHINE  Not Detected   Present  Present    6-ACETYLMORPHINE  Not Detected   Not Detected  Not Detected    OXYCODONE  Present   Present  Present    NOROYXCODONE  Present   Present  Present    OXYMORPHONE  Present   Not Detected  Not Detected    NOROXYMORPHONE  Present   Not Detected  Not Detected    HYDROCODONE  Not Detected   Not Detected  Not Detected    NORHYDROCODONE  Not Detected   Not Detected  Not Detected    HYDROMORPHONE  Not Detected   Not Detected  Not Detected    BUPRENORPHINE  Not Detected   Not Detected  Not Detected    NORUBPRENORPHINE  Not Detected   Not Detected  Not Detected    FENTANYL  Not Detected   Not Detected  Not Detected    NORFENTANYL  Not Detected   Not Detected  Not Detected    MEPERIDINE METABOLITE  Not Detected   Not Detected  Not Detected    TAPENTADOL  Not Detected   Not Detected  Not Detected    TAPENTADOL-O-SULF  Not Detected   Not Detected  Not Detected    METHADONE  Not Detected   Not Detected  Not Detected    TRAMADOL  Not Detected   Not Detected  Not Detected    AMPHETAMINE  Not Detected   Not Detected  Not Detected    METHAMPHETAMINE  Not Detected   Not Detected  Not Detected    MDMA- ECSTASY  Not Detected   Not Detected  Not Detected    MDA  Not Detected   Not Detected  Not Detected    MDEA- Deanna  Not Detected   Not Detected  Not Detected    METHYLPHENIDATE  Not Detected   Not Detected  Not Detected    PHENTERMINE  Not Detected   Not Detected  Not Detected    BENZOYLECGONINE  Not Detected   Not Detected  Not Detected    ALPRAZOLAM  Present   Not Detected  Present    ALPHA-OH-ALPRAZOLAM  Present   Present  Not Detected    CLONAZEPAM  Not Detected   Not Detected  Not  Detected    7-AMINOCLONAZEPAM  Not Detected   Not Detected  Not Detected    DIAZEPAM  Not Detected   Not Detected  Not Detected    NORDIAZEPAM  Not Detected   Not Detected  Not Detected    OXAZEPAM  Not Detected   Not Detected  Not Detected    TEMAZEPAM  Not Detected   Not Detected  Not Detected    Lorazepam  Not Detected   Not Detected  Not Detected    MIDAZOLAM  Not Detected   Not Detected  Not Detected    ZOLPIDEM  Not Detected   Not Detected  Not Detected    BARBITURATES  Present   Present  Present    Creatinine, Urine 20.0 - 400.0 mg/dL 27.8  18.0 Low  R  113.0  58.9    ETHYL GLUCURONIDE  Not Detected   Not Detected  Not Detected    MARIJUANA METABOLITE  Not Detected   Not Detected  Not Detected    PCP  Not Detected   Not Detected  Not Detected    CARISOPRODOL  Not Detected   Not Detected CM  Not Detected CM    Comment: The carisoprodol immunoassay has cross-reactivity to   carisoprodol and meprobamate.    Naloxone  Not Detected       Gabapentin  Present       Pregabalin  Not Detected       Alpha-OH-Midazolam  Not Detected       Zolpidem Metabolite  Not Detected       PAIN MANAGEMENT DRUG PANEL  See Below   See Below CM  See Below CM    Comment: Methodology: Qualitative Enzyme Immunoassay and Qualitative   Liquid Chromatography-Tandem Mass Spectrometry,   Quantitative Spectrophotometry   The absence of expected drug(s) and/or drug metabolite(s)   may indicate non-compliance, inappropriate timing of   specimen collection relative to drug administration, poor   drug absorption, diluted/adulterated urine, or limitations   of testing. The concentration must be greater than or equal   to the cutoff to be reported as present.  If specific drug   concentrations are required, contact the laboratory within   two weeks of specimen collection to request quantification   by a second analytical technique. Interpretive questions   should be directed to the laboratory.   Results based on immunoassay detection that do not  match   clinical expectations should be   interpreted with caution. Confirmatory testing by mass   spectrometry for immunoassay-based results is available, if   ordered within two weeks of specimen collection. Additional   charges apply.   For medical purposes only; not valid for forensic use.   This test was developed and its performance characteristics   determined by [facility]. It has not been cleared or   approved by the US Food and Drug Administration. This test   was performed in a CLIA certified laboratory and is   intended for clinical purposes.    EER PAIN MGT AARON,HIGH RES/EMIT, INTERP  See Note   See Note CM  See Note CM    Comment: Authorized individuals can access the [facility]   Enhanced Report using the following link:     [email or web address]   Performed By: [facility]   [address] [place] [id number]   : Calvin Telles MD, PhD    Resulting Agency  [facility]           Narrative  Performed by: [facility]  Specimen Source->Urine      Specimen Collected: 03/10/23 13:54 Last Resulted: 03/15/23 11:24

## 2023-05-22 ENCOUNTER — PATIENT MESSAGE (OUTPATIENT)
Dept: SURGERY | Facility: CLINIC | Age: 53
End: 2023-05-22
Payer: MEDICARE

## 2023-05-23 ENCOUNTER — OFFICE VISIT (OUTPATIENT)
Dept: CARDIOLOGY | Facility: CLINIC | Age: 53
End: 2023-05-23
Payer: MEDICARE

## 2023-05-23 VITALS
HEIGHT: 60 IN | WEIGHT: 136 LBS | BODY MASS INDEX: 26.7 KG/M2 | DIASTOLIC BLOOD PRESSURE: 54 MMHG | HEART RATE: 92 BPM | SYSTOLIC BLOOD PRESSURE: 89 MMHG

## 2023-05-23 DIAGNOSIS — R09.89 DECREASED PULSES IN FEET: Primary | ICD-10-CM

## 2023-05-23 DIAGNOSIS — I73.9 PERIPHERAL VASCULAR DISEASE, UNSPECIFIED: ICD-10-CM

## 2023-05-23 DIAGNOSIS — E66.3 OVERWEIGHT (BMI 25.0-29.9): ICD-10-CM

## 2023-05-23 DIAGNOSIS — E78.2 MIXED HYPERLIPIDEMIA: ICD-10-CM

## 2023-05-23 PROCEDURE — 99205 PR OFFICE/OUTPT VISIT, NEW, LEVL V, 60-74 MIN: ICD-10-PCS | Mod: S$PBB,,, | Performed by: INTERNAL MEDICINE

## 2023-05-23 PROCEDURE — 99215 OFFICE O/P EST HI 40 MIN: CPT | Mod: PBBFAC,PO | Performed by: INTERNAL MEDICINE

## 2023-05-23 PROCEDURE — 99999 PR PBB SHADOW E&M-EST. PATIENT-LVL V: ICD-10-PCS | Mod: PBBFAC,,, | Performed by: INTERNAL MEDICINE

## 2023-05-23 PROCEDURE — 99205 OFFICE O/P NEW HI 60 MIN: CPT | Mod: S$PBB,,, | Performed by: INTERNAL MEDICINE

## 2023-05-23 PROCEDURE — 99999 PR PBB SHADOW E&M-EST. PATIENT-LVL V: CPT | Mod: PBBFAC,,, | Performed by: INTERNAL MEDICINE

## 2023-05-23 RX ORDER — CHOLECALCIFEROL (VITAMIN D3) 125 MCG
1 CAPSULE ORAL DAILY
COMMUNITY
Start: 2022-12-05

## 2023-05-23 RX ORDER — MAGNESIUM 250 MG
1 TABLET ORAL DAILY
COMMUNITY
Start: 2022-12-05

## 2023-05-23 RX ORDER — ATORVASTATIN CALCIUM 20 MG/1
20 TABLET, FILM COATED ORAL DAILY
Qty: 90 TABLET | Refills: 3 | Status: SHIPPED | OUTPATIENT
Start: 2023-05-23 | End: 2024-06-12

## 2023-05-23 NOTE — PATIENT INSTRUCTIONS
Assessment/Plan:   Ari Sainz is a 53 y.o. adult with congenital adrenal hyperplasia, lumbar spinal stenosis s/p laminectomy (2019), overweight, who presents for an initial appointment.     Decreased pulses in feet- Pt with dopplerable BLE pulses.  No claudication, rest pain, or tissue loss.  RLE Arterial Ultrasound on 4/10/2023 revealed no focal hemodynamically stenosis of the right lower extremity seen.  Check exercise RUSTY study and CTA abd/pelvis with iliofemoral runoff.     2. Overweight- Encourage diet, exercise, and weight loss.     Follow up in 3 months

## 2023-05-23 NOTE — PROGRESS NOTES
[facility]      Chief Complaint   Patient presents with    Peripheral Vascular Disease    Restless leg syndrome    Decrease pulses in feet       Patient ID: Ari Sainz is a 53 y.o. adult with HLD, congenital adrenal hyperplasia, lumbar spinal stenosis s/p laminectomy (2019), overweight, who presents for an initial appointment.  Pertinent history/events are as follows:     -Pt kindly referred by Dr. Mitchell for evaluation of decreased pulses in feet.    HPI:  Mr. Sainz is accompanied by his wife.  He reports difficult to feel pulses in both feet.  He has no claudication, rest pain, or tissue loss.  RLE Arterial Ultrasound on 4/10/2023 revealed no focal hemodynamically stenosis of the right lower extremity seen.    Past Medical History:   Diagnosis Date    Abnormal CT scan, pelvis 10/27/2019    Abnormal thyroid blood test 05/06/2019    Adrenal cyst     left    Adrenal insufficiency     Blister- healing 01/09/2020    Chronic bilateral low back pain with bilateral sciatica 03/19/2019    Congenital adrenal hyperplasia     Hepatitis B core antibody positive 11/06/2020    Negative sAg, suggests previous exposure but no chronic/active Hep B. At risk for reactivation with any immunosuppression medication, steroids, chemo, etc.      IGT (impaired glucose tolerance) 11/05/2019    Insomnia due to medical condition     Multiple closed traumatic fractures of multiple bones of hip and pelvis with routine healing, subsequent encounter 09/27/2019    Pancreatic cyst 11/02/2020    Restless leg syndrome     S/P lumbar laminectomy 10/28/2019    Spinal stenosis     Spinal stenosis of lumbar region with neurogenic claudication 03/19/2019    Status post sex reassignment surgery 03/19/2019    Hx of Congenital Adrenal Hyperplasia    Unintentional weight loss 07/21/2020     Past Surgical History:   Procedure Laterality Date    BACK SURGERY      BREAST SURGERY  1986,2001    Implants, removal    CHOLECYSTECTOMY N/A  05/18/2021    Procedure: CHOLECYSTECTOMY;  Surgeon: Antonio Brandt MD;  [address]  Service: General;  Laterality: N/A;    ENDOSCOPIC ULTRASOUND OF UPPER GASTROINTESTINAL TRACT N/A 12/03/2020    Procedure: ULTRASOUND, UPPER GI TRACT, ENDOSCOPIC;  Surgeon: Jonathan Sharma MD;  Location: [facility] ENDO (2ND FLR);  Service: Endoscopy;  Laterality: N/A;  elevated alk phos, panc cysts, liver biopsy   11/30-covid pcw-tb    ENDOSCOPIC ULTRASOUND OF UPPER GASTROINTESTINAL TRACT N/A 05/17/2021    Procedure: ULTRASOUND, UPPER GI TRACT, ENDOSCOPIC;  Surgeon: Claudio Salvador MD;  [facility]  Service: Endoscopy;  Laterality: N/A;    ERCP N/A 05/17/2021    Procedure: ERCP (ENDOSCOPIC RETROGRADE CHOLANGIOPANCREATOGRAPHY);  Surgeon: Claudio Salvador MD;  [facility]  Service: Endoscopy;  Laterality: N/A;    gender surgery      multiple surgeries since birth    HYSTERECTOMY  2003    INJECTION OF ANESTHETIC AGENT AROUND NERVE Right 11/19/2020    Procedure: BLOCK, NERVE, GLENOHUMERAL  need consent;  Surgeon: Messi Cabello MD;  Location: [facility] PAIN MGT;  Service: Pain Management;  Laterality: Right;    JOINT REPLACEMENT  2/2020, 1/2021    Knees    KNEE ARTHROPLASTY Left 01/25/2021    Procedure: ARTHROPLASTY, KNEE:DEPUY-ATTUNE REVISION: SERINA iASSIST:CABLES ON HOLD;  Surgeon: Donte Andrade III, MD;  Location: [facility];  Service: Orthopedics;  Laterality: Left;    LAMINECTOMY  09/13/2019    LAPAROSCOPIC CHOLECYSTECTOMY N/A 05/17/2021    Procedure: CHOLECYSTECTOMY, LAPAROSCOPIC;  Surgeon: Calvin Wilson MD;  [address];  Service: General;  Laterality: N/A;    LAPAROSCOPIC CHOLECYSTECTOMY N/A 05/18/2021    Procedure: CHOLECYSTECTOMY, LAPAROSCOPIC;  Surgeon: Antonio Brandt MD;  Location: [address];  Service: General;  Laterality: N/A;    RADIOFREQUENCY ABLATION Left 05/09/2019    Procedure: RADIOFREQUENCY ABLATION, LEFT L3,4,5;  Surgeon: Messi SÁNCHEZ  MD Irineo;  Location: [facility];  Service: Pain Management;  Laterality: Left;  1 of 2    RADIOFREQUENCY ABLATION Right 2019    Procedure: RADIOFREQUENCY ABLATION, RIGHT L3,4,5;  Surgeon: Messi Cabello MD;  Location: [facility];  Service: Pain Management;  Laterality: Right;  2of 2    ROBOT-ASSISTED LAPAROSCOPIC RECTOPEXY N/A 2023    Procedure: ROBOTIC RECTOPEXY;  Surgeon: Genia Ku MD;  Location: [facility] OR;  Service: Colon and Rectal;  Laterality: N/A;    SPINE SURGERY      2019     TOTAL KNEE ARTHROPLASTY Right 2020    Procedure: ARTHROPLASTY, KNEE, TOTAL;  Surgeon: Donte Andrade III, MD;  Location: [address];  Service: Orthopedics;  Laterality: Right;     Social History     Socioeconomic History    Marital status:      Spouse name: Kristy Sainz    Number of children: 1   Occupational History     Comment:  and artist   Tobacco Use    Smoking status: Former     Types: Cigarettes     Quit date: 2009     Years since quittin.3    Smokeless tobacco: Never    Tobacco comments:     was not a daily smoker-    Substance and Sexual Activity    Alcohol use: Not Currently    Drug use: No    Sexual activity: Yes     Partners: Female     Birth control/protection: None   Social History Narrative    Lives in a house with his wife      Social Determinants of Health     Financial Resource Strain: Low Risk     Difficulty of Paying Living Expenses: Not hard at all   Food Insecurity: No Food Insecurity    Worried About Running Out of Food in the Last Year: Never true    Ran Out of Food in the Last Year: Never true   Transportation Needs: No Transportation Needs    Lack of Transportation (Medical): No    Lack of Transportation (Non-Medical): No   Physical Activity: Insufficiently Active    Days of Exercise per Week: 7 days    Minutes of Exercise per Session: 20 min   Stress: Stress Concern Present    Feeling of Stress : To some extent   Social Connections:  Unknown    Frequency of Communication with Friends and Family: More than three times a week    Frequency of Social Gatherings with Friends and Family: Once a week    Active Member of Clubs or Organizations: No    Attends Club or Organization Meetings: Never    Marital Status:    Housing Stability: Low Risk     Unable to Pay for Housing in the Last Year: No    Number of Places Lived in the Last Year: 1    Unstable Housing in the Last Year: No     Family History   Problem Relation Age of Onset    Diabetes Maternal Grandfather     Cancer Maternal Grandfather         Colon    Cancer Mother         Kidney    Early death Mother         58    Heart disease Father     Autoimmune disease Sister     Hypertension Maternal Grandmother     Stroke Maternal Grandmother     Diabetes Maternal Uncle     Breast cancer Neg Hx     Ovarian cancer Neg Hx     Vaginal cancer Neg Hx     Endometrial cancer Neg Hx     Cervical cancer Neg Hx        Review of patient's allergies indicates:   Allergen Reactions    Bactrim [sulfamethoxazole-trimethoprim] Itching    Penicillins Rash       Medication List with Changes/Refills   Current Medications    ALPRAZOLAM (XANAX) 1 MG TABLET    Take 1 tablet (1 mg total) by mouth 3 (three) times daily as needed for Anxiety.    ASCORBIC ACID, VITAMIN C, (VITAMIN C) 1000 MG TABLET    Take 1,000 mg by mouth once daily.    ATORVASTATIN (LIPITOR) 10 MG TABLET    Take 1 tablet (10 mg total) by mouth every evening.    CHOLECALCIFEROL, VITAMIN D3, 125 MCG (5,000 UNIT) CAPSULE    Take 1 capsule by mouth Daily.    CRANBERRY FRUIT EXTRACT (CRANBERRY ORAL)    Take by mouth.    CYANOCOBALAMIN (VITAMIN B-12) 1000 MCG TABLET    Take 100 mcg by mouth once daily.    CYCLOBENZAPRINE (FLEXERIL) 10 MG TABLET    Take 1 tablet (10 mg total) by mouth 3 (three) times daily as needed for Muscle spasms.    DICLOFENAC SODIUM (VOLTAREN) 1 % GEL    Apply 2 g topically once daily.    DOCUSATE SODIUM (COLACE) 100 MG CAPSULE    Take 1  "capsule (100 mg total) by mouth 2 (two) times daily as needed for Constipation.    GABAPENTIN (NEURONTIN) 800 MG TABLET    Take 1 tablet by mouth three times a day and take 2 tablets at night (5 tablets a day, 4000mg)    KETOCONAZOLE (NIZORAL) 2 % CREAM    Apply topically once daily.    LACTOBACILLUS ACIDOPHILUS CAP    Take by mouth once daily.     LAMOTRIGINE (LAMICTAL) 200 MG TABLET    Take 1 tablet (200 mg total) by mouth 2 (two) times daily.    LORATADINE (CLARITIN) 10 MG TABLET    Take 10 mg by mouth once daily.     MAGNESIUM 250 MG TAB    Take 1 tablet by mouth Daily.    NAPROXEN SODIUM (ANAPROX) 220 MG TABLET    Take 220 mg by mouth.    OXYCODONE-ACETAMINOPHEN (PERCOCET)  MG PER TABLET    Take 1 tablet by mouth every 8 (eight) hours as needed for Pain.    PREDNISONE (DELTASONE) 5 MG TABLET    Take 1 tablet (5 mg total) by mouth once daily. Double the dose if sick    PRIMIDONE (MYSOLINE) 50 MG TAB    Take 3 tablets (150 mg total) by mouth 3 (three) times daily.    ROPINIROLE (REQUIP) 4 MG TABLET    Take 1 tablet (4 mg total) by mouth once daily.    TESTOSTERONE (ANDROGEL) 1 % (50 MG/5 GRAM) GLPK    Apply 1 packet (5 grams) topically once daily.    TRIAMCINOLONE ACETONIDE 0.1% (KENALOG) 0.1 % CREAM    Apply topically 2 (two) times daily.       Review of Systems  Constitution: Denies chills, fever, and sweats.  HENT: Denies headaches or blurry vision.  Cardiovascular: Denies chest pain or irregular heart beat.  Respiratory: Denies cough or shortness of breath.  Gastrointestinal: Denies abdominal pain, nausea, or vomiting.  Musculoskeletal: Denies muscle cramps.  Neurological: Denies dizziness or focal weakness.  Psychiatric/Behavioral: Normal mental status.  Hematologic/Lymphatic: Denies bleeding problem or easy bruising/bleeding.  Skin: Denies rash or suspicious lesions    Physical Examination  BP (!) 89/54   Pulse 92   Ht 4' 9" (1.448 m)   Wt 61.7 kg (136 lb 0.4 oz)   BMI 29.44 kg/m² "     Constitutional: No acute distress, conversant  HEENT: Sclera anicteric, Pupils equal, round and reactive to light, extraocular motions intact, Oropharynx clear  Neck: No JVD, no carotid bruits  Cardiovascular: regular rate and rhythm, no murmur, rubs or gallops, normal S1/S2  Pulmonary: Clear to auscultation bilaterally  Abdominal: Abdomen soft, nontender, nondistended, positive bowel sounds  Extremities: No lower extremity edema,   Pulses:  Carotid pulses are 2+ on the right side, and 2+ on the left side.  Radial pulses are 2+ on the right side, and 2+ on the left side.   Femoral pulses are 2+ on the right side, and 2+ on the left side.  Popliteal pulses are 2+ on the right side, and 2+ on the left side.   Dorsalis pedis pulses are 2+ on the right side, and 2+ on the left side.   Posterior tibial pulses are 2+ on the right side, and 2+ on the left side.    Skin: No ecchymosis, erythema, or ulcers  Psych: Alert and oriented x 3, appropriate affect  Neuro: CNII-XII intact, no focal deficits    Labs:  Most Recent Data  CBC:   Lab Results   Component Value Date    WBC 7.88 04/10/2023    HGB 14.5 04/10/2023    HCT 42.4 04/10/2023     04/10/2023    MCV 99 (H) 04/10/2023    RDW 12.6 04/10/2023     BMP:   Lab Results   Component Value Date     04/10/2023    K 4.1 04/10/2023     04/10/2023    CO2 26 04/10/2023    BUN 8 04/10/2023    CREATININE 0.7 04/10/2023    GLU 93 04/10/2023    CALCIUM 9.4 04/10/2023    MG 2.3 05/22/2021    PHOS 2.8 05/22/2021     LFTS;   Lab Results   Component Value Date    PROT 7.2 04/10/2023    ALBUMIN 4.2 04/10/2023    BILITOT 0.3 04/10/2023    AST 32 04/10/2023    ALKPHOS 140 (H) 04/10/2023    ALT 38 04/10/2023     COAGS:   Lab Results   Component Value Date    INR 1.2 05/18/2021     FLP:   Lab Results   Component Value Date    CHOL 183 04/10/2023    HDL 54 04/10/2023    LDLCALC 112.6 04/10/2023    TRIG 82 04/10/2023    CHOLHDL 29.5 04/10/2023     CARDIAC: No results found  for: TROPONINI, CKMB, BNP    Imaging:    RLE Arterial Ultrasound 4/10/2023:  No focal hemodynamically stenosis of the right lower extremity seen.    Nuclear Stress test 9/10/2019:    The perfusion scan is free of evidence from myocardial ischemia or injury.    Gated perfusion images showed an ejection fraction of 90.0 % at rest and 90 % post stress. Normal is 51%.    There is  normal wall motion at rest.    LV cavity size is normal at rest and normal at stress.    Visually estimated LV function is normal.    The EKG portion of this study is negative for ischemia.    There were no arrhythmias during stress.    There are no prior studies for comparison.    Assessment/Plan:   Ari Sainz is a 53 y.o. adult with HLD, congenital adrenal hyperplasia, lumbar spinal stenosis s/p laminectomy (2019), overweight, who presents for an initial appointment.     Decreased pulses in feet- Pt with dopplerable BLE pulses.  No claudication, rest pain, or tissue loss.  RLE Arterial Ultrasound on 4/10/2023 revealed no focal hemodynamically stenosis of the right lower extremity seen.  Check exercise RUSTY study and CTA abd/pelvis with iliofemoral runoff.     2. Overweight- Encourage diet, exercise, and weight loss.     3. HLD- Increase atorvastatin to 20 mg daily.     Follow up in 3 months with lipids prior    Total duration of face to face visit time 45 minutes.  Total time spent counseling greater than fifty percent of total visit time.  Counseling included discussion regarding imaging findings, diagnosis, possibilities, treatment options, risks and benefits.  The patient had many questions regarding the options and long-term effects.    Messi Kendrick MD, PhD  Interventional Cardiology

## 2023-05-24 RX ORDER — TRIAMCINOLONE ACETONIDE 1 MG/G
CREAM TOPICAL 2 TIMES DAILY
Qty: 45 G | Refills: 1 | Status: SHIPPED | OUTPATIENT
Start: 2023-05-24 | End: 2023-09-23 | Stop reason: SDUPTHER

## 2023-05-24 NOTE — TELEPHONE ENCOUNTER
Refill Decision Note   Ari Sainz  is requesting a refill authorization.  Brief Assessment and Rationale for Refill:  Approve     Medication Therapy Plan:       Medication Reconciliation Completed: No    Comments:     No Care Gaps recommended.     Note composed:3:43 PM 05/24/2023

## 2023-05-24 NOTE — TELEPHONE ENCOUNTER
No care due was identified.  [facility] Embedded Care Due Messages. Reference number: [id number].   5/24/2023 2:34:16 PM CDT

## 2023-05-25 ENCOUNTER — TELEPHONE (OUTPATIENT)
Dept: SURGERY | Facility: CLINIC | Age: 53
End: 2023-05-25
Payer: MEDICARE

## 2023-05-25 DIAGNOSIS — Z12.11 ENCOUNTER FOR SCREENING COLONOSCOPY: Primary | ICD-10-CM

## 2023-05-25 RX ORDER — SODIUM, POTASSIUM,MAG SULFATES 17.5-3.13G
1 SOLUTION, RECONSTITUTED, ORAL ORAL DAILY
Qty: 1 KIT | Refills: 0 | Status: SHIPPED | OUTPATIENT
Start: 2023-05-25 | End: 2023-05-27

## 2023-06-13 ENCOUNTER — OFFICE VISIT (OUTPATIENT)
Dept: PAIN MEDICINE | Facility: CLINIC | Age: 53
End: 2023-06-13
Payer: MEDICARE

## 2023-06-13 DIAGNOSIS — G89.4 CHRONIC PAIN SYNDROME: ICD-10-CM

## 2023-06-13 DIAGNOSIS — M54.12 CERVICAL RADICULOPATHY: ICD-10-CM

## 2023-06-13 DIAGNOSIS — Z79.891 ENCOUNTER FOR LONG-TERM OPIATE ANALGESIC USE: ICD-10-CM

## 2023-06-13 DIAGNOSIS — M47.816 LUMBAR SPONDYLOSIS: ICD-10-CM

## 2023-06-13 DIAGNOSIS — Z98.890 S/P LUMBAR LAMINECTOMY: ICD-10-CM

## 2023-06-13 DIAGNOSIS — M48.062 SPINAL STENOSIS OF LUMBAR REGION WITH NEUROGENIC CLAUDICATION: ICD-10-CM

## 2023-06-13 DIAGNOSIS — M54.14 THORACIC RADICULOPATHY: Primary | ICD-10-CM

## 2023-06-13 PROCEDURE — 99213 PR OFFICE/OUTPT VISIT, EST, LEVL III, 20-29 MIN: ICD-10-PCS | Mod: 95,,, | Performed by: NURSE PRACTITIONER

## 2023-06-13 PROCEDURE — 99213 OFFICE O/P EST LOW 20 MIN: CPT | Mod: 95,,, | Performed by: NURSE PRACTITIONER

## 2023-06-13 RX ORDER — GABAPENTIN 800 MG/1
TABLET ORAL
Qty: 450 TABLET | Refills: 2 | Status: SHIPPED | OUTPATIENT
Start: 2023-06-13

## 2023-06-13 NOTE — PROGRESS NOTES
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      Spouse name: Kristy Sainz    Number of children: 1   Occupational History     Comment:  and artist   Tobacco Use    Smoking status: Former     Types: Cigarettes     Quit date:      Years since quittin.4    Smokeless tobacco: Never    Tobacco comments:     was not a daily smoker-    Substance and Sexual Activity    Alcohol use: Not Currently    Drug use: No    Sexual activity: Yes     Partners: Female     Birth control/protection: None   Social History Narrative    Lives in a house with his wife      Social Determinants of Health     Financial Resource Strain: Low Risk     Difficulty of Paying Living Expenses: Not hard at all   Food Insecurity: No Food Insecurity    Worried About Running Out of Food in the Last Year: Never true    Ran Out of Food in the Last Year: Never true   Transportation Needs: No Transportation Needs    Lack of Transportation (Medical): No    Lack of Transportation (Non-Medical): No   Physical Activity: Insufficiently Active    Days of Exercise per Week: 7 days    Minutes of Exercise per Session: 20 min   Stress: Stress Concern Present    Feeling of Stress : To some extent   Social Connections: Unknown    Frequency of Communication with Friends and Family: More than three times a week     Frequency of Social Gatherings with Friends and Family: Once a week    Active Member of Clubs or Organizations: No    Attends Club or Organization Meetings: Never    Marital Status:    Housing Stability: Low Risk     Unable to Pay for Housing in the Last Year: No    Number of Places Lived in the Last Year: 1    Unstable Housing in the Last Year: No     Family History   Problem Relation Age of Onset    Diabetes Maternal Grandfather     Cancer Maternal Grandfather         Colon    Cancer Mother         Kidney    Early death Mother         58    Heart disease Father     Autoimmune disease Sister     Hypertension Maternal Grandmother     Stroke Maternal Grandmother     Diabetes Maternal Uncle     Breast cancer Neg Hx     Ovarian cancer Neg Hx     Vaginal cancer Neg Hx     Endometrial cancer Neg Hx     Cervical cancer Neg Hx        Review of patient's allergies indicates:   Allergen Reactions    Bactrim [sulfamethoxazole-trimethoprim] Itching    Penicillins Rash       Current Outpatient Medications   Medication Sig    ALPRAZolam (XANAX) 1 MG tablet Take 1 tablet (1 mg total) by mouth 3 (three) times daily as needed for Anxiety.    ascorbic acid, vitamin C, (VITAMIN C) 1000 MG tablet Take 1,000 mg by mouth once daily.    atorvastatin (LIPITOR) 20 MG tablet Take 1 tablet (20 mg total) by mouth once daily.    cholecalciferol, vitamin D3, 125 mcg (5,000 unit) capsule Take 1 capsule by mouth Daily.    cranberry fruit extract (CRANBERRY ORAL) Take by mouth.    cyanocobalamin (VITAMIN B-12) 1000 MCG tablet Take 100 mcg by mouth once daily.    cyclobenzaprine (FLEXERIL) 10 MG tablet Take 1 tablet (10 mg total) by mouth 3 (three) times daily as needed for Muscle spasms.    diclofenac sodium (VOLTAREN) 1 % Gel Apply 2 g topically once daily.    docusate sodium (COLACE) 100 MG capsule Take 1 capsule (100 mg total) by mouth 2 (two) times daily as needed for Constipation.    gabapentin (NEURONTIN) 800 MG tablet Take 1 tablet  by mouth three times a day and take 2 tablets at night (5 tablets a day, 4000mg)    ketoconazole (NIZORAL) 2 % cream Apply topically once daily.    Lactobacillus acidophilus Cap Take by mouth once daily.     lamoTRIgine (LAMICTAL) 200 MG tablet Take 1 tablet (200 mg total) by mouth 2 (two) times daily.    loratadine (CLARITIN) 10 mg tablet Take 10 mg by mouth once daily.     magnesium 250 mg Tab Take 1 tablet by mouth Daily.    naproxen sodium (ANAPROX) 220 MG tablet Take 220 mg by mouth.    oxyCODONE-acetaminophen (PERCOCET)  mg per tablet Take 1 tablet by mouth every 8 (eight) hours as needed for Pain.    predniSONE (DELTASONE) 5 MG tablet Take 1 tablet (5 mg total) by mouth once daily. Double the dose if sick    primidone (MYSOLINE) 50 MG Tab Take 3 tablets (150 mg total) by mouth 3 (three) times daily.    rOPINIRole (REQUIP) 4 MG tablet Take 1 tablet (4 mg total) by mouth once daily.    testosterone (ANDROGEL) 1 % (50 mg/5 gram) GlPk Apply 1 packet (5 grams) topically once daily.    triamcinolone acetonide 0.1% (KENALOG) 0.1 % cream Apply topically 2 (two) times daily.     No current facility-administered medications for this visit.     Facility-Administered Medications Ordered in Other Visits   Medication    fentaNYL injection 25 mcg    midazolam (VERSED) 1 mg/mL injection 0.5 mg       REVIEW OF SYSTEMS:    GENERAL:  No weight loss, malaise or fevers.  HEENT:   No recent changes in vision or hearing  NECK:  Negative for lumps, no difficulty with swallowing.  RESPIRATORY:  Negative for cough, wheezing or shortness of breath, patient denies any recent URI.  CARDIOVASCULAR:  Negative for chest pain, leg swelling or palpitations.  GI:  Negative for abdominal discomfort, blood in stools or black stools or change in bowel habits.  MUSCULOSKELETAL:  See HPI.  SKIN:  Negative for lesions, rash, and itching.  PSYCH:  Significant psychosocial stressors including PTSD for sex re-assignment surgery.  Patient's sleep  is disturbed secondary to pain.  HEMATOLOGY/LYMPHOLOGY:  Negative for prolonged bleeding, bruising easily or swollen nodes.  Patient is not currently taking any anti-coagulants  ENDO: No history of diabetes or thyroid dysfunction  NEURO:   No history of headaches, syncope, paralysis, seizures or tremors.  All other reviewed and negative other than HPI.    OBJECTIVE:    PHYSICAL EXAMINATION:    General appearance: Well appearing, in no acute distress, alert and oriented x3.  Psych:  Mood and affect appropriate.  Skin: Skin color normal, no rashes or lesions, in both upper and lower body.  Extremities: Moves all visualized extremities freely.      PREVIOUS PHYSICAL EXAMINATION:     GENERAL: Well appearing, in no acute distress, alert and oriented x3.  PSYCH:  Mood and affect appropriate.  SKIN:  Well-healed incisions without any evidence of infection  HEAD/FACE:  Normocephalic, atraumatic.   PULM: No evidence of respiratory difficulty, symmetric chest rise.  EXT:  Decreased range of motion in the left knee. Brace on left knee. Well healing scar to right knee from recent TKA. Medial and lateral joint line tenderness to both knees.  BACK:  There is significant pain with palpation over the facet joints and paraspinals of the lumbar spine bilaterally. There is decreased range of motion with extension to 15 degrees, and facet loading maneuvers cause reproducible pain.    NEURO:  No clonus. Cranial nerves grossly intact.  GAIT: Antalgic- ambulates with rolling walker.    Lab Results   Component Value Date    WBC 7.88 04/10/2023    HGB 14.5 04/10/2023    HCT 42.4 04/10/2023    MCV 99 (H) 04/10/2023     04/10/2023     CMP  Sodium   Date Value Ref Range Status   04/10/2023 138 136 - 145 mmol/L Final     Potassium   Date Value Ref Range Status   04/10/2023 4.1 3.5 - 5.1 mmol/L Final     Chloride   Date Value Ref Range Status   04/10/2023 104 95 - 110 mmol/L Final     CO2   Date Value Ref Range Status   04/10/2023 26 23 -  29 mmol/L Final     Glucose   Date Value Ref Range Status   04/10/2023 93 70 - 110 mg/dL Final     BUN   Date Value Ref Range Status   04/10/2023 8 6 - 20 mg/dL Final     Creatinine   Date Value Ref Range Status   04/10/2023 0.7 0.5 - 1.4 mg/dL Final     Calcium   Date Value Ref Range Status   04/10/2023 9.4 8.7 - 10.5 mg/dL Final     Total Protein   Date Value Ref Range Status   04/10/2023 7.2 6.0 - 8.4 g/dL Final     Albumin   Date Value Ref Range Status   04/10/2023 4.2 3.5 - 5.2 g/dL Final     Total Bilirubin   Date Value Ref Range Status   04/10/2023 0.3 0.1 - 1.0 mg/dL Final     Comment:     For infants and newborns, interpretation of results should be based  on gestational age, weight and in agreement with clinical  observations.    Premature Infant recommended reference ranges:  Up to 24 hours.............<8.0 mg/dL  Up to 48 hours............<12.0 mg/dL  3-5 days..................<15.0 mg/dL  6-29 days.................<15.0 mg/dL       Alkaline Phosphatase   Date Value Ref Range Status   04/10/2023 140 (H) 55 - 135 U/L Final     AST   Date Value Ref Range Status   04/10/2023 32 10 - 40 U/L Final     ALT   Date Value Ref Range Status   04/10/2023 38 10 - 44 U/L Final     Anion Gap   Date Value Ref Range Status   04/10/2023 8 8 - 16 mmol/L Final     eGFR if    Date Value Ref Range Status   08/23/2021 >60.0 >60 mL/min/1.73 m^2 Final     eGFR if non    Date Value Ref Range Status   08/23/2021 >60.0 >60 mL/min/1.73 m^2 Final     Comment:     Calculation used to obtain the estimated glomerular filtration  rate (eGFR) is the CKD-EPI equation.        Lab Results   Component Value Date    HGBA1C 5.3 04/10/2023     Lab Results   Component Value Date    TSH 1.034 04/10/2023         ASSESSMENT: 53 y.o. year old adult with chronic back and knee pain, consistent with the following diagnoses:    Encounter Diagnoses   Name Primary?    Thoracic radiculopathy Yes    Cervical radiculopathy      Chronic pain syndrome     Lumbar spondylosis     S/P lumbar laminectomy     Spinal stenosis of lumbar region with neurogenic claudication     Encounter for long-term opiate analgesic use          PLAN:     - Previous imaging was reviewed and discussed with the patient today.    - Will order thoracic MRI given radicular symptoms.    - Continue with home PT and yoga exercises.    - Continue oxycodone acetaminophen 10/325 mg QID PRN, #90. Can call for additional refills as needed.    - The patient is here today for a refill of current pain medications and they believe these provide effective pain control and improvements in quality of life.  The patient notes no serious side effects, and feels the benefits outweigh the risks.  The patient was reminded of the pain contract that they signed previously as well as the risks and benefits of the medication including possible death.  The updated [place] Board of Pharmacy prescription monitoring program was reviewed, and the patient has been found to be compliant with current treatment plan.    - Pt has pain contract.  Last UDS from 3/10/23 reviewed.    - F/U as needed. I will call with MRI results.    - Dr. Farmer was consulted on the patient and agrees with this plan.      The above plan and management options were discussed at length with patient. Patient is in agreement with the above and verbalized understanding.      Alejandra Phoenix     06/13/2023

## 2023-06-15 ENCOUNTER — PATIENT MESSAGE (OUTPATIENT)
Dept: PAIN MEDICINE | Facility: CLINIC | Age: 53
End: 2023-06-15
Payer: MEDICARE

## 2023-06-15 DIAGNOSIS — M54.12 CERVICAL RADICULOPATHY: ICD-10-CM

## 2023-06-15 DIAGNOSIS — M79.2 NEUROPATHIC PAIN: Primary | ICD-10-CM

## 2023-06-15 DIAGNOSIS — G89.4 CHRONIC PAIN SYNDROME: ICD-10-CM

## 2023-06-15 DIAGNOSIS — M79.2 NEUROPATHIC PAIN: ICD-10-CM

## 2023-06-15 RX ORDER — OXYCODONE AND ACETAMINOPHEN 10; 325 MG/1; MG/1
1 TABLET ORAL EVERY 8 HOURS PRN
Qty: 90 TABLET | Refills: 0 | Status: SHIPPED | OUTPATIENT
Start: 2023-06-15 | End: 2023-07-15

## 2023-06-15 RX ORDER — OXYCODONE AND ACETAMINOPHEN 10; 325 MG/1; MG/1
1 TABLET ORAL EVERY 8 HOURS PRN
Qty: 90 TABLET | Refills: 0 | Status: CANCELLED | OUTPATIENT
Start: 2023-06-15

## 2023-06-15 NOTE — TELEPHONE ENCOUNTER
Patient requesting refill on oxycodone HCl/acetaminophen  mg  Last office visit 06/13/23   shows last refill on 05/18/23  Patient does have a pain contract on file with [facility] department  Patient last UDS 03/10/23 was consistent with current therapy     CODEINE  Not Detected   Not Detected  Not Detected    MORPHINE  Not Detected   Present  Present    6-ACETYLMORPHINE  Not Detected   Not Detected  Not Detected    OXYCODONE  Present   Present  Present    NOROYXCODONE  Present   Present  Present    OXYMORPHONE  Present   Not Detected  Not Detected    NOROXYMORPHONE  Present   Not Detected  Not Detected    HYDROCODONE  Not Detected   Not Detected  Not Detected    NORHYDROCODONE  Not Detected   Not Detected  Not Detected    HYDROMORPHONE  Not Detected   Not Detected  Not Detected    BUPRENORPHINE  Not Detected

## 2023-06-18 DIAGNOSIS — G25.81 RLS (RESTLESS LEGS SYNDROME): ICD-10-CM

## 2023-06-18 RX ORDER — PRIMIDONE 50 MG/1
150 TABLET ORAL 3 TIMES DAILY
Qty: 810 TABLET | Refills: 0 | Status: CANCELLED | OUTPATIENT
Start: 2023-06-18

## 2023-06-20 ENCOUNTER — PATIENT MESSAGE (OUTPATIENT)
Dept: PAIN MEDICINE | Facility: CLINIC | Age: 53
End: 2023-06-20
Payer: MEDICARE

## 2023-06-20 DIAGNOSIS — G25.81 RLS (RESTLESS LEGS SYNDROME): ICD-10-CM

## 2023-06-20 DIAGNOSIS — M54.9 DORSALGIA, UNSPECIFIED: Primary | ICD-10-CM

## 2023-06-20 RX ORDER — PRIMIDONE 50 MG/1
150 TABLET ORAL 3 TIMES DAILY
Qty: 810 TABLET | Refills: 0 | Status: CANCELLED | OUTPATIENT
Start: 2023-06-18

## 2023-06-21 DIAGNOSIS — G25.81 RLS (RESTLESS LEGS SYNDROME): ICD-10-CM

## 2023-06-22 RX ORDER — PRIMIDONE 50 MG/1
150 TABLET ORAL 3 TIMES DAILY
Qty: 810 TABLET | Refills: 0 | Status: SHIPPED | OUTPATIENT
Start: 2023-06-22 | End: 2023-08-07 | Stop reason: SDUPTHER

## 2023-06-25 DIAGNOSIS — G25.81 RLS (RESTLESS LEGS SYNDROME): ICD-10-CM

## 2023-06-25 RX ORDER — ROPINIROLE 4 MG/1
4 TABLET, FILM COATED ORAL DAILY
Qty: 90 TABLET | Refills: 1 | Status: CANCELLED | OUTPATIENT
Start: 2023-06-25

## 2023-06-26 DIAGNOSIS — G25.81 RLS (RESTLESS LEGS SYNDROME): ICD-10-CM

## 2023-06-26 RX ORDER — ROPINIROLE 4 MG/1
4 TABLET, FILM COATED ORAL DAILY
Qty: 90 TABLET | Refills: 1 | Status: SHIPPED | OUTPATIENT
Start: 2023-06-26 | End: 2023-08-07 | Stop reason: SDUPTHER

## 2023-06-28 ENCOUNTER — TELEPHONE (OUTPATIENT)
Dept: SURGERY | Facility: CLINIC | Age: 53
End: 2023-06-28
Payer: MEDICARE

## 2023-06-28 NOTE — TELEPHONE ENCOUNTER
Called pt's wife to let her know the arrival time for sx on tomorrow which is 8am with Dr. Ku at [facility].  No questions or concerns.  Pt's wife verbalized understanding to all.

## 2023-06-29 ENCOUNTER — ANESTHESIA (OUTPATIENT)
Dept: ENDOSCOPY | Facility: OTHER | Age: 53
End: 2023-06-29
Payer: MEDICARE

## 2023-06-29 ENCOUNTER — HOSPITAL ENCOUNTER (OUTPATIENT)
Facility: OTHER | Age: 53
Discharge: HOME | End: 2023-06-29
Attending: SURGERY | Admitting: SURGERY
Payer: MEDICARE

## 2023-06-29 ENCOUNTER — ANESTHESIA EVENT (OUTPATIENT)
Dept: ENDOSCOPY | Facility: OTHER | Age: 53
End: 2023-06-29
Payer: MEDICARE

## 2023-06-29 VITALS
OXYGEN SATURATION: 100 % | HEIGHT: 60 IN | HEART RATE: 90 BPM | TEMPERATURE: 98 F | BODY MASS INDEX: 26.7 KG/M2 | RESPIRATION RATE: 18 BRPM | WEIGHT: 136 LBS | SYSTOLIC BLOOD PRESSURE: 125 MMHG | DIASTOLIC BLOOD PRESSURE: 75 MMHG

## 2023-06-29 DIAGNOSIS — Z12.11 ENCOUNTER FOR SCREENING COLONOSCOPY: ICD-10-CM

## 2023-06-29 PROCEDURE — D9220A PRA ANESTHESIA: ICD-10-PCS | Mod: CRNA,,, | Performed by: STUDENT IN AN ORGANIZED HEALTH CARE EDUCATION/TRAINING PROGRAM

## 2023-06-29 PROCEDURE — 45380 COLONOSCOPY AND BIOPSY: CPT | Mod: PT,,, | Performed by: SURGERY

## 2023-06-29 PROCEDURE — 27201012 HC FORCEPS, HOT/COLD, DISP: Performed by: SURGERY

## 2023-06-29 PROCEDURE — 37000008 HC ANESTHESIA 1ST 15 MINUTES: Performed by: SURGERY

## 2023-06-29 PROCEDURE — 63600175 PHARM REV CODE 636 W HCPCS: Performed by: STUDENT IN AN ORGANIZED HEALTH CARE EDUCATION/TRAINING PROGRAM

## 2023-06-29 PROCEDURE — 37000009 HC ANESTHESIA EA ADD 15 MINS: Performed by: SURGERY

## 2023-06-29 PROCEDURE — 25000003 PHARM REV CODE 250: Performed by: STUDENT IN AN ORGANIZED HEALTH CARE EDUCATION/TRAINING PROGRAM

## 2023-06-29 PROCEDURE — D9220A PRA ANESTHESIA: Mod: ANES,,, | Performed by: ANESTHESIOLOGY

## 2023-06-29 PROCEDURE — D9220A PRA ANESTHESIA: ICD-10-PCS | Mod: ANES,,, | Performed by: ANESTHESIOLOGY

## 2023-06-29 PROCEDURE — 45380 COLONOSCOPY AND BIOPSY: CPT | Mod: PT | Performed by: SURGERY

## 2023-06-29 PROCEDURE — 45380 PR COLONOSCOPY,BIOPSY: ICD-10-PCS | Mod: PT,,, | Performed by: SURGERY

## 2023-06-29 PROCEDURE — 88305 TISSUE EXAM BY PATHOLOGIST: ICD-10-PCS | Mod: 26,,, | Performed by: PATHOLOGY

## 2023-06-29 PROCEDURE — 88305 TISSUE EXAM BY PATHOLOGIST: CPT | Mod: 26,,, | Performed by: PATHOLOGY

## 2023-06-29 PROCEDURE — D9220A PRA ANESTHESIA: Mod: CRNA,,, | Performed by: STUDENT IN AN ORGANIZED HEALTH CARE EDUCATION/TRAINING PROGRAM

## 2023-06-29 PROCEDURE — 88305 TISSUE EXAM BY PATHOLOGIST: CPT | Performed by: PATHOLOGY

## 2023-06-29 RX ORDER — LIDOCAINE HYDROCHLORIDE 20 MG/ML
INJECTION INTRAVENOUS
Status: DISCONTINUED | OUTPATIENT
Start: 2023-06-29 | End: 2023-06-29

## 2023-06-29 RX ORDER — PROPOFOL 10 MG/ML
VIAL (ML) INTRAVENOUS
Status: DISCONTINUED | OUTPATIENT
Start: 2023-06-29 | End: 2023-06-29

## 2023-06-29 RX ORDER — HYDROCORTISONE 25 MG/G
CREAM TOPICAL 2 TIMES DAILY
Qty: 28 G | Refills: 3 | Status: SHIPPED | OUTPATIENT
Start: 2023-06-29

## 2023-06-29 RX ORDER — FENTANYL CITRATE 50 UG/ML
INJECTION, SOLUTION INTRAMUSCULAR; INTRAVENOUS
Status: DISCONTINUED | OUTPATIENT
Start: 2023-06-29 | End: 2023-06-29

## 2023-06-29 RX ADMIN — PROPOFOL 40 MG: 10 INJECTION, EMULSION INTRAVENOUS at 10:06

## 2023-06-29 RX ADMIN — PROPOFOL 60 MG: 10 INJECTION, EMULSION INTRAVENOUS at 09:06

## 2023-06-29 RX ADMIN — LIDOCAINE HYDROCHLORIDE 60 MG: 20 INJECTION, SOLUTION INTRAVENOUS at 09:06

## 2023-06-29 RX ADMIN — SODIUM CHLORIDE, SODIUM LACTATE, POTASSIUM CHLORIDE, AND CALCIUM CHLORIDE: .6; .31; .03; .02 INJECTION, SOLUTION INTRAVENOUS at 09:06

## 2023-06-29 RX ADMIN — FENTANYL CITRATE 25 MCG: 50 INJECTION, SOLUTION INTRAMUSCULAR; INTRAVENOUS at 10:06

## 2023-06-29 NOTE — DISCHARGE SUMMARY
[facility] - Endoscopy  Discharge Note  Short Stay    Procedure(s) (LRB):  COLONOSCOPY (N/A)      OUTCOME: Patient tolerated treatment/procedure well without complication and is now ready for discharge.    DISPOSITION: Home or Self Care    FINAL DIAGNOSIS: screening colonoscopy, history of rectal prolapse    FOLLOWUP: In clinic    DISCHARGE INSTRUCTIONS:    - resume regular diet and activities   - repeat colonoscopy in 10 years pending pathology    Genia Ku MD  Staff Surgeon   Colon & Rectal Surgery

## 2023-06-29 NOTE — ANESTHESIA POSTPROCEDURE EVALUATION
Anesthesia Post Evaluation    Patient: Ari Sainz    Procedure(s) Performed: Procedure(s) (LRB):  COLONOSCOPY (N/A)    Final Anesthesia Type: general      Patient location during evaluation: [facility]  Patient participation: Yes- Able to Participate  Level of consciousness: awake and alert  Post-procedure vital signs: reviewed and stable  Pain management: adequate  Airway patency: patent  KIARA mitigation strategies: Multimodal analgesia  PONV status at discharge: No PONV  Anesthetic complications: no      Cardiovascular status: blood pressure returned to baseline, hemodynamically stable and stable  Respiratory status: unassisted, spontaneous ventilation and room air  Hydration status: euvolemic  Follow-up not needed.          Vitals Value Taken Time   /72 06/29/23 0809   Temp 36.7 °C (98.1 °F) 06/29/23 0808   Pulse 73 06/29/23 0808   Resp 16 06/29/23 0808   SpO2 100 % 06/29/23 0808         No case tracking events are documented in the log.      Pain/Andreea Score: No data recorded

## 2023-06-29 NOTE — PLAN OF CARE
Ari Sainz has met all discharge criteria from Phase II. Vital Signs are stable, ambulating  without difficulty. Discharge instructions given, patient verbalized understanding. Discharged from facility via wheelchair in stable condition.

## 2023-06-29 NOTE — H&P
CRS History and Physical      SUBJECTIVE:     Chief Complaint: colonoscopy    History of Present Illness:    Ari Sainz is a 53 y.o. adult presents for colonoscopy. Prep completed.  Had some bright red blood per rectum with prep. No pain.     Last Colonoscopy: 5 yeas ago     Review of patient's allergies indicates:   Allergen Reactions    Bactrim [sulfamethoxazole-trimethoprim] Itching    Penicillins Rash       Past Medical History:   Diagnosis Date    Abnormal CT scan, pelvis 10/27/2019    Abnormal thyroid blood test 05/06/2019    Adrenal cyst     left    Adrenal insufficiency     Blister- healing 01/09/2020    Chronic bilateral low back pain with bilateral sciatica 03/19/2019    Congenital adrenal hyperplasia     Hepatitis B core antibody positive 11/06/2020    Negative sAg, suggests previous exposure but no chronic/active Hep B. At risk for reactivation with any immunosuppression medication, steroids, chemo, etc.      IGT (impaired glucose tolerance) 11/05/2019    Insomnia due to medical condition     Multiple closed traumatic fractures of multiple bones of hip and pelvis with routine healing, subsequent encounter 09/27/2019    Pancreatic cyst 11/02/2020    Restless leg syndrome     S/P lumbar laminectomy 10/28/2019    Spinal stenosis     Spinal stenosis of lumbar region with neurogenic claudication 03/19/2019    Status post sex reassignment surgery 03/19/2019    Hx of Congenital Adrenal Hyperplasia    Unintentional weight loss 07/21/2020     Past Surgical History:   Procedure Laterality Date    BACK SURGERY      BREAST SURGERY  1986,2001    Implants, removal    CHOLECYSTECTOMY N/A 05/18/2021    Procedure: CHOLECYSTECTOMY;  Surgeon: Antonio Brandt MD;  Location: [facility] OR [address] [place] [address];  Service: General;  Laterality: N/A;    ENDOSCOPIC ULTRASOUND OF UPPER GASTROINTESTINAL TRACT N/A 12/03/2020    Procedure: ULTRASOUND, UPPER GI TRACT, ENDOSCOPIC;  Surgeon: Jonathan Sharma MD;  Location: [address]  FLR);  Service: Endoscopy;  Laterality: N/A;  elevated alk phos, panc cysts, liver biopsy   11/30-covid pcw-tb    ENDOSCOPIC ULTRASOUND OF UPPER GASTROINTESTINAL TRACT N/A 05/17/2021    Procedure: ULTRASOUND, UPPER GI TRACT, ENDOSCOPIC;  Surgeon: Claudio Salvador MD;  Location: [facility] ENDO (2ND FLR);  Service: Endoscopy;  Laterality: N/A;    ERCP N/A 05/17/2021    Procedure: ERCP (ENDOSCOPIC RETROGRADE CHOLANGIOPANCREATOGRAPHY);  Surgeon: Claudio Salvador MD;  [facility]  Service: Endoscopy;  Laterality: N/A;    gender surgery      multiple surgeries since birth    HYSTERECTOMY  2003    INJECTION OF ANESTHETIC AGENT AROUND NERVE Right 11/19/2020    Procedure: BLOCK, NERVE, GLENOHUMERAL  need consent;  Surgeon: Messi Cabello MD;  Location: [facility] PAIN MGT;  Service: Pain Management;  Laterality: Right;    JOINT REPLACEMENT  2/2020, 1/2021    Knees    KNEE ARTHROPLASTY Left 01/25/2021    Procedure: ARTHROPLASTY, KNEE:DEPUY-ATTUNE REVISION: SERINA iASSIST:CABLES ON HOLD;  Surgeon: Donte Andrade III, MD;  Location: [facility];  Service: Orthopedics;  Laterality: Left;    LAMINECTOMY  09/13/2019    LAPAROSCOPIC CHOLECYSTECTOMY N/A 05/17/2021    Procedure: CHOLECYSTECTOMY, LAPAROSCOPIC;  Surgeon: Calvin Wilson MD;  [address]  Service: General;  Laterality: N/A;    LAPAROSCOPIC CHOLECYSTECTOMY N/A 05/18/2021    Procedure: CHOLECYSTECTOMY, LAPAROSCOPIC;  Surgeon: Antonio Brandt MD;  [facility];  Service: General;  Laterality: N/A;    RADIOFREQUENCY ABLATION Left 05/09/2019    Procedure: RADIOFREQUENCY ABLATION, LEFT L3,4,5;  Surgeon: Messi Cabello MD;  Location: [facility] PAIN MGT;  Service: Pain Management;  Laterality: Left;  1 of 2    RADIOFREQUENCY ABLATION Right 05/23/2019    Procedure: RADIOFREQUENCY ABLATION, RIGHT L3,4,5;  Surgeon: Messi Cabello MD;  Location: [facility] PAIN MGT;  Service: Pain Management;  Laterality: Right;  2of 2    ROBOT-ASSISTED  "LAPAROSCOPIC RECTOPEXY N/A 2023    Procedure: ROBOTIC RECTOPEXY;  Surgeon: Genia Ku MD;  Location: [facility];  Service: Colon and Rectal;  Laterality: N/A;    SPINE SURGERY      2019     TOTAL KNEE ARTHROPLASTY Right 2020    Procedure: ARTHROPLASTY, KNEE, TOTAL;  Surgeon: Donte Andrade III, MD;  [facility];  Service: Orthopedics;  Laterality: Right;     Family History   Problem Relation Age of Onset    Diabetes Maternal Grandfather     Cancer Maternal Grandfather         Colon    Cancer Mother         Kidney    Early death Mother         58    Heart disease Father     Autoimmune disease Sister     Hypertension Maternal Grandmother     Stroke Maternal Grandmother     Diabetes Maternal Uncle     Breast cancer Neg Hx     Ovarian cancer Neg Hx     Vaginal cancer Neg Hx     Endometrial cancer Neg Hx     Cervical cancer Neg Hx      Social History     Tobacco Use    Smoking status: Former     Types: Cigarettes     Quit date:      Years since quittin.4    Smokeless tobacco: Never    Tobacco comments:     was not a daily smoker-    Substance Use Topics    Alcohol use: Not Currently    Drug use: No        Review of Systems:  Review of Systems   All other systems reviewed and are negative.    OBJECTIVE:     Vital Signs (Most Recent)  /72   Pulse 73   Temp 98.1 °F (36.7 °C) (Oral)   Resp 16   Ht 4' 9" (1.448 m)   Wt 61.7 kg (136 lb 0.4 oz)   SpO2 100%   Breastfeeding No   BMI 29.44 kg/m²     Physical Exam:  General: 53 y.o. adult in no distress   Neuro: alert and oriented x 4.  Moves all extremities.     HEENT: normocephalic, atraumatic, PERRL, EOMI   Respiratory: respirations are even and unlabored  Cardiac: regular rate and rhythm  Abdomen: soft, NTND  Extremities: Warm dry and intact  Skin: no rashes    Labs: NA    Imaging: NA      ASSESSMENT/PLAN:       53 y.o. adult with history of rectal prolapse s/p robotic rectopexy who presents for colonoscopy    - prep " completed  - proceed     Genia Ku MD  Staff Surgeon  Colon & Rectal Surgery

## 2023-06-29 NOTE — [employer] PATIENT INSTRUCTIONS
Discharge Summary/Instructions after an Endoscopic Procedure  Patient Name: Ari Sainz  Patient MRN: [id number]  Patient YOB: 1970  Thursday, June 29, 2023  Genia Ku MD  RESTRICTIONS:  During your procedure today, you received medications for sedation.  These   medications may affect your judgment, balance and coordination.  Therefore,   for 24 hours, you have the following restrictions:   - DO NOT drive a car, operate machinery, make legal/financial decisions,   sign important papers or drink alcohol.    ACTIVITY:  Today: no heavy lifting, straining or running due to procedural   sedation/anesthesia.  The following day: return to full activity including work.  DIET:  Eat and drink normally unless instructed otherwise.     TREATMENT FOR COMMON SIDE EFFECTS:  - Mild abdominal pain, nausea, belching, bloating or excessive gas:  rest,   eat lightly and use a heating pad.  - Sore Throat: treat with throat lozenges and/or gargle with warm salt   water.  - Because air was used during the procedure, expelling large amounts of air   from your rectum or belching is normal.  - If a bowel prep was taken, you may not have a bowel movement for 1-3 days.    This is normal.  SYMPTOMS TO WATCH FOR AND REPORT TO YOUR PHYSICIAN:  1. Abdominal pain or bloating, other than gas cramps.  2. Chest pain.  3. Back pain.  4. Signs of infection such as: chills or fever occurring within 24 hours   after the procedure.  5. Rectal bleeding, which would show as bright red, maroon, or black stools.   (A tablespoon of blood from the rectum is not serious, especially if   hemorrhoids are present.)  6. Vomiting.  7. Weakness or dizziness.  GO DIRECTLY TO THE NEAREST EMERGENCY ROOM IF YOU HAVE ANY OF THE FOLLOWING:      Difficulty breathing              Chills and/or fever over 101 F   Persistent vomiting and/or vomiting blood   Severe abdominal pain   Severe chest pain   Black, tarry stools   Bleeding- more than one tablespoon   Any  other symptom or condition that you feel may need urgent attention  Your doctor recommends these additional instructions:  If any biopsies were taken, your doctors clinic will contact you in 1 to 2   weeks with any results.  - Patient has a contact number available for emergencies.  The signs and   symptoms of potential delayed complications were discussed with the   patient.  Return to normal activities tomorrow.  Written discharge   instructions were provided to the patient.   - Resume previous diet.   - Continue present medications.   - Await pathology results.   - Repeat colonoscopy in 10 years for screening purposes.   - Discharge patient to home (ambulatory).  For questions, problems or results please call your physician - Genia Ku MD at Work:  [phone].  [facility], EMERGENCY ROOM PHONE NUMBER: [phone], [facility]   [phone].  IF A COMPLICATION OR EMERGENCY SITUATION ARISES AND YOU ARE UNABLE TO REACH   YOUR PHYSICIAN - GO DIRECTLY TO THE EMERGENCY ROOM.  MD Genia Weiss MD  6/29/2023 10:29:25 AM  This report has been verified and signed electronically.  Dear patient,  As a result of recent federal legislation (The Federal Cures Act), you may   receive lab or pathology results from your procedure in your [facility]   account before your physician is able to contact you. Your physician or   their representative will relay the results to you with their   recommendations at their soonest availability.  Thank you,  [employer]ATION

## 2023-06-29 NOTE — ANESTHESIA PREPROCEDURE EVALUATION
06/29/2023  Ari Sainz is a 53 y.o., adult.      Pre-op Assessment    I have reviewed the Patient Summary Reports.     I have reviewed the Nursing Notes. I have reviewed the NPO Status.   I have reviewed the Medications.     Review of Systems  Anesthesia Hx:  No problems with previous Anesthesia  Denies Family Hx of Anesthesia complications.   Denies Personal Hx of Anesthesia complications.   Social:  Non-Smoker, Former Smoker    Hematology/Oncology:  Hematology Normal   Oncology Normal     EENT/Dental:EENT/Dental Normal   Cardiovascular:  Cardiovascular Normal Exercise tolerance: good     Pulmonary:   Sleep Apnea    Renal/:  Renal/ Normal     Hepatic/GI:   Liver Disease,    Musculoskeletal:  Spine Disorders: lumbar and cervical Chronic Pain    Neurological:  Neurology Normal Restless leg syndrome   Endocrine:  Endocrine Normal Congenital adrenal hyperplasia   Dermatological:  Skin Normal    Psych:  Psychiatric Normal  transgender         Physical Exam  General: Well nourished, Cooperative, Oriented and Alert    Airway:  Mouth Opening: Normal  TM Distance: Normal  Neck ROM: Normal ROM    Dental:  Intact        Anesthesia Plan  Type of Anesthesia, risks & benefits discussed:    Anesthesia Type: Gen Natural Airway  Intra-op Monitoring Plan: Standard ASA Monitors  Post Op Pain Control Plan: multimodal analgesia  Induction:  IV  Airway Plan: Video and Direct  Informed Consent: Informed consent signed with the Patient and all parties understand the risks and agree with anesthesia plan.  All questions answered.   ASA Score: 2  Day of Surgery Review of History & Physical: H&P Update referred to the surgeon/provider.    Ready For Surgery From Anesthesia Perspective.     .

## 2023-07-03 LAB
FINAL PATHOLOGIC DIAGNOSIS: NORMAL
GROSS: NORMAL
Lab: NORMAL

## 2023-07-18 RX ORDER — OXYCODONE AND ACETAMINOPHEN 10; 325 MG/1; MG/1
1 TABLET ORAL EVERY 8 HOURS PRN
Qty: 90 TABLET | Refills: 0 | Status: SHIPPED | OUTPATIENT
Start: 2023-07-18 | End: 2023-08-16 | Stop reason: SDUPTHER

## 2023-07-18 NOTE — OP NOTE
7/18/2023         RE: Loren Marie  [address] [place] [address]        Dear Colleague,    Thank you for referring your patient, Loren Marie, to the [facility]. Please see a copy of my visit note below.    PATIENT HISTORY:  Dr. Marquez requested I see this patient for their foot issue.  Loren Marie is a 84 year old female who presents to clinic for Achilles tendinitis right foot.  Pain to the right heel.  Has had this for 40 days.  Has a throbbing pain.  Pain is 9 out of 10.  Notes it is all day and with all movements.  She has had an MRI done.  Here with her daughter.  Denies specific injury.  Wondering what can be done for the pain.    Review of Systems:  Patient denies fever, chills, rash, wound, stiffness, numbness, weakness, heart burn, blood in stool, chest pain with activity, calf pain when walking, shortness of breath with activity, chronic cough, easy bleeding/bruising, swelling of ankles, excessive thirst, fatigue, depression, anxiety.  Patient admits to limping.     PAST MEDICAL HISTORY:   Past Medical History:   Diagnosis Date     HLD (hyperlipidemia)      HTN (hypertension)         PAST SURGICAL HISTORY:   Past Surgical History:   Procedure Laterality Date     ESOPHAGOSCOPY, GASTROSCOPY, DUODENOSCOPY (EGD), COMBINED N/A 07/13/2023    Procedure: ESOPHAGOGASTRODUODENOSCOPY WITH DILATION;  Surgeon: Meño Martínez MD;  Location: [facility] KNEE SCOPE, DIAGNOSTIC      Description: Arthroscopy Knee Left;  Recorded: 02/11/2008;      KNEE SCOPE, DIAGNOSTIC      Description: Arthroscopy Knee Right;  Recorded: 02/11/2008;      REDUCTION OF LARGE BREAST      Description: Breast Surgery Reduction Procedure Bilateral;  Recorded: 11/06/2008;     LAPAROSCOPIC CHOLECYSTECTOMY N/A 12/01/2018    Procedure: CHOLECYSTECTOMY, LAPAROSCOPIC- with cholangiograms;  Surgeon: Breezy Sykes DO;  Location: [facility];  Service: General        Date of surgery: 01/17/2023    Operative Report  Pre-operative diagnosis: rectal prolapse  Post-operative diagnosis: Same as above    Procedure:  Robotic rectopexy     Findings:  Redundant sigmoid colon        Surgeon: Genia Ku MD   Assistant:  none    Indication: Mr. Santos is a 52 year old man with a history of full thickness rectal prolapse  who is scheduled to undergo robotic suture rectopexy. The benefits, risks, and alternatives were discussed with the patient, they were given the opportunity to ask questions and they elected to proceed with operative intervention after signing written consent.    Procedure:  After pre-operative assessment and review of informed consent, the patient was taken to the operating room and received general anesthesia. A lund catheter was then placed under strict sterile precautions. Pre-operative antibiotics were administered if indicated and the patient was placed in lithotomy position. The abdomen and perineum was prepped and draped in the usual sterile fashion and a timeout was performed according to [facility] Quality and Safety guidelines.      A skin incision was made at [place] point in the left upper quadrant.  The abdominal cavity was accessed with a veress needle.  A negative drop test was performed.  Insufflation was turned on and pneumoperitoneum achieved.  An 8mm trocar was placed in an optiview fashion.  Three additional 8 mm trocars were placed in a diagonal line to the right ASIS under direct laparoscopic visualization.  A 5 mm airseal trocar was placed in the right upper quadrant under direct laparoscopic visualization.  Abdominal contents were inspected and there were no injuries from entry.      The [employer] robot was then brought onto the field and docked.  A small grasping retractor, fenestrated bipolar, and scissors were introduced.  The sigmoid was grasped and elevated out of the pelvis.  It was noted to be significantly redundant.  The peritoneum was  OOPHORECTOMY  1969    left, still has right ovary     ZZC TOTAL ABDOM HYSTERECTOMY      Description: Hysterectomy;  Recorded: 04/11/2011;  Comments: at 29 years of age        MEDICATIONS:   Current Outpatient Medications:      amLODIPine (NORVASC) 5 MG tablet, Take 1 tablet (5 mg) by mouth daily, Disp: 90 tablet, Rfl: 4     cholecalciferol, vitamin D3, (VITAMIN D3 ORAL), [CHOLECALCIFEROL, VITAMIN D3, (VITAMIN D3 ORAL)] Take 2,000 Units by mouth daily., Disp: , Rfl:      hydrochlorothiazide (HYDRODIURIL) 25 MG tablet, TAKE ONE TABLET BY MOUTH ONE TIME DAILY, Disp: 90 tablet, Rfl: 0     latanoprost (XALATAN) 0.005 % ophthalmic solution, [LATANOPROST (XALATAN) 0.005 % OPHTHALMIC SOLUTION] Use as directed in both eyes daily, Disp: 2.5 mL, Rfl: 6     LORazepam (ATIVAN) 1 MG tablet, TAKE ONE TABLET BY MOUTH AT BEDTIME, Disp: 90 tablet, Rfl: 0     losartan (COZAAR) 100 MG tablet, TAKE ONE TABLET BY MOUTH ONE TIME DAILY, Disp: 90 tablet, Rfl: 0     metoprolol succinate ER (TOPROL XL) 50 MG 24 hr tablet, Take 1 tablet (50 mg) by mouth daily, Disp: 90 tablet, Rfl: 3     multivitamin-minerals-lutein (CENTRUM SILVER) tablet, [MULTIVITAMIN-MINERALS-LUTEIN (CENTRUM SILVER) TABLET] Take 1 tablet by mouth daily., Disp: , Rfl:      omeprazole (PRILOSEC) 20 MG DR capsule, Take 1 capsule (20 mg) by mouth daily, Disp: 90 capsule, Rfl: 4     pravastatin (PRAVACHOL) 20 MG tablet, Take 1 tablet (20 mg) by mouth daily, Disp: 90 tablet, Rfl: 3    Current Facility-Administered Medications:      cyanocobalamin injection 1,000 mcg, 1,000 mcg, Intramuscular, Q30 Days, Js Roberson MD, 1,000 mcg at 11/19/21 3680     ALLERGIES:    Allergies   Allergen Reactions     Aspirin (Tartrazine Only) [Tartrazine] Other (See Comments)     Abdominal pain     Codeine Nausea and Vomiting        SOCIAL HISTORY:   Social History     Socioeconomic History     Marital status: Single     Spouse name: Not on file     Number of children: Not on file     Years  incised along the right side and the posterior mesorectal plane was developed down to pelvic floor.  The right ureter was visualized and vermiculation was noted.  The rectovaginal septum was then developed anteriorly.  Once we were satisfied with the dissection, an Ethibond suture was used to pexy the redundant rectum to the periosteum of the sacral promontory.      I then went below and a rectal exam revealed no further rectal prolapse.  The peritoneal window was the closed with a running v-loc suture.  The surgical site was inspected and hemostasis was appropriate.  The robotic instruments were removed from the patient and the robot undocked and removed from the field.  Pneumoperitoneum was evacuated and the trocars removed.      The skin incisions were reapproximated with monocryl.  The wounds were cleaned and dried and dressed with dermabond.      Prior to closure and after final closure instrument, needle, and sponge counts were correct.    The procedure was completed without complication and was well-tolerated. The patient was then brought to the post-anesthesia care unit in stable condition. I was present for the entire operation.    Complications: None  Estimated blood loss:  50 mL  Disposition: PACU    Genia Ku MD  Staff Surgeon   Colon & Rectal Surgery     of education: Not on file     Highest education level: Not on file   Occupational History     Not on file   Tobacco Use     Smoking status: Never     Smokeless tobacco: Never   Vaping Use     Vaping Use: Never used   Substance and Sexual Activity     Alcohol use: No     Drug use: No     Sexual activity: Never   Other Topics Concern     Not on file   Social History Narrative      2020.  Three daughters and one son.  Numerous grandchildren and great grandchildren.    Granddaughter, Loren, handles things for her.   Originally from [place].      Social Determinants of Health     Financial Resource Strain: Not on file   Food Insecurity: Not on file   Transportation Needs: Not on file   Physical Activity: Not on file   Stress: Not on file   Social Connections: Not on file   Intimate Partner Violence: Not on file   Housing Stability: Not on file        FAMILY HISTORY:   Family History   Problem Relation Age of Onset     No Known Problems Mother      No Known Problems Father      Thyroid Cancer Sister      Pancreatic Cancer Sister      Myocardial Infarction Brother      Diabetes Brother      No Known Problems Brother      No Known Problems Daughter      No Known Problems Daughter      No Known Problems Daughter      No Known Problems Son         disabled         EXAM:Vitals: BP (!) 142/80   Wt 96.6 kg (213 lb)   LMP  (LMP Unknown)   BMI 40.25 kg/m      A1C: 6.9 (2023)    General appearance: Patient is alert and fully cooperative with history & exam.  No sign of distress is noted during the visit.     Psychiatric: Affect is pleasant & appropriate.  Patient appears motivated to improve health.     Respiratory: Breathing is regular & unlabored while sitting.     HEENT: Hearing is intact to spoken word.  Speech is clear.  No gross evidence of visual impairment that would impact ambulation.     Dermatologic: Skin is intact to both lower extremities without significant lesions, rash or  abrasion.  No paronychia or evidence of soft tissue infection is noted.     Vascular: DP & PT pulses are intact & regular bilaterally.  No significant edema or varicosities noted.  CFT and skin temperature is normal to both lower extremities.     Neurologic: Lower extremity sensation is intact to light touch.  No evidence of weakness or contracture in the lower extremities.  No evidence of neuropathy.     Musculoskeletal: Patient is ambulatory without assistive device or brace.  Pain on palpation of the posterior aspect of the right Achilles tendon insertion.  Pain with dorsiflexion and plantarflexion to this area of the right foot.    Radiographs: right heel - I personally reviewed the xrays -  Moderate calcaneal enthesophytes. There is also a small amount of calcification in the distal Achilles tendon which may be from chronic degeneration or hydroxyapatite deposition. No fracture.    Mri right heel -  Acute on chronic Achilles tendon tendinopathy. No evidence for tearing but there is some reactive enthesitis at the attachment. Overall thickening of the Achilles tendon. Mild surrounding edema more proximally along the course of the tendon.  2.  Mild peroneus longus brevis and longus tendinopathy without tearing.  3.  Mild posterior tibial tendon tendinopathy without tearing.  4.  No ligamentous pathology or dysfunction.  5.  No evidence for fracture or significant marrow signal abnormality.     ASSESSMENT:    Achilles tendinitis of right lower extremity  Right foot pain  Type 2 diabetes mellitus with diabetic neuropathy, without long-term current use of insulin (H)     Medical Decision Making/Plan:  Reviewed patient's chart in [facility].  Reviewed and discussed x-rays and MRI results with patient and patient's daughter. Reviewed and discussed causes of tendonitis.  We discussed treatments such as immobiliation, icing, stretching, heel lifts, orthotics, physical therapy, MRI.     At this time I would recommend a short  Aircast boot for the patient for the next 5 weeks to help take pressure off of the Achilles tendon.  Would also recommend physical therapy with iontophoresis to try to help calm down inflammation to the tendon and help with pain.  Discussed that we do not want to inject the tendon because that can cause rupturing.  We will order an oral prednisone medication to also help with pain and swelling to the area.  If there is no relief in 5 weeks then we discussed we may have to go in and discuss surgery to debride the area however this would be quite involved and she would have to be nonweightbearing for at least 4 to 6 weeks and minimal weightbearing in a boot for 4 to 6 weeks after the nonweightbearing.    All questions were answered to patient and patient's daughter satisfaction they will call further questions or concerns.    Patient risk factor: Patient is at medium risk for action.        Constance Escobedo DPM, Podiatry/Foot and Ankle Surgery        Again, thank you for allowing me to participate in the care of your patient.        Sincerely,        Constance Escobedo DPM, Podiatry/Foot and Ankle Surgery

## 2023-07-18 NOTE — TELEPHONE ENCOUNTER
Patient requesting refill on oxycodone 10 MG   Last office visit 6/13/2023   shows last refill on 05/18/2023  Patient does have a pain contract on file with [facility] department  Patient last UDS 03/10/2023 was consistent with current therapy

## 2023-08-07 ENCOUNTER — OFFICE VISIT (OUTPATIENT)
Dept: NEUROLOGY | Facility: CLINIC | Age: 53
End: 2023-08-07
Payer: MEDICARE

## 2023-08-07 ENCOUNTER — TELEPHONE (OUTPATIENT)
Dept: NEUROLOGY | Facility: CLINIC | Age: 53
End: 2023-08-07
Payer: MEDICARE

## 2023-08-07 DIAGNOSIS — D64.9 ANEMIA, UNSPECIFIED TYPE: ICD-10-CM

## 2023-08-07 DIAGNOSIS — R25.1 TREMOR: ICD-10-CM

## 2023-08-07 DIAGNOSIS — G25.81 RLS (RESTLESS LEGS SYNDROME): Primary | ICD-10-CM

## 2023-08-07 PROCEDURE — 99215 PR OFFICE/OUTPT VISIT, EST, LEVL V, 40-54 MIN: ICD-10-PCS | Mod: 95,,, | Performed by: PSYCHIATRY & NEUROLOGY

## 2023-08-07 PROCEDURE — 99215 OFFICE O/P EST HI 40 MIN: CPT | Mod: 95,,, | Performed by: PSYCHIATRY & NEUROLOGY

## 2023-08-07 RX ORDER — ROPINIROLE 4 MG/1
4 TABLET, FILM COATED ORAL DAILY
Qty: 90 TABLET | Refills: 12 | Status: SHIPPED | OUTPATIENT
Start: 2023-08-07

## 2023-08-07 RX ORDER — PRIMIDONE 50 MG/1
150 TABLET ORAL 3 TIMES DAILY
Qty: 810 TABLET | Refills: 12 | Status: SHIPPED | OUTPATIENT
Start: 2023-08-07 | End: 2024-02-26

## 2023-08-07 NOTE — PROGRESS NOTES
"  The patient location is: home  The chief complaint leading to consultation is: Temor    Visit type: audiovisual    Face to Face time with patient: 20mins  20 minutes of total time spent on the encounter, which includes face to face time and non-face to face time preparing to see the patient (eg, review of tests), Obtaining and/or reviewing separately obtained history, Documenting clinical information in the electronic or other health record, Independently interpreting results (not separately reported) and communicating results to the patient/family/caregiver, or Care coordination (not separately reported).     Each patient to whom he or she provides medical services by telemedicine is:  (1) informed of the relationship between the physician and patient and the respective role of any other health care provider with respect to management of the patient; and (2) notified that he or she may decline to receive medical services by telemedicine and may withdraw from such care at any time.    Notes:     [facility]    PCP/Referring Provider: No referring provider defined for this encounter.  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 adult with a medical issues significant for Lspine surg 2019 (stenosis), R knee replacement, concussions, " "adrenal hyperplasia, fibromyalgia, coming for help with hand tremors and RLS. For RLS, he's had this for 20 years and feels like it's well controlled. He feels a bilateral leg sensation improved by movement. These sensations start around 9PM. He feels like this is well controlled with 4mg requip at night. Takes Gabapentin 800mg TID and 1600 QHS. Unknown if ETOH sensitive.  Hand tremor for 10 years.  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 Primidone 100mg PO TID. This helps tremors a 1/2 hour after taking primidone. He notices tremors worse when paining upright and less when he's drawing. 30 minutes after primidone he has no tremor. Primidone lasts 3-4 hours.    Balance issues. Falls frequently. Imbalance since 10 years. He attributes this to needing a knee replacement.    Not overly sedated.    Currently on lamictal    Uncle has a tremor.    Medication history:  -Tried propranolol and it did not seem to help      Neuroleptic exposure:  -Was on latuda 2 years ago    PD Review of Symptoms:  Anosmia: none  Depression: as above  Cognitive slowing: mild  Orthostasis: mild  Falls: yes  Micrographia: none  Sleep issues:  -RBD: several reactions to nightmares - some PTSD"    Review of Systems:   Review of Systems   Constitutional:  Negative for fever.   HENT:  Negative for congestion.    Eyes:  Negative for double vision.   Respiratory:  Negative for cough and shortness of breath.    Cardiovascular:  Negative for chest pain and leg swelling.   Gastrointestinal:  Negative for nausea.   Genitourinary:  Negative for dysuria.   Musculoskeletal:  Negative for falls.   Skin:  Negative for rash.   Neurological:  Positive for tremors. Negative for speech change and headaches.   Psychiatric/Behavioral:  Negative for depression.          Current Medications:  Outpatient Encounter Medications as of 8/7/2023   Medication Sig Dispense Refill    ALPRAZolam (XANAX) 1 MG tablet Take 1 tablet (1 mg total) by mouth 3 (three) " times daily as needed for Anxiety. 90 tablet 2    ascorbic acid, vitamin C, (VITAMIN C) 1000 MG tablet Take 1,000 mg by mouth once daily.      atorvastatin (LIPITOR) 20 MG tablet Take 1 tablet (20 mg total) by mouth once daily. 90 tablet 3    cholecalciferol, vitamin D3, 125 mcg (5,000 unit) capsule Take 1 capsule by mouth Daily.      cranberry fruit extract (CRANBERRY ORAL) Take by mouth.      cyanocobalamin (VITAMIN B-12) 1000 MCG tablet Take 100 mcg by mouth once daily.      cyclobenzaprine (FLEXERIL) 10 MG tablet Take 1 tablet (10 mg total) by mouth 3 (three) times daily as needed for Muscle spasms. 270 tablet 0    diclofenac sodium (VOLTAREN) 1 % Gel Apply 2 g topically once daily. 100 g 3    docusate sodium (COLACE) 100 MG capsule Take 1 capsule (100 mg total) by mouth 2 (two) times daily as needed for Constipation. 60 capsule 0    gabapentin (NEURONTIN) 800 MG tablet Take 1 tablet by mouth three times a day and take 2 tablets at night (5 tablets a day, 4000mg) 450 tablet 2    hydrocortisone (ANUSOL-HC) 2.5 % rectal cream Place rectally 2 (two) times daily. 28 g 3    ketoconazole (NIZORAL) 2 % cream Apply topically once daily. 60 g 3    Lactobacillus acidophilus Cap Take by mouth once daily.       lamoTRIgine (LAMICTAL) 200 MG tablet Take 1 tablet (200 mg total) by mouth 2 (two) times daily. 180 tablet 1    loratadine (CLARITIN) 10 mg tablet Take 10 mg by mouth once daily.       magnesium 250 mg Tab Take 1 tablet by mouth Daily.      naproxen sodium (ANAPROX) 220 MG tablet Take 220 mg by mouth.      [] oxyCODONE-acetaminophen (PERCOCET)  mg per tablet Take 1 tablet by mouth every 8 (eight) hours as needed for Pain. 90 tablet 0    oxyCODONE-acetaminophen (PERCOCET)  mg per tablet Take 1 tablet by mouth every 8 (eight) hours as needed for Pain. 90 tablet 0    predniSONE (DELTASONE) 5 MG tablet Take 1 tablet (5 mg total) by mouth once daily. Double the dose if sick 200 tablet 3    primidone  (MYSOLINE) 50 MG Tab Take 3 tablets (150 mg total) by mouth 3 (three) times daily. 810 tablet 12    rOPINIRole (REQUIP) 4 MG tablet Take 1 tablet (4 mg total) by mouth once daily. 90 tablet 12    testosterone (ANDROGEL) 1 % (50 mg/5 gram) GlPk Apply 1 packet (5 grams) topically once daily. 30 packet 5    triamcinolone acetonide 0.1% (KENALOG) 0.1 % cream Apply topically 2 (two) times daily. 45 g 1    [DISCONTINUED] primidone (MYSOLINE) 50 MG Tab Take 3 tablets (150 mg total) by mouth 3 (three) times daily. 810 tablet 0    [DISCONTINUED] rOPINIRole (REQUIP) 4 MG tablet Take 1 tablet (4 mg total) by mouth once daily. 90 tablet 1     Facility-Administered Encounter Medications as of 8/7/2023   Medication Dose Route Frequency Provider Last Rate Last Admin    fentaNYL injection 25 mcg  25 mcg Intravenous Q5 Min PRN French Smith MD        midazolam (VERSED) 1 mg/mL injection 0.5 mg  0.5 mg Intravenous PRN French Smith MD           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    Cervical radiculopathy    Thoracic myofascial strain    Pelvic fluid collection    Chronic, continuous use of opioids    Primary osteoarthritis of right knee    KIARA (obstructive sleep apnea)    Spinal stenosis    Arthritis of right shoulder region    Status post total left knee replacement 11/2/2020    Pancreatic cyst    Elevated alkaline phosphatase level    Liver fibrosis    Hepatitis B core antibody positive    Pancreas cyst    Osteopenia    Left knee pain    Primary osteoarthritis of left knee     Ascending cholangitis    Grade III hemorrhoids    Rectal prolapse    Decreased pulses in feet    Overweight (BMI 25.0-29.9)       Past Surgical History:  Past Surgical History:   Procedure Laterality Date    BACK SURGERY      BREAST SURGERY  1986,2001    Implants, removal    CHOLECYSTECTOMY N/A 05/18/2021    Procedure: CHOLECYSTECTOMY;  Surgeon: Antonio Brandt MD;  Location: [facility] OR 2ND FLR;  Service: General;  Laterality: N/A;    COLONOSCOPY N/A 6/29/2023    Procedure: COLONOSCOPY;  Surgeon: Genia Ku MD;  Location: [facility];  Service: Colon and Rectal;  Laterality: N/A;    ENDOSCOPIC ULTRASOUND OF UPPER GASTROINTESTINAL TRACT N/A 12/03/2020    Procedure: ULTRASOUND, UPPER GI TRACT, ENDOSCOPIC;  Surgeon: Jonathan Sharma MD;  Location: [facility] ENDO ([facility] FLR);  Service: Endoscopy;  Laterality: N/A;  elevated alk phos, panc cysts, liver biopsy   11/30-covid pcw-tb    ENDOSCOPIC ULTRASOUND OF UPPER GASTROINTESTINAL TRACT N/A 05/17/2021    Procedure: ULTRASOUND, UPPER GI TRACT, ENDOSCOPIC;  Surgeon: Claudio Salvador MD;  [facility]  Service: Endoscopy;  Laterality: N/A;    ERCP N/A 05/17/2021    Procedure: ERCP (ENDOSCOPIC RETROGRADE CHOLANGIOPANCREATOGRAPHY);  Surgeon: Claudio Salvador MD;  [facility]  Service: Endoscopy;  Laterality: N/A;    gender surgery      multiple surgeries since birth    HYSTERECTOMY  2003    INJECTION OF ANESTHETIC AGENT AROUND NERVE Right 11/19/2020    Procedure: BLOCK, NERVE, GLENOHUMERAL  need consent;  Surgeon: Messi Cabello MD;  Location: [facility] PAIN MGT;  Service: Pain Management;  Laterality: Right;    JOINT REPLACEMENT  2/2020, 1/2021    Knees    KNEE ARTHROPLASTY Left 01/25/2021    Procedure: ARTHROPLASTY, KNEE:DEPUY-ATTUNE REVISION: SERINA iASSIST:CABLES ON HOLD;  Surgeon: Donte Andrade III, MD;  Location: [facility];  Service: Orthopedics;  Laterality: Left;    LAMINECTOMY  09/13/2019    LAPAROSCOPIC CHOLECYSTECTOMY N/A 05/17/2021     Procedure: CHOLECYSTECTOMY, LAPAROSCOPIC;  Surgeon: Calvin Wilson MD;  Location: [facility] OR 2ND FLR;  Service: General;  Laterality: N/A;    LAPAROSCOPIC CHOLECYSTECTOMY N/A 2021    Procedure: CHOLECYSTECTOMY, LAPAROSCOPIC;  Surgeon: Antonio Brandt MD;  Location: [facility] OR 2ND FLR;  Service: General;  Laterality: N/A;    RADIOFREQUENCY ABLATION Left 2019    Procedure: RADIOFREQUENCY ABLATION, LEFT L3,4,5;  Surgeon: Messi Cabello MD;  Location: [facility] PAIN MGT;  Service: Pain Management;  Laterality: Left;  1 of 2    RADIOFREQUENCY ABLATION Right 2019    Procedure: RADIOFREQUENCY ABLATION, RIGHT L3,4,5;  Surgeon: Messi Cabello MD;  Location: [facility] PAIN MGT;  Service: Pain Management;  Laterality: Right;  2of 2    ROBOT-ASSISTED LAPAROSCOPIC RECTOPEXY N/A 2023    Procedure: ROBOTIC RECTOPEXY;  Surgeon: Genia Ku MD;  Location: [facility];  Service: Colon and Rectal;  Laterality: N/A;    SPINE SURGERY      2019     TOTAL KNEE ARTHROPLASTY Right 2020    Procedure: ARTHROPLASTY, KNEE, TOTAL;  Surgeon: Donte Andrade III, MD;  Location: [facility] OR 2ND FLR;  Service: Orthopedics;  Laterality: Right;       Current Living Situation: home    Social:  Social History     Socioeconomic History    Marital status:      Spouse name: Kristy Sainz    Number of children: 1   Occupational History     Comment:  and artist   Tobacco Use    Smoking status: Former     Current packs/day: 0.00     Types: Cigarettes     Quit date:      Years since quittin.6    Smokeless tobacco: Never    Tobacco comments:     was not a daily smoker-    Substance and Sexual Activity    Alcohol use: Not Currently    Drug use: No    Sexual activity: Yes     Partners: Female     Birth control/protection: None   Social History Narrative    Lives in a house with his wife      Social Determinants of Health     Financial Resource Strain: Low Risk  (2023)    Overall Financial Resource  Strain (CARDIA)     Difficulty of Paying Living Expenses: Not hard at all   Food Insecurity: No Food Insecurity (8/7/2023)    Hunger Vital Sign     Worried About Running Out of Food in the Last Year: Never true     Ran Out of Food in the Last Year: Never true   Transportation Needs: No Transportation Needs (8/7/2023)    PRAPARE - Transportation     Lack of Transportation (Medical): No     Lack of Transportation (Non-Medical): No   Physical Activity: Insufficiently Active (8/7/2023)    Exercise Vital Sign     Days of Exercise per Week: 3 days     Minutes of Exercise per Session: 20 min   Stress: Stress Concern Present (8/7/2023)    [employer] [place] [employer] - Occupational Stress Questionnaire     Feeling of Stress : To some extent   Social Connections: Unknown (8/7/2023)    Social Connection and Isolation Panel [NHANES]     Frequency of Communication with Friends and Family: More than three times a week     Frequency of Social Gatherings with Friends and Family: Once a week     Active Member of Clubs or Organizations: No     Attends Club or Organization Meetings: Never     Marital Status:    Housing Stability: Low Risk  (8/7/2023)    Housing Stability Vital Sign     Unable to Pay for Housing in the Last Year: No     Number of Places Lived in the Last Year: 1     Unstable Housing in the Last Year: No       Family History:  Family History   Problem Relation Age of Onset    Diabetes Maternal Grandfather     Cancer Maternal Grandfather         Colon    Cancer Mother         Kidney    Early death Mother         58    Heart disease Father     Autoimmune disease Sister     Hypertension Maternal Grandmother     Stroke Maternal Grandmother     Diabetes Maternal Uncle     Breast cancer Neg Hx     Ovarian cancer Neg Hx     Vaginal cancer Neg Hx     Endometrial cancer Neg Hx     Cervical cancer Neg Hx        PHYSICAL:  There were no vitals taken for this visit.    Physical Exam  Constitutional:  Well-developed, well-nourished, appears stated age  Eyes: No scleral icterus  ENT: Moist oral mucosa  Cardiovascular: No lower extremity edema   Respiratory: No labored breathing   Skin: No rash   Hematologic: No bruising    Other: GI/ deferred   Mental status: Alert and oriented to person, place, time, and situation;   follows commands  Speech: normal (not dysarthric), no aphasia  Cranial nerves:            CN II: Pupils mid-position and equal, not tested light or accommodation  CN III, IV, VI: Extraocular movements full, no nystagmus visualized  CN V: Not tested   CN VII: Face strong and symmetric bilaterally   CN VIII: Hearing intact to voice and conversation   CN IX, X: Palate raises midline and symmetric   CN XI: Strong shoulder shrug B/L  CN XII: Tongue appears midline   Motor: Normal bulk by appearance, no drift   Sensory: Not tested    Gait: Not tested  Deep tendon reflexes: Not tested  Movement/Coordination                    No hypophonic speech.                     No facial masking.  No bradykinesia.                    Bradykinesia  ? Finger taps Finger flicks KAVITHA Heel taps   Left 0 0 0 0   Right 0 0 0 0       Tremor Exam   Arms extended Arms in wing position, fingers almost touching Re-emergent Arms extended wrists extended Intention Resting Kinetic   Left ?+ ? ? ? ? ? ?neg   Right ?+ ? ? ? ? ? ?   Head tremor: No-No Yes-Yes NE     Archimedes Spirals   Left ?nl   Right ?nl       Laboratory Data:  NA    Imaging:  NA    EMG  EMG/NCV with Neurology which showed a bilateral carpal tunnel with concomitant C7 radiculopathy. Previous MRI of the cervical spine did reveal stenosis at C5-6 and C6-7 with moderate neuroforaminal stenosis.    Assessment//Plan:   Problem List Items Addressed This Visit          Neuro    Tremor    Current Assessment & Plan     Postural hand tremor, affecting his artwork. Well managed with primidone.  Suggested continue Primidone 150mg TID.  No oversedation.    Likely tremor due to  predisone/gabapentin however if this worsens may consider him to have essential tremor.  No PDism noted on video exam despite RLS         RLS (restless legs syndrome) - Primary    Current Assessment & Plan     Continue ropinirole 4mg QHS  May cut in half and straddle time where RLS begins  Check ferritin         Relevant Medications    rOPINIRole (REQUIP) 4 MG tablet    primidone (MYSOLINE) 50 MG Tab    Other Relevant Orders    FERRITIN     Other Visit Diagnoses       Anemia, unspecified type        Relevant Orders    FERRITIN              Baylee Esteban MD, MS [facility]  Department of Neurology  Movement Disorders

## 2023-08-15 ENCOUNTER — PATIENT MESSAGE (OUTPATIENT)
Dept: PAIN MEDICINE | Facility: CLINIC | Age: 53
End: 2023-08-15
Payer: MEDICARE

## 2023-08-15 DIAGNOSIS — M62.838 MUSCLE SPASM: Primary | ICD-10-CM

## 2023-08-16 RX ORDER — OXYCODONE AND ACETAMINOPHEN 10; 325 MG/1; MG/1
1 TABLET ORAL EVERY 8 HOURS PRN
Qty: 90 TABLET | Refills: 0 | Status: SHIPPED | OUTPATIENT
Start: 2023-08-18 | End: 2023-09-25 | Stop reason: SDUPTHER

## 2023-08-16 RX ORDER — CYCLOBENZAPRINE HCL 10 MG
10 TABLET ORAL 3 TIMES DAILY PRN
Qty: 270 TABLET | Refills: 0 | Status: SHIPPED | OUTPATIENT
Start: 2023-08-16 | End: 2023-10-31 | Stop reason: SDUPTHER

## 2023-08-16 NOTE — TELEPHONE ENCOUNTER
Attempted to contact pt. Pt did not answer. LM to call office   Pt awaiting psych bed, voluntary admission, VS recorded.

## 2023-08-16 NOTE — TELEPHONE ENCOUNTER
Patient requesting refill on oxyCODONE-acetaminophen (PERCOCET)  mg   Last office visit 06/13/23   shows last refill on 07/19/23  Patient does have a pain contract on file with [facility]  Patient last UDS 03/10/23 was consistent with current therapy     CODEINE  Not Detected   Not Detected  Not Detected    MORPHINE  Not Detected   Present  Present    6-ACETYLMORPHINE  Not Detected   Not Detected  Not Detected    OXYCODONE  Present   Present  Present    NOROYXCODONE  Present   Present  Present    OXYMORPHONE  Present   Not Detected  Not Detected    NOROXYMORPHONE  Present   Not Detected  Not Detected    HYDROCODONE  Not Detected   Not Detected  Not Detected    NORHYDROCODONE  Not Detected   Not Detected  Not Detected    HYDROMORPHONE  Not Detected   Not Detected  Not Detected    BUPRENORPHINE  Not Detected   Not Detected  Not Detected    NORUBPRENORPHINE  Not Detected   Not Detected  Not Detected    FENTANYL  Not Detected   Not Detected  Not Detected    NORFENTANYL  Not Detected   Not Detected  Not Detected    MEPERIDINE METABOLITE  Not Detected   Not Detected  Not Detected    TAPENTADOL  Not Detected   Not Detected  Not Detected    TAPENTADOL-O-SULF  Not Detected   Not Detected  Not Detected    METHADONE  Not Detected   Not Detected  Not Detected    TRAMADOL  Not Detected   Not Detected  Not Detected    AMPHETAMINE  Not Detected   Not Detected  Not Detected    METHAMPHETAMINE  Not Detected   Not Detected  Not Detect

## 2023-08-20 ENCOUNTER — PATIENT MESSAGE (OUTPATIENT)
Dept: PAIN MEDICINE | Facility: CLINIC | Age: 53
End: 2023-08-20
Payer: MEDICARE

## 2023-08-22 ENCOUNTER — HOSPITAL ENCOUNTER (OUTPATIENT)
Dept: RADIOLOGY | Facility: OTHER | Age: 53
Discharge: HOME | End: 2023-08-22
Attending: NURSE PRACTITIONER
Payer: MEDICARE

## 2023-08-22 DIAGNOSIS — M54.14 THORACIC RADICULOPATHY: ICD-10-CM

## 2023-08-22 DIAGNOSIS — M54.9 DORSALGIA, UNSPECIFIED: ICD-10-CM

## 2023-08-22 PROCEDURE — 72148 MRI LUMBAR SPINE W/O DYE: CPT | Mod: 26,,, | Performed by: RADIOLOGY

## 2023-08-22 PROCEDURE — 72146 MRI THORACIC SPINE WITHOUT CONTRAST: ICD-10-PCS | Mod: 26,,, | Performed by: RADIOLOGY

## 2023-08-22 PROCEDURE — 72146 MRI CHEST SPINE W/O DYE: CPT | Mod: 26,,, | Performed by: RADIOLOGY

## 2023-08-22 PROCEDURE — 72146 MRI CHEST SPINE W/O DYE: CPT | Mod: TC

## 2023-08-22 PROCEDURE — 72148 MRI LUMBAR SPINE W/O DYE: CPT | Mod: TC

## 2023-08-22 PROCEDURE — 72148 MRI LUMBAR SPINE WITHOUT CONTRAST: ICD-10-PCS | Mod: 26,,, | Performed by: RADIOLOGY

## 2023-08-28 ENCOUNTER — OFFICE VISIT (OUTPATIENT)
Dept: PAIN MEDICINE | Facility: CLINIC | Age: 53
End: 2023-08-28
Payer: MEDICARE

## 2023-08-28 DIAGNOSIS — M47.816 LUMBAR SPONDYLOSIS: ICD-10-CM

## 2023-08-28 DIAGNOSIS — M79.2 NEUROPATHIC PAIN: Primary | ICD-10-CM

## 2023-08-28 DIAGNOSIS — G89.4 CHRONIC PAIN SYNDROME: ICD-10-CM

## 2023-08-28 DIAGNOSIS — Z79.891 ENCOUNTER FOR LONG-TERM OPIATE ANALGESIC USE: ICD-10-CM

## 2023-08-28 DIAGNOSIS — Z98.890 S/P LUMBAR LAMINECTOMY: ICD-10-CM

## 2023-08-28 DIAGNOSIS — M62.838 MUSCLE SPASM: ICD-10-CM

## 2023-08-28 PROCEDURE — 99214 PR OFFICE/OUTPT VISIT, EST, LEVL IV, 30-39 MIN: ICD-10-PCS | Mod: 95,,, | Performed by: NURSE PRACTITIONER

## 2023-08-28 PROCEDURE — 99214 OFFICE O/P EST MOD 30 MIN: CPT | Mod: 95,,, | Performed by: NURSE PRACTITIONER

## 2023-08-28 NOTE — PROGRESS NOTES
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 CHOLANGIOPANCREATOGRAPHY);  Surgeon: Claudio Salvador MD;  [facility] ([facility]  Service: Endoscopy;  Laterality: N/A;    gender surgery      multiple surgeries since birth    HYSTERECTOMY  2003    INJECTION OF ANESTHETIC AGENT AROUND NERVE Right 11/19/2020    Procedure: BLOCK, NERVE, GLENOHUMERAL  need consent;  Surgeon: Messi Cabello MD;  Location: [facility] PAIN MGT;  Service: Pain Management;  Laterality: Right;    JOINT REPLACEMENT  2/2020, 1/2021    Knees    KNEE ARTHROPLASTY Left 01/25/2021    Procedure: ARTHROPLASTY, KNEE:DEPUY-ATTUNE REVISION: SERINA iASSIST:CABLES ON HOLD;  Surgeon: Donte Andrade III, MD;  Location: [facility];  Service: Orthopedics;  Laterality: Left;    LAMINECTOMY  09/13/2019    LAPAROSCOPIC CHOLECYSTECTOMY N/A 05/17/2021    Procedure: CHOLECYSTECTOMY, LAPAROSCOPIC;  Surgeon: Calvin Wilson MD;  Location: [address];  Service: General;  Laterality: N/A;    LAPAROSCOPIC CHOLECYSTECTOMY N/A 05/18/2021    Procedure: CHOLECYSTECTOMY, LAPAROSCOPIC;  Surgeon: Antonio Bradnt MD;  Location: [address];  Service: General;  Laterality: N/A;    RADIOFREQUENCY ABLATION Left 05/09/2019    Procedure: RADIOFREQUENCY ABLATION, LEFT L3,4,5;  Surgeon: Messi Cabello MD;  Location: [facility] PAIN MGT;  Service: Pain Management;  Laterality: Left;  1  of 2    RADIOFREQUENCY ABLATION Right 2019    Procedure: RADIOFREQUENCY ABLATION, RIGHT L3,4,5;  Surgeon: Messi Cabello MD;  Location: [facility] MGT;  Service: Pain Management;  Laterality: Right;  2of 2    ROBOT-ASSISTED LAPAROSCOPIC RECTOPEXY N/A 2023    Procedure: ROBOTIC RECTOPEXY;  Surgeon: Genia Ku MD;  Location: [facility];  Service: Colon and Rectal;  Laterality: N/A;    SPINE SURGERY      2019     TOTAL KNEE ARTHROPLASTY Right 2020    Procedure: ARTHROPLASTY, KNEE, TOTAL;  Surgeon: Donte Andrade III, MD;  Location: [facility] OR 2ND FLR;  Service: Orthopedics;  Laterality: Right;     Social History     Socioeconomic History    Marital status:      Spouse name: Kristy Sainz    Number of children: 1   Occupational History     Comment:  and artist   Tobacco Use    Smoking status: Former     Current packs/day: 0.00     Types: Cigarettes     Quit date:      Years since quittin.6    Smokeless tobacco: Never    Tobacco comments:     was not a daily smoker-    Substance and Sexual Activity    Alcohol use: Not Currently    Drug use: No    Sexual activity: Yes     Partners: Female     Birth control/protection: None   Social History Narrative    Lives in a house with his wife      Social Determinants of Health     Financial Resource Strain: Low Risk  (2023)    Overall Financial Resource Strain (CARDIA)     Difficulty of Paying Living Expenses: Not hard at all   Food Insecurity: No Food Insecurity (2023)    Hunger Vital Sign     Worried About Running Out of Food in the Last Year: Never true     Ran Out of Food in the Last Year: Never true   Transportation Needs: No Transportation Needs (2023)    PRAPARE - Transportation     Lack of Transportation (Medical): No     Lack of Transportation (Non-Medical): No   Physical Activity: Insufficiently Active (2023)    Exercise Vital Sign     Days of Exercise per Week: 3 days     Minutes of Exercise per  Session: 20 min   Stress: Stress Concern Present (8/7/2023)    [facility] of Occupational Health - Occupational Stress Questionnaire     Feeling of Stress : To some extent   Social Connections: Unknown (8/7/2023)    Social Connection and Isolation Panel [NHANES]     Frequency of Communication with Friends and Family: More than three times a week     Frequency of Social Gatherings with Friends and Family: Once a week     Active Member of Clubs or Organizations: No     Attends Club or Organization Meetings: Never     Marital Status:    Housing Stability: Low Risk  (8/7/2023)    Housing Stability Vital Sign     Unable to Pay for Housing in the Last Year: No     Number of Places Lived in the Last Year: 1     Unstable Housing in the Last Year: No     Family History   Problem Relation Age of Onset    Diabetes Maternal Grandfather     Cancer Maternal Grandfather         Colon    Cancer Mother         Kidney    Early death Mother         58    Heart disease Father     Autoimmune disease Sister     Hypertension Maternal Grandmother     Stroke Maternal Grandmother     Diabetes Maternal Uncle     Breast cancer Neg Hx     Ovarian cancer Neg Hx     Vaginal cancer Neg Hx     Endometrial cancer Neg Hx     Cervical cancer Neg Hx        Review of patient's allergies indicates:   Allergen Reactions    Bactrim [sulfamethoxazole-trimethoprim] Itching    Penicillins Rash       Current Outpatient Medications   Medication Sig    ALPRAZolam (XANAX) 1 MG tablet Take 1 tablet (1 mg total) by mouth 3 (three) times daily as needed for Anxiety.    ascorbic acid, vitamin C, (VITAMIN C) 1000 MG tablet Take 1,000 mg by mouth once daily.    atorvastatin (LIPITOR) 20 MG tablet Take 1 tablet (20 mg total) by mouth once daily.    cholecalciferol, vitamin D3, 125 mcg (5,000 unit) capsule Take 1 capsule by mouth Daily.    cranberry fruit extract (CRANBERRY ORAL) Take by mouth.    cyanocobalamin (VITAMIN B-12) 1000 MCG tablet Take 100 mcg  by mouth once daily.    cyclobenzaprine (FLEXERIL) 10 MG tablet Take 1 tablet (10 mg total) by mouth 3 (three) times daily as needed for Muscle spasms.    diclofenac sodium (VOLTAREN) 1 % Gel Apply 2 g topically once daily.    docusate sodium (COLACE) 100 MG capsule Take 1 capsule (100 mg total) by mouth 2 (two) times daily as needed for Constipation.    gabapentin (NEURONTIN) 800 MG tablet Take 1 tablet by mouth three times a day and take 2 tablets at night (5 tablets a day, 4000mg)    hydrocortisone (ANUSOL-HC) 2.5 % rectal cream Place rectally 2 (two) times daily.    ketoconazole (NIZORAL) 2 % cream Apply topically once daily.    Lactobacillus acidophilus Cap Take by mouth once daily.     lamoTRIgine (LAMICTAL) 200 MG tablet Take 1 tablet (200 mg total) by mouth 2 (two) times daily.    loratadine (CLARITIN) 10 mg tablet Take 10 mg by mouth once daily.     magnesium 250 mg Tab Take 1 tablet by mouth Daily.    naproxen sodium (ANAPROX) 220 MG tablet Take 220 mg by mouth.    oxyCODONE-acetaminophen (PERCOCET)  mg per tablet Take 1 tablet by mouth every 8 (eight) hours as needed for Pain.    predniSONE (DELTASONE) 5 MG tablet Take 1 tablet (5 mg total) by mouth once daily. Double the dose if sick    primidone (MYSOLINE) 50 MG Tab Take 3 tablets (150 mg total) by mouth 3 (three) times daily.    rOPINIRole (REQUIP) 4 MG tablet Take 1 tablet (4 mg total) by mouth once daily.    testosterone (ANDROGEL) 1 % (50 mg/5 gram) GlPk Apply 1 packet (5 grams) topically once daily.    triamcinolone acetonide 0.1% (KENALOG) 0.1 % cream Apply topically 2 (two) times daily.     No current facility-administered medications for this visit.     Facility-Administered Medications Ordered in Other Visits   Medication    fentaNYL injection 25 mcg    midazolam (VERSED) 1 mg/mL injection 0.5 mg       REVIEW OF SYSTEMS:    GENERAL:  No weight loss, malaise or fevers.  HEENT:   No recent changes in vision or hearing  NECK:  Negative for  lumps, no difficulty with swallowing.  RESPIRATORY:  Negative for cough, wheezing or shortness of breath, patient denies any recent URI.  CARDIOVASCULAR:  Negative for chest pain, leg swelling or palpitations.  GI:  Negative for abdominal discomfort, blood in stools or black stools or change in bowel habits.  MUSCULOSKELETAL:  See HPI.  SKIN:  Negative for lesions, rash, and itching.  PSYCH:  Significant psychosocial stressors including PTSD for sex re-assignment surgery.  Patient's sleep is disturbed secondary to pain.  HEMATOLOGY/LYMPHOLOGY:  Negative for prolonged bleeding, bruising easily or swollen nodes.  Patient is not currently taking any anti-coagulants  ENDO: No history of diabetes or thyroid dysfunction  NEURO:   No history of headaches, syncope, paralysis, seizures or tremors.  All other reviewed and negative other than HPI.    OBJECTIVE:    PHYSICAL EXAMINATION:    General appearance: Well appearing, in no acute distress, alert and oriented x3.  Psych:  Mood and affect appropriate.  Skin: Skin color normal, no rashes or lesions, in both upper and lower body.  Extremities: Moves all visualized extremities freely.      PREVIOUS PHYSICAL EXAMINATION:     GENERAL: Well appearing, in no acute distress, alert and oriented x3.  PSYCH:  Mood and affect appropriate.  SKIN:  Well-healed incisions without any evidence of infection  HEAD/FACE:  Normocephalic, atraumatic.   PULM: No evidence of respiratory difficulty, symmetric chest rise.  EXT:  Decreased range of motion in the left knee. Brace on left knee. Well healing scar to right knee from recent TKA. Medial and lateral joint line tenderness to both knees.  BACK:  There is significant pain with palpation over the facet joints and paraspinals of the lumbar spine bilaterally. There is decreased range of motion with extension to 15 degrees, and facet loading maneuvers cause reproducible pain.    NEURO:  No clonus. Cranial nerves grossly intact.  GAIT: Antalgic-  ambulates with rolling walker.    Lab Results   Component Value Date    WBC 7.88 04/10/2023    HGB 14.5 04/10/2023    HCT 42.4 04/10/2023    MCV 99 (H) 04/10/2023     04/10/2023     CMP  Sodium   Date Value Ref Range Status   04/10/2023 138 136 - 145 mmol/L Final     Potassium   Date Value Ref Range Status   04/10/2023 4.1 3.5 - 5.1 mmol/L Final     Chloride   Date Value Ref Range Status   04/10/2023 104 95 - 110 mmol/L Final     CO2   Date Value Ref Range Status   04/10/2023 26 23 - 29 mmol/L Final     Glucose   Date Value Ref Range Status   04/10/2023 93 70 - 110 mg/dL Final     BUN   Date Value Ref Range Status   04/10/2023 8 6 - 20 mg/dL Final     Creatinine   Date Value Ref Range Status   04/10/2023 0.7 0.5 - 1.4 mg/dL Final     Calcium   Date Value Ref Range Status   04/10/2023 9.4 8.7 - 10.5 mg/dL Final     Total Protein   Date Value Ref Range Status   04/10/2023 7.2 6.0 - 8.4 g/dL Final     Albumin   Date Value Ref Range Status   04/10/2023 4.2 3.5 - 5.2 g/dL Final     Total Bilirubin   Date Value Ref Range Status   04/10/2023 0.3 0.1 - 1.0 mg/dL Final     Comment:     For infants and newborns, interpretation of results should be based  on gestational age, weight and in agreement with clinical  observations.    Premature Infant recommended reference ranges:  Up to 24 hours.............<8.0 mg/dL  Up to 48 hours............<12.0 mg/dL  3-5 days..................<15.0 mg/dL  6-29 days.................<15.0 mg/dL       Alkaline Phosphatase   Date Value Ref Range Status   04/10/2023 140 (H) 55 - 135 U/L Final     AST   Date Value Ref Range Status   04/10/2023 32 10 - 40 U/L Final     ALT   Date Value Ref Range Status   04/10/2023 38 10 - 44 U/L Final     Anion Gap   Date Value Ref Range Status   04/10/2023 8 8 - 16 mmol/L Final     eGFR if    Date Value Ref Range Status   08/23/2021 >60.0 >60 mL/min/1.73 m^2 Final     eGFR if non    Date Value Ref Range Status   08/23/2021  >60.0 >60 mL/min/1.73 m^2 Final     Comment:     Calculation used to obtain the estimated glomerular filtration  rate (eGFR) is the CKD-EPI equation.        Lab Results   Component Value Date    HGBA1C 5.3 04/10/2023     Lab Results   Component Value Date    TSH 1.034 04/10/2023         ASSESSMENT: 53 y.o. year old adult with chronic back and knee pain, consistent with the following diagnoses:    Encounter Diagnoses   Name Primary?    Neuropathic pain Yes    Muscle spasm     Chronic pain syndrome     Lumbar spondylosis     S/P lumbar laminectomy     Encounter for long-term opiate analgesic use        PLAN:     - Recent thoracic and lumbar MRIs were reviewed and discussed with the patient today. I will message Dr. Mitchell to let him know about incidental finding of trace bilateral pleural effusion.    - Discussed caudal MAYURI which he declined at this time.    - Continue with home PT and yoga exercises.    - Continue oxycodone acetaminophen 10/325 mg QID PRN, #90. Can call for additional refills as needed.    - The patient is here today for a refill of current pain medications and they believe these provide effective pain control and improvements in quality of life.  The patient notes no serious side effects, and feels the benefits outweigh the risks.  The patient was reminded of the pain contract that they signed previously as well as the risks and benefits of the medication including possible death.  The updated [place] Board of Pharmacy prescription monitoring program was reviewed, and the patient has been found to be compliant with current treatment plan.    - Pt has pain contract.  Last UDS from 3/10/23 reviewed.    - F/U in 3 months in office with Dr. Farmer or sooner if needed.    - Dr. Farmer was consulted on the patient and agrees with this plan.      The above plan and management options were discussed at length with patient. Patient is in agreement with the above and verbalized understanding.      Alejandra CHENG  Alban     08/28/2023

## 2023-09-06 ENCOUNTER — OFFICE VISIT (OUTPATIENT)
Dept: PSYCHIATRY | Facility: CLINIC | Age: 53
End: 2023-09-06
Payer: MEDICARE

## 2023-09-06 DIAGNOSIS — F41.9 ANXIETY: Primary | ICD-10-CM

## 2023-09-06 DIAGNOSIS — F39 MOOD DISORDER: ICD-10-CM

## 2023-09-06 DIAGNOSIS — Z87.890 STATUS POST SEX REASSIGNMENT SURGERY: ICD-10-CM

## 2023-09-06 PROCEDURE — 99213 PR OFFICE/OUTPT VISIT, EST, LEVL III, 20-29 MIN: ICD-10-PCS | Mod: 95,,, | Performed by: NURSE PRACTITIONER

## 2023-09-06 PROCEDURE — 99213 OFFICE O/P EST LOW 20 MIN: CPT | Mod: 95,,, | Performed by: NURSE PRACTITIONER

## 2023-09-06 RX ORDER — ALPRAZOLAM 1 MG/1
1 TABLET ORAL 3 TIMES DAILY PRN
Qty: 90 TABLET | Refills: 2 | Status: SHIPPED | OUTPATIENT
Start: 2023-09-06 | End: 2023-12-01 | Stop reason: SDUPTHER
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
## 2023-09-10 ENCOUNTER — PATIENT MESSAGE (OUTPATIENT)
Dept: ENDOCRINOLOGY | Facility: CLINIC | Age: 53
End: 2023-09-10
Payer: MEDICARE

## 2023-09-12 RX ORDER — TESTOSTERONE GEL, 1% 10 MG/G
1 GEL TRANSDERMAL DAILY
Qty: 30 PACKET | Refills: 0 | Status: SHIPPED | OUTPATIENT
Start: 2023-09-12 | End: 2023-10-16 | Stop reason: SDUPTHER

## 2023-09-23 ENCOUNTER — PATIENT MESSAGE (OUTPATIENT)
Dept: PAIN MEDICINE | Facility: CLINIC | Age: 53
End: 2023-09-23
Payer: MEDICARE

## 2023-09-23 DIAGNOSIS — M62.838 MUSCLE SPASM: Primary | ICD-10-CM

## 2023-09-23 NOTE — TELEPHONE ENCOUNTER
No care due was identified.  [facility] Embedded Care Due Messages. Reference number: [id number].   9/23/2023 4:09:30 PM CDT

## 2023-09-25 RX ORDER — TRIAMCINOLONE ACETONIDE 1 MG/G
CREAM TOPICAL 2 TIMES DAILY
Qty: 45 G | Refills: 0 | Status: SHIPPED | OUTPATIENT
Start: 2023-09-25

## 2023-09-25 RX ORDER — OXYCODONE AND ACETAMINOPHEN 10; 325 MG/1; MG/1
1 TABLET ORAL EVERY 8 HOURS PRN
Qty: 90 TABLET | Refills: 0 | Status: SHIPPED | OUTPATIENT
Start: 2023-09-25 | End: 2023-10-31 | Stop reason: SDUPTHER

## 2023-09-25 NOTE — TELEPHONE ENCOUNTER
Patient requesting refill on oxyCODONE-acetaminophen (PERCOCET)  mg   Last office visit 08/28/23   shows last refill on 08/22/23  Patient does have a pain contract on file with [facility] department  Patient last UDS 03/10/23 was consistent with current therapy     CODEINE  Not Detected   Not Detected  Not Detected    MORPHINE  Not Detected   Present  Present    6-ACETYLMORPHINE  Not Detected   Not Detected  Not Detected    OXYCODONE  Present   Present  Present    NOROYXCODONE  Present   Present  Present    OXYMORPHONE  Present   Not Detected  Not Detected    NOROXYMORPHONE  Present   Not Detected  Not Detected    HYDROCODONE  Not Detected   Not Detected  Not Detected    NORHYDROCODONE  Not Detected   Not Detected  Not Detected    HYDROMORPHONE  Not Detected   Not Detected  Not Detected    BUPRENORPHINE  Not Detected

## 2023-09-25 NOTE — TELEPHONE ENCOUNTER
Refill Decision Note   Ari Sainz  is requesting a refill authorization.  Brief Assessment and Rationale for Refill:  Approve     Medication Therapy Plan:         Comments:     Note composed:3:58 AM 09/25/2023

## 2023-10-16 ENCOUNTER — OFFICE VISIT (OUTPATIENT)
Dept: ENDOCRINOLOGY | Facility: CLINIC | Age: 53
End: 2023-10-16
Payer: MEDICARE

## 2023-10-16 ENCOUNTER — IMMUNIZATION (OUTPATIENT)
Dept: INTERNAL MEDICINE | Facility: CLINIC | Age: 53
End: 2023-10-16
Payer: MEDICARE

## 2023-10-16 ENCOUNTER — LAB VISIT (OUTPATIENT)
Dept: LAB | Facility: HOSPITAL | Age: 53
End: 2023-10-16
Attending: INTERNAL MEDICINE
Payer: MEDICARE

## 2023-10-16 VITALS
BODY MASS INDEX: 27.2 KG/M2 | WEIGHT: 138.56 LBS | DIASTOLIC BLOOD PRESSURE: 70 MMHG | HEIGHT: 60 IN | SYSTOLIC BLOOD PRESSURE: 102 MMHG

## 2023-10-16 DIAGNOSIS — E25.0 CONGENITAL ADRENAL HYPERPLASIA: ICD-10-CM

## 2023-10-16 DIAGNOSIS — Z87.890 STATUS POST SEX REASSIGNMENT SURGERY: ICD-10-CM

## 2023-10-16 DIAGNOSIS — F64.0 TRANSGENDER MAN ON HORMONE THERAPY: ICD-10-CM

## 2023-10-16 DIAGNOSIS — E25.0 CONGENITAL ADRENAL HYPERPLASIA: Primary | ICD-10-CM

## 2023-10-16 DIAGNOSIS — G47.33 OSA (OBSTRUCTIVE SLEEP APNEA): ICD-10-CM

## 2023-10-16 DIAGNOSIS — N62 GYNECOMASTIA: ICD-10-CM

## 2023-10-16 DIAGNOSIS — D64.9 ANEMIA, UNSPECIFIED TYPE: ICD-10-CM

## 2023-10-16 DIAGNOSIS — G25.81 RLS (RESTLESS LEGS SYNDROME): ICD-10-CM

## 2023-10-16 DIAGNOSIS — Z79.899 TRANSGENDER MAN ON HORMONE THERAPY: ICD-10-CM

## 2023-10-16 LAB
ALBUMIN SERPL BCP-MCNC: 4.2 G/DL (ref 3.5–5.2)
ALP SERPL-CCNC: 130 U/L (ref 55–135)
ALT SERPL W/O P-5'-P-CCNC: 52 U/L (ref 10–44)
ANION GAP SERPL CALC-SCNC: 10 MMOL/L (ref 8–16)
AST SERPL-CCNC: 42 U/L (ref 10–40)
BILIRUB SERPL-MCNC: 0.5 MG/DL (ref 0.1–1)
BUN SERPL-MCNC: 11 MG/DL (ref 6–20)
CALCIUM SERPL-MCNC: 9.3 MG/DL (ref 8.7–10.5)
CHLORIDE SERPL-SCNC: 101 MMOL/L (ref 95–110)
CO2 SERPL-SCNC: 23 MMOL/L (ref 23–29)
CREAT SERPL-MCNC: 0.6 MG/DL (ref 0.5–1.4)
DHEA-S SERPL-MCNC: 53.1 UG/DL (ref 48.6–361.8)
ERYTHROCYTE [DISTWIDTH] IN BLOOD BY AUTOMATED COUNT: 12.4 % (ref 11.5–14.5)
EST. GFR  (NO RACE VARIABLE): >60 ML/MIN/1.73 M^2
FERRITIN SERPL-MCNC: 42 NG/ML (ref 20–300)
GLUCOSE SERPL-MCNC: 86 MG/DL (ref 70–110)
HCT VFR BLD AUTO: 39.9 % (ref 40–54)
HGB BLD-MCNC: 13.6 G/DL (ref 14–18)
MCH RBC QN AUTO: 33.6 PG (ref 27–31)
MCHC RBC AUTO-ENTMCNC: 34.1 G/DL (ref 32–36)
MCV RBC AUTO: 99 FL (ref 82–98)
PLATELET # BLD AUTO: 333 K/UL (ref 150–450)
PMV BLD AUTO: 8.7 FL (ref 9.2–12.9)
POTASSIUM SERPL-SCNC: 4.6 MMOL/L (ref 3.5–5.1)
PROT SERPL-MCNC: 7.1 G/DL (ref 6–8.4)
RBC # BLD AUTO: 4.05 M/UL (ref 4.6–6.2)
SODIUM SERPL-SCNC: 134 MMOL/L (ref 136–145)
TESTOST SERPL-MCNC: 601 NG/DL (ref 304–1227)
WBC # BLD AUTO: 7.34 K/UL (ref 3.9–12.7)

## 2023-10-16 PROCEDURE — 80053 COMPREHEN METABOLIC PANEL: CPT | Performed by: INTERNAL MEDICINE

## 2023-10-16 PROCEDURE — 99999 PR PBB SHADOW E&M-EST. PATIENT-LVL IV: ICD-10-PCS | Mod: PBBFAC,,, | Performed by: INTERNAL MEDICINE

## 2023-10-16 PROCEDURE — 84403 ASSAY OF TOTAL TESTOSTERONE: CPT | Performed by: INTERNAL MEDICINE

## 2023-10-16 PROCEDURE — 99999PBSHW FLU VACCINE (QUAD) GREATER THAN OR EQUAL TO 3YO PRESERVATIVE FREE IM: Mod: PBBFAC,,,

## 2023-10-16 PROCEDURE — 82024 ASSAY OF ACTH: CPT | Performed by: INTERNAL MEDICINE

## 2023-10-16 PROCEDURE — 99999 PR PBB SHADOW E&M-EST. PATIENT-LVL IV: CPT | Mod: PBBFAC,,, | Performed by: INTERNAL MEDICINE

## 2023-10-16 PROCEDURE — 99214 OFFICE O/P EST MOD 30 MIN: CPT | Mod: S$PBB,,, | Performed by: INTERNAL MEDICINE

## 2023-10-16 PROCEDURE — 85027 COMPLETE CBC AUTOMATED: CPT | Performed by: INTERNAL MEDICINE

## 2023-10-16 PROCEDURE — 82728 ASSAY OF FERRITIN: CPT | Performed by: PSYCHIATRY & NEUROLOGY

## 2023-10-16 PROCEDURE — 83498 ASY HYDROXYPROGESTERONE 17-D: CPT | Performed by: INTERNAL MEDICINE

## 2023-10-16 PROCEDURE — 99214 OFFICE O/P EST MOD 30 MIN: CPT | Mod: PBBFAC,25 | Performed by: INTERNAL MEDICINE

## 2023-10-16 PROCEDURE — 36415 COLL VENOUS BLD VENIPUNCTURE: CPT | Performed by: INTERNAL MEDICINE

## 2023-10-16 PROCEDURE — G0008 ADMIN INFLUENZA VIRUS VAC: HCPCS | Mod: PBBFAC

## 2023-10-16 PROCEDURE — 82627 DEHYDROEPIANDROSTERONE: CPT | Performed by: INTERNAL MEDICINE

## 2023-10-16 PROCEDURE — 99999PBSHW FLU VACCINE (QUAD) GREATER THAN OR EQUAL TO 3YO PRESERVATIVE FREE IM: ICD-10-PCS | Mod: PBBFAC,,,

## 2023-10-16 PROCEDURE — 99214 PR OFFICE/OUTPT VISIT, EST, LEVL IV, 30-39 MIN: ICD-10-PCS | Mod: S$PBB,,, | Performed by: INTERNAL MEDICINE

## 2023-10-16 RX ORDER — TESTOSTERONE GEL, 1% 10 MG/G
1 GEL TRANSDERMAL DAILY
Qty: 30 PACKET | Refills: 5 | Status: SHIPPED | OUTPATIENT
Start: 2023-10-16 | End: 2024-05-13

## 2023-10-16 NOTE — ASSESSMENT & PLAN NOTE
We will ask for a redraw of previously ordered labs and monitor testosterone levels  Send refill for Androgel packets  F/u outpatient 1 year

## 2023-10-16 NOTE — ASSESSMENT & PLAN NOTE
CAH with AI, anatomy as described in HPI  Goal is lowest tolerated dose on prednisone. Has felt well on 5 mg daily, will continue this  He has supplies for sick day doses, needs medical alert bracelet  Follow ACTH, 17-OH, DHEA - no need to normalize these labs    Repeat labs in one year prior to next appointment

## 2023-10-16 NOTE — PROGRESS NOTES
Subjective:      Patient ID: Ari Sainz is a 53 y.o. adult.    Chief Complaint:  CAH  History of Present Illness    Was raised female.     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     Saw Dr Taylor and this was very traumatic for him but he appreciated her kindness      Per her exam:  PELVIC:    External genitalia:  Normal Bartholins, Skenes and labia bilaterally.  Bilateral labia not fused superiorly.  Fleshy, erectile tissue around urethra (superior most aspect of mons).  Bilateral labia majora present.  Small dimple present at previous vaginal area--does not track with gentle exploration with os finder.   Urethra:  No caruncle, diverticulum or masses. Able to pass 14F catheter easily--needs to pass 7-8 cm before urine expelled (increased urethral length)?    Internal: deferred, as reports MAUREEN/BSO with vaginal closure      Started testosterone in 2004. Attempted to have phalloplasty in 2004, unsuccessful.         Current dose:     Testosterone 50mg/5g - 1 packet qd      With regards to the treatment of the adrenal insufficiency due to CAH    When I initially saw him he was on a high dose of pred   Prednisone 10mg in the Am and 5mg at night    Since we have attempted to lower the dose to physiologic as we dont need to over treat the CAH      Current dose:   Prednisone  5  mg qd --  He and his wife are aware of when they need stress dose steroids    ACTH was 210 01/03/23  Did not have testosterone, 17-hydroxy and DHEA drawn yet, ordered Jan/2023    BMD 2/10/23   Osteopenia.     Stable from previous.  FRAX adjusted to chronic steroid use and  "fractures hip 2.6 and total 15    Back pain is still present but he's doing better, he's using a cane to ambulate      Review of Systems   As above     Objective:   Physical Exam  Psychiatric:         Attention and Perception: Attention normal.         Mood and Affect: Mood normal.         Speech: Speech normal.         Behavior: Behavior normal.         Thought Content: Thought content normal.         Cognition and Memory: Cognition normal.         Judgment: Judgment normal.       Visit Vitals  /70   Ht 4' 9" (1.448 m)   Wt 62.9 kg (138 lb 9 oz)   BMI 29.98 kg/m²       Body mass index is 29.98 kg/m².    Lab Review:   Lab Results   Component Value Date    HGBA1C 5.3 04/10/2023     Lab Results   Component Value Date    CHOL 183 04/10/2023    HDL 54 04/10/2023    LDLCALC 112.6 04/10/2023    TRIG 82 04/10/2023    CHOLHDL 29.5 04/10/2023     Lab Results   Component Value Date     04/10/2023    K 4.1 04/10/2023     04/10/2023    CO2 26 04/10/2023    GLU 93 04/10/2023    BUN 8 04/10/2023    CREATININE 0.7 04/10/2023    CALCIUM 9.4 04/10/2023    PROT 7.2 04/10/2023    ALBUMIN 4.2 04/10/2023    BILITOT 0.3 04/10/2023    ALKPHOS 140 (H) 04/10/2023    AST 32 04/10/2023    ALT 38 04/10/2023    ANIONGAP 8 04/10/2023    ESTGFRAFRICA >60.0 08/23/2021    EGFRNONAA >60.0 08/23/2021    TSH 1.034 04/10/2023     Vit D, 25-Hydroxy   Date Value Ref Range Status   05/06/2019 36 30 - 96 ng/mL Final     Comment:     Vitamin D deficiency.........<10 ng/mL                              Vitamin D insufficiency......10-29 ng/mL       Vitamin D sufficiency........> or equal to 30 ng/mL  Vitamin D toxicity............>100 ng/mL          Assessment and Plan     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  We will ask for a redraw of previously ordered labs and monitor testosterone levels  Send refill for Androgel packets  F/u outpatient 1 year      Congenital adrenal hyperplasia  CAH with AI, anatomy as described in HPI  Goal is lowest " tolerated dose on prednisone. Has felt well on 5 mg daily, will continue this  He has supplies for sick day doses, needs medical alert bracelet  Follow ACTH, 17-OH, DHEA - no need to normalize these labs    Repeat labs in one year prior to next appointment      Juliane Harden MD  [facility], [address]  [address] [place], [place], [address]    Office: [phone]  Fax: [phone]

## 2023-10-17 LAB — ACTH PLAS-MCNC: 67 PG/ML (ref 0–46)

## 2023-10-19 LAB — 17OHP SERPL-MCNC: ABNORMAL NG/DL (ref 40–239)

## 2023-10-31 ENCOUNTER — PATIENT MESSAGE (OUTPATIENT)
Dept: PAIN MEDICINE | Facility: CLINIC | Age: 53
End: 2023-10-31
Payer: MEDICARE

## 2023-10-31 DIAGNOSIS — M62.838 MUSCLE SPASM: Primary | ICD-10-CM

## 2023-10-31 RX ORDER — OXYCODONE AND ACETAMINOPHEN 10; 325 MG/1; MG/1
1 TABLET ORAL EVERY 8 HOURS PRN
Qty: 90 TABLET | Refills: 0 | Status: SHIPPED | OUTPATIENT
Start: 2023-10-31 | End: 2023-11-22 | Stop reason: SDUPTHER

## 2023-10-31 RX ORDER — CYCLOBENZAPRINE HCL 10 MG
10 TABLET ORAL 3 TIMES DAILY PRN
Qty: 270 TABLET | Refills: 0 | Status: SHIPPED | OUTPATIENT
Start: 2023-10-31 | End: 2024-01-24 | Stop reason: SDUPTHER

## 2023-10-31 NOTE — TELEPHONE ENCOUNTER
Patient requesting refill on oxycodone acetaminophen 10/325 mg   Last office visit 08/28/23   shows last refill on 09/25/23  Patient does have a pain contract on file with [facility]  Patient last UDS 03/10/23 was consistent with current therapy     CODEINE  Not Detected   Not Detected  Not Detected    MORPHINE  Not Detected   Present  Present    6-ACETYLMORPHINE  Not Detected   Not Detected  Not Detected    OXYCODONE  Present   Present  Present    NOROYXCODONE  Present   Present  Present    OXYMORPHONE  Present   Not Detected  Not Detected    NOROXYMORPHONE  Present   Not Detected  Not Detected    HYDROCODONE  Not Detected   Not Detected  Not Detected    NORHYDROCODONE  Not Detected   Not Detected  Not Detected    HYDROMORPHONE  Not Detected   Not Detected  Not Detected    BUPRENORPHINE  Not Detected   Not Detected  Not Detected    NORUBPRENORPHINE  Not Detected   Not Detected  Not

## 2023-11-06 ENCOUNTER — TELEPHONE (OUTPATIENT)
Dept: PLASTIC SURGERY | Facility: CLINIC | Age: 53
End: 2023-11-06
Payer: MEDICARE

## 2023-11-22 ENCOUNTER — OFFICE VISIT (OUTPATIENT)
Dept: PAIN MEDICINE | Facility: CLINIC | Age: 53
End: 2023-11-22
Attending: ANESTHESIOLOGY
Payer: MEDICARE

## 2023-11-22 VITALS
RESPIRATION RATE: 18 BRPM | SYSTOLIC BLOOD PRESSURE: 131 MMHG | WEIGHT: 138 LBS | TEMPERATURE: 98 F | OXYGEN SATURATION: 100 % | HEIGHT: 60 IN | DIASTOLIC BLOOD PRESSURE: 85 MMHG | BODY MASS INDEX: 27.09 KG/M2 | HEART RATE: 92 BPM

## 2023-11-22 DIAGNOSIS — M62.838 MUSCLE SPASM: Primary | ICD-10-CM

## 2023-11-22 DIAGNOSIS — M54.14 THORACIC RADICULOPATHY: ICD-10-CM

## 2023-11-22 DIAGNOSIS — Z79.891 ENCOUNTER FOR LONG-TERM OPIATE ANALGESIC USE: ICD-10-CM

## 2023-11-22 DIAGNOSIS — M48.062 SPINAL STENOSIS OF LUMBAR REGION WITH NEUROGENIC CLAUDICATION: ICD-10-CM

## 2023-11-22 DIAGNOSIS — M17.11 PRIMARY OSTEOARTHRITIS OF RIGHT KNEE: ICD-10-CM

## 2023-11-22 DIAGNOSIS — M47.816 LUMBAR SPONDYLOSIS: ICD-10-CM

## 2023-11-22 DIAGNOSIS — M54.12 CERVICAL RADICULOPATHY: ICD-10-CM

## 2023-11-22 DIAGNOSIS — Z98.890 S/P LUMBAR LAMINECTOMY: ICD-10-CM

## 2023-11-22 DIAGNOSIS — M79.2 NEUROPATHIC PAIN: ICD-10-CM

## 2023-11-22 DIAGNOSIS — G89.4 CHRONIC PAIN SYNDROME: ICD-10-CM

## 2023-11-22 PROCEDURE — 99999 PR PBB SHADOW E&M-EST. PATIENT-LVL III: CPT | Mod: PBBFAC,,, | Performed by: ANESTHESIOLOGY

## 2023-11-22 PROCEDURE — 99213 OFFICE O/P EST LOW 20 MIN: CPT | Mod: S$PBB,,, | Performed by: ANESTHESIOLOGY

## 2023-11-22 PROCEDURE — 99213 OFFICE O/P EST LOW 20 MIN: CPT | Mod: PBBFAC | Performed by: ANESTHESIOLOGY

## 2023-11-22 PROCEDURE — 99999 PR PBB SHADOW E&M-EST. PATIENT-LVL III: ICD-10-PCS | Mod: PBBFAC,,, | Performed by: ANESTHESIOLOGY

## 2023-11-22 PROCEDURE — 99213 PR OFFICE/OUTPT VISIT, EST, LEVL III, 20-29 MIN: ICD-10-PCS | Mod: S$PBB,,, | Performed by: ANESTHESIOLOGY

## 2023-11-22 RX ORDER — OXYCODONE AND ACETAMINOPHEN 10; 325 MG/1; MG/1
1 TABLET ORAL EVERY 8 HOURS PRN
Qty: 90 TABLET | Refills: 0 | Status: SHIPPED | OUTPATIENT
Start: 2023-12-01 | End: 2024-01-12 | Stop reason: SDUPTHER

## 2023-11-22 NOTE — PROGRESS NOTES
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    Procedure: BLOCK, NERVE, GLENOHUMERAL  need consent;  Surgeon: Messi Cabello MD;  Location: [facility] PAIN MGT;  Service: Pain Management;  Laterality: Right;    JOINT REPLACEMENT  2/2020, 1/2021    Knees    KNEE ARTHROPLASTY Left 01/25/2021    Procedure: ARTHROPLASTY, KNEE:DEPUY-ATTUNE REVISION: SERINA iASSIST:CABLES ON HOLD;  Surgeon: Donte Andrade III, MD;  Location: [facility];  Service: Orthopedics;  Laterality: Left;    LAMINECTOMY  09/13/2019    LAPAROSCOPIC CHOLECYSTECTOMY N/A 05/17/2021    Procedure: CHOLECYSTECTOMY, LAPAROSCOPIC;  Surgeon: Calvin Wilson MD;  Location: [facility] OR 2ND FLR;  Service: General;  Laterality: N/A;    LAPAROSCOPIC CHOLECYSTECTOMY N/A 05/18/2021    Procedure: CHOLECYSTECTOMY, LAPAROSCOPIC;  Surgeon: Antonio Brandt MD;  Location: [facility] OR 2ND FLR;  Service: General;  Laterality: N/A;    RADIOFREQUENCY ABLATION Left 05/09/2019    Procedure: RADIOFREQUENCY ABLATION, LEFT L3,4,5;  Surgeon: Messi Cabello MD;  Location: [facility] PAIN MGT;  Service: Pain Management;  Laterality: Left;  1 of 2    RADIOFREQUENCY ABLATION Right 05/23/2019    Procedure: RADIOFREQUENCY ABLATION, RIGHT L3,4,5;  Surgeon: Messi Cabello MD;  Location: [facility] PAIN MGT;  Service: Pain Management;  Laterality: Right;  2of 2    ROBOT-ASSISTED LAPAROSCOPIC RECTOPEXY N/A 01/17/2023    Procedure: ROBOTIC RECTOPEXY;  Surgeon: Genia Ku MD;  Location: [facility] OR;  Service: Colon and Rectal;   Laterality: N/A;    SPINE SURGERY      2019     TOTAL KNEE ARTHROPLASTY Right 2020    Procedure: ARTHROPLASTY, KNEE, TOTAL;  Surgeon: Donte Andrade III, MD;  Location: [facility] OR [address] [place] [address];  Service: Orthopedics;  Laterality: Right;     Social History     Socioeconomic History    Marital status:      Spouse name: Kristy Sainz    Number of children: 1   Occupational History     Comment:  and artist   Tobacco Use    Smoking status: Former     Current packs/day: 0.00     Types: Cigarettes     Quit date:      Years since quittin.8    Smokeless tobacco: Never    Tobacco comments:     was not a daily smoker-    Substance and Sexual Activity    Alcohol use: Not Currently    Drug use: No    Sexual activity: Yes     Partners: Female     Birth control/protection: None   Social History Narrative    Lives in a house with his wife      Social Determinants of Health     Financial Resource Strain: Low Risk  (10/15/2023)    Overall Financial Resource Strain (CARDIA)     Difficulty of Paying Living Expenses: Not hard at all   Food Insecurity: No Food Insecurity (10/15/2023)    Hunger Vital Sign     Worried About Running Out of Food in the Last Year: Never true     Ran Out of Food in the Last Year: Never true   Transportation Needs: No Transportation Needs (10/15/2023)    PRAPARE - Transportation     Lack of Transportation (Medical): No     Lack of Transportation (Non-Medical): No   Physical Activity: Insufficiently Active (10/15/2023)    Exercise Vital Sign     Days of Exercise per Week: 3 days     Minutes of Exercise per Session: 20 min   Stress: No Stress Concern Present (10/15/2023)    [employer] - Occupational Stress Questionnaire     Feeling of Stress : Only a little   Recent Concern: Stress - Stress Concern Present (2023)    [employer] - Occupational Stress Questionnaire     Feeling of Stress : To some extent   Social  Connections: Unknown (10/15/2023)    Social Connection and Isolation Panel [NHANES]     Frequency of Communication with Friends and Family: More than three times a week     Frequency of Social Gatherings with Friends and Family: Patient refused     Active Member of Clubs or Organizations: No     Attends Club or Organization Meetings: Patient refused     Marital Status:    Housing Stability: Low Risk  (10/15/2023)    Housing Stability Vital Sign     Unable to Pay for Housing in the Last Year: No     Number of Places Lived in the Last Year: 1     Unstable Housing in the Last Year: No     Family History   Problem Relation Age of Onset    Diabetes Maternal Grandfather     Cancer Maternal Grandfather         Colon    Cancer Mother         Kidney    Early death Mother         58    Heart disease Father     Autoimmune disease Sister     Hypertension Maternal Grandmother     Stroke Maternal Grandmother     Diabetes Maternal Uncle     Breast cancer Neg Hx     Ovarian cancer Neg Hx     Vaginal cancer Neg Hx     Endometrial cancer Neg Hx     Cervical cancer Neg Hx        Review of patient's allergies indicates:   Allergen Reactions    Bactrim [sulfamethoxazole-trimethoprim] Itching    Penicillins Rash       Current Outpatient Medications   Medication Sig    ALPRAZolam (XANAX) 1 MG tablet Take 1 tablet (1 mg total) by mouth 3 (three) times daily as needed for Anxiety.    ascorbic acid, vitamin C, (VITAMIN C) 1000 MG tablet Take 1,000 mg by mouth once daily.    atorvastatin (LIPITOR) 20 MG tablet Take 1 tablet (20 mg total) by mouth once daily.    cholecalciferol, vitamin D3, 125 mcg (5,000 unit) capsule Take 1 capsule by mouth Daily.    cranberry fruit extract (CRANBERRY ORAL) Take by mouth.    cyanocobalamin (VITAMIN B-12) 1000 MCG tablet Take 100 mcg by mouth once daily.    cyclobenzaprine (FLEXERIL) 10 MG tablet Take 1 tablet (10 mg total) by mouth 3 (three) times daily as needed for Muscle spasms.    diclofenac  sodium (VOLTAREN) 1 % Gel Apply 2 g topically once daily.    docusate sodium (COLACE) 100 MG capsule Take 1 capsule (100 mg total) by mouth 2 (two) times daily as needed for Constipation.    gabapentin (NEURONTIN) 800 MG tablet Take 1 tablet by mouth three times a day and take 2 tablets at night (5 tablets a day, 4000mg)    hydrocortisone (ANUSOL-HC) 2.5 % rectal cream Place rectally 2 (two) times daily.    ketoconazole (NIZORAL) 2 % cream Apply topically once daily.    Lactobacillus acidophilus Cap Take by mouth once daily.     lamoTRIgine (LAMICTAL) 200 MG tablet Take 1 tablet (200 mg total) by mouth 2 (two) times daily.    loratadine (CLARITIN) 10 mg tablet Take 10 mg by mouth once daily.     magnesium 250 mg Tab Take 1 tablet by mouth Daily.    naproxen sodium (ANAPROX) 220 MG tablet Take 220 mg by mouth.    predniSONE (DELTASONE) 5 MG tablet Take 1 tablet (5 mg total) by mouth once daily. Double the dose if sick    primidone (MYSOLINE) 50 MG Tab Take 3 tablets (150 mg total) by mouth 3 (three) times daily.    rOPINIRole (REQUIP) 4 MG tablet Take 1 tablet (4 mg total) by mouth once daily.    testosterone (ANDROGEL) 1 % (50 mg/5 gram) GlPk Apply 1 packet (5 grams) topically once daily.    triamcinolone acetonide 0.1% (KENALOG) 0.1 % cream Apply topically 2 (two) times daily.    [START ON 12/1/2023] oxyCODONE-acetaminophen (PERCOCET)  mg per tablet Take 1 tablet by mouth every 8 (eight) hours as needed for Pain.     No current facility-administered medications for this visit.     Facility-Administered Medications Ordered in Other Visits   Medication    fentaNYL injection 25 mcg    midazolam (VERSED) 1 mg/mL injection 0.5 mg       REVIEW OF SYSTEMS:    GENERAL:  No weight loss, malaise or fevers.  HEENT:   No recent changes in vision or hearing  NECK:  Negative for lumps, no difficulty with swallowing.  RESPIRATORY:  Negative for cough, wheezing or shortness of breath, patient denies any recent  URI.  CARDIOVASCULAR:  Negative for chest pain, leg swelling or palpitations.  GI:  Negative for abdominal discomfort, blood in stools or black stools or change in bowel habits.  MUSCULOSKELETAL:  See HPI.  SKIN:  Negative for lesions, rash, and itching.  PSYCH:  Significant psychosocial stressors including PTSD for sex re-assignment surgery.  Patient's sleep is disturbed secondary to pain.  HEMATOLOGY/LYMPHOLOGY:  Negative for prolonged bleeding, bruising easily or swollen nodes.  Patient is not currently taking any anti-coagulants  ENDO: No history of diabetes or thyroid dysfunction  NEURO:   No history of headaches, syncope, paralysis, seizures or tremors.  All other reviewed and negative other than HPI.    OBJECTIVE:    PHYSICAL EXAMINATION:    General appearance: Well appearing, in no acute distress, alert and oriented x3.  Psych:  Mood and affect appropriate.  Skin: Skin color normal, no rashes or lesions, in both upper and lower body.  Extremities: Moves all visualized extremities freely.      PREVIOUS PHYSICAL EXAMINATION:     GENERAL: Well appearing, in no acute distress, alert and oriented x3.  PSYCH:  Mood and affect appropriate.  SKIN:  Well-healed incisions without any evidence of infection  HEAD/FACE:  Normocephalic, atraumatic.   PULM: No evidence of respiratory difficulty, symmetric chest rise.  EXT:  Decreased range of motion in the left knee. Brace on left knee. Well healing scar to right knee from recent TKA. Medial and lateral joint line tenderness to both knees.  BACK:  There is significant pain with palpation over the facet joints and paraspinals of the lumbar spine bilaterally. There is decreased range of motion with extension to 15 degrees, and facet loading maneuvers cause reproducible pain.    NEURO:  No clonus. Cranial nerves grossly intact.  GAIT: Antalgic- ambulates with rolling walker.    Lab Results   Component Value Date    WBC 7.34 10/16/2023    HGB 13.6 (L) 10/16/2023    HCT 39.9  (L) 10/16/2023    MCV 99 (H) 10/16/2023     10/16/2023     CMP  Sodium   Date Value Ref Range Status   10/16/2023 134 (L) 136 - 145 mmol/L Final     Potassium   Date Value Ref Range Status   10/16/2023 4.6 3.5 - 5.1 mmol/L Final     Chloride   Date Value Ref Range Status   10/16/2023 101 95 - 110 mmol/L Final     CO2   Date Value Ref Range Status   10/16/2023 23 23 - 29 mmol/L Final     Glucose   Date Value Ref Range Status   10/16/2023 86 70 - 110 mg/dL Final     BUN   Date Value Ref Range Status   10/16/2023 11 6 - 20 mg/dL Final     Creatinine   Date Value Ref Range Status   10/16/2023 0.6 0.5 - 1.4 mg/dL Final     Calcium   Date Value Ref Range Status   10/16/2023 9.3 8.7 - 10.5 mg/dL Final     Total Protein   Date Value Ref Range Status   10/16/2023 7.1 6.0 - 8.4 g/dL Final     Albumin   Date Value Ref Range Status   10/16/2023 4.2 3.5 - 5.2 g/dL Final     Total Bilirubin   Date Value Ref Range Status   10/16/2023 0.5 0.1 - 1.0 mg/dL Final     Comment:     For infants and newborns, interpretation of results should be based  on gestational age, weight and in agreement with clinical  observations.    Premature Infant recommended reference ranges:  Up to 24 hours.............<8.0 mg/dL  Up to 48 hours............<12.0 mg/dL  3-5 days..................<15.0 mg/dL  6-29 days.................<15.0 mg/dL       Alkaline Phosphatase   Date Value Ref Range Status   10/16/2023 130 55 - 135 U/L Final     AST   Date Value Ref Range Status   10/16/2023 42 (H) 10 - 40 U/L Final     ALT   Date Value Ref Range Status   10/16/2023 52 (H) 10 - 44 U/L Final     Anion Gap   Date Value Ref Range Status   10/16/2023 10 8 - 16 mmol/L Final     eGFR if    Date Value Ref Range Status   08/23/2021 >60.0 >60 mL/min/1.73 m^2 Final     eGFR if non    Date Value Ref Range Status   08/23/2021 >60.0 >60 mL/min/1.73 m^2 Final     Comment:     Calculation used to obtain the estimated glomerular  filtration  rate (eGFR) is the CKD-EPI equation.        Lab Results   Component Value Date    HGBA1C 5.3 04/10/2023     Lab Results   Component Value Date    TSH 1.034 04/10/2023         ASSESSMENT: 53 y.o. year old adult with chronic back and knee pain, consistent with the following diagnoses:    Encounter Diagnoses   Name Primary?    Muscle spasm Yes    Chronic pain syndrome     Lumbar spondylosis     S/P lumbar laminectomy     Encounter for long-term opiate analgesic use     Thoracic radiculopathy     Neuropathic pain     Cervical radiculopathy     Primary osteoarthritis of right knee     Spinal stenosis of lumbar region with neurogenic claudication        PLAN:   We discussed with the patient the assessment and recommendations. The following is the plan we agreed on:   Discussed caudal MAYURI which he declined at this time.  2. Continue with home PT and yoga exercises.  3. Continue oxycodone acetaminophen 10/325 mg QID PRN, #90. Can call for additional refills as needed.  4. Pt has pain contract.  Last UDS from 3/10/23 reviewed.  5. Follow-up in 3 months or sooner if needed.  The patient is here today for a refill of current pain medications and they believe these provide effective pain control and improvements in quality of life.  The patient notes no serious side effects, and feels the benefits outweigh the risks.  The patient was reminded of the pain contract that they signed previously as well as the risks and benefits of the medication including possible death.  The updated [place] [employer] prescription monitoring program was reviewed, and the patient has been found to be compliant with current treatment plan.

## 2023-12-01 ENCOUNTER — OFFICE VISIT (OUTPATIENT)
Dept: PSYCHIATRY | Facility: CLINIC | Age: 53
End: 2023-12-01
Payer: MEDICARE

## 2023-12-01 ENCOUNTER — CLINICAL SUPPORT (OUTPATIENT)
Dept: PSYCHIATRY | Facility: CLINIC | Age: 53
End: 2023-12-01
Payer: MEDICARE

## 2023-12-01 VITALS
DIASTOLIC BLOOD PRESSURE: 63 MMHG | BODY MASS INDEX: 29.67 KG/M2 | WEIGHT: 137.13 LBS | SYSTOLIC BLOOD PRESSURE: 115 MMHG | HEART RATE: 92 BPM

## 2023-12-01 DIAGNOSIS — F39 MOOD DISORDER: ICD-10-CM

## 2023-12-01 DIAGNOSIS — F18.10 INHALANT ABUSE, UNCOMPLICATED: ICD-10-CM

## 2023-12-01 DIAGNOSIS — F40.00 AGORAPHOBIA: ICD-10-CM

## 2023-12-01 DIAGNOSIS — Z13.39 ADHD (ATTENTION DEFICIT HYPERACTIVITY DISORDER) EVALUATION: Primary | ICD-10-CM

## 2023-12-01 DIAGNOSIS — F41.9 ANXIETY: Primary | ICD-10-CM

## 2023-12-01 LAB
AMPHET+METHAMPHET UR QL: NEGATIVE
BARBITURATES UR QL SCN>200 NG/ML: ABNORMAL
BENZODIAZ UR QL SCN>200 NG/ML: ABNORMAL
BZE UR QL SCN: NEGATIVE
CANNABINOIDS UR QL SCN: NEGATIVE
CREAT UR-MCNC: 49 MG/DL (ref 23–375)
ETHANOL UR-MCNC: <10 MG/DL
METHADONE UR QL SCN>300 NG/ML: NEGATIVE
OPIATES UR QL SCN: NEGATIVE
PCP UR QL SCN>25 NG/ML: NEGATIVE
TOXICOLOGY INFORMATION: ABNORMAL

## 2023-12-01 PROCEDURE — 99214 PR OFFICE/OUTPT VISIT, EST, LEVL IV, 30-39 MIN: ICD-10-PCS | Mod: S$PBB,,, | Performed by: NURSE PRACTITIONER

## 2023-12-01 PROCEDURE — 99212 OFFICE O/P EST SF 10 MIN: CPT | Mod: PBBFAC | Performed by: NURSE PRACTITIONER

## 2023-12-01 PROCEDURE — 99214 OFFICE O/P EST MOD 30 MIN: CPT | Mod: S$PBB,,, | Performed by: NURSE PRACTITIONER

## 2023-12-01 PROCEDURE — 99999 PR PBB SHADOW E&M-EST. PATIENT-LVL II: CPT | Mod: PBBFAC,,, | Performed by: NURSE PRACTITIONER

## 2023-12-01 PROCEDURE — 99999 PR PBB SHADOW E&M-EST. PATIENT-LVL II: ICD-10-PCS | Mod: PBBFAC,,, | Performed by: NURSE PRACTITIONER

## 2023-12-01 PROCEDURE — 80307 DRUG TEST PRSMV CHEM ANLYZR: CPT | Performed by: NURSE PRACTITIONER

## 2023-12-01 RX ORDER — LAMOTRIGINE 200 MG/1
200 TABLET ORAL 2 TIMES DAILY
Qty: 180 TABLET | Refills: 3 | Status: SHIPPED | OUTPATIENT
Start: 2023-12-01

## 2023-12-01 RX ORDER — ALPRAZOLAM 1 MG/1
1 TABLET ORAL 3 TIMES DAILY PRN
Qty: 90 TABLET | Refills: 5 | Status: SHIPPED | OUTPATIENT
Start: 2023-12-10

## 2023-12-01 NOTE — PROGRESS NOTES
Outpatient Psychiatry Follow-Up Visit (MD/NP)    12/1/2023    Clinical Status of Patient:  Outpatient (Ambulatory)    Chief Complaint:  Ari Sainz is a 53 y.o. adult who presents today for follow-up of mood disorder and anxiety.  Met with patient.      Interval History and Content of Current Session:    Social/medical updates -- no major changes, lives with wife and 3 cats. Plans with family over [place].     Mood -- euthymic mood and affect. Denies depressed mood, denies anhedonia. No thoughts of death or SI. No ricardo.   Anxiety -- no change from baseline. Day-to-day worry largely manageable. Significant anxiety prior to social outings, leaving the home.   Attention -- no concerns expressed  Psychosis -- no   Sleep -- regulated, denies insomnia  Energy -- adequate  Appetite -- adequate, weight stable     Substance use -- denies all     Medications:    Lamotrigine 200 mg BID -- compliant, denies SE including rash  Alprazolam 1 mg TID -- takes BID most days, denies SE    Brief synopsis:  Sx stable, no SI/HI/AVH      Review of Systems   PSYCHIATRIC: Pertinant items are noted in the narrative.  CONSTITUTIONAL: No weight gain or loss.   MUSCULOSKELETAL: chronic pain  CARDIOVASCULAR: No tachycardia or chest pain.  GASTROINTESTINAL: No nausea, vomiting, pain, constipation or diarrhea.  GENITOURINARY: No frequency, dysuria or sexual dysfunction.    Past Medical, Family and Social History: The patient's past medical, family and social history have been reviewed and updated as appropriate within the electronic medical record - see encounter notes.    Compliance: see above    Side effects: see above    Risk Parameters:  Patient reports no suicidal ideation  Patient reports no homicidal ideation  Patient reports no self-injurious behavior  Patient reports no violent behavior    Exam (detailed: at least 9 elements; comprehensive: all 15 elements)   Constitutional  Vitals:  Most recent vital signs, dated less than  90 days prior to this appointment, were reviewed.   Vitals:    12/01/23 1446   BP: 115/63   Pulse: 92   Weight: 62.2 kg (137 lb 2 oz)      General:  unremarkable, age appropriate     Musculoskeletal  Muscle Strength/Tone:  no spasicity, no rigidity, no cogwheeling   Gait & Station:  uses cane     Psychiatric  Speech:  no latency; no press   Mood & Affect:  euthymic  congruent and appropriate, guarded   Thought Process:  normal and logical   Associations:  intact   Thought Content:  normal, no suicidality, no homicidality, delusions, or paranoia   Insight:  intact   Judgement: behavior is adequate to circumstances   Orientation:  grossly intact   Memory: intact for content of interview   Language: grossly intact   Attention Span & Concentration:  able to focus   Fund of Knowledge:  intact and appropriate to age and level of education     Assessment and Diagnosis   Status/Progress: Based on the examination today, the patient's problem(s) is/are adequately but not ideally controlled.  New problems have not been presented today.   Co-morbidities, Diagnostic uncertainty, and Lack of compliance are not complicating management of the primary condition.  There are no active rule-out diagnoses for this patient at this time.     General Impression: Ari Sainz is a 53 y.o. adult with a psychiatric hx of PTSD and anxiety presenting without active symptoms of PTSD or HELDER. Unclear origin of PTSD diagnosis. 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 Medical hx significant for fibromyalgia, lumbar laminectomy decompression from L1-S1, KIARA. Reports being prescribed lamotrigine and alprazolam for 10+ years. Remains unclear as to why patient is prescribed mood stabilizer vs SSRI/SNRI antidepressant. Chart review reveals previous prescription for lurasidone. Remote hx of self-injurious behavior in  adolescence. No hx of suicide attempts. No hx of drug abuse. Lives with his wife. 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    01/30/23 -- Euthymic mood. Denies anxiety concerns. No evident PTSD sx    05/02/23 -- no appreciable change in sx. Anxiety sx explored in more depth today, appears consistent with agoraphobia.     09/06/23 -- sx stable. Continue lamotrigine and alprazolam as prescribed    12/01/23 -- sx stable. No medication changes made        ICD-10-CM ICD-9-CM   1. Anxiety  F41.9 300.00   2. Agoraphobia  F40.00 300.22   3. Mood disorder  F39 296.90       Intervention/Counseling/Treatment Plan   Reviewed patient's symptoms,and medication regimen  Continue medication regimen as prescribed  Have discussed risks of long-term benzodiazepine use with patient multiple times  Though chronic benzodiazepine use is not ideal, sx have been adequately controlled for an extended period of time on current regimen, patient utilizes medication appropriately, and has hx of multiple failed psychotropic trials for anxiety  UDS completed, results pending     Medication Management  Prescription drug management was employed during the encounter, as medications were prescribed, or considered but not prescribed.   [facility] reviewed  The risks and benefits of medication were discussed with the patient.  Possible expectable adverse effects of any current or proposed individual psychotropic agents were discussed with this patient.  Counseling was provided on the importance of full compliance with any prescribed medication.  Detailed instructions were provided to the patient regarding the administration of any prescribed medication.  Patient voiced understanding    Return to Clinic:  3-6 months

## 2024-01-08 ENCOUNTER — OFFICE VISIT (OUTPATIENT)
Dept: NEUROLOGY | Facility: CLINIC | Age: 54
End: 2024-01-08
Payer: MEDICARE

## 2024-01-08 DIAGNOSIS — G25.81 RLS (RESTLESS LEGS SYNDROME): ICD-10-CM

## 2024-01-08 DIAGNOSIS — R25.1 TREMOR: ICD-10-CM

## 2024-01-08 DIAGNOSIS — E66.01 SEVERE OBESITY (BMI >= 40): Primary | ICD-10-CM

## 2024-01-08 PROCEDURE — 99215 OFFICE O/P EST HI 40 MIN: CPT | Mod: 95,,, | Performed by: PSYCHIATRY & NEUROLOGY

## 2024-01-08 RX ORDER — PRIMIDONE 50 MG/1
TABLET ORAL
Qty: 810 TABLET | Refills: 12 | Status: SHIPPED | OUTPATIENT
Start: 2024-01-08 | End: 2024-01-08

## 2024-01-08 RX ORDER — PRIMIDONE 50 MG/1
TABLET ORAL
Qty: 900 TABLET | Refills: 12 | Status: SHIPPED | OUTPATIENT
Start: 2024-01-08

## 2024-01-08 NOTE — ASSESSMENT & PLAN NOTE
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 Well managed with primidone.  Suggested change Primidone 150mg TID. To 200mg AM if ne needs extra to paint.  No oversedation.    Likely tremor due to predisone/gabapentin however if this worsens may consider him to have essential tremor.  No PDism noted on video exam despite RLS

## 2024-01-08 NOTE — PROGRESS NOTES
"  The patient location is: home  The chief complaint leading to consultation is: Temor    Visit type: audiovisual    Face to Face time with patient: 20mins  20 minutes of total time spent on the encounter, which includes face to face time and non-face to face time preparing to see the patient (eg, review of tests), Obtaining and/or reviewing separately obtained history, Documenting clinical information in the electronic or other health record, Independently interpreting results (not separately reported) and communicating results to the patient/family/caregiver, or Care coordination (not separately reported).     Each patient to whom he or she provides medical services by telemedicine is:  (1) informed of the relationship between the physician and patient and the respective role of any other health care provider with respect to management of the patient; and (2) notified that he or she may decline to receive medical services by telemedicine and may withdraw from such care at any time.    Notes:     [facility]    PCP/Referring Provider: No referring provider defined for this encounter.  Date of Service: 1/8/2024    Chief Complaint: RLS and tremors      Interval Hx    Since last visit,  Started Fe, tumeric, vitamin D    Continues primidone 150mg PO TID  No oversedation  Making art   Takes medications 20 minutes before he paints  His painting is large volume but tremor usually comes out for small motor movements  At times feels like he needs extra for his L hand    Uncle has hand tremors    Continues to have arthritis and fibromyalgia knees  No shuffling gait  Has been off latuda 3 years when he noticed a tremor    Continues ropinirole 4mg QHS  No impulsive behavior  Ferritin still not drawn  RLS is well controlled    On gabapetnin chronically 800mg 5 pills/day  On prednisone chronically    "PriorHPI: Ari Sainz is a R HANDED 53 y.o. adult with a medical issues significant for Lspine surg 2019 " "(stenosis), R knee replacement, concussions, adrenal hyperplasia, fibromyalgia, coming for help with hand tremors and RLS. For RLS, he's had this for 20 years and feels like it's well controlled. He feels a bilateral leg sensation improved by movement. These sensations start around 9PM. He feels like this is well controlled with 4mg requip at night. Takes Gabapentin 800mg TID and 1600 QHS. Unknown if ETOH sensitive.  Hand tremor for 10 years.  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 Primidone 100mg PO TID. This helps tremors a 1/2 hour after taking primidone. He notices tremors worse when paining upright and less when he's drawing. 30 minutes after primidone he has no tremor. Primidone lasts 3-4 hours.    Balance issues. Falls frequently. Imbalance since 10 years. He attributes this to needing a knee replacement.    Not overly sedated.    Currently on lamictal    Uncle has a tremor.    Medication history:  -Tried propranolol and it did not seem to help      Neuroleptic exposure:  -Was on latuda 2 years ago    PD Review of Symptoms:  Anosmia: none  Depression: as above  Cognitive slowing: mild  Orthostasis: mild  Falls: yes  Micrographia: none  Sleep issues:  -RBD: several reactions to nightmares - some PTSD"    Review of Systems:   Review of Systems   Constitutional:  Negative for fever.   HENT:  Negative for congestion.    Eyes:  Negative for double vision.   Respiratory:  Negative for cough and shortness of breath.    Cardiovascular:  Negative for chest pain and leg swelling.   Gastrointestinal:  Negative for nausea.   Genitourinary:  Negative for dysuria.   Musculoskeletal:  Negative for falls.   Skin:  Negative for rash.   Neurological:  Positive for tremors. Negative for speech change and headaches.   Psychiatric/Behavioral:  Negative for depression.          Current Medications:  Outpatient Encounter Medications as of 1/8/2024   Medication Sig Dispense Refill    ALPRAZolam (XANAX) 1 MG tablet " Take 1 tablet (1 mg total) by mouth 3 (three) times daily as needed for Anxiety. 90 tablet 5    ascorbic acid, vitamin C, (VITAMIN C) 1000 MG tablet Take 1,000 mg by mouth once daily.      atorvastatin (LIPITOR) 20 MG tablet Take 1 tablet (20 mg total) by mouth once daily. 90 tablet 3    cholecalciferol, vitamin D3, 125 mcg (5,000 unit) capsule Take 1 capsule by mouth Daily.      cranberry fruit extract (CRANBERRY ORAL) Take by mouth.      cyanocobalamin (VITAMIN B-12) 1000 MCG tablet Take 100 mcg by mouth once daily.      cyclobenzaprine (FLEXERIL) 10 MG tablet Take 1 tablet (10 mg total) by mouth 3 (three) times daily as needed for Muscle spasms. 270 tablet 0    diclofenac sodium (VOLTAREN) 1 % Gel Apply 2 g topically once daily. 100 g 3    docusate sodium (COLACE) 100 MG capsule Take 1 capsule (100 mg total) by mouth 2 (two) times daily as needed for Constipation. 60 capsule 0    gabapentin (NEURONTIN) 800 MG tablet Take 1 tablet by mouth three times a day and take 2 tablets at night (5 tablets a day, 4000mg) 450 tablet 2    hydrocortisone (ANUSOL-HC) 2.5 % rectal cream Place rectally 2 (two) times daily. 28 g 3    ketoconazole (NIZORAL) 2 % cream Apply topically once daily. 60 g 3    Lactobacillus acidophilus Cap Take by mouth once daily.       lamoTRIgine (LAMICTAL) 200 MG tablet Take 1 tablet (200 mg total) by mouth 2 (two) times daily. 180 tablet 3    loratadine (CLARITIN) 10 mg tablet Take 10 mg by mouth once daily.       magnesium 250 mg Tab Take 1 tablet by mouth Daily.      naproxen sodium (ANAPROX) 220 MG tablet Take 220 mg by mouth.      oxyCODONE-acetaminophen (PERCOCET)  mg per tablet Take 1 tablet by mouth every 8 (eight) hours as needed for Pain. 90 tablet 0    predniSONE (DELTASONE) 5 MG tablet Take 1 tablet (5 mg total) by mouth once daily. Double the dose if sick 200 tablet 3    primidone (MYSOLINE) 50 MG Tab 200mgAM/150mgnoon/150mgPM 810 tablet 12    rOPINIRole (REQUIP) 4 MG tablet Take 1  tablet (4 mg total) by mouth once daily. 90 tablet 12    testosterone (ANDROGEL) 1 % (50 mg/5 gram) GlPk Apply 1 packet (5 grams) topically once daily. 30 packet 5    triamcinolone acetonide 0.1% (KENALOG) 0.1 % cream Apply topically 2 (two) times daily. 45 g 0    [DISCONTINUED] primidone (MYSOLINE) 50 MG Tab Take 3 tablets (150 mg total) by mouth 3 (three) times daily. 810 tablet 12     Facility-Administered Encounter Medications as of 1/8/2024   Medication Dose Route Frequency Provider Last Rate Last Admin    fentaNYL injection 25 mcg  25 mcg Intravenous Q5 Min PRN French Smith MD        midazolam (VERSED) 1 mg/mL injection 0.5 mg  0.5 mg Intravenous PRN French Smith MD           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    Cervical radiculopathy    Thoracic myofascial strain    Pelvic fluid collection    Chronic, continuous use of opioids    Primary osteoarthritis of right knee    KIARA (obstructive sleep apnea)    Spinal stenosis    Arthritis of right shoulder region    Status post total left knee replacement 11/2/2020    Pancreatic cyst    Elevated alkaline phosphatase level    Liver fibrosis    Hepatitis B core antibody positive    Pancreas cyst    Osteopenia    Left knee pain    Primary osteoarthritis of left knee    Ascending cholangitis    Grade III hemorrhoids    Rectal prolapse    Decreased pulses in feet    Overweight (BMI 25.0-29.9)    Severe obesity (BMI >= 40)       Past Surgical History:  Past Surgical History:   Procedure Laterality Date    BACK SURGERY       BREAST SURGERY  1986,2001    Implants, removal    CHOLECYSTECTOMY N/A 05/18/2021    Procedure: CHOLECYSTECTOMY;  Surgeon: Antonio Brandt MD;  [address]  Service: General;  Laterality: N/A;    COLONOSCOPY N/A 6/29/2023    Procedure: COLONOSCOPY;  Surgeon: Genia uK MD;  Location: [facility];  Service: Colon and Rectal;  Laterality: N/A;    ENDOSCOPIC ULTRASOUND OF UPPER GASTROINTESTINAL TRACT N/A 12/03/2020    Procedure: ULTRASOUND, UPPER GI TRACT, ENDOSCOPIC;  Surgeon: Jonathan Sharma MD;  Location: [facility] (2ND FLR);  Service: Endoscopy;  Laterality: N/A;  elevated alk phos, panc cysts, liver biopsy   11/30-covid pcw-tb    ENDOSCOPIC ULTRASOUND OF UPPER GASTROINTESTINAL TRACT N/A 05/17/2021    Procedure: ULTRASOUND, UPPER GI TRACT, ENDOSCOPIC;  Surgeon: Claudio Salvador MD;  [facility]  Service: Endoscopy;  Laterality: N/A;    ERCP N/A 05/17/2021    Procedure: ERCP (ENDOSCOPIC RETROGRADE CHOLANGIOPANCREATOGRAPHY);  Surgeon: Claudio Salvador MD;  [facility]  Service: Endoscopy;  Laterality: N/A;    gender surgery      multiple surgeries since birth    HYSTERECTOMY  2003    INJECTION OF ANESTHETIC AGENT AROUND NERVE Right 11/19/2020    Procedure: BLOCK, NERVE, GLENOHUMERAL  need consent;  Surgeon: Messi Cabello MD;  Location: [facility];  Service: Pain Management;  Laterality: Right;    JOINT REPLACEMENT  2/2020, 1/2021    Knees    KNEE ARTHROPLASTY Left 01/25/2021    Procedure: ARTHROPLASTY, KNEE:DEPUY-ATTUNE REVISION: SERINA iASSIST:CABLES ON HOLD;  Surgeon: Donte Andrade III, MD;  Location: [facility];  Service: Orthopedics;  Laterality: Left;    LAMINECTOMY  09/13/2019    LAPAROSCOPIC CHOLECYSTECTOMY N/A 05/17/2021    Procedure: CHOLECYSTECTOMY, LAPAROSCOPIC;  Surgeon: Calvin Wilson MD;  [address];  Service: General;  Laterality: N/A;    LAPAROSCOPIC CHOLECYSTECTOMY N/A 05/18/2021    Procedure: CHOLECYSTECTOMY,  LAPAROSCOPIC;  Surgeon: Antonio Brandt MD;  Location: [facility] OR 2ND FLR;  Service: General;  Laterality: N/A;    RADIOFREQUENCY ABLATION Left 05/09/2019    Procedure: RADIOFREQUENCY ABLATION, LEFT L3,4,5;  Surgeon: Messi Cabello MD;  Location: [facility] T;  Service: Pain Management;  Laterality: Left;  1 of 2    RADIOFREQUENCY ABLATION Right 05/23/2019    Procedure: RADIOFREQUENCY ABLATION, RIGHT L3,4,5;  Surgeon: Messi Cabello MD;  Location: [facility] PA[facility] MGT;  Service: Pain Management;  Laterality: Right;  2of 2    ROBOT-ASSISTED LAPAROSCOPIC RECTOPEXY N/A 01/17/2023    Procedure: ROBOTIC RECTOPEXY;  Surgeon: Genia Ku MD;  Location: [facility] OR;  Service: Colon and Rectal;  Laterality: N/A;    SPINE SURGERY      Sept 2019     TOTAL KNEE ARTHROPLASTY Right 01/31/2020    Procedure: ARTHROPLASTY, KNEE, TOTAL;  Surgeon: Donte Andrade III, MD;  Location: [facility] OR 2ND FLR;  Service: Orthopedics;  Laterality: Right;       Current Living Situation: home    Social:  Social History     Socioeconomic History    Marital status:      Spouse name: Kristy Sainz    Number of children: 1   Occupational History     Comment:  and artist   Tobacco Use    Smoking status: Former     Current packs/day: 0.00     Types: Cigarettes     Quit date: 2009     Years since quitting: 15.0    Smokeless tobacco: Never    Tobacco comments:     was not a daily smoker-    Substance and Sexual Activity    Alcohol use: Not Currently    Drug use: No    Sexual activity: Yes     Partners: Female     Birth control/protection: None   Social History Narrative    Lives in a house with his wife      Social Determinants of Health     Financial Resource Strain: Low Risk  (11/26/2023)    Overall Financial Resource Strain (CARDIA)     Difficulty of Paying Living Expenses: Not hard at all   Food Insecurity: No Food Insecurity (11/26/2023)    Hunger Vital Sign     Worried About Running Out of Food in the Last Year: Never true      Ran Out of Food in the Last Year: Never true   Transportation Needs: No Transportation Needs (11/26/2023)    PRAPARE - Transportation     Lack of Transportation (Medical): No     Lack of Transportation (Non-Medical): No   Physical Activity: Insufficiently Active (11/26/2023)    Exercise Vital Sign     Days of Exercise per Week: 4 days     Minutes of Exercise per Session: 20 min   Stress: Stress Concern Present (11/26/2023)    Citizen of [place] [employer] - Occupational Stress Questionnaire     Feeling of Stress : To some extent   Social Connections: Unknown (11/26/2023)    Social Connection and Isolation Panel [NHANES]     Frequency of Communication with Friends and Family: More than three times a week     Frequency of Social Gatherings with Friends and Family: Never     Active Member of Clubs or Organizations: No     Attends Club or Organization Meetings: Never     Marital Status:    Housing Stability: Low Risk  (11/26/2023)    Housing Stability Vital Sign     Unable to Pay for Housing in the Last Year: No     Number of Places Lived in the Last Year: 1     Unstable Housing in the Last Year: No       Family History:  Family History   Problem Relation Age of Onset    Diabetes Maternal Grandfather     Cancer Maternal Grandfather         Colon    Cancer Mother         Kidney    Early death Mother         58    Heart disease Father     Autoimmune disease Sister     Hypertension Maternal Grandmother     Stroke Maternal Grandmother     Diabetes Maternal Uncle     Breast cancer Neg Hx     Ovarian cancer Neg Hx     Vaginal cancer Neg Hx     Endometrial cancer Neg Hx     Cervical cancer Neg Hx        PHYSICAL:  There were no vitals taken for this visit.    Physical Exam  Constitutional: Well-developed, well-nourished, appears stated age  Eyes: No scleral icterus  ENT: Moist oral mucosa  Cardiovascular: No lower extremity edema   Respiratory: No labored breathing   Skin: No rash   Hematologic: No  bruising    Other: GI/ deferred   Mental status: Alert and oriented to person, place, time, and situation;   follows commands  Speech: normal (not dysarthric), no aphasia  Cranial nerves:            CN II: Pupils mid-position and equal, not tested light or accommodation  CN III, IV, VI: Extraocular movements full, no nystagmus visualized  CN V: Not tested   CN VII: Face strong and symmetric bilaterally   CN VIII: Hearing intact to voice and conversation   CN IX, X: Palate raises midline and symmetric   CN XI: Strong shoulder shrug B/L  CN XII: Tongue appears midline   Motor: Normal bulk by appearance, no drift   Sensory: Not tested    Gait: Not tested  Deep tendon reflexes: Not tested  Movement/Coordination                    No hypophonic speech.                     No facial masking.  No bradykinesia.                    Bradykinesia  ? Finger taps Finger flicks KAVITHA Heel taps   Left 0 0 0 0   Right 0 0 0 0       Tremor Exam   Arms extended Arms in wing position, fingers almost touching Re-emergent Arms extended wrists extended Intention Resting Kinetic   Left ?++ ? ? ? ? ? ?neg   Right ?+ ? ? ? ? ? ?   Head tremor: No-No Yes-Yes NE     Archimedes Spirals   Left ?nl   Right ?nl       Laboratory Data:  NA    Imaging:  NA    EMG  EMG/NCV with Neurology which showed a bilateral carpal tunnel with concomitant C7 radiculopathy. Previous MRI of the cervical spine did reveal stenosis at C5-6 and C6-7 with moderate neuroforaminal stenosis.    Assessment//Plan:   Problem List Items Addressed This Visit          Neuro    Tremor    Current Assessment & Plan     Postural hand tremor, affecting his artwork. Well managed with primidone.  Suggested change Primidone 150mg TID. To 200mg AM if ne needs extra to paint.  No oversedation.    Likely tremor due to predisone/gabapentin however if this worsens may consider him to have essential tremor.  No PDism noted on video exam despite RLS         RLS (restless legs syndrome)     Current Assessment & Plan     Continue ropinirole 4mg QHS  May cut in half and straddle time where RLS begins    Also taking gabapentin which may help         Relevant Medications    primidone (MYSOLINE) 50 MG Tab       Endocrine    Severe obesity (BMI >= 40) - Primary         Baylee Esteban MD, MS [facility]  Department of Neurology  Movement Disorders

## 2024-01-08 NOTE — ASSESSMENT & PLAN NOTE
Continue ropinirole 4mg QHS  May cut in half and straddle time where RLS begins    Also taking gabapentin which may help

## 2024-01-11 ENCOUNTER — PATIENT MESSAGE (OUTPATIENT)
Dept: PAIN MEDICINE | Facility: CLINIC | Age: 54
End: 2024-01-11
Payer: MEDICARE

## 2024-01-12 DIAGNOSIS — M62.838 MUSCLE SPASM: ICD-10-CM

## 2024-01-12 NOTE — PROGRESS NOTES
Patient requesting refill on oxycodone acetaminophen 10/325 mg   Last office visit 11/22/23   shows last refill on 12/14/23  Patient does have a pain contract on file with [facility]  Patient last UDS 03/10/23 was consistent with current therapy     CODEINE   Not Detected    Not Detected  Not Detected    MORPHINE   Not Detected    Present  Present    6-ACETYLMORPHINE   Not Detected    Not Detected  Not Detected    OXYCODONE   Present    Present  Present    NOROYXCODONE   Present    Present  Present    OXYMORPHONE   Present    Not Detected  Not Detected    NOROXYMORPHONE   Present    Not Detected  Not Detected    HYDROCODONE   Not Detected    Not Detected  Not Detected    NORHYDROCODONE   Not Detected    Not Detected  Not Detected    HYDROMORPHONE   Not Detected    Not Detected  Not Detected    BUPRENORPHINE   Not Detected    Not Detected  Not Detected    NORUBPRENORPHINE   Not Detected    Not Detected  Not

## 2024-01-12 NOTE — TELEPHONE ENCOUNTER
Patient requesting refill on oxyCODONE-acetaminophen (PERCOCET)  mg   Last office visit 11/22/23   shows last refill on 12/11/23  Patient does have a pain contract on file with [facility]  Patient last UDS 03/10/23 was consistent with current therapy    CODEINE  Not Detected  Not Detected Not Detected   MORPHINE  Not Detected  Present Present   6-ACETYLMORPHINE  Not Detected  Not Detected Not Detected   OXYCODONE  Present  Present Present   NOROYXCODONE  Present  Present Present   OXYMORPHONE  Present  Not Detected Not Detected   NOROXYMORPHONE  Present  Not Detected Not Detected   HYDROCODONE  Not Detected  Not Detected Not Detected   NORHYDROCODONE  Not Detected  Not Detected Not Detected   HYDROMORPHONE  Not Detected  Not Detected Not Detected   BUPRENORPHINE  Not Detected  Not Detected Not Detected   NORUBPRENORPHINE  Not Detected  Not Detected Not Detected   FENTANYL  Not Detected  Not Detected Not Detected   NORFENTANYL  Not Detected  Not Detected Not Detected   MEPERIDINE METABOLITE  Not Detected  Not Detected Not Detected   TAPENTADOL  Not Detected  Not Detected Not Detected   TAPENTADOL-O-SULF  Not Detected  Not Detected Not Detected   METHADONE  Not Detected  Not Detected Not Detected   TRAMADOL  Not Detected  Not Detected Not Detected   AMPHETAMINE  Not Detected  Not Detected Not Detected   METHAMPHETAMINE  Not Detected  Not Detected Not Detected   MDMA- ECSTASY  Not Detected  Not Detected Not Detected   MDA  Not Detected  Not Detected Not Detected   MDEA- Deanna  Not Detected  Not Detected Not Detected   METHYLPHENIDATE  Not Detected  Not Detected Not Detected   PHENTERMINE  Not Detected  Not Detected Not Detected   BENZOYLECGONINE  Not Detected  Not Detected Not Detected   ALPRAZOLAM  Present  Not Detected Present   ALPHA-OH-ALPRAZOLAM  Present  Present Not Detected   CLONAZEPAM  Not Detected  Not Detected Not Detected   7-AMINOCLONAZEPAM  Not Detected  Not Detected  Not Detected   DIAZEPAM  Not Detected  Not Detected Not Detected   NORDIAZEPAM  Not Detected  Not Detected Not Detected   OXAZEPAM  Not Detected  Not Detected Not Detected   TEMAZEPAM  Not Detected  Not Detected Not Detected   Lorazepam  Not Detected  Not Detected Not Detected   MIDAZOLAM  Not Detected  Not Detected Not Detected   ZOLPIDEM  Not Detected  Not Detected Not Detected   BARBITURATES  Present  Present Present   Creatinine, Urine 20.0 - 400.0 mg/dL 27.8 18.0 Low  R 113.0 58.9   ETHYL GLUCURONIDE  Not Detected  Not Detected Not Detected   MARIJUANA METABOLITE  Not Detected  Not Detected Not Detected   PCP  Not Detected  Not Detected Not Detected   CARISOPRODOL  Not Detected  Not Detected CM Not Detected CM   Comment: The carisoprodol immunoassay has cross-reactivity to  carisoprodol and meprobamate.   Naloxone  Not Detected      Gabapentin  Present      Pregabalin  Not Detected      Alpha-OH-Midazolam  Not Detected      Zolpidem Metabolite  Not Detected      PAIN MANAGEMENT DRUG PANEL  See Below  See Below CM See Lorraine

## 2024-01-24 ENCOUNTER — PATIENT MESSAGE (OUTPATIENT)
Dept: PAIN MEDICINE | Facility: CLINIC | Age: 54
End: 2024-01-24
Payer: MEDICARE

## 2024-01-31 RX ORDER — OXYCODONE AND ACETAMINOPHEN 10; 325 MG/1; MG/1
1 TABLET ORAL EVERY 8 HOURS PRN
Qty: 90 TABLET | Refills: 0 | Status: SHIPPED | OUTPATIENT
Start: 2024-01-31 | End: 2024-02-26 | Stop reason: SDUPTHER

## 2024-01-31 RX ORDER — CYCLOBENZAPRINE HCL 10 MG
10 TABLET ORAL 3 TIMES DAILY PRN
Qty: 270 TABLET | Refills: 0 | Status: SHIPPED | OUTPATIENT
Start: 2024-01-31 | End: 2024-02-26 | Stop reason: SDUPTHER

## 2024-02-26 ENCOUNTER — OFFICE VISIT (OUTPATIENT)
Dept: PAIN MEDICINE | Facility: CLINIC | Age: 54
End: 2024-02-26
Payer: MEDICARE

## 2024-02-26 DIAGNOSIS — M62.838 MUSCLE SPASM: ICD-10-CM

## 2024-02-26 DIAGNOSIS — Z98.890 S/P LUMBAR LAMINECTOMY: Primary | ICD-10-CM

## 2024-02-26 DIAGNOSIS — M47.816 LUMBAR SPONDYLOSIS: ICD-10-CM

## 2024-02-26 DIAGNOSIS — G89.4 CHRONIC PAIN SYNDROME: ICD-10-CM

## 2024-02-26 PROCEDURE — 99213 OFFICE O/P EST LOW 20 MIN: CPT | Mod: 95,,, | Performed by: NURSE PRACTITIONER

## 2024-02-26 RX ORDER — CYCLOBENZAPRINE HCL 10 MG
10 TABLET ORAL 3 TIMES DAILY PRN
Qty: 270 TABLET | Refills: 0 | Status: SHIPPED | OUTPATIENT
Start: 2024-02-26 | End: 2024-07-31

## 2024-02-26 NOTE — PROGRESS NOTES
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  Laterality: N/A;    ENDOSCOPIC ULTRASOUND OF UPPER GASTROINTESTINAL TRACT N/A 12/03/2020    Procedure: ULTRASOUND, UPPER GI TRACT, ENDOSCOPIC;  Surgeon: Jonathan Sharma MD;  Location: [facility] ENDO (2ND FLR);  Service: Endoscopy;  Laterality: N/A;  elevated alk phos, panc cysts, liver biopsy   11/30-covid [id number]    ENDOSCOPIC ULTRASOUND OF UPPER GASTROINTESTINAL TRACT N/A 05/17/2021    Procedure: ULTRASOUND, UPPER GI TRACT, ENDOSCOPIC;  Surgeon: Claudio Salvador MD;  [facility]  Service: Endoscopy;  Laterality: N/A;    ERCP N/A 05/17/2021    Procedure: ERCP (ENDOSCOPIC RETROGRADE CHOLANGIOPANCREATOGRAPHY);  Surgeon: Claudio Salvador MD;  [facility]  Service: Endoscopy;  Laterality: N/A;    gender surgery      multiple surgeries since birth    HYSTERECTOMY  2003    INJECTION OF ANESTHETIC AGENT AROUND NERVE Right 11/19/2020    Procedure: BLOCK, NERVE, GLENOHUMERAL  need consent;  Surgeon: Messi Cabello MD;  Location: [facility] PAIN MGT;  Service: Pain Management;  Laterality: Right;    JOINT REPLACEMENT  2/2020, 1/2021    Knees    KNEE ARTHROPLASTY Left 01/25/2021    Procedure: ARTHROPLASTY, KNEE:DEPUY-ATTUNE REVISION: SERINA iASSIST:CABLES ON HOLD;  Surgeon: Donte Andrade III, MD;  Location: [facility];  Service: Orthopedics;  Laterality: Left;    LAMINECTOMY  09/13/2019     LAPAROSCOPIC CHOLECYSTECTOMY N/A 05/17/2021    Procedure: CHOLECYSTECTOMY, LAPAROSCOPIC;  Surgeon: Calvin Wilson MD;  Location: [facility] OR 2ND FLR;  Service: General;  Laterality: N/A;    LAPAROSCOPIC CHOLECYSTECTOMY N/A 05/18/2021    Procedure: CHOLECYSTECTOMY, LAPAROSCOPIC;  Surgeon: Antonio Brandt MD;  Location: [facility] OR 2ND FLR;  Service: General;  Laterality: N/A;    RADIOFREQUENCY ABLATION Left 05/09/2019    Procedure: RADIOFREQUENCY ABLATION, LEFT L3,4,5;  Surgeon: Messi Cabello MD;  Location: [facility] PAIN MGT;  Service: Pain Management;  Laterality: Left;  1 of 2    RADIOFREQUENCY ABLATION Right 05/23/2019    Procedure: RADIOFREQUENCY ABLATION, RIGHT L3,4,5;  Surgeon: Messi Cabello MD;  Location: [facility] PAIN MGT;  Service: Pain Management;  Laterality: Right;  2of 2    ROBOT-ASSISTED LAPAROSCOPIC RECTOPEXY N/A 01/17/2023    Procedure: ROBOTIC RECTOPEXY;  Surgeon: Genia Ku MD;  Location: [facility] OR;  Service: Colon and Rectal;  Laterality: N/A;    SPINE SURGERY      Sept 2019     TOTAL KNEE ARTHROPLASTY Right 01/31/2020    Procedure: ARTHROPLASTY, KNEE, TOTAL;  Surgeon: Donte Andrade III, MD;  Location: [facility] OR 2ND FLR;  Service: Orthopedics;  Laterality: Right;     Social History     Socioeconomic History    Marital status:      Spouse name: Kristy Sainz    Number of children: 1   Occupational History     Comment:  and artist   Tobacco Use    Smoking status: Former     Current packs/day: 0.00     Types: Cigarettes     Quit date: 2009     Years since quitting: 15.1    Smokeless tobacco: Never    Tobacco comments:     was not a daily smoker-    Substance and Sexual Activity    Alcohol use: Not Currently    Drug use: No    Sexual activity: Yes     Partners: Female     Birth control/protection: None   Social History Narrative    Lives in a house with his wife      Social Determinants of Health     Financial Resource Strain: Low Risk  (11/26/2023)    Overall Financial  Resource Strain (CARDIA)     Difficulty of Paying Living Expenses: Not hard at all   Food Insecurity: No Food Insecurity (11/26/2023)    Hunger Vital Sign     Worried About Running Out of Food in the Last Year: Never true     Ran Out of Food in the Last Year: Never true   Transportation Needs: No Transportation Needs (11/26/2023)    PRAPARE - Transportation     Lack of Transportation (Medical): No     Lack of Transportation (Non-Medical): No   Physical Activity: Insufficiently Active (11/26/2023)    Exercise Vital Sign     Days of Exercise per Week: 4 days     Minutes of Exercise per Session: 20 min   Stress: Stress Concern Present (11/26/2023)    [employer] [place] [employer] - Occupational Stress Questionnaire     Feeling of Stress : To some extent   Social Connections: Unknown (11/26/2023)    Social Connection and Isolation Panel [NHANES]     Frequency of Communication with Friends and Family: More than three times a week     Frequency of Social Gatherings with Friends and Family: Never     Active Member of Clubs or Organizations: No     Attends Club or Organization Meetings: Never     Marital Status:    Housing Stability: Low Risk  (11/26/2023)    Housing Stability Vital Sign     Unable to Pay for Housing in the Last Year: No     Number of Places Lived in the Last Year: 1     Unstable Housing in the Last Year: No     Family History   Problem Relation Age of Onset    Diabetes Maternal Grandfather     Cancer Maternal Grandfather         Colon    Cancer Mother         Kidney    Early death Mother         58    Heart disease Father     Autoimmune disease Sister     Hypertension Maternal Grandmother     Stroke Maternal Grandmother     Diabetes Maternal Uncle     Breast cancer Neg Hx     Ovarian cancer Neg Hx     Vaginal cancer Neg Hx     Endometrial cancer Neg Hx     Cervical cancer Neg Hx        Review of patient's allergies indicates:   Allergen Reactions    Bactrim [sulfamethoxazole-trimethoprim]  Itching    Penicillins Rash       Current Outpatient Medications   Medication Sig    ALPRAZolam (XANAX) 1 MG tablet Take 1 tablet (1 mg total) by mouth 3 (three) times daily as needed for Anxiety.    ascorbic acid, vitamin C, (VITAMIN C) 1000 MG tablet Take 1,000 mg by mouth once daily.    atorvastatin (LIPITOR) 20 MG tablet Take 1 tablet (20 mg total) by mouth once daily.    cholecalciferol, vitamin D3, 125 mcg (5,000 unit) capsule Take 1 capsule by mouth Daily.    cranberry fruit extract (CRANBERRY ORAL) Take by mouth.    cyanocobalamin (VITAMIN B-12) 1000 MCG tablet Take 100 mcg by mouth once daily.    cyclobenzaprine (FLEXERIL) 10 MG tablet Take 1 tablet (10 mg total) by mouth 3 (three) times daily as needed for Muscle spasms.    diclofenac sodium (VOLTAREN) 1 % Gel Apply 2 g topically once daily.    docusate sodium (COLACE) 100 MG capsule Take 1 capsule (100 mg total) by mouth 2 (two) times daily as needed for Constipation.    gabapentin (NEURONTIN) 800 MG tablet Take 1 tablet by mouth three times a day and take 2 tablets at night (5 tablets a day, 4000mg)    hydrocortisone (ANUSOL-HC) 2.5 % rectal cream Place rectally 2 (two) times daily.    ketoconazole (NIZORAL) 2 % cream Apply topically once daily.    Lactobacillus acidophilus Cap Take by mouth once daily.     lamoTRIgine (LAMICTAL) 200 MG tablet Take 1 tablet (200 mg total) by mouth 2 (two) times daily.    loratadine (CLARITIN) 10 mg tablet Take 10 mg by mouth once daily.     magnesium 250 mg Tab Take 1 tablet by mouth Daily.    naproxen sodium (ANAPROX) 220 MG tablet Take 220 mg by mouth.    oxyCODONE-acetaminophen (PERCOCET)  mg per tablet Take 1 tablet by mouth every 8 (eight) hours as needed for Pain.    predniSONE (DELTASONE) 5 MG tablet Take 1 tablet (5 mg total) by mouth once daily. Double the dose if sick    primidone (MYSOLINE) 50 MG Tab Take 3 tablets (150 mg total) by mouth 3 (three) times daily.    primidone (MYSOLINE) 50 MG Tab Take  200mg (4 tabs) every morning/150mg (3 tabs ) at noon/150mg (3 tabs) every evening    rOPINIRole (REQUIP) 4 MG tablet Take 1 tablet (4 mg total) by mouth once daily.    testosterone (ANDROGEL) 1 % (50 mg/5 gram) GlPk Apply 1 packet (5 grams) topically once daily.    triamcinolone acetonide 0.1% (KENALOG) 0.1 % cream Apply topically 2 (two) times daily.     No current facility-administered medications for this visit.     Facility-Administered Medications Ordered in Other Visits   Medication    fentaNYL injection 25 mcg    midazolam (VERSED) 1 mg/mL injection 0.5 mg       REVIEW OF SYSTEMS:    GENERAL:  No weight loss, malaise or fevers.  HEENT:   No recent changes in vision or hearing  NECK:  Negative for lumps, no difficulty with swallowing.  RESPIRATORY:  Negative for cough, wheezing or shortness of breath, patient denies any recent URI.  CARDIOVASCULAR:  Negative for chest pain, leg swelling or palpitations.  GI:  Negative for abdominal discomfort, blood in stools or black stools or change in bowel habits.  MUSCULOSKELETAL:  See HPI.  SKIN:  Negative for lesions, rash, and itching.  PSYCH:  Significant psychosocial stressors including PTSD for sex re-assignment surgery.  Patient's sleep is disturbed secondary to pain.  HEMATOLOGY/LYMPHOLOGY:  Negative for prolonged bleeding, bruising easily or swollen nodes.  Patient is not currently taking any anti-coagulants  ENDO: No history of diabetes or thyroid dysfunction  NEURO:   No history of headaches, syncope, paralysis, seizures or tremors.  All other reviewed and negative other than HPI.    OBJECTIVE:    PHYSICAL EXAMINATION:    General appearance: Well appearing, in no acute distress, alert and oriented x3.  Psych:  Mood and affect appropriate.  Skin: Skin color normal, no rashes or lesions, in both upper and lower body.  Extremities: Moves all visualized extremities freely.      PREVIOUS PHYSICAL EXAMINATION:     GENERAL: Well appearing, in no acute distress, alert  and oriented x3.  PSYCH:  Mood and affect appropriate.  SKIN:  Well-healed incisions without any evidence of infection  HEAD/FACE:  Normocephalic, atraumatic.   PULM: No evidence of respiratory difficulty, symmetric chest rise.  EXT:  Decreased range of motion in the left knee. Brace on left knee. Well healing scar to right knee from recent TKA. Medial and lateral joint line tenderness to both knees.  BACK:  There is significant pain with palpation over the facet joints and paraspinals of the lumbar spine bilaterally. There is decreased range of motion with extension to 15 degrees, and facet loading maneuvers cause reproducible pain.    NEURO:  No clonus. Cranial nerves grossly intact.  GAIT: Antalgic- ambulates with rolling walker.    Lab Results   Component Value Date    WBC 7.34 10/16/2023    HGB 13.6 (L) 10/16/2023    HCT 39.9 (L) 10/16/2023    MCV 99 (H) 10/16/2023     10/16/2023     CMP  Sodium   Date Value Ref Range Status   10/16/2023 134 (L) 136 - 145 mmol/L Final     Potassium   Date Value Ref Range Status   10/16/2023 4.6 3.5 - 5.1 mmol/L Final     Chloride   Date Value Ref Range Status   10/16/2023 101 95 - 110 mmol/L Final     CO2   Date Value Ref Range Status   10/16/2023 23 23 - 29 mmol/L Final     Glucose   Date Value Ref Range Status   10/16/2023 86 70 - 110 mg/dL Final     BUN   Date Value Ref Range Status   10/16/2023 11 6 - 20 mg/dL Final     Creatinine   Date Value Ref Range Status   10/16/2023 0.6 0.5 - 1.4 mg/dL Final     Calcium   Date Value Ref Range Status   10/16/2023 9.3 8.7 - 10.5 mg/dL Final     Total Protein   Date Value Ref Range Status   10/16/2023 7.1 6.0 - 8.4 g/dL Final     Albumin   Date Value Ref Range Status   10/16/2023 4.2 3.5 - 5.2 g/dL Final     Total Bilirubin   Date Value Ref Range Status   10/16/2023 0.5 0.1 - 1.0 mg/dL Final     Comment:     For infants and newborns, interpretation of results should be based  on gestational age, weight and in agreement with  clinical  observations.    Premature Infant recommended reference ranges:  Up to 24 hours.............<8.0 mg/dL  Up to 48 hours............<12.0 mg/dL  3-5 days..................<15.0 mg/dL  6-29 days.................<15.0 mg/dL       Alkaline Phosphatase   Date Value Ref Range Status   10/16/2023 130 55 - 135 U/L Final     AST   Date Value Ref Range Status   10/16/2023 42 (H) 10 - 40 U/L Final     ALT   Date Value Ref Range Status   10/16/2023 52 (H) 10 - 44 U/L Final     Anion Gap   Date Value Ref Range Status   10/16/2023 10 8 - 16 mmol/L Final     eGFR if    Date Value Ref Range Status   08/23/2021 >60.0 >60 mL/min/1.73 m^2 Final     eGFR if non    Date Value Ref Range Status   08/23/2021 >60.0 >60 mL/min/1.73 m^2 Final     Comment:     Calculation used to obtain the estimated glomerular filtration  rate (eGFR) is the CKD-EPI equation.        Lab Results   Component Value Date    HGBA1C 5.3 04/10/2023     Lab Results   Component Value Date    TSH 1.034 04/10/2023         ASSESSMENT: 53 y.o. year old adult with chronic back and knee pain, consistent with the following diagnoses:    Encounter Diagnoses   Name Primary?    Muscle spasm     S/P lumbar laminectomy Yes    Lumbar spondylosis     Chronic pain syndrome          PLAN:   We discussed with the patient the assessment and recommendations. The following is the plan we agreed on:   Consider interventions if pain worsens.  2. Continue with home PT and yoga exercises.  3. Continue oxycodone acetaminophen 10/325 mg QID PRN, #90.   4. Pt has pain contract.  Last UDS from 3/10/23 reviewed.  5. Follow-up in 3 months or sooner if needed.  The patient is here today for a refill of current pain medications and they believe these provide effective pain control and improvements in quality of life.  The patient notes no serious side effects, and feels the benefits outweigh the risks.  The patient was reminded of the pain contract that they  signed previously as well as the risks and benefits of the medication including possible death.  The updated [place] Board  Pharmacy prescription monitoring program was reviewed, and the patient has been found to be compliant with current treatment plan.    - Dr. Farmer was consulted on the patient and agrees with this plan.    The above plan and management options were discussed at length with patient. Patient is in agreement with the above and verbalized understanding.     Alejandra Phoenix, ERIK  02/26/2024

## 2024-02-27 RX ORDER — OXYCODONE AND ACETAMINOPHEN 10; 325 MG/1; MG/1
1 TABLET ORAL EVERY 8 HOURS PRN
Qty: 90 TABLET | Refills: 0 | Status: SHIPPED | OUTPATIENT
Start: 2024-03-01 | End: 2024-04-09 | Stop reason: SDUPTHER

## 2024-03-11 ENCOUNTER — PATIENT MESSAGE (OUTPATIENT)
Dept: INTERNAL MEDICINE | Facility: CLINIC | Age: 54
End: 2024-03-11
Payer: MEDICARE

## 2024-03-13 ENCOUNTER — OFFICE VISIT (OUTPATIENT)
Dept: INTERNAL MEDICINE | Facility: CLINIC | Age: 54
End: 2024-03-13
Payer: MEDICARE

## 2024-03-13 VITALS — HEART RATE: 83 BPM | DIASTOLIC BLOOD PRESSURE: 63 MMHG | SYSTOLIC BLOOD PRESSURE: 107 MMHG

## 2024-03-13 DIAGNOSIS — J06.9 URI WITH COUGH AND CONGESTION: Primary | ICD-10-CM

## 2024-03-13 PROCEDURE — 99213 OFFICE O/P EST LOW 20 MIN: CPT | Mod: 95,,,

## 2024-03-13 RX ORDER — BENZONATATE 200 MG/1
200 CAPSULE ORAL 3 TIMES DAILY PRN
Qty: 21 CAPSULE | Refills: 0 | Status: SHIPPED | OUTPATIENT
Start: 2024-03-13 | End: 2024-03-23

## 2024-03-13 RX ORDER — FLUTICASONE FUROATE 27.5 UG/1
2 SPRAY, METERED NASAL DAILY
Qty: 9.1 ML | Refills: 0 | Status: SHIPPED | OUTPATIENT
Start: 2024-03-13

## 2024-03-13 RX ORDER — PROMETHAZINE HYDROCHLORIDE AND DEXTROMETHORPHAN HYDROBROMIDE 6.25; 15 MG/5ML; MG/5ML
5 SYRUP ORAL EVERY 8 HOURS PRN
Qty: 118 ML | Refills: 0 | Status: SHIPPED | OUTPATIENT
Start: 2024-03-13 | End: 2024-03-20 | Stop reason: SDUPTHER

## 2024-03-13 NOTE — PATIENT INSTRUCTIONS
Flonase daily to decrease sinus congestion.    Tessalon Perles up to three x day for cough.    Promethazine DM at bedtime for cough and sleep.    Drink plenty of water to keep the mucus thin and mobile.    Return to clinic for symptoms that get worse, increase of cough with wheezing or shortness of breath.    Return to clinic for fever that returns after having gone away for more than 24 hours without fever reducing medication, especially with coarse or wet breath sounds.

## 2024-03-13 NOTE — PROGRESS NOTES
The patient location is: [place]  The chief complaint leading to consultation is: uri    Visit type: audiovisual    Face to Face time with patient: 5 min  10 min minutes of total time spent on the encounter, which includes face to face time and non-face to face time preparing to see the patient (eg, review of tests), Obtaining and/or reviewing separately obtained history, Documenting clinical information in the electronic or other health record, Independently interpreting results (not separately reported) and communicating results to the patient/family/caregiver, or Care coordination (not separately reported).         Each patient to whom he or she provides medical services by telemedicine is:  (1) informed of the relationship between the physician and patient and the respective role of any other health care provider with respect to management of the patient; and (2) notified that he or she may decline to receive medical services by telemedicine and may withdraw from such care at any time.    Notes:       CHIEF COMPLAINT     Chief Complaint   Patient presents with    Cough    Nasal Congestion       HPI     Ari Sainz is a 53 y.o. adult who presents for uri symptoms today.    PCP is Siva Mitchell MD, patient is new to me. Symptoms started one week ago. Reports cough, congestion. Denies fever, n/v/d, sore throat. Productive cough with yellow mucus.    Past Medical History:  Past Medical History:   Diagnosis Date    Abnormal CT scan, pelvis 10/27/2019    Abnormal thyroid blood test 05/06/2019    Adrenal cyst     left    Adrenal insufficiency     Blister- healing 01/09/2020    Chronic bilateral low back pain with bilateral sciatica 03/19/2019    Congenital adrenal hyperplasia     Hepatitis B core antibody positive 11/06/2020    Negative sAg, suggests previous exposure but no chronic/active Hep B. At risk for reactivation with any immunosuppression medication, steroids, chemo, etc.      IGT (impaired glucose  tolerance) 11/05/2019    Insomnia due to medical condition     Multiple closed traumatic fractures of multiple bones of hip and pelvis with routine healing, subsequent encounter 09/27/2019    Pancreatic cyst 11/02/2020    Restless leg syndrome     S/P lumbar laminectomy 10/28/2019    Spinal stenosis     Spinal stenosis of lumbar region with neurogenic claudication 03/19/2019    Status post sex reassignment surgery 03/19/2019    Hx of Congenital Adrenal Hyperplasia    Unintentional weight loss 07/21/2020       Home Medications:  Prior to Admission medications    Medication Sig Start Date End Date Taking? Authorizing Provider   ALPRAZolam (XANAX) 1 MG tablet Take 1 tablet (1 mg total) by mouth 3 (three) times daily as needed for Anxiety. 12/10/23  Yes Roney Bruce, NP   ascorbic acid, vitamin C, (VITAMIN C) 1000 MG tablet Take 1,000 mg by mouth once daily.   Yes Provider, Historical   atorvastatin (LIPITOR) 20 MG tablet Take 1 tablet (20 mg total) by mouth once daily. 5/23/23 6/9/24 Yes Messi Kendrick MD PhD   cholecalciferol, vitamin D3, 125 mcg (5,000 unit) capsule Take 1 capsule by mouth Daily. 12/5/22  Yes Provider, Historical   cranberry fruit extract (CRANBERRY ORAL) Take by mouth.   Yes Provider, Historical   cyanocobalamin (VITAMIN B-12) 1000 MCG tablet Take 100 mcg by mouth once daily.   Yes Provider, Historical   cyclobenzaprine (FLEXERIL) 10 MG tablet Take 1 tablet (10 mg total) by mouth 3 (three) times daily as needed for Muscle spasms. 2/26/24 5/26/24 Yes Alejandra Phoenix, ERIK   diclofenac sodium (VOLTAREN) 1 % Gel Apply 2 g topically once daily. 2/1/23  Yes Terri Ortega, DPM   docusate sodium (COLACE) 100 MG capsule Take 1 capsule (100 mg total) by mouth 2 (two) times daily as needed for Constipation. 1/22/21  Yes Daisy Hogue, PAChrisC   gabapentin (NEURONTIN) 800 MG tablet Take 1 tablet by mouth three times a day and take 2 tablets at night (5 tablets a day, 4000mg) 6/13/23  Yes  Alejanrda Phoenix FNP   hydrocortisone (ANUSOL-HC) 2.5 % rectal cream Place rectally 2 (two) times daily. 6/29/23  Yes Genia Ku MD   ketoconazole (NIZORAL) 2 % cream Apply topically once daily. 2/1/23  Yes Terri Ortega DPM   Lactobacillus acidophilus Cap Take by mouth once daily.    Yes Provider, Historical   lamoTRIgine (LAMICTAL) 200 MG tablet Take 1 tablet (200 mg total) by mouth 2 (two) times daily. 12/1/23  Yes Roney Bruce NP   loratadine (CLARITIN) 10 mg tablet Take 10 mg by mouth once daily.    Yes Provider, Historical   magnesium 250 mg Tab Take 1 tablet by mouth Daily. 12/5/22  Yes Provider, Historical   naproxen sodium (ANAPROX) 220 MG tablet Take 220 mg by mouth.   Yes Provider, Historical   oxyCODONE-acetaminophen (PERCOCET)  mg per tablet Take 1 tablet by mouth every 8 (eight) hours as needed for Pain. 3/1/24 4/6/24 Yes Jessica Farmer MD   predniSONE (DELTASONE) 5 MG tablet Take 1 tablet (5 mg total) by mouth once daily. Double the dose if sick 1/4/23  Yes Delfina Alvarez MD   primidone (MYSOLINE) 50 MG Tab Take 200mg (4 tabs) every morning/150mg (3 tabs ) at noon/150mg (3 tabs) every evening 1/8/24  Yes Baylee Esteban MD   rOPINIRole (REQUIP) 4 MG tablet Take 1 tablet (4 mg total) by mouth once daily. 8/7/23  Yes Baylee Esteban MD   testosterone (ANDROGEL) 1 % (50 mg/5 gram) GlPk Apply 1 packet (5 grams) topically once daily. 10/16/23 5/13/24 Yes Juliane Zaman MD   triamcinolone acetonide 0.1% (KENALOG) 0.1 % cream Apply topically 2 (two) times daily. 9/25/23  Yes Siva Mitchell MD       Review of Systems:  Review of Systems   Constitutional:  Negative for chills and fever.   HENT:  Positive for postnasal drip and rhinorrhea. Negative for ear pain and sore throat.    Respiratory:  Positive for cough. Negative for shortness of breath and wheezing.    Cardiovascular:  Negative for chest pain.   Musculoskeletal:  Negative for myalgias.   Skin:   Negative for rash.   Allergic/Immunologic: Positive for environmental allergies.   Neurological:  Positive for headaches.       Health Maintainence:   Immunizations:  Health Maintenance         Date Due Completion Date    HIV Screening Never done ---    Complete Opioid Risk Tool Never done ---    Pneumococcal Vaccines (Age 0-64) (2 of 2 - PCV) 01/28/2015 1/28/2014    COVID-19 Vaccine (6 - 2023-24 season) 09/01/2023 11/30/2022    Hemoglobin A1c (Prediabetes) 04/10/2024 4/10/2023    TETANUS VACCINE 10/05/2026 10/5/2016    Lipid Panel 04/10/2028 4/10/2023    Colorectal Cancer Screening 06/29/2033 6/29/2023             PHYSICAL EXAM     /63   Pulse 83     Physical Exam  HENT:      Head: Normocephalic and atraumatic.   Pulmonary:      Effort: Pulmonary effort is normal.   Neurological:      Mental Status: He is alert and oriented to person, place, and time.   Psychiatric:         Behavior: Behavior normal.         LABS     Lab Results   Component Value Date    HGBA1C 5.3 04/10/2023     CMP  Sodium   Date Value Ref Range Status   10/16/2023 134 (L) 136 - 145 mmol/L Final     Potassium   Date Value Ref Range Status   10/16/2023 4.6 3.5 - 5.1 mmol/L Final     Chloride   Date Value Ref Range Status   10/16/2023 101 95 - 110 mmol/L Final     CO2   Date Value Ref Range Status   10/16/2023 23 23 - 29 mmol/L Final     Glucose   Date Value Ref Range Status   10/16/2023 86 70 - 110 mg/dL Final     BUN   Date Value Ref Range Status   10/16/2023 11 6 - 20 mg/dL Final     Creatinine   Date Value Ref Range Status   10/16/2023 0.6 0.5 - 1.4 mg/dL Final     Calcium   Date Value Ref Range Status   10/16/2023 9.3 8.7 - 10.5 mg/dL Final     Total Protein   Date Value Ref Range Status   10/16/2023 7.1 6.0 - 8.4 g/dL Final     Albumin   Date Value Ref Range Status   10/16/2023 4.2 3.5 - 5.2 g/dL Final     Total Bilirubin   Date Value Ref Range Status   10/16/2023 0.5 0.1 - 1.0 mg/dL Final     Comment:     For infants and newborns,  interpretation of results should be based  on gestational age, weight and in agreement with clinical  observations.    Premature Infant recommended reference ranges:  Up to 24 hours.............<8.0 mg/dL  Up to 48 hours............<12.0 mg/dL  3-5 days..................<15.0 mg/dL  6-29 days.................<15.0 mg/dL       Alkaline Phosphatase   Date Value Ref Range Status   10/16/2023 130 55 - 135 U/L Final     AST   Date Value Ref Range Status   10/16/2023 42 (H) 10 - 40 U/L Final     ALT   Date Value Ref Range Status   10/16/2023 52 (H) 10 - 44 U/L Final     Anion Gap   Date Value Ref Range Status   10/16/2023 10 8 - 16 mmol/L Final     eGFR if    Date Value Ref Range Status   08/23/2021 >60.0 >60 mL/min/1.73 m^2 Final     eGFR if non    Date Value Ref Range Status   08/23/2021 >60.0 >60 mL/min/1.73 m^2 Final     Comment:     Calculation used to obtain the estimated glomerular filtration  rate (eGFR) is the CKD-EPI equation.        Lab Results   Component Value Date    WBC 7.34 10/16/2023    HGB 13.6 (L) 10/16/2023    HCT 39.9 (L) 10/16/2023    MCV 99 (H) 10/16/2023     10/16/2023     Lab Results   Component Value Date    CHOL 183 04/10/2023    CHOL 192 08/23/2021    CHOL 218 (H) 03/18/2019     Lab Results   Component Value Date    HDL 54 04/10/2023    HDL 58 08/23/2021    HDL 47 03/18/2019     Lab Results   Component Value Date    LDLCALC 112.6 04/10/2023    LDLCALC 111.8 08/23/2021    LDLCALC 139.2 03/18/2019     Lab Results   Component Value Date    TRIG 82 04/10/2023    TRIG 111 08/23/2021    TRIG 159 (H) 03/18/2019     Lab Results   Component Value Date    CHOLHDL 29.5 04/10/2023    CHOLHDL 30.2 08/23/2021    CHOLHDL 21.6 03/18/2019     Lab Results   Component Value Date    TSH 1.034 04/10/2023    [id number] 88 02/27/2020       ASSESSMENT/PLAN   1. URI with cough and congestion  -     fluticasone (FLONASE SENSIMIST) 27.5 mcg/actuation nasal spray; 2 sprays by Nasal  route once daily.  Dispense: 5.9 mL; Refill: 0  -     benzonatate (TESSALON) 200 MG capsule; Take 1 capsule (200 mg total) by mouth 3 (three) times daily as needed for Cough.  Dispense: 21 capsule; Refill: 0  -     promethazine-dextromethorphan (PROMETHAZINE-DM) 6.25-15 mg/5 mL Syrp; Take 5 mLs by mouth every 8 (eight) hours as needed (For cough. Do not operate heavy machinery. Will cause drowsiness.).  Dispense: 118 mL; Refill: 0             Rafael Grullon NP   Department of Internal Medicine - [facility]  8:06 AM  Answers submitted by the patient for this visit:  Cough Questionnaire (Submitted on 3/13/2024)  Chief Complaint: Cough  Chronicity: new  Onset: in the past 7 days  Progression since onset: waxing and waning  Frequency: every few minutes  Cough characteristics: productive of sputum  ear congestion: No  heartburn: No  hemoptysis: No  nasal congestion: Yes  sweats: No  weight loss: No  asthma: No  bronchiectasis: No  bronchitis: Yes  COPD: No  emphysema: No  pneumonia: No  Treatments tried: OTC cough suppressant, rest

## 2024-03-20 ENCOUNTER — PATIENT MESSAGE (OUTPATIENT)
Dept: INTERNAL MEDICINE | Facility: CLINIC | Age: 54
End: 2024-03-20
Payer: MEDICARE

## 2024-03-20 DIAGNOSIS — J06.9 URI WITH COUGH AND CONGESTION: ICD-10-CM

## 2024-03-20 RX ORDER — PROMETHAZINE HYDROCHLORIDE AND DEXTROMETHORPHAN HYDROBROMIDE 6.25; 15 MG/5ML; MG/5ML
5 SYRUP ORAL EVERY 8 HOURS PRN
Qty: 118 ML | Refills: 0 | Status: SHIPPED | OUTPATIENT
Start: 2024-03-20 | End: 2024-03-30

## 2024-03-25 ENCOUNTER — PATIENT MESSAGE (OUTPATIENT)
Dept: INTERNAL MEDICINE | Facility: CLINIC | Age: 54
End: 2024-03-25
Payer: MEDICARE

## 2024-03-25 DIAGNOSIS — J32.9 BACTERIAL SINUSITIS: Primary | ICD-10-CM

## 2024-03-25 DIAGNOSIS — B96.89 BACTERIAL SINUSITIS: Primary | ICD-10-CM

## 2024-03-25 RX ORDER — DOXYCYCLINE HYCLATE 100 MG
100 TABLET ORAL 2 TIMES DAILY
Qty: 10 TABLET | Refills: 0 | Status: SHIPPED | OUTPATIENT
Start: 2024-03-25 | End: 2024-06-04

## 2024-03-26 ENCOUNTER — PATIENT MESSAGE (OUTPATIENT)
Dept: INTERNAL MEDICINE | Facility: CLINIC | Age: 54
End: 2024-03-26
Payer: MEDICARE

## 2024-03-26 DIAGNOSIS — B37.9 CANDIDA INFECTION: Primary | ICD-10-CM

## 2024-03-27 RX ORDER — FLUCONAZOLE 150 MG/1
150 TABLET ORAL DAILY
Qty: 2 TABLET | Refills: 0 | Status: SHIPPED | OUTPATIENT
Start: 2024-03-27 | End: 2024-03-31

## 2024-04-04 ENCOUNTER — PATIENT MESSAGE (OUTPATIENT)
Dept: INTERNAL MEDICINE | Facility: CLINIC | Age: 54
End: 2024-04-04
Payer: MEDICARE

## 2024-04-07 ENCOUNTER — PATIENT MESSAGE (OUTPATIENT)
Dept: PAIN MEDICINE | Facility: CLINIC | Age: 54
End: 2024-04-07
Payer: MEDICARE

## 2024-04-07 DIAGNOSIS — M62.838 MUSCLE SPASM: ICD-10-CM

## 2024-04-09 NOTE — TELEPHONE ENCOUNTER
Patient requesting refill on oxycodone acetaminophen 10/325 mg   Last office visit 02-26-24   shows last refill on 03-07-24  Patient does have a pain contract on file with [facility] department  Patient last UDS 03-10-23 was consistent with current therapy     CODEINE   Not Detected    Not Detected  Not Detected    MORPHINE   Not Detected    Present  Present    6-ACETYLMORPHINE   Not Detected    Not Detected  Not Detected    OXYCODONE   Present    Present  Present    NOROYXCODONE   Present    Present  Present    OXYMORPHONE   Present    Not Detected  Not Detected    NOROXYMORPHONE   Present    Not Detected  Not Detected    HYDROCODONE   Not Detected    Not Detected  Not Detected    NORHYDROCODONE   Not Detected    Not Detected  Not Detected    HYDROMORPHONE   Not Detected    Not Detected  Not Detected    BUPRENORPHINE   Not Detected    Not Detected  Not Detected    NORUBPRENORPHINE   Not Detected    Not Detected  Not

## 2024-04-13 ENCOUNTER — PATIENT MESSAGE (OUTPATIENT)
Dept: INTERNAL MEDICINE | Facility: CLINIC | Age: 54
End: 2024-04-13
Payer: MEDICARE

## 2024-04-14 RX ORDER — OXYCODONE AND ACETAMINOPHEN 10; 325 MG/1; MG/1
1 TABLET ORAL EVERY 8 HOURS PRN
Qty: 90 TABLET | Refills: 0 | Status: SHIPPED | OUTPATIENT
Start: 2024-04-14 | End: 2024-05-21 | Stop reason: SDUPTHER

## 2024-04-16 ENCOUNTER — LAB VISIT (OUTPATIENT)
Dept: LAB | Facility: OTHER | Age: 54
End: 2024-04-16
Attending: INTERNAL MEDICINE
Payer: MEDICARE

## 2024-04-16 ENCOUNTER — OFFICE VISIT (OUTPATIENT)
Dept: INTERNAL MEDICINE | Facility: CLINIC | Age: 54
End: 2024-04-16
Attending: INTERNAL MEDICINE
Payer: MEDICARE

## 2024-04-16 ENCOUNTER — TELEPHONE (OUTPATIENT)
Dept: ORTHOPEDICS | Facility: CLINIC | Age: 54
End: 2024-04-16
Payer: MEDICARE

## 2024-04-16 VITALS
WEIGHT: 140.19 LBS | SYSTOLIC BLOOD PRESSURE: 118 MMHG | BODY MASS INDEX: 27.52 KG/M2 | OXYGEN SATURATION: 99 % | HEART RATE: 109 BPM | DIASTOLIC BLOOD PRESSURE: 80 MMHG | HEIGHT: 60 IN

## 2024-04-16 DIAGNOSIS — R79.9 ABNORMAL FINDING OF BLOOD CHEMISTRY, UNSPECIFIED: ICD-10-CM

## 2024-04-16 DIAGNOSIS — Z00.00 ANNUAL PHYSICAL EXAM: Primary | ICD-10-CM

## 2024-04-16 DIAGNOSIS — Z00.00 ANNUAL PHYSICAL EXAM: ICD-10-CM

## 2024-04-16 DIAGNOSIS — R23.4 CHANGES IN SKIN TEXTURE: ICD-10-CM

## 2024-04-16 DIAGNOSIS — K59.09 CHRONIC CONSTIPATION: ICD-10-CM

## 2024-04-16 DIAGNOSIS — K46.9 ABDOMINAL HERNIA WITHOUT OBSTRUCTION AND WITHOUT GANGRENE, RECURRENCE NOT SPECIFIED, UNSPECIFIED HERNIA TYPE: ICD-10-CM

## 2024-04-16 DIAGNOSIS — F39 MOOD DISORDER: ICD-10-CM

## 2024-04-16 DIAGNOSIS — E25.0 CONGENITAL ADRENAL HYPERPLASIA: ICD-10-CM

## 2024-04-16 DIAGNOSIS — I73.9 PERIPHERAL VASCULAR DISEASE, UNSPECIFIED: ICD-10-CM

## 2024-04-16 DIAGNOSIS — K75.81 NASH (NONALCOHOLIC STEATOHEPATITIS): ICD-10-CM

## 2024-04-16 DIAGNOSIS — G47.33 OSA (OBSTRUCTIVE SLEEP APNEA): ICD-10-CM

## 2024-04-16 PROBLEM — K83.09 ASCENDING CHOLANGITIS: Status: RESOLVED | Noted: 2021-05-17 | Resolved: 2024-04-16

## 2024-04-16 LAB
ALBUMIN SERPL BCP-MCNC: 4.3 G/DL (ref 3.5–5.2)
ALP SERPL-CCNC: 114 U/L (ref 55–135)
ALT SERPL W/O P-5'-P-CCNC: 69 U/L (ref 10–44)
ANION GAP SERPL CALC-SCNC: 8 MMOL/L (ref 8–16)
AST SERPL-CCNC: 79 U/L (ref 10–40)
BASOPHILS # BLD AUTO: 0.06 K/UL (ref 0–0.2)
BASOPHILS NFR BLD: 0.9 % (ref 0–1.9)
BILIRUB SERPL-MCNC: 0.3 MG/DL (ref 0.1–1)
BUN SERPL-MCNC: 11 MG/DL (ref 6–20)
CALCIUM SERPL-MCNC: 9.8 MG/DL (ref 8.7–10.5)
CHLORIDE SERPL-SCNC: 103 MMOL/L (ref 95–110)
CHOLEST SERPL-MCNC: 183 MG/DL (ref 120–199)
CHOLEST/HDLC SERPL: 3.3 {RATIO} (ref 2–5)
CO2 SERPL-SCNC: 26 MMOL/L (ref 23–29)
CREAT SERPL-MCNC: 0.8 MG/DL (ref 0.5–1.4)
DIFFERENTIAL METHOD BLD: ABNORMAL
EOSINOPHIL # BLD AUTO: 0.1 K/UL (ref 0–0.5)
EOSINOPHIL NFR BLD: 0.9 % (ref 0–8)
ERYTHROCYTE [DISTWIDTH] IN BLOOD BY AUTOMATED COUNT: 12.4 % (ref 11.5–14.5)
EST. GFR  (NO RACE VARIABLE): >60 ML/MIN/1.73 M^2
GLUCOSE SERPL-MCNC: 111 MG/DL (ref 70–110)
HCT VFR BLD AUTO: 42.1 % (ref 40–54)
HDLC SERPL-MCNC: 56 MG/DL (ref 40–75)
HDLC SERPL: 30.6 % (ref 20–50)
HGB BLD-MCNC: 14.5 G/DL (ref 14–18)
IMM GRANULOCYTES # BLD AUTO: 0.02 K/UL (ref 0–0.04)
IMM GRANULOCYTES NFR BLD AUTO: 0.3 % (ref 0–0.5)
LDLC SERPL CALC-MCNC: 103 MG/DL (ref 63–159)
LYMPHOCYTES # BLD AUTO: 1.7 K/UL (ref 1–4.8)
LYMPHOCYTES NFR BLD: 26.2 % (ref 18–48)
MCH RBC QN AUTO: 34 PG (ref 27–31)
MCHC RBC AUTO-ENTMCNC: 34.4 G/DL (ref 32–36)
MCV RBC AUTO: 99 FL (ref 82–98)
MONOCYTES # BLD AUTO: 0.4 K/UL (ref 0.3–1)
MONOCYTES NFR BLD: 5.8 % (ref 4–15)
NEUTROPHILS # BLD AUTO: 4.2 K/UL (ref 1.8–7.7)
NEUTROPHILS NFR BLD: 65.9 % (ref 38–73)
NONHDLC SERPL-MCNC: 127 MG/DL
NRBC BLD-RTO: 0 /100 WBC
PLATELET # BLD AUTO: 290 K/UL (ref 150–450)
PMV BLD AUTO: 8.5 FL (ref 9.2–12.9)
POTASSIUM SERPL-SCNC: 4.4 MMOL/L (ref 3.5–5.1)
PROT SERPL-MCNC: 7.6 G/DL (ref 6–8.4)
RBC # BLD AUTO: 4.27 M/UL (ref 4.6–6.2)
SODIUM SERPL-SCNC: 137 MMOL/L (ref 136–145)
TRIGL SERPL-MCNC: 120 MG/DL (ref 30–150)
TSH SERPL DL<=0.005 MIU/L-ACNC: 1.86 UIU/ML (ref 0.4–4)
WBC # BLD AUTO: 6.37 K/UL (ref 3.9–12.7)

## 2024-04-16 PROCEDURE — 99999 PR PBB SHADOW E&M-EST. PATIENT-LVL V: CPT | Mod: PBBFAC,,, | Performed by: INTERNAL MEDICINE

## 2024-04-16 PROCEDURE — 80061 LIPID PANEL: CPT | Performed by: INTERNAL MEDICINE

## 2024-04-16 PROCEDURE — 80053 COMPREHEN METABOLIC PANEL: CPT | Performed by: INTERNAL MEDICINE

## 2024-04-16 PROCEDURE — 85025 COMPLETE CBC W/AUTO DIFF WBC: CPT | Performed by: INTERNAL MEDICINE

## 2024-04-16 PROCEDURE — 83036 HEMOGLOBIN GLYCOSYLATED A1C: CPT | Performed by: INTERNAL MEDICINE

## 2024-04-16 PROCEDURE — 99214 OFFICE O/P EST MOD 30 MIN: CPT | Mod: S$PBB,,, | Performed by: INTERNAL MEDICINE

## 2024-04-16 PROCEDURE — 36415 COLL VENOUS BLD VENIPUNCTURE: CPT | Performed by: INTERNAL MEDICINE

## 2024-04-16 PROCEDURE — 84443 ASSAY THYROID STIM HORMONE: CPT | Performed by: INTERNAL MEDICINE

## 2024-04-16 PROCEDURE — 99215 OFFICE O/P EST HI 40 MIN: CPT | Mod: PBBFAC | Performed by: INTERNAL MEDICINE

## 2024-04-16 RX ORDER — LUBIPROSTONE 8 UG/1
8 CAPSULE ORAL 2 TIMES DAILY WITH MEALS
Qty: 60 CAPSULE | Refills: 0 | Status: SHIPPED | OUTPATIENT
Start: 2024-04-16

## 2024-04-16 NOTE — PROGRESS NOTES
"Subjective:       Patient ID: Ari Sainz is a 54 y.o. adult.    Chief Complaint: Possible Hernia    Here for     Chronic constipation despite senokot, fiber supplementation, increased hydration, regular indoor cycling, miralax,     Acute onset abdominal pain periumbilical some residual burning and tenderness.  Slight bulge.  Was a bulge noted weeks prior but can not confidently say prior this.  Just inferior to large ventral incision.      Review of Systems   Constitutional:  Negative for appetite change, chills, fever and unexpected weight change.   HENT:  Negative for hearing loss, sore throat and trouble swallowing.    Eyes:  Negative for visual disturbance.   Respiratory:  Negative for cough, chest tightness and shortness of breath.    Cardiovascular:  Negative for chest pain and leg swelling.   Gastrointestinal:  Positive for abdominal pain. Negative for blood in stool, constipation, diarrhea, nausea and vomiting.   Endocrine: Negative for polydipsia and polyuria.   Genitourinary:  Negative for decreased urine volume, difficulty urinating, dysuria, frequency and urgency.   Musculoskeletal:  Negative for gait problem.   Skin:  Negative for rash.   Neurological:  Negative for dizziness and numbness.   Psychiatric/Behavioral:  The patient is not nervous/anxious.        Objective:      Vitals:    04/16/24 1315   BP: 118/80   BP Location: Left arm   Patient Position: Sitting   BP Method: Medium (Manual)   Pulse: 109   SpO2: 99%   Weight: 63.6 kg (140 lb 3.4 oz)   Height: 4' 9" (1.448 m)      Physical Exam  Vitals and nursing note reviewed.   Constitutional:       General: He is not in acute distress.     Appearance: Normal appearance. He is well-developed.   HENT:      Head: Normocephalic and atraumatic.      Mouth/Throat:      Pharynx: No oropharyngeal exudate.   Eyes:      General: No scleral icterus.     Conjunctiva/sclera: Conjunctivae normal.      Pupils: Pupils are equal, round, and reactive to " light.   Neck:      Thyroid: No thyromegaly.   Cardiovascular:      Rate and Rhythm: Normal rate and regular rhythm.      Heart sounds: Normal heart sounds. No murmur heard.  Pulmonary:      Effort: Pulmonary effort is normal.      Breath sounds: Normal breath sounds. No wheezing or rales.   Abdominal:      General: There is distension.      Tenderness: There is abdominal tenderness (mild). There is no guarding or rebound.      Hernia: A hernia is present.   Musculoskeletal:         General: No tenderness.   Lymphadenopathy:      Cervical: No cervical adenopathy.   Skin:     General: Skin is warm and dry.   Neurological:      Mental Status: He is alert and oriented to person, place, and time.   Psychiatric:         Behavior: Behavior normal.         Assessment:       1. Annual physical exam    2. Mood disorder    3. Peripheral vascular disease, unspecified    4. Congenital adrenal hyperplasia    5. Abnormal finding of blood chemistry, unspecified    6. Changes in skin texture    7. Abdominal hernia without obstruction and without gangrene, recurrence not specified, unspecified hernia type    8. Chronic constipation    9. KIARA (obstructive sleep apnea)    10. URBINA (nonalcoholic steatohepatitis)        Plan:       Ari was seen today for possible hernia.    Diagnoses and all orders for this visit:    Annual physical exam  -     Comprehensive Metabolic Panel; Future  -     Lipid Panel; Future  -     TSH; Future  -     CBC Auto Differential; Future  -     Hemoglobin A1C; Future    Mood disorder    Peripheral vascular disease, unspecified    Congenital adrenal hyperplasia    Abnormal finding of blood chemistry, unspecified  -     Lipid Panel; Future  -     CBC Auto Differential; Future  -     Hemoglobin A1C; Future    Changes in skin texture  -     TSH; Future    Abdominal hernia without obstruction and without gangrene, recurrence not specified, unspecified hernia type  -     Ambulatory referral/consult to General  Surgery; Future    Chronic constipation  -     lubiprostone (AMITIZA) 8 MCG Cap; Take 1 capsule (8 mcg total) by mouth 2 (two) times daily with meals.  KIARA (obstructive sleep apnea)   Stressed adherence of and complications related to untreated KIARA.    URBINA (nonalcoholic steatohepatitis)   CMp ordered                 Siva Faulkner MD  Internal Medicine-[facility]        Side effects of medication(s) were discussed in detail and patient voiced understanding.  Patient will call back for any issues or complications.

## 2024-04-17 LAB
ESTIMATED AVG GLUCOSE: 108 MG/DL (ref 68–131)
HBA1C MFR BLD: 5.4 % (ref 4–5.6)

## 2024-05-13 ENCOUNTER — PATIENT MESSAGE (OUTPATIENT)
Dept: NEUROLOGY | Facility: CLINIC | Age: 54
End: 2024-05-13
Payer: MEDICARE

## 2024-05-13 DIAGNOSIS — M54.12 CERVICAL RADICULOPATHY: ICD-10-CM

## 2024-05-13 RX ORDER — GABAPENTIN 800 MG/1
TABLET ORAL
Qty: 450 TABLET | Refills: 2 | Status: SHIPPED | OUTPATIENT
Start: 2024-05-13

## 2024-05-21 ENCOUNTER — OFFICE VISIT (OUTPATIENT)
Dept: PAIN MEDICINE | Facility: CLINIC | Age: 54
End: 2024-05-21
Payer: MEDICARE

## 2024-05-21 VITALS
HEIGHT: 60 IN | HEART RATE: 97 BPM | WEIGHT: 137.38 LBS | SYSTOLIC BLOOD PRESSURE: 107 MMHG | BODY MASS INDEX: 26.97 KG/M2 | DIASTOLIC BLOOD PRESSURE: 70 MMHG

## 2024-05-21 DIAGNOSIS — M48.062 SPINAL STENOSIS OF LUMBAR REGION WITH NEUROGENIC CLAUDICATION: ICD-10-CM

## 2024-05-21 DIAGNOSIS — M79.2 NEUROPATHIC PAIN: ICD-10-CM

## 2024-05-21 DIAGNOSIS — G89.4 CHRONIC PAIN SYNDROME: ICD-10-CM

## 2024-05-21 DIAGNOSIS — Z79.891 ENCOUNTER FOR LONG-TERM OPIATE ANALGESIC USE: Primary | ICD-10-CM

## 2024-05-21 DIAGNOSIS — Z98.890 S/P LUMBAR LAMINECTOMY: ICD-10-CM

## 2024-05-21 DIAGNOSIS — T40.2X5A THERAPEUTIC OPIOID INDUCED CONSTIPATION: ICD-10-CM

## 2024-05-21 DIAGNOSIS — M62.838 MUSCLE SPASM: ICD-10-CM

## 2024-05-21 DIAGNOSIS — M47.816 LUMBAR SPONDYLOSIS: ICD-10-CM

## 2024-05-21 DIAGNOSIS — K59.03 THERAPEUTIC OPIOID INDUCED CONSTIPATION: ICD-10-CM

## 2024-05-21 PROCEDURE — 80355 GABAPENTIN NON-BLOOD: CPT | Performed by: NURSE PRACTITIONER

## 2024-05-21 PROCEDURE — 80347 BENZODIAZEPINES 13 OR MORE: CPT | Performed by: NURSE PRACTITIONER

## 2024-05-21 PROCEDURE — 99214 OFFICE O/P EST MOD 30 MIN: CPT | Mod: PBBFAC | Performed by: NURSE PRACTITIONER

## 2024-05-21 PROCEDURE — 99999 PR PBB SHADOW E&M-EST. PATIENT-LVL IV: CPT | Mod: PBBFAC,,, | Performed by: NURSE PRACTITIONER

## 2024-05-21 PROCEDURE — 80326 AMPHETAMINES 5 OR MORE: CPT | Performed by: NURSE PRACTITIONER

## 2024-05-21 PROCEDURE — 99214 OFFICE O/P EST MOD 30 MIN: CPT | Mod: S$PBB,,, | Performed by: NURSE PRACTITIONER

## 2024-05-21 RX ORDER — OXYCODONE AND ACETAMINOPHEN 10; 325 MG/1; MG/1
1 TABLET ORAL EVERY 8 HOURS PRN
Qty: 90 TABLET | Refills: 0 | Status: SHIPPED | OUTPATIENT
Start: 2024-05-21 | End: 2024-06-20

## 2024-05-21 RX ORDER — NALOXEGOL OXALATE 25 MG/1
25 TABLET, FILM COATED ORAL DAILY
Qty: 30 TABLET | Refills: 2 | Status: SHIPPED | OUTPATIENT
Start: 2024-05-21

## 2024-05-21 NOTE — PROGRESS NOTES
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 [facility] [place];  Service: Colon and Rectal;  Laterality: N/A;    ENDOSCOPIC ULTRASOUND OF UPPER GASTROINTESTINAL TRACT N/A 12/03/2020    Procedure: ULTRASOUND, UPPER GI TRACT, ENDOSCOPIC;  Surgeon: Jonathan Sharma MD;  Location: [facility] ([facility]  Service: Endoscopy;  Laterality: N/A;  elevated alk phos, panc cysts, liver biopsy   11/30-covid pcw-tb    ENDOSCOPIC ULTRASOUND OF UPPER GASTROINTESTINAL TRACT N/A 05/17/2021    Procedure: ULTRASOUND, UPPER GI TRACT, ENDOSCOPIC;  Surgeon: Claudio Salvador MD;  [facility]: [facility] ([facility]  Service: Endoscopy;  Laterality: N/A;    ERCP N/A 05/17/2021    Procedure: ERCP (ENDOSCOPIC RETROGRADE CHOLANGIOPANCREATOGRAPHY);  Surgeon: Claudio Salvador MD;  Location: [facility] ([facility]  Service: Endoscopy;  Laterality: N/A;    gender surgery      multiple surgeries since birth    HYSTERECTOMY  2003    INJECTION OF ANESTHETIC AGENT AROUND NERVE Right 11/19/2020    Procedure: BLOCK, NERVE, GLENOHUMERAL  need consent;  Surgeon: Messi Cabello MD;  Location: [facility] PAIN MGT;  Service: Pain Management;  Laterality: Right;    JOINT REPLACEMENT  2/2020, 1/2021    Knees    KNEE ARTHROPLASTY Left 01/25/2021    Procedure: ARTHROPLASTY, KNEE:DEPUY-ATTUNE REVISION: SERINA iASSIST:CABLES ON HOLD;  Surgeon: Donte Andrade III, MD;  Location: [facility];  Service: Orthopedics;  Laterality: Left;     LAMINECTOMY  09/13/2019    LAPAROSCOPIC CHOLECYSTECTOMY N/A 05/17/2021    Procedure: CHOLECYSTECTOMY, LAPAROSCOPIC;  Surgeon: Calvin Wilson MD;  Location: [facility] OR 2ND FLR;  Service: General;  Laterality: N/A;    LAPAROSCOPIC CHOLECYSTECTOMY N/A 05/18/2021    Procedure: CHOLECYSTECTOMY, LAPAROSCOPIC;  Surgeon: Antonio Brandt MD;  Location: [facility] OR 2ND FLR;  Service: General;  Laterality: N/A;    RADIOFREQUENCY ABLATION Left 05/09/2019    Procedure: RADIOFREQUENCY ABLATION, LEFT L3,4,5;  Surgeon: Messi Cabello MD;  Location: [facility] PAIN MGT;  Service: Pain Management;  Laterality: Left;  1 of 2    RADIOFREQUENCY ABLATION Right 05/23/2019    Procedure: RADIOFREQUENCY ABLATION, RIGHT L3,4,5;  Surgeon: Messi Cabello MD;  Location: [facility] PAIN MGT;  Service: Pain Management;  Laterality: Right;  2of 2    ROBOT-ASSISTED LAPAROSCOPIC RECTOPEXY N/A 01/17/2023    Procedure: ROBOTIC RECTOPEXY;  Surgeon: Genia Ku MD;  Location: [facility] OR;  Service: Colon and Rectal;  Laterality: N/A;    SPINE SURGERY      Sept 2019     TOTAL KNEE ARTHROPLASTY Right 01/31/2020    Procedure: ARTHROPLASTY, KNEE, TOTAL;  Surgeon: Donte Andrade III, MD;  Location: [facility] OR 2ND FLR;  Service: Orthopedics;  Laterality: Right;     Social History     Socioeconomic History    Marital status:      Spouse name: Kristy Sainz    Number of children: 1   Occupational History     Comment:  and artist   Tobacco Use    Smoking status: Former     Current packs/day: 0.00     Types: Cigarettes     Quit date: 2009     Years since quitting: 15.3    Smokeless tobacco: Never    Tobacco comments:     was not a daily smoker-    Substance and Sexual Activity    Alcohol use: Not Currently    Drug use: No    Sexual activity: Yes     Partners: Female     Birth control/protection: None   Social History Narrative    Lives in a house with his wife      Social Determinants of Health     Financial Resource Strain: Low Risk   (11/26/2023)    Overall Financial Resource Strain (CARDIA)     Difficulty of Paying Living Expenses: Not hard at all   Food Insecurity: No Food Insecurity (11/26/2023)    Hunger Vital Sign     Worried About Running Out of Food in the Last Year: Never true     Ran Out of Food in the Last Year: Never true   Transportation Needs: No Transportation Needs (11/26/2023)    PRAPARE - Transportation     Lack of Transportation (Medical): No     Lack of Transportation (Non-Medical): No   Physical Activity: Insufficiently Active (11/26/2023)    Exercise Vital Sign     Days of Exercise per Week: 4 days     Minutes of Exercise per Session: 20 min   Stress: Stress Concern Present (11/26/2023)    [employer] - Occupational Stress Questionnaire     Feeling of Stress : To some extent   Housing Stability: Low Risk  (11/26/2023)    Housing Stability Vital Sign     Unable to Pay for Housing in the Last Year: No     Number of Places Lived in the Last Year: 1     Unstable Housing in the Last Year: No     Family History   Problem Relation Name Age of Onset    Diabetes Maternal Grandfather Bj Santos     Cancer Maternal Grandfather Bj Santos         Colon    Cancer Mother Taina Bourgeois         Kidney    Early death Mother Taina Bourgeois         58    Heart disease Father      Autoimmune disease Sister      Hypertension Maternal Grandmother Simi Santos     Stroke Maternal Grandmother Simi Santos     Diabetes Maternal Uncle Trev Santos     Breast cancer Neg Hx      Ovarian cancer Neg Hx      Vaginal cancer Neg Hx      Endometrial cancer Neg Hx      Cervical cancer Neg Hx         Review of patient's allergies indicates:   Allergen Reactions    Bactrim [sulfamethoxazole-trimethoprim] Itching    Penicillins Rash       Current Outpatient Medications   Medication Sig    ALPRAZolam (XANAX) 1 MG tablet Take 1 tablet (1 mg total) by mouth 3 (three) times daily as needed for Anxiety.    ascorbic acid, vitamin C, (VITAMIN  C) 1000 MG tablet Take 1,000 mg by mouth once daily.    atorvastatin (LIPITOR) 20 MG tablet Take 1 tablet (20 mg total) by mouth once daily.    cholecalciferol, vitamin D3, 125 mcg (5,000 unit) capsule Take 1 capsule by mouth Daily.    cranberry fruit extract (CRANBERRY ORAL) Take by mouth.    cyanocobalamin (VITAMIN B-12) 1000 MCG tablet Take 100 mcg by mouth once daily.    cyclobenzaprine (FLEXERIL) 10 MG tablet Take 1 tablet (10 mg total) by mouth 3 (three) times daily as needed for Muscle spasms.    diclofenac sodium (VOLTAREN) 1 % Gel Apply 2 g topically once daily.    docusate sodium (COLACE) 100 MG capsule Take 1 capsule (100 mg total) by mouth 2 (two) times daily as needed for Constipation.    doxycycline (VIBRA-TABS) 100 MG tablet Take 1 tablet (100 mg total) by mouth 2 (two) times daily.    fluticasone (FLONASE SENSIMIST) 27.5 mcg/actuation nasal spray 2 sprays by Nasal route once daily.    gabapentin (NEURONTIN) 800 MG tablet Take 1 tablet by mouth three times a day and take 2 tablets at night (5 tablets a day, 4000mg)    hydrocortisone (ANUSOL-HC) 2.5 % rectal cream Place rectally 2 (two) times daily.    ketoconazole (NIZORAL) 2 % cream Apply topically once daily.    Lactobacillus acidophilus Cap Take by mouth once daily.     lamoTRIgine (LAMICTAL) 200 MG tablet Take 1 tablet (200 mg total) by mouth 2 (two) times daily.    loratadine (CLARITIN) 10 mg tablet Take 10 mg by mouth once daily.     magnesium 250 mg Tab Take 1 tablet by mouth Daily.    naproxen sodium (ANAPROX) 220 MG tablet Take 220 mg by mouth.    predniSONE (DELTASONE) 5 MG tablet Take 1 tablet (5 mg total) by mouth once daily. Double the dose if sick    primidone (MYSOLINE) 50 MG Tab Take 200mg (4 tabs) every morning/150mg (3 tabs ) at noon/150mg (3 tabs) every evening    rOPINIRole (REQUIP) 4 MG tablet Take 1 tablet (4 mg total) by mouth once daily.    triamcinolone acetonide 0.1% (KENALOG) 0.1 % cream Apply topically 2 (two) times daily.  "   lubiprostone (AMITIZA) 8 MCG Cap Take 1 capsule (8 mcg total) by mouth 2 (two) times daily with meals. (Patient not taking: Reported on 5/21/2024)    testosterone (ANDROGEL) 1 % (50 mg/5 gram) GlPk Apply 1 packet (5 grams) topically once daily.     No current facility-administered medications for this visit.     Facility-Administered Medications Ordered in Other Visits   Medication    fentaNYL injection 25 mcg    midazolam (VERSED) 1 mg/mL injection 0.5 mg       REVIEW OF SYSTEMS:    GENERAL:  No weight loss, malaise or fevers.  HEENT:   No recent changes in vision or hearing  NECK:  Negative for lumps, no difficulty with swallowing.  RESPIRATORY:  Negative for cough, wheezing or shortness of breath, patient denies any recent URI.  CARDIOVASCULAR:  Negative for chest pain, leg swelling or palpitations.  GI:  Negative for abdominal discomfort, blood in stools or black stools or change in bowel habits.  MUSCULOSKELETAL:  See HPI.  SKIN:  Negative for lesions, rash, and itching.  PSYCH:  Significant psychosocial stressors including PTSD for sex re-assignment surgery.  Patient's sleep is disturbed secondary to pain.  HEMATOLOGY/LYMPHOLOGY:  Negative for prolonged bleeding, bruising easily or swollen nodes.  Patient is not currently taking any anti-coagulants  ENDO: No history of diabetes or thyroid dysfunction  NEURO:   No history of headaches, syncope, paralysis, seizures or tremors.  All other reviewed and negative other than HPI.    OBJECTIVE:    /70 (BP Location: Left arm, Patient Position: Sitting, BP Method: Large (Automatic))   Pulse 97   Ht 4' 9" (1.448 m)   Wt 62.3 kg (137 lb 5.6 oz)   BMI 29.72 kg/m²     PHYSICAL EXAMINATION:     GENERAL: Well appearing, in no acute distress, alert and oriented x3.  PSYCH:  Mood and affect appropriate.  SKIN:  Well-healed incisions without any evidence of infection  HEAD/FACE:  Normocephalic, atraumatic.   PULM: No evidence of respiratory difficulty, symmetric " chest rise.  EXT:  Well healed scar to both knees from TKA. Superficial abrasions to both knees. Medial and lateral joint line tenderness to both knees.  BACK:  There is significant pain with palpation over the facet joints and paraspinals of the lumbar spine bilaterally. There is decreased range of motion with extension to 15 degrees, and facet loading maneuvers cause reproducible pain.    NEURO:  No clonus. Cranial nerves grossly intact.  GAIT: Antalgic- ambulates with rolling walker.    Lab Results   Component Value Date    WBC 6.37 04/16/2024    HGB 14.5 04/16/2024    HCT 42.1 04/16/2024    MCV 99 (H) 04/16/2024     04/16/2024     CMP  Sodium   Date Value Ref Range Status   04/16/2024 137 136 - 145 mmol/L Final     Potassium   Date Value Ref Range Status   04/16/2024 4.4 3.5 - 5.1 mmol/L Final     Chloride   Date Value Ref Range Status   04/16/2024 103 95 - 110 mmol/L Final     CO2   Date Value Ref Range Status   04/16/2024 26 23 - 29 mmol/L Final     Glucose   Date Value Ref Range Status   04/16/2024 111 (H) 70 - 110 mg/dL Final     BUN   Date Value Ref Range Status   04/16/2024 11 6 - 20 mg/dL Final     Creatinine   Date Value Ref Range Status   04/16/2024 0.8 0.5 - 1.4 mg/dL Final     Calcium   Date Value Ref Range Status   04/16/2024 9.8 8.7 - 10.5 mg/dL Final     Total Protein   Date Value Ref Range Status   04/16/2024 7.6 6.0 - 8.4 g/dL Final     Albumin   Date Value Ref Range Status   04/16/2024 4.3 3.5 - 5.2 g/dL Final     Total Bilirubin   Date Value Ref Range Status   04/16/2024 0.3 0.1 - 1.0 mg/dL Final     Comment:     For infants and newborns, interpretation of results should be based  on gestational age, weight and in agreement with clinical  observations.    Premature Infant recommended reference ranges:  Up to 24 hours.............<8.0 mg/dL  Up to 48 hours............<12.0 mg/dL  3-5 days..................<15.0 mg/dL  6-29 days.................<15.0 mg/dL       Alkaline Phosphatase   Date  Value Ref Range Status   04/16/2024 114 55 - 135 U/L Final     AST   Date Value Ref Range Status   04/16/2024 79 (H) 10 - 40 U/L Final     ALT   Date Value Ref Range Status   04/16/2024 69 (H) 10 - 44 U/L Final     Anion Gap   Date Value Ref Range Status   04/16/2024 8 8 - 16 mmol/L Final     eGFR if    Date Value Ref Range Status   08/23/2021 >60.0 >60 mL/min/1.73 m^2 Final     eGFR if non    Date Value Ref Range Status   08/23/2021 >60.0 >60 mL/min/1.73 m^2 Final     Comment:     Calculation used to obtain the estimated glomerular filtration  rate (eGFR) is the CKD-EPI equation.        Lab Results   Component Value Date    HGBA1C 5.4 04/16/2024     Lab Results   Component Value Date    TSH 1.857 04/16/2024         ASSESSMENT: 54 y.o. year old adult with chronic back and knee pain, consistent with the following diagnoses:    Encounter Diagnoses   Name Primary?    Encounter for long-term opiate analgesic use Yes    Therapeutic opioid induced constipation     S/P lumbar laminectomy     Lumbar spondylosis     Chronic pain syndrome     Muscle spasm     Neuropathic pain     Spinal stenosis of lumbar region with neurogenic claudication            PLAN:   We discussed with the patient the assessment and recommendations. The following is the plan we agreed on:   Will start Movantik 25 mg daily to take 1 hr prior to breakfast for OIC.  2. Continue with home PT and yoga exercises.  3. Continue oxycodone acetaminophen 10/325 mg QID PRN, #90.   4. Pt has pain contract.  Last UDS from 3/10/23 reviewed. Repeat today.  5. Follow-up in 3 months or sooner if needed.  The patient is here today for a refill of current pain medications and they believe these provide effective pain control and improvements in quality of life.  The patient notes no serious side effects, and feels the benefits outweigh the risks.  The patient was reminded of the pain contract that they signed previously as well as the  risks and benefits of the medication including possible death.  The updated [place] [employer] prescription monitoring program was reviewed, and the patient has been found to be compliant with current treatment plan.    - Dr. Farmer was consulted on the patient and agrees with this plan.    The above plan and management options were discussed at length with patient. Patient is in agreement with the above and verbalized understanding.     Alejandra Phoenix, ERIK  05/21/2024

## 2024-05-23 ENCOUNTER — PATIENT MESSAGE (OUTPATIENT)
Dept: INTERNAL MEDICINE | Facility: CLINIC | Age: 54
End: 2024-05-23

## 2024-05-23 ENCOUNTER — OFFICE VISIT (OUTPATIENT)
Dept: INTERNAL MEDICINE | Facility: CLINIC | Age: 54
End: 2024-05-23
Payer: MEDICARE

## 2024-05-23 VITALS — SYSTOLIC BLOOD PRESSURE: 86 MMHG | DIASTOLIC BLOOD PRESSURE: 60 MMHG

## 2024-05-23 DIAGNOSIS — H60.393 OTHER INFECTIVE ACUTE OTITIS EXTERNA OF BOTH EARS: Primary | ICD-10-CM

## 2024-05-23 PROCEDURE — 99213 OFFICE O/P EST LOW 20 MIN: CPT | Mod: 95,,, | Performed by: STUDENT IN AN ORGANIZED HEALTH CARE EDUCATION/TRAINING PROGRAM

## 2024-05-23 RX ORDER — CIPROFLOXACIN AND DEXAMETHASONE 3; 1 MG/ML; MG/ML
4 SUSPENSION/ DROPS AURICULAR (OTIC) 2 TIMES DAILY
Qty: 7.5 ML | Refills: 0 | Status: SHIPPED | OUTPATIENT
Start: 2024-05-23 | End: 2024-06-02

## 2024-05-23 NOTE — PROGRESS NOTES
[facility]  Subjective:     Patient ID: Ari Sainz is a 54 y.o. adult.  Chief Complaint: Ear itching.   HPI:  Patient is a 54 y.o. adult who presents for the above chief complaint.     Sx started 4-5 days ago started having itching in both ears. Sometimes uses a q tip to clean ears. May have gotten them wet in the shower. A little pain when pressing on the ear. Has throbbing in the left ear.     Patient's wife was able to take a photo of the external ear canal which demonstrated erythema    Objective:      There is no height or weight on file to calculate BMI.    Physical Exam  Cardiovascular:      Rate and Rhythm: Normal rate.   Neurological:      Mental Status: He is alert.   Psychiatric:         Mood and Affect: Mood normal.           Assessment:       1. Other infective acute otitis externa of both ears        Plan:   By virtual nature of visit however symptoms appear consistent with otitis externa.  Treat with Ciprodex.  Follow up p.r.n. worsening.  Discussed instructions for use.  Other infective acute otitis externa of both ears  -     ciprofloxacin-dexAMETHasone 0.3-0.1% (CIPRODEX) 0.3-0.1 % DrpS; Place 4 drops into both ears 2 (two) times daily. for 10 days  Dispense: 7.5 mL; Refill: 0        All of your core healthy metrics are met.    No follow-ups on file. or sooner prn (as needed)          Thomas Troy  [facility]  [address] [address]  Phone [phone]  Fax [phone]    This note is dictated using the M*Modal Fluency Direct word recognition program. It may contain word recognition mistakes or wrong word substitutions (commonly he/she and is/was substitutions) that were missed on review.    Answers submitted by the patient for this visit:  Ear Pain Questionnaire (Submitted on 5/21/2024)  Chief Complaint: Ear pain  Affected ear: left  Chronicity: new  Onset: in the past 7 days  Progression since onset:  gradually worsening  Frequency: constantly  Fever: no fever  Pain - numeric: 4/10  abdominal pain: No  ear discharge: No  rash: No  cough: No  headaches: No  rhinorrhea: No  diarrhea: No  hearing loss: No  sore throat: No  neck pain: No  vomiting: No  drainage: No  Treatments tried: NSAIDs, acetaminophen  Improvement on treatment: mild  Pain severity: moderate  chronic ear infection: No  hearing loss: No  tympanostomy tube: No

## 2024-05-27 ENCOUNTER — TELEPHONE (OUTPATIENT)
Dept: SURGERY | Facility: CLINIC | Age: 54
End: 2024-05-27
Payer: MEDICARE

## 2024-05-27 NOTE — TELEPHONE ENCOUNTER
----- Message from Deb Panchal sent at 5/27/2024  1:12 PM CDT -----  Regarding: sooner appt  Contact: pt @ [phone]  Patient Requesting Sooner Appointment.     Reason for sooner appt.: pt is worsening     When is the first available appointment? 06/18    Communication Preference: call back       Additional Information:    Pt is complaining of burning where hernia is and the hernia is feeling harder. Please call to advise further. Thank you for all you are doing.

## 2024-05-28 LAB
1OH-MIDAZOLAM UR QL SCN: NOT DETECTED
6MAM UR QL: NOT DETECTED
7AMINOCLONAZEPAM UR QL: NOT DETECTED
A-OH ALPRAZ UR QL: PRESENT
ALPRAZ UR QL: NOT DETECTED
AMPHET UR QL SCN: NOT DETECTED
ANNOTATION COMMENT IMP: NORMAL
BARBITURATES UR QL: NORMAL
BUPRENORPHINE UR QL: NOT DETECTED
BZE UR QL: NEGATIVE
CARBOXYTHC UR QL: NEGATIVE
CARISOPRODOL UR QL: NEGATIVE
CLONAZEPAM UR QL: NOT DETECTED
CODEINE UR QL: NOT DETECTED
CREAT UR-MCNC: 30.4 MG/DL (ref 20–400)
DIAZEPAM UR QL: NOT DETECTED
ETHYL GLUCURONIDE UR QL: NEGATIVE
FENTANYL UR QL: NOT DETECTED
GABAPENTIN UR QL CFM: PRESENT
HYDROCODONE UR QL: NOT DETECTED
HYDROMORPHONE UR QL: NOT DETECTED
LORAZEPAM UR QL: NOT DETECTED
MDA UR QL: NOT DETECTED
MDEA UR QL: NOT DETECTED
MDMA UR QL: NOT DETECTED
ME-PHENIDATE UR QL: NOT DETECTED
METHADONE UR QL: NEGATIVE
METHAMPHET UR QL: NOT DETECTED
MIDAZOLAM UR QL SCN: NOT DETECTED
MORPHINE UR QL: NOT DETECTED
NALOXONE UR QL CFM: NOT DETECTED
NORBUPRENORPHINE UR QL CFM: NOT DETECTED
NORDIAZEPAM UR QL: NOT DETECTED
NORFENTANYL UR QL: NOT DETECTED
NORHYDROCODONE UR QL CFM: NOT DETECTED
NORMEPERIDINE UR QL CFM: NOT DETECTED
NOROXYCODONE UR QL CFM: PRESENT
NOROXYMORPHONE UR QL SCN: PRESENT
OXAZEPAM UR QL: NOT DETECTED
OXYCODONE UR QL: PRESENT
OXYMORPHONE UR QL: PRESENT
PATHOLOGY STUDY: NORMAL
PCP UR QL: NEGATIVE
PHENTERMINE UR QL: NOT DETECTED
PREGABALIN UR QL CFM: NOT DETECTED
SERVICE CMNT-IMP: NORMAL
TAPENTADOL UR QL SCN: NOT DETECTED
TAPENTADOL UR QL SCN: NOT DETECTED
TEMAZEPAM UR QL: NOT DETECTED
TRAMADOL UR QL: NEGATIVE
ZOLPIDEM PHENYL-4-CARB UR QL SCN: NOT DETECTED
ZOLPIDEM UR QL: NOT DETECTED

## 2024-05-29 ENCOUNTER — PATIENT MESSAGE (OUTPATIENT)
Dept: PAIN MEDICINE | Facility: CLINIC | Age: 54
End: 2024-05-29
Payer: MEDICARE

## 2024-06-04 ENCOUNTER — OFFICE VISIT (OUTPATIENT)
Dept: SURGERY | Facility: CLINIC | Age: 54
End: 2024-06-04
Payer: MEDICARE

## 2024-06-04 ENCOUNTER — OFFICE VISIT (OUTPATIENT)
Dept: HEPATOLOGY | Facility: CLINIC | Age: 54
End: 2024-06-04
Payer: MEDICARE

## 2024-06-04 VITALS
HEART RATE: 102 BPM | BODY MASS INDEX: 27.26 KG/M2 | HEIGHT: 60 IN | DIASTOLIC BLOOD PRESSURE: 56 MMHG | SYSTOLIC BLOOD PRESSURE: 118 MMHG | WEIGHT: 138.88 LBS

## 2024-06-04 DIAGNOSIS — K46.9 ABDOMINAL HERNIA WITHOUT OBSTRUCTION AND WITHOUT GANGRENE, RECURRENCE NOT SPECIFIED, UNSPECIFIED HERNIA TYPE: ICD-10-CM

## 2024-06-04 DIAGNOSIS — K75.81 NASH (NONALCOHOLIC STEATOHEPATITIS): Primary | ICD-10-CM

## 2024-06-04 DIAGNOSIS — R76.8 HEPATITIS B CORE ANTIBODY POSITIVE: ICD-10-CM

## 2024-06-04 DIAGNOSIS — K74.00 LIVER FIBROSIS: ICD-10-CM

## 2024-06-04 DIAGNOSIS — R74.8 ELEVATED LIVER ENZYMES: Primary | ICD-10-CM

## 2024-06-04 DIAGNOSIS — R10.9 ABDOMINAL PAIN, UNSPECIFIED ABDOMINAL LOCATION: ICD-10-CM

## 2024-06-04 PROBLEM — K86.2 PANCREAS CYST: Status: RESOLVED | Noted: 2020-12-03 | Resolved: 2024-06-04

## 2024-06-04 PROBLEM — E66.01 SEVERE OBESITY (BMI >= 40): Status: RESOLVED | Noted: 2024-01-08 | Resolved: 2024-06-04

## 2024-06-04 PROCEDURE — 99204 OFFICE O/P NEW MOD 45 MIN: CPT | Mod: S$PBB,,, | Performed by: SURGERY

## 2024-06-04 PROCEDURE — 99999 PR PBB SHADOW E&M-EST. PATIENT-LVL V: CPT | Mod: PBBFAC,,, | Performed by: SURGERY

## 2024-06-04 PROCEDURE — 99215 OFFICE O/P EST HI 40 MIN: CPT | Mod: 95,,, | Performed by: NURSE PRACTITIONER

## 2024-06-04 PROCEDURE — 99215 OFFICE O/P EST HI 40 MIN: CPT | Mod: PBBFAC | Performed by: SURGERY

## 2024-06-04 NOTE — PROGRESS NOTES
[facility] - New Patient     REFERRING PROVIDER: Dr. Messi Short*  PCP: Siva Mitchell MD    Chief Complaint: Elevated liver enzymes      HISTORY     This is a 54 y.o. adult referred for evaluation of above. Previously seen by Mariely Robison NP in 2020; new patient to me.     In 2020, he was referred for elevated liver enzymes, specifically alk phos ranging 200-600s. Transaminases also elevated at that time, 200s or less. Serologic workup as below. MRI MRCP with gallstone in the distal cystic duct and possible obstruction. MRI elasto obtained same time, no significant fat in the liver and mild-moderate fibrosis (F1-2).    He underwent EUS with liver biopsy 12/2020- dilation of CBD though no stones, masses, or obstruction. Pancreatic cysts noted.  Liver biopsy:  Final Pathologic Diagnosis Liver, random, EUS guided biopsy:  - Focal justina-biliary neutrophils.  - No significant steatosis.  - No significant fibrosis.     His gallbladder was removed 5/2021 for acute cholecystitis. Path showed necrosis and gangrenous cholelithiasis. Liver tests high at that time though normalized shortly after.     He recently saw Surgery as he needs a hernia repair. Has MRI scheduled in 2 weeks for pancreatic cyst surveillance.     Review of latest labs shows liver enzymes increasing again:  - Transaminases: 40-70s since 10/2023  - Alk phos: WNL  - Synthetic liver function: WNL  - Platelets: WNL    Workup thus far:  Serologies:   Lab Results   Component Value Date    SMOOTHMUSCAB Negative 1:40 08/21/2020    AMAIFA Negative 1:40 08/21/2020    IGGSERUM 839 08/21/2020    ANASCREEN Negative <1:80 08/21/2020    FERRITIN 42 10/16/2023    FESATURATED 29 08/21/2020    PETH Negative 08/21/2020    D0RUJXCXRK 137 08/21/2020    HEPBSAG Negative 08/21/2020    HEPBIGM Negative 08/21/2020    HEPCAB Negative 08/21/2020    HEPAIGM Negative 08/21/2020    CERULOPLSM 34.0 08/21/2020     (H) 08/21/2020      Risk factors for  fatty liver:   Weight -- BMI 30  He has lost 40-50 lb over the last few years. Attributes this to stress and anxiety from [place] of state, multiple surgeries, limited mobility                 Dyslipidemia -- yes  On statin                                Insulin resistance / diabetes -- h/o pre-diabetes though last HgbA1c 5.4       Hypertension -- no  Alcohol use -- none     Family history:  No family history of liver disease, cirrhosis, or liver cancer.    Meds/supplements:  -Rx: atorvastatin, on this for years though dose increased ~1 year ago   -OTC, herbs/vitamins: vitamin C, D, B12     Social history:  Exercise: yes, daily. Still has some muscle weakness from prior surgeries though no significant myalgias   Denies illicit drug use.    Denies recent illness or infection.    No symptoms of hepatic decompensation including jaundice, ascites, cognitive problems to suggest hepatic encephalopathy, or GI bleeding.            Past medical history, surgical history, problem list, family history, social history, allergies: Reviewed and updated in the appropriate section of the electronic medical record.      Current Outpatient Medications   Medication Sig Dispense Refill    ALPRAZolam (XANAX) 1 MG tablet Take 1 tablet (1 mg total) by mouth 3 (three) times daily as needed for Anxiety. 90 tablet 5    ascorbic acid, vitamin C, (VITAMIN C) 1000 MG tablet Take 1,000 mg by mouth once daily.      atorvastatin (LIPITOR) 20 MG tablet Take 1 tablet (20 mg total) by mouth once daily. 90 tablet 3    cholecalciferol, vitamin D3, 125 mcg (5,000 unit) capsule Take 1 capsule by mouth Daily.      cranberry fruit extract (CRANBERRY ORAL) Take by mouth.      cyanocobalamin (VITAMIN B-12) 1000 MCG tablet Take 100 mcg by mouth once daily.      cyclobenzaprine (FLEXERIL) 10 MG tablet Take 1 tablet (10 mg total) by mouth 3 (three) times daily as needed for Muscle spasms. 270 tablet 0    diclofenac sodium (VOLTAREN) 1 % Gel Apply 2 g  topically once daily. 100 g 3    docusate sodium (COLACE) 100 MG capsule Take 1 capsule (100 mg total) by mouth 2 (two) times daily as needed for Constipation. 60 capsule 0    fluticasone (FLONASE SENSIMIST) 27.5 mcg/actuation nasal spray 2 sprays by Nasal route once daily. (Patient not taking: Reported on 6/4/2024) 9.1 mL 0    gabapentin (NEURONTIN) 800 MG tablet Take 1 tablet by mouth three times a day and take 2 tablets at night (5 tablets a day, 4000mg) 450 tablet 2    hydrocortisone (ANUSOL-HC) 2.5 % rectal cream Place rectally 2 (two) times daily. 28 g 3    ketoconazole (NIZORAL) 2 % cream Apply topically once daily. 60 g 3    Lactobacillus acidophilus Cap Take by mouth once daily.       lamoTRIgine (LAMICTAL) 200 MG tablet Take 1 tablet (200 mg total) by mouth 2 (two) times daily. 180 tablet 3    loratadine (CLARITIN) 10 mg tablet Take 10 mg by mouth once daily.      lubiprostone (AMITIZA) 8 MCG Cap Take 1 capsule (8 mcg total) by mouth 2 (two) times daily with meals. 60 capsule 0    magnesium 250 mg Tab Take 1 tablet by mouth Daily.      naloxegoL (MOVANTIK) 25 mg tablet Take 25 mg by mouth once daily. Take one hour prior to breakfast 30 tablet 2    naproxen sodium (ANAPROX) 220 MG tablet Take 220 mg by mouth.      oxyCODONE-acetaminophen (PERCOCET)  mg per tablet Take 1 tablet by mouth every 8 (eight) hours as needed for Pain. 90 tablet 0    predniSONE (DELTASONE) 5 MG tablet Take 1 tablet (5 mg total) by mouth once daily. Double the dose if sick 200 tablet 3    primidone (MYSOLINE) 50 MG Tab Take 200mg (4 tabs) every morning/150mg (3 tabs ) at noon/150mg (3 tabs) every evening 900 tablet 12    rOPINIRole (REQUIP) 4 MG tablet Take 1 tablet (4 mg total) by mouth once daily. 90 tablet 12    testosterone (ANDROGEL) 1 % (50 mg/5 gram) GlPk Apply 1 packet (5 grams) topically once daily. 30 packet 5    triamcinolone acetonide 0.1% (KENALOG) 0.1 % cream Apply topically 2 (two) times daily. 45 g 0     No  current facility-administered medications for this visit.     Facility-Administered Medications Ordered in Other Visits   Medication Dose Route Frequency Provider Last Rate Last Admin    fentaNYL injection 25 mcg  25 mcg Intravenous Q5 Min PRN French Smith MD        midazolam (VERSED) 1 mg/mL injection 0.5 mg  0.5 mg Intravenous PRN French Smith MD         Medication list reviewed and updated.      Review of Systems - as per HPI  Constitutional: Negative for unexpected weight change.   Respiratory: Negative for shortness of breath.    Cardiovascular: Negative for leg swelling.  Gastrointestinal: Negative for abdominal distention or abdominal pain. Negative for melena or hematemesis.  Musculoskeletal: Negative for myalgias.    Skin: Negative for jaundice or itching.  Neurological: Negative for confusion or slowed mentation. Negative for tremors.   Hematological: Does not bruise/bleed easily.       Physical Exam - limited by virtual visit  Constitutional: No distress. Alert and oriented.  Pulmonary/Chest: No respiratory distress.   Skin: No jaundice.   Psychiatric: Normal mood and affect. Speech, behavior, and thought content normal.      LABS & DIAGNOSTIC STUDIES     I have personally reviewed pertinent laboratory findings:    Lab Results   Component Value Date    ALT 69 (H) 04/16/2024    AST 79 (H) 04/16/2024    ALKPHOS 114 04/16/2024    BILITOT 0.3 04/16/2024    ALBUMIN 4.3 04/16/2024    INR 1.2 05/18/2021       Lab Results   Component Value Date    WBC 6.37 04/16/2024    HGB 14.5 04/16/2024    HCT 42.1 04/16/2024    MCV 99 (H) 04/16/2024     04/16/2024       Lab Results   Component Value Date     04/16/2024    K 4.4 04/16/2024    BUN 11 04/16/2024    CREATININE 0.8 04/16/2024    ESTGFRAFRICA >60.0 08/23/2021    EGFRNONAA >60.0 08/23/2021       Lab Results   Component Value Date    SMOOTHMUSCAB Negative 1:40 08/21/2020    AMAIFA Negative 1:40 08/21/2020    IGGSERUM 839  08/21/2020    ANASCREEN Negative <1:80 08/21/2020    FERRITIN 42 10/16/2023    FESATURATED 29 08/21/2020    PETH Negative 08/21/2020    CERULOPLSM 34.0 08/21/2020     (H) 08/21/2020    HEPBSAG Negative 08/21/2020    HEPBCAB Positive (A) 08/21/2020    HEPCAB Negative 08/21/2020       Lab Results   Component Value Date    AFP 3.0 11/05/2020         I have personally reviewed the following result reports:  Abdominal US - 5/21/21  EUS - 12/2020. 5/2021  Liver biopsy - 12/3/20      ASSESSMENT & PLAN     54 y.o. adult with:    1. Elevated liver enzymes    2. Hepatitis B core antibody positive        Elevated liver enzymes in 2020 due to unclear etiology. Liver biopsy at that time was reassuring- no significant steatosis or fibrosis; no evidence of underlying liver disease. High liver tests also noted in 2021 due to acute cholecystitis. Liver enzymes normalized after though transaminases started increasing again ~6 months ago.     Recommendations:  Continue to trend liver enzymes. Repeat CK as this was noted to be elevated years ago and AST>ALT.  Statin dose increase within the last year; consider trial of holding if enzymes remain elevated.  Obtain Fibroscan to reassess fibrosis/steatosis staging.   Liver function is normal and no evidence of advanced liver disease. No contraindications to hernia surgery from liver standpoint.  HBV core Ab positive: HBV surface Ag negative, indicating no current/chronic hepatitis B infection. Not on immunosuppression at this time. Nothing further needed.       Orders Placed This Encounter   Procedures    FibroScan Transplant Hepatology(Vibration Controlled Transient Elastography)    Hepatic Function Panel    CK       Return to clinic pending results.       Thank you for allowing me to participate in the care of [facility]        The patient location is: [place], [place]  The chief complaint leading to consultation is: Elevated liver  enzymes    Visit type: audiovisual    Face to Face time with patient: 20 min  45 minutes of total time spent on the encounter, which includes face to face time and non-face to face time preparing to see the patient (eg, review of tests), Obtaining and/or reviewing separately obtained history, Documenting clinical information in the electronic or other health record, Independently interpreting results (not separately reported) and communicating results to the patient/family/caregiver, or Care coordination (not separately reported).     Each patient to whom he or she provides medical services by telemedicine is:  (1) informed of the relationship between the physician and patient and the respective role of any other health care provider with respect to management of the patient; and (2) notified that he or she may decline to receive medical services by telemedicine and may withdraw from such care at any time.

## 2024-06-04 NOTE — PROGRESS NOTES
History & Physical    SUBJECTIVE:     History of Present Illness:  Patient is a 54 y.o. adult presents with incisional hernia.  He is s/p open beatriz for gangrenous gallbladder.  He noticed a possible hernia about a month ago.  He has occasional pain in the area.  It becomes symptomatic when he needs to go to the bathroom and improves after.  He denies chronic cough, difficulty urinating and has some constipation.    He has lost 80lb by eating less but not consciously dieting.  He is more mobile since his back surgery.  He believes his michael is improved.  He thinks it was due to nerves from moving from [place] to here.    Is hep c positive and has messina.  Liver enzymes are elevated.    Pancreatic cysts, small, seen 2020.    PVC is on his problem list but doesn't know about it.     shows relatively high dose narcotics.  He uses this for joint pain, arthritis and lumbar stenosis.  Also with ptsd, rls and fibromyalgia.    He is an artist. Painting, sculpting and drawing.    Chief Complaint   Patient presents with    Hernia     Had gallbladder sx 2021 and may have incision v umbilical herbia       Review of patient's allergies indicates:   Allergen Reactions    Bactrim [sulfamethoxazole-trimethoprim] Itching    Penicillins Rash       Current Outpatient Medications   Medication Sig Dispense Refill    ALPRAZolam (XANAX) 1 MG tablet Take 1 tablet (1 mg total) by mouth 3 (three) times daily as needed for Anxiety. 90 tablet 5    ascorbic acid, vitamin C, (VITAMIN C) 1000 MG tablet Take 1,000 mg by mouth once daily.      atorvastatin (LIPITOR) 20 MG tablet Take 1 tablet (20 mg total) by mouth once daily. 90 tablet 3    cholecalciferol, vitamin D3, 125 mcg (5,000 unit) capsule Take 1 capsule by mouth Daily.      cranberry fruit extract (CRANBERRY ORAL) Take by mouth.      cyanocobalamin (VITAMIN B-12) 1000 MCG tablet Take 100 mcg by mouth once daily.      cyclobenzaprine (FLEXERIL) 10 MG tablet Take 1 tablet (10 mg total) by mouth 3  (three) times daily as needed for Muscle spasms. 270 tablet 0    diclofenac sodium (VOLTAREN) 1 % Gel Apply 2 g topically once daily. 100 g 3    docusate sodium (COLACE) 100 MG capsule Take 1 capsule (100 mg total) by mouth 2 (two) times daily as needed for Constipation. 60 capsule 0    gabapentin (NEURONTIN) 800 MG tablet Take 1 tablet by mouth three times a day and take 2 tablets at night (5 tablets a day, 4000mg) 450 tablet 2    hydrocortisone (ANUSOL-HC) 2.5 % rectal cream Place rectally 2 (two) times daily. 28 g 3    ketoconazole (NIZORAL) 2 % cream Apply topically once daily. 60 g 3    Lactobacillus acidophilus Cap Take by mouth once daily.       lamoTRIgine (LAMICTAL) 200 MG tablet Take 1 tablet (200 mg total) by mouth 2 (two) times daily. 180 tablet 3    loratadine (CLARITIN) 10 mg tablet Take 10 mg by mouth once daily.      magnesium 250 mg Tab Take 1 tablet by mouth Daily.      naloxegoL (MOVANTIK) 25 mg tablet Take 25 mg by mouth once daily. Take one hour prior to breakfast 30 tablet 2    naproxen sodium (ANAPROX) 220 MG tablet Take 220 mg by mouth.      oxyCODONE-acetaminophen (PERCOCET)  mg per tablet Take 1 tablet by mouth every 8 (eight) hours as needed for Pain. 90 tablet 0    predniSONE (DELTASONE) 5 MG tablet Take 1 tablet (5 mg total) by mouth once daily. Double the dose if sick 200 tablet 3    primidone (MYSOLINE) 50 MG Tab Take 200mg (4 tabs) every morning/150mg (3 tabs ) at noon/150mg (3 tabs) every evening 900 tablet 12    rOPINIRole (REQUIP) 4 MG tablet Take 1 tablet (4 mg total) by mouth once daily. 90 tablet 12    testosterone (ANDROGEL) 1 % (50 mg/5 gram) GlPk Apply 1 packet (5 grams) topically once daily. 30 packet 5    triamcinolone acetonide 0.1% (KENALOG) 0.1 % cream Apply topically 2 (two) times daily. 45 g 0    doxycycline (VIBRA-TABS) 100 MG tablet Take 1 tablet (100 mg total) by mouth 2 (two) times daily. (Patient not taking: Reported on 5/23/2024) 10 tablet 0    fluticasone  (FLONASE SENSIMIST) 27.5 mcg/actuation nasal spray 2 sprays by Nasal route once daily. (Patient not taking: Reported on 6/4/2024) 9.1 mL 0    lubiprostone (AMITIZA) 8 MCG Cap Take 1 capsule (8 mcg total) by mouth 2 (two) times daily with meals. 60 capsule 0     No current facility-administered medications for this visit.     Facility-Administered Medications Ordered in Other Visits   Medication Dose Route Frequency Provider Last Rate Last Admin    fentaNYL injection 25 mcg  25 mcg Intravenous Q5 Min PRN French Smith MD        midazolam (VERSED) 1 mg/mL injection 0.5 mg  0.5 mg Intravenous PRN French Smith MD           Past Medical History:   Diagnosis Date    Abnormal CT scan, pelvis 10/27/2019    Abnormal thyroid blood test 05/06/2019    Adrenal cyst     left    Adrenal insufficiency     Blister- healing 01/09/2020    Chronic bilateral low back pain with bilateral sciatica 03/19/2019    Congenital adrenal hyperplasia     Hepatitis B core antibody positive 11/06/2020    Negative sAg, suggests previous exposure but no chronic/active Hep B. At risk for reactivation with any immunosuppression medication, steroids, chemo, etc.      IGT (impaired glucose tolerance) 11/05/2019    Insomnia due to medical condition     Multiple closed traumatic fractures of multiple bones of hip and pelvis with routine healing, subsequent encounter 09/27/2019    Pancreatic cyst 11/02/2020    Restless leg syndrome     S/P lumbar laminectomy 10/28/2019    Spinal stenosis     Spinal stenosis of lumbar region with neurogenic claudication 03/19/2019    Status post sex reassignment surgery 03/19/2019    Hx of Congenital Adrenal Hyperplasia    Unintentional weight loss 07/21/2020     Past Surgical History:   Procedure Laterality Date    BACK SURGERY      BREAST SURGERY  1986,2001    Implants, removal    CHOLECYSTECTOMY N/A 05/18/2021    Procedure: CHOLECYSTECTOMY;  Surgeon: Antonio Brandt MD;  Location: [facility] OR  2ND FLR;  Service: General;  Laterality: N/A;    COLONOSCOPY N/A 6/29/2023    Procedure: COLONOSCOPY;  Surgeon: Genia Ku MD;  Location: [facility];  Service: Colon and Rectal;  Laterality: N/A;    ENDOSCOPIC ULTRASOUND OF UPPER GASTROINTESTINAL TRACT N/A 12/03/2020    Procedure: ULTRASOUND, UPPER GI TRACT, ENDOSCOPIC;  Surgeon: Jonathan Sharma MD;  Location: [facility] ENDO (2ND FLR);  Service: Endoscopy;  Laterality: N/A;  elevated alk phos, panc cysts, liver biopsy   11/30-covid pcw-tb    ENDOSCOPIC ULTRASOUND OF UPPER GASTROINTESTINAL TRACT N/A 05/17/2021    Procedure: ULTRASOUND, UPPER GI TRACT, ENDOSCOPIC;  Surgeon: Claudio Salvador MD;  Location: [facility] (2ND FLR);  Service: Endoscopy;  Laterality: N/A;    ERCP N/A 05/17/2021    Procedure: ERCP (ENDOSCOPIC RETROGRADE CHOLANGIOPANCREATOGRAPHY);  Surgeon: Claudio Salvador MD;  Location: [facility] (2ND FLR);  Service: Endoscopy;  Laterality: N/A;    gender surgery      multiple surgeries since birth    HYSTERECTOMY  2003    INJECTION OF ANESTHETIC AGENT AROUND NERVE Right 11/19/2020    Procedure: BLOCK, NERVE, GLENOHUMERAL  need consent;  Surgeon: Messi Cabello MD;  Location: [facility];  Service: Pain Management;  Laterality: Right;    JOINT REPLACEMENT  2/2020, 1/2021    Knees    KNEE ARTHROPLASTY Left 01/25/2021    Procedure: ARTHROPLASTY, KNEE:DEPUY-ATTUNE REVISION: SERINA iASSIST:CABLES ON HOLD;  Surgeon: Donte Andrade III, MD;  Location: [facility];  Service: Orthopedics;  Laterality: Left;    LAMINECTOMY  09/13/2019    LAPAROSCOPIC CHOLECYSTECTOMY N/A 05/17/2021    Procedure: CHOLECYSTECTOMY, LAPAROSCOPIC;  Surgeon: Calvin Wilson MD;  [address]  Service: General;  Laterality: N/A;    LAPAROSCOPIC CHOLECYSTECTOMY N/A 05/18/2021    Procedure: CHOLECYSTECTOMY, LAPAROSCOPIC;  Surgeon: Antonio Brandt MD;  [address];  Service: General;  Laterality: N/A;    RADIOFREQUENCY ABLATION Left 05/09/2019    Procedure:  "RADIOFREQUENCY ABLATION, LEFT L3,4,5;  Surgeon: Messi Cabello MD;  Location: [facility] PAIN MGT;  Service: Pain Management;  Laterality: Left;  1 of 2    RADIOFREQUENCY ABLATION Right 05/23/2019    Procedure: RADIOFREQUENCY ABLATION, RIGHT L3,4,5;  Surgeon: Messi Cabello MD;  Location: [facility] PAIN MGT;  Service: Pain Management;  Laterality: Right;  2of 2    ROBOT-ASSISTED LAPAROSCOPIC RECTOPEXY N/A 01/17/2023    Procedure: ROBOTIC RECTOPEXY;  Surgeon: Genia Ku MD;  Location: [facility] OR;  Service: Colon and Rectal;  Laterality: N/A;    SPINE SURGERY      Sept 2019     TOTAL KNEE ARTHROPLASTY Right 01/31/2020    Procedure: ARTHROPLASTY, KNEE, TOTAL;  Surgeon: Donte Andrade III, MD;  [facility];  Service: Orthopedics;  Laterality: Right;     Family History   Problem Relation Name Age of Onset    Diabetes Maternal Grandfather Bj Santos     Cancer Maternal Grandfather Bj Santos         Colon    Cancer Mother Taina Bourgeois         Kidney    Early death Mother Taina Bourgeois         58    Heart disease Father      Autoimmune disease Sister      Hypertension Maternal Grandmother Simi Santos     Stroke Maternal Grandmother Simi Santos     Diabetes Maternal Uncle Trev Santos     Breast cancer Neg Hx      Ovarian cancer Neg Hx      Vaginal cancer Neg Hx      Endometrial cancer Neg Hx      Cervical cancer Neg Hx       Social History     Tobacco Use    Smoking status: Former     Current packs/day: 0.00     Types: Cigarettes     Quit date: 2009     Years since quitting: 15.4    Smokeless tobacco: Never    Tobacco comments:     was not a daily smoker-    Substance Use Topics    Alcohol use: Not Currently    Drug use: No        Review of Systems:  Review of Systems    OBJECTIVE:     Vital Signs (Most Recent)  Pulse: 102 (06/04/24 0709)  BP: (!) 118/56 (06/04/24 0709)  4' 9" (1.448 m)  63 kg (138 lb 14.2 oz)     Physical Exam:  Physical Exam  Vitals reviewed.   Constitutional:       Appearance: Normal " appearance.   Abdominal:       Skin:     General: Skin is warm and dry.   Neurological:      General: No focal deficit present.      Mental Status: He is alert and oriented to person, place, and time.   Psychiatric:         Mood and Affect: Mood normal.         Behavior: Behavior normal.         Thought Content: Thought content normal.         Judgment: Judgment normal.         Laboratory  Cbc, cmp reviewed, c/w messina    Diagnostic Results:  No recent results    ASSESSMENT/PLAN:     Incisional hernia, pancreatic cysts, messina, possible michael, weight loss possibly due to nerves, chronic pain    PLAN:       Consult hepatology.  Obtain MRI for pancreatic cysts and follow up with GI.  Consider open incisional hernia, likely without mesh.  He will need pcp clearance.

## 2024-06-04 NOTE — PROGRESS NOTES
General Surgery  Initial Consult Visit    Chief Complaint: ***    HPI:  Patient is a 54 y.o. adult, referred by Dr. Siva Mitchell, for evaluation of ventral hernia. PMHx CAH,    Medical History:  Past Medical History:   Diagnosis Date    Abnormal CT scan, pelvis 10/27/2019    Abnormal thyroid blood test 05/06/2019    Adrenal cyst     left    Adrenal insufficiency     Blister- healing 01/09/2020    Chronic bilateral low back pain with bilateral sciatica 03/19/2019    Congenital adrenal hyperplasia     Hepatitis B core antibody positive 11/06/2020    Negative sAg, suggests previous exposure but no chronic/active Hep B. At risk for reactivation with any immunosuppression medication, steroids, chemo, etc.      IGT (impaired glucose tolerance) 11/05/2019    Insomnia due to medical condition     Multiple closed traumatic fractures of multiple bones of hip and pelvis with routine healing, subsequent encounter 09/27/2019    Pancreatic cyst 11/02/2020    Restless leg syndrome     S/P lumbar laminectomy 10/28/2019    Spinal stenosis     Spinal stenosis of lumbar region with neurogenic claudication 03/19/2019    Status post sex reassignment surgery 03/19/2019    Hx of Congenital Adrenal Hyperplasia    Unintentional weight loss 07/21/2020     Surgical History:  Past Surgical History:   Procedure Laterality Date    BACK SURGERY      BREAST SURGERY  1986,2001    Implants, removal    CHOLECYSTECTOMY N/A 05/18/2021    Procedure: CHOLECYSTECTOMY;  Surgeon: Antonio Brandt MD;  Location: [facility] OR [address] [place] [address];  Service: General;  Laterality: N/A;    COLONOSCOPY N/A 6/29/2023    Procedure: COLONOSCOPY;  Surgeon: Genia Ku MD;  Location: [facility];  Service: Colon and Rectal;  Laterality: N/A;    ENDOSCOPIC ULTRASOUND OF UPPER GASTROINTESTINAL TRACT N/A 12/03/2020    Procedure: ULTRASOUND, UPPER GI TRACT, ENDOSCOPIC;  Surgeon: Jonathan Sharma MD;  Location: [facility] ([address] [place] [address]);  Service: Endoscopy;  Laterality: N/A;  elevated  alk phos, panc cysts, liver biopsy   11/30-covid pcw-tb    ENDOSCOPIC ULTRASOUND OF UPPER GASTROINTESTINAL TRACT N/A 05/17/2021    Procedure: ULTRASOUND, UPPER GI TRACT, ENDOSCOPIC;  Surgeon: Claudio Salvador MD;  [facility]  Service: Endoscopy;  Laterality: N/A;    ERCP N/A 05/17/2021    Procedure: ERCP (ENDOSCOPIC RETROGRADE CHOLANGIOPANCREATOGRAPHY);  Surgeon: Claudio Salvador MD;  [facility]  Service: Endoscopy;  Laterality: N/A;    gender surgery      multiple surgeries since birth    HYSTERECTOMY  2003    INJECTION OF ANESTHETIC AGENT AROUND NERVE Right 11/19/2020    Procedure: BLOCK, NERVE, GLENOHUMERAL  need consent;  Surgeon: Messi Cabello MD;  Location: [facility];  Service: Pain Management;  Laterality: Right;    JOINT REPLACEMENT  2/2020, 1/2021    Knees    KNEE ARTHROPLASTY Left 01/25/2021    Procedure: ARTHROPLASTY, KNEE:DEPUY-ATTUNE REVISION: SERINA iASSIST:CABLES ON HOLD;  Surgeon: Donte Andrade III, MD;  Location: [facility];  Service: Orthopedics;  Laterality: Left;    LAMINECTOMY  09/13/2019    LAPAROSCOPIC CHOLECYSTECTOMY N/A 05/17/2021    Procedure: CHOLECYSTECTOMY, LAPAROSCOPIC;  Surgeon: Calvin Wilson MD;  Location: [address];  Service: General;  Laterality: N/A;    LAPAROSCOPIC CHOLECYSTECTOMY N/A 05/18/2021    Procedure: CHOLECYSTECTOMY, LAPAROSCOPIC;  Surgeon: Antonio Brandt MD;  [address];  Service: General;  Laterality: N/A;    RADIOFREQUENCY ABLATION Left 05/09/2019    Procedure: RADIOFREQUENCY ABLATION, LEFT L3,4,5;  Surgeon: Messi Cabello MD;  Location: [facility];  Service: Pain Management;  Laterality: Left;  1 of 2    RADIOFREQUENCY ABLATION Right 05/23/2019    Procedure: RADIOFREQUENCY ABLATION, RIGHT L3,4,5;  Surgeon: Messi Cabello MD;  Location: [facility];  Service: Pain Management;  Laterality: Right;  2of 2    ROBOT-ASSISTED LAPAROSCOPIC RECTOPEXY N/A 01/17/2023    Procedure: ROBOTIC  RECTOPEXY;  Surgeon: Genia Ku MD;  Location: [facility];  Service: Colon and Rectal;  Laterality: N/A;    SPINE SURGERY      Sept 2019     TOTAL KNEE ARTHROPLASTY Right 01/31/2020    Procedure: ARTHROPLASTY, KNEE, TOTAL;  Surgeon: Donte Andrade III, MD;  Location: [address];  Service: Orthopedics;  Laterality: Right;     Social History:  Social History     Tobacco Use    Smoking status: Former     Current packs/day: 0.00     Types: Cigarettes     Quit date: 2009     Years since quitting: 15.4    Smokeless tobacco: Never    Tobacco comments:     was not a daily smoker-    Substance Use Topics    Alcohol use: Not Currently    Drug use: No       Review of Systems:  ROS      Physical Exam:  There were no vitals taken for this visit.  Gen: no apparent distress, awake and alert  CV: regular rate/rhythm  Pulm: non-labored breathing, equal and bilateral chest rise  Abd: soft, non-distended, non-tender  Ext: warm and well perfused, no edema  Skin: warm and dry, no lesions appreciated  Neuro: motor and sensation grossly intact and symmetric         Data Review:  ***None pertinent    Assessment/Plan:  There are no diagnoses linked to this encounter.    Ari is a 54 y.o. adult with ***    Elsie Maldonado MD  [facility]  General Surgery PGY-1

## 2024-06-10 ENCOUNTER — PATIENT MESSAGE (OUTPATIENT)
Dept: INTERNAL MEDICINE | Facility: CLINIC | Age: 54
End: 2024-06-10
Payer: MEDICARE

## 2024-06-10 ENCOUNTER — PATIENT MESSAGE (OUTPATIENT)
Dept: SURGERY | Facility: CLINIC | Age: 54
End: 2024-06-10
Payer: MEDICARE

## 2024-06-10 RX ORDER — ATORVASTATIN CALCIUM 20 MG/1
20 TABLET, FILM COATED ORAL DAILY
Qty: 90 TABLET | Refills: 3 | Status: SHIPPED | OUTPATIENT
Start: 2024-06-10

## 2024-06-10 NOTE — TELEPHONE ENCOUNTER
Refill Routing Note   Medication(s) are not appropriate for processing by [facility] for the following reason(s):        No active prescription written by provider: not yet signed by current PCP    [facility] action(s):  Defer      Medication Therapy Plan:         Appointments  past 12m or future 3m with PCP    Date Provider   Last Visit   4/16/2024 Siva Mitchell MD   Next Visit   Visit date not found Siva Mitchell MD   ED visits in past 90 days: 0        Note composed:3:38 PM 06/10/2024

## 2024-06-10 NOTE — TELEPHONE ENCOUNTER
No care due was identified.  [facility] Embedded Care Due Messages. Reference number: [id number].   6/10/2024 3:27:23 PM CDT

## 2024-06-12 ENCOUNTER — OFFICE VISIT (OUTPATIENT)
Dept: PSYCHIATRY | Facility: CLINIC | Age: 54
End: 2024-06-12
Payer: MEDICARE

## 2024-06-12 DIAGNOSIS — F40.00 AGORAPHOBIA: ICD-10-CM

## 2024-06-12 DIAGNOSIS — F39 MOOD DISORDER: ICD-10-CM

## 2024-06-12 DIAGNOSIS — F41.9 ANXIETY: Primary | ICD-10-CM

## 2024-06-12 PROCEDURE — 99214 OFFICE O/P EST MOD 30 MIN: CPT | Mod: 95,,, | Performed by: NURSE PRACTITIONER

## 2024-06-12 RX ORDER — ALPRAZOLAM 1 MG/1
1 TABLET ORAL 3 TIMES DAILY PRN
Qty: 90 TABLET | Refills: 5 | Status: SHIPPED | OUTPATIENT
Start: 2024-07-18

## 2024-06-12 RX ORDER — LAMOTRIGINE 200 MG/1
200 TABLET ORAL 2 TIMES DAILY
Qty: 180 TABLET | Refills: 3 | Status: SHIPPED | OUTPATIENT
Start: 2024-06-12

## 2024-06-12 NOTE — PROGRESS NOTES
"Outpatient Psychiatry Follow-Up Visit (MD/NP)    6/12/2024    Clinical Status of Patient:  Outpatient (Ambulatory)    Chief Complaint:  Ari Sainz is a 54 y.o. adult who presents today for follow-up of mood disorder and anxiety.  Met with patient.      The patient location is: [place]   The chief complaint leading to consultation is: mood disorder and anxiety    Visit type: audiovisual    Face to Face time with patient: 10 minutes   20 minutes of total time spent on the encounter, which includes face to face time and non-face to face time preparing to see the patient (eg, review of tests), Obtaining and/or reviewing separately obtained history, Documenting clinical information in the electronic or other health record, Independently interpreting results (not separately reported) and communicating results to the patient/family/caregiver, or Care coordination (not separately reported).     Each patient to whom he or she provides medical services by telemedicine is:  (1) informed of the relationship between the physician and patient and the respective role of any other health care provider with respect to management of the patient; and (2) notified that he or she may decline to receive medical services by telemedicine and may withdraw from such care at any time.    Notes:       Interval History and Content of Current Session:    Social/medical updates -- no major changes, lives with wife and 3 cats. Diagnosed with hernia, will require surgery. Also following with hepatology regarding elevated LFTs.    Mood -- presents with euthymic mood and affect. No change from baseline, guarded, offers little in conversation. Does have bright affect.Denies persistent depressed mood, denies anhedonia. No thoughts of death or SI. No ricardo.   Anxiety -- "it goes up and down." Somewhat exacerbated lately given medical issues. Does not leave the home much aside from medical appointments and going outside in the yard. Plans on " attending event at [employer] in the future.   Attention -- no concerns expressed  Psychosis -- no  Sleep -- regulated, denies insomnia   Energy -- adequate  Appetite -- adequate, weight stable, 138 lb    Substance use -- denies all. Pain clinic drug screen from 05/21/24 unremarkable.    Medications:    Lamotrigine 200 mg BID -- compliant, denies SE including rash  Alprazolam 1 mg TID -- takes BID most days, denies SE    Brief synopsis:  Sx stable, no SI/HI/AVH      Review of Systems   PSYCHIATRIC: Pertinant items are noted in the narrative.  CONSTITUTIONAL: See above.   MUSCULOSKELETAL: chronic pain  GASTROINTESTINAL: No nausea, vomiting, pain, constipation or diarrhea.    Past Medical, Family and Social History: The patient's past medical, family and social history have been reviewed and updated as appropriate within the electronic medical record - see encounter notes.    Compliance: see above    Side effects: see above    Risk Parameters:  Patient reports no suicidal ideation  Patient reports no homicidal ideation  Patient reports no self-injurious behavior  Patient reports no violent behavior    Exam (detailed: at least 9 elements; comprehensive: all 15 elements)   Constitutional  Vitals:  Most recent vital signs, dated less than 90 days prior to this appointment, were reviewed.   There were no vitals filed for this visit.     General:  unremarkable, age appropriate     Musculoskeletal  Muscle Strength/Tone:  no spasicity, no rigidity, no cogwheeling   Gait & Station:  uses cane     Psychiatric  Speech:  no latency; no press   Mood & Affect:  euthymic  congruent and appropriate, guarded   Thought Process:  normal and logical   Associations:  intact   Thought Content:  normal, no suicidality, no homicidality, delusions, or paranoia   Insight:  intact   Judgement: behavior is adequate to circumstances   Orientation:  grossly intact   Memory: intact for content of interview   Language: grossly intact   Attention  Span & Concentration:  able to focus   Fund of Knowledge:  intact and appropriate to age and level of education     Assessment and Diagnosis   Status/Progress: Based on the examination today, the patient's problem(s) is/are adequately but not ideally controlled.  New problems have not been presented today.   Co-morbidities, Diagnostic uncertainty, and Lack of compliance are not complicating management of the primary condition.  There are no active rule-out diagnoses for this patient at this time.     General Impression: Ari Sainz is a 54 y.o. adult with a psychiatric hx of PTSD and anxiety presenting without active symptoms of PTSD or HELDER. Unclear origin of PTSD diagnosis. 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 Medical hx significant for URBINA, fibromyalgia, lumbar laminectomy decompression from L1-S1, KIARA. Reports being prescribed lamotrigine and alprazolam for 10+ years. Remains unclear as to why patient is prescribed mood stabilizer vs SSRI/SNRI antidepressant. Chart review reveals previous prescription for lurasidone. Remote hx of self-injurious behavior in adolescence. No hx of suicide attempts. No hx of drug abuse. Lives with his wife. 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    01/30/23 -- Euthymic mood. Denies anxiety concerns. No evident PTSD sx    05/02/23 -- no appreciable change in sx. Anxiety sx explored in more depth today, appears consistent with agoraphobia.     09/06/23 -- sx stable. Continue lamotrigine and alprazolam as prescribed    12/01/23 -- sx stable. No medication changes made    06/12/24 -- sx stable, no medication changes made         ICD-10-CM ICD-9-CM   1. Anxiety  F41.9 300.00   2. Agoraphobia  F40.00 300.22   3. Mood disorder  F39 296.90         Intervention/Counseling/Treatment Plan   Reviewed patient's symptoms,and medication regimen  Continue medication regimen as  prescribed  Have discussed risks of long-term benzodiazepine use with patient multiple times, including today  Though chronic benzodiazepine use is not ideal, sx have been adequately controlled for an extended period of time on current regimen, patient utilizes medication appropriately, and has hx of multiple failed psychotropic trials for anxiety    Benzodiazepines: discussed and educated the patient that benzodiazepines are generally not intended for prolonged use. They are likely to cause tolerance and dependence over time, and can cause disinhibition, cognitive impairment, and increased risk of falls. They are not recommended to be used concurrently with narcotics, hypnotics, other benzodiazepines, or alcohol, as this can increase the risk of profound sedation, respiratory depression, coma, and even death. A plan to taper benzodiazepines over time was also discussed, along with options for alternative anxiolytics as suitable replacements. Patient voiced understanding.    Medication Management  Prescription drug management was employed during the encounter, as medications were prescribed, or considered but not prescribed.   [facility] reviewed  The risks and benefits of medication were discussed with the patient.  Possible expectable adverse effects of any current or proposed individual psychotropic agents were discussed with this patient.  Counseling was provided on the importance of full compliance with any prescribed medication.  Detailed instructions were provided to the patient regarding the administration of any prescribed medication.  Patient voiced understanding    Return to Clinic:  6 months

## 2024-06-19 ENCOUNTER — HOSPITAL ENCOUNTER (OUTPATIENT)
Dept: RADIOLOGY | Facility: OTHER | Age: 54
Discharge: HOME | End: 2024-06-19
Attending: SURGERY
Payer: MEDICARE

## 2024-06-19 DIAGNOSIS — R10.9 ABDOMINAL PAIN, UNSPECIFIED ABDOMINAL LOCATION: ICD-10-CM

## 2024-06-19 PROCEDURE — A9585 GADOBUTROL INJECTION: HCPCS | Performed by: SURGERY

## 2024-06-19 PROCEDURE — 25500020 PHARM REV CODE 255: Performed by: SURGERY

## 2024-06-19 PROCEDURE — 74183 MRI ABD W/O CNTR FLWD CNTR: CPT | Mod: TC

## 2024-06-19 PROCEDURE — 74183 MRI ABD W/O CNTR FLWD CNTR: CPT | Mod: 26,,, | Performed by: RADIOLOGY

## 2024-06-19 RX ORDER — GADOBUTROL 604.72 MG/ML
7 INJECTION INTRAVENOUS
Status: COMPLETED | OUTPATIENT
Start: 2024-06-19 | End: 2024-06-19

## 2024-06-19 RX ADMIN — GADOBUTROL 7 ML: 604.72 INJECTION INTRAVENOUS at 12:06

## 2024-06-22 ENCOUNTER — PATIENT MESSAGE (OUTPATIENT)
Dept: SURGERY | Facility: CLINIC | Age: 54
End: 2024-06-22
Payer: MEDICARE

## 2024-06-26 ENCOUNTER — LAB VISIT (OUTPATIENT)
Dept: LAB | Facility: HOSPITAL | Age: 54
End: 2024-06-26
Payer: MEDICARE

## 2024-06-26 ENCOUNTER — PATIENT MESSAGE (OUTPATIENT)
Dept: HEPATOLOGY | Facility: CLINIC | Age: 54
End: 2024-06-26

## 2024-06-26 ENCOUNTER — PROCEDURE VISIT (OUTPATIENT)
Dept: HEPATOLOGY | Facility: CLINIC | Age: 54
End: 2024-06-26
Payer: MEDICARE

## 2024-06-26 DIAGNOSIS — R74.8 ELEVATED LIVER ENZYMES: ICD-10-CM

## 2024-06-26 DIAGNOSIS — Z87.890 STATUS POST SEX REASSIGNMENT SURGERY: ICD-10-CM

## 2024-06-26 LAB
ALBUMIN SERPL BCP-MCNC: 4 G/DL (ref 3.5–5.2)
ALP SERPL-CCNC: 104 U/L (ref 55–135)
ALT SERPL W/O P-5'-P-CCNC: 30 U/L (ref 10–44)
AST SERPL-CCNC: 30 U/L (ref 10–40)
BILIRUB DIRECT SERPL-MCNC: 0.2 MG/DL (ref 0.1–0.3)
BILIRUB SERPL-MCNC: 0.3 MG/DL (ref 0.1–1)
CK SERPL-CCNC: 308 U/L (ref 20–200)
PROT SERPL-MCNC: 6.8 G/DL (ref 6–8.4)

## 2024-06-26 PROCEDURE — 80076 HEPATIC FUNCTION PANEL: CPT | Performed by: NURSE PRACTITIONER

## 2024-06-26 PROCEDURE — 91200 LIVER ELASTOGRAPHY: CPT | Mod: 26,S$PBB,, | Performed by: NURSE PRACTITIONER

## 2024-06-26 PROCEDURE — 91200 LIVER ELASTOGRAPHY: CPT | Mod: PBBFAC | Performed by: NURSE PRACTITIONER

## 2024-06-26 PROCEDURE — 82550 ASSAY OF CK (CPK): CPT | Performed by: NURSE PRACTITIONER

## 2024-06-26 PROCEDURE — 36415 COLL VENOUS BLD VENIPUNCTURE: CPT | Performed by: NURSE PRACTITIONER

## 2024-06-26 RX ORDER — TESTOSTERONE GEL, 1% 10 MG/G
1 GEL TRANSDERMAL DAILY
Qty: 30 PACKET | Refills: 2 | Status: SHIPPED | OUTPATIENT
Start: 2024-06-26 | End: 2025-01-22

## 2024-06-26 NOTE — PROCEDURES
FibroScan Transplant Hepatology(Vibration Controlled Transient Elastography)    Date/Time: 6/26/2024 11:00 AM    Performed by: Binta Benites NP  Authorized by: Binta Benites NP    Diagnosis:  Other    Probe:  M    Universal Protocol: Patient's identity, procedure and site were verified, confirmatory pause was performed.  Discussed procedure including risks and potential complications.  Questions answered.  Patient verbalizes understanding and wishes to proceed with VCTE.     Procedure: After providing explanations of the procedure, patient was placed in the supine position with right arm in maximum abduction to allow optimal exposure of right lateral abdomen.  Patient was briefly assessed, Testing was performed in the mid-axillary location, 50Hz Shear Wave pulses were applied and the resulting Shear Wave and Propagation Speed detected with a 3.5 MHz ultrasonic signal, using the FibroScan probe, Skin to liver capsule distance and liver parenchyma were accessed during the entire examination with the FibroScan probe, Patient was instructed to breathe normally and to abstain from sudden movements during the procedure, allowing for random measurements of liver stiffness. At least 10 Shear Waves were produced, Individual measurements of each Shear Wave were calculated.  Patient tolerated the procedure well with no complications.  Meets discharge criteria as was dismissed.  Rates pain 0 out of 10.  Patient will follow up with ordering provider to review results.    Findings  Median liver stiffness score:  3.8  CAP Reading: dB/m:  199    IQR/med %:  8  Interpretation  Fibrosis interpretation is based on medial liver stiffness - Kilopascal (kPa).    Fibrosis Stage:  F 0-1  Steatosis interpretation is based on controlled attenuation parameter - (dB/m).    Steatosis Grade:  <S1

## 2024-07-04 ENCOUNTER — PATIENT MESSAGE (OUTPATIENT)
Dept: PAIN MEDICINE | Facility: CLINIC | Age: 54
End: 2024-07-04
Payer: MEDICARE

## 2024-07-04 DIAGNOSIS — M62.838 MUSCLE SPASM: ICD-10-CM

## 2024-07-05 RX ORDER — OXYCODONE AND ACETAMINOPHEN 10; 325 MG/1; MG/1
1 TABLET ORAL EVERY 8 HOURS PRN
Qty: 90 TABLET | Refills: 0 | Status: SHIPPED | OUTPATIENT
Start: 2024-07-05 | End: 2024-08-04

## 2024-07-05 NOTE — TELEPHONE ENCOUNTER
Patient requesting refill on Percocet   Last office visit 5/21/24   shows last refill on 5/21/24  Patient does have a pain contract on file with [facility] department  Patient last UDS 5/21/24 was consistent with current therapy    CODEINE  Not Detected  Not Detected  Not Detected Not Detected   Comment: INTERPRETIVE INFORMATION: Codeine, U  Positive Cutoff: 40 ng/mL  Methodology: Mass Spectrometry   MORPHINE  Not Detected  Not Detected  Present Present   Comment: INTERPRETIVE INFORMATION:Morphine, U  Positive Cutoff: 20 ng/mL  Methodology: Mass Spectrometry   6-ACETYLMORPHINE  Not Detected  Not Detected  Not Detected Not Detected   Comment: INTERPRETIVE INFORMATION:6-acetylmorphine, U  Positive Cutoff: 20 ng/mL  Methodology: Mass Spectrometry   OXYCODONE  Present  Present  Present Present   Comment: INTERPRETIVE INFORMATION:Oxycodone, U  Positive Cutoff: 40 ng/mL  Methodology: Mass Spectrometry   NOROYXCODONE  Present  Present  Present Present   Comment: INTERPRETIVE INFORMATION:Noroxycodone, U  Positive Cutoff: 100 ng/mL  Methodology: Mass Spectrometry   OXYMORPHONE  Present  Present  Not Detected Not Detected   Comment: INTERPRETIVE INFORMATION:Oxymorphone, U  Positive Cutoff: 40 ng/mL  Methodology: Mass Spectrometry   NOROXYMORPHONE  Present  Present  Not Detected Not Detected   Comment: INTERPRETIVE INFORMATION:Noroxymorphone, U  Positive Cutoff: 100 ng/mL  Methodology: Mass Spectrometry   HYDROCODONE  Not Detected  Not Detected  Not Detected Not Detected   Comment: INTERPRETIVE INFORMATION:Hydrocodone, U  Positive Cutoff: 40 ng/mL  Methodology: Mass Spectrometry   NORHYDROCODONE  Not Detected  Not Detected  Not Detected Not Detected   Comment: INTERPRETIVE INFORMATION:Norhydrocodone, U  Positive Cutoff: 100 ng/mL  Methodology: Mass Spectrometry   HYDROMORPHONE  Not Detected  Not Detected  Not Detected Not Detected   Comment: INTERPRETIVE INFORMATION:Hydromorphone, U  Positive Cutoff: 20  ng/mL  Methodology: Mass Spectrometry   BUPRENORPHINE  Not Detected  Not Detected  Not Detected Not Detected   Comment: INTERPRETIVE INFORMATION:Buprenorphine, U  Positive Cutoff: 5 ng/mL  Methodology: Mass Spectrometry   NORUBPRENORPHINE  Not Detected  Not Detected  Not Detected Not Detected   Comment: INTERPRETIVE INFORMATION:Norbuprenorphine, U  Positive Cutoff: 20 ng/mL  Methodology: Mass Spectrometry   FENTANYL  Not Detected  Not Detected  Not Detected Not Detected   Comment: INTERPRETIVE INFORMATION:Fentanyl, U  Positive Cutoff: 2 ng/mL  Methodology: Mass Spectrometry   NORFENTANYL  Not Detected  Not Detected  Not Detected Not Detected   Comment: INTERPRETIVE INFORMATION:Norfentanyl, U  Positive Cutoff: 2 ng/mL  Methodology: Mass Spectrometry   MEPERIDINE METABOLITE  Not Detected  Not Detected  Not Detected Not Detected   Comment: INTERPRETIVE INFORMATION:Meperidine metabolite, U  Positive Cutoff: 50 ng/mL  Methodology: Mass Spectrometry   TAPENTADOL  Not Detected  Not Detected  Not Detected Not Detected   Comment: INTERPRETIVE INFORMATION:Tapentadol, U  Positive Cutoff: 100 ng/mL  Methodology: Mass Spectrometry   TAPENTADOL-O-SULF  Not Detected  Not Detected  Not Detected Not Detected   Comment: INTERPRETIVE INFORMATION:Tapentadol-o-Sulf, U  Positive Cutoff: 200 ng/mL  Methodology: Mass Spectrometry   METHADONE  Negative  Not Detected  Not Detected Not Detected   Comment: Presumptive negative by immunoassay. Testing by mass  spectrometry is available on request.  INTERPRETIVE INFORMATION: Methadone Screen, U  Positive Cutoff: 150 ng/mL  Methodology: Immunoassay   TRAMADOL  Negative  Not Detected  Not Detected Not Detected   Comment: Presumptive negative by immunoassay. Testing by mass  spectrometry is available on request.  INTERPRETIVE INFORMATION:Tramadol Screen, U  Positive Cutoff: 100 ng/mL  Methodology: Immunoassay   AMPHETAMINE  Not Detected  Not Detected  Not Detected Not Detected   Comment:  INTERPRETIVE INFORMATION:Amphetamine, U  Positive Cutoff: 50 ng/mL  Methodology: Mass Spectrometry   METHAMPHETAMINE  Not Detected  Not Detected  Not Detected Not Detected   Comment: INTERPRETIVE INFORMATION:Methamphetamine, U  Positive Cutoff: 200 ng/mL  Methodology: Mass Spectrometry   MDMA- ECSTASY  Not Detected  Not Detected  Not Detected Not Detected   Comment: INTERPRETIVE INFORMATION:MDMA, U  Positive Cutoff: 200 ng/mL  Methodology: Mass Spectrometry   MDA  Not Detected  Not Detected  Not Detected Not Detected   Comment: INTERPRETIVE INFORMATION:MDA, U  Positive Cutoff: 200 ng/mL  Methodology: Mass Spectrometry   MDEA- Deanna  Not Detected  Not Detected  Not Detected Not Detected   Comment: INTERPRETIVE INFORMATION:MDEA, U  Positive Cutoff: 200 ng/mL  Methodology: Mass Spectrometry   METHYLPHENIDATE  Not Detected  Not Detected  Not Detected Not Detected   Comment: INTERPRETIVE INFORMATION:Methylphenidate, U  Positive Cutoff: 100 ng/mL  Methodology: Mass Spectrometry   PHENTERMINE  Not Detected  Not Detected  Not Detected Not Detected   Comment: INTERPRETIVE INFORMATION:Phentermine, U  Positive Cutoff: 100 ng/mL  Methodology: Mass Spectrometry   BENZOYLECGONINE  Negative  Not Detected  Not Detected Not Detected   Comment: Presumptive negative by immunoassay. Testing by mass  spectrometry is available on request.  INTERPRETIVE INFORMATION:Cocaine Screen, U  Positive Cutoff: 150 ng/mL  Methodology: Immunoassay   ALPRAZOLAM  Not Detected  Present  Not Detected Present   Comment: INTERPRETIVE INFORMATION:Alprazolam, U  Positive Cutoff: 40 ng/mL  Methodology: Mass Spectrometry   ALPHA-OH-ALPRAZOLAM  Present  Present  Present Not Detected   Comment: INTERPRETIVE INFORMATION:Alpha-OH-Alprazolam, U  Positive Cutoff: 20 ng/mL  Methodology: Mass Spectrometry   CLONAZEPAM  Not Detected  Not Detected  Not Detected Not Detected   Comment: INTERPRETIVE INFORMATION:Clonazepam, U  Positive Cutoff: 20 ng/mL  Methodology: Mass  Spectrometry   7-AMINOCLONAZEPAM  Not Detected  Not Detected  Not Detected Not Detected   Comment: INTERPRETIVE INFORMATION:7-Aminoclonazepam, U  Positive Cutoff: 40 ng/mL  Methodology: Mass Spectrometry   DIAZEPAM  Not Detected  Not Detected  Not Detected Not Detected   Comment: INTERPRETIVE INFORMATION:Diazepam, U  Positive Cutoff: 50 ng/mL  Methodology: Mass Spectrometry   NORDIAZEPAM  Not Detected  Not Detected  Not Detected Not Detected   Comment: INTERPRETIVE INFORMATION:Nordiazepam, U  Positive Cutoff: 50 ng/mL  Methodology: Mass Spectrometry   OXAZEPAM  Not Detected  Not Detected  Not Detected Not Detected   Comment: INTERPRETIVE INFORMATION:Oxazepam, U  Positive Cutoff: 50 ng/mL  Methodology: Mass Spectrometry   TEMAZEPAM  Not Detected  Not Detected  Not Detected Not Detected   Comment: INTERPRETIVE INFORMATION:Temazepam, U  Positive Cutoff: 50 ng/mL  Methodology: Mass Spectrometry   Lorazepam  Not Detected  Not Detected  Not Detected Not Detected   Comment: INTERPRETIVE INFORMATION:Lorazepam, U  Positive Cutoff: 60 ng/mL  Methodology: Mass Spectrometry   MIDAZOLAM  Not Detected  Not Detected  Not Detected Not Detected   Comment: INTERPRETIVE INFORMATION:Midazolam, U  Positive Cutoff: 20 ng/mL  Methodology: Mass Spectrometry   ZOLPIDEM  Not Detected  Not Detected  Not Detected Not Detected   Comment: INTERPRETIVE INFORMATION:Zolpidem, U  Positive Cutoff: 20 ng/mL  Methodology: Mass Spectrometry   BARBITURATES  PresumptivePOS  Present  Present Present   Comment: Presumptive positive by immunoassay. Testing by mass  spectrometry is available on request.  INTERPRETIVE INFORMATION:Barbiturates Screen, U  Positive Cutoff: 200 ng/mL  Methodology: Immunoassay   Creatinine, Urine 20.0 - 400.0 mg/dL 30.4 49.0 R 27.8 18.0 Low  R 113.0 58.9   ETHYL GLUCURONIDE  Negative  Not Detected  Not Detected Not Detected   Comment: Presumptive negative by immunoassay. Testing by mass  spectrometry is available on  request.  INTERPRETIVE INFORMATION:Ethyl Glucuronide Screen, U  Positive Cutoff: 500 ng/mL  Methodology: Immunoassay   MARIJUANA METABOLITE  Negative  Not Detected  Not Detected Not Detected   Comment: Presumptive negative by immunoassay. Testing by mass  spectrometry is available on request.  INTERPRETIVE INFORMATION: THC (Cannabinoids) Screen, U  Positive Cutoff: 50 ng/mL  Methodology: Immunoassay   PCP  Negative  Not Detected  Not Detected Not Detected   Comment: Presumptive negative by immunoassay. Testing by mass  spectrometry is available on request.  INTERPRETIVE INFORMATION:Phencyclidine Screen, U  Positive Cutoff: 25 ng/mL  Methodology: Immunoassay   CARISOPRODOL  Negative  Not Detected CM  Not Detected CM Not Detected CM   Comment: Presumptive negative by immunoassay. Testing by mass  spectrometry is available on request.  INTERPRETIVE INFORMATION: Carisoprodol Screen, U  Positive Cutoff: 100 ng/mL  Methodology: Immunoassay  The carisoprodol immunoassay has cross-reactivity to  carisoprodol and meprobamate.   Naloxone  Not Detected  Not Detected      Comment: INTERPRETIVE INFORMATION:Naloxone, U  Positive Cutoff: 100 ng/mL  Methodology: Mass Spectrometry   Gabapentin  Present  Present      Comment: INTERPRETIVE INFORMATION:Gabapentin, U  Positive Cutoff: 3,000 ng/mL  Methodology: Mass Spectrometry   Pregabalin  Not Detected  Not Detected      Comment: INTERPRETIVE INFORMATION:Pregabalin, U  Positive Cutoff: 3,000 ng/mL  Methodology: Mass Spectrometry   Alpha-OH-Midazolam  Not Detected  Not Detected      Comment: INTERPRETIVE INFORMATION:Alpha-OH-Midazolam, U  Positive Cutoff: 20 ng/mL  Methodology: Mass Spectrometry   Zolpidem Metabolite  Not Detected  Not Detected      Comment: INTERPRETIVE INFORMATION:Zolpidem Metabolite, U  Positive Cutoff: 100 ng/mL  Methodology: Mass Spectrometry

## 2024-07-15 ENCOUNTER — TELEPHONE (OUTPATIENT)
Dept: OTOLARYNGOLOGY | Facility: CLINIC | Age: 54
End: 2024-07-15
Payer: MEDICARE

## 2024-07-15 NOTE — TELEPHONE ENCOUNTER
----- Message from Blair CASTRO Route sent at 7/15/2024  1:08 PM CDT -----  Regarding: Change to In person  Contact: pt [phone]  Pt is calling in ref to changing 7/29 scheduled virtual appt to an in person appt. . Patient Requesting Call Back @ [phone]

## 2024-07-18 ENCOUNTER — PATIENT MESSAGE (OUTPATIENT)
Dept: SURGERY | Facility: CLINIC | Age: 54
End: 2024-07-18
Payer: MEDICARE

## 2024-07-19 ENCOUNTER — TELEPHONE (OUTPATIENT)
Dept: INTERNAL MEDICINE | Facility: CLINIC | Age: 54
End: 2024-07-19
Payer: MEDICARE

## 2024-07-19 DIAGNOSIS — Z01.818 PRE-OP TESTING: Primary | ICD-10-CM

## 2024-07-19 NOTE — TELEPHONE ENCOUNTER
----- Message from Lainey Austin RN sent at 7/19/2024 10:37 AM CDT -----  Regarding: Pre-Op Clearance APpointment Needed  Dr. Diaz is requesting medical clearance for the following:    Date of Procedure: Aug 7 2024  Anesthesia:  General/Regional  Procedure:  Open Incision Hernia Repair; Poss Mesh    We are requesting a medical clearance for this patient.  Please kindly contact patient to schedule this appointment.  Patient's most recent labs will be available in [employer].  An EKG is ordered, but has not yet been completed. Please let me know if you need anything else from me.    Thank you,  Lainey Austin, RN  Nurse Navigator - General Surgery  Ext [phone]

## 2024-07-19 NOTE — TELEPHONE ENCOUNTER
Called pt back  Wife answered  Scheduled Pre Op  They verbalized understanding and had no further concerns or questions.

## 2024-07-21 ENCOUNTER — PATIENT MESSAGE (OUTPATIENT)
Dept: PAIN MEDICINE | Facility: CLINIC | Age: 54
End: 2024-07-21
Payer: MEDICARE

## 2024-07-21 DIAGNOSIS — M62.838 MUSCLE SPASM: ICD-10-CM

## 2024-07-22 RX ORDER — CYCLOBENZAPRINE HCL 10 MG
10 TABLET ORAL 3 TIMES DAILY PRN
Qty: 270 TABLET | Refills: 0 | Status: SHIPPED | OUTPATIENT
Start: 2024-07-22 | End: 2024-10-23

## 2024-07-26 ENCOUNTER — OFFICE VISIT (OUTPATIENT)
Dept: INTERNAL MEDICINE | Facility: CLINIC | Age: 54
End: 2024-07-26
Payer: MEDICARE

## 2024-07-26 ENCOUNTER — HOSPITAL ENCOUNTER (OUTPATIENT)
Dept: CARDIOLOGY | Facility: OTHER | Age: 54
Discharge: HOME | End: 2024-07-26
Attending: SURGERY
Payer: MEDICARE

## 2024-07-26 VITALS
WEIGHT: 134.5 LBS | DIASTOLIC BLOOD PRESSURE: 62 MMHG | BODY MASS INDEX: 26.41 KG/M2 | OXYGEN SATURATION: 99 % | SYSTOLIC BLOOD PRESSURE: 114 MMHG | HEART RATE: 88 BPM | HEIGHT: 60 IN

## 2024-07-26 DIAGNOSIS — Z01.818 PREOP EXAM FOR INTERNAL MEDICINE: Primary | ICD-10-CM

## 2024-07-26 DIAGNOSIS — Z01.818 PRE-OP TESTING: ICD-10-CM

## 2024-07-26 LAB
OHS QRS DURATION: 78 MS
OHS QTC CALCULATION: 406 MS

## 2024-07-26 PROCEDURE — 99214 OFFICE O/P EST MOD 30 MIN: CPT | Mod: PBBFAC

## 2024-07-26 PROCEDURE — 93010 ELECTROCARDIOGRAM REPORT: CPT | Mod: ,,, | Performed by: INTERNAL MEDICINE

## 2024-07-26 PROCEDURE — 93005 ELECTROCARDIOGRAM TRACING: CPT

## 2024-07-26 PROCEDURE — 99999 PR PBB SHADOW E&M-EST. PATIENT-LVL IV: CPT | Mod: PBBFAC,,,

## 2024-07-26 NOTE — PROGRESS NOTES
HPI     Ari Sainz is a 54 y.o. adult who presents for preop today.    PCP is Siva Mitchell MD, patient is known to me.     HTN: N  DM: N  CHF: N  MI: N  CVA: N  TIA: N  Trouble with anesthesia in the past? N  Family history of sudden cardiac death? N  ADLs: self  Anticoagulation: N  KIARA: N  Cigarettes: N  Opioids: Y  Dental implants/hardware that is removable: Y      Past Medical History:  Past Medical History:   Diagnosis Date    Abnormal CT scan, pelvis 10/27/2019    Abnormal thyroid blood test 05/06/2019    Adrenal cyst     left    Adrenal insufficiency     Blister- healing 01/09/2020    Chronic bilateral low back pain with bilateral sciatica 03/19/2019    Congenital adrenal hyperplasia     Hepatitis B core antibody positive 11/06/2020    Negative sAg, suggests previous exposure but no chronic/active Hep B. At risk for reactivation with any immunosuppression medication, steroids, chemo, etc.      IGT (impaired glucose tolerance) 11/05/2019    Insomnia due to medical condition     Multiple closed traumatic fractures of multiple bones of hip and pelvis with routine healing, subsequent encounter 09/27/2019    Pancreatic cyst 11/02/2020    Restless leg syndrome     S/P lumbar laminectomy 10/28/2019    Spinal stenosis     Spinal stenosis of lumbar region with neurogenic claudication 03/19/2019    Status post sex reassignment surgery 03/19/2019    Hx of Congenital Adrenal Hyperplasia    Unintentional weight loss 07/21/2020       Home Medications:  Prior to Admission medications    Medication Sig Start Date End Date Taking? Authorizing Provider   ALPRAZolam (XANAX) 1 MG tablet Take 1 tablet (1 mg total) by mouth 3 (three) times daily as needed for Anxiety. 7/18/24   Roney Bruce, NP   ascorbic acid, vitamin C, (VITAMIN C) 1000 MG tablet Take 1,000 mg by mouth once daily.    Provider, Historical   atorvastatin (LIPITOR) 20 MG tablet Take 1 tablet (20 mg total) by mouth once daily. 6/10/24    Siva Mitchell MD   cholecalciferol, vitamin D3, 125 mcg (5,000 unit) capsule Take 1 capsule by mouth Daily. 12/5/22   Provider, Historical   cranberry fruit extract (CRANBERRY ORAL) Take by mouth.    Provider, Historical   cyanocobalamin (VITAMIN B-12) 1000 MCG tablet Take 100 mcg by mouth once daily.    Provider, Historical   cyclobenzaprine (FLEXERIL) 10 MG tablet Take 1 tablet (10 mg total) by mouth 3 (three) times daily as needed for Muscle spasms. 7/22/24 10/23/24  Jessica Farmer MD   diclofenac sodium (VOLTAREN) 1 % Gel Apply 2 g topically once daily. 2/1/23   Terri Ortega DPM   docusate sodium (COLACE) 100 MG capsule Take 1 capsule (100 mg total) by mouth 2 (two) times daily as needed for Constipation. 1/22/21   Daisy Hogue PA-C   fluticasone (FLONASE SENSIMIST) 27.5 mcg/actuation nasal spray 2 sprays by Nasal route once daily.  Patient not taking: Reported on 6/4/2024 3/13/24   Rafael Grullon NP   gabapentin (NEURONTIN) 800 MG tablet Take 1 tablet by mouth three times a day and take 2 tablets at night (5 tablets a day, 4000mg) 5/13/24   Alejandra Phoenix FNP   hydrocortisone (ANUSOL-HC) 2.5 % rectal cream Place rectally 2 (two) times daily. 6/29/23   Genia Ku MD   ketoconazole (NIZORAL) 2 % cream Apply topically once daily. 2/1/23   Terri Ortega DPM   Lactobacillus acidophilus Cap Take by mouth once daily.     Provider, Historical   lamoTRIgine (LAMICTAL) 200 MG tablet Take 1 tablet (200 mg total) by mouth 2 (two) times daily. 6/12/24   Roney Bruce NP   loratadine (CLARITIN) 10 mg tablet Take 10 mg by mouth once daily.    Provider, Historical   lubiprostone (AMITIZA) 8 MCG Cap Take 1 capsule (8 mcg total) by mouth 2 (two) times daily with meals. 4/16/24   Siva Mitchell MD   magnesium 250 mg Tab Take 1 tablet by mouth Daily. 12/5/22   Provider, Historical   naloxegoL (MOVANTIK) 25 mg tablet Take 25 mg by mouth once daily. Take one hour prior to  breakfast 5/21/24   Alejandra Phoenix FNP   naproxen sodium (ANAPROX) 220 MG tablet Take 220 mg by mouth.    Provider, Historical   oxyCODONE-acetaminophen (PERCOCET)  mg per tablet Take 1 tablet by mouth every 8 (eight) hours as needed for Pain. 7/5/24 8/7/24  Kristi Jacobson MD   predniSONE (DELTASONE) 5 MG tablet Take 1 tablet (5 mg total) by mouth once daily. Double the dose if sick 1/4/23   Delfina Alvarez MD   primidone (MYSOLINE) 50 MG Tab Take 200mg (4 tabs) every morning/150mg (3 tabs ) at noon/150mg (3 tabs) every evening 1/8/24   Baylee Esteban MD   rOPINIRole (REQUIP) 4 MG tablet Take 1 tablet (4 mg total) by mouth once daily. 8/7/23   Baylee Esteban MD   testosterone (ANDROGEL) 1 % (50 mg/5 gram) GlPk Apply 1 packet (5 grams) topically once daily. 6/26/24 1/22/25  Delfina Alvarez MD   triamcinolone acetonide 0.1% (KENALOG) 0.1 % cream Apply topically 2 (two) times daily. 9/25/23   Siva Mitchell MD       Review of Systems:  Review of Systems   Constitutional:  Negative for chills, fever and unexpected weight change.   HENT:  Negative for congestion and sore throat.    Eyes:  Negative for visual disturbance.   Respiratory:  Negative for cough, shortness of breath and wheezing.    Cardiovascular:  Negative for chest pain.   Gastrointestinal:  Negative for abdominal pain, constipation and diarrhea.   Endocrine: Negative for polydipsia and polyuria.   Genitourinary:  Negative for difficulty urinating.   Musculoskeletal: Negative.    Skin: Negative.    Neurological:  Negative for facial asymmetry, speech difficulty and weakness.   Psychiatric/Behavioral:  Negative for sleep disturbance and suicidal ideas. The patient is not nervous/anxious.        Health Maintainence:   Immunizations:  Health Maintenance         Date Due Completion Date    HIV Screening Never done ---    Complete Opioid Risk Tool Never done ---    Pneumococcal Vaccines (Age 0-64) (2 of 2 - PCV)  "01/28/2015 1/28/2014    COVID-19 Vaccine (6 - 2023-24 season) 09/01/2023 11/30/2022    Influenza Vaccine (1) 09/01/2024 10/16/2023    TETANUS VACCINE 10/05/2026 10/5/2016    Hemoglobin A1c (Diabetic Prevention Screening) 04/16/2027 4/16/2024    Lipid Panel 04/16/2029 4/16/2024    Colorectal Cancer Screening 06/29/2033 6/29/2023             PHYSICAL EXAM     /62   Pulse 88   Ht 4' 9" (1.448 m)   Wt 61 kg (134 lb 7.7 oz)   SpO2 99%   BMI 29.10 kg/m²     Physical Exam  Vitals reviewed.   Constitutional:       General: He is not in acute distress.     Appearance: He is well-developed. He is not diaphoretic.   HENT:      Head: Normocephalic and atraumatic.      Nose: Nose normal.   Eyes:      General:         Right eye: No discharge.         Left eye: No discharge.      Conjunctiva/sclera: Conjunctivae normal.      Pupils: Pupils are equal, round, and reactive to light.   Neck:      Thyroid: No thyromegaly.   Cardiovascular:      Rate and Rhythm: Normal rate and regular rhythm.      Pulses: Normal pulses.      Heart sounds: Normal heart sounds. No murmur heard.  Pulmonary:      Effort: Pulmonary effort is normal. No respiratory distress.      Breath sounds: Normal breath sounds. No wheezing.   Abdominal:      General: Abdomen is flat. There is no distension.      Palpations: Abdomen is soft.   Musculoskeletal:      Cervical back: Neck supple.   Skin:     General: Skin is warm and dry.   Neurological:      Mental Status: He is alert and oriented to person, place, and time.   Psychiatric:         Behavior: Behavior normal.         LABS     Lab Results   Component Value Date    HGBA1C 5.4 04/16/2024     CMP  Sodium   Date Value Ref Range Status   04/16/2024 137 136 - 145 mmol/L Final     Potassium   Date Value Ref Range Status   04/16/2024 4.4 3.5 - 5.1 mmol/L Final     Chloride   Date Value Ref Range Status   04/16/2024 103 95 - 110 mmol/L Final     CO2   Date Value Ref Range Status   04/16/2024 26 23 - 29 " mmol/L Final     Glucose   Date Value Ref Range Status   04/16/2024 111 (H) 70 - 110 mg/dL Final     BUN   Date Value Ref Range Status   04/16/2024 11 6 - 20 mg/dL Final     Creatinine   Date Value Ref Range Status   04/16/2024 0.8 0.5 - 1.4 mg/dL Final     Calcium   Date Value Ref Range Status   04/16/2024 9.8 8.7 - 10.5 mg/dL Final     Total Protein   Date Value Ref Range Status   06/26/2024 6.8 6.0 - 8.4 g/dL Final     Albumin   Date Value Ref Range Status   06/26/2024 4.0 3.5 - 5.2 g/dL Final     Total Bilirubin   Date Value Ref Range Status   06/26/2024 0.3 0.1 - 1.0 mg/dL Final     Comment:     For infants and newborns, interpretation of results should be based  on gestational age, weight and in agreement with clinical  observations.    Premature Infant recommended reference ranges:  Up to 24 hours.............<8.0 mg/dL  Up to 48 hours............<12.0 mg/dL  3-5 days..................<15.0 mg/dL  6-29 days.................<15.0 mg/dL       Alkaline Phosphatase   Date Value Ref Range Status   06/26/2024 104 55 - 135 U/L Final     AST   Date Value Ref Range Status   06/26/2024 30 10 - 40 U/L Final     ALT   Date Value Ref Range Status   06/26/2024 30 10 - 44 U/L Final     Anion Gap   Date Value Ref Range Status   04/16/2024 8 8 - 16 mmol/L Final     eGFR if    Date Value Ref Range Status   08/23/2021 >60.0 >60 mL/min/1.73 m^2 Final     eGFR if non    Date Value Ref Range Status   08/23/2021 >60.0 >60 mL/min/1.73 m^2 Final     Comment:     Calculation used to obtain the estimated glomerular filtration  rate (eGFR) is the CKD-EPI equation.        Lab Results   Component Value Date    WBC 6.37 04/16/2024    HGB 14.5 04/16/2024    HCT 42.1 04/16/2024    MCV 99 (H) 04/16/2024     04/16/2024     Lab Results   Component Value Date    CHOL 183 04/16/2024    CHOL 183 04/10/2023    CHOL 192 08/23/2021     Lab Results   Component Value Date    HDL 56 04/16/2024    HDL 54 04/10/2023     HDL 58 08/23/2021     Lab Results   Component Value Date    LDLCALC 103.0 04/16/2024    LDLCALC 112.6 04/10/2023    LDLCALC 111.8 08/23/2021     Lab Results   Component Value Date    TRIG 120 04/16/2024    TRIG 82 04/10/2023    TRIG 111 08/23/2021     Lab Results   Component Value Date    CHOLHDL 30.6 04/16/2024    CHOLHDL 29.5 04/10/2023    CHOLHDL 30.2 08/23/2021     Lab Results   Component Value Date    TSH 1.857 04/16/2024    T2LKKCT 88 02/27/2020       ASSESSMENT/PLAN   1. Preop exam for internal medicine       Pt clear for surgery and anesthesia.      Rafael Grullon NP   Department of Internal Medicine - [facility]  10:20 AM

## 2024-07-26 NOTE — H&P (VIEW-ONLY)
HPI     Ari Sainz is a 54 y.o. adult who presents for preop today.    PCP is Siva Mitchell MD, patient is known to me.     HTN: N  DM: N  CHF: N  MI: N  CVA: N  TIA: N  Trouble with anesthesia in the past? N  Family history of sudden cardiac death? N  ADLs: self  Anticoagulation: N  KIARA: N  Cigarettes: N  Opioids: Y  Dental implants/hardware that is removable: Y      Past Medical History:  Past Medical History:   Diagnosis Date    Abnormal CT scan, pelvis 10/27/2019    Abnormal thyroid blood test 05/06/2019    Adrenal cyst     left    Adrenal insufficiency     Blister- healing 01/09/2020    Chronic bilateral low back pain with bilateral sciatica 03/19/2019    Congenital adrenal hyperplasia     Hepatitis B core antibody positive 11/06/2020    Negative sAg, suggests previous exposure but no chronic/active Hep B. At risk for reactivation with any immunosuppression medication, steroids, chemo, etc.      IGT (impaired glucose tolerance) 11/05/2019    Insomnia due to medical condition     Multiple closed traumatic fractures of multiple bones of hip and pelvis with routine healing, subsequent encounter 09/27/2019    Pancreatic cyst 11/02/2020    Restless leg syndrome     S/P lumbar laminectomy 10/28/2019    Spinal stenosis     Spinal stenosis of lumbar region with neurogenic claudication 03/19/2019    Status post sex reassignment surgery 03/19/2019    Hx of Congenital Adrenal Hyperplasia    Unintentional weight loss 07/21/2020       Home Medications:  Prior to Admission medications    Medication Sig Start Date End Date Taking? Authorizing Provider   ALPRAZolam (XANAX) 1 MG tablet Take 1 tablet (1 mg total) by mouth 3 (three) times daily as needed for Anxiety. 7/18/24   Roney Bruce, NP   ascorbic acid, vitamin C, (VITAMIN C) 1000 MG tablet Take 1,000 mg by mouth once daily.    Provider, Historical   atorvastatin (LIPITOR) 20 MG tablet Take 1 tablet (20 mg total) by mouth once daily. 6/10/24    Siva Mitchell MD   cholecalciferol, vitamin D3, 125 mcg (5,000 unit) capsule Take 1 capsule by mouth Daily. 12/5/22   Provider, Historical   cranberry fruit extract (CRANBERRY ORAL) Take by mouth.    Provider, Historical   cyanocobalamin (VITAMIN B-12) 1000 MCG tablet Take 100 mcg by mouth once daily.    Provider, Historical   cyclobenzaprine (FLEXERIL) 10 MG tablet Take 1 tablet (10 mg total) by mouth 3 (three) times daily as needed for Muscle spasms. 7/22/24 10/23/24  Jessica Farmer MD   diclofenac sodium (VOLTAREN) 1 % Gel Apply 2 g topically once daily. 2/1/23   Terri Ortega DPM   docusate sodium (COLACE) 100 MG capsule Take 1 capsule (100 mg total) by mouth 2 (two) times daily as needed for Constipation. 1/22/21   Daisy Hogue PA-C   fluticasone (FLONASE SENSIMIST) 27.5 mcg/actuation nasal spray 2 sprays by Nasal route once daily.  Patient not taking: Reported on 6/4/2024 3/13/24   Rafael Grullon NP   gabapentin (NEURONTIN) 800 MG tablet Take 1 tablet by mouth three times a day and take 2 tablets at night (5 tablets a day, 4000mg) 5/13/24   Alejandra Phoenix FNP   hydrocortisone (ANUSOL-HC) 2.5 % rectal cream Place rectally 2 (two) times daily. 6/29/23   Genia Ku MD   ketoconazole (NIZORAL) 2 % cream Apply topically once daily. 2/1/23   Terri Ortega DPM   Lactobacillus acidophilus Cap Take by mouth once daily.     Provider, Historical   lamoTRIgine (LAMICTAL) 200 MG tablet Take 1 tablet (200 mg total) by mouth 2 (two) times daily. 6/12/24   Roney Bruce NP   loratadine (CLARITIN) 10 mg tablet Take 10 mg by mouth once daily.    Provider, Historical   lubiprostone (AMITIZA) 8 MCG Cap Take 1 capsule (8 mcg total) by mouth 2 (two) times daily with meals. 4/16/24   Siva Mitchell MD   magnesium 250 mg Tab Take 1 tablet by mouth Daily. 12/5/22   Provider, Historical   naloxegoL (MOVANTIK) 25 mg tablet Take 25 mg by mouth once daily. Take one hour prior to  breakfast 5/21/24   Alejandra Phoenix FNP   naproxen sodium (ANAPROX) 220 MG tablet Take 220 mg by mouth.    Provider, Historical   oxyCODONE-acetaminophen (PERCOCET)  mg per tablet Take 1 tablet by mouth every 8 (eight) hours as needed for Pain. 7/5/24 8/7/24  Kristi Jacobson MD   predniSONE (DELTASONE) 5 MG tablet Take 1 tablet (5 mg total) by mouth once daily. Double the dose if sick 1/4/23   Delfina Alvarez MD   primidone (MYSOLINE) 50 MG Tab Take 200mg (4 tabs) every morning/150mg (3 tabs ) at noon/150mg (3 tabs) every evening 1/8/24   Baylee Esteban MD   rOPINIRole (REQUIP) 4 MG tablet Take 1 tablet (4 mg total) by mouth once daily. 8/7/23   Baylee Esteban MD   testosterone (ANDROGEL) 1 % (50 mg/5 gram) GlPk Apply 1 packet (5 grams) topically once daily. 6/26/24 1/22/25  Delfina Alvarez MD   triamcinolone acetonide 0.1% (KENALOG) 0.1 % cream Apply topically 2 (two) times daily. 9/25/23   Siva Mitchell MD       Review of Systems:  Review of Systems   Constitutional:  Negative for chills, fever and unexpected weight change.   HENT:  Negative for congestion and sore throat.    Eyes:  Negative for visual disturbance.   Respiratory:  Negative for cough, shortness of breath and wheezing.    Cardiovascular:  Negative for chest pain.   Gastrointestinal:  Negative for abdominal pain, constipation and diarrhea.   Endocrine: Negative for polydipsia and polyuria.   Genitourinary:  Negative for difficulty urinating.   Musculoskeletal: Negative.    Skin: Negative.    Neurological:  Negative for facial asymmetry, speech difficulty and weakness.   Psychiatric/Behavioral:  Negative for sleep disturbance and suicidal ideas. The patient is not nervous/anxious.        Health Maintainence:   Immunizations:  Health Maintenance         Date Due Completion Date    HIV Screening Never done ---    Complete Opioid Risk Tool Never done ---    Pneumococcal Vaccines (Age 0-64) (2 of 2 - PCV)  "01/28/2015 1/28/2014    COVID-19 Vaccine (6 - 2023-24 season) 09/01/2023 11/30/2022    Influenza Vaccine (1) 09/01/2024 10/16/2023    TETANUS VACCINE 10/05/2026 10/5/2016    Hemoglobin A1c (Diabetic Prevention Screening) 04/16/2027 4/16/2024    Lipid Panel 04/16/2029 4/16/2024    Colorectal Cancer Screening 06/29/2033 6/29/2023             PHYSICAL EXAM     /62   Pulse 88   Ht 4' 9" (1.448 m)   Wt 61 kg (134 lb 7.7 oz)   SpO2 99%   BMI 29.10 kg/m²     Physical Exam  Vitals reviewed.   Constitutional:       General: He is not in acute distress.     Appearance: He is well-developed. He is not diaphoretic.   HENT:      Head: Normocephalic and atraumatic.      Nose: Nose normal.   Eyes:      General:         Right eye: No discharge.         Left eye: No discharge.      Conjunctiva/sclera: Conjunctivae normal.      Pupils: Pupils are equal, round, and reactive to light.   Neck:      Thyroid: No thyromegaly.   Cardiovascular:      Rate and Rhythm: Normal rate and regular rhythm.      Pulses: Normal pulses.      Heart sounds: Normal heart sounds. No murmur heard.  Pulmonary:      Effort: Pulmonary effort is normal. No respiratory distress.      Breath sounds: Normal breath sounds. No wheezing.   Abdominal:      General: Abdomen is flat. There is no distension.      Palpations: Abdomen is soft.   Musculoskeletal:      Cervical back: Neck supple.   Skin:     General: Skin is warm and dry.   Neurological:      Mental Status: He is alert and oriented to person, place, and time.   Psychiatric:         Behavior: Behavior normal.         LABS     Lab Results   Component Value Date    HGBA1C 5.4 04/16/2024     CMP  Sodium   Date Value Ref Range Status   04/16/2024 137 136 - 145 mmol/L Final     Potassium   Date Value Ref Range Status   04/16/2024 4.4 3.5 - 5.1 mmol/L Final     Chloride   Date Value Ref Range Status   04/16/2024 103 95 - 110 mmol/L Final     CO2   Date Value Ref Range Status   04/16/2024 26 23 - 29 " mmol/L Final     Glucose   Date Value Ref Range Status   04/16/2024 111 (H) 70 - 110 mg/dL Final     BUN   Date Value Ref Range Status   04/16/2024 11 6 - 20 mg/dL Final     Creatinine   Date Value Ref Range Status   04/16/2024 0.8 0.5 - 1.4 mg/dL Final     Calcium   Date Value Ref Range Status   04/16/2024 9.8 8.7 - 10.5 mg/dL Final     Total Protein   Date Value Ref Range Status   06/26/2024 6.8 6.0 - 8.4 g/dL Final     Albumin   Date Value Ref Range Status   06/26/2024 4.0 3.5 - 5.2 g/dL Final     Total Bilirubin   Date Value Ref Range Status   06/26/2024 0.3 0.1 - 1.0 mg/dL Final     Comment:     For infants and newborns, interpretation of results should be based  on gestational age, weight and in agreement with clinical  observations.    Premature Infant recommended reference ranges:  Up to 24 hours.............<8.0 mg/dL  Up to 48 hours............<12.0 mg/dL  3-5 days..................<15.0 mg/dL  6-29 days.................<15.0 mg/dL       Alkaline Phosphatase   Date Value Ref Range Status   06/26/2024 104 55 - 135 U/L Final     AST   Date Value Ref Range Status   06/26/2024 30 10 - 40 U/L Final     ALT   Date Value Ref Range Status   06/26/2024 30 10 - 44 U/L Final     Anion Gap   Date Value Ref Range Status   04/16/2024 8 8 - 16 mmol/L Final     eGFR if    Date Value Ref Range Status   08/23/2021 >60.0 >60 mL/min/1.73 m^2 Final     eGFR if non    Date Value Ref Range Status   08/23/2021 >60.0 >60 mL/min/1.73 m^2 Final     Comment:     Calculation used to obtain the estimated glomerular filtration  rate (eGFR) is the CKD-EPI equation.        Lab Results   Component Value Date    WBC 6.37 04/16/2024    HGB 14.5 04/16/2024    HCT 42.1 04/16/2024    MCV 99 (H) 04/16/2024     04/16/2024     Lab Results   Component Value Date    CHOL 183 04/16/2024    CHOL 183 04/10/2023    CHOL 192 08/23/2021     Lab Results   Component Value Date    HDL 56 04/16/2024    HDL 54 04/10/2023     HDL 58 08/23/2021     Lab Results   Component Value Date    LDLCALC 103.0 04/16/2024    LDLCALC 112.6 04/10/2023    LDLCALC 111.8 08/23/2021     Lab Results   Component Value Date    TRIG 120 04/16/2024    TRIG 82 04/10/2023    TRIG 111 08/23/2021     Lab Results   Component Value Date    CHOLHDL 30.6 04/16/2024    CHOLHDL 29.5 04/10/2023    CHOLHDL 30.2 08/23/2021     Lab Results   Component Value Date    TSH 1.857 04/16/2024    [id number] 88 02/27/2020       ASSESSMENT/PLAN   1. Preop exam for internal medicine       Pt clear for surgery and anesthesia.      Rafael Grullon NP   Department of Internal Medicine - [facility]  10:20 AM

## 2024-07-28 NOTE — PROGRESS NOTES
Subjective:       Patient ID: Ari Sainz is a 54 y.o. adult.    Chief Complaint: Dizziness and Cerumen Impaction (Lump in back of tongue )      The patient is coming in for evaluation of issues as follows.  He is medically disabled because of multiple orthopedic problems.  He self describes himself as a hypochondriac.  1. Lumps on the back of the tongue:  He has some lumps on the back of his tongue that he is concerned about.  They do not interfere with talking or swallowing.  They are not painful.    2. Recurring vertigo:  He has recurring episodes of vertigo, particularly when he changes position.  3. Blockage/itching in the ears he is having blockage of both ears with wax and recurring popping in pressure in both ears.  4. Allergic rhinitis:  He does have nasal congestion, rhinorrhea and postnasal drip.  5. Adrenal insufficiency:  The patient has longstanding adrenal insufficiency and has been on long-term oral steroids  6. Multiple orthopedic problems:  The patient has had multiple different orthopedic conditions because of his prolonged steroid therapy            Review of Systems     Constitutional: Negative for appetite change, chills, fatigue, fever and unexpected weight loss.      HENT: Positive for postnasal drip, runny nose, sinus pressure and stuffy nose.  Negative for ear discharge, ear infection, ear pain (blockage of both ears), facial swelling, hearing loss, mouth sores, nosebleeds, ringing in the ears, sinus infection, sore throat, tonsil infection, dental problems, trouble swallowing and voice change.      Eyes:  Negative for change in eyesight, eye drainage, eye itching and photophobia.     Respiratory:  Positive for sleep apnea and snoring. Negative for cough, shortness of breath and wheezing.      Cardiovascular:  Negative for chest pain, foot swelling, irregular heartbeat and swollen veins.     Gastrointestinal:  Positive for abdominal pain. Negative for acid reflux, constipation,  diarrhea, heartburn and vomiting.     Genitourinary: Negative for difficulty urinating, sexual problems and frequent urination.     Musc: Positive for aching joints, aching muscles, back pain and neck pain. Gait disorder    Skin: Negative for rash.     Allergy: Positive for seasonal allergies. Negative for food allergies.     Endocrine: Negative for cold intolerance and heat intolerance.  Osteopenia    Neurological: Positive for dizziness, light-headedness and tremors. Negative for headaches and seizures.      Hematologic: Negative for bruises/bleeds easily and swollen glands.      Psychiatric: Negative for decreased concentration, depression, nervous/anxious and sleep disturbance.  Posttraumatic stress disorder              Objective:      Physical Exam  Vitals and nursing note reviewed. Exam conducted with a chaperone present (Patient's wife accompanies him to the visit).   Constitutional:       General: He is awake.      Appearance: Normal appearance. He is well-developed, well-groomed and normal weight.   HENT:      Head: Normocephalic.      Jaw: There is normal jaw occlusion.      Salivary Glands: Right salivary gland is not diffusely enlarged or tender. Left salivary gland is not diffusely enlarged or tender.      Right Ear: Hearing, ear canal and external ear normal. There is impacted cerumen. Tympanic membrane is retracted.      Left Ear: Hearing, ear canal and external ear normal. There is impacted cerumen. Tympanic membrane is retracted.      Nose: Septal deviation (septum deviated to the right), mucosal edema (cyanotic, boggy inferior turbinates bilaterally) and rhinorrhea (clear mucus bilaterally) present. No nasal deformity. Rhinorrhea is clear.      Right Turbinates: Enlarged and pale.      Left Turbinates: Enlarged and pale.      Mouth/Throat:      Lips: No lesions.      Mouth: Mucous membranes are moist. No oral lesions.      Dentition: Abnormal dentition. No gum lesions.      Tongue: No lesions  (normal circumvallate papillae on the posterior tongue base, no suspicious lesions noted).      Palate: No mass and lesions.      Pharynx: Oropharynx is clear. Uvula midline.      Tonsils: 3+ on the right. 3+ on the left.   Eyes:      General: Lids are normal. Vision grossly intact.         Right eye: No discharge.         Left eye: No discharge.      Extraocular Movements: Extraocular movements intact.      Conjunctiva/sclera: Conjunctivae normal.      Pupils: Pupils are equal, round, and reactive to light.   Neck:      Thyroid: No thyroid mass or thyromegaly.      Trachea: Trachea normal. No tracheal deviation.   Cardiovascular:      Rate and Rhythm: Normal rate and regular rhythm.      Pulses: Normal pulses.      Heart sounds: Normal heart sounds.   Pulmonary:      Effort: Pulmonary effort is normal.      Breath sounds: Normal breath sounds. No stridor. No decreased breath sounds, wheezing, rhonchi or rales.   Abdominal:      General: Bowel sounds are normal.      Palpations: Abdomen is soft.      Tenderness: There is no abdominal tenderness.   Musculoskeletal:      Cervical back: Neck supple. No muscular tenderness. Decreased range of motion.   Lymphadenopathy:      Head:      Right side of head: No submental, submandibular, preauricular, posterior auricular or occipital adenopathy.      Left side of head: No submental, submandibular, preauricular, posterior auricular or occipital adenopathy.      Cervical: No cervical adenopathy.   Skin:     General: Skin is warm and dry.      Findings: No petechiae or rash.      Nails: There is no clubbing.   Neurological:      Mental Status: He is alert and oriented to person, place, and time.      Cranial Nerves: No cranial nerve deficit.      Sensory: No sensory deficit.      Gait: Gait normal.      Comments: Neuro otologic-cranial nerves 2-12 intact, no nystagmus, no focal or cerebellar signs wide-based, unstable gait (patient uses cane for ambulation normally), tandem  gait not attempted, Romberg falls to the right, tandem Romberg falls to the right   Psychiatric:         Speech: Speech normal.         Behavior: Behavior normal. Behavior is cooperative.         Thought Content: Thought content normal.         Judgment: Judgment normal.         Procedure:  1. Removal cerumen impactions right ear, 2. Partial removal cerumen left ear, 3.  Binocular microscopic examination of left ear Preoperative diagnosis: Bilateral cerumen impactions  Postop diagnosis: Same  Anesthesia:  None  Procedure in detail:  The patient was placed in the examining chair in the upright position.  Under direct visualization cerumen impactions was removed from the right ear with 8 Congolese suction.  The patient tolerated procedure well.  Attempted removal of cerumen impactions from left ear was unsuccessful.  The patient was placed in the semi-recumbent position in the operating microscope was brought on the field.  An aural speculum was inserted into the left ear.  Using 5 Congolese suction and a Petersen needle cerumen impactions that was adherent to the ear canal skin was partially removed leaving a significant portion adherent to the canal wall skin of the external auditory canal.  The patient tolerated procedure well was discharged post procedure      Assessment:       1. Bilateral impacted cerumen    2. Vertigo    3. Nasal septal deviation    4. Allergic rhinitis, unspecified seasonality, unspecified trigger    5. Dysfunction of Eustachian tube, unspecified laterality    6. Abnormal auditory perception, unspecified laterality        Plan:       I am placing the patient on Cortisporin otic drops and will recheck him in 1 week.  When the ear canals are clear he will need undergo an audio vestibular evaluation.                    DISCLAIMER: This note was prepared with [employer] voice recognition transcription software. Garbled syntax, mangled pronouns, and other bizarre constructions may be attributed to that software  system. While efforts were made to correct any mistakes made by this voice recognition program, some errors and/or omissions may remain in the note that were missed when the note was originally created.

## 2024-07-29 ENCOUNTER — OFFICE VISIT (OUTPATIENT)
Dept: OTOLARYNGOLOGY | Facility: CLINIC | Age: 54
End: 2024-07-29
Payer: MEDICARE

## 2024-07-29 VITALS
SYSTOLIC BLOOD PRESSURE: 126 MMHG | HEART RATE: 99 BPM | WEIGHT: 131.06 LBS | DIASTOLIC BLOOD PRESSURE: 70 MMHG | BODY MASS INDEX: 28.36 KG/M2

## 2024-07-29 DIAGNOSIS — J34.2 NASAL SEPTAL DEVIATION: ICD-10-CM

## 2024-07-29 DIAGNOSIS — R42 VERTIGO: ICD-10-CM

## 2024-07-29 DIAGNOSIS — H69.90 DYSFUNCTION OF EUSTACHIAN TUBE, UNSPECIFIED LATERALITY: ICD-10-CM

## 2024-07-29 DIAGNOSIS — J30.9 ALLERGIC RHINITIS, UNSPECIFIED SEASONALITY, UNSPECIFIED TRIGGER: ICD-10-CM

## 2024-07-29 DIAGNOSIS — H93.299 ABNORMAL AUDITORY PERCEPTION, UNSPECIFIED LATERALITY: ICD-10-CM

## 2024-07-29 DIAGNOSIS — H61.23 BILATERAL IMPACTED CERUMEN: Primary | ICD-10-CM

## 2024-07-29 LAB
OHS QRS DURATION: 78 MS
OHS QTC CALCULATION: 406 MS

## 2024-07-29 PROCEDURE — 99204 OFFICE O/P NEW MOD 45 MIN: CPT | Mod: 25,S$PBB,, | Performed by: SPECIALIST

## 2024-07-29 PROCEDURE — 99214 OFFICE O/P EST MOD 30 MIN: CPT | Mod: PBBFAC,PN | Performed by: SPECIALIST

## 2024-07-29 PROCEDURE — 99999 PR PBB SHADOW E&M-EST. PATIENT-LVL IV: CPT | Mod: PBBFAC,,, | Performed by: SPECIALIST

## 2024-07-29 RX ORDER — NEOMYCIN SULFATE, POLYMYXIN B SULFATE, HYDROCORTISONE 3.5; 10000; 1 MG/ML; [USP'U]/ML; MG/ML
SOLUTION/ DROPS AURICULAR (OTIC)
Qty: 10 ML | Refills: 1 | Status: SHIPPED | OUTPATIENT
Start: 2024-07-29

## 2024-07-30 ENCOUNTER — ANESTHESIA EVENT (OUTPATIENT)
Dept: SURGERY | Facility: HOSPITAL | Age: 54
End: 2024-07-30
Payer: MEDICARE

## 2024-07-30 ENCOUNTER — TELEPHONE (OUTPATIENT)
Dept: SURGERY | Facility: CLINIC | Age: 54
End: 2024-07-30
Payer: MEDICARE

## 2024-07-30 NOTE — ANESTHESIA PREPROCEDURE EVALUATION
[facility]  Anesthesia Pre-Operative Evaluation         Patient Name: Ari Sainz  YOB: 1970  MRN: [id number]    SUBJECTIVE:     Pre-operative evaluation for Procedure(s) (LRB):  OPEN REPAIR, HERNIA, INCISIONAL, POSSIBLE MESH (N/A)     07/30/2024    Ari Sainz is a 54 y.o. adult w/ a significant PMHx of PVD, PTSD, URBINA, congenital adrenal hyperplasia, adrenal insufficiency, chronic pain, KIARA, and abdominal hernia.    Patient now presents for the above procedure(s).      LDA: None documented.     Nuclear stress 2019:      The perfusion scan is free of evidence from myocardial ischemia or injury.    Gated perfusion images showed an ejection fraction of 90.0 % at rest and 90 % post stress. Normal is 51%.    There is  normal wall motion at rest.    LV cavity size is normal at rest and normal at stress.    Visually estimated LV function is normal.    The EKG portion of this study is negative for ischemia.    There were no arrhythmias during stress.    There are no prior studies for comparison.    Prev airway:     Date/Time: 1/17/2023 7:37 AM  Performed by: Derrek Bonilla CRNA  Authorized by: Jignesh Peña MD      Intubation:     Induction:  Intravenous    Intubated:  Postinduction    Mask Ventilation:  Easy mask    Attempts:  1    Attempted By:  CRNA    Method of Intubation:  Video laryngoscopy    Blade:  Dunaway 3    Laryngeal View Grade: Grade I - full view of cords      Difficult Airway Encountered?: No      Complications:  None    Airway Device:  Oral endotracheal tube    Airway Device Size:  7.5    Style/Cuff Inflation:  Cuffed (inflated to minimal occlusive pressure)    Tube secured:  20    Secured at:  The lips    Placement Verified By:  Capnometry    Complicating Factors:  None    Findings Post-Intubation:  BS equal bilateral and atraumatic/condition of teeth unchanged    Drips:       Patient Active Problem List   Diagnosis    Fibromyalgia    Tremor    RLS  (restless legs syndrome)    PTSD (post-traumatic stress disorder)    Congenital adrenal hyperplasia    Seasonal allergies    HLD (hyperlipidemia)    Renal cyst    Chronic pain    Neuropathic pain    Urinary incontinence, urge    Erectile disorder, generalized, mild    Carpal tunnel syndrome on both sides    Adrenal insufficiency    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    Cervical radiculopathy    Thoracic myofascial strain    Pelvic fluid collection    Chronic, continuous use of opioids    Primary osteoarthritis of right knee    KIARA (obstructive sleep apnea)    Spinal stenosis    Arthritis of right shoulder region    Status post total left knee replacement 11/2/2020    Pancreatic cyst    Elevated liver enzymes    Hepatitis B core antibody positive    Osteopenia    Left knee pain    Primary osteoarthritis of left knee    Grade III hemorrhoids    Rectal prolapse    Decreased pulses in feet    Overweight (BMI 25.0-29.9)    Mood disorder    Peripheral vascular disease, unspecified    Abdominal hernia without obstruction and without gangrene       Review of patient's allergies indicates:   Allergen Reactions    Bactrim [sulfamethoxazole-trimethoprim] Itching    Penicillins Rash       Current Inpatient Medications:      Current Facility-Administered Medications on File Prior to Encounter   Medication Dose Route Frequency Provider Last Rate Last Admin    fentaNYL injection 25 mcg  25 mcg Intravenous Q5 Min PRN French Smith MD        midazolam (VERSED) 1 mg/mL injection 0.5 mg  0.5 mg Intravenous PRN French Smith MD         Current Outpatient Medications on File Prior to Encounter   Medication Sig Dispense Refill    ALPRAZolam (XANAX) 1 MG tablet Take 1 tablet (1 mg total) by mouth 3 (three) times daily as needed for Anxiety. 90 tablet 5    ascorbic acid, vitamin C, (VITAMIN C) 1000 MG tablet Take 1,000 mg by mouth once daily.      atorvastatin (LIPITOR) 20 MG tablet Take 1 tablet (20 mg total)  by mouth once daily. 90 tablet 3    cholecalciferol, vitamin D3, 125 mcg (5,000 unit) capsule Take 1 capsule by mouth Daily.      cranberry fruit extract (CRANBERRY ORAL) Take by mouth.      cyanocobalamin (VITAMIN B-12) 1000 MCG tablet Take 100 mcg by mouth once daily.      diclofenac sodium (VOLTAREN) 1 % Gel Apply 2 g topically once daily. 100 g 3    docusate sodium (COLACE) 100 MG capsule Take 1 capsule (100 mg total) by mouth 2 (two) times daily as needed for Constipation. 60 capsule 0    fluticasone (FLONASE SENSIMIST) 27.5 mcg/actuation nasal spray 2 sprays by Nasal route once daily. 9.1 mL 0    gabapentin (NEURONTIN) 800 MG tablet Take 1 tablet by mouth three times a day and take 2 tablets at night (5 tablets a day, 4000mg) 450 tablet 2    hydrocortisone (ANUSOL-HC) 2.5 % rectal cream Place rectally 2 (two) times daily. 28 g 3    ketoconazole (NIZORAL) 2 % cream Apply topically once daily. 60 g 3    Lactobacillus acidophilus Cap Take by mouth once daily.       lamoTRIgine (LAMICTAL) 200 MG tablet Take 1 tablet (200 mg total) by mouth 2 (two) times daily. 180 tablet 3    loratadine (CLARITIN) 10 mg tablet Take 10 mg by mouth once daily.      magnesium 250 mg Tab Take 1 tablet by mouth Daily.      naloxegoL (MOVANTIK) 25 mg tablet Take 25 mg by mouth once daily. Take one hour prior to breakfast 30 tablet 2    naproxen sodium (ANAPROX) 220 MG tablet Take 220 mg by mouth.      oxyCODONE-acetaminophen (PERCOCET)  mg per tablet Take 1 tablet by mouth every 8 (eight) hours as needed for Pain. 90 tablet 0    predniSONE (DELTASONE) 5 MG tablet Take 1 tablet (5 mg total) by mouth once daily. Double the dose if sick 200 tablet 3    primidone (MYSOLINE) 50 MG Tab Take 200mg (4 tabs) every morning/150mg (3 tabs ) at noon/150mg (3 tabs) every evening 900 tablet 12    rOPINIRole (REQUIP) 4 MG tablet Take 1 tablet (4 mg total) by mouth once daily. 90 tablet 12    testosterone (ANDROGEL) 1 % (50 mg/5 gram) GlPk Apply 1  packet (5 grams) topically once daily. 30 packet 2    triamcinolone acetonide 0.1% (KENALOG) 0.1 % cream Apply topically 2 (two) times daily. 45 g 0       Past Surgical History:   Procedure Laterality Date    BACK SURGERY      BREAST SURGERY  1986,2001    Implants, removal    CHOLECYSTECTOMY N/A 05/18/2021    Procedure: CHOLECYSTECTOMY;  Surgeon: Antonio Brandt MD;  Location: [facility] OR 2ND FLR;  Service: General;  Laterality: N/A;    COLONOSCOPY N/A 6/29/2023    Procedure: COLONOSCOPY;  Surgeon: eGnia Ku MD;  Location: [facility];  Service: Colon and Rectal;  Laterality: N/A;    ENDOSCOPIC ULTRASOUND OF UPPER GASTROINTESTINAL TRACT N/A 12/03/2020    Procedure: ULTRASOUND, UPPER GI TRACT, ENDOSCOPIC;  Surgeon: Jonathan Sharma MD;  [facility]  Service: Endoscopy;  Laterality: N/A;  elevated alk phos, panc cysts, liver biopsy   11/30-covid pcw-tb    ENDOSCOPIC ULTRASOUND OF UPPER GASTROINTESTINAL TRACT N/A 05/17/2021    Procedure: ULTRASOUND, UPPER GI TRACT, ENDOSCOPIC;  Surgeon: Claudio Salvador MD;  [facility]  Service: Endoscopy;  Laterality: N/A;    ERCP N/A 05/17/2021    Procedure: ERCP (ENDOSCOPIC RETROGRADE CHOLANGIOPANCREATOGRAPHY);  Surgeon: Claudio Salvador MD;  [facility]  Service: Endoscopy;  Laterality: N/A;    gender surgery      multiple surgeries since birth    HYSTERECTOMY  2003    INJECTION OF ANESTHETIC AGENT AROUND NERVE Right 11/19/2020    Procedure: BLOCK, NERVE, GLENOHUMERAL  need consent;  Surgeon: Messi Cabello MD;  Location: [facility] PAIN MGT;  Service: Pain Management;  Laterality: Right;    JOINT REPLACEMENT  2/2020, 1/2021    Knees    KNEE ARTHROPLASTY Left 01/25/2021    Procedure: ARTHROPLASTY, KNEE:DEPUY-ATTUNE REVISION: SERINA iASSIST:CABLES ON HOLD;  Surgeon: Donte Andrade III, MD;  Location: [facility] OR;  Service: Orthopedics;  Laterality: Left;    LAMINECTOMY  09/13/2019    LAPAROSCOPIC CHOLECYSTECTOMY N/A 05/17/2021     Procedure: CHOLECYSTECTOMY, LAPAROSCOPIC;  Surgeon: Calvin Wilson MD;  Location: [facility] OR 2ND FLR;  Service: General;  Laterality: N/A;    LAPAROSCOPIC CHOLECYSTECTOMY N/A 05/18/2021    Procedure: CHOLECYSTECTOMY, LAPAROSCOPIC;  Surgeon: Antonio Brandt MD;  Location: [facility] OR 2ND FLR;  Service: General;  Laterality: N/A;    RADIOFREQUENCY ABLATION Left 05/09/2019    Procedure: RADIOFREQUENCY ABLATION, LEFT L3,4,5;  Surgeon: Messi Cabello MD;  Location: [facility] PAIN MGT;  Service: Pain Management;  Laterality: Left;  1 of 2    RADIOFREQUENCY ABLATION Right 05/23/2019    Procedure: RADIOFREQUENCY ABLATION, RIGHT L3,4,5;  Surgeon: Messi Cabello MD;  Location: [facility] PAIN MGT;  Service: Pain Management;  Laterality: Right;  2of 2    ROBOT-ASSISTED LAPAROSCOPIC RECTOPEXY N/A 01/17/2023    Procedure: ROBOTIC RECTOPEXY;  Surgeon: Genia Ku MD;  Location: [facility];  Service: Colon and Rectal;  Laterality: N/A;    SPINE SURGERY      Sept 2019     TOTAL KNEE ARTHROPLASTY Right 01/31/2020    Procedure: ARTHROPLASTY, KNEE, TOTAL;  Surgeon: Donte Andrade III, MD;  Location: [facility] OR 2ND FLR;  Service: Orthopedics;  Laterality: Right;       Social History     Socioeconomic History    Marital status:      Spouse name: Kristy Sainz    Number of children: 1   Occupational History     Comment:  and artist   Tobacco Use    Smoking status: Former     Current packs/day: 0.00     Types: Cigarettes     Quit date: 2009     Years since quitting: 15.5    Smokeless tobacco: Never    Tobacco comments:     was not a daily smoker-    Substance and Sexual Activity    Alcohol use: Not Currently    Drug use: No    Sexual activity: Yes     Partners: Female     Birth control/protection: None   Social History Narrative    Lives in a house with his wife      Social Determinants of Health     Financial Resource Strain: Low Risk  (11/26/2023)    Overall Financial Resource Strain (CARDIA)     Difficulty of Paying  Living Expenses: Not hard at all   Food Insecurity: No Food Insecurity (11/26/2023)    Hunger Vital Sign     Worried About Running Out of Food in the Last Year: Never true     Ran Out of Food in the Last Year: Never true   Transportation Needs: No Transportation Needs (11/26/2023)    PRAPARE - Transportation     Lack of Transportation (Medical): No     Lack of Transportation (Non-Medical): No   Physical Activity: Insufficiently Active (11/26/2023)    Exercise Vital Sign     Days of Exercise per Week: 4 days     Minutes of Exercise per Session: 20 min   Stress: Stress Concern Present (11/26/2023)    [facility] - Occupational Stress Questionnaire     Feeling of Stress : To some extent   Housing Stability: Low Risk  (11/26/2023)    Housing Stability Vital Sign     Unable to Pay for Housing in the Last Year: No     Number of Places Lived in the Last Year: 1     Unstable Housing in the Last Year: No       OBJECTIVE:     Vital Signs Range (Last 24H):         Significant Labs:  Lab Results   Component Value Date    WBC 8.06 07/26/2024    HGB 14.8 07/26/2024    HCT 43.6 07/26/2024     07/26/2024    CHOL 183 04/16/2024    TRIG 120 04/16/2024    HDL 56 04/16/2024    ALT 30 06/26/2024    AST 30 06/26/2024     (L) 07/26/2024    K 4.3 07/26/2024    CL 98 07/26/2024    CREATININE 0.8 07/26/2024    BUN 7 07/26/2024    CO2 23 07/26/2024    TSH 1.857 04/16/2024    INR 1.2 05/18/2021    HGBA1C 5.4 04/16/2024       Diagnostic Studies: No relevant studies.    EKG:   Results for orders placed or performed during the hospital encounter of 07/26/24   SCHEDULED EKG 12-LEAD (to Muse)    Collection Time: 07/26/24 11:05 AM   Result Value Ref Range    QRS Duration 78 ms    [facility] QTC Calculation 406 ms    Narrative    Test Reason : Z01.818,    Vent. Rate : 082 BPM     Atrial Rate : 082 BPM     P-R Int : 136 ms          QRS Dur : 078 ms      QT Int : 348 ms       P-R-T Axes : 059 054 062 degrees     QTc  Int : 406 ms    Normal sinus rhythm  Normal ECG    Confirmed by Ruperto Holguin MD ([id number]) on 7/29/2024 11:19:31 AM    Referred By: FREDI TAPIA           Confirmed By:Ruperto Holguin MD       2D ECHO:  TTE:  No results found. However, due to the size of the patient record, not all encounters were searched. Please check Results Review for a complete set of results.    ANA:  No results found. However, due to the size of the patient record, not all encounters were searched. Please check Results Review for a complete set of results.    ASSESSMENT/PLAN:          Pre-op Assessment    I have reviewed the Patient Summary Reports.     I have reviewed the Nursing Notes. I have reviewed the NPO Status.   I have reviewed the Medications.     Review of Systems  Anesthesia Hx:  No problems with previous Anesthesia             Denies Family Hx of Anesthesia complications.    Denies Personal Hx of Anesthesia complications.                    Social:  Non-Smoker, Former Smoker       Hematology/Oncology:  Hematology Normal   Oncology Normal                                   EENT/Dental:  EENT/Dental Normal           Cardiovascular:  Exercise tolerance: poor             PVD                              Pulmonary:         Denies Sleep Apnea. (Patient denies)                Renal/:  Renal/ Normal                 Hepatic/GI:      Liver Disease,  URBINA  Abdominal hernia          Musculoskeletal:         Spine Disorders: lumbar and cervical Chronic Pain           Neurological:           Restless leg syndrome      Chronic Pain Syndrome                         Endocrine:     Congenital adrenal hyperplasia        Dermatological:  Skin Normal    Psych:     PTSD               Physical Exam  General: Well nourished, Cooperative and Alert    Airway:  Mallampati: II   Mouth Opening: Normal  TM Distance: Normal  Tongue: Normal  Neck ROM: Normal ROM    Dental:    Chest/Lungs:  Normal Respiratory Rate    Heart:  Rate:  Normal        Anesthesia Plan  Type of Anesthesia, risks & benefits discussed:    Anesthesia Type: Gen ETT  Intra-op Monitoring Plan: Standard ASA Monitors  Post Op Pain Control Plan: multimodal analgesia and IV/PO Opioids PRN  Induction:  IV  Airway Plan: Direct, Post-Induction  Informed Consent: Informed consent signed with the Patient and all parties understand the risks and agree with anesthesia plan.  All questions answered.   ASA Score: 3  Day of Surgery Review of History & Physical: H&P Update referred to the surgeon/provider.    Ready For Surgery From Anesthesia Perspective.     .

## 2024-07-31 ENCOUNTER — ANESTHESIA (OUTPATIENT)
Dept: SURGERY | Facility: HOSPITAL | Age: 54
End: 2024-07-31
Payer: MEDICARE

## 2024-07-31 ENCOUNTER — HOSPITAL ENCOUNTER (OUTPATIENT)
Facility: HOSPITAL | Age: 54
Discharge: HOME | End: 2024-07-31
Attending: SURGERY | Admitting: SURGERY
Payer: MEDICARE

## 2024-07-31 VITALS
DIASTOLIC BLOOD PRESSURE: 69 MMHG | BODY MASS INDEX: 25.76 KG/M2 | SYSTOLIC BLOOD PRESSURE: 117 MMHG | RESPIRATION RATE: 17 BRPM | WEIGHT: 131.19 LBS | HEIGHT: 60 IN | HEART RATE: 88 BPM | TEMPERATURE: 99 F | OXYGEN SATURATION: 100 %

## 2024-07-31 DIAGNOSIS — K43.2 INCISIONAL HERNIA: ICD-10-CM

## 2024-07-31 DIAGNOSIS — K43.2 INCISIONAL HERNIA, WITHOUT OBSTRUCTION OR GANGRENE: Primary | ICD-10-CM

## 2024-07-31 PROCEDURE — 37000008 HC ANESTHESIA 1ST 15 MINUTES: Performed by: SURGERY

## 2024-07-31 PROCEDURE — 63600175 PHARM REV CODE 636 W HCPCS

## 2024-07-31 PROCEDURE — 63600175 PHARM REV CODE 636 W HCPCS: Mod: JZ,JG | Performed by: ANESTHESIOLOGY

## 2024-07-31 PROCEDURE — 25000003 PHARM REV CODE 250

## 2024-07-31 PROCEDURE — 64488 TAP BLOCK BI INJECTION: CPT

## 2024-07-31 PROCEDURE — 71000015 HC POSTOP RECOV 1ST HR: Performed by: SURGERY

## 2024-07-31 PROCEDURE — 36000706: Performed by: SURGERY

## 2024-07-31 PROCEDURE — 36000707: Performed by: SURGERY

## 2024-07-31 PROCEDURE — 37000009 HC ANESTHESIA EA ADD 15 MINS: Performed by: SURGERY

## 2024-07-31 PROCEDURE — 71000044 HC DOSC ROUTINE RECOVERY FIRST HOUR: Performed by: SURGERY

## 2024-07-31 PROCEDURE — 49593 RPR AA HRN 1ST 3-10 RDC: CPT | Mod: GC,,, | Performed by: SURGERY

## 2024-07-31 RX ORDER — PROPOFOL 10 MG/ML
VIAL (ML) INTRAVENOUS
Status: DISCONTINUED | OUTPATIENT
Start: 2024-07-31 | End: 2024-07-31

## 2024-07-31 RX ORDER — DEXAMETHASONE SODIUM PHOSPHATE 4 MG/ML
INJECTION, SOLUTION INTRA-ARTICULAR; INTRALESIONAL; INTRAMUSCULAR; INTRAVENOUS; SOFT TISSUE
Status: DISCONTINUED | OUTPATIENT
Start: 2024-07-31 | End: 2024-07-31

## 2024-07-31 RX ORDER — FENTANYL CITRATE 50 UG/ML
25-200 INJECTION, SOLUTION INTRAMUSCULAR; INTRAVENOUS
Status: DISCONTINUED | OUTPATIENT
Start: 2024-07-31 | End: 2024-07-31 | Stop reason: HOSPADM

## 2024-07-31 RX ORDER — OXYCODONE HYDROCHLORIDE 5 MG/1
5 TABLET ORAL EVERY 4 HOURS PRN
Qty: 10 TABLET | Refills: 0 | Status: SHIPPED | OUTPATIENT
Start: 2024-07-31

## 2024-07-31 RX ORDER — ACETAMINOPHEN 325 MG/1
650 TABLET ORAL EVERY 6 HOURS PRN
COMMUNITY
Start: 2024-07-31

## 2024-07-31 RX ORDER — SODIUM CHLORIDE 9 MG/ML
INJECTION, SOLUTION INTRAVENOUS CONTINUOUS
Status: DISCONTINUED | OUTPATIENT
Start: 2024-07-31 | End: 2024-07-31 | Stop reason: HOSPADM

## 2024-07-31 RX ORDER — POLYETHYLENE GLYCOL 3350 17 G/17G
17 POWDER, FOR SOLUTION ORAL DAILY
COMMUNITY
Start: 2024-07-31

## 2024-07-31 RX ORDER — ONDANSETRON HYDROCHLORIDE 2 MG/ML
INJECTION, SOLUTION INTRAVENOUS
Status: DISCONTINUED | OUTPATIENT
Start: 2024-07-31 | End: 2024-07-31

## 2024-07-31 RX ORDER — HALOPERIDOL 5 MG/ML
0.5 INJECTION INTRAMUSCULAR EVERY 10 MIN PRN
Status: DISCONTINUED | OUTPATIENT
Start: 2024-07-31 | End: 2024-07-31 | Stop reason: HOSPADM

## 2024-07-31 RX ORDER — MIDAZOLAM HYDROCHLORIDE 1 MG/ML
.5-4 INJECTION, SOLUTION INTRAMUSCULAR; INTRAVENOUS
Status: DISCONTINUED | OUTPATIENT
Start: 2024-07-31 | End: 2024-07-31 | Stop reason: HOSPADM

## 2024-07-31 RX ORDER — PHENYLEPHRINE HYDROCHLORIDE 10 MG/ML
INJECTION INTRAVENOUS
Status: DISCONTINUED | OUTPATIENT
Start: 2024-07-31 | End: 2024-07-31

## 2024-07-31 RX ORDER — KETOROLAC TROMETHAMINE 10 MG/1
10 TABLET, FILM COATED ORAL EVERY 6 HOURS
Qty: 20 TABLET | Refills: 0 | Status: SHIPPED | OUTPATIENT
Start: 2024-07-31 | End: 2024-08-05

## 2024-07-31 RX ORDER — ACETAMINOPHEN 500 MG
1000 TABLET ORAL
Status: COMPLETED | OUTPATIENT
Start: 2024-07-31 | End: 2024-07-31

## 2024-07-31 RX ORDER — GLUCAGON 1 MG
1 KIT INJECTION
Status: DISCONTINUED | OUTPATIENT
Start: 2024-07-31 | End: 2024-07-31 | Stop reason: HOSPADM

## 2024-07-31 RX ORDER — LIDOCAINE HYDROCHLORIDE 10 MG/ML
1 INJECTION, SOLUTION EPIDURAL; INFILTRATION; INTRACAUDAL; PERINEURAL ONCE
OUTPATIENT
Start: 2024-07-31 | End: 2024-07-31

## 2024-07-31 RX ORDER — SODIUM CHLORIDE 0.9 % (FLUSH) 0.9 %
10 SYRINGE (ML) INJECTION
Status: DISCONTINUED | OUTPATIENT
Start: 2024-07-31 | End: 2024-07-31 | Stop reason: HOSPADM

## 2024-07-31 RX ORDER — CEFAZOLIN SODIUM 1 G/3ML
INJECTION, POWDER, FOR SOLUTION INTRAMUSCULAR; INTRAVENOUS
Status: DISCONTINUED | OUTPATIENT
Start: 2024-07-31 | End: 2024-07-31

## 2024-07-31 RX ORDER — FENTANYL CITRATE 50 UG/ML
INJECTION, SOLUTION INTRAMUSCULAR; INTRAVENOUS
Status: DISCONTINUED | OUTPATIENT
Start: 2024-07-31 | End: 2024-07-31

## 2024-07-31 RX ORDER — ROCURONIUM BROMIDE 10 MG/ML
INJECTION, SOLUTION INTRAVENOUS
Status: DISCONTINUED | OUTPATIENT
Start: 2024-07-31 | End: 2024-07-31

## 2024-07-31 RX ORDER — ACETAMINOPHEN 500 MG
1000 TABLET ORAL
Status: DISCONTINUED | OUTPATIENT
Start: 2024-07-31 | End: 2024-07-31

## 2024-07-31 RX ORDER — BUPIVACAINE HYDROCHLORIDE 2.5 MG/ML
INJECTION, SOLUTION EPIDURAL; INFILTRATION; INTRACAUDAL
Status: COMPLETED | OUTPATIENT
Start: 2024-07-31 | End: 2024-07-31

## 2024-07-31 RX ORDER — KETAMINE HCL IN 0.9 % NACL 50 MG/5 ML
SYRINGE (ML) INTRAVENOUS
Status: DISCONTINUED | OUTPATIENT
Start: 2024-07-31 | End: 2024-07-31

## 2024-07-31 RX ORDER — LIDOCAINE HYDROCHLORIDE 20 MG/ML
INJECTION, SOLUTION EPIDURAL; INFILTRATION; INTRACAUDAL; PERINEURAL
Status: DISCONTINUED | OUTPATIENT
Start: 2024-07-31 | End: 2024-07-31

## 2024-07-31 RX ORDER — HYDROMORPHONE HYDROCHLORIDE 1 MG/ML
0.2 INJECTION, SOLUTION INTRAMUSCULAR; INTRAVENOUS; SUBCUTANEOUS EVERY 5 MIN PRN
Status: DISCONTINUED | OUTPATIENT
Start: 2024-07-31 | End: 2024-07-31 | Stop reason: HOSPADM

## 2024-07-31 RX ADMIN — ONDANSETRON 4 MG: 2 INJECTION INTRAMUSCULAR; INTRAVENOUS at 12:07

## 2024-07-31 RX ADMIN — ROCURONIUM BROMIDE 40 MG: 10 INJECTION, SOLUTION INTRAVENOUS at 11:07

## 2024-07-31 RX ADMIN — PHENYLEPHRINE HYDROCHLORIDE 100 MCG: 10 INJECTION INTRAVENOUS at 12:07

## 2024-07-31 RX ADMIN — Medication 120 MG: at 11:07

## 2024-07-31 RX ADMIN — SODIUM CHLORIDE: 0.9 INJECTION, SOLUTION INTRAVENOUS at 11:07

## 2024-07-31 RX ADMIN — ACETAMINOPHEN 1000 MG: 500 TABLET ORAL at 10:07

## 2024-07-31 RX ADMIN — FENTANYL CITRATE 100 MCG: 50 INJECTION INTRAMUSCULAR; INTRAVENOUS at 11:07

## 2024-07-31 RX ADMIN — MIDAZOLAM 2 MG: 1 INJECTION INTRAMUSCULAR; INTRAVENOUS at 11:07

## 2024-07-31 RX ADMIN — SODIUM CHLORIDE: 9 INJECTION, SOLUTION INTRAVENOUS at 10:07

## 2024-07-31 RX ADMIN — FENTANYL CITRATE 50 MCG: 50 INJECTION, SOLUTION INTRAMUSCULAR; INTRAVENOUS at 11:07

## 2024-07-31 RX ADMIN — LIDOCAINE HYDROCHLORIDE 60 MG: 20 INJECTION, SOLUTION EPIDURAL; INFILTRATION; INTRACAUDAL; PERINEURAL at 11:07

## 2024-07-31 RX ADMIN — CEFAZOLIN 2 G: 330 INJECTION, POWDER, FOR SOLUTION INTRAMUSCULAR; INTRAVENOUS at 11:07

## 2024-07-31 RX ADMIN — DEXAMETHASONE SODIUM PHOSPHATE 8 MG: 4 INJECTION, SOLUTION INTRAMUSCULAR; INTRAVENOUS at 11:07

## 2024-07-31 RX ADMIN — ROCURONIUM BROMIDE 20 MG: 10 INJECTION, SOLUTION INTRAVENOUS at 12:07

## 2024-07-31 RX ADMIN — SODIUM CHLORIDE, SODIUM GLUCONATE, SODIUM ACETATE, POTASSIUM CHLORIDE, MAGNESIUM CHLORIDE, SODIUM PHOSPHATE, DIBASIC, AND POTASSIUM PHOSPHATE: .53; .5; .37; .037; .03; .012; .00082 INJECTION, SOLUTION INTRAVENOUS at 12:07

## 2024-07-31 RX ADMIN — Medication 10 MG: at 12:07

## 2024-07-31 RX ADMIN — BUPIVACAINE HYDROCHLORIDE 40 ML: 2.5 INJECTION, SOLUTION EPIDURAL; INFILTRATION; INTRACAUDAL; PERINEURAL at 11:07

## 2024-07-31 RX ADMIN — SUGAMMADEX 200 MG: 100 INJECTION, SOLUTION INTRAVENOUS at 12:07

## 2024-07-31 RX ADMIN — Medication 80 MG: at 11:07

## 2024-07-31 NOTE — BRIEF OP NOTE
Operative Note       Surgery Date: 7/31/2024     Surgeons and Role:     * Messi Diaz MD - Primary     * João Swan MD - Resident - Assisting     * Charlene Bradley MD - Resident - Assisting    Pre-op Diagnosis:  Abdominal hernia without obstruction and without gangrene, recurrence not specified, unspecified hernia type [K46.9]    Post-op Diagnosis:  Abdominal hernia without obstruction and without gangrene, recurrence not specified, unspecified hernia type [K46.9]    Procedure(s) (LRB):  OPEN REPAIR, HERNIA, INCISIONAL, POSSIBLE MESH (N/A)    Anesthesia: General/Regional    Procedure in Detail/Findings:  1.5cm defect.  Primary repair.    Estimated Blood Loss: Minimal           Specimens (From admission, onward)       Start     Ordered    07/31/24 1234  Specimen to Pathology, Surgery General Surgery  Once        Comments: Pre-op Diagnosis: Abdominal hernia without obstruction and without gangrene, recurrence not specified, unspecified hernia type [K46.9]Procedure(s):OPEN REPAIR, HERNIA, INCISIONAL, POSSIBLE MESH Number of specimens: 1Name of specimens: 1.) hernia sac (permanent)     References:    Click here for ordering Quick Tip   Question Answer Comment   Procedure Type: General Surgery    Release to patient Immediate        07/31/24 1233                  Implants: * No implants in log *           Disposition: PACU - hemodynamically stable.           Condition: Good    Attestation:  I was present and scrubbed for the entire procedure.

## 2024-07-31 NOTE — ANESTHESIA PROCEDURE NOTES
Intubation    Date/Time: 7/31/2024 11:49 AM    Performed by: Theron Bhardwaj MD  Authorized by: Piero Kelly MD    Intubation:     Induction:  Intravenous    Intubated:  Postinduction    Mask Ventilation:  Easy with oral airway    Attempts:  2    Attempted By:  Resident anesthesiologist    Method of Intubation:  Video laryngoscopy and direct    Blade:  Ming 3    Laryngeal View Grade: Grade IIA - cords partially seen      Attempted By (2nd Attempt):  Resident anesthesiologist    Method of Intubation (2nd Attempt):  Direct    Blade (2nd Attempt):  Ming 3    Laryngeal View Grade (2nd Attempt): Grade I - full view of cords      Difficult Airway Encountered?: No      Complications:  None    Airway Device:  Oral endotracheal tube    Airway Device Size:  7.0    Style/Cuff Inflation:  Cuffed (inflated to minimal occlusive pressure)    Tube secured:  21    Secured at:  The teeth    Placement Verified By:  Capnometry    Complicating Factors:  None    Findings Post-Intubation:  BS equal bilateral and atraumatic/condition of teeth unchanged

## 2024-07-31 NOTE — DISCHARGE SUMMARY
[facility] - [facility] (2nd Fl)  Discharge Note  Short Stay    Procedure(s) (LRB):  OPEN REPAIR, HERNIA, INCISIONAL, POSSIBLE MESH (N/A)      OUTCOME: Patient tolerated treatment/procedure well without complication and is now ready for discharge.    DISPOSITION: Home or Self Care    FINAL DIAGNOSIS:  <principal problem not specified>    FOLLOWUP: In clinic    DISCHARGE INSTRUCTIONS:    Discharge Procedure Orders   Diet Adult Regular     Lifting restrictions   Order Comments: No more than 10 lbs for 6 weeks (no more than a gallon of milk)     No driving until:   Order Comments: While taking narcotic medication     Notify your health care provider if you experience any of the following:  temperature >100.4     Notify your health care provider if you experience any of the following:  persistent nausea and vomiting or diarrhea     Notify your health care provider if you experience any of the following:  severe uncontrolled pain     Notify your health care provider if you experience any of the following:  redness, tenderness, or signs of infection (pain, swelling, redness, odor or green/yellow discharge around incision site)     Remove dressing in 48 hours         Clinical Reference Documents Added to Patient Instructions         Document    ABDOMINAL HERNIA DISCHARGE INSTRUCTIONS (ENGLISH)            TIME SPENT ON DISCHARGE: 30 minutes

## 2024-07-31 NOTE — TRANSFER OF CARE
"Anesthesia Transfer of Care Note    Patient: Ari Sainz    Procedure(s) Performed: Procedure(s) (LRB):  OPEN REPAIR, HERNIA, INCISIONAL, POSSIBLE MESH (N/A)    Patient location: PACU    Anesthesia Type: general    Transport from OR: Transported from OR on 6-10 L/min O2 by face mask with adequate spontaneous ventilation    Post pain: adequate analgesia    Post assessment: no apparent anesthetic complications    Post vital signs: stable    Level of consciousness: awake    Nausea/Vomiting: no nausea/vomiting    Complications: none    Transfer of care protocol was followed      Last vitals: Visit Vitals  BP (!) 103/55 (BP Location: Right arm, Patient Position: Lying)   Pulse 83   Temp 37.1 °C (98.8 °F) (Temporal)   Resp 10   Ht 4' 9" (1.448 m)   Wt 59.5 kg (131 lb 2.8 oz)   SpO2 100%   Breastfeeding No   BMI 28.39 kg/m²     "

## 2024-07-31 NOTE — PLAN OF CARE
Patient is stable and ready for discharge. Instructions given to patient and spouse.. Questions answered. Patient tolerating po liquids with no difficulty. Patient has no pain or states it is a tolerable level for them. Anesthesia consent and surgical consent in chart.

## 2024-07-31 NOTE — ANESTHESIA PROCEDURE NOTES
BL Rectus Sheath SS    Patient location during procedure: pre-op   Block not for primary anesthetic.  Reason for block: at surgeon's request and post-op pain management   Post-op Pain Location: abdominal pain   Start time: 7/31/2024 11:23 AM  Timeout: 7/31/2024 11:23 AM   End time: 7/31/2024 11:33 AM    Staffing  Authorizing Provider: Earnestine Sexton MD  Performing Provider: Jose Luis Chapman MD    Staffing  Performed by: Jose Luis Chapman MD  Authorized by: Earnestine Sextno MD    Preanesthetic Checklist  Completed: patient identified, IV checked, site marked, risks and benefits discussed, surgical consent, monitors and equipment checked, pre-op evaluation and timeout performed  Peripheral Block  Patient position: supine  Prep: ChloraPrep  Patient monitoring: heart rate, cardiac monitor, continuous pulse ox, continuous capnometry and frequent blood pressure checks  Block type: rectus sheath  Laterality: bilateral  Injection technique: single shot  Needle  Needle type: Stimuplex   Needle gauge: 22 G  Needle length: 2 in  Needle localization: anatomical landmarks and ultrasound guidance   -ultrasound image captured on disc.  Assessment  Injection assessment: negative aspiration, negative parasthesia and local visualized surrounding nerve  Paresthesia pain: none  Heart rate change: no  Slow fractionated injection: yes  Pain Tolerance: comfortable throughout block and no complaints  Medications:    Medications: bupivacaine (pf) (MARCAINE) injection 0.25% - Perineural   40 mL - 7/31/2024 11:33:00 AM    Additional Notes  VSS.  Vitals monitored throughout procedure - see record.  Patient tolerated procedure well.

## 2024-07-31 NOTE — OP NOTE
Yann Murguia - Surgery ([facility])  General Surgery  Operative Note    SUMMARY     Date of Procedure: 7/31/2024     Procedure: Procedure(s) (LRB):  OPEN REPAIR, HERNIA, INCISIONAL, POSSIBLE MESH (N/A)       Surgeons and Role:     * Messi Diaz MD - Primary     * João Swan MD - Resident - Assisting     * Charlene Bradley MD - Resident - Assisting    Pre-Operative Diagnosis: Abdominal hernia without obstruction and without gangrene, recurrence not specified, unspecified hernia type [K46.9]    Post-Operative Diagnosis: Post-Op Diagnosis Codes:     * Abdominal hernia without obstruction and without gangrene, recurrence not specified, unspecified hernia type [K46.9]    Anesthesia: General anesthesia with regional block    Operative Findings (including complications, if any):  3 cm open incisional hernia repair without use of mesh    Description of Technical Procedures:   After the procedure was explained and all questions answered appropriately, consent was obtained. The patient was then brought back to the Operating Room, where he was placed in the supine position.  General endotracheal anesthesia was then induced.  Next, the patient was prepped and draped in the usual sterile fashion.  A timeout, including the patient's name, allergies, procedure, was then performed, to which all members of the operative team agreed. We also confirmed the administration of appropriate pre-operative antibiotics.     We began with a 3 cm linear incision above the umbilicus, utilizing a midline incision scar. Dissection through skin utilizing an 15 blade. Dissection down through subcutaneous tissue to the level of hernia sac was completed using bovie electrocautery. The hernia sac was identified and dissected free from the surrounding tissue. The hernia sac was opened and we confirmed that all contents had been reduced. The hernia was was then ligated with a Vicryl. The hernia sac was sent for pathology. The fascial defect measured  3 cm. We cleaned off the surrounding fascia and closed the defect primarily with a 2-0 Vicryl absorbable suture in a running fashion. The fascia was approximated without tension. Subcutaneous tissues were closed using running 3-0 Vicryl absorbable sutures. Skin was closed in a running subcuticular fashion utilizing 4-0 monocryl absorbable suture. Hemostasis was achieved throughout the procedure. Sterile dressings were applied. Patient tolerated the procedure well and was extubated in the operating room.    Estimated Blood Loss (EBL): * <5 cc           Specimens:   Specimen (24h ago, onward)       Start     Ordered    07/31/24 1234  Specimen to Pathology, Surgery General Surgery  Once        Comments: Pre-op Diagnosis: Abdominal hernia without obstruction and without gangrene, recurrence not specified, unspecified hernia type [K46.9]Procedure(s):OPEN REPAIR, HERNIA, INCISIONAL, POSSIBLE MESH Number of specimens: 1Name of specimens: 1.) hernia sac (permanent)     References:    Click here for ordering Quick Tip   Question Answer Comment   Procedure Type: General Surgery    Release to patient Immediate        07/31/24 1233                            Condition: Good    Disposition: PACU - hemodynamically stable.    Attestation: I was present and scrubbed for the entire procedure.

## 2024-07-31 NOTE — DISCHARGE INSTRUCTIONS
Postoperative General Instructions    What to Expect:  It is normal to experience pain and swelling at the surgical site.  The pain usually decreases significantly after the first week but may last for many weeks.  Each day, the pain should be similar or better to the previous day. If it worsens, call the doctor's office.     Activity:  You should walk beginning on the day of surgery and increase activity slowly over the next two to four weeks.  Do not drive while taking prescription pain medication.  You may return to work when you feel ready.  Do not lift anything heavier than 10 lbs for 6 weeks.     Wound Care:  You may shower. Let soap and water run over the incisions and pat dry. Do not submerge in a bathtub or pool.  If you have an open wound, you should change the dressing twice daily with moist gauze.     Diet:  You may resume your normal diet postoperatively.  Take a stool softener or laxative to avoid constipation.     Medication:  Pain Control  Take acetaminophen (Tylenol) 650 mg 4 times daily as needed for pain.  Take Toradol 10 mg every 6 hours for 5 days. Do not take Naproxen while you are taking Toradol.  Take the prescribed oxycodone as needed if you have pain that is not controlled by acetaminophen and ibuprofen.  Bowel Regularity  Take an over-the-counter stool softener/laxative (Colace, Miralax, or Milk of Magnesia) to avoid constipation.  Previous Home Medication  Restart your previous home medication unless otherwise instructed.    Call Your Doctor's Office If You Experience:  Fevers greater than 101.3°F.  Vomiting.  Spreading redness or drainage from the incisions.  Opening of the incisions.  Worsening pain not controlled by medication.  Chest pain or shortness of breath.    Follow-Up:  Follow-up with your surgeon as scheduled in 2 weeks.  If no follow-up appointment has been scheduled, call to schedule an appointment within 1-2 weeks of discharge.     Contact Information:  During normal  business hours call the clinic with any questions or concerns. On weekends and evenings call [phone] to have the operators page the surgery resident on call after hours with questions or concerns.

## 2024-07-31 NOTE — BRIEF OP NOTE
Yann lissa - Surgery ([facility])  Brief Operative Note    SUMMARY     Surgery Date: 7/31/2024     Surgeons and Role:     * Messi Diaz MD - Primary     * João Swan MD - Resident - Assisting     * Charlene Bradley MD - Resident - Assisting      Pre-op Diagnosis:  Abdominal hernia without obstruction and without gangrene, recurrence not specified, unspecified hernia type [K46.9]    Post-op Diagnosis:  Post-Op Diagnosis Codes:     * Abdominal hernia without obstruction and without gangrene, recurrence not specified, unspecified hernia type [K46.9]    Procedure(s) (LRB):  OPEN REPAIR, HERNIA, INCISIONAL, POSSIBLE MESH (N/A)    Anesthesia: General/Regional    Implants:  * No implants in log *    Operative Findings: 3 cm incisional hernia repair completed without complication. No mesh used.    Estimated Blood Loss: * No values recorded between 7/31/2024 12:12 PM and 7/31/2024 12:56 PM *    Estimated Blood Loss has not been documented. EBL = < 1 cc.         Specimens:   Specimen (24h ago, onward)       Start     Ordered    07/31/24 1234  Specimen to Pathology, Surgery General Surgery  Once        Comments: Pre-op Diagnosis: Abdominal hernia without obstruction and without gangrene, recurrence not specified, unspecified hernia type [K46.9]Procedure(s):OPEN REPAIR, HERNIA, INCISIONAL, POSSIBLE MESH Number of specimens: 1Name of specimens: 1.) hernia sac (permanent)     References:    Click here for ordering Quick Tip   Question Answer Comment   Procedure Type: General Surgery    Release to patient Immediate        07/31/24 1233                    [id number]

## 2024-08-01 NOTE — ANESTHESIA POSTPROCEDURE EVALUATION
Anesthesia Post Evaluation    Patient: Ari Sainz    Procedure(s) Performed: Procedure(s) (LRB):  OPEN REPAIR, HERNIA, INCISIONAL, POSSIBLE MESH (N/A)    Final Anesthesia Type: general      Patient location during evaluation: PACU  Patient participation: Yes- Able to Participate  Level of consciousness: awake and alert  Post-procedure vital signs: reviewed and stable  Pain management: adequate  Airway patency: patent  KIARA mitigation strategies: Extubation while patient is awake, Multimodal analgesia and Use of major conduction anesthesia (spinal/epidural) or peripheral nerve block  PONV status at discharge: No PONV  Anesthetic complications: no      Cardiovascular status: stable  Respiratory status: unassisted and spontaneous ventilation  Hydration status: euvolemic  Follow-up not needed.              Vitals Value Taken Time   /69 07/31/24 1331   Temp  08/01/24 0723   Pulse 85 07/31/24 1339   Resp 36 07/31/24 1338   SpO2 100 % 07/31/24 1339   Vitals shown include unfiled device data.      No case tracking events are documented in the log.      Pain/Andreea Score: Pain Rating Prior to Med Admin: 8 (7/31/2024 10:40 AM)  Pain Rating Post Med Admin: 0 (7/31/2024 11:05 AM)  Andreea Score: 6 (7/31/2024  1:00 PM)

## 2024-08-01 NOTE — PROGRESS NOTES
Phone call: He is doing pretty well.  He has some pain.  He is eating light food (his pain is better than preop), denies fevers, urinating ok, has passed gas (no bm), and wound is covered with a dressing.  There is nothing he needs.

## 2024-08-06 ENCOUNTER — PATIENT MESSAGE (OUTPATIENT)
Dept: SURGERY | Facility: CLINIC | Age: 54
End: 2024-08-06
Payer: MEDICARE

## 2024-08-11 ENCOUNTER — PATIENT MESSAGE (OUTPATIENT)
Dept: PAIN MEDICINE | Facility: CLINIC | Age: 54
End: 2024-08-11
Payer: MEDICARE

## 2024-08-11 DIAGNOSIS — M62.838 MUSCLE SPASM: ICD-10-CM

## 2024-08-12 RX ORDER — OXYCODONE AND ACETAMINOPHEN 10; 325 MG/1; MG/1
1 TABLET ORAL EVERY 8 HOURS PRN
Qty: 90 TABLET | Refills: 0 | Status: SHIPPED | OUTPATIENT
Start: 2024-08-12 | End: 2024-09-14

## 2024-08-12 NOTE — TELEPHONE ENCOUNTER
Patient requesting refill on PERCOCET  Last office visit 5/21/24 3m f/u 8/23   shows last refill on 7/8/24  Patient does have a pain contract on file with [facility]  Patient last UDS 5/21/24 was consistent with current therapy    CODEINE  Not Detected  Not Detected  Not Detected Not Detected   Comment: INTERPRETIVE INFORMATION: Codeine, U  Positive Cutoff: 40 ng/mL  Methodology: Mass Spectrometry   MORPHINE  Not Detected  Not Detected  Present Present   Comment: INTERPRETIVE INFORMATION:Morphine, U  Positive Cutoff: 20 ng/mL  Methodology: Mass Spectrometry   6-ACETYLMORPHINE  Not Detected  Not Detected  Not Detected Not Detected   Comment: INTERPRETIVE INFORMATION:6-acetylmorphine, U  Positive Cutoff: 20 ng/mL  Methodology: Mass Spectrometry   OXYCODONE  Present  Present  Present Present   Comment: INTERPRETIVE INFORMATION:Oxycodone, U  Positive Cutoff: 40 ng/mL  Methodology: Mass Spectrometry   NOROYXCODONE  Present  Present  Present Present   Comment: INTERPRETIVE INFORMATION:Noroxycodone, U  Positive Cutoff: 100 ng/mL  Methodology: Mass Spectrometry   OXYMORPHONE  Present  Present  Not Detected Not Detected   Comment: INTERPRETIVE INFORMATION:Oxymorphone, U  Positive Cutoff: 40 ng/mL  Methodology: Mass Spectrometry   NOROXYMORPHONE  Present  Present  Not Detected Not Detected   Comment: INTERPRETIVE INFORMATION:Noroxymorphone, U  Positive Cutoff: 100 ng/mL  Methodology: Mass Spectrometry   HYDROCODONE  Not Detected  Not Detected  Not Detected Not Detected   Comment: INTERPRETIVE INFORMATION:Hydrocodone, U  Positive Cutoff: 40 ng/mL  Methodology: Mass Spectrometry   NORHYDROCODONE  Not Detected  Not Detected  Not Detected Not Detected   Comment: INTERPRETIVE INFORMATION:Norhydrocodone, U  Positive Cutoff: 100 ng/mL  Methodology: Mass Spectrometry   HYDROMORPHONE  Not Detected  Not Detected  Not Detected Not Detected   Comment: INTERPRETIVE INFORMATION:Hydromorphone, U  Positive  Cutoff: 20 ng/mL  Methodology: Mass Spectrometry   BUPRENORPHINE  Not Detected  Not Detected  Not Detected Not Detected   Comment: INTERPRETIVE INFORMATION:Buprenorphine, U  Positive Cutoff: 5 ng/mL  Methodology: Mass Spectrometry   NORUBPRENORPHINE  Not Detected  Not Detected  Not Detected Not Detected   Comment: INTERPRETIVE INFORMATION:Norbuprenorphine, U  Positive Cutoff: 20 ng/mL  Methodology: Mass Spectrometry   FENTANYL  Not Detected  Not Detected  Not Detected Not Detected   Comment: INTERPRETIVE INFORMATION:Fentanyl, U  Positive Cutoff: 2 ng/mL  Methodology: Mass Spectrometry   NORFENTANYL  Not Detected  Not Detected  Not Detected Not Detected   Comment: INTERPRETIVE INFORMATION:Norfentanyl, U  Positive Cutoff: 2 ng/mL  Methodology: Mass Spectrometry   MEPERIDINE METABOLITE  Not Detected  Not Detected  Not Detected Not Detected   Comment: INTERPRETIVE INFORMATION:Meperidine metabolite, U  Positive Cutoff: 50 ng/mL  Methodology: Mass Spectrometry   TAPENTADOL  Not Detected  Not Detected  Not Detected Not Detected   Comment: INTERPRETIVE INFORMATION:Tapentadol, U  Positive Cutoff: 100 ng/mL  Methodology: Mass Spectrometry   TAPENTADOL-O-SULF  Not Detected  Not Detected  Not Detected Not Detected   Comment: INTERPRETIVE INFORMATION:Tapentadol-o-Sulf, U  Positive Cutoff: 200 ng/mL  Methodology: Mass Spectrometry   METHADONE  Negative  Not Detected  Not Detected Not Detected   Comment: Presumptive negative by immunoassay. Testing by mass  spectrometry is available on request.  INTERPRETIVE INFORMATION: Methadone Screen, U  Positive Cutoff: 150 ng/mL  Methodology: Immunoassay   TRAMADOL  Negative  Not Detected  Not Detected Not Detected   Comment: Presumptive negative by immunoassay. Testing by mass  spectrometry is available on request.  INTERPRETIVE INFORMATION:Tramadol Screen, U  Positive Cutoff: 100 ng/mL  Methodology: Immunoassay   AMPHETAMINE  Not Detected  Not Detected  Not Detected Not Detected   Comment:  INTERPRETIVE INFORMATION:Amphetamine, U  Positive Cutoff: 50 ng/mL  Methodology: Mass Spectrometry   METHAMPHETAMINE  Not Detected  Not Detected  Not Detected Not Detected   Comment: INTERPRETIVE INFORMATION:Methamphetamine, U  Positive Cutoff: 200 ng/mL  Methodology: Mass Spectrometry   MDMA- ECSTASY  Not Detected  Not Detected  Not Detected Not Detected   Comment: INTERPRETIVE INFORMATION:MDMA, U  Positive Cutoff: 200 ng/mL  Methodology: Mass Spectrometry   MDA  Not Detected  Not Detected  Not Detected Not Detected   Comment: INTERPRETIVE INFORMATION:MDA, U  Positive Cutoff: 200 ng/mL  Methodology: Mass Spectrometry   MDEA- Deanna  Not Detected  Not Detected  Not Detected Not Detected   Comment: INTERPRETIVE INFORMATION:MDEA, U  Positive Cutoff: 200 ng/mL  Methodology: Mass Spectrometry   METHYLPHENIDATE  Not Detected  Not Detected  Not Detected Not Detected   Comment: INTERPRETIVE INFORMATION:Methylphenidate, U  Positive Cutoff: 100 ng/mL  Methodology: Mass Spectrometry   PHENTERMINE  Not Detected  Not Detected  Not Detected Not Detected   Comment: INTERPRETIVE INFORMATION:Phentermine, U  Positive Cutoff: 100 ng/mL  Methodology: Mass Spectrometry   BENZOYLECGONINE  Negative  Not Detected  Not Detected Not Detected   Comment: Presumptive negative by immunoassay. Testing by mass  spectrometry is available on request.  INTERPRETIVE INFORMATION:Cocaine Screen, U  Positive Cutoff: 150 ng/mL  Methodology: Immunoassay   ALPRAZOLAM  Not Detected  Present  Not Detected Present   Comment: INTERPRETIVE INFORMATION:Alprazolam, U  Positive Cutoff: 40 ng/mL  Methodology: Mass Spectrometry   ALPHA-OH-ALPRAZOLAM  Present  Present  Present Not Detected   Comment: INTERPRETIVE INFORMATION:Alpha-OH-Alprazolam, U  Positive Cutoff: 20 ng/mL  Methodology: Mass Spectrometry   CLONAZEPAM  Not Detected  Not Detected  Not Detected Not Detected   Comment: INTERPRETIVE INFORMATION:Clonazepam, U  Positive Cutoff: 20 ng/mL  Methodology: Mass  Spectrometry   7-AMINOCLONAZEPAM  Not Detected  Not Detected  Not Detected Not Detected   Comment: INTERPRETIVE INFORMATION:7-Aminoclonazepam, U  Positive Cutoff: 40 ng/mL  Methodology: Mass Spectrometry   DIAZEPAM  Not Detected  Not Detected  Not Detected Not Detected   Comment: INTERPRETIVE INFORMATION:Diazepam, U  Positive Cutoff: 50 ng/mL  Methodology: Mass Spectrometry   NORDIAZEPAM  Not Detected  Not Detected  Not Detected Not Detected   Comment: INTERPRETIVE INFORMATION:Nordiazepam, U  Positive Cutoff: 50 ng/mL  Methodology: Mass Spectrometry   OXAZEPAM  Not Detected  Not Detected  Not Detected Not Detected   Comment: INTERPRETIVE INFORMATION:Oxazepam, U  Positive Cutoff: 50 ng/mL  Methodology: Mass Spectrometry   TEMAZEPAM  Not Detected  Not Detected  Not Detected Not Detected   Comment: INTERPRETIVE INFORMATION:Temazepam, U  Positive Cutoff: 50 ng/mL  Methodology: Mass Spectrometry   Lorazepam  Not Detected  Not Detected  Not Detected Not Detected   Comment: INTERPRETIVE INFORMATION:Lorazepam, U  Positive Cutoff: 60 ng/mL  Methodology: Mass Spectrometry   MIDAZOLAM  Not Detected  Not Detected  Not Detected Not Detected   Comment: INTERPRETIVE INFORMATION:Midazolam, U  Positive Cutoff: 20 ng/mL  Methodology: Mass Spectrometry   ZOLPIDEM  Not Detected  Not Detected  Not Detected Not Detected   Comment: INTERPRETIVE INFORMATION:Zolpidem, U  Positive Cutoff: 20 ng/mL  Methodology: Mass Spectrometry   BARBITURATES  PresumptivePOS  Present  Present Present   Comment: Presumptive positive by immunoassay. Testing by mass  spectrometry is available on request.  INTERPRETIVE INFORMATION:Barbiturates Screen, U  Positive Cutoff: 200 ng/mL  Methodology: Immunoassay   Creatinine, Urine 20.0 - 400.0 mg/dL 30.4 49.0 R 27.8 18.0 Low  R 113.0 58.9   ETHYL GLUCURONIDE  Negative  Not Detected  Not Detected Not Detected   Comment: Presumptive negative by immunoassay. Testing by mass  spectrometry is available on  request.  INTERPRETIVE INFORMATION:Ethyl Glucuronide Screen, U  Positive Cutoff: 500 ng/mL  Methodology: Immunoassay   MARIJUANA METABOLITE  Negative  Not Detected  Not Detected Not Detected   Comment: Presumptive negative by immunoassay. Testing by mass  spectrometry is available on request.  INTERPRETIVE INFORMATION: THC (Cannabinoids) Screen, U  Positive Cutoff: 50 ng/mL  Methodology: Immunoassay   PCP  Negative  Not Detected  Not Detected Not Detected   Comment: Presumptive negative by immunoassay. Testing by mass  spectrometry is available on request.  INTERPRETIVE INFORMATION:Phencyclidine Screen, U  Positive Cutoff: 25 ng/mL  Methodology: Immunoassay   CARISOPRODOL  Negative  Not Detected CM  Not Detected CM Not Detected CM   Comment: Presumptive negative by immunoassay. Testing by mass  spectrometry is available on request.  INTERPRETIVE INFORMATION: Carisoprodol Screen, U  Positive Cutoff: 100 ng/mL  Methodology: Immunoassay  The carisoprodol immunoassay has cross-reactivity to  carisoprodol and meprobamate.   Naloxone  Not Detected  Not Detected      Comment: INTERPRETIVE INFORMATION:Naloxone, U  Positive Cutoff: 100 ng/mL  Methodology: Mass Spectrometry   Gabapentin  Present  Present      Comment: INTERPRETIVE INFORMATION:Gabapentin, U  Positive Cutoff: 3,000 ng/mL  Methodology: Mass Spectrometry   Pregabalin  Not Detected  Not Detected      Comment: INTERPRETIVE INFORMATION:Pregabalin, U  Positive Cutoff: 3,000 ng/mL  Methodology: Mass Spectrometry   Alpha-OH-Midazolam  Not Detected  Not Detected      Comment: INTERPRETIVE INFORMATION:Alpha-OH-Midazolam, U  Positive Cutoff: 20 ng/mL  Methodology: Mass Spectrometry   Zolpidem Metabolite  Not Detected  Not Detected      Comment: INTERPRETIVE INFORMATION:Zolpidem Metabolite, U  Positive Cutoff: 100 ng/mL  Methodology: Mass Spectrometry

## 2024-08-15 ENCOUNTER — OFFICE VISIT (OUTPATIENT)
Dept: SURGERY | Facility: CLINIC | Age: 54
End: 2024-08-15
Payer: MEDICARE

## 2024-08-15 VITALS
HEART RATE: 92 BPM | SYSTOLIC BLOOD PRESSURE: 116 MMHG | DIASTOLIC BLOOD PRESSURE: 73 MMHG | HEIGHT: 60 IN | BODY MASS INDEX: 26.66 KG/M2 | WEIGHT: 135.81 LBS

## 2024-08-15 DIAGNOSIS — Z09 POSTOP CHECK: Primary | ICD-10-CM

## 2024-08-15 PROCEDURE — 99213 OFFICE O/P EST LOW 20 MIN: CPT | Mod: PBBFAC | Performed by: SURGERY

## 2024-08-15 PROCEDURE — 99999 PR PBB SHADOW E&M-EST. PATIENT-LVL III: CPT | Mod: PBBFAC,,, | Performed by: SURGERY

## 2024-08-15 NOTE — PROGRESS NOTES
I have seen the patient, reviewed the Student's history and physical, assessment and plan. I have personally interviewed and examined the patient at bedside and: agree with the findings.     S/p open ventral hernia without mesh 7/31/24.  He has some itching in the area.  The wound has a lumpy feeling to him.  His preop symptoms have resolved.    PE Normal healing ridge, no hernia, no tenderness    Doing well.  Light duty for one more month and follow up prn.

## 2024-08-15 NOTE — LETTER
August 15, 2024        Siva Mitchell MD  [address]  [address]  [facility] [address]             [facility]  [address]  [facility] [address]  Phone: [phone]   Patient: Ari Sainz   MR Number: [id number]   YOB: 1970   Date of Visit: 8/15/2024       Dear Dr. Mitchell:    Thank you for referring Ari Sainz to me for evaluation. Attached you will find relevant portions of my assessment and plan of care.    If you have questions, please do not hesitate to call me. I look forward to following Ari Sainz along with you.    Sincerely,      Messi Diaz MD            CC  No Recipients    Enclosure

## 2024-08-15 NOTE — PROGRESS NOTES
Minimally Invasive Surgery  Follow Up Clinic Note    Subjective:     Ari Sainz is a 54 y.o. adult who presents to clinic for follow up s/p open ventral hernia repair without mesh on 7/31. Pt reports that they are feeling significantly better postoperatively, no longer having severe pain with meals. He is concerned about a small lump in the area of the repair which he describes as firm and smaller in size than his previous hernia. Tolerating a regular diet and having regular bowel movements. Denies fever, chills, chest pain, and shortness of breath. Pain is well controlled. Denies redness or drainage of incision.    Medications:  Current Outpatient Medications on File Prior to Visit   Medication Sig Dispense Refill    acetaminophen (TYLENOL) 325 MG tablet Take 2 tablets (650 mg total) by mouth every 6 (six) hours as needed for Pain.      ALPRAZolam (XANAX) 1 MG tablet Take 1 tablet (1 mg total) by mouth 3 (three) times daily as needed for Anxiety. 90 tablet 5    ascorbic acid, vitamin C, (VITAMIN C) 1000 MG tablet Take 1,000 mg by mouth once daily.      atorvastatin (LIPITOR) 20 MG tablet Take 1 tablet (20 mg total) by mouth once daily. 90 tablet 3    cholecalciferol, vitamin D3, 125 mcg (5,000 unit) capsule Take 1 capsule by mouth Daily.      cranberry fruit extract (CRANBERRY ORAL) Take by mouth.      cyanocobalamin (VITAMIN B-12) 1000 MCG tablet Take 100 mcg by mouth once daily.      cyclobenzaprine (FLEXERIL) 10 MG tablet Take 1 tablet (10 mg total) by mouth 3 (three) times daily as needed for Muscle spasms. 270 tablet 0    diclofenac sodium (VOLTAREN) 1 % Gel Apply 2 g topically once daily. 100 g 3    docusate sodium (COLACE) 100 MG capsule Take 1 capsule (100 mg total) by mouth 2 (two) times daily as needed for Constipation. 60 capsule 0    fluticasone (FLONASE SENSIMIST) 27.5 mcg/actuation nasal spray 2 sprays by Nasal route once daily. 9.1 mL 0    gabapentin (NEURONTIN) 800 MG tablet Take 1 tablet  by mouth three times a day and take 2 tablets at night (5 tablets a day, 4000mg) 450 tablet 2    hydrocortisone (ANUSOL-HC) 2.5 % rectal cream Place rectally 2 (two) times daily. 28 g 3    ketoconazole (NIZORAL) 2 % cream Apply topically once daily. 60 g 3    Lactobacillus acidophilus Cap Take by mouth once daily.       lamoTRIgine (LAMICTAL) 200 MG tablet Take 1 tablet (200 mg total) by mouth 2 (two) times daily. 180 tablet 3    loratadine (CLARITIN) 10 mg tablet Take 10 mg by mouth once daily.      magnesium 250 mg Tab Take 1 tablet by mouth Daily.      naloxegoL (MOVANTIK) 25 mg tablet Take 25 mg by mouth once daily. Take one hour prior to breakfast 30 tablet 2    naproxen sodium (ANAPROX) 220 MG tablet Take 220 mg by mouth.      neomycin-polymyxin-hydrocortisone (CORTISPORIN) otic solution Three drops to affected ear 4 times daily 10 mL 1    polyethylene glycol (GLYCOLAX) 17 gram PwPk Take 17 g by mouth once daily.      predniSONE (DELTASONE) 5 MG tablet Take 1 tablet (5 mg total) by mouth once daily. Double the dose if sick 200 tablet 3    primidone (MYSOLINE) 50 MG Tab Take 200mg (4 tabs) every morning/150mg (3 tabs ) at noon/150mg (3 tabs) every evening 900 tablet 12    rOPINIRole (REQUIP) 4 MG tablet Take 1 tablet (4 mg total) by mouth once daily. 90 tablet 12    testosterone (ANDROGEL) 1 % (50 mg/5 gram) GlPk Apply 1 packet (5 grams) topically once daily. 30 packet 2    triamcinolone acetonide 0.1% (KENALOG) 0.1 % cream Apply topically 2 (two) times daily. 45 g 0    oxyCODONE (ROXICODONE) 5 MG immediate release tablet Take 1 tablet (5 mg total) by mouth every 4 (four) hours as needed for Pain. Do not take with Oxycodone/Acetaminophen 10mg/325mg. (Patient not taking: Reported on 8/15/2024) 10 tablet 0    oxyCODONE-acetaminophen (PERCOCET)  mg per tablet Take 1 tablet by mouth every 8 (eight) hours as needed for Pain. (Patient not taking: Reported on 8/15/2024) 90 tablet 0     Current  Facility-Administered Medications on File Prior to Visit   Medication Dose Route Frequency Provider Last Rate Last Admin    fentaNYL injection 25 mcg  25 mcg Intravenous Q5 Min PRN French Smith MD        midazolam (VERSED) 1 mg/mL injection 0.5 mg  0.5 mg Intravenous PRN French Smith MD             Objective:     PHYSICAL EXAM:  Vital Signs (Most Recent)  Pulse: 92 (08/15/24 1451)  BP: 116/73 (08/15/24 1451)    Physical Exam:  Gen: no apparent distress, awake and alert  CV: regular rate/rhythm  Pulm: non-labored breathing, equal and bilateral chest rise  Abd: soft, non-distended, non-tender. A firm area of scar tissue is present beneath the supraumbilical incision consistent with a healing ridge.   Ext: warm and well perfused, no edema  Skin: warm and dry, no lesions appreciated  Incision: c/d/I, no surrounding erythema or edema  Neuro: motor and sensation grossly intact and symmetric       Assessment:     Ari Sainz is a 54 y.o. adult presenting to clinic today for follow up s/p open ventral hernia repair without mesh on 7/31. He is doing well postoperatively, there is palpable scar tissue at the repair sight consistent with a healing ridge, physical exam is not concerning for a recurrence at this time.     Plan:   - Discussed postoperative changes and typical course of healing ridge with patient, no further intervention indicated at this time.   - Patient is recovering well  - Can advance to a regular diet as tolerated  - We discussed a gradual return to routine activity and exercise as tolerated.  - Continue with lifting restrictions of no more than 10 lbs for 4 more weeks  - RTC PRN

## 2024-08-19 DIAGNOSIS — T40.2X5A THERAPEUTIC OPIOID INDUCED CONSTIPATION: ICD-10-CM

## 2024-08-19 DIAGNOSIS — K59.03 THERAPEUTIC OPIOID INDUCED CONSTIPATION: ICD-10-CM

## 2024-08-20 ENCOUNTER — PATIENT MESSAGE (OUTPATIENT)
Dept: OTOLARYNGOLOGY | Facility: CLINIC | Age: 54
End: 2024-08-20
Payer: MEDICARE

## 2024-08-20 RX ORDER — NALOXEGOL OXALATE 25 MG/1
25 TABLET, FILM COATED ORAL DAILY
Qty: 30 TABLET | Refills: 2 | Status: SHIPPED | OUTPATIENT
Start: 2024-08-20

## 2024-08-21 ENCOUNTER — PATIENT MESSAGE (OUTPATIENT)
Dept: PAIN MEDICINE | Facility: CLINIC | Age: 54
End: 2024-08-21
Payer: MEDICARE

## 2024-08-26 ENCOUNTER — OFFICE VISIT (OUTPATIENT)
Dept: OTOLARYNGOLOGY | Facility: CLINIC | Age: 54
End: 2024-08-26
Payer: MEDICARE

## 2024-08-26 VITALS
WEIGHT: 138 LBS | BODY MASS INDEX: 29.86 KG/M2 | HEART RATE: 88 BPM | SYSTOLIC BLOOD PRESSURE: 108 MMHG | DIASTOLIC BLOOD PRESSURE: 67 MMHG

## 2024-08-26 DIAGNOSIS — J34.2 NASAL SEPTAL DEVIATION: ICD-10-CM

## 2024-08-26 DIAGNOSIS — H61.22 IMPACTED CERUMEN OF LEFT EAR: Primary | ICD-10-CM

## 2024-08-26 DIAGNOSIS — H93.299 ABNORMAL AUDITORY PERCEPTION, UNSPECIFIED LATERALITY: ICD-10-CM

## 2024-08-26 DIAGNOSIS — J30.9 ALLERGIC RHINITIS, UNSPECIFIED SEASONALITY, UNSPECIFIED TRIGGER: ICD-10-CM

## 2024-08-26 DIAGNOSIS — H69.90 DYSFUNCTION OF EUSTACHIAN TUBE, UNSPECIFIED LATERALITY: ICD-10-CM

## 2024-08-26 DIAGNOSIS — R42 VERTIGO: ICD-10-CM

## 2024-08-26 PROCEDURE — 69210 REMOVE IMPACTED EAR WAX UNI: CPT | Mod: S$PBB,LT,, | Performed by: SPECIALIST

## 2024-08-26 PROCEDURE — 99999 PR PBB SHADOW E&M-EST. PATIENT-LVL III: CPT | Mod: PBBFAC,,, | Performed by: SPECIALIST

## 2024-08-26 PROCEDURE — 69210 REMOVE IMPACTED EAR WAX UNI: CPT | Mod: PBBFAC,PN,LT | Performed by: SPECIALIST

## 2024-08-26 PROCEDURE — 99213 OFFICE O/P EST LOW 20 MIN: CPT | Mod: PBBFAC,PN | Performed by: SPECIALIST

## 2024-08-26 PROCEDURE — 99214 OFFICE O/P EST MOD 30 MIN: CPT | Mod: 25,S$PBB,, | Performed by: SPECIALIST

## 2024-08-26 NOTE — PROGRESS NOTES
Subjective:       Patient ID: Ari Sainz is a 54 y.o. adult.    Chief Complaint: Cerumen Impaction      The patient is coming in for evaluation of issues as follows.  He is medically disabled because of multiple orthopedic problems.  He self describes himself as a hypochondriac.  1. Lumps on the back of the tongue:  There is no change in the patient's circumvallate papillae, which still appeared to be normal.  2. Recurring vertigo:  He has recurring episodes of vertigo, particularly when he changes position.  There has been no change in his issue since his last visit.  3. Blockage/itching in the ears:  He has been using Cortisporin drops since his last visit.       .  4. Allergic rhinitis:  He does have nasal congestion, rhinorrhea and postnasal drip.  Nasal secretions are clear.  He is not taking any medications.  5. Adrenal insufficiency:  The patient has longstanding adrenal insufficiency and has been on long-term oral steroids.  He takes prednisone 5 mg daily as a maintenance dose.  6. Multiple orthopedic problems:  The patient has had multiple different orthopedic conditions because of his prolonged steroid therapy            Review of Systems     Constitutional: Negative for appetite change, chills, fatigue, fever and unexpected weight loss.      HENT: Positive for postnasal drip, runny nose, sinus pressure and stuffy nose.  Negative for ear discharge, ear infection, ear pain (blockage of both ears), facial swelling, hearing loss, mouth sores, nosebleeds, ringing in the ears, sinus infection, sore throat, tonsil infection, dental problems, trouble swallowing and voice change.      Eyes:  Negative for change in eyesight, eye drainage, eye itching and photophobia.     Respiratory:  Positive for sleep apnea and snoring. Negative for cough, shortness of breath and wheezing.      Cardiovascular:  Negative for chest pain, foot swelling, irregular heartbeat and swollen veins.     Gastrointestinal:   Positive for abdominal pain. Negative for acid reflux, constipation, diarrhea, heartburn and vomiting.     Genitourinary: Negative for difficulty urinating, sexual problems and frequent urination.     Musc: Positive for aching joints, aching muscles, back pain and neck pain. Gait disorder    Skin: Negative for rash.     Allergy: Positive for seasonal allergies. Negative for food allergies.     Endocrine: Negative for cold intolerance and heat intolerance.  Osteopenia    Neurological: Positive for dizziness, light-headedness and tremors. Negative for headaches and seizures.      Hematologic: Negative for bruises/bleeds easily and swollen glands.      Psychiatric: Negative for decreased concentration, depression, nervous/anxious and sleep disturbance.  Posttraumatic stress disorder              Objective:      Physical Exam  Vitals and nursing note reviewed. Exam conducted with a chaperone present (Patient's wife accompanies him to the visit).   Constitutional:       General: He is awake.      Appearance: Normal appearance. He is well-developed, well-groomed and normal weight.   HENT:      Head: Normocephalic.      Jaw: There is normal jaw occlusion.      Salivary Glands: Right salivary gland is not diffusely enlarged or tender. Left salivary gland is not diffusely enlarged or tender.      Right Ear: Hearing, ear canal and external ear normal. There is impacted cerumen. Tympanic membrane is retracted.      Left Ear: Hearing, ear canal and external ear normal. There is impacted cerumen. Tympanic membrane is retracted.      Nose: Septal deviation (septum deviated to the right), mucosal edema (cyanotic, boggy inferior turbinates bilaterally) and rhinorrhea (clear mucus bilaterally) present. No nasal deformity. Rhinorrhea is clear.      Right Turbinates: Enlarged and pale.      Left Turbinates: Enlarged and pale.      Mouth/Throat:      Lips: No lesions.      Mouth: Mucous membranes are moist. No oral lesions.       Dentition: Abnormal dentition. No gum lesions.      Tongue: No lesions (normal circumvallate papillae on the posterior tongue base, no suspicious lesions noted).      Palate: No mass and lesions.      Pharynx: Oropharynx is clear. Uvula midline.      Tonsils: 3+ on the right. 3+ on the left.   Eyes:      General: Lids are normal. Vision grossly intact.         Right eye: No discharge.         Left eye: No discharge.      Extraocular Movements: Extraocular movements intact.      Conjunctiva/sclera: Conjunctivae normal.      Pupils: Pupils are equal, round, and reactive to light.   Neck:      Thyroid: No thyroid mass or thyromegaly.      Trachea: Trachea normal. No tracheal deviation.   Cardiovascular:      Rate and Rhythm: Normal rate and regular rhythm.      Pulses: Normal pulses.      Heart sounds: Normal heart sounds.   Pulmonary:      Effort: Pulmonary effort is normal.      Breath sounds: Normal breath sounds. No stridor. No decreased breath sounds, wheezing, rhonchi or rales.   Abdominal:      General: Bowel sounds are normal.      Palpations: Abdomen is soft.      Tenderness: There is no abdominal tenderness.   Musculoskeletal:      Cervical back: Neck supple. No muscular tenderness. Decreased range of motion.   Lymphadenopathy:      Head:      Right side of head: No submental, submandibular, preauricular, posterior auricular or occipital adenopathy.      Left side of head: No submental, submandibular, preauricular, posterior auricular or occipital adenopathy.      Cervical: No cervical adenopathy.   Skin:     General: Skin is warm and dry.      Findings: No petechiae or rash.      Nails: There is no clubbing.   Neurological:      Mental Status: He is alert and oriented to person, place, and time.      Cranial Nerves: No cranial nerve deficit.      Sensory: No sensory deficit.      Gait: Gait normal.      Comments: Neuro otologic-cranial nerves 2-12 intact, no nystagmus, no focal or cerebellar signs  wide-based, unstable gait (patient uses cane for ambulation normally), tandem gait not attempted, Romberg falls to the right, tandem Romberg falls to the right   Psychiatric:         Speech: Speech normal.         Behavior: Behavior normal. Behavior is cooperative.         Thought Content: Thought content normal.         Judgment: Judgment normal.         Procedure-cerumen impactions in the left ear is removed with 8 [employer] suction and irrigation.  The patient tolerated procedure well was discharged post procedure       Assessment:       1. Impacted cerumen of left ear    2. Vertigo    3. Allergic rhinitis, unspecified seasonality, unspecified trigger    4. Dysfunction of Eustachian tube, unspecified laterality    5. Nasal septal deviation          Plan:       I will schedule patient for an audio vestibular evaluation and recheck him when I have those results.                  DISCLAIMER: This note was prepared with [employer] voice recognition transcription software. Garbled syntax, mangled pronouns, and other bizarre constructions may be attributed to that software system. While efforts were made to correct any mistakes made by this voice recognition program, some errors and/or omissions may remain in the note that were missed when the note was originally created.

## 2024-08-27 NOTE — PROCEDURES
"Ear Cerumen Removal    Date/Time: 8/26/2024 1:20 PM    Performed by: LATISHA Yanez MD  Authorized by: LATISHA Yanez MD    Time out: Immediately prior to procedure a "time out" was called to verify the correct patient, procedure, equipment, support staff and site/side marked as required.    Consent Done?:  Yes (Verbal)    Local anesthetic:  None  Location details:  Left ear  Procedure type comment:  8 [place] suction and irrigation  Cerumen  Removal Results:  Cerumen completely removed  Patient tolerance:  Patient tolerated the procedure well with no immediate complications    "

## 2024-08-29 ENCOUNTER — OFFICE VISIT (OUTPATIENT)
Dept: INTERNAL MEDICINE | Facility: CLINIC | Age: 54
End: 2024-08-29
Payer: MEDICARE

## 2024-08-29 DIAGNOSIS — T36.0X5S: ICD-10-CM

## 2024-08-29 DIAGNOSIS — U07.1 COVID-19 VIRUS INFECTION: Primary | ICD-10-CM

## 2024-08-29 DIAGNOSIS — F41.9 ANXIETY: ICD-10-CM

## 2024-08-29 DIAGNOSIS — J30.2 SEASONAL ALLERGIES: ICD-10-CM

## 2024-08-29 PROBLEM — K46.9 ABDOMINAL HERNIA WITHOUT OBSTRUCTION AND WITHOUT GANGRENE: Status: RESOLVED | Noted: 2024-04-16 | Resolved: 2024-08-29

## 2024-08-29 PROCEDURE — 99215 OFFICE O/P EST HI 40 MIN: CPT | Mod: 95,,, | Performed by: INTERNAL MEDICINE

## 2024-08-29 RX ORDER — BENZONATATE 200 MG/1
200 CAPSULE ORAL 3 TIMES DAILY PRN
Qty: 30 CAPSULE | Refills: 0 | Status: SHIPPED | OUTPATIENT
Start: 2024-08-29 | End: 2024-09-08

## 2024-08-29 RX ORDER — NIRMATRELVIR AND RITONAVIR 300-100 MG
KIT ORAL
Qty: 30 TABLET | Refills: 0 | Status: SHIPPED | OUTPATIENT
Start: 2024-08-29 | End: 2024-09-03

## 2024-08-29 RX ORDER — IPRATROPIUM BROMIDE 21 UG/1
2 SPRAY, METERED NASAL 4 TIMES DAILY PRN
Qty: 30 ML | Refills: 0 | Status: SHIPPED | OUTPATIENT
Start: 2024-08-29

## 2024-08-29 NOTE — PROGRESS NOTES
The patient location is: [place]  The chief complaint leading to consultation is: No chief complaint on file.    Visit type: Virtual visit with synchronous audio and video  Contact time spent with patient: 18 minutes  Each patient to whom he or she provides medical services by telemedicine is:  (1) informed of the relationship between the physician and patient and the respective role of any other health care provider with respect to management of the patient; and (2) notified that he or she may decline to receive medical services by telemedicine and may withdraw from such care at any time.    Subjective:       Patient ID: Ari Sainz is a 54 y.o. adult who  has a past medical history of Abdominal hernia without obstruction and without gangrene (04/16/2024), Abnormal CT scan, pelvis (10/27/2019), Abnormal thyroid blood test (05/06/2019), Adrenal cyst, Adrenal insufficiency, Blister- healing (01/09/2020), Chronic bilateral low back pain with bilateral sciatica (03/19/2019), Congenital adrenal hyperplasia, Hepatitis B core antibody positive (11/06/2020), IGT (impaired glucose tolerance) (11/05/2019), Insomnia due to medical condition, Multiple closed traumatic fractures of multiple bones of hip and pelvis with routine healing, subsequent encounter (09/27/2019), Pancreatic cyst (11/02/2020), Restless leg syndrome, S/P lumbar laminectomy (10/28/2019), Spinal stenosis, Spinal stenosis of lumbar region with neurogenic claudication (03/19/2019), Status post sex reassignment surgery (03/19/2019), and Unintentional weight loss (07/21/2020).    Chief Complaint: No chief complaint on file.     History was obtained from the patient and supplemented through chart review. History supplemented by his wife who was present during the VV.  He is a patient of Siva Mitchell MD.  Was last seen  preop evaluation.    HPI    He saw ENT earlier this week for cerumen impaction, AR.  On exam, there was bilateral impacted  cerumen with retracted TM.  Septal deviation with mucosal edema, clear rhinorrhea, enlarged turbinates. OP was clear.  Removed cerumen from the left ear.  Recommended audio vestibular evaluation.    Symptoms started over the weekend (<1 week ago) with HA. Swollen glands.  Feels sluggish. Myalgias started last night.  No fever.  Sore throat, LAD.   Has chronic postnasal drip, sputum.  Not rhinorrhea.  Mild cough with sputum, no difficulty expectorating sputum.     No CP, SOB, wheezing, chest tightness.  No h/o asthma or lung disease.    He tested positive for COVID.  His wife also has COVID.    Has tried tylenol, ASA, aleeve, percocet.    COVID vaccine: Last booster 11/2022.  COVID risk score 2.  On long-term steroids for adrenal insufficiency, congenital adrenal hyperplasia.    AR:   Taking Claritin.  Follows with ENT.    Anxiety:  He and his wife are concerned holding Xanax due to Paxlovid.  Reports extreme anxiety, panic attacks.  He states that he is able to hide it well.     Allergies:  Per allergy list, Penicillins cause rash.   Bactrim caused itching.  Did receive cefazolin though during his hernia surgery last month, in , .  Received Zosyn in  during his cholecystectomy.     Review of Systems   Constitutional:  Negative for activity change and unexpected weight change.   HENT:  Positive for rhinorrhea. Negative for hearing loss and trouble swallowing.    Eyes:  Negative for discharge and visual disturbance.   Respiratory:  Negative for chest tightness and wheezing.    Cardiovascular:  Negative for chest pain and palpitations.   Gastrointestinal:  Negative for blood in stool, constipation, diarrhea and vomiting.   Endocrine: Negative for polydipsia and polyuria.   Genitourinary:  Negative for difficulty urinating, dysuria, hematuria, menstrual problem and urgency.   Musculoskeletal:  Negative for arthralgias, joint swelling and neck pain.   Neurological:  Positive for headaches. Negative  for weakness.   Psychiatric/Behavioral:  Negative for confusion and dysphoric mood.        Past Medical History:   Diagnosis Date    Abdominal hernia without obstruction and without gangrene 04/16/2024    Abnormal CT scan, pelvis 10/27/2019    Abnormal thyroid blood test 05/06/2019    Adrenal cyst     left    Adrenal insufficiency     Blister- healing 01/09/2020    Chronic bilateral low back pain with bilateral sciatica 03/19/2019    Congenital adrenal hyperplasia     Hepatitis B core antibody positive 11/06/2020    Negative sAg, suggests previous exposure but no chronic/active Hep B. At risk for reactivation with any immunosuppression medication, steroids, chemo, etc.      IGT (impaired glucose tolerance) 11/05/2019    Insomnia due to medical condition     Multiple closed traumatic fractures of multiple bones of hip and pelvis with routine healing, subsequent encounter 09/27/2019    Pancreatic cyst 11/02/2020    Restless leg syndrome     S/P lumbar laminectomy 10/28/2019    Spinal stenosis     Spinal stenosis of lumbar region with neurogenic claudication 03/19/2019    Status post sex reassignment surgery 03/19/2019    Hx of Congenital Adrenal Hyperplasia    Unintentional weight loss 07/21/2020     Past Surgical History:   Procedure Laterality Date    BACK SURGERY      BREAST SURGERY  1986,2001    Implants, removal    CHOLECYSTECTOMY N/A 05/18/2021    Procedure: CHOLECYSTECTOMY;  Surgeon: Antonio Brandt MD;  Location: [facility] OR [address] [place] [address];  Service: General;  Laterality: N/A;    COLONOSCOPY N/A 6/29/2023    Procedure: COLONOSCOPY;  Surgeon: Genia Ku MD;  Location: [facility];  Service: Colon and Rectal;  Laterality: N/A;    ENDOSCOPIC ULTRASOUND OF UPPER GASTROINTESTINAL TRACT N/A 12/03/2020    Procedure: ULTRASOUND, UPPER GI TRACT, ENDOSCOPIC;  Surgeon: Jonathan Sharma MD;  Location: [facility] ([address] [place] [address]);  Service: Endoscopy;  Laterality: N/A;  elevated alk phos, panc cysts, liver biopsy   11/30-covid pcw-tb     ENDOSCOPIC ULTRASOUND OF UPPER GASTROINTESTINAL TRACT N/A 05/17/2021    Procedure: ULTRASOUND, UPPER GI TRACT, ENDOSCOPIC;  Surgeon: Claudio Salvador MD;  [facility]  Service: Endoscopy;  Laterality: N/A;    ERCP N/A 05/17/2021    Procedure: ERCP (ENDOSCOPIC RETROGRADE CHOLANGIOPANCREATOGRAPHY);  Surgeon: Claudio Salvador MD;  Location: [facility] ENDO [facility]  Service: Endoscopy;  Laterality: N/A;    gender surgery      multiple surgeries since birth    HYSTERECTOMY  2003    INJECTION OF ANESTHETIC AGENT AROUND NERVE Right 11/19/2020    Procedure: BLOCK, NERVE, GLENOHUMERAL  need consent;  Surgeon: Messi Cabello MD;  Location: [facility] PAIN MGT;  Service: Pain Management;  Laterality: Right;    JOINT REPLACEMENT  2/2020, 1/2021    Knees    KNEE ARTHROPLASTY Left 01/25/2021    Procedure: ARTHROPLASTY, KNEE:DEPUY-ATTUNE REVISION: SERINA iASSIST:CABLES ON HOLD;  Surgeon: Donte Andrade III, MD;  Location: [facility];  Service: Orthopedics;  Laterality: Left;    LAMINECTOMY  09/13/2019    LAPAROSCOPIC CHOLECYSTECTOMY N/A 05/17/2021    Procedure: CHOLECYSTECTOMY, LAPAROSCOPIC;  Surgeon: Calvin Wilson MD;  [facility];  Service: General;  Laterality: N/A;    LAPAROSCOPIC CHOLECYSTECTOMY N/A 05/18/2021    Procedure: CHOLECYSTECTOMY, LAPAROSCOPIC;  Surgeon: Antonio Brnadt MD;  [facility];  Service: General;  Laterality: N/A;    RADIOFREQUENCY ABLATION Left 05/09/2019    Procedure: RADIOFREQUENCY ABLATION, LEFT L3,4,5;  Surgeon: Messi Cabello MD;  Location: [facility] [facility] PAIN MGT;  Service: Pain Management;  Laterality: Left;  1 of 2    RADIOFREQUENCY ABLATION Right 05/23/2019    Procedure: RADIOFREQUENCY ABLATION, RIGHT L3,4,5;  Surgeon: Messi Cabello MD;  Location: [facility] PAIN MGT;  Service: Pain Management;  Laterality: Right;  2of 2    REPAIR, HERNIA, INCISIONAL N/A 7/31/2024    Procedure: OPEN REPAIR, HERNIA, INCISIONAL, POSSIBLE MESH;  Surgeon: Messi Diaz  MD FANG;  Location: [facility] OR 2ND FLR;  Service: General;  Laterality: N/A;  AM CONSENT    ROBOT-ASSISTED LAPAROSCOPIC RECTOPEXY N/A 01/17/2023    Procedure: ROBOTIC RECTOPEXY;  Surgeon: Genia Ku MD;  Location: [facility];  Service: Colon and Rectal;  Laterality: N/A;    SPINE SURGERY      Sept 2019     TOTAL KNEE ARTHROPLASTY Right 01/31/2020    Procedure: ARTHROPLASTY, KNEE, TOTAL;  Surgeon: Donte Andrade III, MD;  Location: [facility] OR 2ND FLR;  Service: Orthopedics;  Laterality: Right;     Family History   Problem Relation Name Age of Onset    Cancer Mother Taina Bourgeois         Kidney    Early death Mother Taina Bourgeois         58    Heart disease Father      Autoimmune disease Sister      Diabetes Maternal Uncle Trev Santos     Hypertension Maternal Grandmother Simi Santos     Stroke Maternal Grandmother Simi Santos     Diabetes Maternal Grandfather Bj Santos     Cancer Maternal Grandfather Bj Santos         Colon    Breast cancer Neg Hx      Ovarian cancer Neg Hx      Vaginal cancer Neg Hx      Endometrial cancer Neg Hx      Cervical cancer Neg Hx      Cirrhosis Neg Hx       Social History     Socioeconomic History    Marital status:      Spouse name: Kristy Sainz    Number of children: 1   Occupational History     Comment:  and artist   Tobacco Use    Smoking status: Former     Current packs/day: 0.00     Types: Cigarettes     Quit date: 2009     Years since quitting: 15.6    Smokeless tobacco: Never    Tobacco comments:     was not a daily smoker-    Substance and Sexual Activity    Alcohol use: Not Currently    Drug use: No    Sexual activity: Yes     Partners: Female     Birth control/protection: None   Social History Narrative    Lives in a house with his wife      Social Determinants of Health     Financial Resource Strain: Low Risk  (11/26/2023)    Overall Financial Resource Strain (CARDIA)     Difficulty of Paying Living Expenses: Not hard at all   Food Insecurity: No Food  Insecurity (11/26/2023)    Hunger Vital Sign     Worried About Running Out of Food in the Last Year: Never true     Ran Out of Food in the Last Year: Never true   Transportation Needs: No Transportation Needs (11/26/2023)    PRAPARE - Transportation     Lack of Transportation (Medical): No     Lack of Transportation (Non-Medical): No   Physical Activity: Insufficiently Active (11/26/2023)    Exercise Vital Sign     Days of Exercise per Week: 4 days     Minutes of Exercise per Session: 20 min   Stress: Stress Concern Present (11/26/2023)    [place] [employer] - Occupational Stress Questionnaire     Feeling of Stress : To some extent   Housing Stability: Low Risk  (11/26/2023)    Housing Stability Vital Sign     Unable to Pay for Housing in the Last Year: No     Number of Places Lived in the Last Year: 1     Unstable Housing in the Last Year: No     Objective:      There were no vitals filed for this visit.   Physical Exam  Constitutional:       General: He is not in acute distress.     Appearance: He is ill-appearing (mild). He is not diaphoretic.   HENT:      Head: Normocephalic.   Eyes:      General: No scleral icterus.        Right eye: No discharge.         Left eye: No discharge.   Pulmonary:      Effort: Pulmonary effort is normal. No respiratory distress.      Comments: Some hoarseness.  Speaking in complete sentences.  Skin:     Coloration: Skin is not pale.      Findings: No erythema.   Neurological:      Mental Status: He is alert.   Psychiatric:         Mood and Affect: Mood normal.         Behavior: Behavior normal.         Thought Content: Thought content normal.         Judgment: Judgment normal.         Lab Results   Component Value Date    WBC 8.06 07/26/2024    HGB 14.8 07/26/2024    HCT 43.6 07/26/2024     07/26/2024    CHOL 183 04/16/2024    TRIG 120 04/16/2024    HDL 56 04/16/2024    ALT 30 06/26/2024    AST 30 06/26/2024     (L) 07/26/2024    K 4.3 07/26/2024     CL 98 2024    CREATININE 0.8 2024    BUN 7 2024    CO2 23 2024    TSH 1.857 2024    INR 1.2 2021    HGBA1C 5.4 2024       The 10-year ASCVD risk score (Anyi WAYNE, et al., 2019) is: 3.8%    Values used to calculate the score:      Age: 54 years      Sex: Male      Is Non- : No      Diabetic: No      Tobacco smoker: No      Systolic Blood Pressure: 108 mmHg      Is BP treated: No      HDL Cholesterol: 56 mg/dL      Total Cholesterol: 183 mg/dL    Assessment:       1. COVID-19 virus infection    2. Seasonal allergies    3. Anxiety    4. Adverse effect of penicillin, sequela      Plan:       Diagnoses and all orders for this visit:    COVID-19 virus infection  Comments:  Rx Paxlovid d/t immunocompromised state. Hold Lipitor, Xanax. Rec hydration, antihistamines, nasal spray, NSAIDs PRN. Discussed isolation guidelines.  Orders:  -     ipratropium (ATROVENT) 21 mcg (0.03 %) nasal spray; 2 sprays by Each Nostril route 4 (four) times daily as needed for Rhinitis.  -     benzonatate (TESSALON) 200 MG capsule; Take 1 capsule (200 mg total) by mouth 3 (three) times daily as needed for Cough.  -     nirmatrelvir-ritonavir (PAXLOVID) 300 mg (150 mg x 2)-100 mg copackaged tablets (EUA); Take 3 tablets by mouth 2 (two) times daily. Each dose contains 2 nirmatrelvir (pink tablets) and 1 ritonavir (white tablet). Take all 3 tablets together  -     COVID-19 Home Symptom Monitoring  - Duration (days): 14    Seasonal allergies  Comments:  Continue Claritin.  Following with ENT.    Anxiety  Comments:  Recommended holding Xanax since it is also metabolized by CY. Discussed R/B, SE. He could try cutting Xanax in half.    Adverse effect of penicillin, sequela  Comments:  See notes below.    Per chart review, penicillin has caused rash in the past, but he has tolerated cephalosporin and pip/tazo.  Would need to confirm with the pt, but it seems that he has demonstrated  having NO allergy to penicillin.  Will update PCP to consider removing the penicillin allergy from EMR.    Side effects of medication(s) were discussed in detail and patient voiced understanding.  Patient will call back for any issues or complications.     I have spent a total of 46 minutes with the patient as well as reviewing the chart/medical record and placing orders on the day of the visit. This includes face to face time and non-face to face time preparing to see the patient (eg, review of tests), obtaining and/or reviewing separately obtained history, documenting clinical information in the electronic or other health record, independently interpreting results and communicating results to the patient/family/caregiver, or care coordinator.    RTC PRN.   DC instructions

## 2024-08-29 NOTE — Clinical Note
Emmanuel Lao!  I saw your patient for COVID.  Per chart review, penicillin has caused rash in the past, but he has tolerated cephalosporin and pip/tazo in the past.  I did not have time to review allergies with him, but it seems that he has demonstrated having NO allergy to penicillin.  You could consider removing the penicillin allergy from his list. This is the resource I used:  [email or web address]

## 2024-08-29 NOTE — PATIENT INSTRUCTIONS
I recommend plenty of rest and drinking plenty of fluids.  Tylenol and NSAIDs, such as ibuprofen, Motrin, naproxen can be helpful for sore throat, headache, ear pain, muscle and joint pain, sneezing, fatigue.  Chloroseptic spray, lozenges can also soothe a sore throat.  Mucinex twice a day as needed for cough, chest congestion.  Over-the-counter antihistamines (Allegra, Claritin, Zyrtec) for runny nose and decongestants (Sudafed) can also be helpful.  Nasal saline once to twice a day.  Hand washing can help prevent the spread of respiratory illnesses, especially from younger children.    May take Afrin nasal spray over the counter as needed for nasal congestion. Limit to 5 days of consecutive use.    Along with wearing a mask, you should also:  use proper hand hygiene to prevent the spread of germs.  cover your mouth and nose with a tissue or your elbow when you sneeze.  clean frequently touched surfaces often.  bring as much fresh air into your home as possible.     Because of medication interactions with Paxlovid, you would need to hold the Lipitor/Atorvastatin and Xanax for 10 days.    I also ordered the COVID home monitoring program.  It will send you a text message daily to check on symptoms and escalate care if needed.      You can go back to your normal activities when, for at least 24 hours, both are true:  Your symptoms are getting better overall, and  You have not had a fever (and are not using fever-reducing medication).     When you go back to your normal activities, take added precaution over the next 5 days, such as taking additional steps for  air, hygiene, masks, physical distancing, and/or testing when you will be around other people indoors.  Keep in mind that you may still be able to spread the virus that made you sick, even if you are feeling better. You are likely to be less contagious at this time, depending on factors like how long you were sick or how sick you were.  If you develop a  fever or you start to feel worse after you have gone back to normal activities, stay home and away from others again until, for at least 24 hours, both are true: your symptoms are improving overall, and you have not had a fever (and are not using fever-reducing medication). Then take added precaution for the next 5 days.    This is a nice resource from the CDC that gives isolation guidelines:  https://www.cdc.gov/respiratory-viruses/prevention/precautions-when-sick.html    I hope you feel better soon!     All of your core healthy metrics are met.      Emmanuel Chapman,     If you are due for any health screening(s) below please notify me so we can arrange them to be ordered and scheduled to maintain your health. Most healthy patients complete it. Don't lose out on improving your health.     All of your core healthy metrics are met.

## 2024-08-30 ENCOUNTER — PATIENT MESSAGE (OUTPATIENT)
Dept: PAIN MEDICINE | Facility: CLINIC | Age: 54
End: 2024-08-30
Payer: MEDICARE

## 2024-08-30 ENCOUNTER — OFFICE VISIT (OUTPATIENT)
Dept: SPINE | Facility: CLINIC | Age: 54
End: 2024-08-30
Payer: MEDICARE

## 2024-08-30 DIAGNOSIS — Z79.891 ENCOUNTER FOR LONG-TERM OPIATE ANALGESIC USE: ICD-10-CM

## 2024-08-30 DIAGNOSIS — M47.816 LUMBAR SPONDYLOSIS: ICD-10-CM

## 2024-08-30 DIAGNOSIS — G89.4 CHRONIC PAIN SYNDROME: Primary | ICD-10-CM

## 2024-08-30 DIAGNOSIS — M47.812 CERVICAL SPONDYLOSIS: ICD-10-CM

## 2024-08-30 NOTE — PROGRESS NOTES
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 chronic/active Hep B. At risk for reactivation with any immunosuppression medication, steroids, chemo, etc.      IGT (impaired glucose tolerance) 11/05/2019    Insomnia due to medical condition     Multiple closed traumatic fractures of multiple bones of hip and pelvis with routine healing, subsequent encounter 09/27/2019    Pancreatic cyst 11/02/2020    Restless leg syndrome     S/P lumbar laminectomy 10/28/2019    Spinal stenosis     Spinal stenosis of lumbar region with neurogenic claudication 03/19/2019    Status post sex reassignment surgery 03/19/2019    Hx of Congenital Adrenal Hyperplasia    Unintentional weight loss 07/21/2020     Past Surgical History:   Procedure Laterality Date    BACK SURGERY      BREAST SURGERY  1986,2001    Implants, removal    CHOLECYSTECTOMY N/A 05/18/2021    Procedure: CHOLECYSTECTOMY;  Surgeon: Antonio Brandt MD;  Location: [facility] OR [address] [place] [address];  Service: General;  Laterality: N/A;    COLONOSCOPY N/A 6/29/2023    Procedure: COLONOSCOPY;  Surgeon: Genia Ku MD;  Location: [facility];  Service: Colon and Rectal;  Laterality: N/A;    ENDOSCOPIC ULTRASOUND OF UPPER GASTROINTESTINAL TRACT N/A 12/03/2020    Procedure: ULTRASOUND, UPPER GI TRACT, ENDOSCOPIC;  Surgeon: Jonathan Sharma MD;  Location: [facility] ([address] [place] [address]);  Service: Endoscopy;  Laterality: N/A;  elevated alk phos,  panc cysts, liver biopsy   11/30-covid [id number]    ENDOSCOPIC ULTRASOUND OF UPPER GASTROINTESTINAL TRACT N/A 05/17/2021    Procedure: ULTRASOUND, UPPER GI TRACT, ENDOSCOPIC;  Surgeon: Claudio Salvador MD;  [facility]  Service: Endoscopy;  Laterality: N/A;    ERCP N/A 05/17/2021    Procedure: ERCP (ENDOSCOPIC RETROGRADE CHOLANGIOPANCREATOGRAPHY);  Surgeon: Claudio Salvador MD;  Location: [facility] ENDO (2ND FLR);  Service: Endoscopy;  Laterality: N/A;    gender surgery      multiple surgeries since birth    HYSTERECTOMY  2003    INJECTION OF ANESTHETIC AGENT AROUND NERVE Right 11/19/2020    Procedure: BLOCK, NERVE, GLENOHUMERAL  need consent;  Surgeon: Messi Cabello MD;  Location: [facility] [place] [facility];  Service: Pain Management;  Laterality: Right;    JOINT REPLACEMENT  2/2020, 1/2021    Knees    KNEE ARTHROPLASTY Left 01/25/2021    Procedure: ARTHROPLASTY, KNEE:DEPUY-ATTUNE REVISION: SERINA iASSIST:CABLES ON HOLD;  Surgeon: Donte Andraed III, MD;  Location: [facility];  Service: Orthopedics;  Laterality: Left;    LAMINECTOMY  09/13/2019    LAPAROSCOPIC CHOLECYSTECTOMY N/A 05/17/2021    Procedure: CHOLECYSTECTOMY, LAPAROSCOPIC;  Surgeon: Calvin Wilson MD;  [address];  Service: General;  Laterality: N/A;    LAPAROSCOPIC CHOLECYSTECTOMY N/A 05/18/2021    Procedure: CHOLECYSTECTOMY, LAPAROSCOPIC;  Surgeon: Antonio Brandt MD;  [facility];  Service: General;  Laterality: N/A;    RADIOFREQUENCY ABLATION Left 05/09/2019    Procedure: RADIOFREQUENCY ABLATION, LEFT L3,4,5;  Surgeon: Messi Cabello MD;  Location: [facility] [place] [facility];  Service: Pain Management;  Laterality: Left;  1 of 2    RADIOFREQUENCY ABLATION Right 05/23/2019    Procedure: RADIOFREQUENCY ABLATION, RIGHT L3,4,5;  Surgeon: Messi Cabello MD;  Location: [facility] [place];  Service: Pain Management;  Laterality: Right;  2of 2    REPAIR, HERNIA, INCISIONAL N/A 7/31/2024    Procedure: OPEN REPAIR, HERNIA, INCISIONAL,  POSSIBLE MESH;  Surgeon: Messi Diaz MD;  Location: [facility] OR 2ND FLR;  Service: General;  Laterality: N/A;  AM CONSENT    ROBOT-ASSISTED LAPAROSCOPIC RECTOPEXY N/A 01/17/2023    Procedure: ROBOTIC RECTOPEXY;  Surgeon: Genia Ku MD;  Location: [facility] OR;  Service: Colon and Rectal;  Laterality: N/A;    SPINE SURGERY      Sept 2019     TOTAL KNEE ARTHROPLASTY Right 01/31/2020    Procedure: ARTHROPLASTY, KNEE, TOTAL;  Surgeon: Donte Andrade III, MD;  Location: [facility] OR 2ND FLR;  Service: Orthopedics;  Laterality: Right;     Social History     Socioeconomic History    Marital status:      Spouse name: Kristy Sainz    Number of children: 1   Occupational History     Comment:  and artist   Tobacco Use    Smoking status: Former     Current packs/day: 0.00     Types: Cigarettes     Quit date: 2009     Years since quitting: 15.6    Smokeless tobacco: Never    Tobacco comments:     was not a daily smoker-    Substance and Sexual Activity    Alcohol use: Not Currently    Drug use: No    Sexual activity: Yes     Partners: Female     Birth control/protection: None   Social History Narrative    Lives in a house with his wife      Social Determinants of Health     Financial Resource Strain: Low Risk  (11/26/2023)    Overall Financial Resource Strain (CARDIA)     Difficulty of Paying Living Expenses: Not hard at all   Food Insecurity: No Food Insecurity (11/26/2023)    Hunger Vital Sign     Worried About Running Out of Food in the Last Year: Never true     Ran Out of Food in the Last Year: Never true   Transportation Needs: No Transportation Needs (11/26/2023)    PRAPARE - Transportation     Lack of Transportation (Medical): No     Lack of Transportation (Non-Medical): No   Physical Activity: Insufficiently Active (11/26/2023)    Exercise Vital Sign     Days of Exercise per Week: 4 days     Minutes of Exercise per Session: 20 min   Stress: Stress Concern Present (11/26/2023)    [place]  [place] [employer] - Occupational Stress Questionnaire     Feeling of Stress : To some extent   Housing Stability: Low Risk  (11/26/2023)    Housing Stability Vital Sign     Unable to Pay for Housing in the Last Year: No     Number of Places Lived in the Last Year: 1     Unstable Housing in the Last Year: No     Family History   Problem Relation Name Age of Onset    Cancer Mother Taina Bourgeois         Kidney    Early death Mother Taina Bourgeois         58    Heart disease Father      Autoimmune disease Sister      Diabetes Maternal Uncle Trev Santos     Hypertension Maternal Grandmother Simi Santos     Stroke Maternal Grandmother Simi Santos     Diabetes Maternal Grandfather Bj Santos     Cancer Maternal Grandfather Bj Santos         Colon    Breast cancer Neg Hx      Ovarian cancer Neg Hx      Vaginal cancer Neg Hx      Endometrial cancer Neg Hx      Cervical cancer Neg Hx      Cirrhosis Neg Hx         Review of patient's allergies indicates:   Allergen Reactions    Bactrim [sulfamethoxazole-trimethoprim] Itching    Penicillins Rash       Current Outpatient Medications   Medication Sig    acetaminophen (TYLENOL) 325 MG tablet Take 2 tablets (650 mg total) by mouth every 6 (six) hours as needed for Pain.    ALPRAZolam (XANAX) 1 MG tablet Take 1 tablet (1 mg total) by mouth 3 (three) times daily as needed for Anxiety.    ascorbic acid, vitamin C, (VITAMIN C) 1000 MG tablet Take 1,000 mg by mouth once daily.    atorvastatin (LIPITOR) 20 MG tablet Take 1 tablet (20 mg total) by mouth once daily.    benzonatate (TESSALON) 200 MG capsule Take 1 capsule (200 mg total) by mouth 3 (three) times daily as needed for Cough.    cholecalciferol, vitamin D3, 125 mcg (5,000 unit) capsule Take 1 capsule by mouth Daily.    cranberry fruit extract (CRANBERRY ORAL) Take by mouth.    cyanocobalamin (VITAMIN B-12) 1000 MCG tablet Take 100 mcg by mouth once daily.    cyclobenzaprine (FLEXERIL) 10 MG tablet Take 1 tablet  (10 mg total) by mouth 3 (three) times daily as needed for Muscle spasms.    diclofenac sodium (VOLTAREN) 1 % Gel Apply 2 g topically once daily.    docusate sodium (COLACE) 100 MG capsule Take 1 capsule (100 mg total) by mouth 2 (two) times daily as needed for Constipation.    fluticasone (FLONASE SENSIMIST) 27.5 mcg/actuation nasal spray 2 sprays by Nasal route once daily.    gabapentin (NEURONTIN) 800 MG tablet Take 1 tablet by mouth three times a day and take 2 tablets at night (5 tablets a day, 4000mg)    hydrocortisone (ANUSOL-HC) 2.5 % rectal cream Place rectally 2 (two) times daily.    ipratropium (ATROVENT) 21 mcg (0.03 %) nasal spray 2 sprays by Each Nostril route 4 (four) times daily as needed for Rhinitis.    ketoconazole (NIZORAL) 2 % cream Apply topically once daily.    Lactobacillus acidophilus Cap Take by mouth once daily.     lamoTRIgine (LAMICTAL) 200 MG tablet Take 1 tablet (200 mg total) by mouth 2 (two) times daily.    loratadine (CLARITIN) 10 mg tablet Take 10 mg by mouth once daily.    magnesium 250 mg Tab Take 1 tablet by mouth Daily.    naloxegoL (MOVANTIK) 25 mg tablet Take 25 mg by mouth once daily. Take one hour prior to breakfast    naproxen sodium (ANAPROX) 220 MG tablet Take 220 mg by mouth.    nirmatrelvir-ritonavir (PAXLOVID) 300 mg (150 mg x 2)-100 mg copackaged tablets (EUA) Take 3 tablets by mouth 2 (two) times daily. Each dose contains 2 nirmatrelvir (pink tablets) and 1 ritonavir (white tablet). Take all 3 tablets together    oxyCODONE (ROXICODONE) 5 MG immediate release tablet Take 1 tablet (5 mg total) by mouth every 4 (four) hours as needed for Pain. Do not take with Oxycodone/Acetaminophen 10mg/325mg. (Patient not taking: Reported on 8/15/2024)    oxyCODONE-acetaminophen (PERCOCET)  mg per tablet Take 1 tablet by mouth every 8 (eight) hours as needed for Pain.    predniSONE (DELTASONE) 5 MG tablet Take 1 tablet (5 mg total) by mouth once daily. Double the dose if sick     primidone (MYSOLINE) 50 MG Tab Take 200mg (4 tabs) every morning/150mg (3 tabs ) at noon/150mg (3 tabs) every evening    rOPINIRole (REQUIP) 4 MG tablet Take 1 tablet (4 mg total) by mouth once daily.    testosterone (ANDROGEL) 1 % (50 mg/5 gram) GlPk Apply 1 packet (5 grams) topically once daily.    triamcinolone acetonide 0.1% (KENALOG) 0.1 % cream Apply topically 2 (two) times daily.     No current facility-administered medications for this visit.       REVIEW OF SYSTEMS:    GENERAL:  No weight loss, malaise or fevers.  HEENT:   No recent changes in vision or hearing. Headaches and malaise.  NECK:  Negative for lumps, no difficulty with swallowing.  RESPIRATORY:  Negative for cough, wheezing or shortness of breath, patient denies any recent URI.  CARDIOVASCULAR:  Negative for chest pain, leg swelling or palpitations.  GI:  Negative for abdominal discomfort, blood in stools or black stools or change in bowel habits. OIC relieved with constipation.  MUSCULOSKELETAL:  See HPI.  SKIN:  Negative for lesions, rash, and itching.  PSYCH:  Significant psychosocial stressors including PTSD. Patient's sleep is disturbed secondary to pain.  HEMATOLOGY/LYMPHOLOGY:  Negative for prolonged bleeding, bruising easily or swollen nodes.  Patient is not currently taking any anti-coagulants  ENDO: No history of diabetes or thyroid dysfunction  NEURO:   No history of syncope, paralysis, seizures or tremors.  All other reviewed and negative other than HPI.    OBJECTIVE:    PHYSICAL EXAMINATION:    General appearance: Well appearing, in no acute distress, alert and oriented x3.  Psych:  Mood and affect appropriate.  Skin: Skin color normal, no rashes or lesions, in both upper and lower body.  Extremities: Moves all visualized extremities freely.  Gait: Normal.      PREVIOUS PHYSICAL EXAMINATION:     GENERAL: Well appearing, in no acute distress, alert and oriented x3.  PSYCH:  Mood and affect appropriate.  SKIN:  Well-healed  incisions without any evidence of infection  HEAD/FACE:  Normocephalic, atraumatic.   PULM: No evidence of respiratory difficulty, symmetric chest rise.  EXT:  Well healed scar to both knees from TKA. Superficial abrasions to both knees. Medial and lateral joint line tenderness to both knees.  BACK:  There is significant pain with palpation over the facet joints and paraspinals of the lumbar spine bilaterally. There is decreased range of motion with extension to 15 degrees, and facet loading maneuvers cause reproducible pain.    NEURO:  No clonus. Cranial nerves grossly intact.  GAIT: Antalgic- ambulates with rolling walker.    Lab Results   Component Value Date    WBC 8.06 07/26/2024    HGB 14.8 07/26/2024    HCT 43.6 07/26/2024    MCV 99 (H) 07/26/2024     07/26/2024     CMP  Sodium   Date Value Ref Range Status   07/26/2024 132 (L) 136 - 145 mmol/L Final     Potassium   Date Value Ref Range Status   07/26/2024 4.3 3.5 - 5.1 mmol/L Final     Chloride   Date Value Ref Range Status   07/26/2024 98 95 - 110 mmol/L Final     CO2   Date Value Ref Range Status   07/26/2024 23 23 - 29 mmol/L Final     Glucose   Date Value Ref Range Status   07/26/2024 80 70 - 110 mg/dL Final     BUN   Date Value Ref Range Status   07/26/2024 7 6 - 20 mg/dL Final     Creatinine   Date Value Ref Range Status   07/26/2024 0.8 0.5 - 1.4 mg/dL Final     Calcium   Date Value Ref Range Status   07/26/2024 9.6 8.7 - 10.5 mg/dL Final     Total Protein   Date Value Ref Range Status   06/26/2024 6.8 6.0 - 8.4 g/dL Final     Albumin   Date Value Ref Range Status   06/26/2024 4.0 3.5 - 5.2 g/dL Final     Total Bilirubin   Date Value Ref Range Status   06/26/2024 0.3 0.1 - 1.0 mg/dL Final     Comment:     For infants and newborns, interpretation of results should be based  on gestational age, weight and in agreement with clinical  observations.    Premature Infant recommended reference ranges:  Up to 24 hours.............<8.0 mg/dL  Up to 48  hours............<12.0 mg/dL  3-5 days..................<15.0 mg/dL  6-29 days.................<15.0 mg/dL       Alkaline Phosphatase   Date Value Ref Range Status   06/26/2024 104 55 - 135 U/L Final     AST   Date Value Ref Range Status   06/26/2024 30 10 - 40 U/L Final     ALT   Date Value Ref Range Status   06/26/2024 30 10 - 44 U/L Final     Anion Gap   Date Value Ref Range Status   07/26/2024 11 8 - 16 mmol/L Final     eGFR if    Date Value Ref Range Status   08/23/2021 >60.0 >60 mL/min/1.73 m^2 Final     eGFR if non    Date Value Ref Range Status   08/23/2021 >60.0 >60 mL/min/1.73 m^2 Final     Comment:     Calculation used to obtain the estimated glomerular filtration  rate (eGFR) is the CKD-EPI equation.        Lab Results   Component Value Date    HGBA1C 5.4 04/16/2024     Lab Results   Component Value Date    TSH 1.857 04/16/2024         ASSESSMENT: 54 y.o. year old adult with chronic back and knee pain, consistent with the following diagnoses:    Encounter Diagnoses   Name Primary?    Chronic pain syndrome Yes    Cervical spondylosis     Lumbar spondylosis     Encounter for long-term opiate analgesic use        PLAN:   We discussed with the patient the assessment and recommendations. The following is the plan we agreed on:   Continue Movantik 25 mg daily to take 1 hr prior to breakfast for OIC. He reports benefit.  2. Continue with home PT and yoga exercises.  3. Continue oxycodone acetaminophen 10/325 mg QID PRN, #90. Can call for refills as needed.  4. Pt has pain contract.  Last UDS reviewed.  5. Follow-up in 3 months or sooner if needed.     - Dr. Farmer was consulted on the patient and agrees with this plan.    The above plan and management options were discussed at length with patient. Patient is in agreement with the above and verbalized understanding.     Alejandra Phoenix, ERIK  08/30/2024

## 2024-09-10 ENCOUNTER — PATIENT MESSAGE (OUTPATIENT)
Dept: INTERNAL MEDICINE | Facility: CLINIC | Age: 54
End: 2024-09-10
Payer: MEDICARE

## 2024-09-10 ENCOUNTER — PATIENT MESSAGE (OUTPATIENT)
Dept: PSYCHIATRY | Facility: CLINIC | Age: 54
End: 2024-09-10
Payer: MEDICARE

## 2024-09-10 NOTE — TELEPHONE ENCOUNTER
Pt asking you sign off on Jury duty letter. Said he can not do it mentally or physically. Paper is in your sign folder.    LOV 8/29/24

## 2024-09-16 ENCOUNTER — PATIENT MESSAGE (OUTPATIENT)
Dept: PAIN MEDICINE | Facility: CLINIC | Age: 54
End: 2024-09-16
Payer: MEDICARE

## 2024-09-16 DIAGNOSIS — M62.838 MUSCLE SPASM: ICD-10-CM

## 2024-09-17 RX ORDER — OXYCODONE AND ACETAMINOPHEN 10; 325 MG/1; MG/1
1 TABLET ORAL EVERY 8 HOURS PRN
Qty: 90 TABLET | Refills: 0 | Status: SHIPPED | OUTPATIENT
Start: 2024-09-17 | End: 2024-10-19

## 2024-09-17 NOTE — TELEPHONE ENCOUNTER
Patient requesting refill on Percocet  Last office visit 8/30/24   shows last refill on 8/15/24  Patient does have a pain contract on file with [facility] department  Patient last UDS 5/21/24 was consistent with current therapy    CODEINE  Not Detected  Not Detected  Not Detected Not Detected   Comment: INTERPRETIVE INFORMATION: Codeine, U  Positive Cutoff: 40 ng/mL  Methodology: Mass Spectrometry   MORPHINE  Not Detected  Not Detected  Present Present   Comment: INTERPRETIVE INFORMATION:Morphine, U  Positive Cutoff: 20 ng/mL  Methodology: Mass Spectrometry   6-ACETYLMORPHINE  Not Detected  Not Detected  Not Detected Not Detected   Comment: INTERPRETIVE INFORMATION:6-acetylmorphine, U  Positive Cutoff: 20 ng/mL  Methodology: Mass Spectrometry   OXYCODONE  Present  Present  Present Present   Comment: INTERPRETIVE INFORMATION:Oxycodone, U  Positive Cutoff: 40 ng/mL  Methodology: Mass Spectrometry   NOROYXCODONE  Present  Present  Present Present   Comment: INTERPRETIVE INFORMATION:Noroxycodone, U  Positive Cutoff: 100 ng/mL  Methodology: Mass Spectrometry   OXYMORPHONE  Present  Present  Not Detected Not Detected   Comment: INTERPRETIVE INFORMATION:Oxymorphone, U  Positive Cutoff: 40 ng/mL  Methodology: Mass Spectrometry   NOROXYMORPHONE  Present  Present  Not Detected Not Detected   Comment: INTERPRETIVE INFORMATION:Noroxymorphone, U  Positive Cutoff: 100 ng/mL  Methodology: Mass Spectrometry   HYDROCODONE  Not Detected  Not Detected  Not Detected Not Detected   Comment: INTERPRETIVE INFORMATION:Hydrocodone, U  Positive Cutoff: 40 ng/mL  Methodology: Mass Spectrometry   NORHYDROCODONE  Not Detected  Not Detected  Not Detected Not Detected   Comment: INTERPRETIVE INFORMATION:Norhydrocodone, U  Positive Cutoff: 100 ng/mL  Methodology: Mass Spectrometry   HYDROMORPHONE  Not Detected  Not Detected  Not Detected Not Detected   Comment: INTERPRETIVE INFORMATION:Hydromorphone, U  Positive Cutoff: 20  ng/mL  Methodology: Mass Spectrometry   BUPRENORPHINE  Not Detected  Not Detected  Not Detected Not Detected   Comment: INTERPRETIVE INFORMATION:Buprenorphine, U  Positive Cutoff: 5 ng/mL  Methodology: Mass Spectrometry   NORUBPRENORPHINE  Not Detected  Not Detected  Not Detected Not Detected   Comment: INTERPRETIVE INFORMATION:Norbuprenorphine, U  Positive Cutoff: 20 ng/mL  Methodology: Mass Spectrometry   FENTANYL  Not Detected  Not Detected  Not Detected Not Detected   Comment: INTERPRETIVE INFORMATION:Fentanyl, U  Positive Cutoff: 2 ng/mL  Methodology: Mass Spectrometry   NORFENTANYL  Not Detected  Not Detected  Not Detected Not Detected   Comment: INTERPRETIVE INFORMATION:Norfentanyl, U  Positive Cutoff: 2 ng/mL  Methodology: Mass Spectrometry   MEPERIDINE METABOLITE  Not Detected  Not Detected  Not Detected Not Detected   Comment: INTERPRETIVE INFORMATION:Meperidine metabolite, U  Positive Cutoff: 50 ng/mL  Methodology: Mass Spectrometry   TAPENTADOL  Not Detected  Not Detected  Not Detected Not Detected   Comment: INTERPRETIVE INFORMATION:Tapentadol, U  Positive Cutoff: 100 ng/mL  Methodology: Mass Spectrometry   TAPENTADOL-O-SULF  Not Detected  Not Detected  Not Detected Not Detected   Comment: INTERPRETIVE INFORMATION:Tapentadol-o-Sulf, U  Positive Cutoff: 200 ng/mL  Methodology: Mass Spectrometry   METHADONE  Negative  Not Detected  Not Detected Not Detected   Comment: Presumptive negative by immunoassay. Testing by mass  spectrometry is available on request.  INTERPRETIVE INFORMATION: Methadone Screen, U  Positive Cutoff: 150 ng/mL  Methodology: Immunoassay   TRAMADOL  Negative  Not Detected  Not Detected Not Detected   Comment: Presumptive negative by immunoassay. Testing by mass  spectrometry is available on request.  INTERPRETIVE INFORMATION:Tramadol Screen, U  Positive Cutoff: 100 ng/mL  Methodology: Immunoassay   AMPHETAMINE  Not Detected  Not Detected  Not Detected Not Detected   Comment:  INTERPRETIVE INFORMATION:Amphetamine, U  Positive Cutoff: 50 ng/mL  Methodology: Mass Spectrometry   METHAMPHETAMINE  Not Detected  Not Detected  Not Detected Not Detected   Comment: INTERPRETIVE INFORMATION:Methamphetamine, U  Positive Cutoff: 200 ng/mL  Methodology: Mass Spectrometry   MDMA- ECSTASY  Not Detected  Not Detected  Not Detected Not Detected   Comment: INTERPRETIVE INFORMATION:MDMA, U  Positive Cutoff: 200 ng/mL  Methodology: Mass Spectrometry   MDA  Not Detected  Not Detected  Not Detected Not Detected   Comment: INTERPRETIVE INFORMATION:MDA, U  Positive Cutoff: 200 ng/mL  Methodology: Mass Spectrometry   MDEA- Deanna  Not Detected  Not Detected  Not Detected Not Detected   Comment: INTERPRETIVE INFORMATION:MDEA, U  Positive Cutoff: 200 ng/mL  Methodology: Mass Spectrometry   METHYLPHENIDATE  Not Detected  Not Detected  Not Detected Not Detected   Comment: INTERPRETIVE INFORMATION:Methylphenidate, U  Positive Cutoff: 100 ng/mL  Methodology: Mass Spectrometry   PHENTERMINE  Not Detected  Not Detected  Not Detected Not Detected   Comment: INTERPRETIVE INFORMATION:Phentermine, U  Positive Cutoff: 100 ng/mL  Methodology: Mass Spectrometry   BENZOYLECGONINE  Negative  Not Detected  Not Detected Not Detected   Comment: Presumptive negative by immunoassay. Testing by mass  spectrometry is available on request.  INTERPRETIVE INFORMATION:Cocaine Screen, U  Positive Cutoff: 150 ng/mL  Methodology: Immunoassay   ALPRAZOLAM  Not Detected  Present  Not Detected Present   Comment: INTERPRETIVE INFORMATION:Alprazolam, U  Positive Cutoff: 40 ng/mL  Methodology: Mass Spectrometry   ALPHA-OH-ALPRAZOLAM  Present  Present  Present Not Detected   Comment: INTERPRETIVE INFORMATION:Alpha-OH-Alprazolam, U  Positive Cutoff: 20 ng/mL  Methodology: Mass Spectrometry   CLONAZEPAM  Not Detected  Not Detected  Not Detected Not Detected   Comment: INTERPRETIVE INFORMATION:Clonazepam, U  Positive Cutoff: 20 ng/mL  Methodology: Mass  Spectrometry   7-AMINOCLONAZEPAM  Not Detected  Not Detected  Not Detected Not Detected   Comment: INTERPRETIVE INFORMATION:7-Aminoclonazepam, U  Positive Cutoff: 40 ng/mL  Methodology: Mass Spectrometry   DIAZEPAM  Not Detected  Not Detected  Not Detected Not Detected   Comment: INTERPRETIVE INFORMATION:Diazepam, U  Positive Cutoff: 50 ng/mL  Methodology: Mass Spectrometry   NORDIAZEPAM  Not Detected  Not Detected  Not Detected Not Detected   Comment: INTERPRETIVE INFORMATION:Nordiazepam, U  Positive Cutoff: 50 ng/mL  Methodology: Mass Spectrometry   OXAZEPAM  Not Detected  Not Detected  Not Detected Not Detected   Comment: INTERPRETIVE INFORMATION:Oxazepam, U  Positive Cutoff: 50 ng/mL  Methodology: Mass Spectrometry   TEMAZEPAM  Not Detected  Not Detected  Not Detected Not Detected   Comment: INTERPRETIVE INFORMATION:Temazepam, U  Positive Cutoff: 50 ng/mL  Methodology: Mass Spectrometry   Lorazepam  Not Detected  Not Detected  Not Detected Not Detected   Comment: INTERPRETIVE INFORMATION:Lorazepam, U  Positive Cutoff: 60 ng/mL  Methodology: Mass Spectrometry   MIDAZOLAM  Not Detected  Not Detected  Not Detected Not Detected   Comment: INTERPRETIVE INFORMATION:Midazolam, U  Positive Cutoff: 20 ng/mL  Methodology: Mass Spectrometry   ZOLPIDEM  Not Detected  Not Detected  Not Detected Not Detected   Comment: INTERPRETIVE INFORMATION:Zolpidem, U  Positive Cutoff: 20 ng/mL  Methodology: Mass Spectrometry   BARBITURATES  PresumptivePOS  Present  Present Present   Comment: Presumptive positive by immunoassay. Testing by mass  spectrometry is available on request.  INTERPRETIVE INFORMATION:Barbiturates Screen, U  Positive Cutoff: 200 ng/mL  Methodology: Immunoassay   Creatinine, Urine 20.0 - 400.0 mg/dL 30.4 49.0 R 27.8 18.0 Low  R 113.0 58.9   ETHYL GLUCURONIDE  Negative  Not Detected  Not Detected Not Detected   Comment: Presumptive negative by immunoassay. Testing by mass  spectrometry is available on  request.  INTERPRETIVE INFORMATION:Ethyl Glucuronide Screen, U  Positive Cutoff: 500 ng/mL  Methodology: Immunoassay   MARIJUANA METABOLITE  Negative  Not Detected  Not Detected Not Detected   Comment: Presumptive negative by immunoassay. Testing by mass  spectrometry is available on request.  INTERPRETIVE INFORMATION: THC (Cannabinoids) Screen, U  Positive Cutoff: 50 ng/mL  Methodology: Immunoassay   PCP  Negative  Not Detected  Not Detected Not Detected   Comment: Presumptive negative by immunoassay. Testing by mass  spectrometry is available on request.  INTERPRETIVE INFORMATION:Phencyclidine Screen, U  Positive Cutoff: 25 ng/mL  Methodology: Immunoassay   CARISOPRODOL  Negative  Not Detected CM  Not Detected CM Not Detected CM   Comment: Presumptive negative by immunoassay. Testing by mass  spectrometry is available on request.  INTERPRETIVE INFORMATION: Carisoprodol Screen, U  Positive Cutoff: 100 ng/mL  Methodology: Immunoassay  The carisoprodol immunoassay has cross-reactivity to  carisoprodol and meprobamate.   Naloxone  Not Detected  Not Detected      Comment: INTERPRETIVE INFORMATION:Naloxone, U  Positive Cutoff: 100 ng/mL  Methodology: Mass Spectrometry   Gabapentin  Present  Present      Comment: INTERPRETIVE INFORMATION:Gabapentin, U  Positive Cutoff: 3,000 ng/mL  Methodology: Mass Spectrometry   Pregabalin  Not Detected  Not Detected      Comment: INTERPRETIVE INFORMATION:Pregabalin, U  Positive Cutoff: 3,000 ng/mL  Methodology: Mass Spectrometry   Alpha-OH-Midazolam  Not Detected  Not Detected      Comment: INTERPRETIVE INFORMATION:Alpha-OH-Midazolam, U  Positive Cutoff: 20 ng/mL  Methodology: Mass Spectrometry   Zolpidem Metabolite  Not Detected  Not Detected      Comment: INTERPRETIVE INFORMATION:Zolpidem Metabolite, U  Positive Cutoff: 100 ng/mL  Methodology: Mass Spectrometry

## 2024-09-27 ENCOUNTER — PATIENT MESSAGE (OUTPATIENT)
Dept: OTOLARYNGOLOGY | Facility: CLINIC | Age: 54
End: 2024-09-27
Payer: MEDICARE

## 2024-09-29 DIAGNOSIS — G25.81 RLS (RESTLESS LEGS SYNDROME): ICD-10-CM

## 2024-09-30 RX ORDER — ROPINIROLE 4 MG/1
4 TABLET, FILM COATED ORAL DAILY
Qty: 90 TABLET | Refills: 12 | Status: SHIPPED | OUTPATIENT
Start: 2024-09-30

## 2024-10-07 ENCOUNTER — TELEPHONE (OUTPATIENT)
Dept: AUDIOLOGY | Facility: CLINIC | Age: 54
End: 2024-10-07
Payer: MEDICARE

## 2024-10-14 ENCOUNTER — TELEPHONE (OUTPATIENT)
Dept: AUDIOLOGY | Facility: CLINIC | Age: 54
End: 2024-10-14
Payer: MEDICARE

## 2024-10-15 ENCOUNTER — PATIENT MESSAGE (OUTPATIENT)
Dept: SPINE | Facility: CLINIC | Age: 54
End: 2024-10-15
Payer: MEDICARE

## 2024-10-22 ENCOUNTER — PATIENT MESSAGE (OUTPATIENT)
Dept: PAIN MEDICINE | Facility: CLINIC | Age: 54
End: 2024-10-22
Payer: MEDICARE

## 2024-10-22 DIAGNOSIS — M62.838 MUSCLE SPASM: ICD-10-CM

## 2024-10-22 RX ORDER — OXYCODONE AND ACETAMINOPHEN 10; 325 MG/1; MG/1
1 TABLET ORAL EVERY 8 HOURS PRN
Qty: 90 TABLET | Refills: 0 | Status: SHIPPED | OUTPATIENT
Start: 2024-10-22 | End: 2024-11-23

## 2024-10-22 NOTE — TELEPHONE ENCOUNTER
Patient requesting refill on Percocet  Last office visit 8/30/224   shows last refill on 8/15/24  Patient does have a pain contract on file with [facility] department  Patient last UDS 5/21/24 was consistent with current therapy    CODEINE  Not Detected  Not Detected  Not Detected Not Detected   Comment: INTERPRETIVE INFORMATION: Codeine, U  Positive Cutoff: 40 ng/mL  Methodology: Mass Spectrometry   MORPHINE  Not Detected  Not Detected  Present Present   Comment: INTERPRETIVE INFORMATION:Morphine, U  Positive Cutoff: 20 ng/mL  Methodology: Mass Spectrometry   6-ACETYLMORPHINE  Not Detected  Not Detected  Not Detected Not Detected   Comment: INTERPRETIVE INFORMATION:6-acetylmorphine, U  Positive Cutoff: 20 ng/mL  Methodology: Mass Spectrometry   OXYCODONE  Present  Present  Present Present   Comment: INTERPRETIVE INFORMATION:Oxycodone, U  Positive Cutoff: 40 ng/mL  Methodology: Mass Spectrometry   NOROYXCODONE  Present  Present  Present Present   Comment: INTERPRETIVE INFORMATION:Noroxycodone, U  Positive Cutoff: 100 ng/mL  Methodology: Mass Spectrometry   OXYMORPHONE  Present  Present  Not Detected Not Detected   Comment: INTERPRETIVE INFORMATION:Oxymorphone, U  Positive Cutoff: 40 ng/mL  Methodology: Mass Spectrometry   NOROXYMORPHONE  Present  Present  Not Detected Not Detected   Comment: INTERPRETIVE INFORMATION:Noroxymorphone, U  Positive Cutoff: 100 ng/mL  Methodology: Mass Spectrometry   HYDROCODONE  Not Detected  Not Detected  Not Detected Not Detected   Comment: INTERPRETIVE INFORMATION:Hydrocodone, U  Positive Cutoff: 40 ng/mL  Methodology: Mass Spectrometry   NORHYDROCODONE  Not Detected  Not Detected  Not Detected Not Detected   Comment: INTERPRETIVE INFORMATION:Norhydrocodone, U  Positive Cutoff: 100 ng/mL  Methodology: Mass Spectrometry   HYDROMORPHONE  Not Detected  Not Detected  Not Detected Not Detected   Comment: INTERPRETIVE INFORMATION:Hydromorphone, U  Positive Cutoff: 20  ng/mL  Methodology: Mass Spectrometry   BUPRENORPHINE  Not Detected  Not Detected  Not Detected Not Detected   Comment: INTERPRETIVE INFORMATION:Buprenorphine, U  Positive Cutoff: 5 ng/mL  Methodology: Mass Spectrometry   NORUBPRENORPHINE  Not Detected  Not Detected  Not Detected Not Detected   Comment: INTERPRETIVE INFORMATION:Norbuprenorphine, U  Positive Cutoff: 20 ng/mL  Methodology: Mass Spectrometry   FENTANYL  Not Detected  Not Detected  Not Detected Not Detected   Comment: INTERPRETIVE INFORMATION:Fentanyl, U  Positive Cutoff: 2 ng/mL  Methodology: Mass Spectrometry   NORFENTANYL  Not Detected  Not Detected  Not Detected Not Detected   Comment: INTERPRETIVE INFORMATION:Norfentanyl, U  Positive Cutoff: 2 ng/mL  Methodology: Mass Spectrometry   MEPERIDINE METABOLITE  Not Detected  Not Detected  Not Detected Not Detected   Comment: INTERPRETIVE INFORMATION:Meperidine metabolite, U  Positive Cutoff: 50 ng/mL  Methodology: Mass Spectrometry   TAPENTADOL  Not Detected  Not Detected  Not Detected Not Detected   Comment: INTERPRETIVE INFORMATION:Tapentadol, U  Positive Cutoff: 100 ng/mL  Methodology: Mass Spectrometry   TAPENTADOL-O-SULF  Not Detected  Not Detected  Not Detected Not Detected   Comment: INTERPRETIVE INFORMATION:Tapentadol-o-Sulf, U  Positive Cutoff: 200 ng/mL  Methodology: Mass Spectrometry   METHADONE  Negative  Not Detected  Not Detected Not Detected   Comment: Presumptive negative by immunoassay. Testing by mass  spectrometry is available on request.  INTERPRETIVE INFORMATION: Methadone Screen, U  Positive Cutoff: 150 ng/mL  Methodology: Immunoassay   TRAMADOL  Negative  Not Detected  Not Detected Not Detected   Comment: Presumptive negative by immunoassay. Testing by mass  spectrometry is available on request.  INTERPRETIVE INFORMATION:Tramadol Screen, U  Positive Cutoff: 100 ng/mL  Methodology: Immunoassay   AMPHETAMINE  Not Detected  Not Detected  Not Detected Not Detected   Comment:  INTERPRETIVE INFORMATION:Amphetamine, U  Positive Cutoff: 50 ng/mL  Methodology: Mass Spectrometry   METHAMPHETAMINE  Not Detected  Not Detected  Not Detected Not Detected   Comment: INTERPRETIVE INFORMATION:Methamphetamine, U  Positive Cutoff: 200 ng/mL  Methodology: Mass Spectrometry   MDMA- ECSTASY  Not Detected  Not Detected  Not Detected Not Detected   Comment: INTERPRETIVE INFORMATION:MDMA, U  Positive Cutoff: 200 ng/mL  Methodology: Mass Spectrometry   MDA  Not Detected  Not Detected  Not Detected Not Detected   Comment: INTERPRETIVE INFORMATION:MDA, U  Positive Cutoff: 200 ng/mL  Methodology: Mass Spectrometry   MDEA- Deanna  Not Detected  Not Detected  Not Detected Not Detected   Comment: INTERPRETIVE INFORMATION:MDEA, U  Positive Cutoff: 200 ng/mL  Methodology: Mass Spectrometry   METHYLPHENIDATE  Not Detected  Not Detected  Not Detected Not Detected   Comment: INTERPRETIVE INFORMATION:Methylphenidate, U  Positive Cutoff: 100 ng/mL  Methodology: Mass Spectrometry   PHENTERMINE  Not Detected  Not Detected  Not Detected Not Detected   Comment: INTERPRETIVE INFORMATION:Phentermine, U  Positive Cutoff: 100 ng/mL  Methodology: Mass Spectrometry   BENZOYLECGONINE  Negative  Not Detected  Not Detected Not Detected   Comment: Presumptive negative by immunoassay. Testing by mass  spectrometry is available on request.  INTERPRETIVE INFORMATION:Cocaine Screen, U  Positive Cutoff: 150 ng/mL  Methodology: Immunoassay   ALPRAZOLAM  Not Detected  Present  Not Detected Present   Comment: INTERPRETIVE INFORMATION:Alprazolam, U  Positive Cutoff: 40 ng/mL  Methodology: Mass Spectrometry   ALPHA-OH-ALPRAZOLAM  Present  Present  Present Not Detected   Comment: INTERPRETIVE INFORMATION:Alpha-OH-Alprazolam, U  Positive Cutoff: 20 ng/mL  Methodology: Mass Spectrometry   CLONAZEPAM  Not Detected  Not Detected  Not Detected Not Detected   Comment: INTERPRETIVE INFORMATION:Clonazepam, U  Positive Cutoff: 20 ng/mL  Methodology: Mass  Spectrometry   7-AMINOCLONAZEPAM  Not Detected  Not Detected  Not Detected Not Detected   Comment: INTERPRETIVE INFORMATION:7-Aminoclonazepam, U  Positive Cutoff: 40 ng/mL  Methodology: Mass Spectrometry   DIAZEPAM  Not Detected  Not Detected  Not Detected Not Detected   Comment: INTERPRETIVE INFORMATION:Diazepam, U  Positive Cutoff: 50 ng/mL  Methodology: Mass Spectrometry   NORDIAZEPAM  Not Detected  Not Detected  Not Detected Not Detected   Comment: INTERPRETIVE INFORMATION:Nordiazepam, U  Positive Cutoff: 50 ng/mL  Methodology: Mass Spectrometry   OXAZEPAM  Not Detected  Not Detected  Not Detected Not Detected   Comment: INTERPRETIVE INFORMATION:Oxazepam, U  Positive Cutoff: 50 ng/mL  Methodology: Mass Spectrometry   TEMAZEPAM  Not Detected  Not Detected  Not Detected Not Detected   Comment: INTERPRETIVE INFORMATION:Temazepam, U  Positive Cutoff: 50 ng/mL  Methodology: Mass Spectrometry   Lorazepam  Not Detected  Not Detected  Not Detected Not Detected   Comment: INTERPRETIVE INFORMATION:Lorazepam, U  Positive Cutoff: 60 ng/mL  Methodology: Mass Spectrometry   MIDAZOLAM  Not Detected  Not Detected  Not Detected Not Detected   Comment: INTERPRETIVE INFORMATION:Midazolam, U  Positive Cutoff: 20 ng/mL  Methodology: Mass Spectrometry   ZOLPIDEM  Not Detected  Not Detected  Not Detected Not Detected   Comment: INTERPRETIVE INFORMATION:Zolpidem, U  Positive Cutoff: 20 ng/mL  Methodology: Mass Spectrometry   BARBITURATES  PresumptivePOS  Present  Present Present   Comment: Presumptive positive by immunoassay. Testing by mass  spectrometry is available on request.  INTERPRETIVE INFORMATION:Barbiturates Screen, U  Positive Cutoff: 200 ng/mL  Methodology: Immunoassay   Creatinine, Urine 20.0 - 400.0 mg/dL 30.4 49.0 R 27.8 18.0 Low  R 113.0 58.9   ETHYL GLUCURONIDE  Negative  Not Detected  Not Detected Not Detected   Comment: Presumptive negative by immunoassay. Testing by mass  spectrometry is available on  request.  INTERPRETIVE INFORMATION:Ethyl Glucuronide Screen, U  Positive Cutoff: 500 ng/mL  Methodology: Immunoassay   MARIJUANA METABOLITE  Negative  Not Detected  Not Detected Not Detected   Comment: Presumptive negative by immunoassay. Testing by mass  spectrometry is available on request.  INTERPRETIVE INFORMATION: THC (Cannabinoids) Screen, U  Positive Cutoff: 50 ng/mL  Methodology: Immunoassay   PCP  Negative  Not Detected  Not Detected Not Detected   Comment: Presumptive negative by immunoassay. Testing by mass  spectrometry is available on request.  INTERPRETIVE INFORMATION:Phencyclidine Screen, U  Positive Cutoff: 25 ng/mL  Methodology: Immunoassay   CARISOPRODOL  Negative  Not Detected CM  Not Detected CM Not Detected CM   Comment: Presumptive negative by immunoassay. Testing by mass  spectrometry is available on request.  INTERPRETIVE INFORMATION: Carisoprodol Screen, U  Positive Cutoff: 100 ng/mL  Methodology: Immunoassay  The carisoprodol immunoassay has cross-reactivity to  carisoprodol and meprobamate.   Naloxone  Not Detected  Not Detected      Comment: INTERPRETIVE INFORMATION:Naloxone, U  Positive Cutoff: 100 ng/mL  Methodology: Mass Spectrometry   Gabapentin  Present  Present      Comment: INTERPRETIVE INFORMATION:Gabapentin, U  Positive Cutoff: 3,000 ng/mL  Methodology: Mass Spectrometry   Pregabalin  Not Detected  Not Detected      Comment: INTERPRETIVE INFORMATION:Pregabalin, U  Positive Cutoff: 3,000 ng/mL  Methodology: Mass Spectrometry   Alpha-OH-Midazolam  Not Detected  Not Detected      Comment: INTERPRETIVE INFORMATION:Alpha-OH-Midazolam, U  Positive Cutoff: 20 ng/mL  Methodology: Mass Spectrometry   Zolpidem Metabolite  Not Detected  Not Detected      Comment: INTERPRETIVE INFORMATION:Zolpidem Metabolite, U  Positive Cutoff: 100 ng/mL  Methodology: Mass Spectrometry

## 2024-11-03 DIAGNOSIS — M62.838 MUSCLE SPASM: ICD-10-CM

## 2024-11-04 RX ORDER — CYCLOBENZAPRINE HCL 10 MG
10 TABLET ORAL 3 TIMES DAILY PRN
Qty: 270 TABLET | Refills: 0 | Status: SHIPPED | OUTPATIENT
Start: 2024-11-04 | End: 2025-02-04

## 2024-11-22 ENCOUNTER — PATIENT MESSAGE (OUTPATIENT)
Dept: PAIN MEDICINE | Facility: CLINIC | Age: 54
End: 2024-11-22
Payer: MEDICARE

## 2024-11-22 DIAGNOSIS — M62.838 MUSCLE SPASM: ICD-10-CM

## 2024-11-22 DIAGNOSIS — K59.03 THERAPEUTIC OPIOID INDUCED CONSTIPATION: ICD-10-CM

## 2024-11-22 DIAGNOSIS — T40.2X5A THERAPEUTIC OPIOID INDUCED CONSTIPATION: ICD-10-CM

## 2024-11-22 NOTE — TELEPHONE ENCOUNTER
Patient requesting refill on Percocet  Last office visit 8/30/24   shows last refill on 10/24/24  Patient does have a pain contract on file with [facility] department  Patient last UDS 5/21/24 was consistent with current therapy    CODEINE  Not Detected  Not Detected  Not Detected Not Detected   Comment: INTERPRETIVE INFORMATION: Codeine, U  Positive Cutoff: 40 ng/mL  Methodology: Mass Spectrometry   MORPHINE  Not Detected  Not Detected  Present Present   Comment: INTERPRETIVE INFORMATION:Morphine, U  Positive Cutoff: 20 ng/mL  Methodology: Mass Spectrometry   6-ACETYLMORPHINE  Not Detected  Not Detected  Not Detected Not Detected   Comment: INTERPRETIVE INFORMATION:6-acetylmorphine, U  Positive Cutoff: 20 ng/mL  Methodology: Mass Spectrometry   OXYCODONE  Present  Present  Present Present   Comment: INTERPRETIVE INFORMATION:Oxycodone, U  Positive Cutoff: 40 ng/mL  Methodology: Mass Spectrometry   NOROYXCODONE  Present  Present  Present Present   Comment: INTERPRETIVE INFORMATION:Noroxycodone, U  Positive Cutoff: 100 ng/mL  Methodology: Mass Spectrometry   OXYMORPHONE  Present  Present  Not Detected Not Detected   Comment: INTERPRETIVE INFORMATION:Oxymorphone, U  Positive Cutoff: 40 ng/mL  Methodology: Mass Spectrometry   NOROXYMORPHONE  Present  Present  Not Detected Not Detected   Comment: INTERPRETIVE INFORMATION:Noroxymorphone, U  Positive Cutoff: 100 ng/mL  Methodology: Mass Spectrometry   HYDROCODONE  Not Detected  Not Detected  Not Detected Not Detected   Comment: INTERPRETIVE INFORMATION:Hydrocodone, U  Positive Cutoff: 40 ng/mL  Methodology: Mass Spectrometry   NORHYDROCODONE  Not Detected  Not Detected  Not Detected Not Detected   Comment: INTERPRETIVE INFORMATION:Norhydrocodone, U  Positive Cutoff: 100 ng/mL  Methodology: Mass Spectrometry   HYDROMORPHONE  Not Detected  Not Detected  Not Detected Not Detected   Comment: INTERPRETIVE INFORMATION:Hydromorphone, U  Positive Cutoff: 20  ng/mL  Methodology: Mass Spectrometry   BUPRENORPHINE  Not Detected  Not Detected  Not Detected Not Detected   Comment: INTERPRETIVE INFORMATION:Buprenorphine, U  Positive Cutoff: 5 ng/mL  Methodology: Mass Spectrometry   NORUBPRENORPHINE  Not Detected  Not Detected  Not Detected Not Detected   Comment: INTERPRETIVE INFORMATION:Norbuprenorphine, U  Positive Cutoff: 20 ng/mL  Methodology: Mass Spectrometry   FENTANYL  Not Detected  Not Detected  Not Detected Not Detected   Comment: INTERPRETIVE INFORMATION:Fentanyl, U  Positive Cutoff: 2 ng/mL  Methodology: Mass Spectrometry   NORFENTANYL  Not Detected  Not Detected  Not Detected Not Detected   Comment: INTERPRETIVE INFORMATION:Norfentanyl, U  Positive Cutoff: 2 ng/mL  Methodology: Mass Spectrometry   MEPERIDINE METABOLITE  Not Detected  Not Detected  Not Detected Not Detected   Comment: INTERPRETIVE INFORMATION:Meperidine metabolite, U  Positive Cutoff: 50 ng/mL  Methodology: Mass Spectrometry   TAPENTADOL  Not Detected  Not Detected  Not Detected Not Detected   Comment: INTERPRETIVE INFORMATION:Tapentadol, U  Positive Cutoff: 100 ng/mL  Methodology: Mass Spectrometry   TAPENTADOL-O-SULF  Not Detected  Not Detected  Not Detected Not Detected   Comment: INTERPRETIVE INFORMATION:Tapentadol-o-Sulf, U  Positive Cutoff: 200 ng/mL  Methodology: Mass Spectrometry   METHADONE  Negative  Not Detected  Not Detected Not Detected   Comment: Presumptive negative by immunoassay. Testing by mass  spectrometry is available on request.  INTERPRETIVE INFORMATION: Methadone Screen, U  Positive Cutoff: 150 ng/mL  Methodology: Immunoassay   TRAMADOL  Negative  Not Detected  Not Detected Not Detected   Comment: Presumptive negative by immunoassay. Testing by mass  spectrometry is available on request.  INTERPRETIVE INFORMATION:Tramadol Screen, U  Positive Cutoff: 100 ng/mL  Methodology: Immunoassay   AMPHETAMINE  Not Detected  Not Detected  Not Detected Not Detected   Comment:  INTERPRETIVE INFORMATION:Amphetamine, U  Positive Cutoff: 50 ng/mL  Methodology: Mass Spectrometry   METHAMPHETAMINE  Not Detected  Not Detected  Not Detected Not Detected   Comment: INTERPRETIVE INFORMATION:Methamphetamine, U  Positive Cutoff: 200 ng/mL  Methodology: Mass Spectrometry   MDMA- ECSTASY  Not Detected  Not Detected  Not Detected Not Detected   Comment: INTERPRETIVE INFORMATION:MDMA, U  Positive Cutoff: 200 ng/mL  Methodology: Mass Spectrometry   MDA  Not Detected  Not Detected  Not Detected Not Detected   Comment: INTERPRETIVE INFORMATION:MDA, U  Positive Cutoff: 200 ng/mL  Methodology: Mass Spectrometry   MDEA- Deanna  Not Detected  Not Detected  Not Detected Not Detected   Comment: INTERPRETIVE INFORMATION:MDEA, U  Positive Cutoff: 200 ng/mL  Methodology: Mass Spectrometry   METHYLPHENIDATE  Not Detected  Not Detected  Not Detected Not Detected   Comment: INTERPRETIVE INFORMATION:Methylphenidate, U  Positive Cutoff: 100 ng/mL  Methodology: Mass Spectrometry   PHENTERMINE  Not Detected  Not Detected  Not Detected Not Detected   Comment: INTERPRETIVE INFORMATION:Phentermine, U  Positive Cutoff: 100 ng/mL  Methodology: Mass Spectrometry   BENZOYLECGONINE  Negative  Not Detected  Not Detected Not Detected   Comment: Presumptive negative by immunoassay. Testing by mass  spectrometry is available on request.  INTERPRETIVE INFORMATION:Cocaine Screen, U  Positive Cutoff: 150 ng/mL  Methodology: Immunoassay   ALPRAZOLAM  Not Detected  Present  Not Detected Present   Comment: INTERPRETIVE INFORMATION:Alprazolam, U  Positive Cutoff: 40 ng/mL  Methodology: Mass Spectrometry   ALPHA-OH-ALPRAZOLAM  Present  Present  Present Not Detected   Comment: INTERPRETIVE INFORMATION:Alpha-OH-Alprazolam, U  Positive Cutoff: 20 ng/mL  Methodology: Mass Spectrometry   CLONAZEPAM  Not Detected  Not Detected  Not Detected Not Detected   Comment: INTERPRETIVE INFORMATION:Clonazepam, U  Positive Cutoff: 20 ng/mL  Methodology: Mass  Spectrometry   7-AMINOCLONAZEPAM  Not Detected  Not Detected  Not Detected Not Detected   Comment: INTERPRETIVE INFORMATION:7-Aminoclonazepam, U  Positive Cutoff: 40 ng/mL  Methodology: Mass Spectrometry   DIAZEPAM  Not Detected  Not Detected  Not Detected Not Detected   Comment: INTERPRETIVE INFORMATION:Diazepam, U  Positive Cutoff: 50 ng/mL  Methodology: Mass Spectrometry   NORDIAZEPAM  Not Detected  Not Detected  Not Detected Not Detected   Comment: INTERPRETIVE INFORMATION:Nordiazepam, U  Positive Cutoff: 50 ng/mL  Methodology: Mass Spectrometry   OXAZEPAM  Not Detected  Not Detected  Not Detected Not Detected   Comment: INTERPRETIVE INFORMATION:Oxazepam, U  Positive Cutoff: 50 ng/mL  Methodology: Mass Spectrometry   TEMAZEPAM  Not Detected  Not Detected  Not Detected Not Detected   Comment: INTERPRETIVE INFORMATION:Temazepam, U  Positive Cutoff: 50 ng/mL  Methodology: Mass Spectrometry   Lorazepam  Not Detected  Not Detected  Not Detected Not Detected   Comment: INTERPRETIVE INFORMATION:Lorazepam, U  Positive Cutoff: 60 ng/mL  Methodology: Mass Spectrometry   MIDAZOLAM  Not Detected  Not Detected  Not Detected Not Detected   Comment: INTERPRETIVE INFORMATION:Midazolam, U  Positive Cutoff: 20 ng/mL  Methodology: Mass Spectrometry   ZOLPIDEM  Not Detected  Not Detected  Not Detected Not Detected   Comment: INTERPRETIVE INFORMATION:Zolpidem, U  Positive Cutoff: 20 ng/mL  Methodology: Mass Spectrometry   BARBITURATES  PresumptivePOS  Present  Present Present   Comment: Presumptive positive by immunoassay. Testing by mass  spectrometry is available on request.  INTERPRETIVE INFORMATION:Barbiturates Screen, U  Positive Cutoff: 200 ng/mL  Methodology: Immunoassay   Creatinine, Urine 20.0 - 400.0 mg/dL 30.4 49.0 R 27.8 18.0 Low  R 113.0 58.9   ETHYL GLUCURONIDE  Negative  Not Detected  Not Detected Not Detected   Comment: Presumptive negative by immunoassay. Testing by mass  spectrometry is available on  request.  INTERPRETIVE INFORMATION:Ethyl Glucuronide Screen, U  Positive Cutoff: 500 ng/mL  Methodology: Immunoassay   MARIJUANA METABOLITE  Negative  Not Detected  Not Detected Not Detected   Comment: Presumptive negative by immunoassay. Testing by mass  spectrometry is available on request.  INTERPRETIVE INFORMATION: THC (Cannabinoids) Screen, U  Positive Cutoff: 50 ng/mL  Methodology: Immunoassay   PCP  Negative  Not Detected  Not Detected Not Detected   Comment: Presumptive negative by immunoassay. Testing by mass  spectrometry is available on request.  INTERPRETIVE INFORMATION:Phencyclidine Screen, U  Positive Cutoff: 25 ng/mL  Methodology: Immunoassay   CARISOPRODOL  Negative  Not Detected CM  Not Detected CM Not Detected CM   Comment: Presumptive negative by immunoassay. Testing by mass  spectrometry is available on request.  INTERPRETIVE INFORMATION: Carisoprodol Screen, U  Positive Cutoff: 100 ng/mL  Methodology: Immunoassay  The carisoprodol immunoassay has cross-reactivity to  carisoprodol and meprobamate.   Naloxone  Not Detected  Not Detected      Comment: INTERPRETIVE INFORMATION:Naloxone, U  Positive Cutoff: 100 ng/mL  Methodology: Mass Spectrometry   Gabapentin  Present  Present      Comment: INTERPRETIVE INFORMATION:Gabapentin, U  Positive Cutoff: 3,000 ng/mL  Methodology: Mass Spectrometry   Pregabalin  Not Detected  Not Detected      Comment: INTERPRETIVE INFORMATION:Pregabalin, U  Positive Cutoff: 3,000 ng/mL  Methodology: Mass Spectrometry   Alpha-OH-Midazolam  Not Detected  Not Detected      Comment: INTERPRETIVE INFORMATION:Alpha-OH-Midazolam, U  Positive Cutoff: 20 ng/mL  Methodology: Mass Spectrometry   Zolpidem Metabolite  Not Detected  Not Detected      Comment: INTERPRETIVE INFORMATION:Zolpidem Metabolite, U  Positive Cutoff: 100 ng/mL  Methodology: Mass Spectrometry

## 2024-11-23 RX ORDER — OXYCODONE AND ACETAMINOPHEN 10; 325 MG/1; MG/1
1 TABLET ORAL EVERY 8 HOURS PRN
Qty: 90 TABLET | Refills: 0 | Status: SHIPPED | OUTPATIENT
Start: 2024-11-23 | End: 2024-12-26

## 2024-11-23 RX ORDER — NALOXEGOL OXALATE 25 MG/1
25 TABLET, FILM COATED ORAL DAILY
Qty: 30 TABLET | Refills: 2 | Status: SHIPPED | OUTPATIENT
Start: 2024-11-23

## 2024-12-03 ENCOUNTER — TELEPHONE (OUTPATIENT)
Dept: PAIN MEDICINE | Facility: CLINIC | Age: 54
End: 2024-12-03
Payer: MEDICARE

## 2024-12-04 ENCOUNTER — IMMUNIZATION (OUTPATIENT)
Dept: INTERNAL MEDICINE | Facility: CLINIC | Age: 54
End: 2024-12-04
Payer: MEDICARE

## 2024-12-04 ENCOUNTER — OFFICE VISIT (OUTPATIENT)
Dept: PSYCHIATRY | Facility: CLINIC | Age: 54
End: 2024-12-04
Payer: MEDICARE

## 2024-12-04 ENCOUNTER — OFFICE VISIT (OUTPATIENT)
Dept: PAIN MEDICINE | Facility: CLINIC | Age: 54
End: 2024-12-04
Attending: ANESTHESIOLOGY
Payer: MEDICARE

## 2024-12-04 VITALS
TEMPERATURE: 98 F | HEART RATE: 99 BPM | SYSTOLIC BLOOD PRESSURE: 117 MMHG | WEIGHT: 138 LBS | DIASTOLIC BLOOD PRESSURE: 69 MMHG | BODY MASS INDEX: 29.86 KG/M2

## 2024-12-04 DIAGNOSIS — K59.03 THERAPEUTIC OPIOID INDUCED CONSTIPATION: ICD-10-CM

## 2024-12-04 DIAGNOSIS — G89.4 CHRONIC PAIN SYNDROME: ICD-10-CM

## 2024-12-04 DIAGNOSIS — F41.9 ANXIETY: ICD-10-CM

## 2024-12-04 DIAGNOSIS — F39 MOOD DISORDER: ICD-10-CM

## 2024-12-04 DIAGNOSIS — M62.838 MUSCLE SPASM: ICD-10-CM

## 2024-12-04 DIAGNOSIS — G40.309: ICD-10-CM

## 2024-12-04 DIAGNOSIS — F40.00 AGORAPHOBIA: Primary | ICD-10-CM

## 2024-12-04 DIAGNOSIS — M47.816 LUMBAR SPONDYLOSIS: ICD-10-CM

## 2024-12-04 DIAGNOSIS — Z23 NEED FOR VACCINATION: Primary | ICD-10-CM

## 2024-12-04 DIAGNOSIS — Z98.890 S/P LUMBAR LAMINECTOMY: Primary | ICD-10-CM

## 2024-12-04 DIAGNOSIS — M79.2 NEUROPATHIC PAIN: ICD-10-CM

## 2024-12-04 DIAGNOSIS — T40.2X5A THERAPEUTIC OPIOID INDUCED CONSTIPATION: ICD-10-CM

## 2024-12-04 PROCEDURE — 90656 IIV3 VACC NO PRSV 0.5 ML IM: CPT | Mod: PBBFAC

## 2024-12-04 PROCEDURE — 99999PBSHW INFLUENZA - TRIVALENT - PF (ADULT): Mod: PBBFAC,,,

## 2024-12-04 PROCEDURE — 99214 OFFICE O/P EST MOD 30 MIN: CPT | Mod: S$PBB,,, | Performed by: NURSE PRACTITIONER

## 2024-12-04 PROCEDURE — 99213 OFFICE O/P EST LOW 20 MIN: CPT | Mod: PBBFAC | Performed by: ANESTHESIOLOGY

## 2024-12-04 PROCEDURE — 99999 PR PBB SHADOW E&M-EST. PATIENT-LVL III: CPT | Mod: PBBFAC,,, | Performed by: ANESTHESIOLOGY

## 2024-12-04 PROCEDURE — 91320 SARSCV2 VAC 30MCG TRS-SUC IM: CPT | Mod: PBBFAC

## 2024-12-04 PROCEDURE — 90480 ADMN SARSCOV2 VAC 1/ONLY CMP: CPT | Mod: PBBFAC

## 2024-12-04 PROCEDURE — 99214 OFFICE O/P EST MOD 30 MIN: CPT | Mod: S$PBB,GC,, | Performed by: ANESTHESIOLOGY

## 2024-12-04 PROCEDURE — 99999PBSHW COVID-19 VAC, TRIS 2023 (PFIZER)(PF) 30 MCG/0.3 ML IM SUSR (12+): Mod: PBBFAC,,,

## 2024-12-04 RX ORDER — ALPRAZOLAM 1 MG/1
1 TABLET ORAL 3 TIMES DAILY PRN
Qty: 90 TABLET | Refills: 5 | Status: SHIPPED | OUTPATIENT
Start: 2024-12-24

## 2024-12-04 RX ORDER — PREDNISONE 5 MG/1
5 TABLET ORAL DAILY
Qty: 200 TABLET | Refills: 3 | Status: CANCELLED | OUTPATIENT
Start: 2024-12-04

## 2024-12-04 RX ORDER — LAMOTRIGINE 200 MG/1
200 TABLET ORAL 2 TIMES DAILY
Qty: 180 TABLET | Refills: 2 | Status: SHIPPED | OUTPATIENT
Start: 2024-12-04

## 2024-12-04 NOTE — PROGRESS NOTES
Outpatient Psychiatry Follow-Up Visit (MD/NP)    12/4/2024    Clinical Status of Patient:  Outpatient (Ambulatory)    Chief Complaint:  Ari Sainz is a 54 y.o. adult who presents today for follow-up of mood disorder and anxiety.  Met with patient.      Interval History and Content of Current Session:    Social/medical updates -- no major changes. Entering art show, virtual. Plans on going to brother-in-law's home for [place].    Mood -- presents with euthymic mood and affect. Denies persistent depressed mood, denies anhedonia. No thoughts of death or SI. No ricardo.   Anxiety -- no change from baseline. Agoraphobia. Takes alprazolam PRN when having to go places outside of his home, states he has done this for many years. Reports severe anxiety in public spaces and social outings, avoidant. Does plan on attending [employer] get together, though has significant anxiety during these events.   Attention -- no concerns expressed  Psychosis -- no  Sleep -- fair, some middle insomnia due to pain  Energy -- baseline  Appetite -- baseline     Substance use -- denies all     Medications:    Lamotrigine 200 mg BID -- compliant, denies SE including rash  Alprazolam 1 mg TID -- takes BID most days, denies SE      Brief synopsis:  Sx stable, no SI/HI/AVH      Review of Systems   PSYCHIATRIC: Pertinant items are noted in the narrative.  CONSTITUTIONAL: See above.   MUSCULOSKELETAL: chronic pain  GASTROINTESTINAL: No nausea, vomiting, pain, constipation or diarrhea.    Past Medical, Family and Social History: The patient's past medical, family and social history have been reviewed and updated as appropriate within the electronic medical record - see encounter notes.    Compliance: see above    Side effects: see above    Risk Parameters:  Patient reports no suicidal ideation  Patient reports no homicidal ideation  Patient reports no self-injurious behavior  Patient reports no violent behavior    Exam (detailed: at least 9  elements; comprehensive: all 15 elements)   Constitutional  Vitals:  Most recent vital signs, dated less than 90 days prior to this appointment, were reviewed.   There were no vitals filed for this visit.     General:  unremarkable, age appropriate     Musculoskeletal  Muscle Strength/Tone:  no spasicity, no rigidity, no cogwheeling   Gait & Station:  uses cane, unsteady gait     Psychiatric  Speech:  no latency; no press   Mood & Affect:  euthymic  congruent and appropriate, guarded   Thought Process:  normal and logical   Associations:  intact   Thought Content:  normal, no suicidality, no homicidality, delusions, or paranoia   Insight:  intact   Judgement: behavior is adequate to circumstances   Orientation:  grossly intact   Memory: intact for content of interview   Language: grossly intact   Attention Span & Concentration:  able to focus   Fund of Knowledge:  intact and appropriate to age and level of education     Assessment and Diagnosis   Status/Progress: Based on the examination today, the patient's problem(s) is/are adequately but not ideally controlled.  New problems have not been presented today.   Co-morbidities, Diagnostic uncertainty, and Lack of compliance are not complicating management of the primary condition.  There are no active rule-out diagnoses for this patient at this time.     General Impression: Ari Sainz is a 54 y.o. adult with a psychiatric hx of PTSD and anxiety presenting without active symptoms of PTSD or HELDER. Unclear origin of PTSD diagnosis. 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 Medical hx significant for URBINA, fibromyalgia, lumbar laminectomy decompression from L1-S1, KIARA. Reports being prescribed lamotrigine and alprazolam for 10+ years. Remains unclear as to why patient is prescribed mood stabilizer vs SSRI/SNRI antidepressant. Chart  review reveals previous prescription for lurasidone. Remote hx of self-injurious behavior in adolescence. No hx of suicide attempts. No hx of drug abuse. Lives with his wife. 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    01/30/23 -- Euthymic mood. Denies anxiety concerns. No evident PTSD sx    05/02/23 -- no appreciable change in sx. Anxiety sx explored in more depth today, appears consistent with agoraphobia.     09/06/23 -- sx stable. Continue lamotrigine and alprazolam as prescribed    12/01/23 -- sx stable. No medication changes made    06/12/24 -- sx stable, no medication changes made     12/04/25 -- sx at baseline. Clearer picture of agoraphobia. Reports side effects with previous SSRI/SNRI trials. No medication changes        ICD-10-CM ICD-9-CM   1. Anxiety  F41.9 300.00   2. Agoraphobia  F40.00 300.22   3. Mood disorder  F39 296.90           Intervention/Counseling/Treatment Plan   Reviewed patient's symptoms,and medication regimen  Continue medication regimen as prescribed  Have discussed risks of long-term benzodiazepine use with patient multiple times, including today  Though chronic benzodiazepine use is not ideal, sx have been adequately controlled for an extended period of time on current regimen, patient utilizes medication appropriately, and has hx of multiple failed psychotropic trials for anxiety  May explore addition of SSRI/SNRI, though patient reports side effects with previous trials.    Benzodiazepines: discussed and educated the patient that benzodiazepines are generally not intended for prolonged use. They are likely to cause tolerance and dependence over time, and can cause disinhibition, cognitive impairment, and increased risk of falls. They are not recommended to be used concurrently with narcotics, hypnotics, other benzodiazepines, or alcohol, as this can increase the risk of profound sedation, respiratory depression, coma, and even death. A plan to taper benzodiazepines over time was also discussed, along  with options for alternative anxiolytics as suitable replacements. Patient voiced understanding.    Medication Management  Prescription drug management was employed during the encounter, as medications were prescribed, or considered but not prescribed.   [facility] reviewed  The risks and benefits of medication were discussed with the patient.  Possible expectable adverse effects of any current or proposed individual psychotropic agents were discussed with this patient.  Counseling was provided on the importance of full compliance with any prescribed medication.  Detailed instructions were provided to the patient regarding the administration of any prescribed medication.  Patient voiced understanding    Return to Clinic:  3-6 months

## 2024-12-04 NOTE — PROGRESS NOTES
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    Status post sex reassignment surgery 03/19/2019    Hx of Congenital Adrenal Hyperplasia    Unintentional weight loss 07/21/2020     Past Surgical History:   Procedure Laterality Date    BACK SURGERY      BREAST SURGERY  1986,2001    Implants, removal    CHOLECYSTECTOMY N/A 05/18/2021    Procedure: CHOLECYSTECTOMY;  Surgeon: Antonio Brandt MD;  [facility];  Service: General;  Laterality: N/A;    COLONOSCOPY N/A 6/29/2023    Procedure: COLONOSCOPY;  Surgeon: Genia Ku MD;  Location: [facility];  Service: Colon and Rectal;  Laterality: N/A;    ENDOSCOPIC ULTRASOUND OF UPPER GASTROINTESTINAL TRACT N/A 12/03/2020    Procedure: ULTRASOUND, UPPER GI TRACT, ENDOSCOPIC;  Surgeon: Jonathan Sharma MD;  Location: [facility] ([facility]);  Service: Endoscopy;  Laterality: N/A;  elevated alk phos, panc cysts, liver biopsy   11/30-covid [id number]    ENDOSCOPIC ULTRASOUND OF UPPER GASTROINTESTINAL TRACT N/A 05/17/2021    Procedure: ULTRASOUND, UPPER GI TRACT, ENDOSCOPIC;  Surgeon: Claudio Salvador MD;  Location: [facility] ([address] [place] [address]);  Service: Endoscopy;  Laterality: N/A;    ERCP N/A 05/17/2021    Procedure: ERCP (ENDOSCOPIC RETROGRADE CHOLANGIOPANCREATOGRAPHY);  Surgeon: Claudio Salvador MD;  Location: [address]);  Service: Endoscopy;  Laterality: N/A;    gender surgery      multiple  surgeries since birth    HYSTERECTOMY  2003    INJECTION OF ANESTHETIC AGENT AROUND NERVE Right 11/19/2020    Procedure: BLOCK, NERVE, GLENOHUMERAL  need consent;  Surgeon: Messi Cabello MD;  Location: [facility]IN [facility]GT;  Service: Pain Management;  Laterality: Right;    JOINT REPLACEMENT  2/2020, 1/2021    Knees    KNEE ARTHROPLASTY Left 01/25/2021    Procedure: ARTHROPLASTY, KNEE:DEPUY-ATTUNE REVISION: SERINA iASSIST:CABLES ON HOLD;  Surgeon: Donte Andrade III, MD;  Location: [facility] OR;  Service: Orthopedics;  Laterality: Left;    LAMINECTOMY  09/13/2019    LAPAROSCOPIC CHOLECYSTECTOMY N/A 05/17/2021    Procedure: CHOLECYSTECTOMY, LAPAROSCOPIC;  Surgeon: Calvin Wilson MD;  Location: [facility] OR 2ND FLR;  Service: General;  Laterality: N/A;    LAPAROSCOPIC CHOLECYSTECTOMY N/A 05/18/2021    Procedure: CHOLECYSTECTOMY, LAPAROSCOPIC;  Surgeon: Antonio Brandt MD;  Location: [facility] OR [facility] F[facility];  Service: General;  Laterality: N/A;    RADIOFREQUENCY ABLATION Left 05/09/2019    Procedure: RADIOFREQUENCY ABLATION, LEFT L3,4,5;  Surgeon: Messi Cabello MD;  Location: [facility]IN [facility];  Service: Pain Management;  Laterality: Left;  1 of 2    RADIOFREQUENCY ABLATION Right 05/23/2019    Procedure: RADIOFREQUENCY ABLATION, RIGHT L3,4,5;  Surgeon: Messi Cabello MD;  Location: [facility];  Service: Pain Management;  Laterality: Right;  2of 2    REPAIR, HERNIA, INCISIONAL N/A 7/31/2024    Procedure: OPEN REPAIR, HERNIA, INCISIONAL, POSSIBLE MESH;  Surgeon: Messi Diaz MD;  Location: [facility] OR 2ND FLR;  Service: General;  Laterality: N/A;  AM CONSENT    ROBOT-ASSISTED LAPAROSCOPIC RECTOPEXY N/A 01/17/2023    Procedure: ROBOTIC RECTOPEXY;  Surgeon: Genia Ku MD;  Location: [facility];  Service: Colon and Rectal;  Laterality: N/A;    SPINE SURGERY      Sept 2019     TOTAL KNEE ARTHROPLASTY Right 01/31/2020    Procedure: ARTHROPLASTY, KNEE, TOTAL;  Surgeon: Donte Andrade III, MD;  [facility];   Service: Orthopedics;  Laterality: Right;     Social History     Socioeconomic History    Marital status:      Spouse name: Kristy Sainz    Number of children: 1   Occupational History     Comment:  and artist   Tobacco Use    Smoking status: Former     Current packs/day: 0.00     Types: Cigarettes     Quit date: 2009     Years since quitting: 15.9    Smokeless tobacco: Never    Tobacco comments:     was not a daily smoker-    Substance and Sexual Activity    Alcohol use: Not Currently    Drug use: No    Sexual activity: Yes     Partners: Female     Birth control/protection: None   Social History Narrative    Lives in a house with his wife      Social Drivers of Health     Financial Resource Strain: Low Risk  (12/4/2024)    Overall Financial Resource Strain (CARDIA)     Difficulty of Paying Living Expenses: Not hard at all   Food Insecurity: No Food Insecurity (12/4/2024)    Hunger Vital Sign     Worried About Running Out of Food in the Last Year: Never true     Ran Out of Food in the Last Year: Never true   Transportation Needs: No Transportation Needs (11/26/2023)    PRAPARE - Transportation     Lack of Transportation (Medical): No     Lack of Transportation (Non-Medical): No   Physical Activity: Insufficiently Active (12/4/2024)    Exercise Vital Sign     Days of Exercise per Week: 5 days     Minutes of Exercise per Session: 20 min   Stress: No Stress Concern Present (12/4/2024)    [employer] - Occupational Stress Questionnaire     Feeling of Stress : Only a little   Housing Stability: Low Risk  (11/26/2023)    Housing Stability Vital Sign     Unable to Pay for Housing in the Last Year: No     Number of Places Lived in the Last Year: 1     Unstable Housing in the Last Year: No     Family History   Problem Relation Name Age of Onset    Cancer Mother Taina Bourgeois         Kidney    Early death Mother Taina Bourgeois         58    Heart disease Father      Autoimmune  disease Sister      Diabetes Maternal Uncle Trev Santos     Hypertension Maternal Grandmother Simi Santos     Stroke Maternal Grandmother Simi Santos     Diabetes Maternal Grandfather Bj Santos     Cancer Maternal Grandfather Bj Santos         Colon    Breast cancer Neg Hx      Ovarian cancer Neg Hx      Vaginal cancer Neg Hx      Endometrial cancer Neg Hx      Cervical cancer Neg Hx      Cirrhosis Neg Hx         Review of patient's allergies indicates:   Allergen Reactions    Bactrim [sulfamethoxazole-trimethoprim] Itching    Penicillins Rash       Current Outpatient Medications   Medication Sig    acetaminophen (TYLENOL) 325 MG tablet Take 2 tablets (650 mg total) by mouth every 6 (six) hours as needed for Pain.    [START ON 12/24/2024] ALPRAZolam (XANAX) 1 MG tablet Take 1 tablet (1 mg total) by mouth 3 (three) times daily as needed for Anxiety.    ascorbic acid, vitamin C, (VITAMIN C) 1000 MG tablet Take 1,000 mg by mouth once daily.    atorvastatin (LIPITOR) 20 MG tablet Take 1 tablet (20 mg total) by mouth once daily.    cholecalciferol, vitamin D3, 125 mcg (5,000 unit) capsule Take 1 capsule by mouth Daily.    cranberry fruit extract (CRANBERRY ORAL) Take by mouth.    cyanocobalamin (VITAMIN B-12) 1000 MCG tablet Take 100 mcg by mouth once daily.    cyclobenzaprine (FLEXERIL) 10 MG tablet Take 1 tablet (10 mg total) by mouth 3 (three) times daily as needed for Muscle spasms.    diclofenac sodium (VOLTAREN) 1 % Gel Apply 2 g topically once daily.    docusate sodium (COLACE) 100 MG capsule Take 1 capsule (100 mg total) by mouth 2 (two) times daily as needed for Constipation.    gabapentin (NEURONTIN) 800 MG tablet Take 1 tablet by mouth three times a day and take 2 tablets at night (5 tablets a day, 4000mg)    hydrocortisone (ANUSOL-HC) 2.5 % rectal cream Place rectally 2 (two) times daily.    ipratropium (ATROVENT) 21 mcg (0.03 %) nasal spray 2 sprays by Each Nostril route 4 (four) times daily as needed  for Rhinitis.    ketoconazole (NIZORAL) 2 % cream Apply topically once daily.    Lactobacillus acidophilus Cap Take by mouth once daily.     lamoTRIgine (LAMICTAL) 200 MG tablet Take 1 tablet (200 mg total) by mouth 2 (two) times daily.    loratadine (CLARITIN) 10 mg tablet Take 10 mg by mouth once daily.    magnesium 250 mg Tab Take 1 tablet by mouth Daily.    naloxegoL (MOVANTIK) 25 mg tablet Take 1 tablet (25 mg total) by mouth once daily. Take one hour prior to breakfast    naproxen sodium (ANAPROX) 220 MG tablet Take 220 mg by mouth.    oxyCODONE-acetaminophen (PERCOCET)  mg per tablet Take 1 tablet by mouth every 8 (eight) hours as needed for Pain.    predniSONE (DELTASONE) 5 MG tablet Take 1 tablet (5 mg total) by mouth once daily. Double the dose if sick    primidone (MYSOLINE) 50 MG Tab Take 200mg (4 tabs) every morning/150mg (3 tabs ) at noon/150mg (3 tabs) every evening    rOPINIRole (REQUIP) 4 MG tablet Take 1 tablet (4 mg total) by mouth once daily.    testosterone (ANDROGEL) 1 % (50 mg/5 gram) GlPk Apply 1 packet (5 grams) topically once daily.    triamcinolone acetonide 0.1% (KENALOG) 0.1 % cream Apply topically 2 (two) times daily.    fluticasone (FLONASE SENSIMIST) 27.5 mcg/actuation nasal spray 2 sprays by Nasal route once daily.     No current facility-administered medications for this visit.       REVIEW OF SYSTEMS:    GENERAL:  No weight loss, malaise or fevers.  HEENT:   No recent changes in vision or hearing  NECK:  Negative for lumps, no difficulty with swallowing.  RESPIRATORY:  Negative for cough, wheezing or shortness of breath, patient denies any recent URI.  CARDIOVASCULAR:  Negative for chest pain, leg swelling or palpitations.  GI:  Negative for abdominal discomfort, blood in stools or black stools or change in bowel habits.  MUSCULOSKELETAL:  See HPI.  SKIN:  Negative for lesions, rash, and itching.  PSYCH:  Significant psychosocial stressors including PTSD for sex re-assignment  surgery.  Patient's sleep is disturbed secondary to pain.  HEMATOLOGY/LYMPHOLOGY:  Negative for prolonged bleeding, bruising easily or swollen nodes.  Patient is not currently taking any anti-coagulants  ENDO: No history of diabetes or thyroid dysfunction  NEURO:   No history of headaches, syncope, paralysis, seizures or tremors.  All other reviewed and negative other than HPI.    OBJECTIVE:    /69 (Patient Position: Sitting)   Pulse 99   Temp 97.5 °F (36.4 °C)   Wt 62.6 kg (138 lb 0.1 oz)   BMI 29.86 kg/m²     PHYSICAL EXAMINATION:     GENERAL: Well appearing, in no acute distress, alert and oriented x3.  PSYCH:  Mood and affect appropriate.  SKIN:  Well-healed incisions without any evidence of infection  HEAD/FACE:  Normocephalic, atraumatic.   PULM: No evidence of respiratory difficulty, symmetric chest rise.  EXT:  Well healed scar to both knees from TKA. Superficial abrasions to both knees. Medial and lateral joint line tenderness to both knees.  BACK:  There is significant pain with palpation over the facet joints and paraspinals of the lumbar spine bilaterally. There is decreased range of motion with extension to 15 degrees, and facet loading maneuvers cause reproducible pain.    NEURO:  No clonus. Cranial nerves grossly intact.  GAIT: Antalgic- ambulates with Straight cane.    Lab Results   Component Value Date    WBC 8.06 07/26/2024    HGB 14.8 07/26/2024    HCT 43.6 07/26/2024    MCV 99 (H) 07/26/2024     07/26/2024     CMP  Sodium   Date Value Ref Range Status   07/26/2024 132 (L) 136 - 145 mmol/L Final     Potassium   Date Value Ref Range Status   07/26/2024 4.3 3.5 - 5.1 mmol/L Final     Chloride   Date Value Ref Range Status   07/26/2024 98 95 - 110 mmol/L Final     CO2   Date Value Ref Range Status   07/26/2024 23 23 - 29 mmol/L Final     Glucose   Date Value Ref Range Status   07/26/2024 80 70 - 110 mg/dL Final     BUN   Date Value Ref Range Status   07/26/2024 7 6 - 20 mg/dL Final      Creatinine   Date Value Ref Range Status   07/26/2024 0.8 0.5 - 1.4 mg/dL Final     Calcium   Date Value Ref Range Status   07/26/2024 9.6 8.7 - 10.5 mg/dL Final     Total Protein   Date Value Ref Range Status   06/26/2024 6.8 6.0 - 8.4 g/dL Final     Albumin   Date Value Ref Range Status   06/26/2024 4.0 3.5 - 5.2 g/dL Final     Total Bilirubin   Date Value Ref Range Status   06/26/2024 0.3 0.1 - 1.0 mg/dL Final     Comment:     For infants and newborns, interpretation of results should be based  on gestational age, weight and in agreement with clinical  observations.    Premature Infant recommended reference ranges:  Up to 24 hours.............<8.0 mg/dL  Up to 48 hours............<12.0 mg/dL  3-5 days..................<15.0 mg/dL  6-29 days.................<15.0 mg/dL       Alkaline Phosphatase   Date Value Ref Range Status   06/26/2024 104 55 - 135 U/L Final     AST   Date Value Ref Range Status   06/26/2024 30 10 - 40 U/L Final     ALT   Date Value Ref Range Status   06/26/2024 30 10 - 44 U/L Final     Anion Gap   Date Value Ref Range Status   07/26/2024 11 8 - 16 mmol/L Final     eGFR if    Date Value Ref Range Status   08/23/2021 >60.0 >60 mL/min/1.73 m^2 Final     eGFR if non    Date Value Ref Range Status   08/23/2021 >60.0 >60 mL/min/1.73 m^2 Final     Comment:     Calculation used to obtain the estimated glomerular filtration  rate (eGFR) is the CKD-EPI equation.        Lab Results   Component Value Date    HGBA1C 5.4 04/16/2024     Lab Results   Component Value Date    TSH 1.857 04/16/2024       ASSESSMENT: 54 y.o. year old adult with chronic back and knee pain, consistent with the following diagnoses:    Encounter Diagnoses   Name Primary?    S/P lumbar laminectomy Yes    Lumbar spondylosis     Chronic pain syndrome     Neuropathic pain     Therapeutic opioid induced constipation     Muscle spasm        PLAN:   We discussed with the patient the assessment and  recommendations. The following is the plan we agreed on:    Images and medication list reviewed and discussed with patient   Continue with oxycodone 10/325 mg QID PRN   Urine drug screen collected today    reviewed and appears to be appropriate.    Continue with home exercise therapy.    Follow up in 3 months for medication management.    The above plan and management options were discussed at length with patient. Patient is in agreement with the above and verbalized understanding.     Blake Jeffries MD PGY-5  Interventional Pain Medicine Fellow   [facility]       I have personally taken the history and examined this patient and agree with the fellow's note as stated above.

## 2024-12-06 RX ORDER — PREDNISONE 5 MG/1
5 TABLET ORAL DAILY
Qty: 200 TABLET | Refills: 3 | Status: SHIPPED | OUTPATIENT
Start: 2024-12-06

## 2024-12-30 ENCOUNTER — PATIENT MESSAGE (OUTPATIENT)
Dept: PAIN MEDICINE | Facility: CLINIC | Age: 54
End: 2024-12-30
Payer: MEDICARE

## 2024-12-30 DIAGNOSIS — M62.838 MUSCLE SPASM: ICD-10-CM

## 2024-12-30 RX ORDER — OXYCODONE AND ACETAMINOPHEN 10; 325 MG/1; MG/1
1 TABLET ORAL EVERY 8 HOURS PRN
Qty: 90 TABLET | Refills: 0 | Status: SHIPPED | OUTPATIENT
Start: 2024-12-30 | End: 2025-01-30

## 2024-12-30 NOTE — TELEPHONE ENCOUNTER
Patient requesting refill on Percocet  Last office visit 12/4/24   shows last refill on 11/26/24  Patient does have a pain contract on file with [facility] department  Patient last UDS 12/4/24 was consistent with current therapy    CODEINE  Not Detected Not Detected CM  Not Detected  Not Detected Not Detected   Comment: INTERPRETIVE INFORMATION: Codeine, U  Positive Cutoff: 40 ng/mL  Methodology: Mass Spectrometry   MORPHINE  Not Detected Not Detected CM  Not Detected  Present Present   Comment: INTERPRETIVE INFORMATION:Morphine, U  Positive Cutoff: 20 ng/mL  Methodology: Mass Spectrometry   6-ACETYLMORPHINE  Not Detected Not Detected CM  Not Detected  Not Detected Not Detected   Comment: INTERPRETIVE INFORMATION:6-acetylmorphine, U  Positive Cutoff: 20 ng/mL  Methodology: Mass Spectrometry   OXYCODONE  Present Present CM  Present  Present Present   Comment: INTERPRETIVE INFORMATION:Oxycodone, U  Positive Cutoff: 40 ng/mL  Methodology: Mass Spectrometry   NOROYXCODONE  Present Present CM  Present  Present Present   Comment: INTERPRETIVE INFORMATION:Noroxycodone, U  Positive Cutoff: 100 ng/mL  Methodology: Mass Spectrometry   OXYMORPHONE  Present Present CM  Present  Not Detected Not Detected   Comment: INTERPRETIVE INFORMATION:Oxymorphone, U  Positive Cutoff: 40 ng/mL  Methodology: Mass Spectrometry   NOROXYMORPHONE  Present Present CM  Present  Not Detected Not Detected   Comment: INTERPRETIVE INFORMATION:Noroxymorphone, U  Positive Cutoff: 100 ng/mL  Methodology: Mass Spectrometry   HYDROCODONE  Not Detected Not Detected CM  Not Detected  Not Detected Not Detected   Comment: INTERPRETIVE INFORMATION:Hydrocodone, U  Positive Cutoff: 40 ng/mL  Methodology: Mass Spectrometry   NORHYDROCODONE  Not Detected Not Detected CM  Not Detected  Not Detected Not Detected   Comment: INTERPRETIVE INFORMATION:Norhydrocodone, U  Positive Cutoff: 100 ng/mL  Methodology: Mass Spectrometry   HYDROMORPHONE  Not  Detected Not Detected CM  Not Detected  Not Detected Not Detected   Comment: INTERPRETIVE INFORMATION:Hydromorphone, U  Positive Cutoff: 20 ng/mL  Methodology: Mass Spectrometry   BUPRENORPHINE  Not Detected Not Detected CM  Not Detected  Not Detected Not Detected   Comment: INTERPRETIVE INFORMATION:Buprenorphine, U  Positive Cutoff: 5 ng/mL  Methodology: Mass Spectrometry   NORUBPRENORPHINE  Not Detected Not Detected CM  Not Detected  Not Detected Not Detected   Comment: INTERPRETIVE INFORMATION:Norbuprenorphine, U  Positive Cutoff: 20 ng/mL  Methodology: Mass Spectrometry   FENTANYL  Not Detected Not Detected CM  Not Detected  Not Detected Not Detected   Comment: INTERPRETIVE INFORMATION:Fentanyl, U  Positive Cutoff: 2 ng/mL  Methodology: Mass Spectrometry   NORFENTANYL  Not Detected Not Detected CM  Not Detected  Not Detected Not Detected   Comment: INTERPRETIVE INFORMATION:Norfentanyl, U  Positive Cutoff: 2 ng/mL  Methodology: Mass Spectrometry   MEPERIDINE METABOLITE  Not Detected Not Detected CM  Not Detected  Not Detected Not Detected   Comment: INTERPRETIVE INFORMATION:Meperidine metabolite, U  Positive Cutoff: 50 ng/mL  Methodology: Mass Spectrometry   TAPENTADOL  Not Detected Not Detected CM  Not Detected  Not Detected Not Detected   Comment: INTERPRETIVE INFORMATION:Tapentadol, U  Positive Cutoff: 100 ng/mL  Methodology: Mass Spectrometry   TAPENTADOL-O-SULF  Not Detected Not Detected CM  Not Detected  Not Detected Not Detected   Comment: INTERPRETIVE INFORMATION:Tapentadol-o-Sulf, U  Positive Cutoff: 200 ng/mL  Methodology: Mass Spectrometry   METHADONE  Negative Negative CM  Not Detected  Not Detected Not Detected   Comment: Presumptive negative by immunoassay. Testing by mass  spectrometry is available on request.  INTERPRETIVE INFORMATION: Methadone Screen, U  Positive Cutoff: 150 ng/mL  Methodology: Immunoassay   TRAMADOL  Negative Negative CM  Not Detected  Not Detected Not Detected   Comment:  Presumptive negative by immunoassay. Testing by mass  spectrometry is available on request.  INTERPRETIVE INFORMATION:Tramadol Screen, U  Positive Cutoff: 100 ng/mL  Methodology: Immunoassay   AMPHETAMINE  Not Detected Not Detected CM  Not Detected  Not Detected Not Detected   Comment: INTERPRETIVE INFORMATION:Amphetamine, U  Positive Cutoff: 50 ng/mL  Methodology: Mass Spectrometry   METHAMPHETAMINE  Not Detected Not Detected CM  Not Detected  Not Detected Not Detected   Comment: INTERPRETIVE INFORMATION:Methamphetamine, U  Positive Cutoff: 200 ng/mL  Methodology: Mass Spectrometry   MDMA- ECSTASY  Not Detected Not Detected CM  Not Detected  Not Detected Not Detected   Comment: INTERPRETIVE INFORMATION:MDMA, U  Positive Cutoff: 200 ng/mL  Methodology: Mass Spectrometry   MDA  Not Detected Not Detected CM  Not Detected  Not Detected Not Detected   Comment: INTERPRETIVE INFORMATION:MDA, U  Positive Cutoff: 200 ng/mL  Methodology: Mass Spectrometry   MDEA- Deanna  Not Detected Not Detected CM  Not Detected  Not Detected Not Detected   Comment: INTERPRETIVE INFORMATION:MDEA, U  Positive Cutoff: 200 ng/mL  Methodology: Mass Spectrometry   METHYLPHENIDATE  Not Detected Not Detected CM  Not Detected  Not Detected Not Detected   Comment: INTERPRETIVE INFORMATION:Methylphenidate, U  Positive Cutoff: 100 ng/mL  Methodology: Mass Spectrometry   PHENTERMINE  Not Detected Not Detected CM  Not Detected  Not Detected Not Detected   Comment: INTERPRETIVE INFORMATION:Phentermine, U  Positive Cutoff: 100 ng/mL  Methodology: Mass Spectrometry   BENZOYLECGONINE  Negative Negative CM  Not Detected  Not Detected Not Detected   Comment: Presumptive negative by immunoassay. Testing by mass  spectrometry is available on request.  INTERPRETIVE INFORMATION:Cocaine Screen, U  Positive Cutoff: 150 ng/mL  Methodology: Immunoassay   ALPRAZOLAM  Not Detected Not Detected CM  Present  Not Detected Present   Comment: INTERPRETIVE  INFORMATION:Alprazolam, U  Positive Cutoff: 40 ng/mL  Methodology: Mass Spectrometry   ALPHA-OH-ALPRAZOLAM  Present Present CM  Present  Present Not Detected   Comment: INTERPRETIVE INFORMATION:Alpha-OH-Alprazolam, U  Positive Cutoff: 20 ng/mL  Methodology: Mass Spectrometry   CLONAZEPAM  Not Detected Not Detected CM  Not Detected  Not Detected Not Detected   Comment: INTERPRETIVE INFORMATION:Clonazepam, U  Positive Cutoff: 20 ng/mL  Methodology: Mass Spectrometry   7-AMINOCLONAZEPAM  Not Detected Not Detected CM  Not Detected  Not Detected Not Detected   Comment: INTERPRETIVE INFORMATION:7-Aminoclonazepam, U  Positive Cutoff: 40 ng/mL  Methodology: Mass Spectrometry   DIAZEPAM  Not Detected Not Detected CM  Not Detected  Not Detected Not Detected   Comment: INTERPRETIVE INFORMATION:Diazepam, U  Positive Cutoff: 50 ng/mL  Methodology: Mass Spectrometry   NORDIAZEPAM  Not Detected Not Detected CM  Not Detected  Not Detected Not Detected   Comment: INTERPRETIVE INFORMATION:Nordiazepam, U  Positive Cutoff: 50 ng/mL  Methodology: Mass Spectrometry   OXAZEPAM  Not Detected Not Detected CM  Not Detected  Not Detected Not Detected   Comment: INTERPRETIVE INFORMATION:Oxazepam, U  Positive Cutoff: 50 ng/mL  Methodology: Mass Spectrometry   TEMAZEPAM  Not Detected Not Detected CM  Not Detected  Not Detected Not Detected   Comment: INTERPRETIVE INFORMATION:Temazepam, U  Positive Cutoff: 50 ng/mL  Methodology: Mass Spectrometry   Lorazepam  Not Detected Not Detected CM  Not Detected  Not Detected Not Detected   Comment: INTERPRETIVE INFORMATION:Lorazepam, U  Positive Cutoff: 60 ng/mL  Methodology: Mass Spectrometry   MIDAZOLAM  Not Detected Not Detected CM  Not Detected  Not Detected Not Detected   Comment: INTERPRETIVE INFORMATION:Midazolam, U  Positive Cutoff: 20 ng/mL  Methodology: Mass Spectrometry   ZOLPIDEM  Not Detected Not Detected CM  Not Detected  Not Detected Not Detected   Comment: INTERPRETIVE INFORMATION:Zolpidem,  U  Positive Cutoff: 20 ng/mL  Methodology: Mass Spectrometry   BARBITURATES  PresumptivePOS PresumptivePOS CM  Present  Present Present   Comment: Presumptive positive by immunoassay. Testing by mass  spectrometry is available on request.  INTERPRETIVE INFORMATION:Barbiturates Screen, U  Positive Cutoff: 200 ng/mL  Methodology: Immunoassay   Creatinine, Urine 20.0 - 400.0 mg/dL 23.9 30.4 49.0 R 27.8 18.0 Low  R 113.0 58.9   ETHYL GLUCURONIDE  Negative Negative CM  Not Detected  Not Detected Not Detected   Comment: Presumptive negative by immunoassay. Testing by mass  spectrometry is available on request.  INTERPRETIVE INFORMATION:Ethyl Glucuronide Screen, U  Positive Cutoff: 500 ng/mL  Methodology: Immunoassay   MARIJUANA METABOLITE  Negative Negative CM  Not Detected  Not Detected Not Detected   Comment: Presumptive negative by immunoassay. Testing by mass  spectrometry is available on request.  INTERPRETIVE INFORMATION: THC (Cannabinoids) Screen, U  Positive Cutoff: 50 ng/mL  Methodology: Immunoassay   PCP  Negative Negative CM  Not Detected  Not Detected Not Detected   Comment: Presumptive negative by immunoassay. Testing by mass  spectrometry is available on request.  INTERPRETIVE INFORMATION:Phencyclidine Screen, U  Positive Cutoff: 25 ng/mL  Methodology: Immunoassay   CARISOPRODOL  Negative Negative CM  Not Detected CM  Not Detected CM Not Detected CM   Comment: Presumptive negative by immunoassay. Testing by mass  spectrometry is available on request.  INTERPRETIVE INFORMATION: Carisoprodol Screen, U  Positive Cutoff: 100 ng/mL  Methodology: Immunoassay  The carisoprodol immunoassay has cross-reactivity to  carisoprodol and meprobamate.   Naloxone  Not Detected Not Detected CM  Not Detected      Comment: INTERPRETIVE INFORMATION:Naloxone, U  Positive Cutoff: 100 ng/mL  Methodology: Mass Spectrometry   Gabapentin  Present Present CM  Present      Comment: INTERPRETIVE INFORMATION:Gabapentin, U  Positive  Cutoff: 3,000 ng/mL  Methodology: Mass Spectrometry   Pregabalin  Not Detected Not Detected CM  Not Detected      Comment: INTERPRETIVE INFORMATION:Pregabalin, U  Positive Cutoff: 3,000 ng/mL  Methodology: Mass Spectrometry   Alpha-OH-Midazolam  Not Detected Not Detected CM  Not Detected      Comment: INTERPRETIVE INFORMATION:Alpha-OH-Midazolam, U  Positive Cutoff: 20 ng/mL  Methodology: Mass Spectrometry   Zolpidem Metabolite  Not Detected Not Detected CM  Not Detected      Comment: INTERPRETIVE INFORMATION:Zolpidem Metabolite, U  Positive Cutoff: 100 ng/mL  Methodology: Mass Spectrometry

## 2024-12-31 ENCOUNTER — LAB VISIT (OUTPATIENT)
Dept: LAB | Facility: HOSPITAL | Age: 54
End: 2024-12-31
Attending: INTERNAL MEDICINE
Payer: MEDICARE

## 2024-12-31 ENCOUNTER — OFFICE VISIT (OUTPATIENT)
Dept: ENDOCRINOLOGY | Facility: CLINIC | Age: 54
End: 2024-12-31
Payer: MEDICARE

## 2024-12-31 VITALS
OXYGEN SATURATION: 98 % | SYSTOLIC BLOOD PRESSURE: 110 MMHG | BODY MASS INDEX: 29.45 KG/M2 | WEIGHT: 150 LBS | HEART RATE: 100 BPM | HEIGHT: 60 IN | DIASTOLIC BLOOD PRESSURE: 68 MMHG

## 2024-12-31 DIAGNOSIS — Z87.890 STATUS POST SEX REASSIGNMENT SURGERY: ICD-10-CM

## 2024-12-31 DIAGNOSIS — M85.80 OSTEOPENIA, UNSPECIFIED LOCATION: ICD-10-CM

## 2024-12-31 DIAGNOSIS — F64.0 TRANSGENDER MAN ON HORMONE THERAPY: ICD-10-CM

## 2024-12-31 DIAGNOSIS — E27.40 ADRENAL INSUFFICIENCY: ICD-10-CM

## 2024-12-31 DIAGNOSIS — Z79.899 TRANSGENDER MAN ON HORMONE THERAPY: ICD-10-CM

## 2024-12-31 DIAGNOSIS — E25.0 CONGENITAL ADRENAL HYPERPLASIA: ICD-10-CM

## 2024-12-31 DIAGNOSIS — E25.0 CONGENITAL ADRENAL HYPERPLASIA: Primary | ICD-10-CM

## 2024-12-31 LAB
ALBUMIN SERPL BCP-MCNC: 3.9 G/DL (ref 3.5–5.2)
ALP SERPL-CCNC: 104 U/L (ref 40–150)
ALT SERPL W/O P-5'-P-CCNC: 39 U/L (ref 10–44)
ANION GAP SERPL CALC-SCNC: 8 MMOL/L (ref 8–16)
AST SERPL-CCNC: 33 U/L (ref 10–40)
BILIRUB SERPL-MCNC: 0.2 MG/DL (ref 0.1–1)
BUN SERPL-MCNC: 9 MG/DL (ref 6–20)
CALCIUM SERPL-MCNC: 8.9 MG/DL (ref 8.7–10.5)
CHLORIDE SERPL-SCNC: 103 MMOL/L (ref 95–110)
CO2 SERPL-SCNC: 23 MMOL/L (ref 23–29)
CREAT SERPL-MCNC: 0.7 MG/DL (ref 0.5–1.4)
ERYTHROCYTE [DISTWIDTH] IN BLOOD BY AUTOMATED COUNT: 12.2 % (ref 11.5–14.5)
EST. GFR  (NO RACE VARIABLE): >60 ML/MIN/1.73 M^2
GLUCOSE SERPL-MCNC: 98 MG/DL (ref 70–110)
HCT VFR BLD AUTO: 38.5 % (ref 40–54)
HGB BLD-MCNC: 12.7 G/DL (ref 14–18)
MCH RBC QN AUTO: 33.2 PG (ref 27–31)
MCHC RBC AUTO-ENTMCNC: 33 G/DL (ref 32–36)
MCV RBC AUTO: 101 FL (ref 82–98)
PLATELET # BLD AUTO: 318 K/UL (ref 150–450)
PMV BLD AUTO: 8.5 FL (ref 9.2–12.9)
POTASSIUM SERPL-SCNC: 4.8 MMOL/L (ref 3.5–5.1)
PROT SERPL-MCNC: 7.1 G/DL (ref 6–8.4)
RBC # BLD AUTO: 3.82 M/UL (ref 4.6–6.2)
SODIUM SERPL-SCNC: 134 MMOL/L (ref 136–145)
TESTOST SERPL-MCNC: 1132 NG/DL (ref 304–1227)
WBC # BLD AUTO: 7.77 K/UL (ref 3.9–12.7)

## 2024-12-31 PROCEDURE — 99214 OFFICE O/P EST MOD 30 MIN: CPT | Mod: S$PBB,,, | Performed by: INTERNAL MEDICINE

## 2024-12-31 PROCEDURE — 36415 COLL VENOUS BLD VENIPUNCTURE: CPT | Performed by: INTERNAL MEDICINE

## 2024-12-31 PROCEDURE — 99999 PR PBB SHADOW E&M-EST. PATIENT-LVL IV: CPT | Mod: PBBFAC,,, | Performed by: INTERNAL MEDICINE

## 2024-12-31 PROCEDURE — 84403 ASSAY OF TOTAL TESTOSTERONE: CPT | Performed by: INTERNAL MEDICINE

## 2024-12-31 PROCEDURE — 83498 ASY HYDROXYPROGESTERONE 17-D: CPT | Performed by: INTERNAL MEDICINE

## 2024-12-31 PROCEDURE — G2211 COMPLEX E/M VISIT ADD ON: HCPCS | Mod: S$PBB,,, | Performed by: INTERNAL MEDICINE

## 2024-12-31 PROCEDURE — 99214 OFFICE O/P EST MOD 30 MIN: CPT | Mod: PBBFAC | Performed by: INTERNAL MEDICINE

## 2024-12-31 PROCEDURE — 82024 ASSAY OF ACTH: CPT | Performed by: INTERNAL MEDICINE

## 2024-12-31 PROCEDURE — 85027 COMPLETE CBC AUTOMATED: CPT | Performed by: INTERNAL MEDICINE

## 2024-12-31 PROCEDURE — 80053 COMPREHEN METABOLIC PANEL: CPT | Performed by: INTERNAL MEDICINE

## 2024-12-31 RX ORDER — TESTOSTERONE GEL, 1% 10 MG/G
1 GEL TRANSDERMAL DAILY
Qty: 150 G | Refills: 5 | Status: SHIPPED | OUTPATIENT
Start: 2024-12-31 | End: 2025-07-29

## 2024-12-31 RX ORDER — PREDNISONE 5 MG/1
5 TABLET ORAL DAILY
Qty: 200 TABLET | Refills: 3 | Status: SHIPPED | OUTPATIENT
Start: 2024-12-31

## 2024-12-31 NOTE — ASSESSMENT & PLAN NOTE
discussed implications  Reassuring not fracturing.   rda calcium and vit D  Follow over time - repeat 2025    discussed indications for offering therapy

## 2024-12-31 NOTE — PROGRESS NOTES
Subjective:      Patient ID: Ari Sainz is a 54 y.o. adult.    Chief Complaint:  CAH  History of Present Illness       Was raised female.     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     Saw Dr Taylor and this was very traumatic for him but he appreciated her kindness      Per her exam:  PELVIC:    External genitalia:  Normal Bartholins, Skenes and labia bilaterally.  Bilateral labia not fused superiorly.  Fleshy, erectile tissue around urethra (superior most aspect of mons).  Bilateral labia majora present.  Small dimple present at previous vaginal area--does not track with gentle exploration with os finder.   Urethra:  No caruncle, diverticulum or masses. Able to pass 14F catheter easily--needs to pass 7-8 cm before urine expelled (increased urethral length)?    Internal: deferred, as reports MAUREEN/BSO with vaginal closure      Started testosterone in 2004.          Current dose:     Testosterone 50mg/5g - 1 packet qd      With regards to the treatment of the adrenal insufficiency due to CAH    When I initially saw him he was on a high dose of pred   Prednisone 10mg in the Am and 5mg at night    Since we have attempted to lower the dose to physiologic as we dont need to over treat the CAH      Current dose:   Prednisone  5  mg qd --  He and his wife are aware of when they need stress dose steroids    BMD   02/10/2023 bone density shows osteopenia but FRAX does not support therapy      Back pain is an issue -  Working with Pain Management.  -    Overall much better than it was  using his cane        Review of Systems   As above     Objective:  "  Physical Exam  Vitals reviewed.   Constitutional:       Appearance: Normal appearance.   Neck:      Comments: No goiter   Cardiovascular:      Rate and Rhythm: Normal rate.   Pulmonary:      Effort: Pulmonary effort is normal.   Abdominal:      Palpations: Abdomen is soft.   Musculoskeletal:      Right lower leg: No edema.      Left lower leg: No edema.   Psychiatric:         Mood and Affect: Mood normal.       Visit Vitals  /68 (BP Location: Right arm, Patient Position: Sitting)   Pulse 100   Ht 4' 9" (1.448 m)   Wt 68.1 kg (150 lb 0.4 oz)   SpO2 98%   BMI 32.46 kg/m²       Body mass index is 32.46 kg/m².    Lab Review:   Lab Results   Component Value Date    HGBA1C 5.4 04/16/2024     Lab Results   Component Value Date    CHOL 183 04/16/2024    HDL 56 04/16/2024    LDLCALC 103.0 04/16/2024    TRIG 120 04/16/2024    CHOLHDL 30.6 04/16/2024     Lab Results   Component Value Date     (L) 07/26/2024    K 4.3 07/26/2024    CL 98 07/26/2024    CO2 23 07/26/2024    GLU 80 07/26/2024    BUN 7 07/26/2024    CREATININE 0.8 07/26/2024    CALCIUM 9.6 07/26/2024    PROT 6.8 06/26/2024    ALBUMIN 4.0 06/26/2024    BILITOT 0.3 06/26/2024    ALKPHOS 104 06/26/2024    AST 30 06/26/2024    ALT 30 06/26/2024    ANIONGAP 11 07/26/2024    ESTGFRAFRICA >60.0 08/23/2021    EGFRNONAA >60.0 08/23/2021    TSH 1.857 04/16/2024     Vit D, 25-Hydroxy   Date Value Ref Range Status   05/06/2019 36 30 - 96 ng/mL Final     Comment:     Vitamin D deficiency.........<10 ng/mL                              Vitamin D insufficiency......10-29 ng/mL       Vitamin D sufficiency........> or equal to 30 ng/mL  Vitamin D toxicity............>100 ng/mL          Assessment and Plan     Congenital adrenal hyperplasia    cah with AI  Goal is to get him on the lowest dose pred that he can tolerate     continue 5 mg a day-  Follow labs.      Needs medic alert tag   Reviewed sick day precautions  Do not need to normalize ACTH, 17 oh p or androgens "           Adrenal insufficiency  As above     Is aware of stress dose needed for surgery    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    Follow labs    continue treatment    Osteopenia    discussed implications  Reassuring not fracturing.   rda calcium and vit D  Follow over time - repeat 2025    discussed indications for offering therapy       Labs today   Return to clinic in 1 year

## 2025-01-03 LAB — ACTH PLAS-MCNC: 27 PG/ML (ref 0–46)

## 2025-01-09 LAB — 17OHP SERPL-MCNC: ABNORMAL NG/DL (ref 40–239)

## 2025-01-27 DIAGNOSIS — M62.838 MUSCLE SPASM: ICD-10-CM

## 2025-01-27 DIAGNOSIS — G25.81 RLS (RESTLESS LEGS SYNDROME): ICD-10-CM

## 2025-01-27 DIAGNOSIS — M54.12 CERVICAL RADICULOPATHY: ICD-10-CM

## 2025-01-27 RX ORDER — GABAPENTIN 800 MG/1
TABLET ORAL
Qty: 450 TABLET | Refills: 2 | Status: SHIPPED | OUTPATIENT
Start: 2025-01-27

## 2025-01-27 NOTE — TELEPHONE ENCOUNTER
Patient requesting refill on percocet  Last office visit 12/4/24   shows last refill on 12/31/24  Patient does have a pain contract on file with [facility] department  Patient last UDS 12/5/24 was consistent with current therapy    CODEINE  Not Detected Not Detected CM  Not Detected  Not Detected Not Detected   Comment: INTERPRETIVE INFORMATION: Codeine, U  Positive Cutoff: 40 ng/mL  Methodology: Mass Spectrometry   MORPHINE  Not Detected Not Detected CM  Not Detected  Present Present   Comment: INTERPRETIVE INFORMATION:Morphine, U  Positive Cutoff: 20 ng/mL  Methodology: Mass Spectrometry   6-ACETYLMORPHINE  Not Detected Not Detected CM  Not Detected  Not Detected Not Detected   Comment: INTERPRETIVE INFORMATION:6-acetylmorphine, U  Positive Cutoff: 20 ng/mL  Methodology: Mass Spectrometry   OXYCODONE  Present Present CM  Present  Present Present   Comment: INTERPRETIVE INFORMATION:Oxycodone, U  Positive Cutoff: 40 ng/mL  Methodology: Mass Spectrometry   NOROYXCODONE  Present Present CM  Present  Present Present   Comment: INTERPRETIVE INFORMATION:Noroxycodone, U  Positive Cutoff: 100 ng/mL  Methodology: Mass Spectrometry   OXYMORPHONE  Present Present CM  Present  Not Detected Not Detected   Comment: INTERPRETIVE INFORMATION:Oxymorphone, U  Positive Cutoff: 40 ng/mL  Methodology: Mass Spectrometry   NOROXYMORPHONE  Present Present CM  Present  Not Detected Not Detected   Comment: INTERPRETIVE INFORMATION:Noroxymorphone, U  Positive Cutoff: 100 ng/mL  Methodology: Mass Spectrometry   HYDROCODONE  Not Detected Not Detected CM  Not Detected  Not Detected Not Detected   Comment: INTERPRETIVE INFORMATION:Hydrocodone, U  Positive Cutoff: 40 ng/mL  Methodology: Mass Spectrometry   NORHYDROCODONE  Not Detected Not Detected CM  Not Detected  Not Detected Not Detected   Comment: INTERPRETIVE INFORMATION:Norhydrocodone, U  Positive Cutoff: 100 ng/mL  Methodology: Mass Spectrometry   HYDROMORPHONE  Not  Detected Not Detected CM  Not Detected  Not Detected Not Detected   Comment: INTERPRETIVE INFORMATION:Hydromorphone, U  Positive Cutoff: 20 ng/mL  Methodology: Mass Spectrometry   BUPRENORPHINE  Not Detected Not Detected CM  Not Detected  Not Detected Not Detected   Comment: INTERPRETIVE INFORMATION:Buprenorphine, U  Positive Cutoff: 5 ng/mL  Methodology: Mass Spectrometry   NORUBPRENORPHINE  Not Detected Not Detected CM  Not Detected  Not Detected Not Detected   Comment: INTERPRETIVE INFORMATION:Norbuprenorphine, U  Positive Cutoff: 20 ng/mL  Methodology: Mass Spectrometry   FENTANYL  Not Detected Not Detected CM  Not Detected  Not Detected Not Detected   Comment: INTERPRETIVE INFORMATION:Fentanyl, U  Positive Cutoff: 2 ng/mL  Methodology: Mass Spectrometry   NORFENTANYL  Not Detected Not Detected CM  Not Detected  Not Detected Not Detected   Comment: INTERPRETIVE INFORMATION:Norfentanyl, U  Positive Cutoff: 2 ng/mL  Methodology: Mass Spectrometry   MEPERIDINE METABOLITE  Not Detected Not Detected CM  Not Detected  Not Detected Not Detected   Comment: INTERPRETIVE INFORMATION:Meperidine metabolite, U  Positive Cutoff: 50 ng/mL  Methodology: Mass Spectrometry   TAPENTADOL  Not Detected Not Detected CM  Not Detected  Not Detected Not Detected   Comment: INTERPRETIVE INFORMATION:Tapentadol, U  Positive Cutoff: 100 ng/mL  Methodology: Mass Spectrometry   TAPENTADOL-O-SULF  Not Detected Not Detected CM  Not Detected  Not Detected Not Detected   Comment: INTERPRETIVE INFORMATION:Tapentadol-o-Sulf, U  Positive Cutoff: 200 ng/mL  Methodology: Mass Spectrometry   METHADONE  Negative Negative CM  Not Detected  Not Detected Not Detected   Comment: Presumptive negative by immunoassay. Testing by mass  spectrometry is available on request.  INTERPRETIVE INFORMATION: Methadone Screen, U  Positive Cutoff: 150 ng/mL  Methodology: Immunoassay   TRAMADOL  Negative Negative CM  Not Detected  Not Detected Not Detected   Comment:  Presumptive negative by immunoassay. Testing by mass  spectrometry is available on request.  INTERPRETIVE INFORMATION:Tramadol Screen, U  Positive Cutoff: 100 ng/mL  Methodology: Immunoassay   AMPHETAMINE  Not Detected Not Detected CM  Not Detected  Not Detected Not Detected   Comment: INTERPRETIVE INFORMATION:Amphetamine, U  Positive Cutoff: 50 ng/mL  Methodology: Mass Spectrometry   METHAMPHETAMINE  Not Detected Not Detected CM  Not Detected  Not Detected Not Detected   Comment: INTERPRETIVE INFORMATION:Methamphetamine, U  Positive Cutoff: 200 ng/mL  Methodology: Mass Spectrometry   MDMA- ECSTASY  Not Detected Not Detected CM  Not Detected  Not Detected Not Detected   Comment: INTERPRETIVE INFORMATION:MDMA, U  Positive Cutoff: 200 ng/mL  Methodology: Mass Spectrometry   MDA  Not Detected Not Detected CM  Not Detected  Not Detected Not Detected   Comment: INTERPRETIVE INFORMATION:MDA, U  Positive Cutoff: 200 ng/mL  Methodology: Mass Spectrometry   MDEA- Deanna  Not Detected Not Detected CM  Not Detected  Not Detected Not Detected   Comment: INTERPRETIVE INFORMATION:MDEA, U  Positive Cutoff: 200 ng/mL  Methodology: Mass Spectrometry   METHYLPHENIDATE  Not Detected Not Detected CM  Not Detected  Not Detected Not Detected   Comment: INTERPRETIVE INFORMATION:Methylphenidate, U  Positive Cutoff: 100 ng/mL  Methodology: Mass Spectrometry   PHENTERMINE  Not Detected Not Detected CM  Not Detected  Not Detected Not Detected   Comment: INTERPRETIVE INFORMATION:Phentermine, U  Positive Cutoff: 100 ng/mL  Methodology: Mass Spectrometry   BENZOYLECGONINE  Negative Negative CM  Not Detected  Not Detected Not Detected   Comment: Presumptive negative by immunoassay. Testing by mass  spectrometry is available on request.  INTERPRETIVE INFORMATION:Cocaine Screen, U  Positive Cutoff: 150 ng/mL  Methodology: Immunoassay   ALPRAZOLAM  Not Detected Not Detected CM  Present  Not Detected Present   Comment: INTERPRETIVE  INFORMATION:Alprazolam, U  Positive Cutoff: 40 ng/mL  Methodology: Mass Spectrometry   ALPHA-OH-ALPRAZOLAM  Present Present CM  Present  Present Not Detected   Comment: INTERPRETIVE INFORMATION:Alpha-OH-Alprazolam, U  Positive Cutoff: 20 ng/mL  Methodology: Mass Spectrometry   CLONAZEPAM  Not Detected Not Detected CM  Not Detected  Not Detected Not Detected   Comment: INTERPRETIVE INFORMATION:Clonazepam, U  Positive Cutoff: 20 ng/mL  Methodology: Mass Spectrometry   7-AMINOCLONAZEPAM  Not Detected Not Detected CM  Not Detected  Not Detected Not Detected   Comment: INTERPRETIVE INFORMATION:7-Aminoclonazepam, U  Positive Cutoff: 40 ng/mL  Methodology: Mass Spectrometry   DIAZEPAM  Not Detected Not Detected CM  Not Detected  Not Detected Not Detected   Comment: INTERPRETIVE INFORMATION:Diazepam, U  Positive Cutoff: 50 ng/mL  Methodology: Mass Spectrometry   NORDIAZEPAM  Not Detected Not Detected CM  Not Detected  Not Detected Not Detected   Comment: INTERPRETIVE INFORMATION:Nordiazepam, U  Positive Cutoff: 50 ng/mL  Methodology: Mass Spectrometry   OXAZEPAM  Not Detected Not Detected CM  Not Detected  Not Detected Not Detected   Comment: INTERPRETIVE INFORMATION:Oxazepam, U  Positive Cutoff: 50 ng/mL  Methodology: Mass Spectrometry   TEMAZEPAM  Not Detected Not Detected CM  Not Detected  Not Detected Not Detected   Comment: INTERPRETIVE INFORMATION:Temazepam, U  Positive Cutoff: 50 ng/mL  Methodology: Mass Spectrometry   Lorazepam  Not Detected Not Detected CM  Not Detected  Not Detected Not Detected   Comment: INTERPRETIVE INFORMATION:Lorazepam, U  Positive Cutoff: 60 ng/mL  Methodology: Mass Spectrometry   MIDAZOLAM  Not Detected Not Detected CM  Not Detected  Not Detected Not Detected   Comment: INTERPRETIVE INFORMATION:Midazolam, U  Positive Cutoff: 20 ng/mL  Methodology: Mass Spectrometry   ZOLPIDEM  Not Detected Not Detected CM  Not Detected  Not Detected Not Detected   Comment: INTERPRETIVE INFORMATION:Zolpidem,  U  Positive Cutoff: 20 ng/mL  Methodology: Mass Spectrometry   BARBITURATES  PresumptivePOS PresumptivePOS CM  Present  Present Present   Comment: Presumptive positive by immunoassay. Testing by mass  spectrometry is available on request.  INTERPRETIVE INFORMATION:Barbiturates Screen, U  Positive Cutoff: 200 ng/mL  Methodology: Immunoassay   Creatinine, Urine 20.0 - 400.0 mg/dL 23.9 30.4 49.0 R 27.8 18.0 Low  R 113.0 58.9   ETHYL GLUCURONIDE  Negative Negative CM  Not Detected  Not Detected Not Detected   Comment: Presumptive negative by immunoassay. Testing by mass  spectrometry is available on request.  INTERPRETIVE INFORMATION:Ethyl Glucuronide Screen, U  Positive Cutoff: 500 ng/mL  Methodology: Immunoassay   MARIJUANA METABOLITE  Negative Negative CM  Not Detected  Not Detected Not Detected   Comment: Presumptive negative by immunoassay. Testing by mass  spectrometry is available on request.  INTERPRETIVE INFORMATION: THC (Cannabinoids) Screen, U  Positive Cutoff: 50 ng/mL  Methodology: Immunoassay   PCP  Negative Negative CM  Not Detected  Not Detected Not Detected   Comment: Presumptive negative by immunoassay. Testing by mass  spectrometry is available on request.  INTERPRETIVE INFORMATION:Phencyclidine Screen, U  Positive Cutoff: 25 ng/mL  Methodology: Immunoassay   CARISOPRODOL  Negative Negative CM  Not Detected CM  Not Detected CM Not Detected CM   Comment: Presumptive negative by immunoassay. Testing by mass  spectrometry is available on request.  INTERPRETIVE INFORMATION: Carisoprodol Screen, U  Positive Cutoff: 100 ng/mL  Methodology: Immunoassay  The carisoprodol immunoassay has cross-reactivity to  carisoprodol and meprobamate.   Naloxone  Not Detected Not Detected CM  Not Detected      Comment: INTERPRETIVE INFORMATION:Naloxone, U  Positive Cutoff: 100 ng/mL  Methodology: Mass Spectrometry   Gabapentin  Present Present CM  Present      Comment: INTERPRETIVE INFORMATION:Gabapentin, U  Positive  Cutoff: 3,000 ng/mL  Methodology: Mass Spectrometry   Pregabalin  Not Detected Not Detected CM  Not Detected      Comment: INTERPRETIVE INFORMATION:Pregabalin, U  Positive Cutoff: 3,000 ng/mL  Methodology: Mass Spectrometry   Alpha-OH-Midazolam  Not Detected Not Detected CM  Not Detected      Comment: INTERPRETIVE INFORMATION:Alpha-OH-Midazolam, U  Positive Cutoff: 20 ng/mL  Methodology: Mass Spectrometry   Zolpidem Metabolite  Not Detected Not Detected CM  Not Detected      Comment: INTERPRETIVE INFORMATION:Zolpidem Metabolite, U  Positive Cutoff: 100 ng/mL  Methodology: Mass Spectrometry

## 2025-01-28 RX ORDER — OXYCODONE AND ACETAMINOPHEN 10; 325 MG/1; MG/1
1 TABLET ORAL EVERY 8 HOURS PRN
Qty: 90 TABLET | Refills: 0 | Status: SHIPPED | OUTPATIENT
Start: 2025-01-28 | End: 2025-02-28

## 2025-01-31 ENCOUNTER — TELEPHONE (OUTPATIENT)
Facility: CLINIC | Age: 55
End: 2025-01-31
Payer: MEDICARE

## 2025-01-31 NOTE — TELEPHONE ENCOUNTER
Called PT today about scheduling a virtual visit for there medication refill. PT did not  left a message.

## 2025-02-03 RX ORDER — PRIMIDONE 50 MG/1
TABLET ORAL
Qty: 900 TABLET | Refills: 12 | Status: SHIPPED | OUTPATIENT
Start: 2025-02-03

## 2025-02-16 DIAGNOSIS — M62.838 MUSCLE SPASM: ICD-10-CM

## 2025-02-17 RX ORDER — CYCLOBENZAPRINE HCL 10 MG
10 TABLET ORAL 3 TIMES DAILY PRN
Qty: 270 TABLET | Refills: 0 | Status: SHIPPED | OUTPATIENT
Start: 2025-02-17 | End: 2025-05-21

## 2025-02-24 ENCOUNTER — OFFICE VISIT (OUTPATIENT)
Dept: PODIATRY | Facility: CLINIC | Age: 55
End: 2025-02-24
Payer: MEDICARE

## 2025-02-24 ENCOUNTER — TELEPHONE (OUTPATIENT)
Dept: WOUND CARE | Facility: HOSPITAL | Age: 55
End: 2025-02-24
Payer: MEDICARE

## 2025-02-24 ENCOUNTER — HOSPITAL ENCOUNTER (OUTPATIENT)
Dept: RADIOLOGY | Facility: HOSPITAL | Age: 55
Discharge: HOME | End: 2025-02-24
Attending: PODIATRIST
Payer: MEDICARE

## 2025-02-24 VITALS — BODY MASS INDEX: 29.48 KG/M2 | WEIGHT: 150.13 LBS | HEIGHT: 60 IN

## 2025-02-24 DIAGNOSIS — S91.109A OPEN WOUND OF TOE, INITIAL ENCOUNTER: Primary | ICD-10-CM

## 2025-02-24 DIAGNOSIS — S91.109A OPEN WOUND OF TOE, INITIAL ENCOUNTER: ICD-10-CM

## 2025-02-24 DIAGNOSIS — L97.522 FOOT ULCERATION, LEFT, WITH FAT LAYER EXPOSED: ICD-10-CM

## 2025-02-24 PROCEDURE — 99999 PR PBB SHADOW E&M-EST. PATIENT-LVL V: CPT | Mod: PBBFAC,,, | Performed by: PODIATRIST

## 2025-02-24 PROCEDURE — 87070 CULTURE OTHR SPECIMN AEROBIC: CPT | Performed by: PODIATRIST

## 2025-02-24 PROCEDURE — 87077 CULTURE AEROBIC IDENTIFY: CPT | Performed by: PODIATRIST

## 2025-02-24 PROCEDURE — 99214 OFFICE O/P EST MOD 30 MIN: CPT | Mod: S$PBB,,, | Performed by: PODIATRIST

## 2025-02-24 PROCEDURE — 87186 SC STD MICRODIL/AGAR DIL: CPT | Performed by: PODIATRIST

## 2025-02-24 PROCEDURE — 73630 X-RAY EXAM OF FOOT: CPT | Mod: 26,50,, | Performed by: RADIOLOGY

## 2025-02-24 PROCEDURE — 87075 CULTR BACTERIA EXCEPT BLOOD: CPT | Performed by: PODIATRIST

## 2025-02-24 PROCEDURE — 73630 X-RAY EXAM OF FOOT: CPT | Mod: TC,50,FY,PO

## 2025-02-24 PROCEDURE — 99215 OFFICE O/P EST HI 40 MIN: CPT | Mod: PBBFAC,PO | Performed by: PODIATRIST

## 2025-02-24 RX ORDER — DOXYCYCLINE 100 MG/1
100 CAPSULE ORAL EVERY 12 HOURS
Qty: 20 CAPSULE | Refills: 0 | Status: SHIPPED | OUTPATIENT
Start: 2025-02-24

## 2025-02-24 NOTE — PROGRESS NOTES
Subjective:     Patient ID: Ari Sainz is a 54 y.o. adult.    Chief Complaint: Foot Problem (Wound on left foot )    Ari is a 54 y.o. adult who presents to the clinic for evaluation and treatment of high risk feet. Ari has a past medical history of Abdominal hernia without obstruction and without gangrene (04/16/2024), Abnormal CT scan, pelvis (10/27/2019), Abnormal thyroid blood test (05/06/2019), Adrenal cyst, Adrenal insufficiency, Blister- healing (01/09/2020), Chronic bilateral low back pain with bilateral sciatica (03/19/2019), Congenital adrenal hyperplasia, Hepatitis B core antibody positive (11/06/2020), IGT (impaired glucose tolerance) (11/05/2019), Insomnia due to medical condition, Multiple closed traumatic fractures of multiple bones of hip and pelvis with routine healing, subsequent encounter (09/27/2019), Pancreatic cyst (11/02/2020), Restless leg syndrome, S/P lumbar laminectomy (10/28/2019), Spinal stenosis, Spinal stenosis of lumbar region with neurogenic claudication (03/19/2019), Status post sex reassignment surgery (03/19/2019), and Unintentional weight loss (07/21/2020). The patient's chief complaint is foot ulcer right 5th toe x several weeks after applying callus remover, Left plantar foot- reports stepping on a piece of plastic while walking barefoot.       PCP: Siva Mitchell MD    Date Last Seen by PCP: none found        Hemoglobin A1C   Date Value Ref Range Status   04/16/2024 5.4 4.0 - 5.6 % Final     Comment:     ADA Screening Guidelines:  5.7-6.4%  Consistent with prediabetes  >or=6.5%  Consistent with diabetes    High levels of fetal hemoglobin interfere with the HbA1C  assay. Heterozygous hemoglobin variants (HbS, HgC, etc)do  not significantly interfere with this assay.   However, presence of multiple variants may affect accuracy.     04/10/2023 5.3 4.0 - 5.6 % Final     Comment:     ADA Screening Guidelines:  5.7-6.4%  Consistent with prediabetes  >or=6.5%   Consistent with diabetes    High levels of fetal hemoglobin interfere with the HbA1C  assay. Heterozygous hemoglobin variants (HbS, HgC, etc)do  not significantly interfere with this assay.   However, presence of multiple variants may affect accuracy.     01/03/2023 5.3 4.0 - 5.6 % Final     Comment:     ADA Screening Guidelines:  5.7-6.4%  Consistent with prediabetes  >or=6.5%  Consistent with diabetes    High levels of fetal hemoglobin interfere with the HbA1C  assay. Heterozygous hemoglobin variants (HbS, HgC, etc)do  not significantly interfere with this assay.   However, presence of multiple variants may affect accuracy.         Review of Systems   Constitutional: Negative for chills.   Cardiovascular:  Negative for chest pain and claudication.   Respiratory:  Negative for cough.    Skin:  Positive for color change, dry skin and nail changes.   Musculoskeletal:  Positive for joint pain.   Gastrointestinal:  Negative for nausea.   Neurological:  Positive for paresthesias. Negative for numbness.   Psychiatric/Behavioral:  The patient is not nervous/anxious.         Objective:     Physical Exam  Constitutional:       Appearance: He is well-developed.      Comments: Oriented to time, place, and person.   Cardiovascular:      Comments: DP and PT pulses are palpable bilaterally. 3 sec capillary refill time and toes and feet are warm to touch proximally .  There is  hair growth on the feet and toes b/l. There is no edema b/l. No spider veins or varicosities present b/l.     Musculoskeletal:      Comments: Equinus noted b/l ankles with < 10 deg DF noted. MMT 5/5 in DF/PF/Inv/Ev resistance with no reproduction of pain in any direction. Passive range of motion of ankle and pedal joints is painless b/l.     Feet:      Right foot:      Skin integrity: No callus or dry skin.      Left foot:      Skin integrity: No callus or dry skin.   Lymphadenopathy:      Comments: Negative lymphadenopathy bilateral popliteal fossa and  tarsal tunnel.   Skin:     Comments: Right 5th toe ulceration purulent drainage     Left plantar foot ulceration fibrogranular base.      Neurological:      Mental Status: He is alert.      Comments: Light touch, proprioception, and sharp/dull sensation are all intact bilaterally. Protective threshold with the [place] monofilament is intact bilaterally.    Psychiatric:         Behavior: Behavior is cooperative.           Assessment:      Encounter Diagnoses   Name Primary?    Open wound of toe, initial encounter Yes    Foot ulceration, left, with fat layer exposed      Plan:     Ari was seen today for foot problem.    Diagnoses and all orders for this visit:    Open wound of toe, initial encounter  -     Ambulatory referral/consult to Wound Clinic; Future  -     X-Ray Foot Complete Bilateral; Future  -     Aerobic culture (Specify Source)  -     CULTURE, ANAEROBE    Foot ulceration, left, with fat layer exposed    Other orders  -     doxycycline (VIBRAMYCIN) 100 MG Cap; Take 1 capsule (100 mg total) by mouth every 12 (twelve) hours.      I counseled the patient on his conditions, their implications and medical management.        Debridement: With verbal consent, nonviable tissues on the bilateral foot were debrided beyond sub q utilizing a  sterile No. 3 scalpel and forceps. Minimal bleeding controlled with direct pressure  The patient tolerated this well.   Culture obtained right 5th toe  Xray B/L    DARCO B/L     Rx. Doxycycline    Dressings: iodosorb  Offloading:Foam    Follow-up:Patient is to return to the clinic in 2 weeks wound care  for follow-up but should call [facility] immediately if any signs of infection, such as fever, chills, sweats, increased redness or pain.    Short-term goals include maintaining good offloading and minimizing bioburden, promoting granulation and epithelialization to healing.  Long-term goals include keeping the wound healed by good offloading and medical management under the  direction of internist.    Referral placed to wound care

## 2025-02-26 LAB — BACTERIA SPEC AEROBE CULT: ABNORMAL

## 2025-02-27 ENCOUNTER — PATIENT MESSAGE (OUTPATIENT)
Dept: PODIATRY | Facility: CLINIC | Age: 55
End: 2025-02-27
Payer: MEDICARE

## 2025-02-28 LAB — BACTERIA SPEC ANAEROBE CULT: NORMAL

## 2025-03-02 DIAGNOSIS — M62.838 MUSCLE SPASM: ICD-10-CM

## 2025-03-03 RX ORDER — OXYCODONE AND ACETAMINOPHEN 10; 325 MG/1; MG/1
1 TABLET ORAL EVERY 8 HOURS PRN
Qty: 90 TABLET | Refills: 0 | Status: SHIPPED | OUTPATIENT
Start: 2025-03-03 | End: 2025-04-05

## 2025-03-03 NOTE — TELEPHONE ENCOUNTER
Patient requesting refill on percocet  Last office visit 12/4/25   shows last refill on 1/29/25  Patient does have a pain contract on file with [facility] department  Patient last UDS 12/5/24

## 2025-03-09 DIAGNOSIS — T40.2X5A THERAPEUTIC OPIOID INDUCED CONSTIPATION: ICD-10-CM

## 2025-03-09 DIAGNOSIS — K59.03 THERAPEUTIC OPIOID INDUCED CONSTIPATION: ICD-10-CM

## 2025-03-11 RX ORDER — NALOXEGOL OXALATE 25 MG/1
25 TABLET, FILM COATED ORAL DAILY
Qty: 30 TABLET | Refills: 2 | Status: SHIPPED | OUTPATIENT
Start: 2025-03-11

## 2025-03-12 ENCOUNTER — PATIENT MESSAGE (OUTPATIENT)
Dept: PODIATRY | Facility: CLINIC | Age: 55
End: 2025-03-12
Payer: MEDICARE

## 2025-03-12 ENCOUNTER — TELEPHONE (OUTPATIENT)
Dept: WOUND CARE | Facility: HOSPITAL | Age: 55
End: 2025-03-12
Payer: MEDICARE

## 2025-03-24 ENCOUNTER — OFFICE VISIT (OUTPATIENT)
Dept: PAIN MEDICINE | Facility: CLINIC | Age: 55
End: 2025-03-24
Payer: MEDICARE

## 2025-03-24 ENCOUNTER — PATIENT MESSAGE (OUTPATIENT)
Dept: PAIN MEDICINE | Facility: CLINIC | Age: 55
End: 2025-03-24

## 2025-03-24 DIAGNOSIS — M62.838 MUSCLE SPASM: ICD-10-CM

## 2025-03-24 DIAGNOSIS — M54.12 CERVICAL RADICULOPATHY: ICD-10-CM

## 2025-03-24 DIAGNOSIS — Z98.890 S/P LUMBAR LAMINECTOMY: ICD-10-CM

## 2025-03-24 DIAGNOSIS — G89.4 CHRONIC PAIN SYNDROME: Primary | ICD-10-CM

## 2025-03-24 DIAGNOSIS — Z79.891 ENCOUNTER FOR LONG-TERM OPIATE ANALGESIC USE: ICD-10-CM

## 2025-03-24 DIAGNOSIS — M79.2 NEUROPATHIC PAIN: ICD-10-CM

## 2025-03-24 PROCEDURE — 98006 SYNCH AUDIO-VIDEO EST MOD 30: CPT | Mod: 95,,, | Performed by: NURSE PRACTITIONER

## 2025-03-24 RX ORDER — OXYCODONE AND ACETAMINOPHEN 10; 325 MG/1; MG/1
1 TABLET ORAL EVERY 8 HOURS PRN
Qty: 90 TABLET | Refills: 0 | Status: SHIPPED | OUTPATIENT
Start: 2025-04-04 | End: 2025-05-04

## 2025-03-24 NOTE — PROGRESS NOTES
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 stenosis at T10-T11 and mild neural foraminal narrowing at T1-T2 and T10-T11.  4. Additional findings as detailed in the body of the report.     Past Medical History:   Diagnosis Date    Abdominal hernia without obstruction and without gangrene 04/16/2024    Abnormal CT scan, pelvis 10/27/2019    Abnormal thyroid blood test 05/06/2019    Adrenal cyst     left    Adrenal insufficiency     Blister- healing 01/09/2020    Chronic bilateral low back pain with bilateral sciatica 03/19/2019    Congenital adrenal hyperplasia     Hepatitis B core antibody positive 11/06/2020    Negative sAg, suggests previous exposure but no chronic/active Hep B. At risk for reactivation with any immunosuppression medication, steroids, chemo, etc.      IGT (impaired glucose tolerance) 11/05/2019    Insomnia due to medical condition     Multiple closed traumatic fractures of multiple bones of hip and pelvis with routine healing, subsequent encounter 09/27/2019    Pancreatic cyst 11/02/2020    Restless leg syndrome     S/P lumbar laminectomy 10/28/2019    Spinal stenosis     Spinal stenosis of lumbar region with neurogenic claudication 03/19/2019    Status post sex reassignment surgery 03/19/2019    Hx of Congenital Adrenal Hyperplasia    Unintentional weight loss 07/21/2020     Past Surgical History:   Procedure Laterality Date    BACK  SURGERY      BREAST SURGERY  1986,2001    Implants, removal    CHOLECYSTECTOMY N/A 05/18/2021    Procedure: CHOLECYSTECTOMY;  Surgeon: Antonio Brandt MD;  [address]  Service: General;  Laterality: N/A;    COLONOSCOPY N/A 6/29/2023    Procedure: COLONOSCOPY;  Surgeon: Genia Ku MD;  Location: [facility];  Service: Colon and Rectal;  Laterality: N/A;    ENDOSCOPIC ULTRASOUND OF UPPER GASTROINTESTINAL TRACT N/A 12/03/2020    Procedure: ULTRASOUND, UPPER GI TRACT, ENDOSCOPIC;  Surgeon: Jonathan Sharma MD;  Location: [facility] (2ND FLR);  Service: Endoscopy;  Laterality: N/A;  elevated alk phos, panc cysts, liver biopsy   11/30-covid pcw-tb    ENDOSCOPIC ULTRASOUND OF UPPER GASTROINTESTINAL TRACT N/A 05/17/2021    Procedure: ULTRASOUND, UPPER GI TRACT, ENDOSCOPIC;  Surgeon: Claudio Salvador MD;  [facility]  Service: Endoscopy;  Laterality: N/A;    ERCP N/A 05/17/2021    Procedure: ERCP (ENDOSCOPIC RETROGRADE CHOLANGIOPANCREATOGRAPHY);  Surgeon: Claudio Salvador MD;  [facility]  Service: Endoscopy;  Laterality: N/A;    gender surgery      multiple surgeries since birth    HYSTERECTOMY  2003    INJECTION OF ANESTHETIC AGENT AROUND NERVE Right 11/19/2020    Procedure: BLOCK, NERVE, GLENOHUMERAL  need consent;  Surgeon: Messi Cabello MD;  Location: [facility];  Service: Pain Management;  Laterality: Right;    JOINT REPLACEMENT  2/2020, 1/2021    Knees    KNEE ARTHROPLASTY Left 01/25/2021    Procedure: ARTHROPLASTY, KNEE:DEPUY-ATTUNE REVISION: SERINA iASSIST:CABLES ON HOLD;  Surgeon: Donte Andrade III, MD;  Location: [facility];  Service: Orthopedics;  Laterality: Left;    LAMINECTOMY  09/13/2019    LAPAROSCOPIC CHOLECYSTECTOMY N/A 05/17/2021    Procedure: CHOLECYSTECTOMY, LAPAROSCOPIC;  Surgeon: Calvin Wilson MD;  [address];  Service: General;  Laterality: N/A;    LAPAROSCOPIC CHOLECYSTECTOMY N/A 05/18/2021    Procedure:  CHOLECYSTECTOMY, LAPAROSCOPIC;  Surgeon: Antonio Brandt MD;  Location: [facility] OR 2ND FLR;  Service: General;  Laterality: N/A;    RADIOFREQUENCY ABLATION Left 2019    Procedure: RADIOFREQUENCY ABLATION, LEFT L3,4,5;  Surgeon: Messi Cabello MD;  Location: [facility] PAIN MGT;  Service: Pain Management;  Laterality: Left;  1 of 2    RADIOFREQUENCY ABLATION Right 2019    Procedure: RADIOFREQUENCY ABLATION, RIGHT L3,4,5;  Surgeon: Messi Cabello MD;  Location: [facility];  Service: Pain Management;  Laterality: Right;  2of 2    REPAIR, HERNIA, INCISIONAL N/A 2024    Procedure: OPEN REPAIR, HERNIA, INCISIONAL, POSSIBLE MESH;  Surgeon: Messi Diaz MD;  Location: [facility] OR 2ND FLR;  Service: General;  Laterality: N/A;  AM CONSENT    ROBOT-ASSISTED LAPAROSCOPIC RECTOPEXY N/A 2023    Procedure: ROBOTIC RECTOPEXY;  Surgeon: Genia Ku MD;  Location: [facility];  Service: Colon and Rectal;  Laterality: N/A;    SPINE SURGERY      2019     TOTAL KNEE ARTHROPLASTY Right 2020    Procedure: ARTHROPLASTY, KNEE, TOTAL;  Surgeon: Donte Andrade III, MD;  Location: [facility] OR [facility] FLR;  Service: Orthopedics;  Laterality: Right;     Social History     Socioeconomic History    Marital status:      Spouse name: Kristy Sainz    Number of children: 1   Occupational History     Comment:  and artist   Tobacco Use    Smoking status: Former     Current packs/day: 0.00     Types: Cigarettes     Quit date: 2009     Years since quittin.2     Passive exposure: Never    Smokeless tobacco: Never    Tobacco comments:     was not a daily smoker-    Substance and Sexual Activity    Alcohol use: Not Currently    Drug use: No    Sexual activity: Yes     Partners: Female     Birth control/protection: None   Social History Narrative    Lives in a house with his wife      Social Drivers of Health     Financial Resource Strain: Low Risk  (2024)    Overall Financial Resource  Strain (CARDIA)     Difficulty of Paying Living Expenses: Not hard at all   Food Insecurity: No Food Insecurity (12/4/2024)    Hunger Vital Sign     Worried About Running Out of Food in the Last Year: Never true     Ran Out of Food in the Last Year: Never true   Transportation Needs: No Transportation Needs (11/26/2023)    PRAPARE - Transportation     Lack of Transportation (Medical): No     Lack of Transportation (Non-Medical): No   Physical Activity: Insufficiently Active (12/4/2024)    Exercise Vital Sign     Days of Exercise per Week: 5 days     Minutes of Exercise per Session: 20 min   Stress: No Stress Concern Present (12/4/2024)    [employer] [place] [employer] - Occupational Stress Questionnaire     Feeling of Stress : Only a little   Housing Stability: Low Risk  (11/26/2023)    Housing Stability Vital Sign     Unable to Pay for Housing in the Last Year: No     Number of Places Lived in the Last Year: 1     Unstable Housing in the Last Year: No     Family History   Problem Relation Name Age of Onset    Cancer Mother Taina Bourgeois         Kidney    Early death Mother Taina Bourgeois         58    Heart disease Father      Autoimmune disease Sister      Diabetes Maternal Uncle Trev Santos     Hypertension Maternal Grandmother Simi Santos     Stroke Maternal Grandmother Simi Santos     Diabetes Maternal Grandfather Bj Santos     Cancer Maternal Grandfather Bj Santos         Colon    Breast cancer Neg Hx      Ovarian cancer Neg Hx      Vaginal cancer Neg Hx      Endometrial cancer Neg Hx      Cervical cancer Neg Hx      Cirrhosis Neg Hx         Review of patient's allergies indicates:   Allergen Reactions    Bactrim [sulfamethoxazole-trimethoprim] Itching    Penicillins Rash       Current Outpatient Medications   Medication Sig    ALPRAZolam (XANAX) 1 MG tablet Take 1 tablet (1 mg total) by mouth 3 (three) times daily as needed for Anxiety.    ascorbic acid, vitamin C, (VITAMIN C) 1000 MG tablet Take  1,000 mg by mouth once daily.    atorvastatin (LIPITOR) 20 MG tablet Take 1 tablet (20 mg total) by mouth once daily.    cholecalciferol, vitamin D3, 125 mcg (5,000 unit) capsule Take 1 capsule by mouth Daily.    cranberry fruit extract (CRANBERRY ORAL) Take by mouth.    cyanocobalamin (VITAMIN B-12) 1000 MCG tablet Take 100 mcg by mouth once daily.    cyclobenzaprine (FLEXERIL) 10 MG tablet Take 1 tablet (10 mg total) by mouth 3 (three) times daily as needed for Muscle spasms.    diclofenac sodium (VOLTAREN) 1 % Gel Apply 2 g topically once daily.    docusate sodium (COLACE) 100 MG capsule Take 1 capsule (100 mg total) by mouth 2 (two) times daily as needed for Constipation.    doxycycline (VIBRAMYCIN) 100 MG Cap Take 1 capsule (100 mg total) by mouth every 12 (twelve) hours.    gabapentin (NEURONTIN) 800 MG tablet Take 1 tablet by mouth three times a day and take 2 tablets at night (5 tablets a day, 4000mg)    hydrocortisone (ANUSOL-HC) 2.5 % rectal cream Place rectally 2 (two) times daily.    ipratropium (ATROVENT) 21 mcg (0.03 %) nasal spray 2 sprays by Each Nostril route 4 (four) times daily as needed for Rhinitis.    ketoconazole (NIZORAL) 2 % cream Apply topically once daily.    Lactobacillus acidophilus Cap Take by mouth once daily.     lamoTRIgine (LAMICTAL) 200 MG tablet Take 1 tablet (200 mg total) by mouth 2 (two) times daily.    loratadine (CLARITIN) 10 mg tablet Take 10 mg by mouth once daily.    magnesium 250 mg Tab Take 1 tablet by mouth Daily.    naloxegoL (MOVANTIK) 25 mg tablet Take 1 tablet (25 mg total) by mouth once daily. Take one hour prior to breakfast    naproxen sodium (ANAPROX) 220 MG tablet Take 220 mg by mouth.    oxyCODONE-acetaminophen (PERCOCET)  mg per tablet Take 1 tablet by mouth every 8 (eight) hours as needed for Pain.    predniSONE (DELTASONE) 5 MG tablet Take 1 tablet (5 mg total) by mouth once daily. Double the dose if sick    primidone (MYSOLINE) 50 MG Tab Take 200mg  (4 tabs) every morning/150mg (3 tabs ) at noon/150mg (3 tabs) every evening    rOPINIRole (REQUIP) 4 MG tablet Take 1 tablet (4 mg total) by mouth once daily.    testosterone (ANDROGEL) 1 % (50 mg/5 gram) GlPk Apply 5 g topically once daily.    triamcinolone acetonide 0.1% (KENALOG) 0.1 % cream Apply topically 2 (two) times daily.     No current facility-administered medications for this visit.       REVIEW OF SYSTEMS:    GENERAL:  No weight loss, malaise or fevers.  HEENT:   No recent changes in vision or hearing  NECK:  Negative for lumps, no difficulty with swallowing.  RESPIRATORY:  Negative for cough, wheezing or shortness of breath, patient denies any recent URI.  CARDIOVASCULAR:  Negative for chest pain, leg swelling or palpitations.  GI:  Negative for abdominal discomfort, blood in stools or black stools or change in bowel habits.  MUSCULOSKELETAL:  See HPI.  SKIN:  Negative for lesions, rash, and itching.  PSYCH:  Significant psychosocial stressors including PTSD for sex re-assignment surgery.  Patient's sleep is disturbed secondary to pain.  HEMATOLOGY/LYMPHOLOGY:  Negative for prolonged bleeding, bruising easily or swollen nodes.  Patient is not currently taking any anti-coagulants  ENDO: No history of diabetes or thyroid dysfunction  NEURO:   No history of headaches, syncope, paralysis, seizures or tremors.  All other reviewed and negative other than HPI.    OBJECTIVE:    PHYSICAL EXAMINATION:    General appearance: Well appearing, in no acute distress, alert and oriented x3.  Psych:  Mood and affect appropriate.  Skin: Skin color normal, no rashes or lesions, in both upper and lower body.  Extremities: Moves all visualized extremities freely.      PREVIOUS PHYSICAL EXAMINATION:     GENERAL: Well appearing, in no acute distress, alert and oriented x3.  PSYCH:  Mood and affect appropriate.  SKIN:  Well-healed incisions without any evidence of infection  HEAD/FACE:  Normocephalic, atraumatic.   PULM: No  evidence of respiratory difficulty, symmetric chest rise.  EXT:  Well healed scar to both knees from TKA. Superficial abrasions to both knees. Medial and lateral joint line tenderness to both knees.  BACK:  There is significant pain with palpation over the facet joints and paraspinals of the lumbar spine bilaterally. There is decreased range of motion with extension to 15 degrees, and facet loading maneuvers cause reproducible pain.    NEURO:  No clonus. Cranial nerves grossly intact.  GAIT: Antalgic- ambulates with Straight cane.    Lab Results   Component Value Date    WBC 7.77 12/31/2024    HGB 12.7 (L) 12/31/2024    HCT 38.5 (L) 12/31/2024     (H) 12/31/2024     12/31/2024     CMP  Sodium   Date Value Ref Range Status   12/31/2024 134 (L) 136 - 145 mmol/L Final     Potassium   Date Value Ref Range Status   12/31/2024 4.8 3.5 - 5.1 mmol/L Final     Chloride   Date Value Ref Range Status   12/31/2024 103 95 - 110 mmol/L Final     CO2   Date Value Ref Range Status   12/31/2024 23 23 - 29 mmol/L Final     Glucose   Date Value Ref Range Status   12/31/2024 98 70 - 110 mg/dL Final     BUN   Date Value Ref Range Status   12/31/2024 9 6 - 20 mg/dL Final     Creatinine   Date Value Ref Range Status   12/31/2024 0.7 0.5 - 1.4 mg/dL Final     Calcium   Date Value Ref Range Status   12/31/2024 8.9 8.7 - 10.5 mg/dL Final     Total Protein   Date Value Ref Range Status   12/31/2024 7.1 6.0 - 8.4 g/dL Final     Albumin   Date Value Ref Range Status   12/31/2024 3.9 3.5 - 5.2 g/dL Final     Total Bilirubin   Date Value Ref Range Status   12/31/2024 0.2 0.1 - 1.0 mg/dL Final     Comment:     For infants and newborns, interpretation of results should be based  on gestational age, weight and in agreement with clinical  observations.    Premature Infant recommended reference ranges:  Up to 24 hours.............<8.0 mg/dL  Up to 48 hours............<12.0 mg/dL  3-5 days..................<15.0 mg/dL  6-29  days.................<15.0 mg/dL       Alkaline Phosphatase   Date Value Ref Range Status   12/31/2024 104 40 - 150 U/L Final     AST   Date Value Ref Range Status   12/31/2024 33 10 - 40 U/L Final     ALT   Date Value Ref Range Status   12/31/2024 39 10 - 44 U/L Final     Anion Gap   Date Value Ref Range Status   12/31/2024 8 8 - 16 mmol/L Final     eGFR if    Date Value Ref Range Status   08/23/2021 >60.0 >60 mL/min/1.73 m^2 Final     eGFR if non    Date Value Ref Range Status   08/23/2021 >60.0 >60 mL/min/1.73 m^2 Final     Comment:     Calculation used to obtain the estimated glomerular filtration  rate (eGFR) is the CKD-EPI equation.        Lab Results   Component Value Date    HGBA1C 5.4 04/16/2024     Lab Results   Component Value Date    TSH 1.857 04/16/2024       ASSESSMENT: 54 y.o. year old adult with chronic back and knee pain, consistent with the following diagnoses:    Encounter Diagnoses   Name Primary?    Chronic pain syndrome Yes    Cervical radiculopathy     S/P lumbar laminectomy     Encounter for long-term opiate analgesic use     Neuropathic pain          PLAN:   We discussed with the patient the assessment and recommendations. The following is the plan we agreed on:    Images and medication list reviewed and discussed with patient   Continue with oxycodone 10/325 mg TID-QID PRN   Urine drug screen collected last OV. Results continue to be positive for barbiturates. The patient is prescribed Primidone.    reviewed and appears to be appropriate.   Continue with home exercise therapy.   We discussed follow up with orthopedics if left shoulder pain persists.   Follow up in 3 months for medication management.      - Dr. Farmer was consulted on the patient and agrees with this plan.      The above plan and management options were discussed at length with patient. Patient is in agreement with the above and verbalized understanding.     Alejandra Phoenix,  FNP  03/24/2025

## 2025-05-06 DIAGNOSIS — M62.838 MUSCLE SPASM: ICD-10-CM

## 2025-05-06 RX ORDER — CYCLOBENZAPRINE HCL 10 MG
10 TABLET ORAL 3 TIMES DAILY PRN
Qty: 270 TABLET | Refills: 0 | Status: SHIPPED | OUTPATIENT
Start: 2025-05-06 | End: 2025-08-04

## 2025-05-06 RX ORDER — OXYCODONE AND ACETAMINOPHEN 10; 325 MG/1; MG/1
1 TABLET ORAL EVERY 8 HOURS PRN
Qty: 90 TABLET | Refills: 0 | Status: SHIPPED | OUTPATIENT
Start: 2025-05-09 | End: 2025-06-09

## 2025-05-06 NOTE — TELEPHONE ENCOUNTER
Patient requesting refill on: Percocet  Last office visit: 03.24.25   shows last refill on: 04.10.25  Patient does have a pain contract on file with [facility]  Patient last UDS: 12.05.24

## 2025-05-22 ENCOUNTER — TELEPHONE (OUTPATIENT)
Dept: PSYCHIATRY | Facility: CLINIC | Age: 55
End: 2025-05-22
Payer: MEDICARE

## 2025-05-22 ENCOUNTER — PATIENT MESSAGE (OUTPATIENT)
Dept: PSYCHIATRY | Facility: CLINIC | Age: 55
End: 2025-05-22
Payer: MEDICARE

## 2025-05-27 ENCOUNTER — PATIENT MESSAGE (OUTPATIENT)
Dept: PSYCHIATRY | Facility: CLINIC | Age: 55
End: 2025-05-27
Payer: MEDICARE

## 2025-05-27 DIAGNOSIS — K59.03 THERAPEUTIC OPIOID INDUCED CONSTIPATION: ICD-10-CM

## 2025-05-27 DIAGNOSIS — F41.9 ANXIETY: ICD-10-CM

## 2025-05-27 DIAGNOSIS — F39 MOOD DISORDER: ICD-10-CM

## 2025-05-27 DIAGNOSIS — G25.81 RLS (RESTLESS LEGS SYNDROME): ICD-10-CM

## 2025-05-27 DIAGNOSIS — M62.838 MUSCLE SPASM: ICD-10-CM

## 2025-05-27 DIAGNOSIS — T40.2X5A THERAPEUTIC OPIOID INDUCED CONSTIPATION: ICD-10-CM

## 2025-05-27 DIAGNOSIS — M54.12 CERVICAL RADICULOPATHY: Primary | ICD-10-CM

## 2025-05-27 DIAGNOSIS — Z87.890 STATUS POST SEX REASSIGNMENT SURGERY: ICD-10-CM

## 2025-05-27 RX ORDER — ATORVASTATIN CALCIUM 20 MG/1
TABLET, FILM COATED ORAL
Refills: 0 | OUTPATIENT
Start: 2025-05-27

## 2025-05-27 RX ORDER — CYCLOBENZAPRINE HCL 10 MG
10 TABLET ORAL 3 TIMES DAILY PRN
Qty: 90 TABLET | Refills: 2 | Status: SHIPPED | OUTPATIENT
Start: 2025-05-27

## 2025-05-27 RX ORDER — GABAPENTIN 800 MG/1
800 TABLET ORAL 3 TIMES DAILY
Qty: 90 TABLET | Refills: 2 | Status: SHIPPED | OUTPATIENT
Start: 2025-05-27

## 2025-05-27 NOTE — TELEPHONE ENCOUNTER
Care Due:                  Date            Visit Type   Department     Provider  --------------------------------------------------------------------------------                                ESTABLISHED                              PATIENT -    [facility] INTERNAL  Last Visit: 08-      [facility]      MEDICINE       Liz Ding  Next Visit: None Scheduled  None         None Found                                                            Last  Test          Frequency    Reason                     Performed    Due Date  --------------------------------------------------------------------------------    Lipid Panel.  12 months..  atorvastatin.............  04- 04-    [facility] Embedded Care Due Messages. Reference number: [id number].   5/27/2025 2:39:58 PM CDT

## 2025-05-27 NOTE — TELEPHONE ENCOUNTER
Refill Decision Note  Quick DC. Inappropriate Request     Ari Sainz  is requesting a refill authorization.  Brief Assessment and Rationale for Refill:  Quick Discontinue     Medication Therapy Plan:       Medication Reconciliation Completed: No   Comments:   Pharmacy is requesting new scripts for the following medications without required information, (sig/ frequency/qty/etc)     Pharmacies have been requesting medications for patients without required information, (sig, frequency, qty, etc.). In addition, requests are sent for medication(s) pt. are currently not taking, and medications patients have never taken.    We have spoken to the pharmacies about these request types and advised their teams previously that we are unable to assess these New Script requests and require all details for these requests. This is a known issue and has been reported.       Provider Staff:  Action required for this patient    Requires labs      Please see care gap opportunities below in Care Due Message.    Thanks!  [facility]     Appointments      Date Provider   Last Visit   4/16/2024 Siva Mitchell MD   Next Visit   Visit date not found Siva Mitchell MD          Note composed:3:02 PM 05/27/2025

## 2025-05-28 ENCOUNTER — TELEPHONE (OUTPATIENT)
Facility: CLINIC | Age: 55
End: 2025-05-28
Payer: MEDICARE

## 2025-05-28 RX ORDER — TESTOSTERONE GEL, 1% 10 MG/G
5 GEL TRANSDERMAL DAILY
Qty: 90 PACKET | Refills: 0 | Status: SHIPPED | OUTPATIENT
Start: 2025-05-28

## 2025-05-28 RX ORDER — ROPINIROLE 4 MG/1
4 TABLET, FILM COATED ORAL NIGHTLY
Qty: 30 TABLET | Refills: 3 | Status: SHIPPED | OUTPATIENT
Start: 2025-05-28

## 2025-05-28 RX ORDER — PRIMIDONE 50 MG/1
TABLET ORAL
Qty: 300 TABLET | Refills: 3 | Status: SHIPPED | OUTPATIENT
Start: 2025-05-28

## 2025-05-28 RX ORDER — PREDNISONE 5 MG/1
5 TABLET ORAL DAILY
Qty: 120 TABLET | Refills: 3 | Status: SHIPPED | OUTPATIENT
Start: 2025-05-28

## 2025-05-28 RX ORDER — NALOXEGOL OXALATE 25 MG/1
25 TABLET, FILM COATED ORAL DAILY
Qty: 30 TABLET | Refills: 0 | Status: SHIPPED | OUTPATIENT
Start: 2025-05-28

## 2025-05-28 RX ORDER — ALPRAZOLAM 1 MG/1
1 TABLET ORAL 3 TIMES DAILY PRN
Qty: 90 TABLET | Refills: 0 | Status: SHIPPED | OUTPATIENT
Start: 2025-05-28

## 2025-05-28 RX ORDER — LAMOTRIGINE 200 MG/1
200 TABLET ORAL 2 TIMES DAILY
Qty: 180 TABLET | Refills: 0 | Status: SHIPPED | OUTPATIENT
Start: 2025-05-28

## 2025-05-28 NOTE — TELEPHONE ENCOUNTER
LOV 1/8/2024  Pended Rx for primadone 200 mg q am and 150 mg at noon and poli.    Ropinerole 4 mg at hs also pended.

## 2025-05-28 NOTE — TELEPHONE ENCOUNTER
Called patient spouse in regards to scheduling a vv with Dr. Esteban. Patient's spouse confirmed virtual visit time and date. Patient's spouse also confirmed education on joining a virtual visit.

## 2025-05-30 DIAGNOSIS — E78.2 MIXED HYPERLIPIDEMIA: Primary | ICD-10-CM

## 2025-05-30 RX ORDER — ATORVASTATIN CALCIUM 20 MG/1
20 TABLET, FILM COATED ORAL DAILY
Qty: 90 TABLET | Refills: 3 | Status: SHIPPED | OUTPATIENT
Start: 2025-05-30

## 2025-05-30 NOTE — TELEPHONE ENCOUNTER
Refill Encounter    PCP Visits: Recent Visits  08/29/2024 with Dr. Turner  07/26/2024 with SARINA Grullon      Last 3 Blood Pressure:   BP Readings from Last 3 Encounters:   12/31/24 110/68   12/04/24 117/69   08/26/24 108/67     Preferred Pharmacy:   [facility] - [address] [address]  Phone: [phone] Fax: [phone]    Requested RX:  Requested Prescriptions     Pending Prescriptions Disp Refills    atorvastatin (LIPITOR) 20 MG tablet 90 tablet 3     Sig: Take 1 tablet (20 mg total) by mouth once daily.      RX Route: Normal

## 2025-05-30 NOTE — TELEPHONE ENCOUNTER
----- Message from Meche sent at 5/30/2025  1:57 PM CDT -----  Call the clinic reply in [facility]: Y Please refill the medication(s) listed below. Please call the patient when the prescription(s) is ready for  at this phone number.[phone] #1:atorvastatin (LIPITOR) 20 MG tabletMedication #2:Preferred Pharmacy:  [phone]    ref number [id number] - [address] [address]Kmlhq: [phone] Fax: [phone]

## 2025-06-02 DIAGNOSIS — E78.2 MIXED HYPERLIPIDEMIA: ICD-10-CM

## 2025-06-02 RX ORDER — ATORVASTATIN CALCIUM 20 MG/1
20 TABLET, FILM COATED ORAL DAILY
Qty: 90 TABLET | Refills: 3 | Status: SHIPPED | OUTPATIENT
Start: 2025-06-02

## 2025-06-06 ENCOUNTER — PATIENT MESSAGE (OUTPATIENT)
Dept: PAIN MEDICINE | Facility: CLINIC | Age: 55
End: 2025-06-06
Payer: MEDICARE

## 2025-06-13 ENCOUNTER — OFFICE VISIT (OUTPATIENT)
Dept: PSYCHIATRY | Facility: CLINIC | Age: 55
End: 2025-06-13
Payer: MEDICARE

## 2025-06-13 ENCOUNTER — PATIENT MESSAGE (OUTPATIENT)
Dept: PSYCHIATRY | Facility: CLINIC | Age: 55
End: 2025-06-13
Payer: MEDICARE

## 2025-06-13 DIAGNOSIS — F40.00 AGORAPHOBIA: Primary | ICD-10-CM

## 2025-06-13 DIAGNOSIS — F39 MOOD DISORDER: ICD-10-CM

## 2025-06-13 DIAGNOSIS — F41.9 ANXIETY: ICD-10-CM

## 2025-06-13 RX ORDER — ALPRAZOLAM 1 MG/1
1 TABLET ORAL 3 TIMES DAILY PRN
Qty: 90 TABLET | Refills: 5 | Status: SHIPPED | OUTPATIENT
Start: 2025-06-13

## 2025-06-13 RX ORDER — LAMOTRIGINE 200 MG/1
200 TABLET ORAL 2 TIMES DAILY
Qty: 180 TABLET | Refills: 2 | Status: SHIPPED | OUTPATIENT
Start: 2025-06-13

## 2025-06-13 NOTE — PROGRESS NOTES
"Outpatient Psychiatry Follow-Up Visit (MD/NP)    6/13/2025    Clinical Status of Patient:  Outpatient (Ambulatory)    Chief Complaint:  Ari Sainz is a 55 y.o. adult who presents today for follow-up of mood disorder and anxiety.  Met with patient.      The patient location is: [place]   The chief complaint leading to consultation is: anxiety/mood disorder     Visit type: audiovisual    Face to Face time with patient: 14 minutes  27 minutes minutes of total time spent on the encounter, which includes face to face time and non-face to face time preparing to see the patient (eg, review of tests), Obtaining and/or reviewing separately obtained history, Documenting clinical information in the electronic or other health record, Independently interpreting results (not separately reported) and communicating results to the patient/family/caregiver, or Care coordination (not separately reported).     Each patient to whom he or she provides medical services by telemedicine is:  (1) informed of the relationship between the physician and patient and the respective role of any other health care provider with respect to management of the patient; and (2) notified that he or she may decline to receive medical services by telemedicine and may withdraw from such care at any time.    Notes:       Interval History and Content of Current Session:    Social/medical updates -- no major social changes. Entering another virtual art show in September.     "I'm doing great."  "I'm active but I stay here."    Mood -- presents with euthymic mood and affect. Denies persistent depressed mood, denies anhedonia. No thoughts of death or SI. No ricardo.   Anxiety -- remains relatively adequately treated overall. Continues to express symptoms that align with agoraphobia. No change in alprazolam use, typically BID, sometimes TID when having to leave the home.  Attention -- no concerns expressed  Psychosis -- no  Sleep -- adequate, denies " significant insomnia   Energy -- adequate  Appetite -- adequate    Substance use -- denies all     Medications:    Lamotrigine 200 mg BID -- compliant, denies SE including rash  Alprazolam 1 mg TID -- takes BID most days, denies SE    Brief synopsis:  Sx stable, no SI/HI/AVH      Review of Systems   PSYCHIATRIC: Pertinant items are noted in the narrative.  CONSTITUTIONAL: See above.   MUSCULOSKELETAL: chronic pain  GASTROINTESTINAL: No nausea, vomiting, pain, constipation or diarrhea.    Past Medical, Family and Social History: The patient's past medical, family and social history have been reviewed and updated as appropriate within the electronic medical record - see encounter notes.    Compliance: see above    Side effects: see above    Risk Parameters:  Patient reports no suicidal ideation  Patient reports no homicidal ideation  Patient reports no self-injurious behavior  Patient reports no violent behavior    Exam (detailed: at least 9 elements; comprehensive: all 15 elements)   Constitutional  Vitals:  Most recent vital signs, dated less than 90 days prior to this appointment, were reviewed.   There were no vitals filed for this visit.     General:  unremarkable, age appropriate     Musculoskeletal  Muscle Strength/Tone:  not examined, telemedicine    Gait & Station:  [facility]SHA telemedicine     Psychiatric  Speech:  no latency; no press   Mood & Affect:  euthymic  congruent and appropriate   Thought Process:  normal and logical   Associations:  intact   Thought Content:  normal, no suicidality, no homicidality, delusions, or paranoia   Insight:  intact   Judgement: behavior is adequate to circumstances   Orientation:  grossly intact   Memory: intact for content of interview   Language: grossly intact   Attention Span & Concentration:  able to focus   Fund of Knowledge:  intact and appropriate to age and level of education     Assessment and Diagnosis   Status/Progress: Based on the examination today, the patient's  problem(s) is/are adequately but not ideally controlled.  New problems have not been presented today.   Co-morbidities, Diagnostic uncertainty, and Lack of compliance are not complicating management of the primary condition.  There are no active rule-out diagnoses for this patient at this time.     General Impression: Ari Sainz is a 55 y.o. adult with a psychiatric hx of PTSD and anxiety presenting without active symptoms of PTSD or HELDER. Unclear origin of PTSD diagnosis. 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 Medical hx significant for URBINA, fibromyalgia, lumbar laminectomy decompression from L1-S1, KIARA. Reports being prescribed lamotrigine and alprazolam for 10+ years. Remains unclear as to why patient is prescribed mood stabilizer vs SSRI/SNRI antidepressant. Chart review reveals previous prescription for lurasidone. Remote hx of self-injurious behavior in adolescence. No hx of suicide attempts. No hx of drug abuse. Lives with his wife. 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    01/30/23 -- Euthymic mood. Denies anxiety concerns. No evident PTSD sx    05/02/23 -- no appreciable change in sx. Anxiety sx explored in more depth today, appears consistent with agoraphobia.     09/06/23 -- sx stable. Continue lamotrigine and alprazolam as prescribed    12/01/23 -- sx stable. No medication changes made    06/12/24 -- sx stable, no medication changes made     12/04/25 -- sx at baseline. Clearer picture of agoraphobia. Reports side effects with previous SSRI/SNRI trials. No medication changes    06/13/25 -- sx stable. Continue medications as prescribed.         ICD-10-CM ICD-9-CM   1. Agoraphobia  F40.00 300.22   2. Anxiety  F41.9 300.00   3. Mood disorder  F39 296.90       Intervention/Counseling/Treatment Plan   Reviewed patient's symptoms,and medication regimen  Continue medication regimen as  prescribed  Have discussed risks of long-term benzodiazepine use with patient multiple times, including today, especially with chronic opioid prescription  Patient expressed understanding of risks   Though chronic benzodiazepine use is not ideal, sx have been adequately controlled for an extended period of time on current regimen, patient utilizes medication appropriately, and has hx of multiple failed psychotropic trials for anxiety  Patient has been reluctant to consider a taper when discussed in the past    Benzodiazepines: discussed and educated the patient that benzodiazepines are generally not intended for prolonged use. They are likely to cause tolerance and dependence over time, and can cause disinhibition, cognitive impairment, and increased risk of falls. They are not recommended to be used concurrently with narcotics, hypnotics, other benzodiazepines, or alcohol, as this can increase the risk of profound sedation, respiratory depression, coma, and even death. A plan to taper benzodiazepines over time was also discussed, along with options for alternative anxiolytics as suitable replacements. Patient voiced understanding.    Medication Management  Prescription drug management was employed during the encounter, as medications were prescribed, or considered but not prescribed.   [facility] reviewed  The risks and benefits of medication were discussed with the patient.  Possible expectable adverse effects of any current or proposed individual psychotropic agents were discussed with this patient.  Counseling was provided on the importance of full compliance with any prescribed medication.  Detailed instructions were provided to the patient regarding the administration of any prescribed medication.  The most common and rare side effects were reviewed, including discussion of potential permanent movement disorder and disfiguring conditions if applicable to any prescribed medication  The medication plan was  developed through shared decision-making with the patient, with consideration of the efficacy and safety of the medications and the patients preferences in treatment   Patient voiced understanding    Return to Clinic: 3-6 months

## 2025-06-23 ENCOUNTER — PATIENT MESSAGE (OUTPATIENT)
Dept: PAIN MEDICINE | Facility: CLINIC | Age: 55
End: 2025-06-23
Payer: MEDICARE

## 2025-06-30 ENCOUNTER — OFFICE VISIT (OUTPATIENT)
Facility: CLINIC | Age: 55
End: 2025-06-30
Payer: MEDICARE

## 2025-06-30 DIAGNOSIS — R25.1 TREMOR: Primary | ICD-10-CM

## 2025-06-30 DIAGNOSIS — G25.81 RLS (RESTLESS LEGS SYNDROME): ICD-10-CM

## 2025-06-30 RX ORDER — PRIMIDONE 50 MG/1
200 TABLET ORAL 4 TIMES DAILY
Qty: 480 TABLET | Refills: 11 | Status: SHIPPED | OUTPATIENT
Start: 2025-06-30 | End: 2026-06-30

## 2025-06-30 NOTE — ASSESSMENT & PLAN NOTE
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 Well managed with primidone.      He is habituating to primidone  Could try  Primidone 200mg QID - decrease if sedation

## 2025-06-30 NOTE — PROGRESS NOTES
"  The patient location is: home  The chief complaint leading to consultation is: Temor    Visit type: audiovisual    Face to Face time with patient: 20mins  20 minutes of total time spent on the encounter, which includes face to face time and non-face to face time preparing to see the patient (eg, review of tests), Obtaining and/or reviewing separately obtained history, Documenting clinical information in the electronic or other health record, Independently interpreting results (not separately reported) and communicating results to the patient/family/caregiver, or Care coordination (not separately reported).     Each patient to whom he or she provides medical services by telemedicine is:  (1) informed of the relationship between the physician and patient and the respective role of any other health care provider with respect to management of the patient; and (2) notified that he or she may decline to receive medical services by telemedicine and may withdraw from such care at any time.    Notes:     [facility]    PCP/Referring Provider: No referring provider defined for this encounter.  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 adult with a medical issues significant for Lspine surg 2019 (stenosis), R knee replacement, concussions, adrenal hyperplasia, " "fibromyalgia, coming for help with hand tremors and RLS. For RLS, he's had this for 20 years and feels like it's well controlled. He feels a bilateral leg sensation improved by movement. These sensations start around 9PM. He feels like this is well controlled with 4mg requip at night. Takes Gabapentin 800mg TID and 1600 QHS. Unknown if ETOH sensitive.  Hand tremor for 10 years.  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 Primidone 100mg PO TID. This helps tremors a 1/2 hour after taking primidone. He notices tremors worse when paining upright and less when he's drawing. 30 minutes after primidone he has no tremor. Primidone lasts 3-4 hours.    Balance issues. Falls frequently. Imbalance since 10 years. He attributes this to needing a knee replacement.    Not overly sedated.    Currently on lamictal    Uncle has a tremor.    Medication history:  -Tried propranolol and it did not seem to help      Neuroleptic exposure:  -Was on latuda 2 years ago    PD Review of Symptoms:  Anosmia: none  Depression: as above  Cognitive slowing: mild  Orthostasis: mild  Falls: yes  Micrographia: none  Sleep issues:  -RBD: several reactions to nightmares - some PTSD"    Review of Systems:   Review of Systems   Constitutional:  Negative for fever.   HENT:  Negative for congestion.    Eyes:  Negative for double vision.   Respiratory:  Negative for cough and shortness of breath.    Cardiovascular:  Negative for chest pain and leg swelling.   Gastrointestinal:  Negative for nausea.   Genitourinary:  Negative for dysuria.   Musculoskeletal:  Negative for falls.   Skin:  Negative for rash.   Neurological:  Positive for tremors. Negative for speech change and headaches.   Psychiatric/Behavioral:  Negative for depression.          Current Medications:  Outpatient Encounter Medications as of 6/30/2025   Medication Sig Dispense Refill    ALPRAZolam (XANAX) 1 MG tablet Take 1 tablet (1 mg total) by mouth 3 (three) times daily as needed " for Anxiety. 90 tablet 5    ascorbic acid, vitamin C, (VITAMIN C) 1000 MG tablet Take 1,000 mg by mouth once daily.      atorvastatin (LIPITOR) 20 MG tablet Take 1 tablet (20 mg total) by mouth once daily. 90 tablet 3    cholecalciferol, vitamin D3, 125 mcg (5,000 unit) capsule Take 1 capsule by mouth Daily.      cranberry fruit extract (CRANBERRY ORAL) Take by mouth.      cyanocobalamin (VITAMIN B-12) 1000 MCG tablet Take 100 mcg by mouth once daily.      cyclobenzaprine (FLEXERIL) 10 MG tablet Take 1 tablet (10 mg total) by mouth 3 (three) times daily as needed for Muscle spasms. 90 tablet 2    diclofenac sodium (VOLTAREN) 1 % Gel Apply 2 g topically once daily. 100 g 3    docusate sodium (COLACE) 100 MG capsule Take 1 capsule (100 mg total) by mouth 2 (two) times daily as needed for Constipation. 60 capsule 0    gabapentin (NEURONTIN) 800 MG tablet Take 1 tablet (800 mg total) by mouth 3 (three) times daily. 90 tablet 2    hydrocortisone (ANUSOL-HC) 2.5 % rectal cream Place rectally 2 (two) times daily. 28 g 3    ipratropium (ATROVENT) 21 mcg (0.03 %) nasal spray 2 sprays by Each Nostril route 4 (four) times daily as needed for Rhinitis. 30 mL 0    ketoconazole (NIZORAL) 2 % cream Apply topically once daily. 60 g 3    Lactobacillus acidophilus Cap Take by mouth once daily.       lamoTRIgine (LAMICTAL) 200 MG tablet Take 1 tablet (200 mg total) by mouth 2 (two) times daily. 180 tablet 2    loratadine (CLARITIN) 10 mg tablet Take 10 mg by mouth once daily.      magnesium 250 mg Tab Take 1 tablet by mouth Daily.      naloxegoL (MOVANTIK) 25 mg tablet Take 1 tablet (25 mg total) by mouth once daily. 30 tablet 0    naproxen sodium (ANAPROX) 220 MG tablet Take 220 mg by mouth.      oxyCODONE-acetaminophen (PERCOCET)  mg per tablet Take 1 tablet by mouth every 8 (eight) hours as needed for Pain. 90 tablet 0    predniSONE (DELTASONE) 5 MG tablet Take 1 tablet (5 mg total) by mouth once daily. Ok to take extra when  sick 120 tablet 3    primidone (MYSOLINE) 50 MG Tab Take 200 mg every morning, 150 mg at noon and 150 mg at bedtime 300 tablet 3    rOPINIRole (REQUIP) 4 MG tablet Take 1 tablet (4 mg total) by mouth nightly. 30 tablet 3    testosterone (ANDROGEL) 1 % (50 mg/5 gram) GlPk Apply 5 g topically once daily. 90 packet 0    triamcinolone acetonide 0.1% (KENALOG) 0.1 % cream Apply topically 2 (two) times daily. 45 g 0    [DISCONTINUED] ALPRAZolam (XANAX) 1 MG tablet Take 1 tablet (1 mg total) by mouth 3 (three) times daily as needed for Anxiety. 90 tablet 0    [DISCONTINUED] ALPRAZolam (XANAX) 1 MG tablet Take 1 tablet (1 mg total) by mouth 3 (three) times daily as needed for Anxiety. 90 tablet 0    [DISCONTINUED] atorvastatin (LIPITOR) 20 MG tablet Take 1 tablet (20 mg total) by mouth once daily. 90 tablet 3    [DISCONTINUED] lamoTRIgine (LAMICTAL) 200 MG tablet Take 1 tablet (200 mg total) by mouth 2 (two) times daily. 180 tablet 0    [DISCONTINUED] oxyCODONE-acetaminophen (PERCOCET)  mg per tablet Take 1 tablet by mouth every 8 (eight) hours as needed for Pain. 90 tablet 0     No facility-administered encounter medications on file as of 6/30/2025.       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    Cervical radiculopathy    Thoracic myofascial strain    Pelvic fluid collection    Chronic, continuous use of opioids    Primary osteoarthritis of right knee    KIARA (obstructive sleep apnea)    Spinal stenosis    Arthritis of right shoulder region    Status post total left knee replacement 11/2/2020     Pancreatic cyst    Elevated liver enzymes    Hepatitis B core antibody positive    Osteopenia    Left knee pain    Primary osteoarthritis of left knee    Grade III hemorrhoids    Rectal prolapse    Decreased pulses in feet    Overweight (BMI 25.0-29.9)    Mood disorder    Peripheral vascular disease, unspecified    Anxiety       Past Surgical History:  Past Surgical History:   Procedure Laterality Date    BACK SURGERY      BREAST SURGERY  1986,2001    Implants, removal    CHOLECYSTECTOMY N/A 05/18/2021    Procedure: CHOLECYSTECTOMY;  Surgeon: Antonio Brandt MD;  [facility];  Service: General;  Laterality: N/A;    COLONOSCOPY N/A 6/29/2023    Procedure: COLONOSCOPY;  Surgeon: Genia Ku MD;  Location: [facility];  Service: Colon and Rectal;  Laterality: N/A;    ENDOSCOPIC ULTRASOUND OF UPPER GASTROINTESTINAL TRACT N/A 12/03/2020    Procedure: ULTRASOUND, UPPER GI TRACT, ENDOSCOPIC;  Surgeon: Jonathan Sharma MD;  [facility]  Service: Endoscopy;  Laterality: N/A;  elevated alk phos, panc cysts, liver biopsy   11/30-covid pcw-tb    ENDOSCOPIC ULTRASOUND OF UPPER GASTROINTESTINAL TRACT N/A 05/17/2021    Procedure: ULTRASOUND, UPPER GI TRACT, ENDOSCOPIC;  Surgeon: Claudio Salvador MD;  [facility]  Service: Endoscopy;  Laterality: N/A;    ERCP N/A 05/17/2021    Procedure: ERCP (ENDOSCOPIC RETROGRADE CHOLANGIOPANCREATOGRAPHY);  Surgeon: Claudio Salvador MD;  [facility]  Service: Endoscopy;  Laterality: N/A;    gender surgery      multiple surgeries since birth    HYSTERECTOMY  2003    INJECTION OF ANESTHETIC AGENT AROUND NERVE Right 11/19/2020    Procedure: BLOCK, NERVE, GLENOHUMERAL  need consent;  Surgeon: Messi Cabello MD;  Location: [facility] PAIN MGT;  Service: Pain Management;  Laterality: Right;    JOINT REPLACEMENT  2/2020, 1/2021    Knees    KNEE ARTHROPLASTY Left 01/25/2021    Procedure: ARTHROPLASTY, KNEE:DEPUY-ATTUNE REVISION: SERINA  iASSIST:CABLES ON HOLD;  Surgeon: Donte Andrade III, MD;  Location: [facility] OR;  Service: Orthopedics;  Laterality: Left;    LAMINECTOMY  09/13/2019    LAPAROSCOPIC CHOLECYSTECTOMY N/A 05/17/2021    Procedure: CHOLECYSTECTOMY, LAPAROSCOPIC;  Surgeon: Calvin Wilson MD;  Location: [facility] OR 2ND FLR;  Service: General;  Laterality: N/A;    LAPAROSCOPIC CHOLECYSTECTOMY N/A 05/18/2021    Procedure: CHOLECYSTECTOMY, LAPAROSCOPIC;  Surgeon: Antonio Brandt MD;  Location: [facility] OR 2ND FLR;  Service: General;  Laterality: N/A;    RADIOFREQUENCY ABLATION Left 05/09/2019    Procedure: RADIOFREQUENCY ABLATION, LEFT L3,4,5;  Surgeon: Messi Cabello MD;  Location: [facility] PAIN MGT;  Service: Pain Management;  Laterality: Left;  1 of 2    RADIOFREQUENCY ABLATION Right 05/23/2019    Procedure: RADIOFREQUENCY ABLATION, RIGHT L3,4,5;  Surgeon: Messi Cabello MD;  Location: [facility] PAIN MGT;  Service: Pain Management;  Laterality: Right;  2of 2    REPAIR, HERNIA, INCISIONAL N/A 7/31/2024    Procedure: OPEN REPAIR, HERNIA, INCISIONAL, POSSIBLE MESH;  Surgeon: Messi Diaz MD;  Location: [facility] OR 2ND FLR;  Service: General;  Laterality: N/A;  AM CONSENT    ROBOT-ASSISTED LAPAROSCOPIC RECTOPEXY N/A 01/17/2023    Procedure: ROBOTIC RECTOPEXY;  Surgeon: Genia Ku MD;  Location: [facility] OR;  Service: Colon and Rectal;  Laterality: N/A;    SPINE SURGERY      Sept 2019     TOTAL KNEE ARTHROPLASTY Right 01/31/2020    Procedure: ARTHROPLASTY, KNEE, TOTAL;  Surgeon: Donte Andrade III, MD;  Location: [facility] OR 2ND FLR;  Service: Orthopedics;  Laterality: Right;       Current Living Situation: home    Social:  Social History     Socioeconomic History    Marital status:      Spouse name: Kristy Sainz    Number of children: 1   Occupational History     Comment:  and artist   Tobacco Use    Smoking status: Former     Current packs/day: 0.00     Types: Cigarettes     Quit date: 2009     Years since quitting:  16.5     Passive exposure: Never    Smokeless tobacco: Never    Tobacco comments:     was not a daily smoker-    Substance and Sexual Activity    Alcohol use: Not Currently    Drug use: No    Sexual activity: Yes     Partners: Female     Birth control/protection: None   Social History Narrative    Lives in a house with his wife      Social Drivers of Health     Financial Resource Strain: Low Risk  (12/4/2024)    Overall Financial Resource Strain (CARDIA)     Difficulty of Paying Living Expenses: Not hard at all   Food Insecurity: No Food Insecurity (12/4/2024)    Hunger Vital Sign     Worried About Running Out of Food in the Last Year: Never true     Ran Out of Food in the Last Year: Never true   Transportation Needs: No Transportation Needs (11/26/2023)    PRAPARE - Transportation     Lack of Transportation (Medical): No     Lack of Transportation (Non-Medical): No   Physical Activity: Insufficiently Active (12/4/2024)    Exercise Vital Sign     Days of Exercise per Week: 5 days     Minutes of Exercise per Session: 20 min   Stress: No Stress Concern Present (12/4/2024)    [employer] [place] [employer] - Occupational Stress Questionnaire     Feeling of Stress : Only a little   Housing Stability: Low Risk  (11/26/2023)    Housing Stability Vital Sign     Unable to Pay for Housing in the Last Year: No     Number of Places Lived in the Last Year: 1     Unstable Housing in the Last Year: No       Family History:  Family History   Problem Relation Name Age of Onset    Cancer Mother Taina Bourgeois         Kidney    Early death Mother Taina Bourgeois         58    Heart disease Father      Autoimmune disease Sister      Diabetes Maternal Uncle Trev Santos     Hypertension Maternal Grandmother Simi Santos     Stroke Maternal Grandmother Simi Santos     Diabetes Maternal Grandfather Bj Santos     Cancer Maternal Grandfather Bj Santos         Colon    Breast cancer Neg Hx      Ovarian cancer Neg Hx      Vaginal  cancer Neg Hx      Endometrial cancer Neg Hx      Cervical cancer Neg Hx      Cirrhosis Neg Hx         PHYSICAL:  There were no vitals taken for this visit.    Physical Exam  Constitutional: Well-developed, well-nourished, appears stated age  Eyes: No scleral icterus  ENT: Moist oral mucosa  Cardiovascular: No lower extremity edema   Respiratory: No labored breathing   Skin: No rash   Hematologic: No bruising    Other: GI/ deferred   Mental status: Alert and oriented to person, place, time, and situation;   follows commands  Speech: normal (not dysarthric), no aphasia  Cranial nerves:            CN II: Pupils mid-position and equal, not tested light or accommodation  CN III, IV, VI: Extraocular movements full, no nystagmus visualized  CN V: Not tested   CN VII: Face strong and symmetric bilaterally   CN VIII: Hearing intact to voice and conversation   CN IX, X: Palate raises midline and symmetric   CN XI: Strong shoulder shrug B/L  CN XII: Tongue appears midline   Motor: Normal bulk by appearance, no drift   Sensory: Not tested    Gait: Not tested  Deep tendon reflexes: Not tested  Movement/Coordination                    No hypophonic speech.                     No facial masking.  No bradykinesia.                    Bradykinesia  ? Finger taps Finger flicks KAVITHA Heel taps   Left 0 0 0 0   Right 0 0 0 0       Tremor Exam   Arms extended Arms in wing position, fingers almost touching Re-emergent Arms extended wrists extended Intention Resting Kinetic   Left ?++ ? ? ? ? ? ?neg   Right ?+ ? ? ? ? ? ?   Head tremor: No-No Yes-Yes NE     Archimedes Spirals   Left ?nl   Right ?nl       Laboratory Data:  NA    Imaging:  NA    EMG  EMG/NCV with Neurology which showed a bilateral carpal tunnel with concomitant C7 radiculopathy. Previous MRI of the cervical spine did reveal stenosis at C5-6 and C6-7 with moderate neuroforaminal stenosis.    Assessment//Plan:   Problem List Items Addressed This Visit          Neuro     Tremor - Primary    Current Assessment & Plan   Postural hand tremor, affecting his artwork. Well managed with primidone.      He is habituating to primidone  Could try  Primidone 200mg QID - decrease if sedation             RLS (restless legs syndrome)    Current Assessment & Plan   Can continue  Ropinirole 4mg QHS for RLS            This visit covered ongoing complex medical needs extending over multiple office visits covering multiple chronic issues.      Baylee Esteban MD, MS  [facility]  Department of Neurology  Movement Disorders

## 2025-07-01 ENCOUNTER — OFFICE VISIT (OUTPATIENT)
Dept: PAIN MEDICINE | Facility: CLINIC | Age: 55
End: 2025-07-01
Payer: MEDICARE

## 2025-07-01 VITALS
HEIGHT: 60 IN | DIASTOLIC BLOOD PRESSURE: 70 MMHG | RESPIRATION RATE: 19 BRPM | WEIGHT: 144.38 LBS | HEART RATE: 110 BPM | SYSTOLIC BLOOD PRESSURE: 131 MMHG | BODY MASS INDEX: 28.35 KG/M2 | OXYGEN SATURATION: 99 %

## 2025-07-01 DIAGNOSIS — G89.4 CHRONIC PAIN SYNDROME: ICD-10-CM

## 2025-07-01 DIAGNOSIS — T40.2X5A THERAPEUTIC OPIOID INDUCED CONSTIPATION: ICD-10-CM

## 2025-07-01 DIAGNOSIS — M62.838 MUSCLE SPASM: ICD-10-CM

## 2025-07-01 DIAGNOSIS — Z79.891 ENCOUNTER FOR LONG-TERM OPIATE ANALGESIC USE: ICD-10-CM

## 2025-07-01 DIAGNOSIS — M54.12 CERVICAL RADICULOPATHY: Primary | ICD-10-CM

## 2025-07-01 DIAGNOSIS — K59.03 THERAPEUTIC OPIOID INDUCED CONSTIPATION: ICD-10-CM

## 2025-07-01 DIAGNOSIS — Z98.890 S/P LUMBAR LAMINECTOMY: ICD-10-CM

## 2025-07-01 PROCEDURE — 99214 OFFICE O/P EST MOD 30 MIN: CPT | Mod: PBBFAC | Performed by: NURSE PRACTITIONER

## 2025-07-01 PROCEDURE — 99999 PR PBB SHADOW E&M-EST. PATIENT-LVL IV: CPT | Mod: PBBFAC,,, | Performed by: NURSE PRACTITIONER

## 2025-07-01 PROCEDURE — 99214 OFFICE O/P EST MOD 30 MIN: CPT | Mod: S$PBB,,, | Performed by: NURSE PRACTITIONER

## 2025-07-01 RX ORDER — GABAPENTIN 800 MG/1
800 TABLET ORAL 3 TIMES DAILY
Qty: 90 TABLET | Refills: 2 | Status: SHIPPED | OUTPATIENT
Start: 2025-07-01

## 2025-07-01 RX ORDER — CYCLOBENZAPRINE HCL 10 MG
10 TABLET ORAL 3 TIMES DAILY PRN
Qty: 90 TABLET | Refills: 2 | Status: SHIPPED | OUTPATIENT
Start: 2025-07-01

## 2025-07-01 RX ORDER — OXYCODONE AND ACETAMINOPHEN 10; 325 MG/1; MG/1
1 TABLET ORAL EVERY 8 HOURS PRN
Qty: 90 TABLET | Refills: 0 | Status: SHIPPED | OUTPATIENT
Start: 2025-07-10 | End: 2025-08-09

## 2025-07-01 NOTE — PROGRESS NOTES
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 spondylosis contributing to mild spinal canal stenosis at T10-T11 and mild neural foraminal narrowing at T1-T2 and T10-T11.  4. Additional findings as detailed in the body of the report.     Past Medical History:   Diagnosis Date    Abdominal hernia without obstruction and without gangrene 04/16/2024    Abnormal CT scan, pelvis 10/27/2019    Abnormal thyroid blood test 05/06/2019    Adrenal cyst     left    Adrenal insufficiency     Blister- healing 01/09/2020    Chronic bilateral low back pain with bilateral sciatica 03/19/2019    Congenital adrenal hyperplasia     Hepatitis B core antibody positive 11/06/2020    Negative sAg, suggests previous exposure but no chronic/active Hep B. At risk for reactivation with any immunosuppression medication, steroids, chemo, etc.      IGT (impaired glucose tolerance) 11/05/2019    Insomnia due to medical condition     Multiple closed traumatic fractures of multiple bones of hip and pelvis with routine healing, subsequent encounter 09/27/2019    Pancreatic cyst 11/02/2020    Restless leg syndrome     S/P lumbar laminectomy 10/28/2019    Spinal stenosis     Spinal stenosis of lumbar region with neurogenic claudication 03/19/2019    Status post sex reassignment surgery 03/19/2019    Hx of Congenital Adrenal Hyperplasia    Unintentional weight loss 07/21/2020     Past Surgical  History:   Procedure Laterality Date    BACK SURGERY      BREAST SURGERY  1986,2001    Implants, removal    CHOLECYSTECTOMY N/A 05/18/2021    Procedure: CHOLECYSTECTOMY;  Surgeon: Antonio Brandt MD;  [facility];  Service: General;  Laterality: N/A;    COLONOSCOPY N/A 6/29/2023    Procedure: COLONOSCOPY;  Surgeon: Genia Ku MD;  Location: [facility];  Service: Colon and Rectal;  Laterality: N/A;    ENDOSCOPIC ULTRASOUND OF UPPER GASTROINTESTINAL TRACT N/A 12/03/2020    Procedure: ULTRASOUND, UPPER GI TRACT, ENDOSCOPIC;  Surgeon: Jonathan Sharma MD;  Location: [facility] ENDO (2ND FLR);  Service: Endoscopy;  Laterality: N/A;  elevated alk phos, panc cysts, liver biopsy   11/30-covid pcw-tb    ENDOSCOPIC ULTRASOUND OF UPPER GASTROINTESTINAL TRACT N/A 05/17/2021    Procedure: ULTRASOUND, UPPER GI TRACT, ENDOSCOPIC;  Surgeon: Claudio Salvador MD;  Location: [facility] (2ND FLR);  Service: Endoscopy;  Laterality: N/A;    ERCP N/A 05/17/2021    Procedure: ERCP (ENDOSCOPIC RETROGRADE CHOLANGIOPANCREATOGRAPHY);  Surgeon: Claudio Salvador MD;  Location: [facility] (2ND FLR);  Service: Endoscopy;  Laterality: N/A;    gender surgery      multiple surgeries since birth    HYSTERECTOMY  2003    INJECTION OF ANESTHETIC AGENT AROUND NERVE Right 11/19/2020    Procedure: BLOCK, NERVE, GLENOHUMERAL  need consent;  Surgeon: Messi Cabello MD;  [facility];  Service: Pain Management;  Laterality: Right;    JOINT REPLACEMENT  2/2020, 1/2021    Knees    KNEE ARTHROPLASTY Left 01/25/2021    Procedure: ARTHROPLASTY, KNEE:DEPUY-ATTUNE REVISION: SERINA iASSIST:CABLES ON HOLD;  Surgeon: Donte Andrade III, MD;  Location: [facility];  Service: Orthopedics;  Laterality: Left;    LAMINECTOMY  09/13/2019    LAPAROSCOPIC CHOLECYSTECTOMY N/A 05/17/2021    Procedure: CHOLECYSTECTOMY, LAPAROSCOPIC;  Surgeon: Calvin Wilson MD;  [facility];  Service: General;  Laterality: N/A;    LAPAROSCOPIC  CHOLECYSTECTOMY N/A 2021    Procedure: CHOLECYSTECTOMY, LAPAROSCOPIC;  Surgeon: Antonio Brandt MD;  Location: [facility] OR 2ND FLR;  Service: General;  Laterality: N/A;    RADIOFREQUENCY ABLATION Left 2019    Procedure: RADIOFREQUENCY ABLATION, LEFT L3,4,5;  Surgeon: Messi Cabello MD;  Location: [facility] [place], operated [facility];  Service: Pain Management;  Laterality: Left;  1 of 2    RADIOFREQUENCY ABLATION Right 2019    Procedure: RADIOFREQUENCY ABLATION, RIGHT L3,4,5;  Surgeon: Messi Cabello MD;  Location: [facility], [place], operated [facility];  Service: Pain Management;  Laterality: Right;  2of 2    REPAIR, HERNIA, INCISIONAL N/A 2024    Procedure: OPEN REPAIR, HERNIA, INCISIONAL, POSSIBLE MESH;  Surgeon: Messi Diaz MD;  Location: [facility] OR 2ND FLR;  Service: General;  Laterality: N/A;  AM CONSENT    ROBOT-ASSISTED LAPAROSCOPIC RECTOPEXY N/A 2023    Procedure: ROBOTIC RECTOPEXY;  Surgeon: Genia Ku MD;  Location: [facility], [place], [facility] OR;  Service: Colon and Rectal;  Laterality: N/A;    SPINE SURGERY      2019     TOTAL KNEE ARTHROPLASTY Right 2020    Procedure: ARTHROPLASTY, KNEE, TOTAL;  Surgeon: Donte Andrade III, MD;  [facility];  Service: Orthopedics;  Laterality: Right;     Social History     Socioeconomic History    Marital status:      Spouse name: Kristy Sainz    Number of children: 1   Occupational History     Comment:  and artist   Tobacco Use    Smoking status: Former     Current packs/day: 0.00     Types: Cigarettes     Quit date:      Years since quittin.5     Passive exposure: Never    Smokeless tobacco: Never    Tobacco comments:     was not a daily smoker-    Substance and Sexual Activity    Alcohol use: Not Currently    Drug use: No    Sexual activity: Yes     Partners: Female     Birth control/protection: None   Social History Narrative    Lives in a house with his wife      Social Drivers of Health     Financial Resource Strain: Low Risk   (12/4/2024)    Overall Financial Resource Strain (CARDIA)     Difficulty of Paying Living Expenses: Not hard at all   Food Insecurity: No Food Insecurity (12/4/2024)    Hunger Vital Sign     Worried About Running Out of Food in the Last Year: Never true     Ran Out of Food in the Last Year: Never true   Transportation Needs: No Transportation Needs (11/26/2023)    PRAPARE - Transportation     Lack of Transportation (Medical): No     Lack of Transportation (Non-Medical): No   Physical Activity: Insufficiently Active (12/4/2024)    Exercise Vital Sign     Days of Exercise per Week: 5 days     Minutes of Exercise per Session: 20 min   Stress: No Stress Concern Present (12/4/2024)    Citizen of [place] of Occupational Health - Occupational Stress Questionnaire     Feeling of Stress : Only a little   Housing Stability: Low Risk  (11/26/2023)    Housing Stability Vital Sign     Unable to Pay for Housing in the Last Year: No     Number of Places Lived in the Last Year: 1     Unstable Housing in the Last Year: No     Family History   Problem Relation Name Age of Onset    Cancer Mother Taina Bourgeois         Kidney    Early death Mother Taina Bourgeois         58    Heart disease Father      Autoimmune disease Sister      Diabetes Maternal Uncle Trev Santos     Hypertension Maternal Grandmother Simi Santos     Stroke Maternal Grandmother Siim Santos     Diabetes Maternal Grandfather Bj Santos     Cancer Maternal Grandfather Bj Santos         Colon    Breast cancer Neg Hx      Ovarian cancer Neg Hx      Vaginal cancer Neg Hx      Endometrial cancer Neg Hx      Cervical cancer Neg Hx      Cirrhosis Neg Hx         Review of patient's allergies indicates:   Allergen Reactions    Bactrim [sulfamethoxazole-trimethoprim] Itching    Penicillins Rash       Current Outpatient Medications   Medication Sig    ALPRAZolam (XANAX) 1 MG tablet Take 1 tablet (1 mg total) by mouth 3 (three) times daily as needed for Anxiety.    ascorbic acid,  vitamin C, (VITAMIN C) 1000 MG tablet Take 1,000 mg by mouth once daily.    atorvastatin (LIPITOR) 20 MG tablet Take 1 tablet (20 mg total) by mouth once daily.    cholecalciferol, vitamin D3, 125 mcg (5,000 unit) capsule Take 1 capsule by mouth Daily.    cranberry fruit extract (CRANBERRY ORAL) Take by mouth.    cyanocobalamin (VITAMIN B-12) 1000 MCG tablet Take 100 mcg by mouth once daily.    cyclobenzaprine (FLEXERIL) 10 MG tablet Take 1 tablet (10 mg total) by mouth 3 (three) times daily as needed for Muscle spasms.    diclofenac sodium (VOLTAREN) 1 % Gel Apply 2 g topically once daily.    docusate sodium (COLACE) 100 MG capsule Take 1 capsule (100 mg total) by mouth 2 (two) times daily as needed for Constipation.    gabapentin (NEURONTIN) 800 MG tablet Take 1 tablet (800 mg total) by mouth 3 (three) times daily.    hydrocortisone (ANUSOL-HC) 2.5 % rectal cream Place rectally 2 (two) times daily.    ipratropium (ATROVENT) 21 mcg (0.03 %) nasal spray 2 sprays by Each Nostril route 4 (four) times daily as needed for Rhinitis.    ketoconazole (NIZORAL) 2 % cream Apply topically once daily.    Lactobacillus acidophilus Cap Take by mouth once daily.     lamoTRIgine (LAMICTAL) 200 MG tablet Take 1 tablet (200 mg total) by mouth 2 (two) times daily.    loratadine (CLARITIN) 10 mg tablet Take 10 mg by mouth once daily.    magnesium 250 mg Tab Take 1 tablet by mouth Daily.    naloxegoL (MOVANTIK) 25 mg tablet Take 1 tablet (25 mg total) by mouth once daily.    naproxen sodium (ANAPROX) 220 MG tablet Take 220 mg by mouth.    oxyCODONE-acetaminophen (PERCOCET)  mg per tablet Take 1 tablet by mouth every 8 (eight) hours as needed for Pain.    predniSONE (DELTASONE) 5 MG tablet Take 1 tablet (5 mg total) by mouth once daily. Ok to take extra when sick    primidone (MYSOLINE) 50 MG Tab Take 200 mg every morning, 150 mg at noon and 150 mg at bedtime    primidone (MYSOLINE) 50 MG Tab Take 4 tablets (200 mg total) by mouth  "4 (four) times daily.    rOPINIRole (REQUIP) 4 MG tablet Take 1 tablet (4 mg total) by mouth nightly.    testosterone (ANDROGEL) 1 % (50 mg/5 gram) GlPk Apply 5 g topically once daily.    triamcinolone acetonide 0.1% (KENALOG) 0.1 % cream Apply topically 2 (two) times daily.     No current facility-administered medications for this visit.       REVIEW OF SYSTEMS:    GENERAL:  No weight loss, malaise or fevers.  HEENT:   No recent changes in vision or hearing  NECK:  Negative for lumps, no difficulty with swallowing.  RESPIRATORY:  Negative for cough, wheezing or shortness of breath, patient denies any recent URI.  CARDIOVASCULAR:  Negative for chest pain, leg swelling or palpitations.  GI:  Negative for abdominal discomfort, blood in stools or black stools or change in bowel habits.  MUSCULOSKELETAL:  See HPI.  SKIN:  Negative for lesions, rash, and itching.  PSYCH:  Significant psychosocial stressors including PTSD for sex re-assignment surgery.  Patient's sleep is disturbed secondary to pain.  HEMATOLOGY/LYMPHOLOGY:  Negative for prolonged bleeding, bruising easily or swollen nodes.  Patient is not currently taking any anti-coagulants  ENDO: No history of diabetes or thyroid dysfunction  NEURO:   No history of headaches, syncope, paralysis, seizures or tremors.  All other reviewed and negative other than HPI.    OBJECTIVE:    /70 (BP Location: Right arm, Patient Position: Sitting)   Pulse 110   Resp 19   Ht 4' 9" (1.448 m)   Wt 65.5 kg (144 lb 6.4 oz)   SpO2 99%   BMI 31.25 kg/m²       PHYSICAL EXAMINATION:     GENERAL: Well appearing, in no acute distress, alert and oriented x3.  PSYCH:  Mood and affect appropriate.  SKIN:  Well-healed incisions without any evidence of infection  HEAD/FACE:  Normocephalic, atraumatic.   PULM: No evidence of respiratory difficulty, symmetric chest rise.  EXT:  Well healed scar to both knees from TKA. Brace worn to right knee. Medial and lateral joint line tenderness to " both knees.  BACK:  There is significant pain with palpation over the facet joints and paraspinals of the lumbar spine bilaterally. There is decreased range of motion with extension to 15 degrees, and facet loading maneuvers cause reproducible pain.    NEURO:  No clonus. Cranial nerves grossly intact.  GAIT: Antalgic- ambulates with straight cane.    Lab Results   Component Value Date    WBC 7.77 12/31/2024    HGB 12.7 (L) 12/31/2024    HCT 38.5 (L) 12/31/2024     (H) 12/31/2024     12/31/2024     CMP  Sodium   Date Value Ref Range Status   12/31/2024 134 (L) 136 - 145 mmol/L Final     Potassium   Date Value Ref Range Status   12/31/2024 4.8 3.5 - 5.1 mmol/L Final     Chloride   Date Value Ref Range Status   12/31/2024 103 95 - 110 mmol/L Final     CO2   Date Value Ref Range Status   12/31/2024 23 23 - 29 mmol/L Final     Glucose   Date Value Ref Range Status   12/31/2024 98 70 - 110 mg/dL Final     BUN   Date Value Ref Range Status   12/31/2024 9 6 - 20 mg/dL Final     Creatinine   Date Value Ref Range Status   12/31/2024 0.7 0.5 - 1.4 mg/dL Final     Calcium   Date Value Ref Range Status   12/31/2024 8.9 8.7 - 10.5 mg/dL Final     Total Protein   Date Value Ref Range Status   12/31/2024 7.1 6.0 - 8.4 g/dL Final     Albumin   Date Value Ref Range Status   12/31/2024 3.9 3.5 - 5.2 g/dL Final     Total Bilirubin   Date Value Ref Range Status   12/31/2024 0.2 0.1 - 1.0 mg/dL Final     Comment:     For infants and newborns, interpretation of results should be based  on gestational age, weight and in agreement with clinical  observations.    Premature Infant recommended reference ranges:  Up to 24 hours.............<8.0 mg/dL  Up to 48 hours............<12.0 mg/dL  3-5 days..................<15.0 mg/dL  6-29 days.................<15.0 mg/dL       Alkaline Phosphatase   Date Value Ref Range Status   12/31/2024 104 40 - 150 U/L Final     AST   Date Value Ref Range Status   12/31/2024 33 10 - 40 U/L Final      ALT   Date Value Ref Range Status   12/31/2024 39 10 - 44 U/L Final     Anion Gap   Date Value Ref Range Status   12/31/2024 8 8 - 16 mmol/L Final     eGFR if    Date Value Ref Range Status   08/23/2021 >60.0 >60 mL/min/1.73 m^2 Final     eGFR if non    Date Value Ref Range Status   08/23/2021 >60.0 >60 mL/min/1.73 m^2 Final     Comment:     Calculation used to obtain the estimated glomerular filtration  rate (eGFR) is the CKD-EPI equation.        Lab Results   Component Value Date    HGBA1C 5.4 04/16/2024     Lab Results   Component Value Date    TSH 1.857 04/16/2024       ASSESSMENT: 55 y.o. year old adult with chronic back and knee pain, consistent with the following diagnoses:    No diagnosis found.        PLAN:   We discussed with the patient the assessment and recommendations. The following is the plan we agreed on:    Images and medication list reviewed and discussed with patient   Continue with oxycodone 10/325 mg TID-QID PRN   Urine drug screen from 12/4/24. Results continue to be positive for barbiturates. The patient is prescribed Primidone which is consistent.    reviewed and appears to be appropriate.   Continue with home exercise therapy.   We discussed follow up with orthopedics if left shoulder pain persists. He declined at this time.   He declined restarting PT, he will work on increasing home exercises.   Follow up in 3 months for medication management.      - Dr. Farmer was consulted on the patient and agrees with this plan.      The above plan and management options were discussed at length with patient. Patient is in agreement with the above and verbalized understanding.     Alejandra Phoenix, ERIK  07/01/2025

## 2025-07-08 ENCOUNTER — PATIENT MESSAGE (OUTPATIENT)
Dept: PAIN MEDICINE | Facility: CLINIC | Age: 55
End: 2025-07-08
Payer: MEDICARE

## 2025-07-18 ENCOUNTER — PATIENT MESSAGE (OUTPATIENT)
Dept: ENDOCRINOLOGY | Facility: CLINIC | Age: 55
End: 2025-07-18
Payer: MEDICARE

## 2025-07-18 DIAGNOSIS — E25.0 CONGENITAL ADRENAL HYPERPLASIA: Primary | ICD-10-CM

## 2025-07-21 DIAGNOSIS — Z87.890 STATUS POST SEX REASSIGNMENT SURGERY: ICD-10-CM

## 2025-07-21 RX ORDER — TESTOSTERONE GEL, 1% 10 MG/G
5 GEL TRANSDERMAL DAILY
Qty: 90 PACKET | Refills: 1 | Status: SHIPPED | OUTPATIENT
Start: 2025-07-21 | End: 2025-07-22

## 2025-07-22 RX ORDER — TESTOSTERONE GEL, 1% 10 MG/G
5 GEL TRANSDERMAL DAILY
Qty: 30 PACKET | Refills: 5 | Status: SHIPPED | OUTPATIENT
Start: 2025-07-22 | End: 2026-01-20

## 2025-08-08 DIAGNOSIS — F40.00 AGORAPHOBIA: ICD-10-CM

## 2025-08-08 DIAGNOSIS — F41.9 ANXIETY: ICD-10-CM

## 2025-08-08 RX ORDER — ALPRAZOLAM 1 MG/1
1 TABLET ORAL 3 TIMES DAILY PRN
Qty: 90 TABLET | Refills: 5 | Status: SHIPPED | OUTPATIENT
Start: 2025-08-08

## 2025-08-13 ENCOUNTER — PATIENT MESSAGE (OUTPATIENT)
Dept: PAIN MEDICINE | Facility: CLINIC | Age: 55
End: 2025-08-13
Payer: MEDICARE

## 2025-08-13 DIAGNOSIS — M62.838 MUSCLE SPASM: ICD-10-CM

## 2025-08-13 RX ORDER — OXYCODONE AND ACETAMINOPHEN 10; 325 MG/1; MG/1
1 TABLET ORAL EVERY 8 HOURS PRN
Qty: 90 TABLET | Refills: 0 | Status: SHIPPED | OUTPATIENT
Start: 2025-08-13 | End: 2025-08-14

## 2025-08-14 ENCOUNTER — TELEPHONE (OUTPATIENT)
Dept: PAIN MEDICINE | Facility: CLINIC | Age: 55
End: 2025-08-14
Payer: MEDICARE

## 2025-08-14 RX ORDER — OXYCODONE AND ACETAMINOPHEN 10; 325 MG/1; MG/1
1 TABLET ORAL EVERY 8 HOURS PRN
Qty: 90 TABLET | Refills: 0 | Status: SHIPPED | OUTPATIENT
Start: 2025-08-14 | End: 2025-09-13

## (undated) DEVICE — ALCOHOL 70% ISOP W/GREEN 16OZ

## (undated) DEVICE — KIT TOTAL KNEE TKOFG

## (undated) DEVICE — CARTRIDGE OIL

## (undated) DEVICE — SEE MEDLINE ITEM 157125

## (undated) DEVICE — COVER TIP CURVED SCISSORS XI

## (undated) DEVICE — DRAPE CORETEMP FLD WRM 56X62IN

## (undated) DEVICE — ADHESIVE DERMABOND ADVANCED

## (undated) DEVICE — CONNECTOR TUBING STR 5 IN 1

## (undated) DEVICE — SUT MCRYL PLUS 4-0 PS2 27IN

## (undated) DEVICE — SEALER AQUAMANTYS 2.3 BIPOLAR

## (undated) DEVICE — HEMOSTAT SURGICEL NU-KNIT 6X9

## (undated) DEVICE — SEALER VESSEL EXTEND

## (undated) DEVICE — TUBING HF INSUFFLATION W/ FLTR

## (undated) DEVICE — SOL IRR NACL .9% 3000ML

## (undated) DEVICE — DRAPE STERI-DRAPE 1000 17X11IN

## (undated) DEVICE — DRAPE SURG W/TWL 17 5/8X23

## (undated) DEVICE — DRESSING TEGADERM 4.4X5IN

## (undated) DEVICE — Device

## (undated) DEVICE — NDL INSUFFLATION VERRES 120MM

## (undated) DEVICE — SEE MEDLINE ITEM 146298

## (undated) DEVICE — SUT 1 36IN COATED VICRYL UN

## (undated) DEVICE — TOWEL OR DISP STRL BLUE 4/PK

## (undated) DEVICE — BAG TISS RETRV MONARCH 10MM

## (undated) DEVICE — DRESSING AQUACEL AG FOAM 4X4

## (undated) DEVICE — NDL 18GA X1 1/2 REG BEVEL

## (undated) DEVICE — CATH SUCTION 10FR

## (undated) DEVICE — PAD KNEE POLAR XL

## (undated) DEVICE — TOWEL OR XRAY WHITE 17X26IN

## (undated) DEVICE — KIT EVACUATOR 3-SPRING 1/8 DRN

## (undated) DEVICE — SUT 0 54IN COATED VICRYL U

## (undated) DEVICE — SUT STRATAFIX 1 PDS CT-1

## (undated) DEVICE — ELECTRODE BLADE INSULATED 1 IN

## (undated) DEVICE — LUBRICANT SURGILUBE 2 OZ

## (undated) DEVICE — MASK FLYTE HOOD PEEL AWAY

## (undated) DEVICE — SCISSOR 5MMX35CM DIRECT DRIVE

## (undated) DEVICE — CONTAINER SPECIMEN STRL 4OZ

## (undated) DEVICE — SEE MEDLINE ITEM 146347

## (undated) DEVICE — SOL BETADINE 5%

## (undated) DEVICE — SEE MEDLINE ITEM 157150

## (undated) DEVICE — DRAPE INCISE IOBAN 2 23X17IN

## (undated) DEVICE — SUT ETHIBOND EXCEL 0 CT2 30

## (undated) DEVICE — SPONGE GAUZE 16PLY 4X4

## (undated) DEVICE — DRESSING INFOVAC LARGE BLK

## (undated) DEVICE — SUT MONOCYRL 4-0 PS2 UND

## (undated) DEVICE — SYR 50CC LL

## (undated) DEVICE — GOWN SURGICAL X-LARGE

## (undated) DEVICE — DRAPE ABDOMINAL TIBURON 14X11

## (undated) DEVICE — GLOVE GAMMEX SURG LF PI SZ 7.5

## (undated) DEVICE — PUMP COLD THERAPY

## (undated) DEVICE — ELECTRODE REM PLYHSV RETURN 9

## (undated) DEVICE — SUT 0 VICRYL / UR6 (J603)

## (undated) DEVICE — SYS SEE SHARP SCP ANTIFG LNG

## (undated) DEVICE — SEE MEDLINE ITEM 157126

## (undated) DEVICE — TRAY MINOR GEN SURG OMC

## (undated) DEVICE — SUT CTD VICRYL 2-0 CR/CT-2

## (undated) DEVICE — TROCAR SPACEMAKER BLUNT 10MM

## (undated) DEVICE — SUT 3-0 VICRYL / SH (J416)

## (undated) DEVICE — SUT PDS II 0 CT VIL MONO 36

## (undated) DEVICE — BANDAGE ADHESIVE

## (undated) DEVICE — BLADE SAGITTAL 18 X 1.27 X 90M

## (undated) DEVICE — GRASPER EPIX 5X20MM 45CM

## (undated) DEVICE — POWDER ARISTA AH 3G

## (undated) DEVICE — BANDAGE ACE ELASTIC 6"

## (undated) DEVICE — DRESSING AQUACEL FOAM 5 X 5

## (undated) DEVICE — KIT PREVENA INCISION MGMT 13CM

## (undated) DEVICE — DRAPE STERI INSTRUMENT 1018

## (undated) DEVICE — DRAPE LAP T SHT W/ INSTR PAD

## (undated) DEVICE — CLIPPER BLADE MOD 4406 (CAREF)

## (undated) DEVICE — SUT 2/0 36IN COATED VICRYL

## (undated) DEVICE — CAUTERY PUSHBUTTON PENCIL

## (undated) DEVICE — SEE MEDLINE ITEM 156905

## (undated) DEVICE — SEE MEDLINE ITEM 157131

## (undated) DEVICE — DRESSING AQUACEL FOAM 3 X 3

## (undated) DEVICE — DRAPE ARM DAVINCI XI

## (undated) DEVICE — DRESSING ADAPTIC N ADH 3X8IN

## (undated) DEVICE — SUT VICRYL 3-0 27 SH

## (undated) DEVICE — SUT VLOC 90 3-0 V-20 NDL 6

## (undated) DEVICE — SOL ELECTROLUBE ANTI-STIC

## (undated) DEVICE — SYS KNEE EPAK PIN ATTUNE
Type: IMPLANTABLE DEVICE | Site: KNEE | Status: NON-FUNCTIONAL
Removed: 2021-01-25

## (undated) DEVICE — SPONGE PATTY SURGICAL .5X3IN

## (undated) DEVICE — TRAY MINOR GEN SURG

## (undated) DEVICE — KIT IRR SUCTION HND PIECE

## (undated) DEVICE — TROCAR ENDOPATH XCEL 5MM 7.5CM

## (undated) DEVICE — TIP YANKAUERS BULB NO VENT

## (undated) DEVICE — SUT 2-0 VICRYL / SH (J417)

## (undated) DEVICE — DRESSING AQUACEL SACRAL 9 X 9

## (undated) DEVICE — MARKER SKIN STND TIP BLUE BARR

## (undated) DEVICE — SUT VICRYL PLUS 0 CT1 36IN

## (undated) DEVICE — OBTURATOR BLADELESS 8MM XI

## (undated) DEVICE — IRRIGATOR ENDOSCOPY DISP.

## (undated) DEVICE — DRESSING LEUKOPLAST FLEX 1X3IN

## (undated) DEVICE — KIT ANTIFOG

## (undated) DEVICE — SUT V-LOC 180 ABD 2/0 GS-21

## (undated) DEVICE — PAD COLD THERAPY KNEE WRAP ON

## (undated) DEVICE — SUT PDS II 2-0 CT-2 VIL

## (undated) DEVICE — RELOAD SUREFORM 60 3.5 BLU 6R

## (undated) DEVICE — SYR 50ML CATH TIP

## (undated) DEVICE — SEE MEDLINE ITEM 152622

## (undated) DEVICE — PACK SET UP CONVERTORS

## (undated) DEVICE — GAUZE SPONGE 4X4 12PLY

## (undated) DEVICE — GOWN POLY REINF BRTH SLV XL

## (undated) DEVICE — CORD BIPOLAR 12 FOOT

## (undated) DEVICE — PRESSURIZER HI VAC HIP KIT

## (undated) DEVICE — STAPLER SKIN REGULAR

## (undated) DEVICE — DRESSING AQUACEL AG RBBN 2X45

## (undated) DEVICE — RELOAD SUREFORM 45 3.5 BLU 6R

## (undated) DEVICE — BUR BONE CUT MICRO TPS 3X3.8MM

## (undated) DEVICE — DIFFUSER

## (undated) DEVICE — TAPE SILK 3IN

## (undated) DEVICE — CATH ALL PUR URTHL 20FR

## (undated) DEVICE — SOL NS 1000CC

## (undated) DEVICE — YANKAUER OPEN TIP W/O VENT

## (undated) DEVICE — NDL HYPO REG 25G X 1 1/2

## (undated) DEVICE — PACK ROBOTIC

## (undated) DEVICE — KIT SPINAL PATIENT CARE JACK

## (undated) DEVICE — TROCAR ENDOPATH XCEL 5X75MM

## (undated) DEVICE — SOL POVIDONE PREP IODINE 4 OZ

## (undated) DEVICE — CANISTER INFOV.A.C WOUND 500ML

## (undated) DEVICE — SEE MEDLINE ITEM 152515

## (undated) DEVICE — DRAPE COLUMN DAVINCI XI

## (undated) DEVICE — KIT IASSIST KNEE 2-POD

## (undated) DEVICE — RELOAD SUREFORM 45 4.3 GRN 6R

## (undated) DEVICE — KIT WING PAD POSITIONING

## (undated) DEVICE — STAPLER SUREFORM SGL USE 45

## (undated) DEVICE — APPLICATOR CHLORAPREP ORN 26ML

## (undated) DEVICE — IRRIGATOR ENDOWRIST XI SUCTION

## (undated) DEVICE — BLADE DUAL CUT SAG 35X64X.89MM

## (undated) DEVICE — SYR IRRIGATION BULB STER 60ML

## (undated) DEVICE — BLADE RECIP RIBBED

## (undated) DEVICE — TUBE FRAZIER 5MM 2FT SOFT TIP

## (undated) DEVICE — SET TRI-LUMEN FILTERED TUBE

## (undated) DEVICE — DURAPREP SURG SCRUB 26ML

## (undated) DEVICE — CANNULA REDUCER 12-8MM

## (undated) DEVICE — SYS REVOLUTION CEMENT MIXING

## (undated) DEVICE — BRACE KNEE T SCOPE PREMIER

## (undated) DEVICE — BLADE SURG CARBON STEEL SZ11

## (undated) DEVICE — SUT PDS II 1 CT VIL MONO 36

## (undated) DEVICE — SUT PROLENE 2-0 SH 36IN BLU

## (undated) DEVICE — TRAY FOLEY 16FR INFECTION CONT

## (undated) DEVICE — SEE MEDLINE ITEM 157144

## (undated) DEVICE — SEAL UNIVERSAL 5MM-8MM XI

## (undated) DEVICE — TUBING SUC UNIV W/CONN 12FT

## (undated) DEVICE — SUT 3-0 VICRYL SH CR/8 18

## (undated) DEVICE — DRAPE C-ARMOR EQUIPMENT COVER

## (undated) DEVICE — CLIP HEMO-LOK ML

## (undated) DEVICE — UNDERGLOVES BIOGEL PI SIZE 8.5

## (undated) DEVICE — SYS KNEE EPAK PIN ATTUNE
Type: IMPLANTABLE DEVICE | Site: KNEE | Status: NON-FUNCTIONAL
Removed: 2020-01-31

## (undated) DEVICE — CANNULA SEAL 12MM

## (undated) DEVICE — KIT SURGIFLO HEMOSTATIC MATRIX

## (undated) DEVICE — GLOVE BIOGEL SKINSENSE PI 8.0

## (undated) DEVICE — DRAPE INCISE IOBAN 2 23X33IN

## (undated) DEVICE — BRUSH SCRUB HIBICLENS 4%

## (undated) DEVICE — SUT VICRYL PLUS 4-0 P3 18IN

## (undated) DEVICE — COVER BACK TABLE 72X21

## (undated) DEVICE — DRESSING ABSRBNT ISLAND 3.6X8